# Patient Record
Sex: FEMALE | Race: WHITE | Employment: OTHER | URBAN - NONMETROPOLITAN AREA
[De-identification: names, ages, dates, MRNs, and addresses within clinical notes are randomized per-mention and may not be internally consistent; named-entity substitution may affect disease eponyms.]

---

## 2017-01-11 ENCOUNTER — HOSPITAL ENCOUNTER (EMERGENCY)
Age: 62
Discharge: HOME | End: 2017-01-11
Payer: COMMERCIAL

## 2017-01-11 ENCOUNTER — APPOINTMENT (OUTPATIENT)
Dept: GENERAL RADIOLOGY | Age: 62
End: 2017-01-11
Payer: COMMERCIAL

## 2017-01-11 VITALS
SYSTOLIC BLOOD PRESSURE: 126 MMHG | HEART RATE: 89 BPM | OXYGEN SATURATION: 100 % | RESPIRATION RATE: 18 BRPM | DIASTOLIC BLOOD PRESSURE: 77 MMHG | WEIGHT: 230 LBS | BODY MASS INDEX: 38.32 KG/M2 | HEIGHT: 65 IN | TEMPERATURE: 97.3 F

## 2017-01-11 DIAGNOSIS — S20.211A RIB CONTUSION, RIGHT, INITIAL ENCOUNTER: Primary | ICD-10-CM
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
## 2017-01-11 PROCEDURE — 71100 X-RAY EXAM RIBS UNI 2 VIEWS: CPT

## 2017-01-11 PROCEDURE — 99282 EMERGENCY DEPT VISIT SF MDM: CPT | Performed by: NURSE PRACTITIONER

## 2017-01-11 PROCEDURE — 99283 EMERGENCY DEPT VISIT LOW MDM: CPT

## 2017-01-11 PROCEDURE — 96372 THER/PROPH/DIAG INJ SC/IM: CPT

## 2017-01-11 PROCEDURE — 6360000002 HC RX W HCPCS: Performed by: NURSE PRACTITIONER

## 2017-01-11 PROCEDURE — 71020 XR CHEST STANDARD TWO VW: CPT

## 2017-01-11 RX ORDER — NAPROXEN 500 MG/1
500 TABLET ORAL 2 TIMES DAILY
Qty: 20 TABLET | Refills: 0 | Status: ON HOLD | OUTPATIENT
Start: 2017-01-11 | End: 2017-09-02 | Stop reason: HOSPADM

## 2017-01-11 RX ORDER — ATORVASTATIN CALCIUM 10 MG/1
10 TABLET, FILM COATED ORAL DAILY
COMMUNITY
End: 2021-05-27 | Stop reason: SDUPTHER

## 2017-01-11 RX ORDER — ESOMEPRAZOLE MAGNESIUM 40 MG/1
40 FOR SUSPENSION ORAL DAILY
COMMUNITY
End: 2021-05-27

## 2017-01-11 RX ORDER — ONDANSETRON 2 MG/ML
4 INJECTION INTRAMUSCULAR; INTRAVENOUS ONCE
Status: COMPLETED | OUTPATIENT
Start: 2017-01-11 | End: 2017-01-11

## 2017-01-11 RX ORDER — MORPHINE SULFATE 4 MG/ML
4 INJECTION, SOLUTION INTRAMUSCULAR; INTRAVENOUS ONCE
Status: COMPLETED | OUTPATIENT
Start: 2017-01-11 | End: 2017-01-11

## 2017-01-11 RX ORDER — HYDROCODONE BITARTRATE AND ACETAMINOPHEN 5; 325 MG/1; MG/1
1 TABLET ORAL EVERY 6 HOURS PRN
Qty: 15 TABLET | Refills: 0 | Status: SHIPPED | OUTPATIENT
Start: 2017-01-11 | End: 2017-01-18

## 2017-01-11 RX ORDER — CYCLOBENZAPRINE HCL 10 MG
10 TABLET ORAL 3 TIMES DAILY PRN
Qty: 30 TABLET | Refills: 0 | Status: SHIPPED | OUTPATIENT
Start: 2017-01-11 | End: 2017-01-21

## 2017-01-11 RX ADMIN — MORPHINE SULFATE 4 MG: 4 INJECTION, SOLUTION INTRAMUSCULAR; INTRAVENOUS at 15:18

## 2017-01-11 RX ADMIN — ONDANSETRON 4 MG: 2 INJECTION INTRAMUSCULAR; INTRAVENOUS at 15:18

## 2017-01-11 ASSESSMENT — PAIN SCALES - GENERAL
PAINLEVEL_OUTOF10: 10
PAINLEVEL_OUTOF10: 10

## 2017-07-21 ENCOUNTER — OFFICE VISIT (OUTPATIENT)
Dept: OTOLARYNGOLOGY | Facility: CLINIC | Age: 62
End: 2017-07-21

## 2017-07-21 VITALS
TEMPERATURE: 98.6 F | WEIGHT: 241 LBS | HEIGHT: 65 IN | SYSTOLIC BLOOD PRESSURE: 167 MMHG | DIASTOLIC BLOOD PRESSURE: 98 MMHG | HEART RATE: 105 BPM | BODY MASS INDEX: 40.15 KG/M2

## 2017-07-21 DIAGNOSIS — K11.20 SUBMANDIBULAR SIALOADENITIS: Primary | ICD-10-CM

## 2017-07-21 PROCEDURE — 99203 OFFICE O/P NEW LOW 30 MIN: CPT | Performed by: PHYSICIAN ASSISTANT

## 2017-07-21 RX ORDER — GABAPENTIN 400 MG/1
800 CAPSULE ORAL 3 TIMES DAILY PRN
COMMUNITY
End: 2020-01-29 | Stop reason: DRUGHIGH

## 2017-07-21 RX ORDER — ESOMEPRAZOLE MAGNESIUM 40 MG/1
40 CAPSULE, DELAYED RELEASE ORAL
COMMUNITY
End: 2020-01-29 | Stop reason: SDUPTHER

## 2017-07-21 RX ORDER — ATORVASTATIN CALCIUM 10 MG/1
10 TABLET, FILM COATED ORAL DAILY
COMMUNITY
Start: 2017-06-06 | End: 2020-01-29 | Stop reason: SDUPTHER

## 2017-07-21 RX ORDER — LISINOPRIL 10 MG/1
10 TABLET ORAL DAILY
COMMUNITY
Start: 2017-06-06 | End: 2020-01-29 | Stop reason: SDUPTHER

## 2017-07-21 RX ORDER — CLINDAMYCIN HYDROCHLORIDE 300 MG/1
CAPSULE ORAL
COMMUNITY
Start: 2017-07-20 | End: 2017-07-28

## 2017-07-21 NOTE — PROGRESS NOTES
Patient Care Team:  NATTY Seymour as PCP - General (Family Medicine)  MARIO Park as Physician Assistant (Otolaryngology)    Chief Complaint   Patient presents with   • Nodule left side of neck        Subjective     Lindsay Laughlin is a 61 y.o. female who presents for evaluation.  HPI Comments: Patient presents for evaluation of left neck swelling and pain. This started several days ago and was very large yesterday with deviation of the trachea with difficulty breathing. The patient was treated with IM steroids and antibiotics and set up to follow-up today. The patient reports greatly improved symptoms since yesterday. She denies any other symptoms at this time.      Review of Systems  Review of Systems   Constitutional: Negative for activity change, appetite change, chills, diaphoresis, fatigue, fever and unexpected weight change.   HENT: Negative for congestion, dental problem, drooling, ear discharge, ear pain, facial swelling, hearing loss, mouth sores, nosebleeds, postnasal drip, rhinorrhea, sinus pressure, sneezing, sore throat, tinnitus, trouble swallowing and voice change.         Left neck swelling and pain   Eyes: Negative.    Respiratory: Negative.    Cardiovascular: Negative.    Gastrointestinal: Negative.    Endocrine: Negative.    Skin: Negative.    Allergic/Immunologic: Negative for environmental allergies, food allergies and immunocompromised state.   Neurological: Negative.    Hematological: Negative.    Psychiatric/Behavioral: Negative.        History  Past Medical History:   Diagnosis Date   • Hypertension    • Neuropathy    • Type 2 diabetes mellitus      Past Surgical History:   Procedure Laterality Date   • CARPAL TUNNEL RELEASE     •  SECTION     • KNEE SURGERY     • TONSILLECTOMY       History reviewed. No pertinent family history.  Social History   Substance Use Topics   • Smoking status: Never Smoker   • Smokeless tobacco: None   • Alcohol use No        Current Outpatient Prescriptions:   •  atorvastatin (LIPITOR) 10 MG tablet, , Disp: , Rfl:   •  clindamycin (CLEOCIN) 300 MG capsule, , Disp: , Rfl:   •  diclofenac (VOLTAREN) 50 MG EC tablet, Take 50 mg by mouth 2 (Two) Times a Day As Needed., Disp: , Rfl:   •  esomeprazole (nexIUM) 40 MG capsule, Take 40 mg by mouth Every Morning Before Breakfast., Disp: , Rfl:   •  gabapentin (NEURONTIN) 400 MG capsule, Take 400 mg by mouth 3 (Three) Times a Day., Disp: , Rfl:   •  lisinopril (PRINIVIL,ZESTRIL) 10 MG tablet, , Disp: , Rfl:   •  metFORMIN (GLUCOPHAGE) 1000 MG tablet, Take 1,000 mg by mouth 2 (Two) Times a Day With Meals., Disp: , Rfl:   Allergies:  Review of patient's allergies indicates no known allergies.    Objective     Vital Signs  Temp:  [98.6 °F (37 °C)] 98.6 °F (37 °C)  Heart Rate:  [105] 105  BP: (167)/(98) 167/98    Physical Exam:  Physical Exam   Constitutional: She is oriented to person, place, and time. Vital signs are normal. She appears well-developed and well-nourished. No distress.   HENT:   Head: Normocephalic and atraumatic.   Right Ear: Hearing, tympanic membrane, external ear and ear canal normal.   Left Ear: Hearing, tympanic membrane, external ear and ear canal normal.   Nose: Nose normal. No mucosal edema, rhinorrhea, nasal deformity or septal deviation.   Mouth/Throat: Uvula is midline, oropharynx is clear and moist and mucous membranes are normal. No oral lesions. No trismus in the jaw. Abnormal dentition. No dental abscesses or uvula swelling. No oropharyngeal exudate, posterior oropharyngeal edema, posterior oropharyngeal erythema or tonsillar abscesses.   Eyes: Conjunctivae, EOM and lids are normal. Pupils are equal, round, and reactive to light. No scleral icterus.   Neck: Trachea normal and phonation normal. No tracheal tenderness present. No tracheal deviation present. No thyroid mass and no thyromegaly present.       Cardiovascular: Normal rate.    Pulmonary/Chest: Effort  normal. No stridor. No respiratory distress.   Lymphadenopathy:        Head (right side): No submental, no submandibular, no tonsillar, no preauricular and no posterior auricular adenopathy present.        Head (left side): Submandibular adenopathy present. No submental, no tonsillar, no preauricular and no posterior auricular adenopathy present.     She has no cervical adenopathy.        Right cervical: No superficial cervical, no deep cervical and no posterior cervical adenopathy present.       Left cervical: No superficial cervical, no deep cervical and no posterior cervical adenopathy present.   Neurological: She is alert and oriented to person, place, and time. No cranial nerve deficit. Coordination and gait normal.   Skin: Skin is warm, dry and intact. No rash noted. She is not diaphoretic. No erythema. No pallor.   Psychiatric: She has a normal mood and affect. Her speech is normal and behavior is normal. Judgment and thought content normal.   Vitals reviewed.      Results Review:   I reviewed the patient's new clinical results.    Assessment/Plan     Problems Addressed this Visit        Other    Submandibular sialoadenitis - Primary    Relevant Medications    clindamycin (CLEOCIN) 300 MG capsule          My findings and recommendations were discussed and questions were answered. Continue antibiotic, start massaging the gland, warm compresses, and sore candies. If symptoms start to worsen again advised to call and will admit patient for IV antibiotics and possible I&D.    Return for Follow-up Monday for recheck of left neck swellling.    MARIO Park  07/21/17  1:38 PM

## 2017-07-23 ENCOUNTER — HOSPITAL ENCOUNTER (EMERGENCY)
Facility: HOSPITAL | Age: 62
Discharge: HOME | End: 2017-07-23
Attending: EMERGENCY MEDICINE | Admitting: EMERGENCY MEDICINE

## 2017-07-23 VITALS
WEIGHT: 245 LBS | SYSTOLIC BLOOD PRESSURE: 124 MMHG | TEMPERATURE: 98.6 F | HEART RATE: 79 BPM | HEIGHT: 65 IN | DIASTOLIC BLOOD PRESSURE: 74 MMHG | BODY MASS INDEX: 40.82 KG/M2 | OXYGEN SATURATION: 100 % | RESPIRATION RATE: 18 BRPM

## 2017-07-23 DIAGNOSIS — K11.20 SUBMANDIBULAR SIALOADENITIS: Primary | ICD-10-CM

## 2017-07-23 PROCEDURE — 25010000002 PIPERACILLIN SOD-TAZOBACTAM PER 1 G: Performed by: EMERGENCY MEDICINE

## 2017-07-23 PROCEDURE — 96375 TX/PRO/DX INJ NEW DRUG ADDON: CPT

## 2017-07-23 PROCEDURE — 25010000002 METHYLPREDNISOLONE PER 125 MG: Performed by: EMERGENCY MEDICINE

## 2017-07-23 PROCEDURE — 99283 EMERGENCY DEPT VISIT LOW MDM: CPT

## 2017-07-23 PROCEDURE — 96365 THER/PROPH/DIAG IV INF INIT: CPT

## 2017-07-23 RX ORDER — SODIUM CHLORIDE 0.9 % (FLUSH) 0.9 %
10 SYRINGE (ML) INJECTION AS NEEDED
Status: DISCONTINUED | OUTPATIENT
Start: 2017-07-23 | End: 2017-07-23 | Stop reason: HOSPADM

## 2017-07-23 RX ORDER — METHYLPREDNISOLONE SODIUM SUCCINATE 125 MG/2ML
125 INJECTION, POWDER, LYOPHILIZED, FOR SOLUTION INTRAMUSCULAR; INTRAVENOUS ONCE
Status: COMPLETED | OUTPATIENT
Start: 2017-07-23 | End: 2017-07-23

## 2017-07-23 RX ORDER — OXYCODONE AND ACETAMINOPHEN 7.5; 325 MG/1; MG/1
1 TABLET ORAL ONCE
Status: COMPLETED | OUTPATIENT
Start: 2017-07-23 | End: 2017-07-23

## 2017-07-23 RX ADMIN — TAZOBACTAM SODIUM AND PIPERACILLIN SODIUM 4.5 G: 500; 4 INJECTION, SOLUTION INTRAVENOUS at 16:44

## 2017-07-23 RX ADMIN — METHYLPREDNISOLONE SODIUM SUCCINATE 125 MG: 125 INJECTION, POWDER, FOR SOLUTION INTRAMUSCULAR; INTRAVENOUS at 16:46

## 2017-07-23 RX ADMIN — OXYCODONE HYDROCHLORIDE AND ACETAMINOPHEN 1 TABLET: 7.5; 325 TABLET ORAL at 16:44

## 2017-07-23 NOTE — ED PROVIDER NOTES
Subjective   HPI Comments: Patient has a knot in the left side of her neck just under the child its been there for more than a week.  One point it was very large had greater than 6 cm when she was seen at [facility].  She was referred to ENT and saw Jorge Frank on Friday.  By that time the mass was much smaller.  She says that she is supposed to see him again tomorrow but was told if it worsened over the weekend to come to the emergency room.  She spoke his answering service today because they do not seem to be a little larger and more painful and that her to come here and he would be paged and have it drained.    Patient is a 61 y.o. female presenting with neck pain.   History provided by:  Patient and spouse   used: No    Neck Pain   Pain location:  L side  Quality:  Aching  Pain radiates to:  Does not radiate  Pain severity:  Moderate  Pain is:  Same all the time  Onset quality:  Gradual  Duration:  1 week  Timing:  Constant  Progression:  Waxing and waning  Chronicity:  New  Context: not fall, not jumping from heights, not lifting a heavy object, not MCA, not MVC, not pedestrian accident and not recent injury    Relieved by:  Nothing  Worsened by:  Nothing  Ineffective treatments:  None tried  Associated symptoms: no bladder incontinence, no bowel incontinence, no chest pain, no fever, no headaches, no leg pain, no numbness, no paresis, no photophobia, no syncope, no tingling, no visual change, no weakness and no weight loss    Risk factors: no hx of head and neck radiation, no hx of osteoporosis, no hx of spinal trauma, no recent epidural, no recent head injury and no recurrent falls        Review of Systems   Constitutional: Negative.  Negative for fever and weight loss.   HENT: Negative.    Eyes: Negative for photophobia.   Respiratory: Negative.    Cardiovascular: Negative.  Negative for chest pain and syncope.   Gastrointestinal: Negative.  Negative for bowel incontinence.    Genitourinary: Negative.  Negative for bladder incontinence.   Musculoskeletal: Positive for neck pain.   Neurological: Negative.  Negative for tingling, weakness, numbness and headaches.   Hematological: Negative.    Psychiatric/Behavioral: Negative.    All other systems reviewed and are negative.      Past Medical History:   Diagnosis Date   • Hypertension    • Neuropathy    • Type 2 diabetes mellitus        No Known Allergies    Past Surgical History:   Procedure Laterality Date   • CARPAL TUNNEL RELEASE     •  SECTION     • KNEE SURGERY     • TONSILLECTOMY         History reviewed. No pertinent family history.    Social History     Social History   • Marital status:      Spouse name: N/A   • Number of children: N/A   • Years of education: N/A     Social History Main Topics   • Smoking status: Never Smoker   • Smokeless tobacco: None   • Alcohol use No   • Drug use: Defer   • Sexual activity: Not Asked     Other Topics Concern   • None     Social History Narrative           Objective   Physical Exam   Constitutional: She is oriented to person, place, and time. She appears well-developed and well-nourished.   HENT:   Head: Normocephalic and atraumatic.   Mouth/Throat: Oropharynx is clear and moist.   Eyes: EOM are normal. Pupils are equal, round, and reactive to light.   Neck: Normal range of motion. Neck supple.   There is a 1-2 cm tender palpable nodule under the left mandible area.  There is no heat or erythema or fluctuance noted.     Cardiovascular: Normal rate and regular rhythm.    Pulmonary/Chest: Effort normal and breath sounds normal.   Musculoskeletal: Normal range of motion.   Neurological: She is alert and oriented to person, place, and time.   Skin: Skin is warm and dry.   Psychiatric: She has a normal mood and affect. Her behavior is normal.   Nursing note and vitals reviewed.      Procedures         ED Course  ED Course   Comment By Time   I did speak with abdomen he advised some  IV antibiotics and steroids and keep the appointment tomorrow and I told the patient this. Nishant Hdz Jr., MD 07/23 1715                  MDM  Number of Diagnoses or Management Options  Submandibular sialoadenitis: established and worsening  Risk of Complications, Morbidity, and/or Mortality  Presenting problems: moderate  Diagnostic procedures: moderate  Management options: moderate    Patient Progress  Patient progress: stable      Final diagnoses:   Submandibular sialoadenitis            Nishant Hdz Jr., MD  07/23/17 [id number]

## 2017-07-24 ENCOUNTER — OFFICE VISIT (OUTPATIENT)
Dept: OTOLARYNGOLOGY | Facility: CLINIC | Age: 62
End: 2017-07-24

## 2017-07-24 VITALS
HEART RATE: 95 BPM | DIASTOLIC BLOOD PRESSURE: 81 MMHG | HEIGHT: 65 IN | TEMPERATURE: 98.1 F | SYSTOLIC BLOOD PRESSURE: 160 MMHG | WEIGHT: 240 LBS | BODY MASS INDEX: 39.99 KG/M2

## 2017-07-24 DIAGNOSIS — R22.1 MASS OF LEFT SIDE OF NECK: Primary | ICD-10-CM

## 2017-07-24 DIAGNOSIS — K21.9 GASTROESOPHAGEAL REFLUX DISEASE, ESOPHAGITIS PRESENCE NOT SPECIFIED: ICD-10-CM

## 2017-07-24 DIAGNOSIS — D49.9 NEOPLASM OF UNSPECIFIED BEHAVIOR OF UNSPECIFIED SITE: ICD-10-CM

## 2017-07-24 DIAGNOSIS — Z86.39 HISTORY OF HYPERCHOLESTEROLEMIA: ICD-10-CM

## 2017-07-24 DIAGNOSIS — E04.2 MULTINODULAR GOITER: ICD-10-CM

## 2017-07-24 DIAGNOSIS — R13.14 PHARYNGOESOPHAGEAL DYSPHAGIA: ICD-10-CM

## 2017-07-24 PROCEDURE — 99213 OFFICE O/P EST LOW 20 MIN: CPT | Performed by: PHYSICIAN ASSISTANT

## 2017-07-24 NOTE — PROGRESS NOTES
Patient Care Team:  NATTY Seymour as PCP - General (Family Medicine)  MARIO Park as Physician Assistant (Otolaryngology)    Chief Complaint   Patient presents with   • Follow-up     neck swelling        Subjective     Lindsay Laughlin is a 61 y.o. female who presents for evaluation.  HPI Comments: Patient presents for follow-up evaluation of left neck swelling and pain. The patient was last seen in the office on Friday and had improved greatly from her previous visit. Over the weekend the patient returned to the ER and was treated for increased swelling and pain again. Today the patient reports stable symptoms and the ultrasound shows a continue mass in the submandibular gland area and also noted thyroid nodules. The patient denies thyroid problems or a family history of thyroid problems, thyroid cancer, or radiation exposure. No other problems at this time.       Review of Systems  Review of Systems   Constitutional: Negative for activity change, appetite change, chills, diaphoresis, fatigue, fever and unexpected weight change.   HENT: Negative for congestion, dental problem, drooling, ear discharge, ear pain, facial swelling, hearing loss, mouth sores, nosebleeds, postnasal drip, rhinorrhea, sinus pressure, sneezing, sore throat, tinnitus, trouble swallowing and voice change.         Left neck swelling and pain   Eyes: Negative.    Respiratory: Negative.    Cardiovascular: Negative.    Gastrointestinal: Negative.    Endocrine: Negative.    Skin: Negative.    Allergic/Immunologic: Negative for environmental allergies, food allergies and immunocompromised state.   Neurological: Negative.    Hematological: Negative.    Psychiatric/Behavioral: Negative.        History  Past Medical History:   Diagnosis Date   • Hypertension    • Neuropathy    • Type 2 diabetes mellitus      Past Surgical History:   Procedure Laterality Date   • CARPAL TUNNEL RELEASE     •  SECTION     • KNEE SURGERY    "  • TONSILLECTOMY       History reviewed. No pertinent family history.  Social History   Substance Use Topics   • Smoking status: Never Smoker   • Smokeless tobacco: None   • Alcohol use No       Current Outpatient Prescriptions:   •  atorvastatin (LIPITOR) 10 MG tablet, , Disp: , Rfl:   •  clindamycin (CLEOCIN) 300 MG capsule, , Disp: , Rfl:   •  diclofenac (VOLTAREN) 50 MG EC tablet, Take 50 mg by mouth 2 (Two) Times a Day As Needed., Disp: , Rfl:   •  esomeprazole (nexIUM) 40 MG capsule, Take 40 mg by mouth Every Morning Before Breakfast., Disp: , Rfl:   •  gabapentin (NEURONTIN) 400 MG capsule, Take 400 mg by mouth 3 (Three) Times a Day., Disp: , Rfl:   •  lisinopril (PRINIVIL,ZESTRIL) 10 MG tablet, , Disp: , Rfl:   •  metFORMIN (GLUCOPHAGE) 1000 MG tablet, Take 1,000 mg by mouth 2 (Two) Times a Day With Meals., Disp: , Rfl:   Allergies:  Review of patient's allergies indicates no known allergies.    Objective     Vital Signs      Vitals:    07/24/17 1406   BP: 160/81   Patient Position: Sitting   Pulse: 95   Temp: 98.1 °F (36.7 °C)   Weight: 240 lb (109 kg)   Height: 65\" (165.1 cm)       Physical Exam:  Physical Exam   Constitutional: She is oriented to person, place, and time. Vital signs are normal. She appears well-developed and well-nourished. No distress.   HENT:   Head: Normocephalic and atraumatic.   Right Ear: Hearing, tympanic membrane, external ear and ear canal normal.   Left Ear: Hearing, tympanic membrane, external ear and ear canal normal.   Nose: Nose normal. No mucosal edema, rhinorrhea, nasal deformity or septal deviation.   Mouth/Throat: Uvula is midline, oropharynx is clear and moist and mucous membranes are normal. No oral lesions. No trismus in the jaw. Abnormal dentition. No dental abscesses or uvula swelling. No oropharyngeal exudate, posterior oropharyngeal edema, posterior oropharyngeal erythema or tonsillar abscesses.   Eyes: Conjunctivae, EOM and lids are normal. Pupils are equal, " round, and reactive to light. No scleral icterus.   Neck: Trachea normal and phonation normal. No tracheal tenderness present. No tracheal deviation present. Thyroid mass present. No thyromegaly present.       Cardiovascular: Normal rate.    Pulmonary/Chest: Effort normal. No stridor. No respiratory distress.   Lymphadenopathy:        Head (right side): No submental, no submandibular, no tonsillar, no preauricular and no posterior auricular adenopathy present.        Head (left side): Submandibular adenopathy present. No submental, no tonsillar, no preauricular and no posterior auricular adenopathy present.     She has no cervical adenopathy.        Right cervical: No superficial cervical, no deep cervical and no posterior cervical adenopathy present.       Left cervical: No superficial cervical, no deep cervical and no posterior cervical adenopathy present.   Neurological: She is alert and oriented to person, place, and time. No cranial nerve deficit. Coordination and gait normal.   Skin: Skin is warm, dry and intact. No rash noted. She is not diaphoretic. No erythema. No pallor.   Psychiatric: She has a normal mood and affect. Her speech is normal and behavior is normal. Judgment and thought content normal.   Vitals reviewed.      Results Review:   I reviewed the patient's new clinical results.    Assessment/Plan     Problems Addressed this Visit        Digestive    Pharyngoesophageal dysphagia    Relevant Orders    Ambulatory Referral to Gastroenterology    Gastroesophageal reflux disease    Relevant Orders    Ambulatory Referral to Gastroenterology       Endocrine    Multinodular goiter    Relevant Orders    TSH    Thyroid Peroxidase Antibody    T4, Free    T3, Free    US Thyroid       Other    Mass of left side of neck - Primary    Relevant Orders    Case Request (Completed)    Comprehensive Metabolic Panel    CBC and Differential    Protime-INR    APTT    ECG 12 Lead    XR Chest 2 View    History of  hypercholesterolemia     Relevant Orders    Protime-INR    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 Will set patient up for excisional biopsy of left neck lymph node. The patient also has thyroid nodules that will need monitored, thus will repeat ultrasound in 6 months.     Return in about 6 months (around 1/24/2018) for Recheck thyroid with ultrasound and postoperatively as directed.    MARIO Park  07/26/17  1:46 PM

## 2017-07-28 ENCOUNTER — APPOINTMENT (OUTPATIENT)
Dept: PREADMISSION TESTING | Facility: HOSPITAL | Age: 62
End: 2017-07-28

## 2017-07-28 ENCOUNTER — HOSPITAL ENCOUNTER (OUTPATIENT)
Dept: GENERAL RADIOLOGY | Facility: HOSPITAL | Age: 62
Discharge: HOME | End: 2017-07-28
Admitting: PHYSICIAN ASSISTANT

## 2017-07-28 VITALS
RESPIRATION RATE: 18 BRPM | OXYGEN SATURATION: 98 % | HEART RATE: 91 BPM | DIASTOLIC BLOOD PRESSURE: 57 MMHG | SYSTOLIC BLOOD PRESSURE: 106 MMHG | WEIGHT: 239.2 LBS | BODY MASS INDEX: 40.84 KG/M2 | HEIGHT: 64 IN

## 2017-07-28 DIAGNOSIS — D49.9 NEOPLASM OF UNSPECIFIED BEHAVIOR OF UNSPECIFIED SITE: ICD-10-CM

## 2017-07-28 DIAGNOSIS — R22.1 MASS OF LEFT SIDE OF NECK: ICD-10-CM

## 2017-07-28 DIAGNOSIS — Z86.39 HISTORY OF HYPERCHOLESTEROLEMIA: ICD-10-CM

## 2017-07-28 LAB
ALBUMIN SERPL-MCNC: 3.8 G/DL (ref 3.5–5)
ALBUMIN/GLOB SERPL: 1.3 G/DL (ref 1.1–2.5)
ALP SERPL-CCNC: 106 U/L (ref 24–120)
ALT SERPL W P-5'-P-CCNC: 39 U/L (ref 0–54)
ANION GAP SERPL CALCULATED.3IONS-SCNC: 11 MMOL/L (ref 4–13)
APTT PPP: 29.7 SECONDS (ref 24.1–34.8)
AST SERPL-CCNC: 23 U/L (ref 7–45)
BASOPHILS # BLD AUTO: 0.03 10*3/MM3 (ref 0–0.2)
BASOPHILS NFR BLD AUTO: 0.2 % (ref 0–2)
BILIRUB SERPL-MCNC: 0.6 MG/DL (ref 0.1–1)
BUN BLD-MCNC: 40 MG/DL (ref 5–21)
BUN/CREAT SERPL: 22.3 (ref 7–25)
CALCIUM SPEC-SCNC: 8.6 MG/DL (ref 8.4–10.4)
CHLORIDE SERPL-SCNC: 103 MMOL/L (ref 98–110)
CO2 SERPL-SCNC: 23 MMOL/L (ref 24–31)
CREAT BLD-MCNC: 1.79 MG/DL (ref 0.5–1.4)
DEPRECATED RDW RBC AUTO: 46.7 FL (ref 40–54)
EOSINOPHIL # BLD AUTO: 0.38 10*3/MM3 (ref 0–0.7)
EOSINOPHIL NFR BLD AUTO: 2.8 % (ref 0–4)
ERYTHROCYTE [DISTWIDTH] IN BLOOD BY AUTOMATED COUNT: 14 % (ref 12–15)
GFR SERPL CREATININE-BSD FRML MDRD: 29 ML/MIN/1.73
GLOBULIN UR ELPH-MCNC: 3 GM/DL
GLUCOSE BLD-MCNC: 236 MG/DL (ref 70–100)
HCT VFR BLD AUTO: 33.9 % (ref 37–47)
HGB BLD-MCNC: 10.9 G/DL (ref 12–16)
IMM GRANULOCYTES # BLD: 0.07 10*3/MM3 (ref 0–0.03)
IMM GRANULOCYTES NFR BLD: 0.5 % (ref 0–5)
INR PPP: 0.98 (ref 0.91–1.09)
LYMPHOCYTES # BLD AUTO: 1.39 10*3/MM3 (ref 0.72–4.86)
LYMPHOCYTES NFR BLD AUTO: 10.3 % (ref 15–45)
MCH RBC QN AUTO: 29.2 PG (ref 28–32)
MCHC RBC AUTO-ENTMCNC: 32.2 G/DL (ref 33–36)
MCV RBC AUTO: 90.9 FL (ref 82–98)
MONOCYTES # BLD AUTO: 1.01 10*3/MM3 (ref 0.19–1.3)
MONOCYTES NFR BLD AUTO: 7.4 % (ref 4–12)
NEUTROPHILS # BLD AUTO: 10.68 10*3/MM3 (ref 1.87–8.4)
NEUTROPHILS NFR BLD AUTO: 78.8 % (ref 39–78)
PLATELET # BLD AUTO: 382 10*3/MM3 (ref 130–400)
PMV BLD AUTO: 10.5 FL (ref 6–12)
POTASSIUM BLD-SCNC: 4.7 MMOL/L (ref 3.5–5.3)
PROT SERPL-MCNC: 6.8 G/DL (ref 6.3–8.7)
PROTHROMBIN TIME: 13.3 SECONDS (ref 11.9–14.6)
RBC # BLD AUTO: 3.73 10*6/MM3 (ref 4.2–5.4)
SODIUM BLD-SCNC: 137 MMOL/L (ref 135–145)
T3FREE SERPL-MCNC: 3.76 PG/ML (ref 2.77–5.27)
T4 FREE SERPL-MCNC: 1.55 NG/DL (ref 0.78–2.19)
TSH SERPL DL<=0.05 MIU/L-ACNC: 0.85 MIU/ML (ref 0.47–4.68)
WBC NRBC COR # BLD: 13.56 10*3/MM3 (ref 4.8–10.8)

## 2017-07-28 PROCEDURE — 36415 COLL VENOUS BLD VENIPUNCTURE: CPT | Performed by: PHYSICIAN ASSISTANT

## 2017-07-28 PROCEDURE — 71020 HC CHEST PA AND LATERAL: CPT

## 2017-07-28 PROCEDURE — 85610 PROTHROMBIN TIME: CPT | Performed by: PHYSICIAN ASSISTANT

## 2017-07-28 PROCEDURE — 93010 ELECTROCARDIOGRAM REPORT: CPT | Performed by: INTERNAL MEDICINE

## 2017-07-28 PROCEDURE — 85730 THROMBOPLASTIN TIME PARTIAL: CPT | Performed by: PHYSICIAN ASSISTANT

## 2017-07-28 PROCEDURE — 84443 ASSAY THYROID STIM HORMONE: CPT | Performed by: PHYSICIAN ASSISTANT

## 2017-07-28 PROCEDURE — 84481 FREE ASSAY (FT-3): CPT | Performed by: PHYSICIAN ASSISTANT

## 2017-07-28 PROCEDURE — 86376 MICROSOMAL ANTIBODY EACH: CPT | Performed by: PHYSICIAN ASSISTANT

## 2017-07-28 PROCEDURE — 85025 COMPLETE CBC W/AUTO DIFF WBC: CPT | Performed by: PHYSICIAN ASSISTANT

## 2017-07-28 PROCEDURE — 93005 ELECTROCARDIOGRAM TRACING: CPT

## 2017-07-28 PROCEDURE — 84439 ASSAY OF FREE THYROXINE: CPT | Performed by: PHYSICIAN ASSISTANT

## 2017-07-28 PROCEDURE — 80053 COMPREHEN METABOLIC PANEL: CPT | Performed by: PHYSICIAN ASSISTANT

## 2017-07-28 RX ORDER — ACETAMINOPHEN 500 MG
500 TABLET ORAL AS NEEDED
COMMUNITY
End: 2020-01-29

## 2017-07-29 LAB — THYROPEROXIDASE AB SERPL-ACNC: 12 IU/ML (ref 0–34)

## 2017-08-04 ENCOUNTER — TELEPHONE (OUTPATIENT)
Dept: OTOLARYNGOLOGY | Facility: CLINIC | Age: 62
End: 2017-08-04

## 2017-08-04 ENCOUNTER — ANESTHESIA (OUTPATIENT)
Dept: PERIOP | Facility: HOSPITAL | Age: 62
End: 2017-08-04

## 2017-08-04 ENCOUNTER — HOSPITAL ENCOUNTER (OUTPATIENT)
Facility: HOSPITAL | Age: 62
Setting detail: HOSPITAL OUTPATIENT SURGERY
Discharge: HOME | End: 2017-08-04
Attending: OTOLARYNGOLOGY | Admitting: OTOLARYNGOLOGY

## 2017-08-04 ENCOUNTER — ANESTHESIA EVENT (OUTPATIENT)
Dept: PERIOP | Facility: HOSPITAL | Age: 62
End: 2017-08-04

## 2017-08-04 VITALS
DIASTOLIC BLOOD PRESSURE: 60 MMHG | SYSTOLIC BLOOD PRESSURE: 115 MMHG | RESPIRATION RATE: 16 BRPM | OXYGEN SATURATION: 96 % | HEART RATE: 72 BPM | TEMPERATURE: 97 F

## 2017-08-04 DIAGNOSIS — R22.1 MASS OF LEFT SIDE OF NECK: ICD-10-CM

## 2017-08-04 LAB
GLUCOSE BLDC GLUCOMTR-MCNC: 153 MG/DL (ref 70–130)
GLUCOSE BLDC GLUCOMTR-MCNC: 173 MG/DL (ref 70–130)

## 2017-08-04 PROCEDURE — 25010000002 FENTANYL CITRATE (PF) 100 MCG/2ML SOLUTION: Performed by: ANESTHESIOLOGY

## 2017-08-04 PROCEDURE — 87070 CULTURE OTHR SPECIMN AEROBIC: CPT | Performed by: OTOLARYNGOLOGY

## 2017-08-04 PROCEDURE — 25010000002 PROPOFOL 10 MG/ML EMULSION: Performed by: NURSE ANESTHETIST, CERTIFIED REGISTERED

## 2017-08-04 PROCEDURE — 21555 EXC NECK LES SC < 3 CM: CPT | Performed by: OTOLARYNGOLOGY

## 2017-08-04 PROCEDURE — 82962 GLUCOSE BLOOD TEST: CPT

## 2017-08-04 PROCEDURE — 88304 TISSUE EXAM BY PATHOLOGIST: CPT | Performed by: OTOLARYNGOLOGY

## 2017-08-04 PROCEDURE — 87205 SMEAR GRAM STAIN: CPT | Performed by: OTOLARYNGOLOGY

## 2017-08-04 PROCEDURE — 87075 CULTR BACTERIA EXCEPT BLOOD: CPT | Performed by: OTOLARYNGOLOGY

## 2017-08-04 PROCEDURE — 25010000002 HYDROMORPHONE PER 4 MG: Performed by: ANESTHESIOLOGY

## 2017-08-04 PROCEDURE — 25010000002 MEPERIDINE PER 100 MG: Performed by: ANESTHESIOLOGY

## 2017-08-04 PROCEDURE — 25010000002 ONDANSETRON PER 1 MG: Performed by: NURSE ANESTHETIST, CERTIFIED REGISTERED

## 2017-08-04 PROCEDURE — 25010000002 MIDAZOLAM PER 1 MG: Performed by: ANESTHESIOLOGY

## 2017-08-04 RX ORDER — MIDAZOLAM HYDROCHLORIDE 1 MG/ML
2 INJECTION INTRAMUSCULAR; INTRAVENOUS
Status: DISCONTINUED | OUTPATIENT
Start: 2017-08-04 | End: 2017-08-04 | Stop reason: HOSPADM

## 2017-08-04 RX ORDER — MORPHINE SULFATE 2 MG/ML
2 INJECTION, SOLUTION INTRAMUSCULAR; INTRAVENOUS
Status: DISCONTINUED | OUTPATIENT
Start: 2017-08-04 | End: 2017-08-04 | Stop reason: HOSPADM

## 2017-08-04 RX ORDER — HYDROCODONE BITARTRATE AND ACETAMINOPHEN 5; 325 MG/1; MG/1
1 TABLET ORAL EVERY 4 HOURS PRN
Qty: 8 TABLET | Refills: 0 | Status: SHIPPED | OUTPATIENT
Start: 2017-08-04 | End: 2019-10-16

## 2017-08-04 RX ORDER — HYDROCODONE BITARTRATE AND ACETAMINOPHEN 5; 325 MG/1; MG/1
1 TABLET ORAL ONCE AS NEEDED
Status: DISCONTINUED | OUTPATIENT
Start: 2017-08-04 | End: 2017-08-04 | Stop reason: HOSPADM

## 2017-08-04 RX ORDER — FENTANYL CITRATE 50 UG/ML
25 INJECTION, SOLUTION INTRAMUSCULAR; INTRAVENOUS
Status: COMPLETED | OUTPATIENT
Start: 2017-08-04 | End: 2017-08-04

## 2017-08-04 RX ORDER — MIDAZOLAM HYDROCHLORIDE 1 MG/ML
1 INJECTION INTRAMUSCULAR; INTRAVENOUS
Status: DISCONTINUED | OUTPATIENT
Start: 2017-08-04 | End: 2017-08-04 | Stop reason: HOSPADM

## 2017-08-04 RX ORDER — MAGNESIUM HYDROXIDE 1200 MG/15ML
LIQUID ORAL AS NEEDED
Status: DISCONTINUED | OUTPATIENT
Start: 2017-08-04 | End: 2017-08-04 | Stop reason: HOSPADM

## 2017-08-04 RX ORDER — ONDANSETRON 2 MG/ML
INJECTION INTRAMUSCULAR; INTRAVENOUS AS NEEDED
Status: DISCONTINUED | OUTPATIENT
Start: 2017-08-04 | End: 2017-08-04 | Stop reason: SURG

## 2017-08-04 RX ORDER — LIDOCAINE HYDROCHLORIDE 40 MG/ML
SOLUTION TOPICAL AS NEEDED
Status: DISCONTINUED | OUTPATIENT
Start: 2017-08-04 | End: 2017-08-04 | Stop reason: SURG

## 2017-08-04 RX ORDER — HYDRALAZINE HYDROCHLORIDE 20 MG/ML
5 INJECTION INTRAMUSCULAR; INTRAVENOUS
Status: DISCONTINUED | OUTPATIENT
Start: 2017-08-04 | End: 2017-08-04 | Stop reason: HOSPADM

## 2017-08-04 RX ORDER — ONDANSETRON 2 MG/ML
4 INJECTION INTRAMUSCULAR; INTRAVENOUS ONCE AS NEEDED
Status: DISCONTINUED | OUTPATIENT
Start: 2017-08-04 | End: 2017-08-04 | Stop reason: HOSPADM

## 2017-08-04 RX ORDER — PROPOFOL 10 MG/ML
VIAL (ML) INTRAVENOUS AS NEEDED
Status: DISCONTINUED | OUTPATIENT
Start: 2017-08-04 | End: 2017-08-04 | Stop reason: SURG

## 2017-08-04 RX ORDER — LABETALOL HYDROCHLORIDE 5 MG/ML
5 INJECTION, SOLUTION INTRAVENOUS
Status: DISCONTINUED | OUTPATIENT
Start: 2017-08-04 | End: 2017-08-04 | Stop reason: HOSPADM

## 2017-08-04 RX ORDER — SODIUM CHLORIDE, SODIUM LACTATE, POTASSIUM CHLORIDE, CALCIUM CHLORIDE 600; 310; 30; 20 MG/100ML; MG/100ML; MG/100ML; MG/100ML
9 INJECTION, SOLUTION INTRAVENOUS CONTINUOUS
Status: DISCONTINUED | OUTPATIENT
Start: 2017-08-04 | End: 2017-08-04 | Stop reason: HOSPADM

## 2017-08-04 RX ORDER — DIPHENHYDRAMINE HYDROCHLORIDE 50 MG/ML
12.5 INJECTION INTRAMUSCULAR; INTRAVENOUS
Status: DISCONTINUED | OUTPATIENT
Start: 2017-08-04 | End: 2017-08-04 | Stop reason: HOSPADM

## 2017-08-04 RX ORDER — IPRATROPIUM BROMIDE AND ALBUTEROL SULFATE 2.5; .5 MG/3ML; MG/3ML
3 SOLUTION RESPIRATORY (INHALATION) ONCE AS NEEDED
Status: DISCONTINUED | OUTPATIENT
Start: 2017-08-04 | End: 2017-08-04 | Stop reason: HOSPADM

## 2017-08-04 RX ORDER — LIDOCAINE HYDROCHLORIDE AND EPINEPHRINE 10; 10 MG/ML; UG/ML
INJECTION, SOLUTION INFILTRATION; PERINEURAL AS NEEDED
Status: DISCONTINUED | OUTPATIENT
Start: 2017-08-04 | End: 2017-08-04 | Stop reason: HOSPADM

## 2017-08-04 RX ORDER — ROCURONIUM BROMIDE 10 MG/ML
INJECTION, SOLUTION INTRAVENOUS AS NEEDED
Status: DISCONTINUED | OUTPATIENT
Start: 2017-08-04 | End: 2017-08-04 | Stop reason: SURG

## 2017-08-04 RX ORDER — SODIUM CHLORIDE 0.9 % (FLUSH) 0.9 %
1-10 SYRINGE (ML) INJECTION AS NEEDED
Status: DISCONTINUED | OUTPATIENT
Start: 2017-08-04 | End: 2017-08-04 | Stop reason: HOSPADM

## 2017-08-04 RX ORDER — DEXTROSE MONOHYDRATE 25 G/50ML
12.5 INJECTION, SOLUTION INTRAVENOUS AS NEEDED
Status: DISCONTINUED | OUTPATIENT
Start: 2017-08-04 | End: 2017-08-04 | Stop reason: HOSPADM

## 2017-08-04 RX ORDER — MEPERIDINE HYDROCHLORIDE 25 MG/ML
12.5 INJECTION INTRAMUSCULAR; INTRAVENOUS; SUBCUTANEOUS
Status: DISCONTINUED | OUTPATIENT
Start: 2017-08-04 | End: 2017-08-04 | Stop reason: HOSPADM

## 2017-08-04 RX ORDER — NALOXONE HCL 0.4 MG/ML
0.4 VIAL (ML) INJECTION AS NEEDED
Status: DISCONTINUED | OUTPATIENT
Start: 2017-08-04 | End: 2017-08-04 | Stop reason: HOSPADM

## 2017-08-04 RX ADMIN — MIDAZOLAM HYDROCHLORIDE 2 MG: 1 INJECTION, SOLUTION INTRAMUSCULAR; INTRAVENOUS at 08:58

## 2017-08-04 RX ADMIN — ROCURONIUM BROMIDE 15 MG: 10 INJECTION INTRAVENOUS at 09:25

## 2017-08-04 RX ADMIN — FENTANYL CITRATE 25 MCG: 50 INJECTION, SOLUTION INTRAMUSCULAR; INTRAVENOUS at 08:44

## 2017-08-04 RX ADMIN — HYDROMORPHONE HYDROCHLORIDE 1 MG: 1 INJECTION, SOLUTION INTRAMUSCULAR; INTRAVENOUS; SUBCUTANEOUS at 11:09

## 2017-08-04 RX ADMIN — ONDANSETRON HYDROCHLORIDE 4 MG: 2 SOLUTION INTRAMUSCULAR; INTRAVENOUS at 09:45

## 2017-08-04 RX ADMIN — FENTANYL CITRATE 50 MCG: 50 INJECTION, SOLUTION INTRAMUSCULAR; INTRAVENOUS at 08:57

## 2017-08-04 RX ADMIN — FENTANYL CITRATE 25 MCG: 50 INJECTION, SOLUTION INTRAMUSCULAR; INTRAVENOUS at 08:31

## 2017-08-04 RX ADMIN — HYDROMORPHONE HYDROCHLORIDE 1 MG: 1 INJECTION, SOLUTION INTRAMUSCULAR; INTRAVENOUS; SUBCUTANEOUS at 11:00

## 2017-08-04 RX ADMIN — SODIUM CHLORIDE, POTASSIUM CHLORIDE, SODIUM LACTATE AND CALCIUM CHLORIDE 9 ML/HR: 600; 310; 30; 20 INJECTION, SOLUTION INTRAVENOUS at 08:32

## 2017-08-04 RX ADMIN — FENTANYL CITRATE 100 MCG: 50 INJECTION, SOLUTION INTRAMUSCULAR; INTRAVENOUS at 09:23

## 2017-08-04 RX ADMIN — MEPERIDINE HYDROCHLORIDE 25 MG: 25 INJECTION, SOLUTION INTRAMUSCULAR; INTRAVENOUS; SUBCUTANEOUS at 11:15

## 2017-08-04 RX ADMIN — MIDAZOLAM HYDROCHLORIDE 2 MG: 1 INJECTION, SOLUTION INTRAMUSCULAR; INTRAVENOUS at 08:31

## 2017-08-04 RX ADMIN — LIDOCAINE HYDROCHLORIDE 1 EACH: 40 SOLUTION TOPICAL at 09:28

## 2017-08-04 RX ADMIN — PROPOFOL 150 MG: 10 INJECTION, EMULSION INTRAVENOUS at 09:25

## 2017-08-04 RX ADMIN — SODIUM CHLORIDE, POTASSIUM CHLORIDE, SODIUM LACTATE AND CALCIUM CHLORIDE 9 ML/HR: 600; 310; 30; 20 INJECTION, SOLUTION INTRAVENOUS at 11:18

## 2017-08-04 RX ADMIN — LIDOCAINE HYDROCHLORIDE 0.5 ML: 10 INJECTION, SOLUTION EPIDURAL; INFILTRATION; INTRACAUDAL; PERINEURAL at 08:32

## 2017-08-04 NOTE — TELEPHONE ENCOUNTER
Pt was told to use the Bactroban ointment, but was not given a script for it.  Will send in to pharmacy per Jorge

## 2017-08-04 NOTE — ANESTHESIA POSTPROCEDURE EVALUATION
Patient: Lindsay Laughlin    Procedure Summary     Date Anesthesia Start Anesthesia Stop Room / Location    08/04/17 0922 1050 [facility] OR 03 / [facility]       Procedure Diagnosis Surgeon Provider    LEFT NECK EXPLORATION WITH INCISIONAL BIOPSY/CULTURE (Left Neck) Mass of left side of neck  (Mass of left side of neck [R22.1]) MD Keith Michaels CRNA          Anesthesia Type: MAC  Last vitals  BP   (!) 89/49 (08/04/17 1140)    Temp   97 °F (36.1 °C) (08/04/17 1140)    Pulse   67 (08/04/17 1140)   Resp   20 (08/04/17 1140)    SpO2   99 % (08/04/17 1140)      Post Anesthesia Care and Evaluation      Comments: Patient discharged from PACU per protocol, VSS.   Blood pressure (!) 89/49, pulse 67, temperature 97 °F (36.1 °C), temperature source Temporal Artery , resp. rate 20, SpO2 99 %.

## 2017-08-04 NOTE — OP NOTE
OPERATIVE NOTE  8/4/2017    NAME: Lindsay Laughlin    YOB: 1955  MRN: [id number]    PRE-OPERATIVE DIAGNOSIS:    Mass of left side of neck [R22.1]    POST-OPERATIVE DIAGNOSIS:   Post-Op Diagnosis Codes:     * Mass of left side of neck [R22.1]    PROCEDURE PERFORMED:   Left neck exploration with excisional biopsy and culture    SURGEON:   Blue Sanchez MD    ASSISTANT(S):   None    ANESTHESIA:   General Anesthesia via Laryngeal Airway    INDICATIONS: The patient is a 61 y.o. female with Mass of left side of neck [R22.1]    PROCEDURE:  The patient was brought to the operating room, given General Anesthesia via Laryngeal Airway, and prepped and draped in the usual manner.     Proximally 8 mL 1% Xylocaine with epinephrine was injected subcutaneously in the planned incision site level to the left neck.  Incision was made horizontally utilizing #15 blade for approximately 4 cm.  Is was carried down through the superficial layer of deep cervical fascia minimal bleeding encountered which was controlled throughout the case with a combination of electrocautery and 3-0 silk ties and 2-0 silk stick ties.  Anteriorly beneath the submandibular gland a large amount of purulent material (approximate 5 mL) associated with granulation tissue in the inferior aspect of the submandibular gland was noted.  A small amount of the granulation tissue was excised and submitted for permanent pathologic examination.  Cultures were taken of the purulent material both anaerobic and aerobic.    Wound was irrigated with copious amounts of bacitracin irrigation and the wound reapproximated utilizing interrupted 4-0 Vicryl subcutaneously and interrupted 5-0 nylon to reapproximate the epidermis.  Steri-Strips Mastisol Bactroban ointment and a Mepilex were applied.  The procedure was terminated.   The patient tolerated the procedure well without complications and was transported to the postanesthesia care unit in stable  condition.    SPECIMENS:  * No specimens in log *    COMPLICATIONS: NONE    ESTIMATED BLOOD LOSS:  < 25 cc    Blue Sanchez MD  8/4/2017

## 2017-08-04 NOTE — ANESTHESIA PREPROCEDURE EVALUATION
Anesthesia Evaluation     Patient summary reviewed   no history of anesthetic complications:  NPO Solid Status: > 8 hours       Airway   Mallampati: II  TM distance: >3 FB  Neck ROM: full  Dental    (+) upper dentures    Pulmonary    (-) asthma, sleep apnea, not a smoker  Cardiovascular   Exercise tolerance: good (4-7 METS)    ECG reviewed    (+) hypertension, hyperlipidemia  (-) pacemaker, past MI, angina, cardiac stents      Neuro/Psych  (-) seizures, CVA  GI/Hepatic/Renal/Endo    (+) morbid obesity, GERD, renal disease CRI, diabetes mellitus,   (-) liver disease    Musculoskeletal     Abdominal    Substance History      OB/GYN          Other                                        Anesthesia Plan    ASA 2     MAC     intravenous induction   Anesthetic plan and risks discussed with patient.

## 2017-08-04 NOTE — ANESTHESIA PROCEDURE NOTES
Airway  Urgency: elective    Date/Time: 8/4/2017 9:34 AM  Airway not difficult    General Information and Staff    Patient location during procedure: OR  CRNA: DIANA NANCE    Indications and Patient Condition  Indications for airway management: airway protection    Preoxygenated: yes  MILS maintained throughout  Mask difficulty assessment: 1 - vent by mask    Final Airway Details  Final airway type: endotracheal airway      Successful airway: ETT  Cuffed: yes   Successful intubation technique: direct laryngoscopy  Endotracheal tube insertion site: oral  Blade: Meza  Blade size: #2  ETT size: 7.5 mm  Cormack-Lehane Classification: grade I - full view of glottis  Placement verified by: chest auscultation and capnometry   Cuff volume (mL): 5  Measured from: gums  ETT to gums (cm): 21  Number of attempts at approach: 1    Additional Comments  Atraumatic

## 2017-08-04 NOTE — PLAN OF CARE
Problem: Patient Care Overview (Adult)  Goal: Plan of Care Review  Outcome: Outcome(s) achieved Date Met:  08/04/17 08/04/17 1255   Coping/Psychosocial Response Interventions   Plan Of Care Reviewed With significant other   Patient Care Overview   Progress improving   Outcome Evaluation   Outcome Summary/Follow up Plan ready for dc          Problem: Perioperative Period (Adult)  Goal: Signs and Symptoms of Listed Potential Problems Will be Absent or Manageable (Perioperative Period)  Outcome: Outcome(s) achieved Date Met:  08/04/17 08/04/17 1255   Perioperative Period   Problems Assessed (Perioperative Period) all   Problems Present (Perioperative Period) none

## 2017-08-04 NOTE — PLAN OF CARE
Problem: Patient Care Overview (Adult)  Goal: Plan of Care Review  Outcome: Ongoing (interventions implemented as appropriate)    08/04/17 0831   Coping/Psychosocial Response Interventions   Plan Of Care Reviewed With patient   Patient Care Overview   Progress no change         Problem: Perioperative Period (Adult)  Goal: Signs and Symptoms of Listed Potential Problems Will be Absent or Manageable (Perioperative Period)  Outcome: Ongoing (interventions implemented as appropriate)    08/04/17 0831   Perioperative Period   Problems Assessed (Perioperative Period) pain;hypothermia;physiologic stress response;situational response   Problems Present (Perioperative Period) pain

## 2017-08-04 NOTE — DISCHARGE INSTRUCTIONS

## 2017-08-04 NOTE — PLAN OF CARE
Problem: Patient Care Overview (Adult)  Goal: Plan of Care Review  Outcome: Ongoing (interventions implemented as appropriate)    08/04/17 1135   Coping/Psychosocial Response Interventions   Plan Of Care Reviewed With patient   Patient Care Overview   Progress no change   Outcome Evaluation   Outcome Summary/Follow up Plan vs stable, meets pacu discharge criteria, patient has periods of anxiety, reassured by staff         Problem: Perioperative Period (Adult)  Goal: Signs and Symptoms of Listed Potential Problems Will be Absent or Manageable (Perioperative Period)  Outcome: Ongoing (interventions implemented as appropriate)

## 2017-08-07 LAB
CYTO UR: NORMAL
LAB AP CASE REPORT: NORMAL
Lab: NORMAL
PATH REPORT.FINAL DX SPEC: NORMAL
PATH REPORT.GROSS SPEC: NORMAL

## 2017-08-09 LAB
BACTERIA SPEC AEROBE CULT: NORMAL
BACTERIA SPEC ANAEROBE CULT: NORMAL
GRAM STN SPEC: NORMAL
GRAM STN SPEC: NORMAL

## 2017-08-11 RX ORDER — CLARITHROMYCIN 500 MG/1
500 TABLET, COATED ORAL 2 TIMES DAILY
Qty: 20 TABLET | Refills: 0 | Status: SHIPPED | OUTPATIENT
Start: 2017-08-11 | End: 2017-08-21

## 2017-08-14 ENCOUNTER — OFFICE VISIT (OUTPATIENT)
Dept: OTOLARYNGOLOGY | Facility: CLINIC | Age: 62
End: 2017-08-14

## 2017-08-14 DIAGNOSIS — R22.1 MASS OF LEFT SIDE OF NECK: Primary | ICD-10-CM

## 2017-08-14 PROCEDURE — 99024 POSTOP FOLLOW-UP VISIT: CPT | Performed by: OTOLARYNGOLOGY

## 2017-08-14 RX ORDER — BENZONATATE 100 MG/1
100 CAPSULE ORAL 3 TIMES DAILY PRN
Qty: 30 CAPSULE | Refills: 0 | Status: SHIPPED | OUTPATIENT
Start: 2017-08-14 | End: 2017-08-24

## 2017-08-14 NOTE — PROGRESS NOTES
Procedure   Suture Removal  Date/Time: 8/14/2017 9:48 AM  Performed by: RAYA CARLSON  Authorized by: PAUL RUSSO   Consent: Verbal consent obtained.  Consent given by: patient  Patient identity confirmed: verbally with patient  Body area: head/neck  Location details: neck  Comments: Patient presents for suture removal. Patient's incision is well-approximated with no redness or edema noted. Patient notified of path report. Jorge Merida, PAC, has listened to patient's chest due to coughing. She was given antibiotics at last visit. We will give her cough medicine today.

## 2017-08-30 ENCOUNTER — APPOINTMENT (OUTPATIENT)
Dept: CT IMAGING | Age: 62
DRG: 683 | End: 2017-08-30
Payer: MEDICARE

## 2017-08-30 ENCOUNTER — HOSPITAL ENCOUNTER (INPATIENT)
Age: 62
LOS: 2 days | Discharge: HOME | DRG: 683 | End: 2017-09-02
Attending: EMERGENCY MEDICINE | Admitting: HOSPITALIST
Payer: MEDICARE

## 2017-08-30 DIAGNOSIS — N17.9 ACUTE RENAL FAILURE, UNSPECIFIED ACUTE RENAL FAILURE TYPE (HCC): Primary | ICD-10-CM

## 2017-08-30 DIAGNOSIS — E11.65 TYPE 2 DIABETES MELLITUS WITH HYPERGLYCEMIA, UNSPECIFIED LONG TERM INSULIN USE STATUS: ICD-10-CM

## 2017-08-30 DIAGNOSIS — R11.11 VOMITING WITHOUT NAUSEA, INTRACTABILITY OF VOMITING NOT SPECIFIED, UNSPECIFIED VOMITING TYPE: ICD-10-CM

## 2017-08-30 DIAGNOSIS — R19.7 DIARRHEA, UNSPECIFIED TYPE: ICD-10-CM

## 2017-08-30 LAB
ALBUMIN SERPL-MCNC: 3.5 G/DL (ref 3.5–5.2)
ALP BLD-CCNC: 83 U/L (ref 35–104)
ALT SERPL-CCNC: 18 U/L (ref 5–33)
ANION GAP SERPL CALCULATED.3IONS-SCNC: 19 MMOL/L (ref 7–19)
AST SERPL-CCNC: 11 U/L (ref 5–32)
BACTERIA: NEGATIVE /HPF
BASE EXCESS ARTERIAL: -6.8 MMOL/L (ref -2–2)
BASOPHILS ABSOLUTE: 0 K/UL (ref 0–0.2)
BASOPHILS RELATIVE PERCENT: 0.2 % (ref 0–1)
BILIRUB SERPL-MCNC: 0.4 MG/DL (ref 0.2–1.2)
BILIRUBIN URINE: ABNORMAL
BLOOD, URINE: NEGATIVE
BUN BLDV-MCNC: 55 MG/DL (ref 8–23)
CALCIUM SERPL-MCNC: 8.8 MG/DL (ref 8.8–10.2)
CARBOXYHEMOGLOBIN ARTERIAL: 2.1 % (ref 0–5)
CHLORIDE BLD-SCNC: 98 MMOL/L (ref 98–111)
CLARITY: ABNORMAL
CO2: 17 MMOL/L (ref 22–29)
COLOR: ABNORMAL
CREAT SERPL-MCNC: 2.5 MG/DL (ref 0.5–0.9)
EOSINOPHILS ABSOLUTE: 0 K/UL (ref 0–0.6)
EOSINOPHILS RELATIVE PERCENT: 0.1 % (ref 0–5)
EPITHELIAL CELLS, UA: 7 /HPF (ref 0–5)
GFR NON-AFRICAN AMERICAN: 19
GLUCOSE BLD-MCNC: 242 MG/DL (ref 74–109)
GLUCOSE URINE: NEGATIVE MG/DL
HCO3 ARTERIAL: 17.5 MMOL/L (ref 22–26)
HCT VFR BLD CALC: 37.4 % (ref 37–47)
HEMOGLOBIN, ART, EXTENDED: 13 G/DL (ref 12–16)
HEMOGLOBIN: 12.2 G/DL (ref 12–16)
HYALINE CASTS: 16 /HPF (ref 0–8)
KETONES, URINE: ABNORMAL MG/DL
LEUKOCYTE ESTERASE, URINE: ABNORMAL
LIPASE: 80 U/L (ref 13–60)
LYMPHOCYTES ABSOLUTE: 1.2 K/UL (ref 1.1–4.5)
LYMPHOCYTES RELATIVE PERCENT: 6.9 % (ref 20–40)
MCH RBC QN AUTO: 30.7 PG (ref 27–31)
MCHC RBC AUTO-ENTMCNC: 32.6 G/DL (ref 33–37)
MCV RBC AUTO: 94.2 FL (ref 81–99)
METHEMOGLOBIN ARTERIAL: 1.3 %
MONOCYTES ABSOLUTE: 1 K/UL (ref 0–0.9)
MONOCYTES RELATIVE PERCENT: 5.7 % (ref 0–10)
NEUTROPHILS ABSOLUTE: 15.2 K/UL (ref 1.5–7.5)
NEUTROPHILS RELATIVE PERCENT: 86.6 % (ref 50–65)
NITRITE, URINE: NEGATIVE
O2 CONTENT ARTERIAL: 17.5 ML/DL
O2 SAT, ARTERIAL: 95.2 %
O2 THERAPY: ABNORMAL
PCO2 ARTERIAL: 31 MMHG (ref 35–45)
PDW BLD-RTO: 14.6 % (ref 11.5–14.5)
PH ARTERIAL: 7.36 (ref 7.35–7.45)
PH UA: 5
PLATELET # BLD: 227 K/UL (ref 130–400)
PMV BLD AUTO: 11.2 FL (ref 9.4–12.3)
PO2 ARTERIAL: 97 MMHG (ref 80–100)
POTASSIUM SERPL-SCNC: 4.9 MMOL/L (ref 3.5–5)
POTASSIUM, WHOLE BLOOD: 4.8
PROTEIN UA: 30 MG/DL
RBC # BLD: 3.97 M/UL (ref 4.2–5.4)
RBC UA: 2 /HPF (ref 0–4)
SODIUM BLD-SCNC: 134 MMOL/L (ref 136–145)
SPECIFIC GRAVITY UA: 1.02
TOTAL PROTEIN: 6.6 G/DL (ref 6.6–8.7)
UROBILINOGEN, URINE: 0.2 E.U./DL
WBC # BLD: 17.6 K/UL (ref 4.8–10.8)
WBC UA: 6 /HPF (ref 0–5)

## 2017-08-30 PROCEDURE — 96365 THER/PROPH/DIAG IV INF INIT: CPT

## 2017-08-30 PROCEDURE — 6360000002 HC RX W HCPCS: Performed by: EMERGENCY MEDICINE

## 2017-08-30 PROCEDURE — 81001 URINALYSIS AUTO W/SCOPE: CPT

## 2017-08-30 PROCEDURE — 84132 ASSAY OF SERUM POTASSIUM: CPT

## 2017-08-30 PROCEDURE — 2580000003 HC RX 258: Performed by: EMERGENCY MEDICINE

## 2017-08-30 PROCEDURE — 83690 ASSAY OF LIPASE: CPT

## 2017-08-30 PROCEDURE — 36600 WITHDRAWAL OF ARTERIAL BLOOD: CPT

## 2017-08-30 PROCEDURE — 80053 COMPREHEN METABOLIC PANEL: CPT

## 2017-08-30 PROCEDURE — 36415 COLL VENOUS BLD VENIPUNCTURE: CPT

## 2017-08-30 PROCEDURE — 85025 COMPLETE CBC W/AUTO DIFF WBC: CPT

## 2017-08-30 PROCEDURE — 99284 EMERGENCY DEPT VISIT MOD MDM: CPT | Performed by: EMERGENCY MEDICINE

## 2017-08-30 PROCEDURE — 99285 EMERGENCY DEPT VISIT HI MDM: CPT

## 2017-08-30 PROCEDURE — 74176 CT ABD & PELVIS W/O CONTRAST: CPT

## 2017-08-30 PROCEDURE — 87086 URINE CULTURE/COLONY COUNT: CPT

## 2017-08-30 PROCEDURE — 82803 BLOOD GASES ANY COMBINATION: CPT

## 2017-08-30 RX ORDER — 0.9 % SODIUM CHLORIDE 0.9 %
1000 INTRAVENOUS SOLUTION INTRAVENOUS ONCE
Status: COMPLETED | OUTPATIENT
Start: 2017-08-30 | End: 2017-08-30

## 2017-08-30 RX ORDER — 0.9 % SODIUM CHLORIDE 0.9 %
1000 INTRAVENOUS SOLUTION INTRAVENOUS ONCE
Status: COMPLETED | OUTPATIENT
Start: 2017-08-30 | End: 2017-08-31

## 2017-08-30 RX ADMIN — CEFTRIAXONE 1 G: 1 INJECTION, POWDER, FOR SOLUTION INTRAMUSCULAR; INTRAVENOUS at 22:43

## 2017-08-30 RX ADMIN — SODIUM CHLORIDE 1000 ML: 9 INJECTION, SOLUTION INTRAVENOUS at 22:22

## 2017-08-30 RX ADMIN — SODIUM CHLORIDE 1000 ML: 9 INJECTION, SOLUTION INTRAVENOUS at 22:38

## 2017-08-30 ASSESSMENT — ENCOUNTER SYMPTOMS
SORE THROAT: 0
ABDOMINAL PAIN: 1
DIARRHEA: 1
NAUSEA: 1
BACK PAIN: 0
SHORTNESS OF BREATH: 0
VOMITING: 1

## 2017-08-30 ASSESSMENT — PAIN SCALES - GENERAL: PAINLEVEL_OUTOF10: 10

## 2017-08-30 ASSESSMENT — PAIN DESCRIPTION - LOCATION: LOCATION: ABDOMEN

## 2017-08-31 PROBLEM — R11.2 NAUSEA AND VOMITING IN ADULT PATIENT: Status: ACTIVE | Noted: 2017-08-31

## 2017-08-31 PROBLEM — N17.9 AKI (ACUTE KIDNEY INJURY) (HCC): Status: ACTIVE | Noted: 2017-08-31

## 2017-08-31 LAB
C DIFFICILE TOXIN, EIA: NORMAL
CREATININE URINE: 137.2 MG/DL (ref 4.2–622)
EOSINOPHIL,URINE: NORMAL
GLUCOSE BLD-MCNC: 193 MG/DL (ref 70–99)
GLUCOSE BLD-MCNC: 198 MG/DL (ref 70–99)
GLUCOSE BLD-MCNC: 200 MG/DL (ref 70–99)
GLUCOSE BLD-MCNC: 217 MG/DL (ref 70–99)
GLUCOSE BLD-MCNC: 253 MG/DL (ref 70–99)
PERFORMED ON: ABNORMAL
SODIUM URINE: 55 MMOL/L
UREA NITROGEN, UR: 625 MG/DL

## 2017-08-31 PROCEDURE — 87324 CLOSTRIDIUM AG IA: CPT

## 2017-08-31 PROCEDURE — 1210000000 HC MED SURG R&B

## 2017-08-31 PROCEDURE — 6360000002 HC RX W HCPCS: Performed by: EMERGENCY MEDICINE

## 2017-08-31 PROCEDURE — 96375 TX/PRO/DX INJ NEW DRUG ADDON: CPT

## 2017-08-31 PROCEDURE — 84300 ASSAY OF URINE SODIUM: CPT

## 2017-08-31 PROCEDURE — 6360000002 HC RX W HCPCS: Performed by: PHYSICIAN ASSISTANT

## 2017-08-31 PROCEDURE — 87046 STOOL CULTR AEROBIC BACT EA: CPT

## 2017-08-31 PROCEDURE — 6370000000 HC RX 637 (ALT 250 FOR IP): Performed by: INTERNAL MEDICINE

## 2017-08-31 PROCEDURE — 84540 ASSAY OF URINE/UREA-N: CPT

## 2017-08-31 PROCEDURE — 89050 BODY FLUID CELL COUNT: CPT

## 2017-08-31 PROCEDURE — 99223 1ST HOSP IP/OBS HIGH 75: CPT | Performed by: INTERNAL MEDICINE

## 2017-08-31 PROCEDURE — 6360000002 HC RX W HCPCS: Performed by: INTERNAL MEDICINE

## 2017-08-31 PROCEDURE — 2580000003 HC RX 258: Performed by: INTERNAL MEDICINE

## 2017-08-31 PROCEDURE — 82948 REAGENT STRIP/BLOOD GLUCOSE: CPT

## 2017-08-31 PROCEDURE — 51702 INSERT TEMP BLADDER CATH: CPT

## 2017-08-31 PROCEDURE — 2580000003 HC RX 258: Performed by: PHYSICIAN ASSISTANT

## 2017-08-31 PROCEDURE — 87335 E COLI 0157 AG IA: CPT

## 2017-08-31 PROCEDURE — 87045 FECES CULTURE AEROBIC BACT: CPT

## 2017-08-31 PROCEDURE — 82570 ASSAY OF URINE CREATININE: CPT

## 2017-08-31 RX ORDER — ACETAMINOPHEN 325 MG/1
650 TABLET ORAL EVERY 4 HOURS PRN
Status: DISCONTINUED | OUTPATIENT
Start: 2017-08-31 | End: 2017-09-02 | Stop reason: HOSPADM

## 2017-08-31 RX ORDER — DOCUSATE SODIUM 100 MG/1
100 CAPSULE, LIQUID FILLED ORAL 2 TIMES DAILY
Status: DISCONTINUED | OUTPATIENT
Start: 2017-08-31 | End: 2017-09-02 | Stop reason: HOSPADM

## 2017-08-31 RX ORDER — NICOTINE POLACRILEX 4 MG
15 LOZENGE BUCCAL PRN
Status: DISCONTINUED | OUTPATIENT
Start: 2017-08-31 | End: 2017-09-02 | Stop reason: HOSPADM

## 2017-08-31 RX ORDER — SODIUM CHLORIDE 0.9 % (FLUSH) 0.9 %
10 SYRINGE (ML) INJECTION EVERY 12 HOURS SCHEDULED
Status: DISCONTINUED | OUTPATIENT
Start: 2017-08-31 | End: 2017-09-02 | Stop reason: HOSPADM

## 2017-08-31 RX ORDER — HYDROCODONE BITARTRATE AND ACETAMINOPHEN 10; 325 MG/1; MG/1
1 TABLET ORAL EVERY 6 HOURS PRN
Status: DISCONTINUED | OUTPATIENT
Start: 2017-08-31 | End: 2017-09-02 | Stop reason: HOSPADM

## 2017-08-31 RX ORDER — SODIUM CHLORIDE 9 MG/ML
INJECTION, SOLUTION INTRAVENOUS CONTINUOUS
Status: DISCONTINUED | OUTPATIENT
Start: 2017-08-31 | End: 2017-09-02 | Stop reason: HOSPADM

## 2017-08-31 RX ORDER — SODIUM CHLORIDE 0.9 % (FLUSH) 0.9 %
10 SYRINGE (ML) INJECTION PRN
Status: DISCONTINUED | OUTPATIENT
Start: 2017-08-31 | End: 2017-09-02 | Stop reason: HOSPADM

## 2017-08-31 RX ORDER — DEXTROSE MONOHYDRATE 25 G/50ML
12.5 INJECTION, SOLUTION INTRAVENOUS PRN
Status: DISCONTINUED | OUTPATIENT
Start: 2017-08-31 | End: 2017-09-02 | Stop reason: HOSPADM

## 2017-08-31 RX ORDER — ONDANSETRON 2 MG/ML
4 INJECTION INTRAMUSCULAR; INTRAVENOUS EVERY 6 HOURS PRN
Status: DISCONTINUED | OUTPATIENT
Start: 2017-08-31 | End: 2017-09-02 | Stop reason: HOSPADM

## 2017-08-31 RX ORDER — MORPHINE SULFATE 4 MG/ML
4 INJECTION, SOLUTION INTRAMUSCULAR; INTRAVENOUS ONCE
Status: COMPLETED | OUTPATIENT
Start: 2017-08-31 | End: 2017-08-31

## 2017-08-31 RX ORDER — DEXTROSE MONOHYDRATE 50 MG/ML
100 INJECTION, SOLUTION INTRAVENOUS PRN
Status: DISCONTINUED | OUTPATIENT
Start: 2017-08-31 | End: 2017-09-02 | Stop reason: HOSPADM

## 2017-08-31 RX ORDER — ONDANSETRON 2 MG/ML
4 INJECTION INTRAMUSCULAR; INTRAVENOUS ONCE
Status: COMPLETED | OUTPATIENT
Start: 2017-08-31 | End: 2017-08-31

## 2017-08-31 RX ADMIN — SODIUM CHLORIDE: 9 INJECTION, SOLUTION INTRAVENOUS at 22:29

## 2017-08-31 RX ADMIN — INSULIN LISPRO 2 UNITS: 100 INJECTION, SOLUTION INTRAVENOUS; SUBCUTANEOUS at 17:40

## 2017-08-31 RX ADMIN — ONDANSETRON 4 MG: 2 INJECTION INTRAMUSCULAR; INTRAVENOUS at 00:18

## 2017-08-31 RX ADMIN — HYDROCODONE BITARTRATE AND ACETAMINOPHEN 1 TABLET: 10; 325 TABLET ORAL at 02:15

## 2017-08-31 RX ADMIN — DOCUSATE SODIUM 100 MG: 100 CAPSULE, LIQUID FILLED ORAL at 20:20

## 2017-08-31 RX ADMIN — ENOXAPARIN SODIUM 40 MG: 40 INJECTION SUBCUTANEOUS at 08:47

## 2017-08-31 RX ADMIN — CEFTRIAXONE SODIUM 1 G: 1 INJECTION, POWDER, FOR SOLUTION INTRAMUSCULAR; INTRAVENOUS at 18:44

## 2017-08-31 RX ADMIN — INSULIN LISPRO 4 UNITS: 100 INJECTION, SOLUTION INTRAVENOUS; SUBCUTANEOUS at 12:43

## 2017-08-31 RX ADMIN — HYDROCODONE BITARTRATE AND ACETAMINOPHEN 1 TABLET: 10; 325 TABLET ORAL at 08:47

## 2017-08-31 RX ADMIN — INSULIN LISPRO 5 UNITS: 100 INJECTION, SOLUTION INTRAVENOUS; SUBCUTANEOUS at 20:35

## 2017-08-31 RX ADMIN — Medication 10 ML: at 08:49

## 2017-08-31 RX ADMIN — HYDROCODONE BITARTRATE AND ACETAMINOPHEN 1 TABLET: 10; 325 TABLET ORAL at 23:21

## 2017-08-31 RX ADMIN — ACETAMINOPHEN 650 MG: 325 TABLET, FILM COATED ORAL at 05:47

## 2017-08-31 RX ADMIN — DOCUSATE SODIUM 100 MG: 100 CAPSULE, LIQUID FILLED ORAL at 08:47

## 2017-08-31 RX ADMIN — HYDROCODONE BITARTRATE AND ACETAMINOPHEN 1 TABLET: 10; 325 TABLET ORAL at 17:40

## 2017-08-31 RX ADMIN — SODIUM CHLORIDE: 9 INJECTION, SOLUTION INTRAVENOUS at 02:15

## 2017-08-31 RX ADMIN — MORPHINE SULFATE 4 MG: 4 INJECTION, SOLUTION INTRAMUSCULAR; INTRAVENOUS at 00:18

## 2017-08-31 RX ADMIN — INSULIN LISPRO 4 UNITS: 100 INJECTION, SOLUTION INTRAVENOUS; SUBCUTANEOUS at 08:49

## 2017-08-31 RX ADMIN — SODIUM CHLORIDE: 9 INJECTION, SOLUTION INTRAVENOUS at 08:55

## 2017-08-31 RX ADMIN — DOCUSATE SODIUM 100 MG: 100 CAPSULE, LIQUID FILLED ORAL at 02:15

## 2017-08-31 ASSESSMENT — PAIN SCALES - GENERAL
PAINLEVEL_OUTOF10: 10
PAINLEVEL_OUTOF10: 3
PAINLEVEL_OUTOF10: 9
PAINLEVEL_OUTOF10: 4
PAINLEVEL_OUTOF10: 1
PAINLEVEL_OUTOF10: 7
PAINLEVEL_OUTOF10: 0
PAINLEVEL_OUTOF10: 6
PAINLEVEL_OUTOF10: 0
PAINLEVEL_OUTOF10: 3
PAINLEVEL_OUTOF10: 6

## 2017-09-01 PROBLEM — R33.9 URINARY RETENTION: Status: ACTIVE | Noted: 2017-09-01

## 2017-09-01 PROBLEM — E87.20 METABOLIC ACIDOSIS: Status: ACTIVE | Noted: 2017-09-01

## 2017-09-01 LAB
ANION GAP SERPL CALCULATED.3IONS-SCNC: 11 MMOL/L (ref 7–19)
BASOPHILS ABSOLUTE: 0 K/UL (ref 0–0.2)
BASOPHILS RELATIVE PERCENT: 0.1 % (ref 0–1)
BUN BLDV-MCNC: 39 MG/DL (ref 8–23)
CALCIUM SERPL-MCNC: 7.5 MG/DL (ref 8.8–10.2)
CHLORIDE BLD-SCNC: 106 MMOL/L (ref 98–111)
CO2: 18 MMOL/L (ref 22–29)
CREAT SERPL-MCNC: 1.6 MG/DL (ref 0.5–0.9)
EOSINOPHILS ABSOLUTE: 0.1 K/UL (ref 0–0.6)
EOSINOPHILS RELATIVE PERCENT: 0.8 % (ref 0–5)
GFR NON-AFRICAN AMERICAN: 33
GLUCOSE BLD-MCNC: 141 MG/DL (ref 70–99)
GLUCOSE BLD-MCNC: 167 MG/DL (ref 70–99)
GLUCOSE BLD-MCNC: 170 MG/DL (ref 70–99)
GLUCOSE BLD-MCNC: 181 MG/DL (ref 70–99)
GLUCOSE BLD-MCNC: 232 MG/DL (ref 74–109)
HCT VFR BLD CALC: 28.3 % (ref 37–47)
HEMOGLOBIN: 8.8 G/DL (ref 12–16)
LYMPHOCYTES ABSOLUTE: 1.1 K/UL (ref 1.1–4.5)
LYMPHOCYTES RELATIVE PERCENT: 10.5 % (ref 20–40)
MCH RBC QN AUTO: 30.4 PG (ref 27–31)
MCHC RBC AUTO-ENTMCNC: 31.1 G/DL (ref 33–37)
MCV RBC AUTO: 97.9 FL (ref 81–99)
MONOCYTES ABSOLUTE: 0.7 K/UL (ref 0–0.9)
MONOCYTES RELATIVE PERCENT: 6.9 % (ref 0–10)
NEUTROPHILS ABSOLUTE: 8.4 K/UL (ref 1.5–7.5)
NEUTROPHILS RELATIVE PERCENT: 81.2 % (ref 50–65)
PDW BLD-RTO: 14.8 % (ref 11.5–14.5)
PERFORMED ON: ABNORMAL
PLATELET # BLD: 145 K/UL (ref 130–400)
PMV BLD AUTO: 11.3 FL (ref 9.4–12.3)
POTASSIUM SERPL-SCNC: 5 MMOL/L (ref 3.5–5)
RBC # BLD: 2.89 M/UL (ref 4.2–5.4)
SODIUM BLD-SCNC: 135 MMOL/L (ref 136–145)
URIC ACID, SERUM: 6.6 MG/DL (ref 2.4–5.7)
URINE CULTURE, ROUTINE: NORMAL
WBC # BLD: 10.4 K/UL (ref 4.8–10.8)

## 2017-09-01 PROCEDURE — 2580000003 HC RX 258: Performed by: PHYSICIAN ASSISTANT

## 2017-09-01 PROCEDURE — 6360000002 HC RX W HCPCS: Performed by: INTERNAL MEDICINE

## 2017-09-01 PROCEDURE — 80048 BASIC METABOLIC PNL TOTAL CA: CPT

## 2017-09-01 PROCEDURE — 85025 COMPLETE CBC W/AUTO DIFF WBC: CPT

## 2017-09-01 PROCEDURE — 84550 ASSAY OF BLOOD/URIC ACID: CPT

## 2017-09-01 PROCEDURE — 1210000000 HC MED SURG R&B

## 2017-09-01 PROCEDURE — 82948 REAGENT STRIP/BLOOD GLUCOSE: CPT

## 2017-09-01 PROCEDURE — 36415 COLL VENOUS BLD VENIPUNCTURE: CPT

## 2017-09-01 PROCEDURE — 6370000000 HC RX 637 (ALT 250 FOR IP): Performed by: INTERNAL MEDICINE

## 2017-09-01 PROCEDURE — 2580000003 HC RX 258: Performed by: INTERNAL MEDICINE

## 2017-09-01 PROCEDURE — 99232 SBSQ HOSP IP/OBS MODERATE 35: CPT | Performed by: PHYSICIAN ASSISTANT

## 2017-09-01 PROCEDURE — 6360000002 HC RX W HCPCS: Performed by: PHYSICIAN ASSISTANT

## 2017-09-01 RX ORDER — SODIUM BICARBONATE 650 MG/1
650 TABLET ORAL 2 TIMES DAILY
Status: DISCONTINUED | OUTPATIENT
Start: 2017-09-01 | End: 2017-09-02 | Stop reason: HOSPADM

## 2017-09-01 RX ADMIN — SODIUM BICARBONATE 650 MG: 650 TABLET ORAL at 20:33

## 2017-09-01 RX ADMIN — INSULIN LISPRO 2 UNITS: 100 INJECTION, SOLUTION INTRAVENOUS; SUBCUTANEOUS at 08:30

## 2017-09-01 RX ADMIN — Medication 10 ML: at 08:57

## 2017-09-01 RX ADMIN — HYDROCODONE BITARTRATE AND ACETAMINOPHEN 1 TABLET: 10; 325 TABLET ORAL at 09:00

## 2017-09-01 RX ADMIN — DOCUSATE SODIUM 100 MG: 100 CAPSULE, LIQUID FILLED ORAL at 08:49

## 2017-09-01 RX ADMIN — HYDROCODONE BITARTRATE AND ACETAMINOPHEN 1 TABLET: 10; 325 TABLET ORAL at 20:33

## 2017-09-01 RX ADMIN — ENOXAPARIN SODIUM 30 MG: 30 INJECTION SUBCUTANEOUS at 08:49

## 2017-09-01 RX ADMIN — ACETAMINOPHEN 650 MG: 325 TABLET, FILM COATED ORAL at 02:03

## 2017-09-01 RX ADMIN — SODIUM BICARBONATE 650 MG: 650 TABLET ORAL at 11:35

## 2017-09-01 RX ADMIN — DOCUSATE SODIUM 100 MG: 100 CAPSULE, LIQUID FILLED ORAL at 20:33

## 2017-09-01 RX ADMIN — SODIUM CHLORIDE: 9 INJECTION, SOLUTION INTRAVENOUS at 03:59

## 2017-09-01 RX ADMIN — INSULIN LISPRO 2 UNITS: 100 INJECTION, SOLUTION INTRAVENOUS; SUBCUTANEOUS at 12:30

## 2017-09-01 RX ADMIN — INSULIN LISPRO 2 UNITS: 100 INJECTION, SOLUTION INTRAVENOUS; SUBCUTANEOUS at 17:30

## 2017-09-01 RX ADMIN — CEFTRIAXONE SODIUM 1 G: 1 INJECTION, POWDER, FOR SOLUTION INTRAMUSCULAR; INTRAVENOUS at 20:33

## 2017-09-01 RX ADMIN — Medication 10 ML: at 20:34

## 2017-09-01 ASSESSMENT — PAIN SCALES - GENERAL
PAINLEVEL_OUTOF10: 5
PAINLEVEL_OUTOF10: 1
PAINLEVEL_OUTOF10: 10
PAINLEVEL_OUTOF10: 1
PAINLEVEL_OUTOF10: 10

## 2017-09-01 ASSESSMENT — ENCOUNTER SYMPTOMS
SORE THROAT: 0
BLOOD IN STOOL: 0
STRIDOR: 0
DOUBLE VISION: 0
NAUSEA: 0
BACK PAIN: 0
WHEEZING: 0
ORTHOPNEA: 0
CONSTIPATION: 0
HEARTBURN: 0
ABDOMINAL PAIN: 0
DIARRHEA: 0
BLURRED VISION: 0
COUGH: 0
VOMITING: 0
HEMOPTYSIS: 0
SHORTNESS OF BREATH: 0
PHOTOPHOBIA: 0
SPUTUM PRODUCTION: 0

## 2017-09-02 VITALS
BODY MASS INDEX: 38.34 KG/M2 | SYSTOLIC BLOOD PRESSURE: 127 MMHG | RESPIRATION RATE: 24 BRPM | WEIGHT: 230.13 LBS | DIASTOLIC BLOOD PRESSURE: 65 MMHG | HEIGHT: 65 IN | OXYGEN SATURATION: 97 % | HEART RATE: 93 BPM | TEMPERATURE: 97.6 F

## 2017-09-02 LAB
ANION GAP SERPL CALCULATED.3IONS-SCNC: 12 MMOL/L (ref 7–19)
BUN BLDV-MCNC: 20 MG/DL (ref 8–23)
CALCIUM SERPL-MCNC: 7.8 MG/DL (ref 8.8–10.2)
CHLORIDE BLD-SCNC: 110 MMOL/L (ref 98–111)
CO2: 18 MMOL/L (ref 22–29)
CREAT SERPL-MCNC: 0.9 MG/DL (ref 0.5–0.9)
GFR NON-AFRICAN AMERICAN: >60
GLUCOSE BLD-MCNC: 164 MG/DL (ref 74–109)
GLUCOSE BLD-MCNC: 199 MG/DL (ref 70–99)
HBA1C MFR BLD: 9.9 %
HCT VFR BLD CALC: 28.8 % (ref 37–47)
HEMOGLOBIN: 8.9 G/DL (ref 12–16)
MCH RBC QN AUTO: 30.5 PG (ref 27–31)
MCHC RBC AUTO-ENTMCNC: 30.9 G/DL (ref 33–37)
MCV RBC AUTO: 98.6 FL (ref 81–99)
PDW BLD-RTO: 15 % (ref 11.5–14.5)
PERFORMED ON: ABNORMAL
PLATELET # BLD: 140 K/UL (ref 130–400)
PMV BLD AUTO: 11.7 FL (ref 9.4–12.3)
POTASSIUM SERPL-SCNC: 4.9 MMOL/L (ref 3.5–5)
RBC # BLD: 2.92 M/UL (ref 4.2–5.4)
SODIUM BLD-SCNC: 140 MMOL/L (ref 136–145)
WBC # BLD: 7 K/UL (ref 4.8–10.8)

## 2017-09-02 PROCEDURE — 80048 BASIC METABOLIC PNL TOTAL CA: CPT

## 2017-09-02 PROCEDURE — 6360000002 HC RX W HCPCS: Performed by: INTERNAL MEDICINE

## 2017-09-02 PROCEDURE — 2580000003 HC RX 258: Performed by: INTERNAL MEDICINE

## 2017-09-02 PROCEDURE — 6370000000 HC RX 637 (ALT 250 FOR IP): Performed by: INTERNAL MEDICINE

## 2017-09-02 PROCEDURE — 83036 HEMOGLOBIN GLYCOSYLATED A1C: CPT

## 2017-09-02 PROCEDURE — 85027 COMPLETE CBC AUTOMATED: CPT

## 2017-09-02 PROCEDURE — 36415 COLL VENOUS BLD VENIPUNCTURE: CPT

## 2017-09-02 PROCEDURE — 99238 HOSP IP/OBS DSCHRG MGMT 30/<: CPT | Performed by: HOSPITALIST

## 2017-09-02 PROCEDURE — 82948 REAGENT STRIP/BLOOD GLUCOSE: CPT

## 2017-09-02 RX ORDER — SODIUM BICARBONATE 650 MG/1
650 TABLET ORAL 2 TIMES DAILY
Qty: 60 TABLET | Refills: 1 | Status: SHIPPED | OUTPATIENT
Start: 2017-09-02 | End: 2021-05-27

## 2017-09-02 RX ADMIN — Medication 10 ML: at 08:38

## 2017-09-02 RX ADMIN — DOCUSATE SODIUM 100 MG: 100 CAPSULE, LIQUID FILLED ORAL at 08:30

## 2017-09-02 RX ADMIN — SODIUM BICARBONATE 650 MG: 650 TABLET ORAL at 08:30

## 2017-09-02 RX ADMIN — ENOXAPARIN SODIUM 40 MG: 40 INJECTION SUBCUTANEOUS at 08:30

## 2017-09-02 RX ADMIN — INSULIN LISPRO 2 UNITS: 100 INJECTION, SOLUTION INTRAVENOUS; SUBCUTANEOUS at 08:30

## 2017-09-03 LAB
CULTURE, STOOL: NORMAL
E COLI SHIGA TOXIN ASSAY: NORMAL

## 2017-09-18 ENCOUNTER — OFFICE VISIT (OUTPATIENT)
Dept: GASTROENTEROLOGY | Facility: CLINIC | Age: 62
End: 2017-09-18

## 2017-09-18 VITALS
WEIGHT: 229 LBS | HEART RATE: 71 BPM | OXYGEN SATURATION: 98 % | HEIGHT: 65 IN | SYSTOLIC BLOOD PRESSURE: 112 MMHG | DIASTOLIC BLOOD PRESSURE: 60 MMHG | BODY MASS INDEX: 38.15 KG/M2 | TEMPERATURE: 97.7 F

## 2017-09-18 DIAGNOSIS — K21.9 GASTROESOPHAGEAL REFLUX DISEASE, ESOPHAGITIS PRESENCE NOT SPECIFIED: ICD-10-CM

## 2017-09-18 DIAGNOSIS — R12 HEARTBURN: ICD-10-CM

## 2017-09-18 DIAGNOSIS — R13.14 PHARYNGOESOPHAGEAL DYSPHAGIA: Primary | ICD-10-CM

## 2017-09-18 PROCEDURE — 99204 OFFICE O/P NEW MOD 45 MIN: CPT | Performed by: NURSE PRACTITIONER

## 2017-09-18 RX ORDER — SODIUM BICARBONATE 650 MG/1
TABLET ORAL
COMMUNITY
Start: 2017-09-02 | End: 2019-10-16

## 2017-09-18 NOTE — PATIENT INSTRUCTIONS
Food Choices for Gastroesophageal Reflux Disease, Adult  When you have gastroesophageal reflux disease (GERD), the foods you eat and your eating habits are very important. Choosing the right foods can help ease the discomfort of GERD.  WHAT GENERAL GUIDELINES DO I NEED TO FOLLOW?  · Choose fruits, vegetables, whole grains, low-fat dairy products, and low-fat meat, fish, and poultry.  · Limit fats such as oils, salad dressings, butter, nuts, and avocado.  · Keep a food diary to identify foods that cause symptoms.  · Avoid foods that cause reflux. These may be different for different people.  · Eat frequent small meals instead of three large meals each day.  · Eat your meals slowly, in a relaxed setting.  · Limit fried foods.  · Cook foods using methods other than frying.  · Avoid drinking alcohol.  · Avoid drinking large amounts of liquids with your meals.  · Avoid bending over or lying down until 2-3 hours after eating.  WHAT FOODS ARE NOT RECOMMENDED?  The following are some foods and drinks that may worsen your symptoms:  Vegetables  Tomatoes. Tomato juice. Tomato and spaghetti sauce. Chili peppers. Onion and garlic. Horseradish.  Fruits  Oranges, grapefruit, and lemon (fruit and juice).  Meats  High-fat meats, fish, and poultry. This includes hot dogs, ribs, ham, sausage, salami, and putnam.  Dairy  Whole milk and chocolate milk. Sour cream. Cream. Butter. Ice cream. Cream cheese.   Beverages  Coffee and tea, with or without caffeine. Carbonated beverages or energy drinks.  Condiments  Hot sauce. Barbecue sauce.   Sweets/Desserts  Chocolate and cocoa. Donuts. Peppermint and spearmint.  Fats and Oils  High-fat foods, including French fries and potato chips.  Other  Vinegar. Strong spices, such as black pepper, white pepper, red pepper, cayenne, grayson powder, cloves, ginger, and chili powder.  The items listed above may not be a complete list of foods and beverages to avoid. Contact your dietitian for more  information.     This information is not intended to replace advice given to you by your health care provider. Make sure you discuss any questions you have with your health care provider.     Document Released: 12/18/2006 Document Revised: 01/08/2016 Document Reviewed: 10/22/2014  [employer] Interactive Patient Education ©2017 [employer]

## 2017-09-18 NOTE — PROGRESS NOTES
"Chief Complaint:   Chief Complaint   Patient presents with   • Difficulty Swallowing     Patient is here today as a new patient having difficulty swallowing.         Patient ID: Lindsay Laughlin is a 61 y.o. female     History of Present Illness:    This is a very pleasant 61-year-old female who was referred to our office by MARIO Taveras with ENT for complaints of difficulty swallowing.  The patient tells me that this has been occurring off and on for over 2 months.  She states that she has found she has most difficulty swallowing meat and bread that feel as though they get \"stuck\" in both her throat and mid esophagus.  She states that she also has some heartburn and reflux along with this.  She states that she has had to make herself vomit in the past as she was unable to get food to pass into her stomach.  The patient does tell me that she has a history of GERD and takes Nexium daily.  He denies any chronic NSAID use.    She denies any nausea, odynophagia or hematemesis.  She denies any fever or chills.  She denies any melena or hematochezia.  She denies any unintentional weight loss or loss of appetite.    The patient has been seen by ENT for complaints of Swelling.  According to records she underwent a biopsy of the left neck lymph node.  The patient was also found to have thyroid nodules that are to be monitored with a repeat ultrasound in 6 months.    Past Medical History:   Diagnosis Date   • Acid reflux    • Arthritis    • High cholesterol    • Hypertension    • Neuropathy    • Swollen lymph nodes     LEFT CERVICAL   • Type 2 diabetes mellitus        Past Surgical History:   Procedure Laterality Date   • CARPAL TUNNEL RELEASE Bilateral    • CERVICAL LYMPH NODE BIOPSY/EXCISION Left 2017    Procedure: LEFT NECK EXPLORATION WITH INCISIONAL BIOPSY/CULTURE;  Surgeon: Blue Sanchez MD;  Location: [facility];  Service:    •  SECTION     • EXCISION LESION      BACK    • KNEE SURGERY Left     " "REPLACEMENT   • TONSILLECTOMY           Current Outpatient Prescriptions:   •  acetaminophen (TYLENOL) 500 MG tablet, Take 500 mg by mouth As Needed for Mild Pain (1-3)., Disp: , Rfl:   •  atorvastatin (LIPITOR) 10 MG tablet, Take 10 mg by mouth Daily., Disp: , Rfl:   •  esomeprazole (nexIUM) 40 MG capsule, Take 40 mg by mouth Every Morning Before Breakfast., Disp: , Rfl:   •  gabapentin (NEURONTIN) 400 MG capsule, Take 400 mg by mouth 3 (Three) Times a Day As Needed (PAIN)., Disp: , Rfl:   •  HYDROcodone-acetaminophen (NORCO) 5-325 MG per tablet, Take 1 tablet by mouth Every 4 (Four) Hours As Needed for Moderate Pain (4-6) or Severe Pain  (7-10) (Pain) for up to 8 doses., Disp: 8 tablet, Rfl: 0  •  lisinopril (PRINIVIL,ZESTRIL) 10 MG tablet, Take 10 mg by mouth Daily., Disp: , Rfl:   •  metFORMIN (GLUCOPHAGE) 1000 MG tablet, Take 1,000 mg by mouth 2 (Two) Times a Day With Meals., Disp: , Rfl:   •  sodium bicarbonate 650 MG tablet, Take  by mouth., Disp: , Rfl:     No Known Allergies    Social History     Social History   • Marital status:      Spouse name: N/A   • Number of children: N/A   • Years of education: N/A     Occupational History   • Not on file.     Social History Main Topics   • Smoking status: Never Smoker   • Smokeless tobacco: Never Used   • Alcohol use No   • Drug use: No   • Sexual activity: Defer     Other Topics Concern   • Not on file     Social History Narrative       Family History   Problem Relation Age of Onset   • Colon cancer Father        Vitals:    09/18/17 1351   BP: 112/60   Pulse: 71   Temp: 97.7 °F (36.5 °C)   SpO2: 98%   Weight: 229 lb (104 kg)   Height: 65\" (165.1 cm)       Review of Systems:    General:    Present -feeling well   Skin:    Not Present-Rash   HEENT:     Not Present-Acute visual changes or Acute hearing changes   Neck :    Not Present- swollen glands   Genitourinary:      Not Present- burning, frequency, urgency hematuria, dysuria,   Cardiovascular:   Not " Present-chest pain, palpitations, or pressure   Respiratory:   Not Present- shortness of breath or cough   Gastrointestinal:  Musculoskeletal:  Neurological:  Psychiatric:   Present as mentioned in the HP    Not Present. Recent gait disturbances.    Not Present-Seizures and weakness in extremities.    Not Present- Anxiety or Depression.       Physical Exam:    General Appearance:    Alert, cooperative, in no acute distress   Psych:    Mood appropriate    Eyes:          conjunctivae and sclerae normal, no   icterus, no pallor   ENMT:    Ears appear intact with no abnormalities noted oral mucosa moist   Neck:   No adenopathy, supple, trachea midline, no thyromegaly, no   carotid bruit, no JVD    Cardiovascular:    Regular rhythm and normal rate, normal S1 and S2, no            murmur, no gallop, no rub, no click   Gastrointestinal:     Inspection normal.  Normal bowel sounds, no masses, no organomegaly, soft round non-tender, non-distended, no guarding, no rebound or tenderness. No hepatosplenomegaly.   Skin:   No bleeding, bruising or rash   Neurologic:   nonfocal       Lab Results   Component Value Date    WBC 13.56 (H) 07/28/2017    HGB 10.9 (L) 07/28/2017    HCT 33.9 (L) 07/28/2017     07/28/2017        Lab Results   Component Value Date     07/28/2017    K 4.7 07/28/2017     07/28/2017    CO2 23.0 (L) 07/28/2017    BUN 40 (H) 07/28/2017    CREATININE 1.79 (H) 07/28/2017    BILITOT 0.6 07/28/2017    ALKPHOS 106 07/28/2017    ALT 39 07/28/2017    AST 23 07/28/2017    GLUCOSE 236 (H) 07/28/2017       Lab Results   Component Value Date    INR 0.98 07/28/2017       Assessment and Plan:    Lindsay was seen today for difficulty swallowing.    Diagnoses and all orders for this visit:    Pharyngoesophageal dysphagia  -     Case Request; Standing  -     Case Request upper endoscopy    Gastroesophageal reflux disease, esophagitis presence not specified  -     Case Request; Standing  -     Case  Request    Heartburn  -     Case Request; Standing  -     Case Request    Other orders  -     Implement Anesthesia Orders Day of Procedure; Standing  -     Obtain Informed Consent; Standing        The risks, benefits, and alternatives of endoscopy were reviewed with the patient today.  Risks including perforation, with or without dilation, possibly requiring surgery.  Additional risks include risk of bleeding.  There is also the risk of a drug reaction or problems with anesthesia.  This will be discussed with the further by the anesthesia team on the day of the procedure. The benefits include the diagnosis and management of disease of the upper digestive tract.  Alternatives to endoscopy include upper GI series, laboratory testing, radiographic evaluation, or no intervention.  The patient verbalizes understanding and agrees.    Next follow-up appointment      EMR Dragon/Transcription disclaimer:  Much of this encounter note is an electronic transcription/translation of spoken language to printed text. The electronic translation of spoken language may permit erroneous, or at times, nonsensical words or phrases to be inadvertently transcribed; although I have reviewed the note for such errors, some may still exist.

## 2017-10-19 ENCOUNTER — TELEPHONE (OUTPATIENT)
Dept: GASTROENTEROLOGY | Facility: CLINIC | Age: 62
End: 2017-10-19

## 2018-04-09 ENCOUNTER — APPOINTMENT (OUTPATIENT)
Dept: CT IMAGING | Age: 63
End: 2018-04-09
Payer: MEDICARE

## 2018-04-09 ENCOUNTER — HOSPITAL ENCOUNTER (EMERGENCY)
Age: 63
Discharge: HOME | End: 2018-04-09
Attending: FAMILY MEDICINE
Payer: MEDICARE

## 2018-04-09 VITALS
TEMPERATURE: 98.2 F | DIASTOLIC BLOOD PRESSURE: 88 MMHG | HEART RATE: 60 BPM | BODY MASS INDEX: 36.65 KG/M2 | HEIGHT: 65 IN | RESPIRATION RATE: 18 BRPM | SYSTOLIC BLOOD PRESSURE: 143 MMHG | WEIGHT: 220 LBS | OXYGEN SATURATION: 98 %

## 2018-04-09 DIAGNOSIS — E16.2 HYPOGLYCEMIA: Primary | ICD-10-CM

## 2018-04-09 LAB
ALBUMIN SERPL-MCNC: 3.1 G/DL (ref 3.5–5.2)
ALP BLD-CCNC: 70 U/L (ref 35–104)
ALT SERPL-CCNC: 10 U/L (ref 5–33)
AMPHETAMINE SCREEN, URINE: POSITIVE
ANION GAP SERPL CALCULATED.3IONS-SCNC: 8 MMOL/L (ref 7–19)
AST SERPL-CCNC: 14 U/L (ref 5–32)
BARBITURATE SCREEN URINE: NEGATIVE
BASOPHILS ABSOLUTE: 0 K/UL (ref 0–0.2)
BASOPHILS RELATIVE PERCENT: 0.3 % (ref 0–1)
BENZODIAZEPINE SCREEN, URINE: NEGATIVE
BILIRUB SERPL-MCNC: <0.2 MG/DL (ref 0.2–1.2)
BILIRUBIN URINE: NEGATIVE
BLOOD, URINE: NEGATIVE
BUN BLDV-MCNC: 28 MG/DL (ref 8–23)
C-REACTIVE PROTEIN: 0.28 MG/DL (ref 0–0.5)
CALCIUM SERPL-MCNC: 7.8 MG/DL (ref 8.8–10.2)
CANNABINOID SCREEN URINE: NEGATIVE
CHLORIDE BLD-SCNC: 111 MMOL/L (ref 98–111)
CLARITY: ABNORMAL
CO2: 23 MMOL/L (ref 22–29)
COCAINE METABOLITE SCREEN URINE: NEGATIVE
COLOR: YELLOW
CREAT SERPL-MCNC: 1.1 MG/DL (ref 0.5–0.9)
EOSINOPHILS ABSOLUTE: 0 K/UL (ref 0–0.6)
EOSINOPHILS RELATIVE PERCENT: 0.4 % (ref 0–5)
ETHANOL: <10 MG/DL (ref 0–0.08)
GFR NON-AFRICAN AMERICAN: 50
GLUCOSE BLD-MCNC: 129 MG/DL (ref 74–109)
GLUCOSE BLD-MCNC: 86 MG/DL (ref 70–99)
GLUCOSE URINE: 100 MG/DL
HCT VFR BLD CALC: 30.1 % (ref 37–47)
HEMOGLOBIN: 9 G/DL (ref 12–16)
INR BLD: 1 (ref 0.88–1.18)
KETONES, URINE: NEGATIVE MG/DL
LACTIC ACID: 1.4 MMOL/L (ref 0.5–1.9)
LEUKOCYTE ESTERASE, URINE: NEGATIVE
LYMPHOCYTES ABSOLUTE: 0.6 K/UL (ref 1.1–4.5)
LYMPHOCYTES RELATIVE PERCENT: 8.2 % (ref 20–40)
Lab: ABNORMAL
MAGNESIUM: 1.5 MG/DL (ref 1.6–2.4)
MCH RBC QN AUTO: 28.8 PG (ref 27–31)
MCHC RBC AUTO-ENTMCNC: 29.9 G/DL (ref 33–37)
MCV RBC AUTO: 96.5 FL (ref 81–99)
MONOCYTES ABSOLUTE: 0.3 K/UL (ref 0–0.9)
MONOCYTES RELATIVE PERCENT: 3.9 % (ref 0–10)
NEUTROPHILS ABSOLUTE: 6 K/UL (ref 1.5–7.5)
NEUTROPHILS RELATIVE PERCENT: 86.8 % (ref 50–65)
NITRITE, URINE: NEGATIVE
OPIATE SCREEN URINE: NEGATIVE
PDW BLD-RTO: 15 % (ref 11.5–14.5)
PERFORMED ON: NORMAL
PERFORMED ON: NORMAL
PH UA: 6.5
PLATELET # BLD: 234 K/UL (ref 130–400)
PMV BLD AUTO: 10.4 FL (ref 9.4–12.3)
POC TROPONIN I: 0.02 NG/ML (ref 0–0.08)
POTASSIUM SERPL-SCNC: 4.8 MMOL/L (ref 3.5–5)
PRO-BNP: 221 PG/ML (ref 0–900)
PROTEIN UA: ABNORMAL MG/DL
PROTHROMBIN TIME: 13.1 SEC (ref 12–14.6)
RBC # BLD: 3.12 M/UL (ref 4.2–5.4)
SODIUM BLD-SCNC: 142 MMOL/L (ref 136–145)
SPECIFIC GRAVITY UA: 1.02
TOTAL CK: 163 U/L (ref 26–192)
TOTAL PROTEIN: 5.4 G/DL (ref 6.6–8.7)
URINE REFLEX TO CULTURE: ABNORMAL
UROBILINOGEN, URINE: 0.2 E.U./DL
WBC # BLD: 6.9 K/UL (ref 4.8–10.8)

## 2018-04-09 PROCEDURE — 99284 EMERGENCY DEPT VISIT MOD MDM: CPT | Performed by: FAMILY MEDICINE

## 2018-04-09 PROCEDURE — 86140 C-REACTIVE PROTEIN: CPT

## 2018-04-09 PROCEDURE — 83605 ASSAY OF LACTIC ACID: CPT

## 2018-04-09 PROCEDURE — 83880 ASSAY OF NATRIURETIC PEPTIDE: CPT

## 2018-04-09 PROCEDURE — 80307 DRUG TEST PRSMV CHEM ANLYZR: CPT

## 2018-04-09 PROCEDURE — 83735 ASSAY OF MAGNESIUM: CPT

## 2018-04-09 PROCEDURE — 82550 ASSAY OF CK (CPK): CPT

## 2018-04-09 PROCEDURE — 85610 PROTHROMBIN TIME: CPT

## 2018-04-09 PROCEDURE — 99285 EMERGENCY DEPT VISIT HI MDM: CPT

## 2018-04-09 PROCEDURE — 84484 ASSAY OF TROPONIN QUANT: CPT

## 2018-04-09 PROCEDURE — 80053 COMPREHEN METABOLIC PANEL: CPT

## 2018-04-09 PROCEDURE — G0480 DRUG TEST DEF 1-7 CLASSES: HCPCS

## 2018-04-09 PROCEDURE — 81003 URINALYSIS AUTO W/O SCOPE: CPT

## 2018-04-09 PROCEDURE — 93005 ELECTROCARDIOGRAM TRACING: CPT

## 2018-04-09 PROCEDURE — 82948 REAGENT STRIP/BLOOD GLUCOSE: CPT

## 2018-04-09 PROCEDURE — 85025 COMPLETE CBC W/AUTO DIFF WBC: CPT

## 2018-04-09 PROCEDURE — 36415 COLL VENOUS BLD VENIPUNCTURE: CPT

## 2018-04-09 PROCEDURE — 70450 CT HEAD/BRAIN W/O DYE: CPT

## 2018-04-09 RX ORDER — GLIMEPIRIDE 2 MG/1
2 TABLET ORAL
COMMUNITY
End: 2018-04-09

## 2018-04-09 RX ORDER — SODIUM CHLORIDE 9 MG/ML
INJECTION, SOLUTION INTRAVENOUS CONTINUOUS
Status: DISCONTINUED | OUTPATIENT
Start: 2018-04-09 | End: 2018-04-09 | Stop reason: HOSPADM

## 2018-04-09 RX ORDER — GLIMEPIRIDE 2 MG/1
1 TABLET ORAL
Qty: 8 TABLET | Refills: 0 | Status: SHIPPED | OUTPATIENT
Start: 2018-04-09 | End: 2021-05-27

## 2018-04-09 ASSESSMENT — ENCOUNTER SYMPTOMS
BACK PAIN: 0
CONSTIPATION: 0
SHORTNESS OF BREATH: 0
DIARRHEA: 0
PHOTOPHOBIA: 0
COUGH: 0
CHEST TIGHTNESS: 0
COLOR CHANGE: 0
SINUS PAIN: 0
BLOOD IN STOOL: 0
ABDOMINAL DISTENTION: 0
VOMITING: 0
WHEEZING: 0
RHINORRHEA: 0
ABDOMINAL PAIN: 0
NAUSEA: 0

## 2018-04-11 LAB
EKG P AXIS: -46 DEGREES
EKG P-R INTERVAL: 230 MS
EKG Q-T INTERVAL: 426 MS
EKG QRS DURATION: 80 MS
EKG QTC CALCULATION (BAZETT): 423 MS
EKG T AXIS: 36 DEGREES

## 2018-08-06 ENCOUNTER — APPOINTMENT (OUTPATIENT)
Dept: ULTRASOUND IMAGING | Age: 63
DRG: 329 | End: 2018-08-06
Payer: MEDICARE

## 2018-08-06 ENCOUNTER — APPOINTMENT (OUTPATIENT)
Dept: CT IMAGING | Age: 63
DRG: 329 | End: 2018-08-06
Payer: MEDICARE

## 2018-08-06 ENCOUNTER — APPOINTMENT (OUTPATIENT)
Dept: GENERAL RADIOLOGY | Age: 63
DRG: 329 | End: 2018-08-06
Payer: MEDICARE

## 2018-08-06 ENCOUNTER — HOSPITAL ENCOUNTER (INPATIENT)
Age: 63
LOS: 5 days | Discharge: ANOTHER HOSPITAL | DRG: 329 | End: 2018-08-11
Attending: EMERGENCY MEDICINE | Admitting: FAMILY MEDICINE
Payer: MEDICARE

## 2018-08-06 DIAGNOSIS — E87.5 HYPERKALEMIA: ICD-10-CM

## 2018-08-06 DIAGNOSIS — I46.9 CARDIAC ARREST (HCC): Primary | ICD-10-CM

## 2018-08-06 DIAGNOSIS — N17.9 ACUTE RENAL FAILURE, UNSPECIFIED ACUTE RENAL FAILURE TYPE (HCC): ICD-10-CM

## 2018-08-06 DIAGNOSIS — E87.20 METABOLIC ACIDOSIS: ICD-10-CM

## 2018-08-06 PROBLEM — E87.29 METABOLIC ACIDOSIS, INCREASED ANION GAP: Status: ACTIVE | Noted: 2017-09-01

## 2018-08-06 PROBLEM — R57.1 HYPOVOLEMIC SHOCK (HCC): Status: ACTIVE | Noted: 2018-08-06

## 2018-08-06 PROBLEM — R34 ANURIA: Status: ACTIVE | Noted: 2018-08-06

## 2018-08-06 LAB
ABO/RH: NORMAL
ACANTHOCYTES: ABNORMAL
ALBUMIN SERPL-MCNC: 2.8 G/DL (ref 3.5–5.2)
ALBUMIN SERPL-MCNC: 2.8 G/DL (ref 3.5–5.2)
ALBUMIN SERPL-MCNC: 3.8 G/DL (ref 3.5–5.2)
ALP BLD-CCNC: 119 U/L (ref 35–104)
ALP BLD-CCNC: 128 U/L (ref 35–104)
ALP BLD-CCNC: 133 U/L (ref 35–104)
ALT SERPL-CCNC: 159 U/L (ref 5–33)
ALT SERPL-CCNC: 17 U/L (ref 5–33)
ALT SERPL-CCNC: 599 U/L (ref 5–33)
ANION GAP SERPL CALCULATED.3IONS-SCNC: 28 MMOL/L (ref 7–19)
ANION GAP SERPL CALCULATED.3IONS-SCNC: 35 MMOL/L (ref 7–19)
ANION GAP SERPL CALCULATED.3IONS-SCNC: 39 MMOL/L (ref 7–19)
ANTIBODY SCREEN: NORMAL
APTT: 63.5 SEC (ref 26–36.2)
AST SERPL-CCNC: 152 U/L (ref 5–32)
AST SERPL-CCNC: 25 U/L (ref 5–32)
AST SERPL-CCNC: 966 U/L (ref 5–32)
ATYPICAL LYMPHOCYTE RELATIVE PERCENT: 1 % (ref 0–8)
BACTERIA: ABNORMAL /HPF
BANDED NEUTROPHILS RELATIVE PERCENT: 6 % (ref 0–5)
BANDED NEUTROPHILS RELATIVE PERCENT: 7 % (ref 0–5)
BASE EXCESS ARTERIAL: -5 MMOL/L (ref -2–2)
BASOPHILS ABSOLUTE: 0 K/UL (ref 0–0.2)
BASOPHILS ABSOLUTE: 0 K/UL (ref 0–0.2)
BASOPHILS MANUAL: 0 %
BASOPHILS MANUAL: 0 %
BASOPHILS RELATIVE PERCENT: 0 % (ref 0–1)
BASOPHILS RELATIVE PERCENT: 0 % (ref 0–1)
BILIRUB SERPL-MCNC: 0.8 MG/DL (ref 0.2–1.2)
BILIRUB SERPL-MCNC: <0.2 MG/DL (ref 0.2–1.2)
BILIRUB SERPL-MCNC: <0.2 MG/DL (ref 0.2–1.2)
BILIRUBIN URINE: ABNORMAL
BLOOD BANK DISPENSE STATUS: NORMAL
BLOOD BANK PRODUCT CODE: NORMAL
BLOOD, URINE: ABNORMAL
BPU ID: NORMAL
BUN BLDV-MCNC: 23 MG/DL (ref 8–23)
BUN BLDV-MCNC: 87 MG/DL (ref 8–23)
BUN BLDV-MCNC: 91 MG/DL (ref 8–23)
CALCIUM SERPL-MCNC: 7 MG/DL (ref 8.8–10.2)
CALCIUM SERPL-MCNC: 7.1 MG/DL (ref 8.8–10.2)
CALCIUM SERPL-MCNC: 8.5 MG/DL (ref 8.8–10.2)
CARBOXYHEMOGLOBIN ARTERIAL: 0.3 % (ref 0–5)
CARBOXYHEMOGLOBIN ARTERIAL: 0.6 % (ref 0–5)
CARBOXYHEMOGLOBIN ARTERIAL: 0.6 % (ref 0–5)
CHLORIDE BLD-SCNC: 86 MMOL/L (ref 98–111)
CHLORIDE BLD-SCNC: 91 MMOL/L (ref 98–111)
CHLORIDE BLD-SCNC: 95 MMOL/L (ref 98–111)
CLARITY: ABNORMAL
CO2: 21 MMOL/L (ref 22–29)
CO2: 5 MMOL/L (ref 22–29)
CO2: 7 MMOL/L (ref 22–29)
COLOR: ABNORMAL
CORTISOL TOTAL: 42.2 UG/DL
CREAT SERPL-MCNC: 10.8 MG/DL (ref 0.5–0.9)
CREAT SERPL-MCNC: 11.2 MG/DL (ref 0.5–0.9)
CREAT SERPL-MCNC: 3 MG/DL (ref 0.5–0.9)
CREATININE URINE: 189 MG/DL (ref 4.2–622)
DESCRIPTION BLOOD BANK: NORMAL
EOSINOPHILS ABSOLUTE: 0 K/UL (ref 0–0.6)
EOSINOPHILS ABSOLUTE: 0 K/UL (ref 0–0.6)
EOSINOPHILS RELATIVE PERCENT: 0 % (ref 0–5)
EOSINOPHILS RELATIVE PERCENT: 0 % (ref 0–5)
EPITHELIAL CELLS, UA: ABNORMAL /HPF
GFR NON-AFRICAN AMERICAN: 16
GFR NON-AFRICAN AMERICAN: 3
GFR NON-AFRICAN AMERICAN: 4
GLUCOSE BLD-MCNC: 100 MG/DL (ref 70–99)
GLUCOSE BLD-MCNC: 103 MG/DL (ref 70–99)
GLUCOSE BLD-MCNC: 103 MG/DL (ref 74–109)
GLUCOSE BLD-MCNC: 104 MG/DL (ref 70–99)
GLUCOSE BLD-MCNC: 115 MG/DL (ref 74–109)
GLUCOSE BLD-MCNC: 120 MG/DL (ref 74–109)
GLUCOSE BLD-MCNC: 122 MG/DL (ref 70–99)
GLUCOSE BLD-MCNC: 125 MG/DL (ref 70–99)
GLUCOSE BLD-MCNC: 152 MG/DL (ref 70–99)
GLUCOSE URINE: 100 MG/DL
HAV IGM SER IA-ACNC: NORMAL
HBA1C MFR BLD: 7.7 % (ref 4–6)
HBV SURFACE AB TITR SER: NORMAL {TITER}
HCO3 ARTERIAL: 19.8 MMOL/L (ref 22–26)
HCT VFR BLD CALC: 31.4 % (ref 37–47)
HCT VFR BLD CALC: 38 % (ref 37–47)
HEMOGLOBIN, ART, EXTENDED: 10.5 G/DL (ref 12–16)
HEMOGLOBIN, ART, EXTENDED: 8.8 G/DL (ref 12–16)
HEMOGLOBIN, ART, EXTENDED: 9.7 G/DL (ref 12–16)
HEMOGLOBIN: 11 G/DL (ref 12–16)
HEMOGLOBIN: 8.7 G/DL (ref 12–16)
HEPATITIS B CORE IGM ANTIBODY: NORMAL
HEPATITIS B SURFACE ANTIGEN INTERPRETATION: NORMAL
HEPATITIS C ANTIBODY INTERPRETATION: NORMAL
HYPOCHROMIA: ABNORMAL
INR BLD: 1.73 (ref 0.88–1.18)
KETONES, URINE: ABNORMAL MG/DL
LACTIC ACID: 11.9 MMOL/L (ref 0.5–1.9)
LACTIC ACID: 12.6 MMOL/L (ref 0.5–1.9)
LACTIC ACID: 14.6 MMOL/L (ref 0.5–1.9)
LEUKOCYTE ESTERASE, URINE: ABNORMAL
LIPASE: 288 U/L (ref 13–60)
LYMPHOCYTES ABSOLUTE: 1.6 K/UL (ref 1.1–4.5)
LYMPHOCYTES ABSOLUTE: 1.9 K/UL (ref 1.1–4.5)
LYMPHOCYTES RELATIVE PERCENT: 11 % (ref 20–40)
LYMPHOCYTES RELATIVE PERCENT: 8 % (ref 20–40)
MACROCYTES: ABNORMAL
MAGNESIUM: 2.2 MG/DL (ref 1.6–2.4)
MCH RBC QN AUTO: 28.2 PG (ref 27–31)
MCH RBC QN AUTO: 28.6 PG (ref 27–31)
MCHC RBC AUTO-ENTMCNC: 27.7 G/DL (ref 33–37)
MCHC RBC AUTO-ENTMCNC: 28.9 G/DL (ref 33–37)
MCV RBC AUTO: 101.9 FL (ref 81–99)
MCV RBC AUTO: 98.7 FL (ref 81–99)
METHEMOGLOBIN ARTERIAL: 0.8 %
METHEMOGLOBIN ARTERIAL: 1 %
METHEMOGLOBIN ARTERIAL: 1.2 %
MONOCYTES ABSOLUTE: 0.2 K/UL (ref 0–0.9)
MONOCYTES ABSOLUTE: 1 K/UL (ref 0–0.9)
MONOCYTES RELATIVE PERCENT: 1 % (ref 0–10)
MONOCYTES RELATIVE PERCENT: 5 % (ref 0–10)
NEUTROPHILS ABSOLUTE: 14.1 K/UL (ref 1.5–7.5)
NEUTROPHILS ABSOLUTE: 17.2 K/UL (ref 1.5–7.5)
NEUTROPHILS MANUAL: 80 %
NEUTROPHILS MANUAL: 81 %
NEUTROPHILS RELATIVE PERCENT: 80 % (ref 50–65)
NEUTROPHILS RELATIVE PERCENT: 81 % (ref 50–65)
NITRITE, URINE: POSITIVE
O2 CONTENT ARTERIAL: 11.9 ML/DL
O2 CONTENT ARTERIAL: 13.8 ML/DL
O2 CONTENT ARTERIAL: 14.6 ML/DL
O2 SAT, ARTERIAL: 93.2 %
O2 SAT, ARTERIAL: 94.5 %
O2 SAT, ARTERIAL: 97.7 %
O2 THERAPY: ABNORMAL
PCO2 ARTERIAL: 22 MMHG (ref 35–45)
PCO2 ARTERIAL: 31 MMHG (ref 35–45)
PCO2 ARTERIAL: 35 MMHG (ref 35–45)
PDW BLD-RTO: 13.6 % (ref 11.5–14.5)
PDW BLD-RTO: 13.9 % (ref 11.5–14.5)
PERFORMED ON: ABNORMAL
PH ARTERIAL: 6.8 (ref 7.35–7.45)
PH ARTERIAL: 6.8 (ref 7.35–7.45)
PH ARTERIAL: 7.36 (ref 7.35–7.45)
PH UA: 5
PHOSPHORUS: 13.6 MG/DL (ref 2.5–4.5)
PLATELET # BLD: 203 K/UL (ref 130–400)
PLATELET # BLD: 327 K/UL (ref 130–400)
PLATELET SLIDE REVIEW: ADEQUATE
PLATELET SLIDE REVIEW: ADEQUATE
PMV BLD AUTO: 11.6 FL (ref 9.4–12.3)
PMV BLD AUTO: 11.7 FL (ref 9.4–12.3)
PO2 ARTERIAL: 106 MMHG (ref 80–100)
PO2 ARTERIAL: 468 MMHG (ref 80–100)
PO2 ARTERIAL: 73 MMHG (ref 80–100)
POIKILOCYTES: ABNORMAL
POTASSIUM SERPL-SCNC: 3.6 MMOL/L (ref 3.5–5)
POTASSIUM SERPL-SCNC: 6.3 MMOL/L (ref 3.5–5)
POTASSIUM SERPL-SCNC: 6.8 MMOL/L (ref 3.5–5)
POTASSIUM, WHOLE BLOOD: 3.2
POTASSIUM, WHOLE BLOOD: 5.7
POTASSIUM, WHOLE BLOOD: 6.7
PROTEIN UA: 300 MG/DL
PROTHROMBIN TIME: 20.2 SEC (ref 12–14.6)
RBC # BLD: 3.08 M/UL (ref 4.2–5.4)
RBC # BLD: 3.85 M/UL (ref 4.2–5.4)
RBC # BLD: NORMAL 10*6/UL
RBC UA: ABNORMAL /HPF (ref 0–2)
SODIUM BLD-SCNC: 130 MMOL/L (ref 136–145)
SODIUM BLD-SCNC: 137 MMOL/L (ref 136–145)
SODIUM BLD-SCNC: 140 MMOL/L (ref 136–145)
SODIUM URINE: <20 MMOL/L
SPECIFIC GRAVITY UA: 1.03
TOTAL PROTEIN: 4.8 G/DL (ref 6.6–8.7)
TOTAL PROTEIN: 4.9 G/DL (ref 6.6–8.7)
TOTAL PROTEIN: 6.9 G/DL (ref 6.6–8.7)
UROBILINOGEN, URINE: 1 E.U./DL
WBC # BLD: 16.2 K/UL (ref 4.8–10.8)
WBC # BLD: 19.8 K/UL (ref 4.8–10.8)
WBC UA: ABNORMAL /HPF (ref 0–5)
YEAST: ABNORMAL /HPF

## 2018-08-06 PROCEDURE — 83735 ASSAY OF MAGNESIUM: CPT

## 2018-08-06 PROCEDURE — P9047 ALBUMIN (HUMAN), 25%, 50ML: HCPCS | Performed by: INTERNAL MEDICINE

## 2018-08-06 PROCEDURE — 6360000002 HC RX W HCPCS: Performed by: FAMILY MEDICINE

## 2018-08-06 PROCEDURE — 6360000002 HC RX W HCPCS: Performed by: EMERGENCY MEDICINE

## 2018-08-06 PROCEDURE — 36556 INSERT NON-TUNNEL CV CATH: CPT | Performed by: FAMILY MEDICINE

## 2018-08-06 PROCEDURE — 74176 CT ABD & PELVIS W/O CONTRAST: CPT

## 2018-08-06 PROCEDURE — 83605 ASSAY OF LACTIC ACID: CPT

## 2018-08-06 PROCEDURE — 84100 ASSAY OF PHOSPHORUS: CPT

## 2018-08-06 PROCEDURE — 36592 COLLECT BLOOD FROM PICC: CPT

## 2018-08-06 PROCEDURE — 0BH18EZ INSERTION OF ENDOTRACHEAL AIRWAY INTO TRACHEA, VIA NATURAL OR ARTIFICIAL OPENING ENDOSCOPIC: ICD-10-PCS | Performed by: INTERNAL MEDICINE

## 2018-08-06 PROCEDURE — 82570 ASSAY OF URINE CREATININE: CPT

## 2018-08-06 PROCEDURE — P9016 RBC LEUKOCYTES REDUCED: HCPCS

## 2018-08-06 PROCEDURE — 02HV33Z INSERTION OF INFUSION DEVICE INTO SUPERIOR VENA CAVA, PERCUTANEOUS APPROACH: ICD-10-PCS | Performed by: FAMILY MEDICINE

## 2018-08-06 PROCEDURE — 6370000000 HC RX 637 (ALT 250 FOR IP): Performed by: FAMILY MEDICINE

## 2018-08-06 PROCEDURE — 6360000002 HC RX W HCPCS: Performed by: INTERNAL MEDICINE

## 2018-08-06 PROCEDURE — 2500000003 HC RX 250 WO HCPCS: Performed by: EMERGENCY MEDICINE

## 2018-08-06 PROCEDURE — 99291 CRITICAL CARE FIRST HOUR: CPT

## 2018-08-06 PROCEDURE — 31500 INSERT EMERGENCY AIRWAY: CPT

## 2018-08-06 PROCEDURE — 6360000002 HC RX W HCPCS

## 2018-08-06 PROCEDURE — 76770 US EXAM ABDO BACK WALL COMP: CPT

## 2018-08-06 PROCEDURE — 86706 HEP B SURFACE ANTIBODY: CPT

## 2018-08-06 PROCEDURE — 36556 INSERT NON-TUNNEL CV CATH: CPT | Performed by: SURGERY

## 2018-08-06 PROCEDURE — 87086 URINE CULTURE/COLONY COUNT: CPT

## 2018-08-06 PROCEDURE — 96374 THER/PROPH/DIAG INJ IV PUSH: CPT

## 2018-08-06 PROCEDURE — S0028 INJECTION, FAMOTIDINE, 20 MG: HCPCS | Performed by: FAMILY MEDICINE

## 2018-08-06 PROCEDURE — 2500000003 HC RX 250 WO HCPCS

## 2018-08-06 PROCEDURE — 2700000000 HC OXYGEN THERAPY PER DAY

## 2018-08-06 PROCEDURE — 8090000000 HC CONTINUOUS VENOVENOUS HEMOFIL

## 2018-08-06 PROCEDURE — 83690 ASSAY OF LIPASE: CPT

## 2018-08-06 PROCEDURE — P9059 PLASMA, FRZ BETWEEN 8-24HOUR: HCPCS

## 2018-08-06 PROCEDURE — C1751 CATH, INF, PER/CENT/MIDLINE: HCPCS

## 2018-08-06 PROCEDURE — 71045 X-RAY EXAM CHEST 1 VIEW: CPT

## 2018-08-06 PROCEDURE — 86850 RBC ANTIBODY SCREEN: CPT

## 2018-08-06 PROCEDURE — 86901 BLOOD TYPING SEROLOGIC RH(D): CPT

## 2018-08-06 PROCEDURE — 87040 BLOOD CULTURE FOR BACTERIA: CPT

## 2018-08-06 PROCEDURE — 87070 CULTURE OTHR SPECIMN AEROBIC: CPT

## 2018-08-06 PROCEDURE — 82533 TOTAL CORTISOL: CPT

## 2018-08-06 PROCEDURE — 36600 WITHDRAWAL OF ARTERIAL BLOOD: CPT

## 2018-08-06 PROCEDURE — 2580000003 HC RX 258: Performed by: FAMILY MEDICINE

## 2018-08-06 PROCEDURE — 93005 ELECTROCARDIOGRAM TRACING: CPT

## 2018-08-06 PROCEDURE — 96375 TX/PRO/DX INJ NEW DRUG ADDON: CPT

## 2018-08-06 PROCEDURE — 2580000003 HC RX 258: Performed by: EMERGENCY MEDICINE

## 2018-08-06 PROCEDURE — 2000000000 HC ICU R&B

## 2018-08-06 PROCEDURE — 86900 BLOOD TYPING SEROLOGIC ABO: CPT

## 2018-08-06 PROCEDURE — 02HV33Z INSERTION OF INFUSION DEVICE INTO SUPERIOR VENA CAVA, PERCUTANEOUS APPROACH: ICD-10-PCS | Performed by: SURGERY

## 2018-08-06 PROCEDURE — 36415 COLL VENOUS BLD VENIPUNCTURE: CPT

## 2018-08-06 PROCEDURE — 99291 CRITICAL CARE FIRST HOUR: CPT | Performed by: EMERGENCY MEDICINE

## 2018-08-06 PROCEDURE — 6370000000 HC RX 637 (ALT 250 FOR IP): Performed by: EMERGENCY MEDICINE

## 2018-08-06 PROCEDURE — 2580000003 HC RX 258: Performed by: INTERNAL MEDICINE

## 2018-08-06 PROCEDURE — 85610 PROTHROMBIN TIME: CPT

## 2018-08-06 PROCEDURE — 31500 INSERT EMERGENCY AIRWAY: CPT | Performed by: INTERNAL MEDICINE

## 2018-08-06 PROCEDURE — 85025 COMPLETE CBC W/AUTO DIFF WBC: CPT

## 2018-08-06 PROCEDURE — 82803 BLOOD GASES ANY COMBINATION: CPT

## 2018-08-06 PROCEDURE — 82948 REAGENT STRIP/BLOOD GLUCOSE: CPT

## 2018-08-06 PROCEDURE — 84132 ASSAY OF SERUM POTASSIUM: CPT

## 2018-08-06 PROCEDURE — 80074 ACUTE HEPATITIS PANEL: CPT

## 2018-08-06 PROCEDURE — 36556 INSERT NON-TUNNEL CV CATH: CPT

## 2018-08-06 PROCEDURE — 94002 VENT MGMT INPAT INIT DAY: CPT

## 2018-08-06 PROCEDURE — 85730 THROMBOPLASTIN TIME PARTIAL: CPT

## 2018-08-06 PROCEDURE — 2500000003 HC RX 250 WO HCPCS: Performed by: INTERNAL MEDICINE

## 2018-08-06 PROCEDURE — 99291 CRITICAL CARE FIRST HOUR: CPT | Performed by: FAMILY MEDICINE

## 2018-08-06 PROCEDURE — 83036 HEMOGLOBIN GLYCOSYLATED A1C: CPT

## 2018-08-06 PROCEDURE — 84300 ASSAY OF URINE SODIUM: CPT

## 2018-08-06 PROCEDURE — 2500000003 HC RX 250 WO HCPCS: Performed by: FAMILY MEDICINE

## 2018-08-06 PROCEDURE — 5A1955Z RESPIRATORY VENTILATION, GREATER THAN 96 CONSECUTIVE HOURS: ICD-10-PCS | Performed by: INTERNAL MEDICINE

## 2018-08-06 PROCEDURE — 81001 URINALYSIS AUTO W/SCOPE: CPT

## 2018-08-06 PROCEDURE — 5A1D80Z PERFORMANCE OF URINARY FILTRATION, PROLONGED INTERMITTENT, 6-18 HOURS PER DAY: ICD-10-PCS | Performed by: FAMILY MEDICINE

## 2018-08-06 PROCEDURE — 80053 COMPREHEN METABOLIC PANEL: CPT

## 2018-08-06 RX ORDER — PANTOPRAZOLE SODIUM 40 MG/10ML
40 INJECTION, POWDER, LYOPHILIZED, FOR SOLUTION INTRAVENOUS DAILY
Status: DISCONTINUED | OUTPATIENT
Start: 2018-08-07 | End: 2018-08-11 | Stop reason: HOSPADM

## 2018-08-06 RX ORDER — DEXTROSE MONOHYDRATE 25 G/50ML
12.5 INJECTION, SOLUTION INTRAVENOUS PRN
Status: DISCONTINUED | OUTPATIENT
Start: 2018-08-06 | End: 2018-08-07 | Stop reason: SDUPTHER

## 2018-08-06 RX ORDER — 0.9 % SODIUM CHLORIDE 0.9 %
1000 INTRAVENOUS SOLUTION INTRAVENOUS ONCE
Status: COMPLETED | OUTPATIENT
Start: 2018-08-06 | End: 2018-08-06

## 2018-08-06 RX ORDER — SODIUM CHLORIDE 0.9 % (FLUSH) 0.9 %
10 SYRINGE (ML) INJECTION EVERY 12 HOURS SCHEDULED
Status: DISCONTINUED | OUTPATIENT
Start: 2018-08-06 | End: 2018-08-11 | Stop reason: HOSPADM

## 2018-08-06 RX ORDER — NOREPINEPHRINE BITARTRATE 1 MG/ML
INJECTION, SOLUTION INTRAVENOUS
Status: DISPENSED
Start: 2018-08-06 | End: 2018-08-07

## 2018-08-06 RX ORDER — DEXTROSE MONOHYDRATE 50 MG/ML
100 INJECTION, SOLUTION INTRAVENOUS PRN
Status: DISCONTINUED | OUTPATIENT
Start: 2018-08-06 | End: 2018-08-10 | Stop reason: SDUPTHER

## 2018-08-06 RX ORDER — MIDAZOLAM HYDROCHLORIDE 1 MG/ML
2 INJECTION INTRAMUSCULAR; INTRAVENOUS EVERY 30 MIN PRN
Status: DISCONTINUED | OUTPATIENT
Start: 2018-08-06 | End: 2018-08-07

## 2018-08-06 RX ORDER — ALBUMIN (HUMAN) 12.5 G/50ML
50 SOLUTION INTRAVENOUS ONCE
Status: COMPLETED | OUTPATIENT
Start: 2018-08-06 | End: 2018-08-06

## 2018-08-06 RX ORDER — NICOTINE POLACRILEX 4 MG
15 LOZENGE BUCCAL PRN
Status: DISCONTINUED | OUTPATIENT
Start: 2018-08-06 | End: 2018-08-10 | Stop reason: SDUPTHER

## 2018-08-06 RX ORDER — CHLORHEXIDINE GLUCONATE 0.12 MG/ML
15 RINSE ORAL 2 TIMES DAILY
Status: DISCONTINUED | OUTPATIENT
Start: 2018-08-06 | End: 2018-08-07

## 2018-08-06 RX ORDER — PROPOFOL 10 MG/ML
INJECTION, EMULSION INTRAVENOUS
Status: COMPLETED
Start: 2018-08-06 | End: 2018-08-06

## 2018-08-06 RX ORDER — 0.9 % SODIUM CHLORIDE 0.9 %
500 INTRAVENOUS SOLUTION INTRAVENOUS ONCE
Status: COMPLETED | OUTPATIENT
Start: 2018-08-06 | End: 2018-08-06

## 2018-08-06 RX ORDER — PROPOFOL 10 MG/ML
10 INJECTION, EMULSION INTRAVENOUS CONTINUOUS
Status: DISCONTINUED | OUTPATIENT
Start: 2018-08-06 | End: 2018-08-07

## 2018-08-06 RX ORDER — SODIUM CHLORIDE 9 MG/ML
INJECTION, SOLUTION INTRAVENOUS ONCE
Status: COMPLETED | OUTPATIENT
Start: 2018-08-06 | End: 2018-08-06

## 2018-08-06 RX ORDER — DEXTROSE MONOHYDRATE 25 G/50ML
25 INJECTION, SOLUTION INTRAVENOUS PRN
Status: DISCONTINUED | OUTPATIENT
Start: 2018-08-06 | End: 2018-08-10 | Stop reason: SDUPTHER

## 2018-08-06 RX ORDER — PHENTOLAMINE MESYLATE 5 MG/1
5 INJECTION INTRAMUSCULAR; INTRAVENOUS ONCE
Status: COMPLETED | OUTPATIENT
Start: 2018-08-06 | End: 2018-08-06

## 2018-08-06 RX ORDER — SODIUM POLYSTYRENE SULFONATE 15 G/60ML
15 SUSPENSION ORAL; RECTAL ONCE
Status: DISCONTINUED | OUTPATIENT
Start: 2018-08-06 | End: 2018-08-11 | Stop reason: HOSPADM

## 2018-08-06 RX ORDER — MORPHINE SULFATE 1 MG/ML
4 INJECTION, SOLUTION EPIDURAL; INTRATHECAL; INTRAVENOUS ONCE
Status: COMPLETED | OUTPATIENT
Start: 2018-08-06 | End: 2018-08-06

## 2018-08-06 RX ORDER — HYDROMORPHONE HCL IN 0.9% NACL 0.5 MG/ML
2 SYRINGE (ML) INTRAVENOUS ONCE
Status: COMPLETED | OUTPATIENT
Start: 2018-08-06 | End: 2018-08-06

## 2018-08-06 RX ORDER — SODIUM CHLORIDE 0.9 % (FLUSH) 0.9 %
10 SYRINGE (ML) INJECTION PRN
Status: DISCONTINUED | OUTPATIENT
Start: 2018-08-06 | End: 2018-08-11 | Stop reason: HOSPADM

## 2018-08-06 RX ORDER — FENTANYL CITRATE 50 UG/ML
25 INJECTION, SOLUTION INTRAMUSCULAR; INTRAVENOUS ONCE
Status: COMPLETED | OUTPATIENT
Start: 2018-08-07 | End: 2018-08-07

## 2018-08-06 RX ORDER — ONDANSETRON 2 MG/ML
4 INJECTION INTRAMUSCULAR; INTRAVENOUS EVERY 6 HOURS PRN
Status: DISCONTINUED | OUTPATIENT
Start: 2018-08-06 | End: 2018-08-11 | Stop reason: HOSPADM

## 2018-08-06 RX ORDER — MORPHINE SULFATE 1 MG/ML
2 INJECTION, SOLUTION EPIDURAL; INTRATHECAL; INTRAVENOUS
Status: DISCONTINUED | OUTPATIENT
Start: 2018-08-06 | End: 2018-08-11 | Stop reason: HOSPADM

## 2018-08-06 RX ADMIN — Medication 4 MG: at 10:58

## 2018-08-06 RX ADMIN — SODIUM CHLORIDE 1000 ML: 9 INJECTION, SOLUTION INTRAVENOUS at 12:27

## 2018-08-06 RX ADMIN — INSULIN HUMAN 10 UNITS: 100 INJECTION, SOLUTION PARENTERAL at 12:00

## 2018-08-06 RX ADMIN — SODIUM CHLORIDE 1.86 UNITS/HR: 9 INJECTION, SOLUTION INTRAVENOUS at 16:03

## 2018-08-06 RX ADMIN — ALBUMIN (HUMAN) 50 G: 0.25 INJECTION, SOLUTION INTRAVENOUS at 23:37

## 2018-08-06 RX ADMIN — PROPOFOL 1000 MG: 10 INJECTION, EMULSION INTRAVENOUS at 11:54

## 2018-08-06 RX ADMIN — VASOPRESSIN 0.04 UNITS/MIN: 20 INJECTION INTRAVENOUS at 20:48

## 2018-08-06 RX ADMIN — SODIUM BICARBONATE: 84 INJECTION, SOLUTION INTRAVENOUS at 15:04

## 2018-08-06 RX ADMIN — Medication 10 ML: at 21:04

## 2018-08-06 RX ADMIN — DEXTROSE MONOHYDRATE 25 G: 25 INJECTION, SOLUTION INTRAVENOUS at 12:02

## 2018-08-06 RX ADMIN — PROPOFOL 5 MCG/KG/MIN: 10 INJECTION, EMULSION INTRAVENOUS at 16:37

## 2018-08-06 RX ADMIN — CHLORHEXIDINE GLUCONATE 15 ML: 1.2 RINSE ORAL at 21:04

## 2018-08-06 RX ADMIN — MEROPENEM 500 MG: 500 INJECTION, POWDER, FOR SOLUTION INTRAVENOUS at 17:06

## 2018-08-06 RX ADMIN — VASOPRESSIN 0.04 UNITS/MIN: 20 INJECTION INTRAVENOUS at 14:35

## 2018-08-06 RX ADMIN — SODIUM BICARBONATE 100 MEQ: 84 INJECTION INTRAVENOUS at 14:23

## 2018-08-06 RX ADMIN — Medication 2 MG: at 11:19

## 2018-08-06 RX ADMIN — EPINEPHRINE 1 MG: 0.1 INJECTION INTRACARDIAC; INTRAVENOUS at 13:13

## 2018-08-06 RX ADMIN — NOREPINEPHRINE BITARTRATE 10 MCG/MIN: 1 INJECTION INTRAVENOUS at 13:26

## 2018-08-06 RX ADMIN — Medication 100 MEQ: at 14:23

## 2018-08-06 RX ADMIN — SODIUM CHLORIDE 1000 ML: 9 INJECTION, SOLUTION INTRAVENOUS at 10:58

## 2018-08-06 RX ADMIN — MIDAZOLAM HYDROCHLORIDE 4 MG/HR: 5 INJECTION, SOLUTION INTRAMUSCULAR; INTRAVENOUS at 16:24

## 2018-08-06 RX ADMIN — FAMOTIDINE 20 MG: 10 INJECTION INTRAVENOUS at 17:40

## 2018-08-06 RX ADMIN — SODIUM CHLORIDE: 9 INJECTION, SOLUTION INTRAVENOUS at 13:18

## 2018-08-06 RX ADMIN — PHENTOLAMINE MESYLATE 5 MG: 5 INJECTION, POWDER, FOR SOLUTION INTRAMUSCULAR; INTRAVENOUS at 18:47

## 2018-08-06 ASSESSMENT — PULMONARY FUNCTION TESTS
PIF_VALUE: 7.9
PIF_VALUE: 6.3

## 2018-08-06 ASSESSMENT — ENCOUNTER SYMPTOMS
ABDOMINAL PAIN: 1
VOMITING: 0
BLOOD IN STOOL: 0
ABDOMINAL DISTENTION: 0
SHORTNESS OF BREATH: 0
NAUSEA: 1
CHEST TIGHTNESS: 0
DIARRHEA: 0

## 2018-08-06 ASSESSMENT — PAIN SCALES - GENERAL
PAINLEVEL_OUTOF10: 6
PAINLEVEL_OUTOF10: 10

## 2018-08-06 NOTE — CONSULTS
injection 10 mL  10 mL Intravenous PRN Radha Hampton MD        ondansetron [facility]) injection 4 mg  4 mg Intravenous Q6H PRN Radha Hampton MD        insulin regular (HUMULIN R;NOVOLIN R) 100 Units in sodium chloride 0.9 % 100 mL infusion  0.5 Units/hr Intravenous Continuous Radha Hampton MD        glucose (GLUTOSE) 40 % oral gel 15 g  15 g Oral PRN Radha Hampton MD        dextrose 50 % solution 12.5 g  12.5 g Intravenous PRN Radha Hampton MD        glucagon (rDNA) injection 1 mg  1 mg Intramuscular PRN Radha Hampton MD        dextrose 5 % solution  100 mL/hr Intravenous PRN Radha Hampton MD        chlorhexidine (PERIDEX) 0.12 % solution 15 mL  15 mL Mouth/Throat BID Radha Hampton MD        famotidine (PEPCID) injection 20 mg  20 mg Intravenous Daily Radha Hampton MD        midazolam (VERSED) 100 mg in dextrose 5 % 100 mL infusion  1 mg/hr Intravenous Continuous Radha Hampton MD        midazolam (VERSED) injection 2 mg  2 mg Intravenous Q30 Min PRN Radha Hampton MD        propofol 1000 MG/100ML injection  10 mcg/kg/min Intravenous Continuous Radha Hampton MD        morphine (PF) injection 2 mg  2 mg Intravenous Q1H PRN Radha Hampton MD        meropenem (MERREM) 500 mg in sodium chloride 0.9 % 100 mL IVPB  500 mg Intravenous Q24H Radha Hamtpon MD           Past Medical History:  Past Medical History:   Diagnosis Date    Chronic back pain     GERD (gastroesophageal reflux disease)     Hypertension     Neuropathy     Osteoarthritis     Type II or unspecified type diabetes mellitus without mention of complication, not stated as uncontrolled        Past Surgical History:  Past Surgical History:   Procedure Laterality Date     SECTION      JOINT REPLACEMENT Left     ltk    [place] EXCISION TUMOR SOFT TISSUE BACK/FLANK SUBQ <3CM N/A 2016    EXCISION LIPOMA OF THE UPPER BACK  performed by Laquita Thomas MD at [address] [place] [address] Family History  Family History   Problem Relation Age of Onset    Arthritis Mother     Diabetes Mother     High Blood Pressure Mother     High Cholesterol Mother     Cancer Father        Social History  Social History     Social History    Marital status:      Spouse name: N/A    Number of children: N/A    Years of education: N/A     Occupational History    Not on file. Social History Main Topics    Smoking status: Never Smoker    Smokeless tobacco: Never Used    Alcohol use No    Drug use: No    Sexual activity: Not on file     Other Topics Concern    Not on file     Social History Narrative    No narrative on file         Review of Systems:  History obtained from unobtainable from patient due to mental status      Objective:  Blood pressure (!) 124/47, pulse 87, temperature 96.3 °F (35.7 °C), temperature source Temporal, resp. rate 25, height 5' 5\" (1.651 m), weight 220 lb (99.8 kg), SpO2 (!) 81 %. No intake or output data in the 24 hours ending 08/06/18 1546  General: sedated/vent  Chest:  clear to auscultation bilaterally without respiratory distress  CVS: regular rate and rhythm  Abdominal: soft, nontender, normal bowel sounds  Extremities: no cyanosis or edema  Skin: warm and dry without rash      Labs:  BMP:   Recent Labs      08/06/18   0950  08/06/18   1216  08/06/18   1336   NA  130*   --    --    K  6.8*  6.7  5.7   CL  86*   --    --    CO2  5*   --    --    BUN  91*   --    --    CREATININE  11.2*   --    --    CALCIUM  8.5*   --    --      CBC:   Recent Labs      08/06/18   0950   WBC  19.8*   HGB  11.0*   HCT  38.0   MCV  98.7   PLT  327     LIVER PROFILE:   Recent Labs      08/06/18   0950   AST  25   ALT  17   LIPASE  288*   BILITOT  <0.2   ALKPHOS  128*     PT/INR:   Recent Labs      08/06/18   1439   PROTIME  20.2*   INR  1.73*     APTT: No results for input(s): APTT in the last 72 hours. BNP:  No results for input(s): BNP in the last 72 hours.   Ionized Calcium:No results for input(s): IONCA in the last 72 hours. Magnesium:No results for input(s): MG in the last 72 hours. Phosphorus:No results for input(s): PHOS in the last 72 hours. HgbA1C: No results for input(s): LABA1C in the last 72 hours. Hepatic:   Recent Labs      08/06/18   0950   ALKPHOS  128*   ALT  17   AST  25   PROT  6.9   BILITOT  <0.2   LABALBU  3.8     Lactic Acid:   Recent Labs      08/06/18   0950   LACTA  12.6*     Troponin: No results for input(s): CKTOTAL, CKMB, TROPONINT in the last 72 hours. ABGs: No results for input(s): PH, PCO2, PO2, HCO3, O2SAT in the last 72 hours. CRP:  No results for input(s): CRP in the last 72 hours. Sed Rate:  No results for input(s): SEDRATE in the last 72 hours. Cultures:   No results for input(s): CULTURE in the last 72 hours. No results for input(s): BC, Yessi Baptise in the last 72 hours. No results for input(s): CXSURG in the last 72 hours. Radiology reports as per the Radiologist  Radiology: Ct Abdomen Pelvis Wo Contrast Additional Contrast? None    Result Date: 8/6/2018  Examination. CT ABDOMEN PELVIS WO CONTRAST History: Abdominal pain. DLP: 1595 mGycm. The CT scan of the abdomen and pelvis is performed without oral or intravenous contrast enhancement. The images are acquired in axial plane with subsequent reconstruction in coronal and sagittal planes. The comparison is made with the previous study dated 8/30/2017. The lung bases included in the study show interstitial infiltrate in the lower lobes bilaterally, extending into the infrahilar region bilaterally. There are atelectatic changes in the right middle lobe and lingular segment of the left upper lobe. Unenhanced liver appears normal. Normal spleen is seen with a large splenule along the lateral inferior margin of the spleen. Moderately distended gallbladder seen with a high density contents which may represent sludge. No radiopaque calculus. No evidence of common bile duct dilatation. Hypoventilation with vascular crowding. Patient is rotated. 2. Right subclavian central venous catheter in good position with no evidence of pneumothorax. 3. Bronchial wall thickening, stable. Signed by Dr Edilma Santana on 8/6/2018 12:24 PM       Assessment   ARABELLA / ATN  CKD 3  hyperkalemia  Lactic acidosis  Hyponatremia  Cardiopulmonary arrest  DM2 with nephropathy  HTN    Plan:  D/w RN/IM  Appreciate quick vascular assistance  Pressors via central line as tolerated  W/u ordered and ongoing  SLED once access obtained      Thank you for the consult, we appreciate the opportunity to provide care to your patients. Feel free to contact me if I can be of any further assistance.       Sheri Bautista MD  08/06/18  3:46 PM

## 2018-08-06 NOTE — ED PROVIDER NOTES
[facility]  eMERGENCY dEPARTMENT eNCOUnter      Pt Name: Antonio Cortez  MRN: [id number]  [place] 1955  Date of evaluation: 8/6/2018  Provider: Kary Pink MD    CHIEF COMPLAINT       Chief Complaint   Patient presents with    Abdominal Pain     presents to er per ems with c/o abd pain         HISTORY OF PRESENT ILLNESS   (Location/Symptom, Timing/Onset, Context/Setting, Quality, Duration, Modifying Factors, Severity)  Note limiting factors. Antonio Cortez is a 60 y.o. female who presents to the emergency department For evaluation of abdominal pain. Patient reports that the symptoms been ongoing for the past 2-3 weeks however have become significantly worse over the past 2-3 days. She describes the pain as sharp in nature, located over her lower abdomen diffusely. Denies any radiation of the pain. States the pain has been fairly constant and without relieving factors. It is described as moderate in severity. States been associated with some nausea, no vomiting. States that she has not had any hematuria, dysuria, diarrhea or blood noted in her stool. No prior history of similar symptoms. He reports that she was seen last week by her PMD and was felt to have a \"stomach infection\", reports that she was started on by mouth antibiotics, unsure of the name. HPI    Nursing Notes were reviewed. REVIEW OF SYSTEMS    (2-9 systems for level 4, 10 or more for level 5)     Review of Systems   Constitutional: Negative for chills and fever. Respiratory: Negative for chest tightness and shortness of breath. Cardiovascular: Negative for chest pain. Gastrointestinal: Positive for abdominal pain and nausea. Negative for abdominal distention, blood in stool, diarrhea and vomiting. Genitourinary: Negative for dysuria. Neurological: Negative for syncope. All other systems reviewed and are negative.            PAST MEDICAL HISTORY     Past Medical History:   Diagnosis Date    Chronic back pain     GERD who was present at the bedside assisting with her resuscitation. At this point patient is been started on a Diprivan drip. She is received IV fluids and is currently sedated. We'll plan to obtain a CT scan for further evaluation at this point. CT scan unremarkable for intra-abdominal pathology. Patient remains sedated on the ventilator. Case was reviewed with Dr. Kenny Neil regarding inpatient admission to the hospitalist service. Patient remains hypotensive despite fluid resuscitation, currently 2.5 L at this time. We will plan to start her on Levophed in addition to continuing fluid resuscitation. Repeat ABG demonstrates improvement of serum potassium. She remains acidotic. PROCEDURES:  Unless otherwise noted below, none     Procedures    FINAL IMPRESSION      1. Cardiac arrest ([address]    2. Acute renal failure, unspecified acute renal failure type ([address]    3.  Hyperkalemia          DISPOSITION/PLAN   DISPOSITION  Admitted    Please note that portions of this note were completed with a voice recognition program.  Efforts were made to edit the dictations but occasionally words are mis-transcribed.)    Steph Teresa MD (electronically signed)  Attending Emergency Physician         Steph Teresa MD  08/06/18 9534

## 2018-08-06 NOTE — ED NOTES
Unable to obtain manual b/p or palpate brachial pulse, strong carotid pulse, dr Suarez Shall notified order received for levophed etco2  17, b/p 52/39 on monitor     Dreama Claude, RN  08/06/18 0202

## 2018-08-06 NOTE — ED NOTES
p107 sat 100% ON VENT, manual b/p 172/86 r 19 to ct per stretcher with rt, multiple staff, pt on code cart monitor rt bagging 100% FIO2     Brien Choi RN  08/06/18 8907

## 2018-08-06 NOTE — PROGRESS NOTES
8/6/2018  1:38 PM - [facility] Incoming Lab Results From [facility]     Component Results     Component Value Ref Range & Units Status Collected Lab   pH, Arterial 6.800   7.350 - 7.450 Final 08/06/2018  1:36 PM [address] [place] [address] Lab   <^Outside Reportable Range   <^Outside Reportable Range    pCO2, Arterial 31.0   35.0 - 45.0 mmHg Final 08/06/2018  1:36 PM [address] [place] [address] Lab   pO2, Arterial 106.0   80.0 - 100.0 mmHg Final 08/06/2018  1:36 PM [address] [place] [address] Lab   Hemoglobin, Art, Extended 8.8   12.0 - 16.0 g/dL Final 08/06/2018  1:36 PM [address] [place] [address] Lab   O2 Sat, Arterial 94.5  >92 % Final 08/06/2018  1:36 PM  - [facility] Lab   Carboxyhgb, Arterial 0.6  0.0 - 5.0 % Final 08/06/2018  1:36 PM [facility] Lab        0.0-1.5   (Smokers 1.5-5.0)    Methemoglobin, Arterial 0.8  <1.5 % Final 08/06/2018  1:36 PM [address] [place] [address] Lab   O2 Content, Arterial 11.9  Not Established mL/dL Final 08/06/2018  1:36 PM [address] [place] [address] Lab   O2 Therapy Unknown   Final 08/06/2018  1:36 PM [address] [place] [address] Performed By     [address] Name Director Address Valid Date Range   [id number] - [address] [place] [address] [address] [place] [address] 08/30/17 1233-Present   Narrative     CALL  [facility] tel. ,  dr Lucie Manning, 08/06/2018 13:37, by [facility]     Pt on vent settings SIMV 12, , 5 peep and 50% Fio2, LR, AT+

## 2018-08-06 NOTE — ED NOTES
Pt to CT scan with RN on monitor, pt noted to lose pulse in CT scan CPR initiated per Christian Huff MD to Ct scan, RT to scan, pt being bagged.    MD Pedraza to CT scan to run code;  1135- Epi given IVP per YANETH Zuniga verbal order per MD Pedraza  1138- 2 gm Calcium given IVP per YANETH Zuniga per verbal order Keila  1140- pulse check, pt noted PEA, compression restarted  1140- 1 amp bicabinate given IVP per YANETH Zuniga per verbal order Beatrice Woods  [id number]- Epi given per YANETH Zuniga per verbal order MD Pedraza   Pt transported to ER room #2 while CPR continued  1147- IV placed in right wrist 18 gauge  1148- Vecuronium 10 mg and Etomidate 20 mg given IVP per Nadia RN per verbal order MD Beatrice Woods  1149-pt intubated per Coreen Dominique MD; color change noted to colormetric and ET tube placement confirmed; 7.5 tube, 24 at the lip  1154- Diprovan gtt started per Jestine Goodell, RN  08/06/18 1150

## 2018-08-06 NOTE — ED NOTES
16 fr rayo catheter inserted, scant amount dark terry urine     Kamari Constantino, YANETH  08/06/18 0565

## 2018-08-06 NOTE — H&P
Negative 04/09/2018    WBCUA 6 08/30/2017    BACTERIA NEGATIVE 08/30/2017    RBCUA 2 08/30/2017    BLOODU Negative 04/09/2018    SPECGRAV 1.019 04/09/2018    GLUCOSEU 100 04/09/2018       Active Hospital Problems    Diagnosis Date Noted    Acute renal failure (ARF) ([facility] [address] [N17.9] 08/06/2018     Priority: High    Hypovolemic shock (HCC) [R57.1] 08/06/2018     Priority: High    Anuria [R34] 08/06/2018     Priority: High    Metabolic acidosis, increased anion gap [E87.2] 09/01/2017     Priority: Medium    Nausea and vomiting in adult patient [R11.2] 08/31/2017     Priority: Medium    Obesity, Class I, BMI 30.0-34.9 (see actual BMI) [E66.9] 05/02/2016     Priority: Low    Neuropathy [G62.9]      Priority: Low    Type 2 DM with CKD stage 3 and hypertension (HCC) [E11.22, I12.9, N18.3]      Priority: Low    Cardiac arrest ([facility] [address] [I46.9]        Assessment and Plan:  Principal Problem:    Acute renal failure (ARF) ([facility] [address]  Active Problems:    Hypovolemic shock (HCC)    Anuria    Nausea and vomiting in adult patient    Metabolic acidosis, increased anion gap    Type 2 DM with CKD stage 3 and hypertension (HCC)    Neuropathy    Obesity, Class I, BMI 30.0-34.9 (see actual BMI)    Cardiac arrest ([facility] [address]  Resolved Problems:    * No resolved hospital problems. *      1. Admit to ICU floor inpatient. Serial vitals, telemetry, diet npo, activity bedrest.  2. IVF D5W sodium bicarbonate. Bolus to MAP above 50. CVP monitoring. 3. Levophed drip, add Vasopressin drip. 4. Follow labs CBC/BMP. 5. Order tests: US kidneys. 6. Consults: Nephrology for HD  7. Start medications Merrem empirically  8. Home medications reconciled. 9. DVT prophylaxis with SCD  10. Code status full  11. Oxygen AC mode invasive ventilation with FIO 50% currently  12. Disposition ICU care  13. Prognosis very poor at the moment. Critical care time 62 minutes, excluding procedures.  Time used for patient evaluation in ER and ICU floor and management decisions, discussion with consultants.     Tabitha Carpenter MD  Hospitalist service  8/6/2018  2:31 PM

## 2018-08-06 NOTE — ED NOTES
Bed: 02-A  Expected date:   Expected time:   Means of arrival:   Comments:  Hold for Usman Oliver RN  08/06/18 [id number]

## 2018-08-06 NOTE — PROGRESS NOTES
Pharmacy Renal Adjustment    Caitlyn Ferro is a 60 y.o. female. Pharmacy has renally adjusted medications per protocol. Recent Labs      08/06/18   0950   BUN  91*       Recent Labs      08/06/18   0950   CREATININE  11.2*       Estimated Creatinine Clearance: 6 mL/min (A) (based on SCr of 11.2 mg/dL (H)). Height:   Ht Readings from Last 1 Encounters:   08/06/18 5' 5\" (1.651 m)     Weight:  Wt Readings from Last 1 Encounters:   08/06/18 220 lb (99.8 kg)       Plan: Adjust the following medications based on renal function:  Change Merrem to 500mg IV q 24 hours.     KVNG FRAZIER, PHARM D, 8/6/2018, 3:14 PM

## 2018-08-06 NOTE — PROCEDURES
Silvia Justin is a 60 y.o. female patient. 1. Cardiac arrest ([facility] [address]    2. Acute renal failure, unspecified acute renal failure type ([facility] [address]    3. Hyperkalemia      Past Medical History:   Diagnosis Date    Chronic back pain     GERD (gastroesophageal reflux disease)     Hypertension     Neuropathy     Osteoarthritis     Type II or unspecified type diabetes mellitus without mention of complication, not stated as uncontrolled      Blood pressure (!) 70/40, pulse 84, temperature 97.7 °F (36.5 °C), resp. rate (!) 32, height 5' 5\" (1.651 m), weight 220 lb (99.8 kg), SpO2 (!) 81 %. Central Line  Date/Time: 8/6/2018 12:00 PM  Performed by: Elo Joya by: Fredy Salcido   Consent: The procedure was performed in an emergent situation. Required items: required blood products, implants, devices, and special equipment available  Patient identity confirmed: arm band and hospital-assigned identification number  Time out: Immediately prior to procedure a \"time out\" was called to verify the correct patient, procedure, equipment, support staff and site/side marked as required.   Indications: vascular access  Anesthesia: local infiltration    Anesthesia:  Local Anesthetic: lidocaine 1% without epinephrine  Anesthetic total: 5 mL    Sedation:  Patient sedated: yes  Sedatives: etomidate    Preparation: skin prepped with 2% chlorhexidine  Skin prep agent dried: skin prep agent completely dried prior to procedure  Sterile barriers: all five maximum sterile barriers used - cap, mask, sterile gown, sterile gloves, and large sterile sheet  Hand hygiene: hand hygiene performed prior to central venous catheter insertion  Location details: right subclavian  Site selection rationale: position and need for further access at different sites  Patient position: flat  Catheter type: triple lumen  Catheter size: 7.5 Fr  Pre-procedure: landmarks identified  Ultrasound guidance: no  Number of attempts: 1  Successful placement: yes  Post-procedure: line sutured and dressing applied  Assessment: blood return through all ports,  free fluid flow,  placement verified by x-ray and no pneumothorax on x-ray  Patient tolerance: Patient tolerated the procedure well with no immediate complications          Nakul Sofia MD  8/6/2018

## 2018-08-07 ENCOUNTER — ANESTHESIA (OUTPATIENT)
Dept: OPERATING ROOM | Age: 63
DRG: 329 | End: 2018-08-07
Payer: MEDICARE

## 2018-08-07 ENCOUNTER — ANESTHESIA EVENT (OUTPATIENT)
Dept: OPERATING ROOM | Age: 63
DRG: 329 | End: 2018-08-07
Payer: MEDICARE

## 2018-08-07 ENCOUNTER — APPOINTMENT (OUTPATIENT)
Dept: GENERAL RADIOLOGY | Age: 63
DRG: 329 | End: 2018-08-07
Payer: MEDICARE

## 2018-08-07 VITALS
DIASTOLIC BLOOD PRESSURE: 29 MMHG | SYSTOLIC BLOOD PRESSURE: 82 MMHG | RESPIRATION RATE: 20 BRPM | OXYGEN SATURATION: 94 %

## 2018-08-07 PROBLEM — K55.069 ACUTE BOWEL INFARCTION (HCC): Status: ACTIVE | Noted: 2018-08-07

## 2018-08-07 PROBLEM — K72.00 SHOCK LIVER: Status: ACTIVE | Noted: 2018-08-07

## 2018-08-07 LAB
ALBUMIN SERPL-MCNC: 3 G/DL (ref 3.5–5.2)
ALP BLD-CCNC: 95 U/L (ref 35–104)
ALT SERPL-CCNC: 462 U/L (ref 5–33)
ANION GAP SERPL CALCULATED.3IONS-SCNC: 40 MMOL/L (ref 7–19)
APTT: 38 SEC (ref 26–36.2)
APTT: 38.6 SEC (ref 26–36.2)
AST SERPL-CCNC: 883 U/L (ref 5–32)
ATYPICAL LYMPHOCYTE RELATIVE PERCENT: 3 % (ref 0–8)
BANDED NEUTROPHILS RELATIVE PERCENT: 3 % (ref 0–5)
BANDED NEUTROPHILS RELATIVE PERCENT: 5 % (ref 0–5)
BASE EXCESS ARTERIAL: -15.3 MMOL/L (ref -2–2)
BASE EXCESS ARTERIAL: 0 (ref -3–3)
BASOPHILS ABSOLUTE: 0 K/UL (ref 0–0.2)
BASOPHILS ABSOLUTE: 0 K/UL (ref 0–0.2)
BASOPHILS MANUAL: 0 %
BASOPHILS MANUAL: 0 %
BASOPHILS RELATIVE PERCENT: 0 % (ref 0–1)
BASOPHILS RELATIVE PERCENT: 0 % (ref 0–1)
BILIRUB SERPL-MCNC: 1.4 MG/DL (ref 0.2–1.2)
BLOOD BANK DISPENSE STATUS: NORMAL
BLOOD BANK PRODUCT CODE: NORMAL
BPU ID: NORMAL
BUN BLDV-MCNC: 26 MG/DL (ref 8–23)
CALCIUM IONIZED: 0.89 MMOL/L (ref 1.1–1.3)
CALCIUM SERPL-MCNC: 6.7 MG/DL (ref 8.8–10.2)
CARBOXYHEMOGLOBIN ARTERIAL: 0.5 % (ref 0–5)
CHLORIDE BLD-SCNC: 84 MMOL/L (ref 98–111)
CO2: 11 MMOL/L (ref 22–29)
CO2: 24 MEQ/L (ref 21–32)
CREAT SERPL-MCNC: 3.7 MG/DL (ref 0.5–0.9)
DESCRIPTION BLOOD BANK: NORMAL
EOSINOPHILS ABSOLUTE: 0 K/UL (ref 0–0.6)
EOSINOPHILS ABSOLUTE: 0 K/UL (ref 0–0.6)
EOSINOPHILS RELATIVE PERCENT: 0 % (ref 0–5)
EOSINOPHILS RELATIVE PERCENT: 0 % (ref 0–5)
FIBRINOGEN: 247 MG/DL (ref 238–505)
FIBRINOGEN: 252 MG/DL (ref 238–505)
GFR NON-AFRICAN AMERICAN: 12
GFR NON-AFRICAN AMERICAN: 32
GLUCOSE BLD-MCNC: 105 MG/DL (ref 70–99)
GLUCOSE BLD-MCNC: 144 MG/DL (ref 70–99)
GLUCOSE BLD-MCNC: 226 MG/DL (ref 74–109)
GLUCOSE BLD-MCNC: 66 MG/DL (ref 70–99)
HCO3 ARTERIAL: 11.2 MMOL/L (ref 22–26)
HCT VFR BLD CALC: 17.8 % (ref 37–47)
HCT VFR BLD CALC: 29.1 % (ref 37–47)
HCT VFR BLD CALC: 32.4 % (ref 37–47)
HEMOGLOBIN, ART, EXTENDED: 9.8 G/DL (ref 12–16)
HEMOGLOBIN: 10.3 G/DL (ref 12–16)
HEMOGLOBIN: 5.8 G/DL (ref 12–16)
HEMOGLOBIN: 8 GM/DL (ref 12–18)
HEMOGLOBIN: 9.1 G/DL (ref 12–16)
HYPOCHROMIA: ABNORMAL
HYPOCHROMIA: ABNORMAL
INR BLD: 1.61 (ref 0.88–1.18)
INR BLD: 1.76 (ref 0.88–1.18)
LACTIC ACID: 16.1 MMOL/L (ref 0.5–1.9)
LYMPHOCYTES ABSOLUTE: 0.7 K/UL (ref 1.1–4.5)
LYMPHOCYTES ABSOLUTE: 1.7 K/UL (ref 1.1–4.5)
LYMPHOCYTES RELATIVE PERCENT: 14 % (ref 20–40)
LYMPHOCYTES RELATIVE PERCENT: 28 % (ref 20–40)
MAGNESIUM: 1.2 MG/DL (ref 1.6–2.4)
MCH RBC QN AUTO: 28.4 PG (ref 27–31)
MCH RBC QN AUTO: 28.4 PG (ref 27–31)
MCH RBC QN AUTO: 28.5 PG (ref 27–31)
MCHC RBC AUTO-ENTMCNC: 31.3 G/DL (ref 33–37)
MCHC RBC AUTO-ENTMCNC: 31.8 G/DL (ref 33–37)
MCHC RBC AUTO-ENTMCNC: 32.6 G/DL (ref 33–37)
MCV RBC AUTO: 87.3 FL (ref 81–99)
MCV RBC AUTO: 89.3 FL (ref 81–99)
MCV RBC AUTO: 91.2 FL (ref 81–99)
METHEMOGLOBIN ARTERIAL: 1.2 %
MONOCYTES ABSOLUTE: 0.2 K/UL (ref 0–0.9)
MONOCYTES ABSOLUTE: 0.2 K/UL (ref 0–0.9)
MONOCYTES RELATIVE PERCENT: 3 % (ref 0–10)
MONOCYTES RELATIVE PERCENT: 4 % (ref 0–10)
MYELOCYTE PERCENT: 1 %
MYELOCYTE PERCENT: 2 %
NEUTROPHILS ABSOLUTE: 3.6 K/UL (ref 1.5–7.5)
NEUTROPHILS ABSOLUTE: 3.9 K/UL (ref 1.5–7.5)
NEUTROPHILS MANUAL: 59 %
NEUTROPHILS MANUAL: 78 %
NEUTROPHILS RELATIVE PERCENT: 59 % (ref 50–65)
NEUTROPHILS RELATIVE PERCENT: 78 % (ref 50–65)
O2 CONTENT ARTERIAL: 13.1 ML/DL
O2 SAT, ARTERIAL: 94 %
O2 SAT, ARTERIAL: 95 % (ref 93–100)
O2 THERAPY: ABNORMAL
OVALOCYTES: ABNORMAL
PARATHYROID HORMONE INTACT: 129.1 PG/ML (ref 15–65)
PCO2 ARTERIAL: 28 MMHG (ref 35–45)
PCO2 ARTERIAL: 35 MM HG (ref 35–48)
PDW BLD-RTO: 13.2 % (ref 11.5–14.5)
PDW BLD-RTO: 13.8 % (ref 11.5–14.5)
PDW BLD-RTO: 13.9 % (ref 11.5–14.5)
PERFORMED ON: ABNORMAL
PH ARTERIAL: 7.21 (ref 7.35–7.45)
PH ARTERIAL: 7.44 (ref 7.3–7.5)
PLATELET # BLD: 103 K/UL (ref 130–400)
PLATELET # BLD: 115 K/UL (ref 130–400)
PLATELET # BLD: 63 K/UL (ref 130–400)
PLATELET SLIDE REVIEW: ABNORMAL
PLATELET SLIDE REVIEW: ABNORMAL
PLATELET SLIDE REVIEW: ADEQUATE
PMV BLD AUTO: 10.9 FL (ref 9.4–12.3)
PMV BLD AUTO: 11.2 FL (ref 9.4–12.3)
PMV BLD AUTO: 11.9 FL (ref 9.4–12.3)
PO2 ARTERIAL: 72 MM HG (ref 83–108)
PO2 ARTERIAL: 83 MMHG (ref 80–100)
POC ANION GAP: 17
POC CHLORIDE: 102 MEQ/L (ref 99–110)
POC CREATININE: 1.6 MG/DL (ref 0.3–1.3)
POC HEMATOCRIT: 23 % (ref 37–52)
POC PATIENT TEMP: 37
POC POTASSIUM: 3.3 MEQ/L (ref 3.5–5.1)
POC SAMPLE TYPE: ABNORMAL
POC SODIUM: 143 MEQ/L (ref 136–145)
POLYCHROMASIA: ABNORMAL
POLYCHROMASIA: ABNORMAL
POTASSIUM REFLEX MAGNESIUM: 3.5 MMOL/L (ref 3.5–5)
POTASSIUM, WHOLE BLOOD: 3.4
PROTHROMBIN TIME: 19.1 SEC (ref 12–14.6)
PROTHROMBIN TIME: 20.5 SEC (ref 12–14.6)
RBC # BLD: 2.04 M/UL (ref 4.2–5.4)
RBC # BLD: 3.19 M/UL (ref 4.2–5.4)
RBC # BLD: 3.63 M/UL (ref 4.2–5.4)
SODIUM BLD-SCNC: 135 MMOL/L (ref 136–145)
TCO2 ARTERIAL: 25 MMOL/L
TOTAL CK: 615 U/L (ref 26–192)
TOTAL CK: 616 U/L (ref 26–192)
TOTAL PROTEIN: 4.7 G/DL (ref 6.6–8.7)
VITAMIN D 25-HYDROXY: 5.6 NG/ML
WBC # BLD: 4.8 K/UL (ref 4.8–10.8)
WBC # BLD: 5.5 K/UL (ref 4.8–10.8)
WBC # BLD: 6.7 K/UL (ref 4.8–10.8)

## 2018-08-07 PROCEDURE — 2000000000 HC ICU R&B

## 2018-08-07 PROCEDURE — 2500000003 HC RX 250 WO HCPCS: Performed by: INTERNAL MEDICINE

## 2018-08-07 PROCEDURE — 82800 BLOOD PH: CPT

## 2018-08-07 PROCEDURE — 3700000001 HC ADD 15 MINUTES (ANESTHESIA): Performed by: SURGERY

## 2018-08-07 PROCEDURE — 99231 SBSQ HOSP IP/OBS SF/LOW 25: CPT | Performed by: SURGERY

## 2018-08-07 PROCEDURE — 82306 VITAMIN D 25 HYDROXY: CPT

## 2018-08-07 PROCEDURE — 82565 ASSAY OF CREATININE: CPT

## 2018-08-07 PROCEDURE — 2580000003 HC RX 258: Performed by: FAMILY MEDICINE

## 2018-08-07 PROCEDURE — 82803 BLOOD GASES ANY COMBINATION: CPT

## 2018-08-07 PROCEDURE — 36600 WITHDRAWAL OF ARTERIAL BLOOD: CPT

## 2018-08-07 PROCEDURE — 2780000010 HC IMPLANT OTHER: Performed by: SURGERY

## 2018-08-07 PROCEDURE — 84132 ASSAY OF SERUM POTASSIUM: CPT

## 2018-08-07 PROCEDURE — 85027 COMPLETE CBC AUTOMATED: CPT

## 2018-08-07 PROCEDURE — 6360000002 HC RX W HCPCS: Performed by: NURSE ANESTHETIST, CERTIFIED REGISTERED

## 2018-08-07 PROCEDURE — 85610 PROTHROMBIN TIME: CPT

## 2018-08-07 PROCEDURE — 88307 TISSUE EXAM BY PATHOLOGIST: CPT

## 2018-08-07 PROCEDURE — 2500000003 HC RX 250 WO HCPCS: Performed by: SURGERY

## 2018-08-07 PROCEDURE — C9113 INJ PANTOPRAZOLE SODIUM, VIA: HCPCS | Performed by: INTERNAL MEDICINE

## 2018-08-07 PROCEDURE — 3600000014 HC SURGERY LEVEL 4 ADDTL 15MIN: Performed by: SURGERY

## 2018-08-07 PROCEDURE — 82435 ASSAY OF BLOOD CHLORIDE: CPT

## 2018-08-07 PROCEDURE — 3600000004 HC SURGERY LEVEL 4 BASE: Performed by: SURGERY

## 2018-08-07 PROCEDURE — 2580000003 HC RX 258: Performed by: EMERGENCY MEDICINE

## 2018-08-07 PROCEDURE — 6360000002 HC RX W HCPCS: Performed by: FAMILY MEDICINE

## 2018-08-07 PROCEDURE — 44120 REMOVAL OF SMALL INTESTINE: CPT | Performed by: PHYSICIAN ASSISTANT

## 2018-08-07 PROCEDURE — 2580000003 HC RX 258: Performed by: INTERNAL MEDICINE

## 2018-08-07 PROCEDURE — 2580000003 HC RX 258: Performed by: SURGERY

## 2018-08-07 PROCEDURE — 82374 ASSAY BLOOD CARBON DIOXIDE: CPT

## 2018-08-07 PROCEDURE — 83605 ASSAY OF LACTIC ACID: CPT

## 2018-08-07 PROCEDURE — 85025 COMPLETE CBC W/AUTO DIFF WBC: CPT

## 2018-08-07 PROCEDURE — 82330 ASSAY OF CALCIUM: CPT

## 2018-08-07 PROCEDURE — 0DTH0ZZ RESECTION OF CECUM, OPEN APPROACH: ICD-10-PCS | Performed by: SURGERY

## 2018-08-07 PROCEDURE — P9047 ALBUMIN (HUMAN), 25%, 50ML: HCPCS | Performed by: INTERNAL MEDICINE

## 2018-08-07 PROCEDURE — 44120 REMOVAL OF SMALL INTESTINE: CPT | Performed by: SURGERY

## 2018-08-07 PROCEDURE — 99291 CRITICAL CARE FIRST HOUR: CPT | Performed by: INTERNAL MEDICINE

## 2018-08-07 PROCEDURE — 82810 BLOOD GASES O2 SAT ONLY: CPT

## 2018-08-07 PROCEDURE — 36620 INSERTION CATHETER ARTERY: CPT | Performed by: NURSE ANESTHETIST, CERTIFIED REGISTERED

## 2018-08-07 PROCEDURE — 2709999900 HC NON-CHARGEABLE SUPPLY: Performed by: SURGERY

## 2018-08-07 PROCEDURE — P9059 PLASMA, FRZ BETWEEN 8-24HOUR: HCPCS

## 2018-08-07 PROCEDURE — 99221 1ST HOSP IP/OBS SF/LOW 40: CPT | Performed by: SURGERY

## 2018-08-07 PROCEDURE — 82550 ASSAY OF CK (CPK): CPT

## 2018-08-07 PROCEDURE — 84295 ASSAY OF SERUM SODIUM: CPT

## 2018-08-07 PROCEDURE — 85730 THROMBOPLASTIN TIME PARTIAL: CPT

## 2018-08-07 PROCEDURE — P9016 RBC LEUKOCYTES REDUCED: HCPCS

## 2018-08-07 PROCEDURE — 2700000000 HC OXYGEN THERAPY PER DAY

## 2018-08-07 PROCEDURE — 2500000003 HC RX 250 WO HCPCS: Performed by: NURSE ANESTHETIST, CERTIFIED REGISTERED

## 2018-08-07 PROCEDURE — 99291 CRITICAL CARE FIRST HOUR: CPT | Performed by: FAMILY MEDICINE

## 2018-08-07 PROCEDURE — 8090000000 HC CONTINUOUS VENOVENOUS HEMOFIL

## 2018-08-07 PROCEDURE — 6360000002 HC RX W HCPCS: Performed by: INTERNAL MEDICINE

## 2018-08-07 PROCEDURE — 3700000000 HC ANESTHESIA ATTENDED CARE: Performed by: SURGERY

## 2018-08-07 PROCEDURE — 82948 REAGENT STRIP/BLOOD GLUCOSE: CPT

## 2018-08-07 PROCEDURE — 80053 COMPREHEN METABOLIC PANEL: CPT

## 2018-08-07 PROCEDURE — 85014 HEMATOCRIT: CPT

## 2018-08-07 PROCEDURE — 2500000003 HC RX 250 WO HCPCS: Performed by: EMERGENCY MEDICINE

## 2018-08-07 PROCEDURE — 83735 ASSAY OF MAGNESIUM: CPT

## 2018-08-07 PROCEDURE — 2500000003 HC RX 250 WO HCPCS: Performed by: FAMILY MEDICINE

## 2018-08-07 PROCEDURE — 85384 FIBRINOGEN ACTIVITY: CPT

## 2018-08-07 PROCEDURE — 6360000002 HC RX W HCPCS

## 2018-08-07 PROCEDURE — 0DB80ZZ EXCISION OF SMALL INTESTINE, OPEN APPROACH: ICD-10-PCS | Performed by: SURGERY

## 2018-08-07 PROCEDURE — 71045 X-RAY EXAM CHEST 1 VIEW: CPT

## 2018-08-07 PROCEDURE — 83970 ASSAY OF PARATHORMONE: CPT

## 2018-08-07 PROCEDURE — 94003 VENT MGMT INPAT SUBQ DAY: CPT

## 2018-08-07 DEVICE — RELOAD STPL L55MM OPN H3.8MM CLS H1.5MM WIRE DIA0.2MM REG: Type: IMPLANTABLE DEVICE | Site: ABDOMEN | Status: FUNCTIONAL

## 2018-08-07 DEVICE — RELOAD STPL H1.5X3.6XL60MM REG TISS BLU B FRM AUTO RET PIN: Type: IMPLANTABLE DEVICE | Site: ABDOMEN | Status: FUNCTIONAL

## 2018-08-07 RX ORDER — 0.9 % SODIUM CHLORIDE 0.9 %
500 INTRAVENOUS SOLUTION INTRAVENOUS ONCE
Status: COMPLETED | OUTPATIENT
Start: 2018-08-07 | End: 2018-08-07

## 2018-08-07 RX ORDER — CALCIUM CHLORIDE 100 MG/ML
INJECTION INTRAVENOUS; INTRAVENTRICULAR PRN
Status: DISCONTINUED | OUTPATIENT
Start: 2018-08-07 | End: 2018-08-07 | Stop reason: SDUPTHER

## 2018-08-07 RX ORDER — 0.9 % SODIUM CHLORIDE 0.9 %
250 INTRAVENOUS SOLUTION INTRAVENOUS ONCE
Status: COMPLETED | OUTPATIENT
Start: 2018-08-07 | End: 2018-08-08

## 2018-08-07 RX ORDER — SODIUM CHLORIDE 9 MG/ML
INJECTION, SOLUTION INTRAVENOUS CONTINUOUS
Status: DISCONTINUED | OUTPATIENT
Start: 2018-08-07 | End: 2018-08-11 | Stop reason: HOSPADM

## 2018-08-07 RX ORDER — HEPARIN SODIUM 1000 [USP'U]/ML
INJECTION, SOLUTION INTRAVENOUS; SUBCUTANEOUS
Status: COMPLETED
Start: 2018-08-07 | End: 2018-08-07

## 2018-08-07 RX ORDER — DEXTROSE MONOHYDRATE 25 G/50ML
25 INJECTION, SOLUTION INTRAVENOUS ONCE
Status: COMPLETED | OUTPATIENT
Start: 2018-08-07 | End: 2018-08-07

## 2018-08-07 RX ORDER — HEPARIN SODIUM 1000 [USP'U]/ML
1000 INJECTION, SOLUTION INTRAVENOUS; SUBCUTANEOUS ONCE
Status: COMPLETED | OUTPATIENT
Start: 2018-08-07 | End: 2018-08-07

## 2018-08-07 RX ORDER — SODIUM BICARBONATE 42 MG/ML
INJECTION, SOLUTION INTRAVENOUS PRN
Status: DISCONTINUED | OUTPATIENT
Start: 2018-08-07 | End: 2018-08-07 | Stop reason: SDUPTHER

## 2018-08-07 RX ORDER — NOREPINEPHRINE BITARTRATE 1 MG/ML
25 INJECTION, SOLUTION INTRAVENOUS
Status: DISCONTINUED | OUTPATIENT
Start: 2018-08-07 | End: 2018-08-07 | Stop reason: SDUPTHER

## 2018-08-07 RX ORDER — FENTANYL CITRATE 50 UG/ML
INJECTION, SOLUTION INTRAMUSCULAR; INTRAVENOUS PRN
Status: DISCONTINUED | OUTPATIENT
Start: 2018-08-07 | End: 2018-08-07 | Stop reason: SDUPTHER

## 2018-08-07 RX ORDER — ALBUMIN (HUMAN) 12.5 G/50ML
50 SOLUTION INTRAVENOUS ONCE
Status: COMPLETED | OUTPATIENT
Start: 2018-08-07 | End: 2018-08-07

## 2018-08-07 RX ADMIN — SODIUM BICARBONATE 50 MEQ: 42 INJECTION, SOLUTION INTRAVENOUS at 13:24

## 2018-08-07 RX ADMIN — SODIUM CHLORIDE 250 ML: 9 INJECTION, SOLUTION INTRAVENOUS at 15:54

## 2018-08-07 RX ADMIN — FENTANYL CITRATE 50 MCG: 50 INJECTION INTRAMUSCULAR; INTRAVENOUS at 12:58

## 2018-08-07 RX ADMIN — SODIUM CHLORIDE 500 ML: 9 INJECTION, SOLUTION INTRAVENOUS at 06:43

## 2018-08-07 RX ADMIN — MEROPENEM 1 G: 1 INJECTION, POWDER, FOR SOLUTION INTRAVENOUS at 18:04

## 2018-08-07 RX ADMIN — Medication 1 G: at 14:41

## 2018-08-07 RX ADMIN — Medication 10 ML: at 09:13

## 2018-08-07 RX ADMIN — SODIUM BICARBONATE: 84 INJECTION, SOLUTION INTRAVENOUS at 00:03

## 2018-08-07 RX ADMIN — HEPARIN SODIUM 1000 UNITS: 1000 INJECTION, SOLUTION INTRAVENOUS; SUBCUTANEOUS at 21:46

## 2018-08-07 RX ADMIN — FENTANYL CITRATE 25 MCG/HR: 50 INJECTION, SOLUTION INTRAMUSCULAR; INTRAVENOUS at 18:04

## 2018-08-07 RX ADMIN — SILVER SULFADIAZINE: 10 CREAM TOPICAL at 15:34

## 2018-08-07 RX ADMIN — FENTANYL CITRATE 50 MCG: 50 INJECTION INTRAMUSCULAR; INTRAVENOUS at 12:54

## 2018-08-07 RX ADMIN — NOREPINEPHRINE BITARTRATE 30 MCG/MIN: 1 INJECTION INTRAVENOUS at 08:32

## 2018-08-07 RX ADMIN — VASOPRESSIN 0.04 UNITS/MIN: 20 INJECTION INTRAVENOUS at 07:20

## 2018-08-07 RX ADMIN — Medication 10 ML: at 21:47

## 2018-08-07 RX ADMIN — CALCIUM CHLORIDE 1 G: 100 INJECTION, SOLUTION INTRAVENOUS; INTRAVENTRICULAR at 13:41

## 2018-08-07 RX ADMIN — FENTANYL CITRATE 50 MCG: 50 INJECTION INTRAMUSCULAR; INTRAVENOUS at 13:10

## 2018-08-07 RX ADMIN — VASOPRESSIN 0.04 UNITS/MIN: 20 INJECTION INTRAVENOUS at 14:29

## 2018-08-07 RX ADMIN — MIDAZOLAM 2 MG: 1 INJECTION INTRAMUSCULAR; INTRAVENOUS at 12:55

## 2018-08-07 RX ADMIN — ALBUMIN (HUMAN) 50 G: 0.25 INJECTION, SOLUTION INTRAVENOUS at 06:45

## 2018-08-07 RX ADMIN — SODIUM CHLORIDE 500 ML: 9 INJECTION, SOLUTION INTRAVENOUS at 01:15

## 2018-08-07 RX ADMIN — SODIUM CHLORIDE: 9 INJECTION, SOLUTION INTRAVENOUS at 11:15

## 2018-08-07 RX ADMIN — CHLORHEXIDINE GLUCONATE 15 ML: 1.2 RINSE ORAL at 09:13

## 2018-08-07 RX ADMIN — SODIUM CHLORIDE: 9 INJECTION, SOLUTION INTRAVENOUS at 16:53

## 2018-08-07 RX ADMIN — SODIUM BICARBONATE 50 MEQ: 42 INJECTION, SOLUTION INTRAVENOUS at 13:23

## 2018-08-07 RX ADMIN — NOREPINEPHRINE BITARTRATE 35 MCG/MIN: 1 INJECTION INTRAVENOUS at 18:10

## 2018-08-07 RX ADMIN — NOREPINEPHRINE BITARTRATE 35 MCG/MIN: 1 INJECTION INTRAVENOUS at 00:03

## 2018-08-07 RX ADMIN — HYDROCORTISONE SODIUM SUCCINATE 100 MG: 100 INJECTION, POWDER, FOR SOLUTION INTRAMUSCULAR; INTRAVENOUS at 14:48

## 2018-08-07 RX ADMIN — SODIUM CHLORIDE 250 ML: 9 INJECTION, SOLUTION INTRAVENOUS at 21:45

## 2018-08-07 RX ADMIN — DEXTROSE MONOHYDRATE 25 G: 25 INJECTION, SOLUTION INTRAVENOUS at 00:45

## 2018-08-07 RX ADMIN — SODIUM BICARBONATE 100 MEQ: 84 INJECTION INTRAVENOUS at 06:14

## 2018-08-07 RX ADMIN — FENTANYL CITRATE 25 MCG: 50 INJECTION INTRAMUSCULAR; INTRAVENOUS at 00:02

## 2018-08-07 RX ADMIN — MIDAZOLAM HYDROCHLORIDE 4 MG/HR: 5 INJECTION, SOLUTION INTRAMUSCULAR; INTRAVENOUS at 15:22

## 2018-08-07 RX ADMIN — VANCOMYCIN HYDROCHLORIDE 1500 MG: 10 INJECTION, POWDER, LYOPHILIZED, FOR SOLUTION INTRAVENOUS at 00:05

## 2018-08-07 RX ADMIN — PANTOPRAZOLE SODIUM 40 MG: 40 INJECTION, POWDER, FOR SOLUTION INTRAVENOUS at 09:13

## 2018-08-07 RX ADMIN — HYDROCORTISONE SODIUM SUCCINATE 100 MG: 100 INJECTION, POWDER, FOR SOLUTION INTRAMUSCULAR; INTRAVENOUS at 00:06

## 2018-08-07 RX ADMIN — HYDROCORTISONE SODIUM SUCCINATE 100 MG: 100 INJECTION, POWDER, FOR SOLUTION INTRAMUSCULAR; INTRAVENOUS at 08:45

## 2018-08-07 RX ADMIN — VASOPRESSIN 0.04 UNITS/MIN: 20 INJECTION INTRAVENOUS at 00:07

## 2018-08-07 RX ADMIN — MEROPENEM 1 G: 1 INJECTION, POWDER, FOR SOLUTION INTRAVENOUS at 06:15

## 2018-08-07 RX ADMIN — SILVER SULFADIAZINE: 10 CREAM TOPICAL at 21:45

## 2018-08-07 ASSESSMENT — PULMONARY FUNCTION TESTS
PIF_VALUE: 20.8
PIF_VALUE: 29.6
PIF_VALUE: 29.4
PIF_VALUE: 22.7
PIF_VALUE: 25.7
PIF_VALUE: 25.4
PIF_VALUE: 35.1
PIF_VALUE: 27.8

## 2018-08-07 ASSESSMENT — PAIN SCALES - GENERAL: PAINLEVEL_OUTOF10: 10

## 2018-08-07 NOTE — ADDENDUM NOTE
Addendum  created 08/07/18 4822 by Zeus Yee MD    Anesthesia Intra Blocks edited, Child order released for a procedure order, LDA created via procedure documentation, Sign clinical note

## 2018-08-07 NOTE — ED NOTES
20G IV RAC D/C'D,  Noted swelling surrounding iv site, iv infiltrated, d'cd with catheter intact     Nydia Azar RN  08/06/18 [id number]

## 2018-08-07 NOTE — PROGRESS NOTES
Taiwo Denise failed, in view of severe hypotension vasoconstriction, however bp parameters improved, albumin bolus given vancomycin also , will continue to monitor.     cct 50 minutes

## 2018-08-07 NOTE — PROGRESS NOTES
Follow up visit:  Report from nurse. Pt improved from yesterday. Follows commands. Opens eyes. Pt continues on Vasopressin and Levophed gtts. SLEDD at present time. No family here at time of this nurse visit. PC will follow for supportive care/POC.     Electronically signed by Laurence Gentile RN on 8/7/2018 at 11:45 AM

## 2018-08-07 NOTE — PROGRESS NOTES
8/7/2018  4:56 AM - Naveen Gaxiola Incoming Lab Results From [employer]     Component Results     Component Value Ref Range & Units Status Collected Lab   pH, Arterial 7.210   7.350 - 7.450 Final 08/07/2018  4:53 AM [facility] Lab   pCO2, Arterial 28.0   35.0 - 45.0 mmHg Final 08/07/2018  4:53 AM [facility] Lab   pO2, Arterial 83.0  80.0 - 100.0 mmHg Final 08/07/2018  4:53 AM [facility] Lab   HCO3, Arterial 11.2   22.0 - 26.0 mmol/L Final 08/07/2018  4:53 AM [facility] Excess, Arterial -15.3   -2.0 - 2.0 mmol/L Final 08/07/2018  4:53 AM [facility] Lab   Hemoglobin, Art, Extended 9.8   12.0 - 16.0 g/dL Final 08/07/2018  4:53 AM [facility] Lab   O2 Sat, Arterial 94.0  >92 % Final 08/07/2018  4:53 AM [facility] Lab   Carboxyhgb, Arterial 0.5  0.0 - 5.0 % Final 08/07/2018  4:53 AM [facility]        0.0-1.5   (Smokers 1.5-5.0)    Methemoglobin, Arterial 1.2  <1.5 % Final 08/07/2018  4:53 AM [facility] Lab   O2 Content, Arterial 13.1  Not Established mL/dL Final 08/07/2018  4:53 AM [facility] Lab   O2 Therapy Unknown   Final 08/07/2018  4:53 AM [facility]   Testing Performed By     Leela Calderón Name Director Address Valid Date Range   [address] - [address] LAB MD Harinder [address] [address] 08/30/17 1233-Present   Narrative     CALL  Levi  [facility] tel. , S.  William Rose RN ICU  Phillip Connell RN ICU, 08/07/2018 04:55, by ANUPAM MARTÍNEZ [facility]   Lab and Collection     BLOOD GAS, ARTERIAL on 8/7/2018   Result History     PRVC 18, , 80% FIO2, 5 PEEP  LB +AT

## 2018-08-07 NOTE — PROGRESS NOTES
reflux disease)     Hypertension     Neuropathy     Osteoarthritis     Type II or unspecified type diabetes mellitus without mention of complication, not stated as uncontrolled        Past Surgical History:  Past Surgical History:   Procedure Laterality Date     SECTION      JOINT REPLACEMENT Left     ltk    LA EXCISION TUMOR SOFT TISSUE BACK/FLANK SUBQ <3CM N/A 2016    EXCISION LIPOMA OF THE UPPER BACK  performed by Kip Guardado MD at [facility] 20         Family History  Family History   Problem Relation Age of Onset    Arthritis Mother     Diabetes Mother     High Blood Pressure Mother     High Cholesterol Mother     Cancer Father        Social History  Social History     Social History    Marital status:      Spouse name: N/A    Number of children: N/A    Years of education: N/A     Occupational History    Not on file. Social History Main Topics    Smoking status: Never Smoker    Smokeless tobacco: Never Used    Alcohol use No    Drug use: No    Sexual activity: Not on file     Other Topics Concern    Not on file     Social History Narrative    No narrative on file         Review of Systems:  History obtained from unobtainable from patient due to mental status          Objective:  Blood pressure 114/75, pulse 113, temperature 97.3 °F (36.3 °C), temperature source Temporal, resp. rate 25, height 5' 5\" (1.651 m), weight 244 lb 11.2 oz (111 kg), SpO2 93 %.     Intake/Output Summary (Last 24 hours) at 18 1044  Last data filed at 18 1001   Gross per 24 hour   Intake          2645.97 ml   Output             5030 ml   Net         -2384.03 ml     General: sedated/vent  Chest:  Coarse bs bilat  CVS: regular rate and rhythm  Abdominal: soft, nontender, normal bowel sounds  Extremities: no cyanosis or edema  Skin: warm and dry without rash    Labs:  BMP: Recent Labs      18   1439  18   2324  18   2346  18   2153 08/07/18   0537   NA  137  140   --    --   135*   K  6.3*  3.6  3.2  3.4  3.5   CL  95*  91*   --    --   84*   CO2  7*  21*   --    --   11*   PHOS  13.6*   --    --    --    --    BUN  87*  23   --    --   26*   CREATININE  10.8*  3.0*   --    --   3.7*   CALCIUM  7.1*  7.0*   --    --   6.7*     CBC: Recent Labs      08/06/18   1439 08/06/18 2324 08/07/18   0537   WBC  16.2*  5.5  4.8   HGB  8.7*  10.3*  9.1*   HCT  31.4*  32.4*  29.1*   MCV  101.9*  89.3  91.2   PLT  203  115*  103*     LIVER PROFILE:   Recent Labs      08/06/18   0950  08/06/18 1439 08/06/18 2324 08/07/18   0537   AST  25  152*  966*  883*   ALT  17  159*  599*  462*   LIPASE  288*   --    --    --    BILITOT  <0.2  <0.2  0.8  1.4*   ALKPHOS  128*  119*  133*  95     PT/INR:   Recent Labs      08/06/18 1439 08/07/18   0537   PROTIME  20.2*  20.5*   INR  1.73*  1.76*     APTT:   Recent Labs      08/06/18   1439 08/07/18   0537   APTT  63.5*  38.0*     BNP:  No results for input(s): BNP in the last 72 hours. Ionized Calcium:No results for input(s): IONCA in the last 72 hours. Magnesium:  Recent Labs      08/06/18 1439 08/07/18   0537   MG  2.2  1.2*     Phosphorus:  Recent Labs      08/06/18 1439   PHOS  13.6*     HgbA1C:   Recent Labs      08/06/18   1439   LABA1C  7.7*     Hepatic: Recent Labs      08/06/18   1439 08/06/18 2324 08/07/18   0537   ALKPHOS  119*  133*  95   ALT  159*  599*  462*   AST  152*  966*  883*   PROT  4.8*  4.9*  4.7*   BILITOT  <0.2  0.8  1.4*   LABALBU  2.8*  2.8*  3.0*     Lactic Acid:   Recent Labs      08/07/18 0537   LACTA  16.1*     Troponin: Recent Labs      08/07/18 0537   CKTOTAL  616*     ABGs: Lab Results   Component Value Date    PHART 7.210 08/07/2018    PO2ART 83.0 08/07/2018    QZR1GQG 28.0 08/07/2018     CRP:  No results for input(s): CRP in the last 72 hours. Sed Rate:  No results for input(s): SEDRATE in the last 72 hours.     Culture Results:   Blood Culture Recent: No results for input(s): BC in the last 720 hours. Urine Culture Recent : No results for input(s): LABURIN in the last 720 hours. Radiology reports as per the Radiologist  Radiology: Ct Abdomen Pelvis Wo Contrast Additional Contrast? None    Result Date: 8/6/2018  Examination. CT ABDOMEN PELVIS WO CONTRAST History: Abdominal pain. DLP: 1595 mGycm. The CT scan of the abdomen and pelvis is performed without oral or intravenous contrast enhancement. The images are acquired in axial plane with subsequent reconstruction in coronal and sagittal planes. The comparison is made with the previous study dated 8/30/2017. The lung bases included in the study show interstitial infiltrate in the lower lobes bilaterally, extending into the infrahilar region bilaterally. There are atelectatic changes in the right middle lobe and lingular segment of the left upper lobe. Unenhanced liver appears normal. Normal spleen is seen with a large splenule along the lateral inferior margin of the spleen. Moderately distended gallbladder seen with a high density contents which may represent sludge. No radiopaque calculus. No evidence of common bile duct dilatation. The pancreas appear normal. There is moderate fullness of the left adrenal gland and a small fat density nodule measuring 1 cm in diameter. The right adrenal gland appear normal. Moderate lobulation of the renal outline bilaterally seen. No hydronephrosis. The ureters appear normal. The urinary bladder is decompressed and may not be optimally evaluated. A normal size uterus is seen which is moderately anteverted and displaced to the left. No adnexal masses. The stomach and duodenum are normal. Normal small bowel is seen. There are no findings to suggest appendicitis. Moderate gas and stool are seen in the colon. The distal colon is decompressed. There is diverticulosis of the distal colon. No evidence for diverticulitis.  Atheromatous changes of the abdominal aorta and iliac arteries are seen. No aneurysmal dilatation. There is no evidence of abdominal or pelvic lymphadenopathy. The scattered mesenteric lymph nodes are visualized without significant enlargement. Chronic degenerative changes of the lumbar spine are seen. No focal bony abnormality or bony lesion. No acute abnormality of the abdomen or pelvis. The diverticulosis of the distal colon. No evidence for diverticulitis. A 1 cm fat density nodule in the left adrenal gland. Bilateral lower lobar interstitial infiltrate. The above finding are recorded on a digital voice clip in PACS. Signed by Dr Darby Cherry on 8/6/2018 12:59 PM    Us Renal Complete    Result Date: 8/6/2018  US RENAL COMPLETE 8/6/2018 2:15 PM HISTORY: Acute kidney injury COMPARISON: None  TECHNIQUE: Multiple longitudinal and transverse realtime sonographic images of the kidneys and urinary bladder are obtained. FINDINGS: The right kidney measures 9.5 x 4.5 x 5 cm. The right kidney is normal in size, shape, contour and position. The cortical thickness is normal, with preservation of the corticomedullary differentiation. The central echo complex is compact with no evidence for hydronephrosis. No nephrolithiasis or abnormal perinephric fluid collections are seen. No hydroureter. The left kidney measures 9.4 x 5 x 4.7 cm. The left kidney is normal in size, shape, contour and position. The cortical thickness is normal, with preservation of the corticomedullary differentiation. The central echo complex is compact with no evidence for hydronephrosis. No nephrolithiasis or abnormal perinephric fluid collections are seen. No hydroureter. The bladder is decompressed. There is no pelvic ascites. 1. Unremarkable renal ultrasound. Signed by Dr Raji Willis on 8/6/2018 6:06 PM    Xr Chest Portable    Result Date: 8/7/2018  EXAMINATION:  XR CHEST PORTABLE  8/7/2018 7:00 AM HISTORY: On ventilator. COMPARISON: 8/6/2018.  FINDINGS:  There is a new NG tube with tip and

## 2018-08-07 NOTE — ANESTHESIA PRE PROCEDURE
BMI:   Wt Readings from Last 3 Encounters:   08/07/18 244 lb 11.2 oz (111 kg)   04/09/18 220 lb (99.8 kg)   09/02/17 230 lb 2 oz (104.4 kg)     Body mass index is 40.72 kg/m². CBC:   Lab Results   Component Value Date    WBC 4.8 08/07/2018    RBC 3.19 08/07/2018    HGB 9.1 08/07/2018    HCT 29.1 08/07/2018    MCV 91.2 08/07/2018    RDW 13.8 08/07/2018     08/07/2018       CMP:   Lab Results   Component Value Date     08/07/2018    K 3.5 08/07/2018    CL 84 08/07/2018    CO2 11 08/07/2018    BUN 26 08/07/2018    CREATININE 3.7 08/07/2018    LABGLOM 12 08/07/2018    GLUCOSE 226 08/07/2018    PROT 4.7 08/07/2018    PROT 7.5 01/13/2013    CALCIUM 6.7 08/07/2018    BILITOT 1.4 08/07/2018    ALKPHOS 95 08/07/2018     08/07/2018     08/07/2018       POC Tests:   Recent Labs      08/07/18   0053   POCGLU  105*       Coags:   Lab Results   Component Value Date    PROTIME 20.5 08/07/2018    INR 1.76 08/07/2018    APTT 38.0 08/07/2018       HCG (If Applicable): No results found for: PREGTESTUR, PREGSERUM, HCG, HCGQUANT     ABGs:   Lab Results   Component Value Date    PHART 7.210 08/07/2018    PO2ART 83.0 08/07/2018    KFW4BLX 28.0 08/07/2018    AUI8XGG 11.2 08/07/2018    BEART -15.3 08/07/2018    [id number] 94.0 08/07/2018        Type & Screen (If Applicable):  No results found for: [facility]    Anesthesia Evaluation  Patient summary reviewed and Nursing notes reviewed no history of anesthetic complications:   Airway:        Comment: Intubated 7.5 ett    Dental:          Pulmonary:Negative Pulmonary ROS                             ROS comment: Intubated for resp failure, metabolic acicoisk.  Code blue in [address] yesterday   Cardiovascular:    (+) hypertension:, weak pulses, peripheral edema,           Rate: abnormal                    Neuro/Psych:               GI/Hepatic/Renal:   (+) GERD:, liver disease: coagulopathy from hepatic

## 2018-08-07 NOTE — ANESTHESIA PROCEDURE NOTES
Arterial Line:    An arterial line was placed using ultrasound guidance, in the ICU for the following indication(s): continuous blood pressure monitoring, blood sampling needed and unable to use non-invasive cuff. A 20 gauge (size), 12 cm (length), Arrow (type) catheter was placed, Seldinger technique used, into the left brachial artery, secured by suture and 2 3/0 silk sutures were applied . Anesthesia type: Local  Local infiltration: Injection    Events:  patient tolerated procedure well with no complications. Additional notes:  After sterile prep and draping the brachial artery was localized with ultrasound  Via  a out of plane technique.  The artery and surronding structures appeared small and vasoconstricted, a 22 ga arrow art line was placed in artery, when I turned around to exchange the arrow cath for a guidewire the pt moved and the catheter was no longer intraarterial. pressue was held for 10 min and after I attempted again to cannulate BA but vessel now too small and hematoma had collected around it, given poor environment in icu, limited space, dialysis machine and inability to get equipemetnt in a ergonomical position will reattempt in OR.   8/7/2018 12:40 PM8/7/2018 12:40 PM  Anesthesiologist: Dinora Live  Performed: Anesthesiologist   Preanesthetic Checklist  Completed: patient identified, site marked, surgical consent, pre-op evaluation, timeout performed, IV checked, risks and benefits discussed, monitors and equipment checked, anesthesia consent given, oxygen available and patient being monitored

## 2018-08-07 NOTE — PROGRESS NOTES
ICU Hospitalist Progress Note                    Patient Name: Loy Taylor  [id number]  Admit Date: 8/6/2018  ICU Day 2  LOS 1     PCP: Murl Aschoff, APRN - CNP     Brief Overview: 60 y.o. female presenting with nausea, vomiting and abdominal pain for an unknown length of time. She coded in ER and is admitted to ICU. Chief Complaint: Abdominal pain    Subjective / History of present illness:  Intubated in ER during code blue, regained circulation and a central line was placed over right subclavian. A left femoral dialysis catheter was placed by Dr Lalo Julio yesterday and SLED started. She has had less than 100 cc urine output. This morning she opens eyes and seems to understand some questions. REVIEW OF SYSTEMS:  Unable to obtain, patient intubated.     OBJECTIVE:  Hemodynamic Monitoring  CVP (Mean): 9 mmHg  Ventilator Settings:   Vent Information  Vent Mode: PRVC  Vt Ordered: 500 mL  Rate Set: 18 bmp  Pressure Support: 0 cmH20  FiO2 : 75 %  Sensitivity: 5  PEEP/CPAP: 5  I Time/ I Time %: 1 s  Additional Respiratory  Assessments  Pulse: 113  Resp: 25  SpO2: 93 %  End Tidal CO2: 39 (%)  Oral Care: Mouth swabbed, Mouth moisturizer, Mouth suctioned    IV Access: Right subclavian (Day 2)   Diet:. npo  Antibiotics:  Merrem  Prophylaxis:  DVT - SCD, coagulopathic  GI - Protonix    Current Medications:  Current Facility-Administered Medications   Medication Dose Route Frequency Provider Last Rate Last Dose    meropenem (MERREM) 1 g in sodium chloride 0.9 % 100 mL IVPB (mini-bag)  1 g Intravenous Q12H Shiva Bauman MD   Stopped at 08/07/18 0645    sodium bicarbonate 8.4 % injection             silver sulfADIAZINE (SILVADENE) 1 % cream   Topical BID Giorgi Guajardo MD        dextrose 50 % solution 25 g  25 g Intravenous PRN John Medel MD   25 g at 08/06/18 1202    sodium polystyrene (KAYEXALATE) 15 GM/60ML suspension 15 g  15 g Oral Once John Medel MD        norepinephrine (LEVOPHED) 16 mg in dextrose 5 % 250 mL infusion  5 mcg/min Intravenous Continuous Jennifer Damon MD 37.5 mL/hr at 08/07/18 0842 40 mcg/min at 08/07/18 0842    sodium bicarbonate 150 mEq in dextrose 5 % 1,000 mL infusion   Intravenous Continuous Min Galan  mL/hr at 08/07/18 0003      vasopressin 20 Units in dextrose 5 % 100 mL infusion  0.04 Units/min Intravenous Continuous Lily Watson MD 18 mL/hr at 08/07/18 0842 0.06 Units/min at 08/07/18 0842    sodium chloride flush 0.9 % injection 10 mL  10 mL Intravenous 2 times per day Lily Watson MD   10 mL at 08/07/18 0913    sodium chloride flush 0.9 % injection 10 mL  10 mL Intravenous PRN Lily Watson MD        ondansetron [facility]) injection 4 mg  4 mg Intravenous Q6H PRN Lily Watson MD        glucose (GLUTOSE) 40 % oral gel 15 g  15 g Oral PRN Lily Watson MD        dextrose 50 % solution 12.5 g  12.5 g Intravenous PRN Lily Watson MD        glucagon (rDNA) injection 1 mg  1 mg Intramuscular PRN Lily Watson MD        dextrose 5 % solution  100 mL/hr Intravenous PRN Lily Watson MD        chlorhexidine (PERIDEX) 0.12 % solution 15 mL  15 mL Mouth/Throat BID Lily Watson MD   15 mL at 08/07/18 0913    midazolam (VERSED) 100 mg in dextrose 5 % 100 mL infusion  1 mg/hr Intravenous Continuous Lily Watson MD 2 mL/hr at 08/07/18 0807 2 mg/hr at 08/07/18 0807    midazolam (VERSED) injection 2 mg  2 mg Intravenous Q30 Min PRN Lily Watson MD        propofol 1000 MG/100ML injection  10 mcg/kg/min Intravenous Continuous Lily Watson MD   Stopped at 08/06/18 1700    morphine (PF) injection 2 mg  2 mg Intravenous Q1H PRN Lily Watson MD        hydrocortisone sodium succinate PF (SOLU-CORTEF) injection 100 mg  100 mg Intravenous Q8H Brannon Ring MD   100 mg at 08/07/18 0845    pantoprazole (PROTONIX) injection 40 mg  40 mg Intravenous Daily Terrance Taylor MD   40 mg at 08/07/18 0913     Physical Exam:  BP 114/75   Pulse 113   Temp 97.3 °F (36.3 °C) (Temporal)   Resp 25   Ht 5' 5\" (1.651 m)   Wt 244 lb 11.2 oz (111 kg)   SpO2 93%   BMI 40.72 kg/m²   24hr I & O:    Intake/Output Summary (Last 24 hours) at 08/07/18 1042  Last data filed at 08/07/18 1001   Gross per 24 hour   Intake          2645.97 ml   Output             5030 ml   Net         -2384.03 ml     General appearance: alert, appears stated age, acutely ill  Head: Normocephalic, without obvious abnormality, atraumatic  Eyes: conjunctivae/corneas clear. PERRL, EOM's intact. Ears: normal external ears  Neck: no adenopathy, no carotid bruit, supple, symmetrical, trachea midline and thyroid not enlarged, symmetric, no tenderness/mass/nodules  Lungs: wet rales bilateral chest, no airway secretions obtained with suctioning   Heart: regular rate and rhythm, S1, S2 normal, no murmur,  No thrill palpable   Abdomen: firm, absent bowel sounds, no organomegaly  Extremities:Pedal edema 1+  Homans sign is negative, no sign of DVT  Skin: Skin color pale, texture, turgor normal. Hematoma and skin sloughing right forearm. Lymphatic: No palpable lymph node enlargment  Neurologic: Intubated, opens eyes.   Psychiatric:  Unable to obtain, intubated    Labs:   Recent Labs      08/06/18   1439  08/06/18   2324  08/07/18   0537   WBC  16.2*  5.5  4.8   RBC  3.08*  3.63*  3.19*   HGB  8.7*  10.3*  9.1*   HCT  31.4*  32.4*  29.1*   PLT  203  115*  103*     Recent Labs      08/06/18   1439  08/06/18   2324  08/06/18   2346  08/07/18   0453  08/07/18   0537   NA  137  140   --    --   135*   K  6.3*  3.6  3.2  3.4  3.5   ANIONGAP  35*  28*   --    --   40*   CL  95*  91*   --    --   84*   CO2  7*  21*   --    --   11*   BUN  87*  23   --    --   26*   CREATININE  10.8*  3.0*   --    --   3.7*   GLUCOSE  120*  115*   --    --   226*   CALCIUM  7.1*  7.0*   --    --   6.7*     Recent Labs      08/06/18   1439  08/07/18   0537   MG  2.2  1.2*   PHOS  13.6*   --      Recent Labs 08/06/18   1439  08/06/18   2324  08/07/18   0537   AST  152*  966*  883*   ALT  159*  599*  462*   BILITOT  <0.2  0.8  1.4*   ALKPHOS  119*  133*  95     HgBA1c:  No components found for: HGBA1C  Troponin T: No results for input(s): TROPONINI in the last 72 hours. Pro-BNP: No results for input(s): BNP in the last 72 hours. INR:   Recent Labs      08/06/18   1439  08/07/18   0537   INR  1.73*  1.76*     ABGs: Lab Results   Component Value Date    PHART 7.210 08/07/2018    PO2ART 83.0 08/07/2018    PBP5DHQ 28.0 08/07/2018     UA:  Recent Labs      08/06/18   1449   COLORU  RED*   PHUR  5.0   WBCUA  3-5*   RBCUA  3-5*   YEAST  Rare*   BACTERIA  NONE   CLARITYU  CLOUDY*   SPECGRAV  1.027   LEUKOCYTESUR  SMALL*   UROBILINOGEN  1.0   BILIRUBINUR  SMALL*   BLOODU  MODERATE*   GLUCOSEU  100*     RAD:   US Kidney 8/6/18  1. Unremarkable renal ultrasound. Echo: pending    Micro: Urine pending    IMPRESSION / PLAN:  Principal Problem:    Acute renal failure (ARF) (HCC)  Active Problems:    Hypovolemic shock (HCC)    Anuria    Shock liver    Nausea and vomiting in adult patient    Metabolic acidosis, increased anion gap    Type 2 DM with CKD stage 3 and hypertension (HCC)    Neuropathy    Obesity, Class I, BMI 30.0-34.9 (see actual BMI)    ARABELLA (acute kidney injury) ([facility] [address]    Cardiac arrest ([facility] [address]  Resolved Problems:    * No resolved hospital problems. *    1. ARABELLA > SLED by nephrology  2. Shock hypovolemic ? Superimposed sepsis > Empiric Merrem, follow cultures. IVF and pressors Vasopressin/norepinephrine. 3. DM > Insulin drip. 4. Coagulopathy due to shock liver ? DIC  5. Abdominal pain and lactic acidosis > Surgery consult in place, case discussed with Dr Katlin Haas. The patient is unstable for any exploration and likely to worsen if attempted. CT abd/pelvis was unremarkable. 6. Pulmonary edema > Check echocardiogram.   7. Hypotension > pressors.  Arterial line attempted overnight by covering nocturnist without success may

## 2018-08-08 ENCOUNTER — APPOINTMENT (OUTPATIENT)
Dept: GENERAL RADIOLOGY | Age: 63
DRG: 329 | End: 2018-08-08
Payer: MEDICARE

## 2018-08-08 LAB
ALBUMIN SERPL-MCNC: 2.4 G/DL (ref 3.5–5.2)
ALP BLD-CCNC: 105 U/L (ref 35–104)
ALT SERPL-CCNC: 237 U/L (ref 5–33)
ANION GAP SERPL CALCULATED.3IONS-SCNC: 21 MMOL/L (ref 7–19)
AST SERPL-CCNC: 286 U/L (ref 5–32)
ATYPICAL LYMPHOCYTE RELATIVE PERCENT: 2 % (ref 0–8)
BANDED NEUTROPHILS RELATIVE PERCENT: 4 % (ref 0–5)
BASE EXCESS ARTERIAL: -3.8 MMOL/L (ref -2–2)
BASOPHILS ABSOLUTE: 0 K/UL (ref 0–0.2)
BASOPHILS MANUAL: 0 %
BASOPHILS RELATIVE PERCENT: 0 % (ref 0–1)
BILIRUB SERPL-MCNC: 1.5 MG/DL (ref 0.2–1.2)
BUN BLDV-MCNC: 9 MG/DL (ref 8–23)
CALCIUM SERPL-MCNC: 7.1 MG/DL (ref 8.8–10.2)
CARBOXYHEMOGLOBIN ARTERIAL: 1 % (ref 0–5)
CHLORIDE BLD-SCNC: 100 MMOL/L (ref 98–111)
CO2: 19 MMOL/L (ref 22–29)
CREAT SERPL-MCNC: 1.2 MG/DL (ref 0.5–0.9)
EOSINOPHILS ABSOLUTE: 0.13 K/UL (ref 0–0.6)
EOSINOPHILS RELATIVE PERCENT: 1 % (ref 0–5)
GFR NON-AFRICAN AMERICAN: 45
GLUCOSE BLD-MCNC: 101 MG/DL (ref 70–99)
GLUCOSE BLD-MCNC: 111 MG/DL (ref 74–109)
GLUCOSE BLD-MCNC: 113 MG/DL (ref 70–99)
GLUCOSE BLD-MCNC: 94 MG/DL (ref 70–99)
HCO3 ARTERIAL: 19.9 MMOL/L (ref 22–26)
HCT VFR BLD CALC: 22.3 % (ref 37–47)
HCT VFR BLD CALC: 23.3 % (ref 37–47)
HEMOGLOBIN, ART, EXTENDED: 8.1 G/DL (ref 12–16)
HEMOGLOBIN: 7.3 G/DL (ref 12–16)
HEMOGLOBIN: 7.7 G/DL (ref 12–16)
LYMPHOCYTES ABSOLUTE: 2.2 K/UL (ref 1.1–4.5)
LYMPHOCYTES RELATIVE PERCENT: 15 % (ref 20–40)
MCH RBC QN AUTO: 28.5 PG (ref 27–31)
MCHC RBC AUTO-ENTMCNC: 33 G/DL (ref 33–37)
MCV RBC AUTO: 86.3 FL (ref 81–99)
METHEMOGLOBIN ARTERIAL: 0.8 %
MONOCYTES ABSOLUTE: 1.2 K/UL (ref 0–0.9)
MONOCYTES RELATIVE PERCENT: 9 % (ref 0–10)
MRSA CULTURE ONLY: NORMAL
MYELOCYTE PERCENT: 1 %
NEUTROPHILS ABSOLUTE: 9.4 K/UL (ref 1.5–7.5)
NEUTROPHILS MANUAL: 68 %
NEUTROPHILS RELATIVE PERCENT: 68 % (ref 50–65)
O2 CONTENT ARTERIAL: 11.2 ML/DL
O2 SAT, ARTERIAL: 96.7 %
O2 THERAPY: ABNORMAL
OVALOCYTES: ABNORMAL
PCO2 ARTERIAL: 30 MMHG (ref 35–45)
PDW BLD-RTO: 13.9 % (ref 11.5–14.5)
PERFORMED ON: ABNORMAL
PERFORMED ON: ABNORMAL
PERFORMED ON: NORMAL
PH ARTERIAL: 7.43 (ref 7.35–7.45)
PLATELET # BLD: 66 K/UL (ref 130–400)
PLATELET SLIDE REVIEW: ABNORMAL
PMV BLD AUTO: 13.2 FL (ref 9.4–12.3)
PO2 ARTERIAL: 108 MMHG (ref 80–100)
POTASSIUM REFLEX MAGNESIUM: 4.4 MMOL/L (ref 3.5–5)
POTASSIUM, WHOLE BLOOD: 4.2
RBC # BLD: 2.7 M/UL (ref 4.2–5.4)
SODIUM BLD-SCNC: 140 MMOL/L (ref 136–145)
TOTAL PROTEIN: 4 G/DL (ref 6.6–8.7)
URINE CULTURE, ROUTINE: NORMAL
WBC # BLD: 12.9 K/UL (ref 4.8–10.8)

## 2018-08-08 PROCEDURE — 2500000003 HC RX 250 WO HCPCS: Performed by: EMERGENCY MEDICINE

## 2018-08-08 PROCEDURE — P9016 RBC LEUKOCYTES REDUCED: HCPCS

## 2018-08-08 PROCEDURE — 6360000002 HC RX W HCPCS: Performed by: FAMILY MEDICINE

## 2018-08-08 PROCEDURE — 6360000002 HC RX W HCPCS: Performed by: INTERNAL MEDICINE

## 2018-08-08 PROCEDURE — 2580000003 HC RX 258: Performed by: INTERNAL MEDICINE

## 2018-08-08 PROCEDURE — 2500000003 HC RX 250 WO HCPCS: Performed by: FAMILY MEDICINE

## 2018-08-08 PROCEDURE — 71045 X-RAY EXAM CHEST 1 VIEW: CPT

## 2018-08-08 PROCEDURE — 94003 VENT MGMT INPAT SUBQ DAY: CPT

## 2018-08-08 PROCEDURE — P9047 ALBUMIN (HUMAN), 25%, 50ML: HCPCS | Performed by: FAMILY MEDICINE

## 2018-08-08 PROCEDURE — 99291 CRITICAL CARE FIRST HOUR: CPT | Performed by: FAMILY MEDICINE

## 2018-08-08 PROCEDURE — 85025 COMPLETE CBC W/AUTO DIFF WBC: CPT

## 2018-08-08 PROCEDURE — 85018 HEMOGLOBIN: CPT

## 2018-08-08 PROCEDURE — 80053 COMPREHEN METABOLIC PANEL: CPT

## 2018-08-08 PROCEDURE — 36430 TRANSFUSION BLD/BLD COMPNT: CPT

## 2018-08-08 PROCEDURE — 2700000000 HC OXYGEN THERAPY PER DAY

## 2018-08-08 PROCEDURE — 2580000003 HC RX 258: Performed by: EMERGENCY MEDICINE

## 2018-08-08 PROCEDURE — 82803 BLOOD GASES ANY COMBINATION: CPT

## 2018-08-08 PROCEDURE — 84132 ASSAY OF SERUM POTASSIUM: CPT

## 2018-08-08 PROCEDURE — 2580000003 HC RX 258: Performed by: FAMILY MEDICINE

## 2018-08-08 PROCEDURE — C9113 INJ PANTOPRAZOLE SODIUM, VIA: HCPCS | Performed by: INTERNAL MEDICINE

## 2018-08-08 PROCEDURE — 82948 REAGENT STRIP/BLOOD GLUCOSE: CPT

## 2018-08-08 PROCEDURE — 36600 WITHDRAWAL OF ARTERIAL BLOOD: CPT

## 2018-08-08 PROCEDURE — 2000000000 HC ICU R&B

## 2018-08-08 PROCEDURE — 85014 HEMATOCRIT: CPT

## 2018-08-08 PROCEDURE — 99024 POSTOP FOLLOW-UP VISIT: CPT | Performed by: SURGERY

## 2018-08-08 RX ORDER — 0.9 % SODIUM CHLORIDE 0.9 %
1500 INTRAVENOUS SOLUTION INTRAVENOUS ONCE
Status: COMPLETED | OUTPATIENT
Start: 2018-08-08 | End: 2018-08-08

## 2018-08-08 RX ORDER — ALBUMIN (HUMAN) 12.5 G/50ML
25 SOLUTION INTRAVENOUS EVERY 8 HOURS
Status: COMPLETED | OUTPATIENT
Start: 2018-08-08 | End: 2018-08-10

## 2018-08-08 RX ADMIN — Medication 10 ML: at 20:43

## 2018-08-08 RX ADMIN — SILVER SULFADIAZINE: 10 CREAM TOPICAL at 08:48

## 2018-08-08 RX ADMIN — NOREPINEPHRINE BITARTRATE 35 MCG/MIN: 1 INJECTION INTRAVENOUS at 07:41

## 2018-08-08 RX ADMIN — SODIUM CHLORIDE: 9 INJECTION, SOLUTION INTRAVENOUS at 20:42

## 2018-08-08 RX ADMIN — MEROPENEM 1 G: 1 INJECTION, POWDER, FOR SOLUTION INTRAVENOUS at 17:35

## 2018-08-08 RX ADMIN — HYDROCORTISONE SODIUM SUCCINATE 100 MG: 100 INJECTION, POWDER, FOR SOLUTION INTRAMUSCULAR; INTRAVENOUS at 00:39

## 2018-08-08 RX ADMIN — NOREPINEPHRINE BITARTRATE 32 MCG/MIN: 1 INJECTION INTRAVENOUS at 03:06

## 2018-08-08 RX ADMIN — HYDROCORTISONE SODIUM SUCCINATE 100 MG: 100 INJECTION, POWDER, FOR SOLUTION INTRAMUSCULAR; INTRAVENOUS at 23:31

## 2018-08-08 RX ADMIN — HYDROCORTISONE SODIUM SUCCINATE 100 MG: 100 INJECTION, POWDER, FOR SOLUTION INTRAMUSCULAR; INTRAVENOUS at 07:18

## 2018-08-08 RX ADMIN — NOREPINEPHRINE BITARTRATE 32 MCG/MIN: 1 INJECTION INTRAVENOUS at 14:32

## 2018-08-08 RX ADMIN — PANTOPRAZOLE SODIUM 40 MG: 40 INJECTION, POWDER, FOR SOLUTION INTRAVENOUS at 08:50

## 2018-08-08 RX ADMIN — ALBUMIN (HUMAN) 25 G: 0.25 INJECTION, SOLUTION INTRAVENOUS at 10:52

## 2018-08-08 RX ADMIN — SODIUM CHLORIDE 1500 ML: 9 INJECTION, SOLUTION INTRAVENOUS at 10:43

## 2018-08-08 RX ADMIN — HYDROCORTISONE SODIUM SUCCINATE 100 MG: 100 INJECTION, POWDER, FOR SOLUTION INTRAMUSCULAR; INTRAVENOUS at 16:25

## 2018-08-08 RX ADMIN — NOREPINEPHRINE BITARTRATE 30 MCG/MIN: 1 INJECTION INTRAVENOUS at 17:32

## 2018-08-08 RX ADMIN — ALBUMIN (HUMAN) 25 G: 0.25 INJECTION, SOLUTION INTRAVENOUS at 17:34

## 2018-08-08 RX ADMIN — NOREPINEPHRINE BITARTRATE 35 MCG/MIN: 1 INJECTION INTRAVENOUS at 10:46

## 2018-08-08 RX ADMIN — MEROPENEM 1 G: 1 INJECTION, POWDER, FOR SOLUTION INTRAVENOUS at 07:18

## 2018-08-08 RX ADMIN — FENTANYL CITRATE 75 MCG/HR: 50 INJECTION, SOLUTION INTRAMUSCULAR; INTRAVENOUS at 13:49

## 2018-08-08 RX ADMIN — NOREPINEPHRINE BITARTRATE 32 MCG/MIN: 1 INJECTION INTRAVENOUS at 17:57

## 2018-08-08 RX ADMIN — Medication 10 ML: at 08:48

## 2018-08-08 RX ADMIN — NOREPINEPHRINE BITARTRATE 32 MCG/MIN: 1 INJECTION INTRAVENOUS at 18:52

## 2018-08-08 RX ADMIN — SILVER SULFADIAZINE: 10 CREAM TOPICAL at 20:43

## 2018-08-08 RX ADMIN — ASCORBIC ACID, VITAMIN A PALMITATE, CHOLECALCIFEROL, THIAMINE HYDROCHLORIDE, RIBOFLAVIN-5 PHOSPHATE SODIUM, PYRIDOXINE HYDROCHLORIDE, NIACINAMIDE, DEXPANTHENOL, ALPHA-TOCOPHEROL ACETATE, VITAMIN K1, FOLIC ACID, BIOTIN, CYANOCOBALAMIN: 200; 3300; 200; 6; 3.6; 6; 40; 15; 10; 150; 600; 60; 5 INJECTION, SOLUTION INTRAVENOUS at 20:43

## 2018-08-08 ASSESSMENT — PULMONARY FUNCTION TESTS
PIF_VALUE: 22.5
PIF_VALUE: 24.5

## 2018-08-08 NOTE — PROGRESS NOTES
Report from nurse. Pt continues on vent, SLED. Follows commands. Sig other here earlier today. Dtr calls daily. This nurse spoke with dtr via phone briefly. Dtr states she is on her way to see pt. Palliative following for supportive care and POC.   Electronically signed by Munir Cadet RN on 8/8/2018 at 2:52 PM

## 2018-08-08 NOTE — PROGRESS NOTES
Nutrition Assessment    Type and Reason for Visit: Initial, Consult    Nutrition Recommendations: start PN 5/20 with a rate of 50ml and give lipids 20% 250ml 3x/week    Malnutrition Assessment:  · Malnutrition Status: At risk for malnutrition  · Context: Acute illness or injury    Nutrition Diagnosis:   · Problem: Increased nutrient needs  · Etiology: related to Increased demand for energy/nutrients due to     Signs and symptoms:  as evidenced by Patient report of    Nutrition Assessment:  · Subjective Assessment: Consult for PN. Aware pt also receiving dialysis. Suggest PN formula 5/20 with a rate of 50ml/hr and lipids 20% at 250 3x/week. · Nutrition-Focused Physical Findings: BiPap  · Wound Type: Surgical Wound  · Current Nutrition Therapies:  · Oral Diet Orders: NPO   · Oral Diet intake: NPO  · Oral Nutrition Supplement (ONS) Orders: None  · ONS intake: NPO  · Parenteral Nutrition Orders:  · Type and Formula:  (PN-5/20)   · Lipids: 250ml, Three times weekly  · Rate/Volume: 50ml/hr  · Duration: Continuous 24 hrs  · Current PN Order Provides: 0  · Goal PN Orders Provides: 50ml= 1056 total kcals with 60 g protein on non-lipid days and     1556 kcals with 60gm protein with lipids.   GUR + 1.53 mgCHO/ KG/Min    · Anthropometric Measures:  · Ht: 5' 5\" (165.1 cm)   · Current Body Wt: 239 lb 1 oz (108.4 kg)  · Admission Body Wt: 220 lb (99.8 kg) (stated)  · Usual Body Wt: 230 lb 2 oz (104.4 kg) (8 2017)  · Ideal Body Wt: 125 lb (56.7 kg), % Ideal Body 191.2%  · BMI Classification: BMI 35.0 - 39.9 Obese Class II     · Comparative Standards (Estimated Nutrition Needs):  · Estimated Daily Total Kcal: 9317-8027 kcals  · Estimated Daily Protein (g): 108-129g  · Estimated Daily Total Fluid (ml/day): 0218-3109 ml    Estimated Intake vs Estimated Needs: Intake Less Than Needs    Nutrition Risk Level: High    Nutrition Interventions:   Continue NPO, Start Parenteral Nutrition  Continued Inpatient Monitoring    Nutrition

## 2018-08-08 NOTE — PROGRESS NOTES
chloride 0.9 % 250 mL  25 mcg/hr Intravenous Continuous Velma Keith MD 15 mL/hr at 18 0800 75 mcg/hr at 18 0800    dextrose 50 % solution 25 g  25 g Intravenous PRN Jackelin Coffey MD   25 g at 18 1202    sodium polystyrene (KAYEXALATE) 15 GM/60ML suspension 15 g  15 g Oral Once Jackelin Coffey MD        norepinephrine (LEVOPHED) 16 mg in dextrose 5 % 250 mL infusion  5 mcg/min Intravenous Continuous Jackelin Coffey MD 32.8 mL/hr at 18 1046 35 mcg/min at 18 1046    sodium chloride flush 0.9 % injection 10 mL  10 mL Intravenous 2 times per day Velma Keith MD   10 mL at 18 0848    sodium chloride flush 0.9 % injection 10 mL  10 mL Intravenous PRN Velma Keith MD        ondansetron [facility]) injection 4 mg  4 mg Intravenous Q6H PRN Velma Keith MD        glucose (GLUTOSE) 40 % oral gel 15 g  15 g Oral PRN Velma Keith MD        glucagon (rDNA) injection 1 mg  1 mg Intramuscular PRN Velma Keith MD        dextrose 5 % solution  100 mL/hr Intravenous PRN Velma Keith MD        morphine (PF) injection 2 mg  2 mg Intravenous Q1H PRN Velma Keith MD        hydrocortisone sodium succinate PF (SOLU-CORTEF) injection 100 mg  100 mg Intravenous Q8H Brannon Ring MD   100 mg at 18 5103    pantoprazole (PROTONIX) injection 40 mg  40 mg Intravenous Daily Michael Newton MD   40 mg at 18 0850       Past Medical History:  Past Medical History:   Diagnosis Date    Chronic back pain     GERD (gastroesophageal reflux disease)     Hypertension     Neuropathy     Osteoarthritis     Palliative care encounter 2018    Type II or unspecified type diabetes mellitus without mention of complication, not stated as uncontrolled        Past Surgical History:  Past Surgical History:   Procedure Laterality Date     SECTION      JOINT REPLACEMENT Left     ltk    IN EXCISION TUMOR SOFT TISSUE BACK/FLANK SUBQ <3CM N/A 2016 9   --    CREATININE  10.8*  3.0*   --   3.7*  1.6*  1.2*   --    CALCIUM  7.1*  7.0*   --   6.7*   --   7.1*   --     < > = values in this interval not displayed. CBC:   Recent Labs      08/07/18   0537  08/07/18   1335  08/07/18 1925 08/08/18   0335   WBC  4.8   --   6.7  12.9*   HGB  9.1*  8.0*  5.8*  7.7*   HCT  29.1*   --   17.8*  23.3*   MCV  91.2   --   87.3  86.3   PLT  103*   --   63*  66*     LIVER PROFILE:   Recent Labs      08/06/18   0950   08/06/18 2324 08/07/18 0537 08/08/18   0335   AST  25   < >  966*  883*  286*   ALT  17   < >  599*  462*  237*   LIPASE  288*   --    --    --    --    BILITOT  <0.2   < >  0.8  1.4*  1.5*   ALKPHOS  128*   < >  133*  95  105*    < > = values in this interval not displayed. PT/INR:   Recent Labs      08/06/18   1439  08/07/18 0537 08/07/18 1925   PROTIME  20.2*  20.5*  19.1*   INR  1.73*  1.76*  1.61*     APTT:   Recent Labs      08/06/18   1439  08/07/18   0537  08/07/18 1925   APTT  63.5*  38.0*  38.6*     BNP:  No results for input(s): BNP in the last 72 hours. Ionized Calcium:No results for input(s): IONCA in the last 72 hours. Magnesium:  Recent Labs      08/06/18   1439 08/07/18   0537   MG  2.2  1.2*     Phosphorus:  Recent Labs      08/06/18   1439   PHOS  13.6*     HgbA1C:   Recent Labs      08/06/18   1439   LABA1C  7.7*     Hepatic:   Recent Labs      08/06/18   2324  08/07/18   0537  08/08/18   0335   ALKPHOS  133*  95  105*   ALT  599*  462*  237*   AST  966*  883*  286*   PROT  4.9*  4.7*  4.0*   BILITOT  0.8  1.4*  1.5*   LABALBU  2.8*  3.0*  2.4*     Lactic Acid:   Recent Labs      08/07/18   0537   LACTA  16.1*     Troponin:   Recent Labs      08/07/18 0537   CKTOTAL  615*  616*     ABGs:   Lab Results   Component Value Date    PHART 7.430 08/08/2018    PO2ART 108.0 08/08/2018    RNL9GWC 30.0 08/08/2018     CRP:  No results for input(s): CRP in the last 72 hours.   Sed Rate:  No results for input(s): SEDRATE in the last 72 hours. Culture Results:   Blood Culture Recent:   Recent Labs      08/06/18   1439   BC  No Growth to date. Any change in status will be called. Urine Culture Recent :   Recent Labs      08/06/18   1449   LABURIN  No growth at 36-48 hours       Radiology reports as per the Radiologist  Radiology: Ct Abdomen Pelvis Wo Contrast Additional Contrast? None    Result Date: 8/6/2018  Examination. CT ABDOMEN PELVIS WO CONTRAST History: Abdominal pain. DLP: 1595 mGycm. The CT scan of the abdomen and pelvis is performed without oral or intravenous contrast enhancement. The images are acquired in axial plane with subsequent reconstruction in coronal and sagittal planes. The comparison is made with the previous study dated 8/30/2017. The lung bases included in the study show interstitial infiltrate in the lower lobes bilaterally, extending into the infrahilar region bilaterally. There are atelectatic changes in the right middle lobe and lingular segment of the left upper lobe. Unenhanced liver appears normal. Normal spleen is seen with a large splenule along the lateral inferior margin of the spleen. Moderately distended gallbladder seen with a high density contents which may represent sludge. No radiopaque calculus. No evidence of common bile duct dilatation. The pancreas appear normal. There is moderate fullness of the left adrenal gland and a small fat density nodule measuring 1 cm in diameter. The right adrenal gland appear normal. Moderate lobulation of the renal outline bilaterally seen. No hydronephrosis. The ureters appear normal. The urinary bladder is decompressed and may not be optimally evaluated. A normal size uterus is seen which is moderately anteverted and displaced to the left. No adnexal masses. The stomach and duodenum are normal. Normal small bowel is seen. There are no findings to suggest appendicitis. Moderate gas and stool are seen in the colon. The distal colon is decompressed.  There is

## 2018-08-08 NOTE — PROGRESS NOTES
0223 (!) 114/50 98.9 °F (37.2 °C) - 109 20 - -   08/08/18 0100 - - - 110 21 98 % -   08/08/18 0000 - 98.4 °F (36.9 °C) Temporal 110 23 99 % -   08/07/18 2331 (!) 132/56 98.9 °F (37.2 °C) - 111 26 - -   08/07/18 2300 - - - 111 26 100 % -   08/07/18 2200 114/87 - - 113 23 - -   08/07/18 2116 (!) 112/47 97.5 °F (36.4 °C) - 111 28 - -   08/07/18 2100 (!) 87/34 - - 111 25 - -   08/07/18 2000 (!) 79/55 97.3 °F (36.3 °C) Temporal 110 20 - -   08/07/18 1900 96/61 - - 110 21 94 % -   08/07/18 1800 (!) 85/48 - - 108 15 - -   08/07/18 1700 (!) 121/94 - - 107 21 - -   08/07/18 1634 (!) 112/104 97.5 °F (36.4 °C) - 107 22 - -   08/07/18 1600 (!) 100/58 - - 107 16 - -   08/07/18 1550 - 97.6 °F (36.4 °C) Temporal 105 12 - -   08/07/18 1528 121/61 97.8 °F (36.6 °C) - 107 22 - -   08/07/18 1500 - - - 108 19 - -   08/07/18 1421 - - - - 18 - -         Intake/Output Summary (Last 24 hours) at 08/08/18 1209  Last data filed at 08/08/18 1200   Gross per 24 hour   Intake          5764.24 ml   Output             3990 ml   Net          1774.24 ml       In: 4776 [I.V.:3777; Blood:599]  Out: 3351 [Urine:28; Drains:1425]    I/O last 3 completed shifts: In: 5227.9 [I.V.:3588.9; Blood:1239; IV Piggyback:400]  Out: 3821 [Urine:25; Emesis/NG output:50; Drains:1575]       Date 08/08/18 0000 - 08/08/18 2359   Shift [id number] 3954-4817 1518-0774 24 Hour Total   I  N  T  A  K  E   I.V.  (mL/kg) 986.1  (9.1) 2125.4  (19.6)  3111.5  (28.7)    IV Piggyback  (mL/kg) 300  (2.8) 100  (0.9)  400  (3.7)    Shift Total  (mL/kg) 1286.1  (11.9) 2225.4  (20.5)  3511.5  (32.4)   O  U  T  P  U  T   Urine  (mL/kg/hr)  27  27    Emesis/NG output  (mL/kg)  0  (0)  0  (0)    Drains  (mL/kg) 200  (1.8) 300  (2.8)  500  (4.6)    CRRT  265  265    Shift Total  (mL/kg) 200  (1.8) 592  (5.5)  792  (7.3)   Weight (kg) 108. 5 108.5 108.5 108.5       Wt Readings from Last 3 Encounters:   08/08/18 239 lb 1.6 oz (108.5 kg)   04/09/18 220 lb (99.8 kg)   09/02/17 230 lb 2 oz (104.4 kg)        Body mass index is 39.79 kg/m². Diet: Diet NPO Effective Now    LABS:     CBC: Recent Labs      08/07/18   0537  08/07/18   1335  08/07/18   1925 08/08/18   0335   WBC  4.8   --   6.7  12.9*   RBC  3.19*   --   2.04*  2.70*   HGB  9.1*  8.0*  5.8*  7.7*   HCT  29.1*   --   17.8*  23.3*   MCV  91.2   --   87.3  86.3   MCH  28.5   --   28.4  28.5   MCHC  31.3*   --   32.6*  33.0   RDW  13.8   --   13.9  13.9   PLT  103*   --   63*  66*   MPV  11.2   --   11.9  13.2*      Last 3 CMP:   Recent Labs      08/06/18   2324   08/07/18 0537  08/07/18 1335 08/08/18   0335  08/08/18   0434   NA  140   --   135*   --   140   --    K  3.6   < >  3.5   --   4.4  4.2   CL  91*   --   84*   --   100   --    CO2  21*   --   11*  24  19*   --    BUN  23   --   26*   --   9   --    CREATININE  3.0*   --   3.7*  1.6*  1.2*   --    GLUCOSE  115*   --   226*   --   111*   --    CALCIUM  7.0*   --   6.7*   --   7.1*   --    PROT  4.9*   --   4.7*   --   4.0*   --    LABALBU  2.8*   --   3.0*   --   2.4*   --    BILITOT  0.8   --   1.4*   --   1.5*   --    ALKPHOS  133*   --   95   --   105*   --    AST  966*   --   883*   --   286*   --    ALT  599*   --   462*   --   237*   --     < > = values in this interval not displayed.           PHYSICAL EXAM:     CONSTITUTIONAL: Alert,  SKIN: warm, dry with no rashes or lesions  EYES: Non icteric  CHEST/LUNGS: CTA bilaterally  CARDIOVASCULAR: RRR    ABDOMEN: soft, ND, vAC IN PLACE  NEUROLOGIC: CN II-XI grossly intact, no motor or sensory deficits   EXTREMITIES: warm, well perfused, no edema, mottled toes    ASSESSMENT:       1. HD # 2  Patient Active Problem List   Diagnosis    GERD (gastroesophageal reflux disease)    Type 2 DM with CKD stage 3 and hypertension (HCC)    Osteoarthritis    Neuropathy    Chronic back pain    Hypertension    Obesity, Class I, BMI 30.0-34.9 (see actual BMI)    Lipoma of torso    ARABELLA (acute kidney injury) (HCC)    Nausea and vomiting in adult patient    Urinary retention    Metabolic acidosis, increased anion gap    Acute renal failure (ARF) (HCC)    Hypovolemic shock (HCC)    Anuria    Cardiac arrest ([facility] [address]    Shock liver    Acute bowel infarction ([facility] [address]   2. PLAN:     1. cONTINUE SUPPORT  2. SECOND LOOK IN A DAY OR SO  3. Mikala Virgen M.D.  FACS  Electronically signed 8/8/2018 at 12:09 PM

## 2018-08-08 NOTE — PROGRESS NOTES
sodium chloride infusion   Intravenous Continuous Jeanna Nageotte,  mL/hr at 08/08/18 0834      fentaNYL (SUBLIMAZE) 1250 mcg in sodium chloride 0.9 % 250 mL  25 mcg/hr Intravenous Continuous Bennett Campos MD 15 mL/hr at 08/08/18 0800 75 mcg/hr at 08/08/18 0800    0.9 % sodium chloride bolus  250 mL Intravenous Once Marjorie Singh MD 20 mL/hr at 08/07/18 2145 250 mL at 08/07/18 2145    dextrose 50 % solution 25 g  25 g Intravenous PRN Rosaline Gaytan MD   25 g at 08/06/18 1202    sodium polystyrene (KAYEXALATE) 15 GM/60ML suspension 15 g  15 g Oral Once Rosaline Gaytan MD        norepinephrine (LEVOPHED) 16 mg in dextrose 5 % 250 mL infusion  5 mcg/min Intravenous Continuous Rosaline Gaytan MD 32.8 mL/hr at 08/08/18 0800 35 mcg/min at 08/08/18 0800    vasopressin 20 Units in dextrose 5 % 100 mL infusion  0.04 Units/min Intravenous Continuous Bennett Campos MD   Stopped at 08/07/18 1734    sodium chloride flush 0.9 % injection 10 mL  10 mL Intravenous 2 times per day Bennett Campos MD   10 mL at 08/08/18 0848    sodium chloride flush 0.9 % injection 10 mL  10 mL Intravenous PRN Bennett Campos MD        ondansetron [facility]) injection 4 mg  4 mg Intravenous Q6H PRN Bennett Campos MD        glucose (GLUTOSE) 40 % oral gel 15 g  15 g Oral PRN Bennett Campos MD        glucagon (rDNA) injection 1 mg  1 mg Intramuscular PRN Bennett Campos MD        dextrose 5 % solution  100 mL/hr Intravenous PRN Bennett Campos MD        morphine (PF) injection 2 mg  2 mg Intravenous Q1H PRN Bennett Campos MD        hydrocortisone sodium succinate PF (SOLU-CORTEF) injection 100 mg  100 mg Intravenous Q8H Brannon Ring MD   100 mg at 08/08/18 0718    pantoprazole (PROTONIX) injection 40 mg  40 mg Intravenous Daily Marjorie Singh MD   40 mg at 08/08/18 0850     Physical Exam:  BP (!) 114/51   Pulse 108   Temp 98.6 °F (37 °C) (Temporal)   Resp 18   Ht 5' 5\" (1.651 m)   Wt 239 lb 1.6

## 2018-08-08 NOTE — PROGRESS NOTES
8/8/2018  4:34 AM - Naveen Gaxiola Incoming Lab Results From [facility]     Component Results     Component Value Ref Range & Units Status Collected Lab   pH, Arterial 7.430  7.350 - 7.450 Final 08/08/2018  4:34 AM [facility]   pCO2, Arterial 30.0   35.0 - 45.0 mmHg Final 08/08/2018  4:34 AM [facility]   pO2, Arterial 108.0   80.0 - 100.0 mmHg Final 08/08/2018  4:34 AM [facility]   HCO3, Arterial 19.9   22.0 - 26.0 mmol/L Final 08/08/2018  4:34 AM [facility] Excess, Arterial -3.8   -2.0 - 2.0 mmol/L Final 08/08/2018  4:34 AM [facility]   Hemoglobin, Art, Extended 8.1   12.0 - 16.0 g/dL Final 08/08/2018  4:34 AM [facility]   O2 Sat, Arterial 96.7  >92 % Final 08/08/2018  4:34 AM [facility]   Carboxyhgb, Arterial 1.0  0.0 - 5.0 % Final 08/08/2018  4:34 AM [facility]        0.0-1.5   (Smokers 1.5-5.0)    Methemoglobin, Arterial 0.8  <1.5 % Final 08/08/2018  4:34 AM [facility]   O2 Content, Arterial 11.2  Not Established mL/dL Final 08/08/2018  4:34 AM [facility]   O2 Therapy Unknown   Final 08/08/2018  4:34 AM [facility]   Testing Performed By     Leela Calderón Name Director Address Valid Date Range   [facility] - [address] LAB Edith Finch MD [address]  [address] [facility] [address] 08/30/17 1233-Present   Lab and Collection     BLOOD GAS, ARTERIAL on 8/8/2018   Result History     BLOOD GAS, ARTERIAL on 8/8/2018     PRVC 18, , 40% FIO2, 5 PEEP  ABG obtained via ART line

## 2018-08-09 ENCOUNTER — APPOINTMENT (OUTPATIENT)
Dept: GENERAL RADIOLOGY | Age: 63
DRG: 329 | End: 2018-08-09
Payer: MEDICARE

## 2018-08-09 LAB
ALBUMIN SERPL-MCNC: 2.8 G/DL (ref 3.5–5.2)
ALP BLD-CCNC: 130 U/L (ref 35–104)
ALT SERPL-CCNC: 149 U/L (ref 5–33)
ANION GAP SERPL CALCULATED.3IONS-SCNC: 19 MMOL/L (ref 7–19)
APTT: 35.7 SEC (ref 26–36.2)
AST SERPL-CCNC: 117 U/L (ref 5–32)
ATYPICAL LYMPHOCYTE RELATIVE PERCENT: 4 % (ref 0–8)
BANDED NEUTROPHILS RELATIVE PERCENT: 3 % (ref 0–5)
BASE EXCESS ARTERIAL: -5.2 MMOL/L (ref -2–2)
BASOPHILS ABSOLUTE: 0 K/UL (ref 0–0.2)
BASOPHILS MANUAL: 0 %
BASOPHILS RELATIVE PERCENT: 0 % (ref 0–1)
BILIRUB SERPL-MCNC: 1.5 MG/DL (ref 0.2–1.2)
BLOOD BANK DISPENSE STATUS: NORMAL
BLOOD BANK PRODUCT CODE: NORMAL
BPU ID: NORMAL
BUN BLDV-MCNC: 14 MG/DL (ref 8–23)
CALCIUM SERPL-MCNC: 7.3 MG/DL (ref 8.8–10.2)
CARBOXYHEMOGLOBIN ARTERIAL: 1.1 % (ref 0–5)
CHLORIDE BLD-SCNC: 97 MMOL/L (ref 98–111)
CO2: 19 MMOL/L (ref 22–29)
CREAT SERPL-MCNC: 1.1 MG/DL (ref 0.5–0.9)
DESCRIPTION BLOOD BANK: NORMAL
EOSINOPHILS ABSOLUTE: 0 K/UL (ref 0–0.6)
EOSINOPHILS RELATIVE PERCENT: 0 % (ref 0–5)
GFR NON-AFRICAN AMERICAN: 50
GLUCOSE BLD-MCNC: 280 MG/DL (ref 74–109)
GLUCOSE BLD-MCNC: 316 MG/DL (ref 70–99)
GLUCOSE BLD-MCNC: 343 MG/DL (ref 70–99)
GLUCOSE BLD-MCNC: 355 MG/DL (ref 70–99)
GLUCOSE BLD-MCNC: 382 MG/DL (ref 70–99)
HCO3 ARTERIAL: 19.8 MMOL/L (ref 22–26)
HCT VFR BLD CALC: 16.5 % (ref 37–47)
HCT VFR BLD CALC: 19.8 % (ref 37–47)
HCT VFR BLD CALC: 20 % (ref 37–47)
HCT VFR BLD CALC: 21.8 % (ref 37–47)
HCT VFR BLD CALC: 23.2 % (ref 37–47)
HEMOGLOBIN, ART, EXTENDED: 6.7 G/DL (ref 12–16)
HEMOGLOBIN: 5.4 G/DL (ref 12–16)
HEMOGLOBIN: 6.7 G/DL (ref 12–16)
HEMOGLOBIN: 6.9 G/DL (ref 12–16)
HEMOGLOBIN: 7.3 G/DL (ref 12–16)
HEMOGLOBIN: 8 G/DL (ref 12–16)
HYPOCHROMIA: ABNORMAL
INR BLD: 1.67 (ref 0.88–1.18)
INR BLD: 2.48 (ref 0.88–1.18)
LACTIC ACID: 1.9 MMOL/L (ref 0.5–1.9)
LYMPHOCYTES ABSOLUTE: 2.8 K/UL (ref 1.1–4.5)
LYMPHOCYTES RELATIVE PERCENT: 8 % (ref 20–40)
MAGNESIUM: 1.8 MG/DL (ref 1.6–2.4)
MCH RBC QN AUTO: 28.4 PG (ref 27–31)
MCHC RBC AUTO-ENTMCNC: 32.7 G/DL (ref 33–37)
MCV RBC AUTO: 86.8 FL (ref 81–99)
METHEMOGLOBIN ARTERIAL: 1.3 %
MONOCYTES ABSOLUTE: 0 K/UL (ref 0–0.9)
MONOCYTES RELATIVE PERCENT: 0 % (ref 0–10)
MYELOCYTE PERCENT: 2 %
NEUTROPHILS ABSOLUTE: 20.5 K/UL (ref 1.5–7.5)
NEUTROPHILS MANUAL: 83 %
NEUTROPHILS RELATIVE PERCENT: 83 % (ref 50–65)
O2 CONTENT ARTERIAL: 9.4 ML/DL
O2 SAT, ARTERIAL: 96.2 %
O2 THERAPY: ABNORMAL
PCO2 ARTERIAL: 35 MMHG (ref 35–45)
PDW BLD-RTO: 16 % (ref 11.5–14.5)
PERFORMED ON: ABNORMAL
PH ARTERIAL: 7.36 (ref 7.35–7.45)
PLATELET # BLD: 51 K/UL (ref 130–400)
PLATELET SLIDE REVIEW: ABNORMAL
PMV BLD AUTO: 10.9 FL (ref 9.4–12.3)
PO2 ARTERIAL: 134 MMHG (ref 80–100)
POLYCHROMASIA: ABNORMAL
POTASSIUM REFLEX MAGNESIUM: 3.7 MMOL/L (ref 3.5–5)
POTASSIUM, WHOLE BLOOD: 3.6
PROTHROMBIN TIME: 19.7 SEC (ref 12–14.6)
PROTHROMBIN TIME: 26.9 SEC (ref 12–14.6)
RBC # BLD: 1.9 M/UL (ref 4.2–5.4)
SODIUM BLD-SCNC: 135 MMOL/L (ref 136–145)
TOTAL PROTEIN: 4 G/DL (ref 6.6–8.7)
WBC # BLD: 23.3 K/UL (ref 4.8–10.8)

## 2018-08-09 PROCEDURE — 2580000003 HC RX 258: Performed by: INTERNAL MEDICINE

## 2018-08-09 PROCEDURE — 36600 WITHDRAWAL OF ARTERIAL BLOOD: CPT

## 2018-08-09 PROCEDURE — 2700000000 HC OXYGEN THERAPY PER DAY

## 2018-08-09 PROCEDURE — P9059 PLASMA, FRZ BETWEEN 8-24HOUR: HCPCS

## 2018-08-09 PROCEDURE — 85730 THROMBOPLASTIN TIME PARTIAL: CPT

## 2018-08-09 PROCEDURE — 83735 ASSAY OF MAGNESIUM: CPT

## 2018-08-09 PROCEDURE — 6360000002 HC RX W HCPCS: Performed by: INTERNAL MEDICINE

## 2018-08-09 PROCEDURE — 2500000003 HC RX 250 WO HCPCS: Performed by: FAMILY MEDICINE

## 2018-08-09 PROCEDURE — 93005 ELECTROCARDIOGRAM TRACING: CPT

## 2018-08-09 PROCEDURE — 82948 REAGENT STRIP/BLOOD GLUCOSE: CPT

## 2018-08-09 PROCEDURE — 71045 X-RAY EXAM CHEST 1 VIEW: CPT

## 2018-08-09 PROCEDURE — 99291 CRITICAL CARE FIRST HOUR: CPT | Performed by: FAMILY MEDICINE

## 2018-08-09 PROCEDURE — P9035 PLATELET PHERES LEUKOREDUCED: HCPCS

## 2018-08-09 PROCEDURE — 80053 COMPREHEN METABOLIC PANEL: CPT

## 2018-08-09 PROCEDURE — 85014 HEMATOCRIT: CPT

## 2018-08-09 PROCEDURE — 82803 BLOOD GASES ANY COMBINATION: CPT

## 2018-08-09 PROCEDURE — 2500000003 HC RX 250 WO HCPCS: Performed by: INTERNAL MEDICINE

## 2018-08-09 PROCEDURE — 2500000003 HC RX 250 WO HCPCS: Performed by: EMERGENCY MEDICINE

## 2018-08-09 PROCEDURE — P9016 RBC LEUKOCYTES REDUCED: HCPCS

## 2018-08-09 PROCEDURE — 83605 ASSAY OF LACTIC ACID: CPT

## 2018-08-09 PROCEDURE — C9113 INJ PANTOPRAZOLE SODIUM, VIA: HCPCS | Performed by: INTERNAL MEDICINE

## 2018-08-09 PROCEDURE — 2000000000 HC ICU R&B

## 2018-08-09 PROCEDURE — 85025 COMPLETE CBC W/AUTO DIFF WBC: CPT

## 2018-08-09 PROCEDURE — 84132 ASSAY OF SERUM POTASSIUM: CPT

## 2018-08-09 PROCEDURE — 6370000000 HC RX 637 (ALT 250 FOR IP): Performed by: FAMILY MEDICINE

## 2018-08-09 PROCEDURE — 6360000002 HC RX W HCPCS: Performed by: FAMILY MEDICINE

## 2018-08-09 PROCEDURE — 94003 VENT MGMT INPAT SUBQ DAY: CPT

## 2018-08-09 PROCEDURE — 85610 PROTHROMBIN TIME: CPT

## 2018-08-09 PROCEDURE — P9047 ALBUMIN (HUMAN), 25%, 50ML: HCPCS | Performed by: FAMILY MEDICINE

## 2018-08-09 PROCEDURE — 2580000003 HC RX 258: Performed by: EMERGENCY MEDICINE

## 2018-08-09 PROCEDURE — 36430 TRANSFUSION BLD/BLD COMPNT: CPT

## 2018-08-09 PROCEDURE — 85018 HEMOGLOBIN: CPT

## 2018-08-09 PROCEDURE — 2580000003 HC RX 258: Performed by: FAMILY MEDICINE

## 2018-08-09 RX ORDER — 0.9 % SODIUM CHLORIDE 0.9 %
250 INTRAVENOUS SOLUTION INTRAVENOUS ONCE
Status: COMPLETED | OUTPATIENT
Start: 2018-08-09 | End: 2018-08-09

## 2018-08-09 RX ORDER — 0.9 % SODIUM CHLORIDE 0.9 %
1000 INTRAVENOUS SOLUTION INTRAVENOUS ONCE
Status: COMPLETED | OUTPATIENT
Start: 2018-08-09 | End: 2018-08-09

## 2018-08-09 RX ORDER — PHYTONADIONE 10 MG/ML
10 INJECTION, EMULSION INTRAMUSCULAR; INTRAVENOUS; SUBCUTANEOUS ONCE
Status: COMPLETED | OUTPATIENT
Start: 2018-08-09 | End: 2018-08-09

## 2018-08-09 RX ORDER — SODIUM CHLORIDE 0.9 % (FLUSH) 0.9 %
10 SYRINGE (ML) INJECTION PRN
Status: DISCONTINUED | OUTPATIENT
Start: 2018-08-09 | End: 2018-08-09 | Stop reason: SDUPTHER

## 2018-08-09 RX ORDER — 0.9 % SODIUM CHLORIDE 0.9 %
250 INTRAVENOUS SOLUTION INTRAVENOUS ONCE
Status: DISCONTINUED | OUTPATIENT
Start: 2018-08-09 | End: 2018-08-11 | Stop reason: HOSPADM

## 2018-08-09 RX ADMIN — ALBUMIN (HUMAN) 25 G: 0.25 INJECTION, SOLUTION INTRAVENOUS at 11:02

## 2018-08-09 RX ADMIN — ALBUMIN (HUMAN) 25 G: 0.25 INJECTION, SOLUTION INTRAVENOUS at 18:00

## 2018-08-09 RX ADMIN — PHYTONADIONE 10 MG: 10 INJECTION, EMULSION INTRAMUSCULAR; INTRAVENOUS; SUBCUTANEOUS at 04:43

## 2018-08-09 RX ADMIN — Medication 2 G: at 04:41

## 2018-08-09 RX ADMIN — SODIUM CHLORIDE 250 ML: 9 INJECTION, SOLUTION INTRAVENOUS at 05:21

## 2018-08-09 RX ADMIN — NOREPINEPHRINE BITARTRATE 20 MCG/MIN: 1 INJECTION INTRAVENOUS at 05:30

## 2018-08-09 RX ADMIN — SODIUM CHLORIDE: 9 INJECTION, SOLUTION INTRAVENOUS at 17:13

## 2018-08-09 RX ADMIN — DILTIAZEM HYDROCHLORIDE 5 MG/HR: 5 INJECTION INTRAVENOUS at 22:24

## 2018-08-09 RX ADMIN — HYDROCORTISONE SODIUM SUCCINATE 100 MG: 100 INJECTION, POWDER, FOR SOLUTION INTRAMUSCULAR; INTRAVENOUS at 23:54

## 2018-08-09 RX ADMIN — Medication 10 ML: at 20:29

## 2018-08-09 RX ADMIN — HYDROCORTISONE SODIUM SUCCINATE 100 MG: 100 INJECTION, POWDER, FOR SOLUTION INTRAMUSCULAR; INTRAVENOUS at 15:17

## 2018-08-09 RX ADMIN — PANTOPRAZOLE SODIUM 40 MG: 40 INJECTION, POWDER, FOR SOLUTION INTRAVENOUS at 07:55

## 2018-08-09 RX ADMIN — INSULIN LISPRO 10 UNITS: 100 INJECTION, SOLUTION INTRAVENOUS; SUBCUTANEOUS at 12:19

## 2018-08-09 RX ADMIN — MEROPENEM 1 G: 1 INJECTION, POWDER, FOR SOLUTION INTRAVENOUS at 17:13

## 2018-08-09 RX ADMIN — ASCORBIC ACID, VITAMIN A PALMITATE, CHOLECALCIFEROL, THIAMINE HYDROCHLORIDE, RIBOFLAVIN-5 PHOSPHATE SODIUM, PYRIDOXINE HYDROCHLORIDE, NIACINAMIDE, DEXPANTHENOL, ALPHA-TOCOPHEROL ACETATE, VITAMIN K1, FOLIC ACID, BIOTIN, CYANOCOBALAMIN: 200; 3300; 200; 6; 3.6; 6; 40; 15; 10; 150; 600; 60; 5 INJECTION, SOLUTION INTRAVENOUS at 21:06

## 2018-08-09 RX ADMIN — INSULIN LISPRO 10 UNITS: 100 INJECTION, SOLUTION INTRAVENOUS; SUBCUTANEOUS at 16:14

## 2018-08-09 RX ADMIN — MEROPENEM 1 G: 1 INJECTION, POWDER, FOR SOLUTION INTRAVENOUS at 06:04

## 2018-08-09 RX ADMIN — I.V. FAT EMULSION 250 ML: 20 EMULSION INTRAVENOUS at 18:00

## 2018-08-09 RX ADMIN — INSULIN LISPRO 4 UNITS: 100 INJECTION, SOLUTION INTRAVENOUS; SUBCUTANEOUS at 20:52

## 2018-08-09 RX ADMIN — SODIUM CHLORIDE 250 ML: 9 INJECTION, SOLUTION INTRAVENOUS at 04:38

## 2018-08-09 RX ADMIN — SILVER SULFADIAZINE: 10 CREAM TOPICAL at 07:56

## 2018-08-09 RX ADMIN — NOREPINEPHRINE BITARTRATE 5 MCG/MIN: 1 INJECTION INTRAVENOUS at 18:08

## 2018-08-09 RX ADMIN — Medication 10 ML: at 07:56

## 2018-08-09 RX ADMIN — SILVER SULFADIAZINE: 10 CREAM TOPICAL at 20:31

## 2018-08-09 RX ADMIN — NOREPINEPHRINE BITARTRATE 5 MCG/MIN: 1 INJECTION INTRAVENOUS at 12:00

## 2018-08-09 RX ADMIN — FENTANYL CITRATE 100 MCG/HR: 50 INJECTION, SOLUTION INTRAMUSCULAR; INTRAVENOUS at 05:18

## 2018-08-09 RX ADMIN — SODIUM CHLORIDE 1000 ML: 9 INJECTION, SOLUTION INTRAVENOUS at 09:58

## 2018-08-09 RX ADMIN — HYDROCORTISONE SODIUM SUCCINATE 100 MG: 100 INJECTION, POWDER, FOR SOLUTION INTRAMUSCULAR; INTRAVENOUS at 07:55

## 2018-08-09 RX ADMIN — ALBUMIN (HUMAN) 25 G: 0.25 INJECTION, SOLUTION INTRAVENOUS at 02:44

## 2018-08-09 RX ADMIN — FENTANYL CITRATE 100 MCG/HR: 50 INJECTION, SOLUTION INTRAMUSCULAR; INTRAVENOUS at 18:07

## 2018-08-09 ASSESSMENT — PAIN SCALES - GENERAL: PAINLEVEL_OUTOF10: 0

## 2018-08-09 ASSESSMENT — PULMONARY FUNCTION TESTS
PIF_VALUE: 24.1
PIF_VALUE: 33

## 2018-08-09 NOTE — PROGRESS NOTES
Ref Range & Units 08/09/18 0411    pH, Arterial 7.350 - 7.450 7.360    pCO2, Arterial 35.0 - 45.0 mmHg 35.0    pO2, Arterial 80.0 - 100.0 mmHg 134.0     HCO3, Arterial 22.0 - 26.0 mmol/L 19.8     Base Excess, Arterial -2.0 - 2.0 mmol/L -5.2     Hemoglobin, Art, Extended 12.0 - 16.0 g/dL 6.7     O2 Sat, Arterial >92 % 96.2    Carboxyhgb, Arterial 0.0 - 5.0 % 1.1    Comment:      0.0-1.5   (Smokers 1.5-5.0)    Methemoglobin, Arterial <1.5 % 1.3    O2 Content, Arterial Not Established mL/dL 9.4      PRVC 18, Vt 500, 40% FIO2, +5 PEEP  A-LINE GAS

## 2018-08-09 NOTE — PROGRESS NOTES
Wound Care  Reviewed order for wound photo right arm ac. Reviewed signed consent in patient folder for wound photo. Wound photo located in epic. Dressing reapplied to right arm, cleansed with SNS and pat dry, silvadene cream applied, non stick dressing, abd pad and kerlix. Discussed with Piyush Ly RN patient nurse today.

## 2018-08-09 NOTE — PROGRESS NOTES
Suzanne Garcia M.D., FACS  Daily Progress Note    Pt Name: Zo Lora  Medical Record Number: [id number]  Date of Birth 1955   Today's Date: 8/9/2018    Chief Complaint:  Chief Complaint   Patient presents with    Abdominal Pain     presents to er per ems with c/o abd pain         SUBJECTIVE:     Char Martínez is off pressors    Still no urine.   Continues to ooze requiring transfusions    Slow improvement    MEDS:     Scheduled Meds:   sodium chloride  250 mL Intravenous Once    sodium chloride  250 mL Intravenous Once    sodium chloride  250 mL Intravenous Once    sodium chloride  1,000 mL Intravenous Once    insulin lispro  0-12 Units Subcutaneous TID WC    insulin lispro  0-6 Units Subcutaneous Nightly    albumin human  25 g Intravenous Q8H    meropenem  1 g Intravenous Q12H    silver sulfADIAZINE   Topical BID    sodium polystyrene  15 g Oral Once    sodium chloride flush  10 mL Intravenous 2 times per day    hydrocortisone sodium succinate PF  100 mg Intravenous Q8H    pantoprazole  40 mg Intravenous Daily     Continuous Infusions:   PN-Adult Premix 5/20 - Standard Electrolytes - Central Line 41.7 mL/hr at 08/09/18 0700    fat emulsion      sodium chloride 125 mL/hr at 08/09/18 0700    fentaNYL (SUBLIMAZE) 1250 mcg in sodium chloride 0.9 % 250 mL 100 mcg/hr (08/09/18 0700)    norepinephrine Stopped (08/09/18 0910)    dextrose       PRN Meds:  dextrose 25 g PRN   sodium chloride flush 10 mL PRN   ondansetron 4 mg Q6H PRN   glucose 15 g PRN   glucagon (rDNA) 1 mg PRN   dextrose 100 mL/hr PRN   morphine 2 mg Q1H PRN       OBJECTIVE:     Patient Vitals for the past 24 hrs:   BP Temp Temp src Pulse Resp SpO2 Weight   08/09/18 1024 111/65 97.8 °F (36.6 °C) Temporal - - - -   08/09/18 0900 - - - 137 16 - -   08/09/18 0845 - 96.6 °F (35.9 °C) - 142 18 - -   08/09/18 0830 117/65 96.8 °F (36 °C) - 126 18 - -   08/09/18 0800 119/67 97 °F (36.1 °C) Temporal 131 18 100 % -   08/09/18 0756 - - - 124 18 100 % -   08/09/18 0730 131/71 97 °F (36.1 °C) Temporal 131 18 - -   08/09/18 0700 127/68 97.1 °F (36.2 °C) Temporal 137 18 100 % -   08/09/18 0634 - - - 140 (!) 36 (!) 76 % -   08/09/18 0608 115/63 97.8 °F (36.6 °C) Temporal 145 18 100 % -   08/09/18 0535 126/71 97.4 °F (36.3 °C) Temporal 146 18 100 % -   08/09/18 0512 129/69 97.4 °F (36.3 °C) - 146 18 100 % -   08/09/18 0500 - - - 148 18 100 % -   08/09/18 0448 - - - - - - 253 lb 12.8 oz (115.1 kg)   08/09/18 0430 (!) 101/49 97.7 °F (36.5 °C) Temporal 152 18 100 % -   08/09/18 0413 (!) 114/59 98.4 °F (36.9 °C) - 148 18 - -   08/09/18 0400 - 98.4 °F (36.9 °C) Temporal 150 18 100 % -   08/09/18 0300 - - - 104 14 100 % -   08/09/18 0200 - - - 107 18 100 % -   08/09/18 0100 - - - 106 18 100 % -   08/09/18 0000 - 98.4 °F (36.9 °C) Temporal 111 18 100 % -   08/08/18 2300 - - - 112 18 100 % -   08/08/18 2200 - - - 111 18 100 % -   08/08/18 2100 - - - 110 18 100 % -   08/08/18 2000 - 99.3 °F (37.4 °C) Temporal 113 18 97 % -   08/08/18 1900 - - - 114 18 99 % -   08/08/18 1800 - - - 112 18 99 % -   08/08/18 1700 - - - 115 22 97 % -   08/08/18 1600 (!) 104/47 97.8 °F (36.6 °C) Temporal 112 19 98 % -   08/08/18 1500 - - - 109 19 100 % -   08/08/18 1400 - 97.9 °F (36.6 °C) Temporal 112 20 100 % -   08/08/18 1339 - - - 109 20 - -   08/08/18 1300 - 97.5 °F (36.4 °C) Temporal 110 19 99 % -   08/08/18 1245 (!) 115/48 97.3 °F (36.3 °C) Temporal 109 22 - -   08/08/18 1200 (!) 107/46 98 °F (36.7 °C) Temporal 109 19 97 % -   08/08/18 1130 - - - 106 23 - -   08/08/18 1112 - - - 110 24 - -   08/08/18 1100 - - - 108 24 96 % -         Intake/Output Summary (Last 24 hours) at 08/09/18 1044  Last data filed at 08/09/18 0900   Gross per 24 hour   Intake          5862.76 ml   Output             3786 ml   Net          2076.76 ml       In: 4610.3 [I.V.:2677.7; Blood:1235]  Out: 7002 [Urine:38; Drains:1250]    I/O last 3 completed shifts: In: 7054.3 [I.V.:5005.1;  Blood:1235;

## 2018-08-09 NOTE — PROGRESS NOTES
250 mL Intravenous Once Ashley Mccray MD 20 mL/hr at 08/09/18 0438 250 mL at 08/09/18 0438    0.9 % sodium chloride bolus  250 mL Intravenous Once Ashley Mccray MD 20 mL/hr at 08/09/18 0521 250 mL at 08/09/18 0521    0.9 % sodium chloride bolus  250 mL Intravenous Once Ashley Mccray MD        0.9 % sodium chloride bolus  1,000 mL Intravenous Once Khai Raymundo MD        albumin human 25 % solution 25 g  25 g Intravenous Q8H Khai Raymundo MD   25 g at 08/09/18 0244    PN-Adult Premix 5/20 - Standard Electrolytes - Central Line   Intravenous Continuous TPN Khai Raymundo MD   Stopped at 08/09/18 0415    fat emulsion 20 % infusion 250 mL  250 mL Intravenous Once per day on Mon Thu Khai Raymundo MD        meropenem [facility]) 1 g in sodium chloride 0.9 % 100 mL IVPB (mini-bag)  1 g Intravenous Q12H Khai Raymundo MD   Stopped at 08/09/18 5208    silver sulfADIAZINE (SILVADENE) 1 % cream   Topical BID Stan Hines MD        0.9 % sodium chloride infusion   Intravenous Continuous Raymond Holman  mL/hr at 08/09/18 0700      fentaNYL (SUBLIMAZE) 1250 mcg in sodium chloride 0.9 % 250 mL  25 mcg/hr Intravenous Continuous Khai Raymundo MD 20 mL/hr at 08/09/18 0700 100 mcg/hr at 08/09/18 0700    dextrose 50 % solution 25 g  25 g Intravenous PRN Marilyn Melara MD   25 g at 08/06/18 1202    sodium polystyrene (KAYEXALATE) 15 GM/60ML suspension 15 g  15 g Oral Once Marilyn Melara MD        norepinephrine (LEVOPHED) 16 mg in dextrose 5 % 250 mL infusion  5 mcg/min Intravenous Continuous Marilyn Melara MD 4.7 mL/hr at 08/09/18 0829 5 mcg/min at 08/09/18 0829    sodium chloride flush 0.9 % injection 10 mL  10 mL Intravenous 2 times per day Khai Raymundo MD   10 mL at 08/09/18 0756    sodium chloride flush 0.9 % injection 10 mL  10 mL Intravenous PRN Khai Raymundo MD        ondansetron [facility]) injection 4 mg  4 mg Intravenous Q6H PRN Khai Raymundo MD       [facility] glucose (GLUTOSE) 40 % oral gel 15 g  15 g Oral PRN Bennett Campos MD        glucagon (rDNA) injection 1 mg  1 mg Intramuscular PRN Bennett Campos MD        dextrose 5 % solution  100 mL/hr Intravenous PRN Bennett Campos MD        morphine (PF) injection 2 mg  2 mg Intravenous Q1H PRN Bennett Campos MD        hydrocortisone sodium succinate PF (SOLU-CORTEF) injection 100 mg  100 mg Intravenous Q8H Brannon Ring MD   100 mg at 08/09/18 0755    pantoprazole (PROTONIX) injection 40 mg  40 mg Intravenous Daily Marjorie Singh MD   40 mg at 08/09/18 0755     Physical Exam:  /65   Pulse 126   Temp 96.8 °F (36 °C)   Resp 18   Ht 5' 5\" (1.651 m)   Wt 253 lb 12.8 oz (115.1 kg)   SpO2 100%   BMI 42.23 kg/m²   24hr I & O:      Intake/Output Summary (Last 24 hours) at 08/09/18 0912  Last data filed at 08/09/18 0800   Gross per 24 hour   Intake           5784.1 ml   Output             3893 ml   Net           1891.1 ml     General appearance: alert, appears stated age, acutely ill  Head: Normocephalic, without obvious abnormality, atraumatic  Eyes: conjunctivae/corneas clear. PERRL, EOM's intact. Ears: normal external ears  Neck: no adenopathy, no carotid bruit, supple, symmetrical, trachea midline and thyroid not enlarged, symmetric, no tenderness/mass/nodules  Lungs: wet rales bilateral chest, no airway secretions obtained with suctioning   Heart: regular rate and rhythm, S1, S2 normal, no murmur,  No thrill palpable   Abdomen: open wound with wound vac in mid abdomen. No bowel sounds. Drain 200 cc bloody serum. Extremities:Pedal edema 1+  Homans sign is negative, no sign of DVT  Skin: Skin color pale, texture, turgor normal. Hematoma and skin sloughing right forearm. Lymphatic: No palpable lymph node enlargment  Neurologic: Intubated, opens eyes.   Psychiatric:  Unable to obtain, intubated    Labs:   Recent Labs      08/07/18   1925  08/08/18   0335  08/08/18   1645  08/09/18   1236 08/09/18   0820   WBC  6.7  12.9*   --   23.3*   --    RBC  2.04*  2.70*   --   1.90*   --    HGB  5.8*  7.7*  7.3*  5.4*  7.3*   HCT  17.8*  23.3*  22.3*  16.5*  21.8*   PLT  63*  66*   --   51*   --      Recent Labs      08/07/18   0537  08/07/18   1335  08/08/18   0335  08/08/18   0434  08/09/18   0338  08/09/18   0411   NA  135*   --   140   --   135*   --    K  3.5   --   4.4  4.2  3.7  3.6   ANIONGAP  40*   --   21*   --   19   --    CL  84*   --   100   --   97*   --    CO2  11*  24  19*   --   19*   --    BUN  26*   --   9   --   14   --    CREATININE  3.7*  1.6*  1.2*   --   1.1*   --    GLUCOSE  226*   --   111*   --   280*   --    CALCIUM  6.7*   --   7.1*   --   7.3*   --      Recent Labs      08/06/18   1439  08/07/18   0537   MG  2.2  1.2*   PHOS  13.6*   --      Recent Labs      08/07/18   0537  08/08/18   0335  08/09/18   0338   AST  883*  286*  117*   ALT  462*  237*  149*   BILITOT  1.4*  1.5*  1.5*   ALKPHOS  95  105*  130*     HgBA1c:  No components found for: HGBA1C  Troponin T: No results for input(s): TROPONINI in the last 72 hours. Pro-BNP: No results for input(s): BNP in the last 72 hours. INR:   Recent Labs      08/07/18   0537  08/07/18   1925  08/09/18   0338   INR  1.76*  1.61*  2.48*     ABGs:   Lab Results   Component Value Date    PHART 7.360 08/09/2018    PO2ART 134.0 08/09/2018    NVN7UAE 35.0 08/09/2018     UA:  Recent Labs      08/06/18   1449   COLORU  RED*   PHUR  5.0   WBCUA  3-5*   RBCUA  3-5*   YEAST  Rare*   BACTERIA  NONE   CLARITYU  CLOUDY*   SPECGRAV  1.027   LEUKOCYTESUR  SMALL*   UROBILINOGEN  1.0   BILIRUBINUR  SMALL*   BLOODU  MODERATE*   GLUCOSEU  100*     RAD:   US Kidney 8/6/18  1. Unremarkable renal ultrasound.     Echo: pending    Micro: Urine pending    IMPRESSION / PLAN:  Principal Problem:    Acute bowel infarction ([address]  Active Problems:    Acute renal failure (ARF) (HCC)    Hypovolemic shock (HCC)    Anuria    Shock liver    Nausea and vomiting in adult

## 2018-08-09 NOTE — PROGRESS NOTES
Nephrology ([facility]) Progress Note    Patient:  Abel Rhodes  YOB: 1955  Date of Service: 8/9/2018  MRN: [id number]   Acct: [de-identified]   Primary Care Physician: ALLYN Dey CNP  Advance Directive: Full Code  Admit Date: 8/6/2018       Hospital Day: 3  Referring Provider: Anne Castleman, MD    Patient independently seen and examined, Chart, Consults, Notes, Operative notes, Labs, Cardiology, and Radiology studies reviewed as able. Subjective:  Abel Rhodes is a 60 y.o. female  whom we were consulted for ARABELLA/hyperkalemia. Presented to ED with abd pain. Underwent code blue in the ED with PEA which required intubation, CPR. Required initiation of pressors and fluid boluses. No prior known renal issues per RN, ARABELLA noted in 2017 and was seen by Dr. Haja Pñia. Minimal UOP. Recently started unknown abx for GI issues as outpt per notes. Today, continued bleed from surgical site. Required transfusions of multiple blood products. Off pressors  No other events per RN. Allergies:  Patient has no known allergies.     Medicines:  Current Facility-Administered Medications   Medication Dose Route Frequency Provider Last Rate Last Dose    0.9 % sodium chloride bolus  250 mL Intravenous Once Teresa Mejias MD 20 mL/hr at 08/09/18 0438 250 mL at 08/09/18 0438    0.9 % sodium chloride bolus  250 mL Intravenous Once Teresa Mejias MD 20 mL/hr at 08/09/18 0521 250 mL at 08/09/18 0521    0.9 % sodium chloride bolus  250 mL Intravenous Once Teresa Mejias MD        0.9 % sodium chloride bolus  1,000 mL Intravenous Once Anne Castleman,  mL/hr at 08/09/18 0958 1,000 mL at 08/09/18 0958    insulin lispro (HUMALOG) injection vial 0-12 Units  0-12 Units Subcutaneous TID  Anne Castleman, MD        insulin lispro (HUMALOG) injection vial 0-6 Units  0-6 Units Subcutaneous Nightly Anne Castleman, MD        albumin human 25 % solution 25 g  25 g 7585    pantoprazole (PROTONIX) injection 40 mg  40 mg Intravenous Daily Armando Mcburney, MD   40 mg at 18 8375       Past Medical History:  Past Medical History:   Diagnosis Date    Chronic back pain     GERD (gastroesophageal reflux disease)     Hypertension     Neuropathy     Osteoarthritis     Palliative care encounter 2018    Type II or unspecified type diabetes mellitus without mention of complication, not stated as uncontrolled        Past Surgical History:  Past Surgical History:   Procedure Laterality Date     SECTION      JOINT REPLACEMENT Left     ltk    NJ EXCISION TUMOR SOFT TISSUE BACK/FLANK SUBQ <3CM N/A 2016    EXCISION LIPOMA OF THE UPPER BACK  performed by Alla Harper MD at [address] N/A 2018    LAPAROTOMY EXPLORATORY performed by Wili Garza MD at [place] [id number]         Family History  Family History   Problem Relation Age of Onset    Arthritis Mother     Diabetes Mother     High Blood Pressure Mother     High Cholesterol Mother     Cancer Father        Social History  Social History     Social History    Marital status:      Spouse name: N/A    Number of children: N/A    Years of education: N/A     Occupational History    Not on file. Social History Main Topics    Smoking status: Never Smoker    Smokeless tobacco: Never Used    Alcohol use No    Drug use: No    Sexual activity: Not on file     Other Topics Concern    Not on file     Social History Narrative    No narrative on file         Review of Systems:  History obtained from unobtainable from patient due to mental status          Objective:  Blood pressure 117/65, pulse 137, temperature 96.6 °F (35.9 °C), resp. rate 16, height 5' 5\" (1.651 m), weight 253 lb 12.8 oz (115.1 kg), SpO2 100 %.     Intake/Output Summary (Last 24 hours) at 18 1013  Last data filed at 18 0900   Gross per 24 hour   Intake          5862.76 ml   Output             3786 ml   Net          2076.76 ml     General: sedated/vent  Chest:  Coarse bs bilat  CVS: regular rate and rhythm  Abdominal: soft, nontender, normal bowel sounds  Extremities: no cyanosis or edema  Skin: warm and dry without rash    Labs:  BMP:   Recent Labs      08/06/18   1439   08/07/18   0537  08/07/18   1335  08/08/18   0335  08/08/18   0434  08/09/18   0338  08/09/18   0411   NA  137   < >  135*   --   140   --   135*   --    K  6.3*   < >  3.5   --   4.4  4.2  3.7  3.6   CL  95*   < >  84*   --   100   --   97*   --    CO2  7*   < >  11*  24  19*   --   19*   --    PHOS  13.6*   --    --    --    --    --    --    --    BUN  87*   < >  26*   --   9   --   14   --    CREATININE  10.8*   < >  3.7*  1.6*  1.2*   --   1.1*   --    CALCIUM  7.1*   < >  6.7*   --   7.1*   --   7.3*   --     < > = values in this interval not displayed. CBC:   Recent Labs      08/07/18 1925 08/08/18 0335 08/08/18   1645  08/09/18   0338  08/09/18   0820   WBC  6.7  12.9*   --   23.3*   --    HGB  5.8*  7.7*  7.3*  5.4*  7.3*   HCT  17.8*  23.3*  22.3*  16.5*  21.8*   MCV  87.3  86.3   --   86.8   --    PLT  63*  66*   --   51*   --      LIVER PROFILE:   Recent Labs      08/07/18   0537  08/08/18   0335  08/09/18   0338   AST  883*  286*  117*   ALT  462*  237*  149*   BILITOT  1.4*  1.5*  1.5*   ALKPHOS  95  105*  130*     PT/INR:   Recent Labs      08/07/18 1925 08/09/18   0338  08/09/18   0930   PROTIME  19.1*  26.9*  19.7*   INR  1.61*  2.48*  1.67*     APTT:   Recent Labs      08/07/18   0537  08/07/18   1925  08/09/18   0930   APTT  38.0*  38.6*  35.7     BNP:  No results for input(s): BNP in the last 72 hours. Ionized Calcium:No results for input(s): IONCA in the last 72 hours.   Magnesium:  Recent Labs      08/06/18   1439  08/07/18   0537   MG  2.2  1.2*     Phosphorus:  Recent Labs      08/06/18   1439   PHOS  13.6*     HgbA1C:   Recent Labs      08/06/18   1439   LABA1C  7.7* Hepatic:   Recent Labs      08/07/18   0537  08/08/18   0335  08/09/18   0338   ALKPHOS  95  105*  130*   ALT  462*  237*  149*   AST  883*  286*  117*   PROT  4.7*  4.0*  4.0*   BILITOT  1.4*  1.5*  1.5*   LABALBU  3.0*  2.4*  2.8*     Lactic Acid:   Recent Labs      08/09/18   0338   LACTA  1.9     Troponin:   Recent Labs      08/07/18   0537   CKTOTAL  615*  616*     ABGs:   Lab Results   Component Value Date    PHART 7.360 08/09/2018    PO2ART 134.0 08/09/2018    AHV4HDP 35.0 08/09/2018     CRP:  No results for input(s): CRP in the last 72 hours. Sed Rate:  No results for input(s): SEDRATE in the last 72 hours. Culture Results:   Blood Culture Recent:   Recent Labs      08/06/18   1439   BC  No Growth to date. Any change in status will be called. Urine Culture Recent :   Recent Labs      08/06/18   1449   LABURIN  No growth at 36-48 hours       Radiology reports as per the Radiologist  Radiology: Ct Abdomen Pelvis Wo Contrast Additional Contrast? None    Result Date: 8/6/2018  Examination. CT ABDOMEN PELVIS WO CONTRAST History: Abdominal pain. DLP: 1595 mGycm. The CT scan of the abdomen and pelvis is performed without oral or intravenous contrast enhancement. The images are acquired in axial plane with subsequent reconstruction in coronal and sagittal planes. The comparison is made with the previous study dated 8/30/2017. The lung bases included in the study show interstitial infiltrate in the lower lobes bilaterally, extending into the infrahilar region bilaterally. There are atelectatic changes in the right middle lobe and lingular segment of the left upper lobe. Unenhanced liver appears normal. Normal spleen is seen with a large splenule along the lateral inferior margin of the spleen. Moderately distended gallbladder seen with a high density contents which may represent sludge. No radiopaque calculus. No evidence of common bile duct dilatation.  The pancreas appear normal. There is moderate fullness of the left adrenal gland and a small fat density nodule measuring 1 cm in diameter. The right adrenal gland appear normal. Moderate lobulation of the renal outline bilaterally seen. No hydronephrosis. The ureters appear normal. The urinary bladder is decompressed and may not be optimally evaluated. A normal size uterus is seen which is moderately anteverted and displaced to the left. No adnexal masses. The stomach and duodenum are normal. Normal small bowel is seen. There are no findings to suggest appendicitis. Moderate gas and stool are seen in the colon. The distal colon is decompressed. There is diverticulosis of the distal colon. No evidence for diverticulitis. Atheromatous changes of the abdominal aorta and iliac arteries are seen. No aneurysmal dilatation. There is no evidence of abdominal or pelvic lymphadenopathy. The scattered mesenteric lymph nodes are visualized without significant enlargement. Chronic degenerative changes of the lumbar spine are seen. No focal bony abnormality or bony lesion. No acute abnormality of the abdomen or pelvis. The diverticulosis of the distal colon. No evidence for diverticulitis. A 1 cm fat density nodule in the left adrenal gland. Bilateral lower lobar interstitial infiltrate. The above finding are recorded on a digital voice clip in PACS. Signed by Dr Yusra Walker on 8/6/2018 12:59 PM    Us Renal Complete    Result Date: 8/6/2018  US RENAL COMPLETE 8/6/2018 2:15 PM HISTORY: Acute kidney injury COMPARISON: None  TECHNIQUE: Multiple longitudinal and transverse realtime sonographic images of the kidneys and urinary bladder are obtained. FINDINGS: The right kidney measures 9.5 x 4.5 x 5 cm. The right kidney is normal in size, shape, contour and position. The cortical thickness is normal, with preservation of the corticomedullary differentiation. The central echo complex is compact with no evidence for hydronephrosis.  No nephrolithiasis or abnormal perinephric fluid collections are seen. No hydroureter. The left kidney measures 9.4 x 5 x 4.7 cm. The left kidney is normal in size, shape, contour and position. The cortical thickness is normal, with preservation of the corticomedullary differentiation. The central echo complex is compact with no evidence for hydronephrosis. No nephrolithiasis or abnormal perinephric fluid collections are seen. No hydroureter. The bladder is decompressed. There is no pelvic ascites. 1. Unremarkable renal ultrasound. Signed by Dr Katia Valdes on 8/6/2018 6:06 PM    Xr Chest Portable    Result Date: 8/7/2018  EXAMINATION:  XR CHEST PORTABLE  8/7/2018 7:00 AM HISTORY: On ventilator. COMPARISON: 8/6/2018. FINDINGS:  There is a new NG tube with tip and side-port in the stomach. There is bilateral perihilar infiltrate. This is worse on the right side when compared to the prior study. There is cardiomegaly. There have been no other tube or line changes. 1. New NG tube with tip and side-port in the stomach. 2. Worsening perihilar infiltrate, especially on the right side. Signed by Dr Emelia Loo on 8/7/2018 7:01 AM    Xr Chest Portable    Result Date: 8/6/2018  EXAMINATION:  XR CHEST PORTABLE  8/6/2018 12:23 PM HISTORY: Central line placement. Intubated. COMPARISON: 1/11/2017. FINDINGS:  The patient is intubated with endotracheal tube tip at the T3 level. There is a right subclavian central venous catheter with catheter tip in the superior vena cava. There is no evidence of pneumothorax. There is poor inspiration. There is vascular crowding. The patient is rotated towards the left side. Heart size is within normal limits. 1. Hypoventilation with vascular crowding. Patient is rotated. 2. Right subclavian central venous catheter in good position with no evidence of pneumothorax. 3. Bronchial wall thickening, stable.  Signed by Dr Emelia Loo on 8/6/2018 12:24 PM       Assessment   ARABELLA / ATN  CKD 3  hyperkalemia  Lactic acidosis  Hyponatremia  Cardiopulmonary arrest  DM2 with nephropathy  HTN  Low vitamin D  Secondary Hyperparathyroidism  Anemia - acute blood loss       Plan:  D/w RN  SLED on hold for today, reassess in AM for possible HD        Archana Roche MD  08/09/18  10:13 AM

## 2018-08-09 NOTE — PROGRESS NOTES
Wound Care  Follow up with patient today re: RAC dressing change. Are measures 12cm x 22cm x <0.1cm. Lateral elbow with partial filled blister, center of AC is light brown/tan, surrounding periwound is purple maroon discoloration. Noted this has remained inside the line marked for demarcation. Cleaned with SNS and pat dry. Silvadene cream 1% applied to area and covered with gauze sponges and abd pad, secured with kerlix. Noted abtherea dressing dry and intact at this time. Noted kci vac ulta at 125 mmHg continuous negative pressure, approximately 120 mls dark serous sanguinous drainage noted in canister. Discussed with Mara Toscano RN patient nurse today.

## 2018-08-09 NOTE — PLAN OF CARE
Problem: Nutrition  Goal: Optimal nutrition therapy  Nutrition Problem: Inadequate oral intake  Intervention: Food and/or Nutrient Delivery: Continue NPO, Continue Parenteral Nutrition  Nutritional Goals: Meet nutritional needs through PN       Outcome: Ongoing

## 2018-08-09 NOTE — PLAN OF CARE
Problem: Falls - Risk of:  Goal: Will remain free from falls  Will remain free from falls   Outcome: Ongoing    Goal: Absence of physical injury  Absence of physical injury   Outcome: Ongoing      Problem: Risk for Impaired Skin Integrity  Goal: Tissue integrity - skin and mucous membranes  Structural intactness and normal physiological function of skin and  mucous membranes.    Outcome: Ongoing      Problem: Restraint Use - Nonviolent/Non-Self-Destructive Behavior:  Goal: Absence of restraint indications  Absence of restraint indications   Outcome: Ongoing    Goal: Absence of restraint-related injury  Absence of restraint-related injury   Outcome: Ongoing      Problem: Cardiac Output - Decreased:  Goal: Hemodynamic stability will improve  Hemodynamic stability will improve   Outcome: Ongoing      Problem: Fluid Volume - Imbalance:  Goal: Absence of imbalanced fluid volume signs and symptoms  Absence of imbalanced fluid volume signs and symptoms   Outcome: Ongoing      Problem: Pain:  Goal: Pain level will decrease  Pain level will decrease   Outcome: Ongoing    Goal: Recognizes and communicates pain  Recognizes and communicates pain   Outcome: Ongoing    Goal: Control of acute pain  Control of acute pain   Outcome: Ongoing    Goal: Control of chronic pain  Control of chronic pain   Outcome: Ongoing      Problem: Nutrition  Goal: Optimal nutrition therapy  Nutrition Problem: Increased nutrient needs  Intervention: Food and/or Nutrient Delivery: Continue NPO, Start Parenteral Nutrition  Nutritional Goals: meet nutritional needs through PN     Outcome: Ongoing

## 2018-08-10 ENCOUNTER — APPOINTMENT (OUTPATIENT)
Dept: GENERAL RADIOLOGY | Age: 63
DRG: 329 | End: 2018-08-10
Payer: MEDICARE

## 2018-08-10 ENCOUNTER — ANESTHESIA EVENT (OUTPATIENT)
Dept: OPERATING ROOM | Age: 63
DRG: 329 | End: 2018-08-10
Payer: MEDICARE

## 2018-08-10 ENCOUNTER — ANESTHESIA (OUTPATIENT)
Dept: OPERATING ROOM | Age: 63
DRG: 329 | End: 2018-08-10
Payer: MEDICARE

## 2018-08-10 VITALS — OXYGEN SATURATION: 89 % | RESPIRATION RATE: 1 BRPM | TEMPERATURE: 98 F

## 2018-08-10 LAB
ABO/RH: NORMAL
ABO/RH: NORMAL
ALBUMIN SERPL-MCNC: 3 G/DL (ref 3.5–5.2)
ALP BLD-CCNC: 123 U/L (ref 35–104)
ALT SERPL-CCNC: 113 U/L (ref 5–33)
ANION GAP SERPL CALCULATED.3IONS-SCNC: 12 MMOL/L (ref 7–19)
ANISOCYTOSIS: ABNORMAL
ANTIBODY SCREEN: NORMAL
AST SERPL-CCNC: 64 U/L (ref 5–32)
ATYPICAL LYMPHOCYTE RELATIVE PERCENT: 1 % (ref 0–8)
BANDED NEUTROPHILS RELATIVE PERCENT: 2 % (ref 0–5)
BASE EXCESS ARTERIAL: -2.3 MMOL/L (ref -2–2)
BASOPHILS ABSOLUTE: 0 K/UL (ref 0–0.2)
BASOPHILS MANUAL: 0 %
BASOPHILS RELATIVE PERCENT: 0 % (ref 0–1)
BILIRUB SERPL-MCNC: 2.3 MG/DL (ref 0.2–1.2)
BLOOD BANK DISPENSE STATUS: NORMAL
BLOOD BANK DISPENSE STATUS: NORMAL
BLOOD BANK PRODUCT CODE: NORMAL
BLOOD BANK PRODUCT CODE: NORMAL
BPU ID: NORMAL
BPU ID: NORMAL
BUN BLDV-MCNC: 36 MG/DL (ref 8–23)
CALCIUM SERPL-MCNC: 7.8 MG/DL (ref 8.8–10.2)
CARBOXYHEMOGLOBIN ARTERIAL: 1.5 % (ref 0–5)
CHLORIDE BLD-SCNC: 103 MMOL/L (ref 98–111)
CO2: 22 MMOL/L (ref 22–29)
CREAT SERPL-MCNC: 2.1 MG/DL (ref 0.5–0.9)
DESCRIPTION BLOOD BANK: NORMAL
DESCRIPTION BLOOD BANK: NORMAL
EOSINOPHILS ABSOLUTE: 0 K/UL (ref 0–0.6)
EOSINOPHILS RELATIVE PERCENT: 0 % (ref 0–5)
GFR NON-AFRICAN AMERICAN: 24
GLUCOSE BLD-MCNC: 103 MG/DL (ref 70–99)
GLUCOSE BLD-MCNC: 107 MG/DL (ref 70–99)
GLUCOSE BLD-MCNC: 108 MG/DL (ref 70–99)
GLUCOSE BLD-MCNC: 110 MG/DL (ref 70–99)
GLUCOSE BLD-MCNC: 111 MG/DL (ref 70–99)
GLUCOSE BLD-MCNC: 117 MG/DL (ref 70–99)
GLUCOSE BLD-MCNC: 126 MG/DL (ref 70–99)
GLUCOSE BLD-MCNC: 139 MG/DL (ref 70–99)
GLUCOSE BLD-MCNC: 145 MG/DL (ref 70–99)
GLUCOSE BLD-MCNC: 155 MG/DL (ref 70–99)
GLUCOSE BLD-MCNC: 157 MG/DL (ref 70–99)
GLUCOSE BLD-MCNC: 174 MG/DL (ref 70–99)
GLUCOSE BLD-MCNC: 181 MG/DL (ref 70–99)
GLUCOSE BLD-MCNC: 186 MG/DL (ref 70–99)
GLUCOSE BLD-MCNC: 195 MG/DL (ref 70–99)
GLUCOSE BLD-MCNC: 244 MG/DL (ref 70–99)
GLUCOSE BLD-MCNC: 269 MG/DL (ref 70–99)
GLUCOSE BLD-MCNC: 284 MG/DL (ref 70–99)
GLUCOSE BLD-MCNC: 336 MG/DL (ref 70–99)
GLUCOSE BLD-MCNC: 343 MG/DL (ref 70–99)
GLUCOSE BLD-MCNC: 388 MG/DL (ref 74–109)
GLUCOSE BLD-MCNC: 394 MG/DL (ref 70–99)
GLUCOSE BLD-MCNC: 87 MG/DL (ref 70–99)
HCO3 ARTERIAL: 22.4 MMOL/L (ref 22–26)
HCT VFR BLD CALC: 24.3 % (ref 37–47)
HEMOGLOBIN, ART, EXTENDED: 8.4 G/DL (ref 12–16)
HEMOGLOBIN: 8.7 G/DL (ref 12–16)
INR BLD: 1.27 (ref 0.88–1.18)
LACTIC ACID: 2.3 MMOL/L (ref 0.5–1.9)
LYMPHOCYTES ABSOLUTE: 1.5 K/UL (ref 1.1–4.5)
LYMPHOCYTES RELATIVE PERCENT: 7 % (ref 20–40)
MAGNESIUM: 1.9 MG/DL (ref 1.6–2.4)
MCH RBC QN AUTO: 30.2 PG (ref 27–31)
MCHC RBC AUTO-ENTMCNC: 35.8 G/DL (ref 33–37)
MCV RBC AUTO: 84.4 FL (ref 81–99)
METHEMOGLOBIN ARTERIAL: 1.2 %
MONOCYTES ABSOLUTE: 0.6 K/UL (ref 0–0.9)
MONOCYTES RELATIVE PERCENT: 3 % (ref 0–10)
MYELOCYTE PERCENT: 1 %
NEUTROPHILS ABSOLUTE: 17.1 K/UL (ref 1.5–7.5)
NEUTROPHILS MANUAL: 86 %
NEUTROPHILS RELATIVE PERCENT: 86 % (ref 50–65)
O2 CONTENT ARTERIAL: 11.7 ML/DL
O2 SAT, ARTERIAL: 96.5 %
O2 THERAPY: ABNORMAL
OVALOCYTES: ABNORMAL
PCO2 ARTERIAL: 37 MMHG (ref 35–45)
PDW BLD-RTO: 15.3 % (ref 11.5–14.5)
PERFORMED ON: ABNORMAL
PERFORMED ON: NORMAL
PH ARTERIAL: 7.39 (ref 7.35–7.45)
PLATELET # BLD: 24 K/UL (ref 130–400)
PLATELET SLIDE REVIEW: ABNORMAL
PO2 ARTERIAL: 134 MMHG (ref 80–100)
POTASSIUM REFLEX MAGNESIUM: 3.3 MMOL/L (ref 3.5–5)
POTASSIUM, WHOLE BLOOD: 2.7
PROTHROMBIN TIME: 15.8 SEC (ref 12–14.6)
RBC # BLD: 2.88 M/UL (ref 4.2–5.4)
SODIUM BLD-SCNC: 137 MMOL/L (ref 136–145)
TEAR DROP CELLS: ABNORMAL
TOTAL PROTEIN: 4.6 G/DL (ref 6.6–8.7)
WBC # BLD: 19.2 K/UL (ref 4.8–10.8)

## 2018-08-10 PROCEDURE — 2500000003 HC RX 250 WO HCPCS: Performed by: INTERNAL MEDICINE

## 2018-08-10 PROCEDURE — 8090000000 HC CONTINUOUS VENOVENOUS HEMOFIL

## 2018-08-10 PROCEDURE — 6360000002 HC RX W HCPCS: Performed by: INTERNAL MEDICINE

## 2018-08-10 PROCEDURE — 2500000003 HC RX 250 WO HCPCS: Performed by: SURGERY

## 2018-08-10 PROCEDURE — 82948 REAGENT STRIP/BLOOD GLUCOSE: CPT

## 2018-08-10 PROCEDURE — P9035 PLATELET PHERES LEUKOREDUCED: HCPCS

## 2018-08-10 PROCEDURE — 84132 ASSAY OF SERUM POTASSIUM: CPT

## 2018-08-10 PROCEDURE — 2580000003 HC RX 258: Performed by: INTERNAL MEDICINE

## 2018-08-10 PROCEDURE — 2500000003 HC RX 250 WO HCPCS: Performed by: NURSE ANESTHETIST, CERTIFIED REGISTERED

## 2018-08-10 PROCEDURE — 86900 BLOOD TYPING SEROLOGIC ABO: CPT

## 2018-08-10 PROCEDURE — C9113 INJ PANTOPRAZOLE SODIUM, VIA: HCPCS | Performed by: INTERNAL MEDICINE

## 2018-08-10 PROCEDURE — 2500000003 HC RX 250 WO HCPCS: Performed by: FAMILY MEDICINE

## 2018-08-10 PROCEDURE — 86850 RBC ANTIBODY SCREEN: CPT

## 2018-08-10 PROCEDURE — P9047 ALBUMIN (HUMAN), 25%, 50ML: HCPCS | Performed by: FAMILY MEDICINE

## 2018-08-10 PROCEDURE — A4649 SURGICAL SUPPLIES: HCPCS | Performed by: SURGERY

## 2018-08-10 PROCEDURE — 80053 COMPREHEN METABOLIC PANEL: CPT

## 2018-08-10 PROCEDURE — 82803 BLOOD GASES ANY COMBINATION: CPT

## 2018-08-10 PROCEDURE — 44625 REPAIR BOWEL OPENING: CPT | Performed by: PHYSICIAN ASSISTANT

## 2018-08-10 PROCEDURE — 36600 WITHDRAWAL OF ARTERIAL BLOOD: CPT

## 2018-08-10 PROCEDURE — 0D1A0ZK BYPASS JEJUNUM TO ASCENDING COLON, OPEN APPROACH: ICD-10-PCS | Performed by: SURGERY

## 2018-08-10 PROCEDURE — 71045 X-RAY EXAM CHEST 1 VIEW: CPT

## 2018-08-10 PROCEDURE — 3600000014 HC SURGERY LEVEL 4 ADDTL 15MIN: Performed by: SURGERY

## 2018-08-10 PROCEDURE — 2000000000 HC ICU R&B

## 2018-08-10 PROCEDURE — 3700000001 HC ADD 15 MINUTES (ANESTHESIA): Performed by: SURGERY

## 2018-08-10 PROCEDURE — 2700000000 HC OXYGEN THERAPY PER DAY

## 2018-08-10 PROCEDURE — 83605 ASSAY OF LACTIC ACID: CPT

## 2018-08-10 PROCEDURE — 86901 BLOOD TYPING SEROLOGIC RH(D): CPT

## 2018-08-10 PROCEDURE — 6360000002 HC RX W HCPCS: Performed by: NURSE ANESTHETIST, CERTIFIED REGISTERED

## 2018-08-10 PROCEDURE — 2720000010 HC SURG SUPPLY STERILE: Performed by: SURGERY

## 2018-08-10 PROCEDURE — 2580000003 HC RX 258: Performed by: FAMILY MEDICINE

## 2018-08-10 PROCEDURE — 6370000000 HC RX 637 (ALT 250 FOR IP): Performed by: INTERNAL MEDICINE

## 2018-08-10 PROCEDURE — 85025 COMPLETE CBC W/AUTO DIFF WBC: CPT

## 2018-08-10 PROCEDURE — 94003 VENT MGMT INPAT SUBQ DAY: CPT

## 2018-08-10 PROCEDURE — 99223 1ST HOSP IP/OBS HIGH 75: CPT | Performed by: INTERNAL MEDICINE

## 2018-08-10 PROCEDURE — 6360000002 HC RX W HCPCS: Performed by: FAMILY MEDICINE

## 2018-08-10 PROCEDURE — 44625 REPAIR BOWEL OPENING: CPT | Performed by: SURGERY

## 2018-08-10 PROCEDURE — 83735 ASSAY OF MAGNESIUM: CPT

## 2018-08-10 PROCEDURE — 2709999900 HC NON-CHARGEABLE SUPPLY: Performed by: SURGERY

## 2018-08-10 PROCEDURE — 3600000004 HC SURGERY LEVEL 4 BASE: Performed by: SURGERY

## 2018-08-10 PROCEDURE — 3700000000 HC ANESTHESIA ATTENDED CARE: Performed by: SURGERY

## 2018-08-10 PROCEDURE — 99239 HOSP IP/OBS DSCHRG MGMT >30: CPT | Performed by: FAMILY MEDICINE

## 2018-08-10 PROCEDURE — 85610 PROTHROMBIN TIME: CPT

## 2018-08-10 PROCEDURE — C1729 CATH, DRAINAGE: HCPCS | Performed by: SURGERY

## 2018-08-10 PROCEDURE — 2580000003 HC RX 258: Performed by: NURSE ANESTHETIST, CERTIFIED REGISTERED

## 2018-08-10 RX ORDER — DEXTROSE MONOHYDRATE 25 G/50ML
12.5 INJECTION, SOLUTION INTRAVENOUS PRN
Status: DISCONTINUED | OUTPATIENT
Start: 2018-08-10 | End: 2018-08-11 | Stop reason: HOSPADM

## 2018-08-10 RX ORDER — SODIUM CHLORIDE 9 MG/ML
INJECTION, SOLUTION INTRAVENOUS CONTINUOUS PRN
Status: DISCONTINUED | OUTPATIENT
Start: 2018-08-10 | End: 2018-08-10 | Stop reason: SDUPTHER

## 2018-08-10 RX ORDER — FENTANYL CITRATE 50 UG/ML
INJECTION, SOLUTION INTRAMUSCULAR; INTRAVENOUS PRN
Status: DISCONTINUED | OUTPATIENT
Start: 2018-08-10 | End: 2018-08-10 | Stop reason: SDUPTHER

## 2018-08-10 RX ORDER — HEPARIN SODIUM 1000 [USP'U]/ML
INJECTION, SOLUTION INTRAVENOUS; SUBCUTANEOUS
Status: DISPENSED
Start: 2018-08-10 | End: 2018-08-11

## 2018-08-10 RX ORDER — HEPARIN SODIUM 1000 [USP'U]/ML
1200 INJECTION, SOLUTION INTRAVENOUS; SUBCUTANEOUS ONCE
Status: COMPLETED | OUTPATIENT
Start: 2018-08-10 | End: 2018-08-10

## 2018-08-10 RX ORDER — DEXTROSE MONOHYDRATE 50 MG/ML
100 INJECTION, SOLUTION INTRAVENOUS PRN
Status: DISCONTINUED | OUTPATIENT
Start: 2018-08-10 | End: 2018-08-11 | Stop reason: HOSPADM

## 2018-08-10 RX ORDER — 0.9 % SODIUM CHLORIDE 0.9 %
250 INTRAVENOUS SOLUTION INTRAVENOUS ONCE
Status: DISCONTINUED | OUTPATIENT
Start: 2018-08-10 | End: 2018-08-11 | Stop reason: HOSPADM

## 2018-08-10 RX ORDER — ROCURONIUM BROMIDE 10 MG/ML
INJECTION, SOLUTION INTRAVENOUS PRN
Status: DISCONTINUED | OUTPATIENT
Start: 2018-08-10 | End: 2018-08-10 | Stop reason: SDUPTHER

## 2018-08-10 RX ORDER — NICOTINE POLACRILEX 4 MG
15 LOZENGE BUCCAL PRN
Status: DISCONTINUED | OUTPATIENT
Start: 2018-08-10 | End: 2018-08-11 | Stop reason: HOSPADM

## 2018-08-10 RX ORDER — HEPARIN SODIUM 1000 [USP'U]/ML
1600 INJECTION, SOLUTION INTRAVENOUS; SUBCUTANEOUS ONCE
Status: COMPLETED | OUTPATIENT
Start: 2018-08-10 | End: 2018-08-10

## 2018-08-10 RX ADMIN — DILTIAZEM HYDROCHLORIDE 5 MG/HR: 5 INJECTION INTRAVENOUS at 15:27

## 2018-08-10 RX ADMIN — SODIUM CHLORIDE 7.8 UNITS/HR: 9 INJECTION, SOLUTION INTRAVENOUS at 08:11

## 2018-08-10 RX ADMIN — ALBUMIN (HUMAN) 25 G: 0.25 INJECTION, SOLUTION INTRAVENOUS at 02:28

## 2018-08-10 RX ADMIN — FENTANYL CITRATE 50 MCG: 50 INJECTION INTRAMUSCULAR; INTRAVENOUS at 09:04

## 2018-08-10 RX ADMIN — Medication 10 ML: at 07:44

## 2018-08-10 RX ADMIN — FENTANYL CITRATE 50 MCG: 50 INJECTION INTRAMUSCULAR; INTRAVENOUS at 09:10

## 2018-08-10 RX ADMIN — SILVER SULFADIAZINE: 10 CREAM TOPICAL at 21:01

## 2018-08-10 RX ADMIN — FENTANYL CITRATE 50 MCG: 50 INJECTION INTRAMUSCULAR; INTRAVENOUS at 09:06

## 2018-08-10 RX ADMIN — FENTANYL CITRATE 100 MCG/HR: 50 INJECTION, SOLUTION INTRAMUSCULAR; INTRAVENOUS at 06:17

## 2018-08-10 RX ADMIN — SODIUM CHLORIDE 8.5 UNITS/HR: 9 INJECTION, SOLUTION INTRAVENOUS at 01:45

## 2018-08-10 RX ADMIN — SODIUM CHLORIDE: 9 INJECTION, SOLUTION INTRAVENOUS at 09:41

## 2018-08-10 RX ADMIN — HYDROCORTISONE SODIUM SUCCINATE 100 MG: 100 INJECTION, POWDER, FOR SOLUTION INTRAMUSCULAR; INTRAVENOUS at 23:32

## 2018-08-10 RX ADMIN — HYDROCORTISONE SODIUM SUCCINATE 100 MG: 100 INJECTION, POWDER, FOR SOLUTION INTRAMUSCULAR; INTRAVENOUS at 07:44

## 2018-08-10 RX ADMIN — Medication 10 ML: at 21:00

## 2018-08-10 RX ADMIN — SODIUM CHLORIDE: 9 INJECTION, SOLUTION INTRAVENOUS at 16:57

## 2018-08-10 RX ADMIN — ASCORBIC ACID, VITAMIN A PALMITATE, CHOLECALCIFEROL, THIAMINE HYDROCHLORIDE, RIBOFLAVIN-5 PHOSPHATE SODIUM, PYRIDOXINE HYDROCHLORIDE, NIACINAMIDE, DEXPANTHENOL, ALPHA-TOCOPHEROL ACETATE, VITAMIN K1, FOLIC ACID, BIOTIN, CYANOCOBALAMIN: 200; 3300; 200; 6; 3.6; 6; 40; 15; 10; 150; 600; 60; 5 INJECTION, SOLUTION INTRAVENOUS at 18:49

## 2018-08-10 RX ADMIN — SILVER SULFADIAZINE: 10 CREAM TOPICAL at 07:45

## 2018-08-10 RX ADMIN — PANTOPRAZOLE SODIUM 40 MG: 40 INJECTION, POWDER, FOR SOLUTION INTRAVENOUS at 07:44

## 2018-08-10 RX ADMIN — HEPARIN SODIUM 1200 UNITS: 1000 INJECTION, SOLUTION INTRAVENOUS; SUBCUTANEOUS at 20:44

## 2018-08-10 RX ADMIN — FENTANYL CITRATE 50 MCG: 50 INJECTION INTRAMUSCULAR; INTRAVENOUS at 09:26

## 2018-08-10 RX ADMIN — HYDROCORTISONE SODIUM SUCCINATE 100 MG: 100 INJECTION, POWDER, FOR SOLUTION INTRAMUSCULAR; INTRAVENOUS at 15:45

## 2018-08-10 RX ADMIN — HEPARIN SODIUM 1600 UNITS: 1000 INJECTION, SOLUTION INTRAVENOUS; SUBCUTANEOUS at 20:44

## 2018-08-10 RX ADMIN — ROCURONIUM BROMIDE 50 MG: 10 INJECTION INTRAVENOUS at 08:34

## 2018-08-10 RX ADMIN — MEROPENEM 1 G: 1 INJECTION, POWDER, FOR SOLUTION INTRAVENOUS at 18:22

## 2018-08-10 RX ADMIN — MEROPENEM 1 G: 1 INJECTION, POWDER, FOR SOLUTION INTRAVENOUS at 05:30

## 2018-08-10 ASSESSMENT — PAIN SCALES - GENERAL
PAINLEVEL_OUTOF10: 0
PAINLEVEL_OUTOF10: 0

## 2018-08-10 ASSESSMENT — PULMONARY FUNCTION TESTS
PIF_VALUE: 31.6
PIF_VALUE: 27.5
PIF_VALUE: 28
PIF_VALUE: 29.1

## 2018-08-10 NOTE — PROGRESS NOTES
Central Line   Intravenous Continuous TPN Nakul Sofia MD 41.7 mL/hr at 08/09/18 2106      diltiazem 125 mg in dextrose 5 % 125 mL infusion  5 mg/hr Intravenous Continuous Cyrus George MD 5 mL/hr at 08/10/18 0727 5 mg/hr at 08/10/18 0727    fat emulsion 20 % infusion 250 mL  250 mL Intravenous Once per day on Mon Thu Nakul Sofia MD   Stopped at 08/10/18 0532    meropenem (MERREM) 1 g in sodium chloride 0.9 % 100 mL IVPB (mini-bag)  1 g Intravenous Q12H Nakul Sofia MD   Stopped at 08/10/18 0556    silver sulfADIAZINE (SILVADENE) 1 % cream   Topical BID Zak Doan MD        0.9 % sodium chloride infusion   Intravenous Continuous Yvette Ortiz  mL/hr at 08/10/18 0727      fentaNYL (SUBLIMAZE) 1250 mcg in sodium chloride 0.9 % 250 mL  25 mcg/hr Intravenous Continuous Nakul Sofia MD 20 mL/hr at 08/10/18 0728 100 mcg/hr at 08/10/18 0728    sodium polystyrene (KAYEXALATE) 15 GM/60ML suspension 15 g  15 g Oral Once Romana Goodman MD        norepinephrine (LEVOPHED) 16 mg in dextrose 5 % 250 mL infusion  5 mcg/min Intravenous Continuous Romana Goodman MD   Stopped at 08/10/18 1138    sodium chloride flush 0.9 % injection 10 mL  10 mL Intravenous 2 times per day Nakul Sofia MD   10 mL at 08/10/18 0744    sodium chloride flush 0.9 % injection 10 mL  10 mL Intravenous PRN Nakul Sofia MD        ondansetron [facility]) injection 4 mg  4 mg Intravenous Q6H PRN Nakul Sofia MD        morphine (PF) injection 2 mg  2 mg Intravenous Q1H PRN Nakul Sofia MD        hydrocortisone sodium succinate PF (SOLU-CORTEF) injection 100 mg  100 mg Intravenous Q8H Brannon Ring MD   100 mg at 08/10/18 0744    pantoprazole (PROTONIX) injection 40 mg  40 mg Intravenous Daily Dejuan Moise MD   40 mg at 08/10/18 0744       Past Medical History:  Past Medical History:   Diagnosis Date    Chronic back pain     GERD (gastroesophageal reflux disease)     Hypertension     Coarse bs bilat  CVS: regular rate and rhythm  Abdominal: soft, nontender, normal bowel sounds  Extremities: no cyanosis or edema  Skin: warm and dry without rash    Labs:  BMP:   Recent Labs      08/08/18   0335 08/09/18   0338 08/09/18   0411  08/10/18   0200  08/10/18   0424   NA  140   --   135*   --   137   --    K  4.4   < >  3.7  3.6  3.3*  2.7   CL  100   --   97*   --   103   --    CO2  19*   --   19*   --   22   --    BUN  9   --   14   --   36*   --    CREATININE  1.2*   --   1.1*   --   2.1*   --    CALCIUM  7.1*   --   7.3*   --   7.8*   --     < > = values in this interval not displayed. CBC:   Recent Labs      08/08/18   0335 08/09/18 0338 08/09/18   1615  08/09/18   2100  08/10/18   0200   WBC  12.9*   --   23.3*   --    --    --   19.2*   HGB  7.7*   < >  5.4*   < >  6.9*  8.0*  8.7*   HCT  23.3*   < >  16.5*   < >  19.8*  23.2*  24.3*   MCV  86.3   --   86.8   --    --    --   84.4   PLT  66*   --   51*   --    --    --   24*    < > = values in this interval not displayed. LIVER PROFILE:   Recent Labs      08/08/18   0335  08/09/18   0338  08/10/18   0200   AST  286*  117*  64*   ALT  237*  149*  113*   BILITOT  1.5*  1.5*  2.3*   ALKPHOS  105*  130*  123*     PT/INR:   Recent Labs      08/09/18 0338 08/09/18   0930  08/10/18   0200   PROTIME  26.9*  19.7*  15.8*   INR  2.48*  1.67*  1.27*     APTT:   Recent Labs      08/07/18   1925 08/09/18 0930   APTT  38.6*  35.7     BNP:  No results for input(s): BNP in the last 72 hours. Ionized Calcium:No results for input(s): IONCA in the last 72 hours. Magnesium:  Recent Labs      08/09/18   0338  08/10/18   0200   MG  1.8  1.9     Phosphorus:  No results for input(s): PHOS in the last 72 hours. HgbA1C:   No results for input(s): LABA1C in the last 72 hours.   Hepatic:   Recent Labs      08/08/18   0335  08/09/18   0338  08/10/18   0200   ALKPHOS  105*  130*  123*   ALT  237*  149*  113*   AST  286*  117*  64*   PROT  4.0*  4.0* renal outline bilaterally seen. No hydronephrosis. The ureters appear normal. The urinary bladder is decompressed and may not be optimally evaluated. A normal size uterus is seen which is moderately anteverted and displaced to the left. No adnexal masses. The stomach and duodenum are normal. Normal small bowel is seen. There are no findings to suggest appendicitis. Moderate gas and stool are seen in the colon. The distal colon is decompressed. There is diverticulosis of the distal colon. No evidence for diverticulitis. Atheromatous changes of the abdominal aorta and iliac arteries are seen. No aneurysmal dilatation. There is no evidence of abdominal or pelvic lymphadenopathy. The scattered mesenteric lymph nodes are visualized without significant enlargement. Chronic degenerative changes of the lumbar spine are seen. No focal bony abnormality or bony lesion. No acute abnormality of the abdomen or pelvis. The diverticulosis of the distal colon. No evidence for diverticulitis. A 1 cm fat density nodule in the left adrenal gland. Bilateral lower lobar interstitial infiltrate. The above finding are recorded on a digital voice clip in PACS. Signed by Dr Nancy Nieves on 8/6/2018 12:59 PM    Us Renal Complete    Result Date: 8/6/2018  US RENAL COMPLETE 8/6/2018 2:15 PM HISTORY: Acute kidney injury COMPARISON: None  TECHNIQUE: Multiple longitudinal and transverse realtime sonographic images of the kidneys and urinary bladder are obtained. FINDINGS: The right kidney measures 9.5 x 4.5 x 5 cm. The right kidney is normal in size, shape, contour and position. The cortical thickness is normal, with preservation of the corticomedullary differentiation. The central echo complex is compact with no evidence for hydronephrosis. No nephrolithiasis or abnormal perinephric fluid collections are seen. No hydroureter. The left kidney measures 9.4 x 5 x 4.7 cm. The left kidney is normal in size, shape, contour and position.  The RN/surgery/cardiology  SLED today  Ok for transfer when bed available.         Heidi Barrera MD  08/10/18  12:07 PM

## 2018-08-10 NOTE — PROGRESS NOTES
Wound Care  Noted patient will be transferring to [facility] in [address]. Hospital wound vac changed to [address] [facility] [id number] for transfer. Notified Karin Queen RN, [employer] that rental vac will be transferred with patient. Notified [facility] RN that [facility] wound vac rental unit can be transferred with patient and that I was notified. Discussed with Donta Stahl RN and Rosi WOLFE.

## 2018-08-10 NOTE — CONSULTS
[facility] OR    TONSILLECTOMY         Family History   Problem Relation Age of Onset    Arthritis Mother     Diabetes Mother     High Blood Pressure Mother     High Cholesterol Mother     Cancer Father        Social History     Social History    Marital status:      Spouse name: N/A    Number of children: N/A    Years of education: N/A     Occupational History    Not on file.      Social History Main Topics    Smoking status: Never Smoker    Smokeless tobacco: Never Used    Alcohol use No    Drug use: No    Sexual activity: Not on file     Other Topics Concern    Not on file     Social History Narrative    No narrative on file       No Known Allergies    Current Meds   sodium chloride  250 mL Intravenous Once    [MAR Hold] sodium chloride  250 mL Intravenous Once    [MAR Hold] meropenem  1 g Intravenous Q12H    [MAR Hold] silver sulfADIAZINE   Topical BID    [MAR Hold] sodium polystyrene  15 g Oral Once    [facility] Hold] sodium chloride flush  10 mL Intravenous 2 times per day    [MAR Hold] hydrocortisone sodium succinate PF  100 mg Intravenous Q8H    [MAR Hold] pantoprazole  40 mg Intravenous Daily       Current Infused Meds   [MAR Hold] insulin (HUMAN R) non-weight based infusion 7.8 Units/hr (08/10/18 0811)    [MAR Hold] dextrose      [MAR Hold] PN-Adult Premix 5/20 - Standard Electrolytes - Central Line 41.7 mL/hr at 08/09/18 2106    [MAR Hold] diltiazem 5 mg/hr (08/10/18 0727)    [MAR Hold] fat emulsion Stopped (08/10/18 0532)    [MAR Hold] sodium chloride 125 mL/hr at 08/10/18 0727    [MAR Hold] fentaNYL (SUBLIMAZE) 1250 mcg in sodium chloride 0.9 % 250 mL 100 mcg/hr (08/10/18 0728)    [MAR Hold] norepinephrine 3 mcg/min (08/10/18 0755)       PE:  Vitals:    08/10/18 0800   BP: (!) 103/58   Pulse: 92   Resp: 18   Temp: 98.7 °F (37.1 °C)   SpO2: 100%       Intake/Output Summary (Last 24 hours) at 08/10/18 0956  Last data filed at 08/10/18 0955   Gross per 24 hour   Intake 7248.66 ml   Output             1610 ml   Net          5638.66 ml     Estimated body mass index is 43.65 kg/m² as calculated from the following:    Height as of this encounter: 5' 5\" (1.651 m). Weight as of this encounter: 262 lb 4.8 oz (119 kg). General - No acute distress  Eyes - PERRL, anicteric sclerae; no lid-lag  ENMT - Atraumatic; Mucous membranes moist, oropharynx clear  Neck - trachea midline, thyroid non-tender  Cardio - No jugular venous distension                Irregularly irregular, no gallop, rub, murmur                 No edema, normal pulses  Resp - Normal effort, Clear to auscultation bilaterally  GI - abdomen soft, non-tender, no hepatosplenomegaly  Skin - warm and dry; no rashes  Psych - A+O x 3, normal affect    Recent Labs      08/08/18 0335 08/09/18 0338 08/09/18   1615  08/09/18   2100  08/10/18   0200   WBC  12.9*   --   23.3*   --    --    --   19.2*   HGB  7.7*   < >  5.4*   < >  6.9*  8.0*  8.7*   PLT  66*   --   51*   --    --    --   24*    < > = values in this interval not displayed. Recent Labs      08/08/18 0335 08/09/18   0338 08/09/18   0411  08/10/18   0200  08/10/18   0424   NA  140   --   135*   --   137   --    K  4.4   < >  3.7  3.6  3.3*  2.7   CL  100   --   97*   --   103   --    CO2  19*   --   19*   --   22   --    BUN  9   --   14   --   36*   --    CREATININE  1.2*   --   1.1*   --   2.1*   --    LABGLOM  45*   --   50*   --   24*   --    MG   --    --   1.8   --   1.9   --    CALCIUM  7.1*   --   7.3*   --   7.8*   --     < > = values in this interval not displayed. No results for input(s): Santos Starring in the last 72 hours.     TTE  Pending    Assessment, Recommendations, & Plan:  60 y.o. female with Status post cardiac arrest, new onset atrial fibrillation, sepsis, ischemic bowel, possible DIC versus shock liver, diabetes, ARABELLA on dialysis    PAF - agree with the current strategy of diltiazem IV, she's not requiring much in the order of pressors. Replete potassium to 4 and mag to 2. Will follow-up on transthoracic echo. The patient gone back to the OR, I explained to the nurse anesthetist that if something is needed to control heart rate, a one-time dose of IV digoxin can be given. I will need to know this because she has ARABELLA and on dialysis. Hypotension - wean Levophed as tolerated    Cardiac arrest - likely related to hyperkalemia and acidotic state, I'm not certain the patient will need a heart cath, we will consider an ischemia workup for her atrial fibrillation anyway once the patient is medically stable and likely as an outpatient. As always, thank you for allowing me to participate in the care of your patient. Please do not hesitate to contact me with any questions or concerns. Cristofer Khan MD, MSc  Director of the [facility] [place]

## 2018-08-10 NOTE — PROGRESS NOTES
8/10/2018  4:25 AM - Naveen Gaxiola Incoming Lab Results From [facility]     Component Results     Component Value Ref Range & Units Status Collected Lab   pH, Arterial 7.390  7.350 - 7.450 Final 08/10/2018  4:24 AM [facility] Lab   pCO2, Arterial 37.0  35.0 - 45.0 mmHg Final 08/10/2018  4:24 AM [facility]   pO2, Arterial 134.0   80.0 - 100.0 mmHg Final 08/10/2018  4:24 AM [facility]   HCO3, Arterial 22.4  22.0 - 26.0 mmol/L Final 08/10/2018  4:24 AM [facility] Excess, Arterial -2.3   -2.0 - 2.0 mmol/L Final 08/10/2018  4:24 AM [facility]   Hemoglobin, Art, Extended 8.4   12.0 - 16.0 g/dL Final 08/10/2018  4:24 AM [facility]   O2 Sat, Arterial 96.5  >92 % Final 08/10/2018  4:24 AM [facility]   Carboxyhgb, Arterial 1.5  0.0 - 5.0 % Final 08/10/2018  4:24 AM [facility]        0.0-1.5   (Smokers 1.5-5.0)    Methemoglobin, Arterial 1.2  <1.5 % Final 08/10/2018  4:24 AM [facility]   O2 Content, Arterial 11.7  Not Established mL/dL Final 08/10/2018  4:24 AM [address] [facility]   O2 Therapy Unknown   Final 08/10/2018  4:24 AM [facility] [address] [facility], 100%  PRVC 18, 500, +5, 40%  Decrease FIO2 to 30%

## 2018-08-10 NOTE — ANESTHESIA PRE PROCEDURE
resp failure, metabolic acidosis   Cardiovascular:    (+) hypertension:, weak pulses, peripheral edema,           Rate: abnormal                    Neuro/Psych:   Negative Neuro/Psych ROS              GI/Hepatic/Renal:   (+) GERD:, liver disease: coagulopathy from hepatic dysfunction, renal disease: ESRD and dialysis, morbid obesity          Endo/Other:    (+) DiabetesType II DM, , blood dyscrasia: anemia:., electrolyte abnormalities, . Abdominal:           Vascular: negative vascular ROS. Anesthesia Plan      general     ASA 4     (Iv zofran within 30 min of closing . Attempted gibran left brachial, no success, see note . Will attempt femoral art line in or)  Induction: intravenous. arterial line  MIPS: Postoperative opioids intended and Prophylactic antiemetics administered. Anesthetic plan and risks discussed with patient. Use of blood products discussed with patient whom. Plan discussed with CRNA.     Attending anesthesiologist reviewed and agrees with Pre Eval content              ALLYN Wilkerson - CRNA   8/10/2018

## 2018-08-10 NOTE — DISCHARGE SUMMARY
MD Jhonathan        glucagon (rDNA) injection 1 mg  1 mg Intramuscular PRN Brannon Ring MD        dextrose 5 % solution  100 mL/hr Intravenous PRN Brannon Ring MD        0.9 % sodium chloride bolus  250 mL Intravenous Once Mikala Virgen MD        PN-Adult Premix 5/20 - Standard Electrolytes - Central Line   Intravenous Continuous TPN Lily Watson MD        0.9 % sodium chloride bolus  250 mL Intravenous Once Terrance Taylor MD        PN-Adult Premix 5/20 - Standard Electrolytes - Central Line   Intravenous Continuous TPN Lily Watson MD 41.7 mL/hr at 08/09/18 2106      diltiazem 125 mg in dextrose 5 % 125 mL infusion  5 mg/hr Intravenous Continuous Betty Jones MD 5 mL/hr at 08/10/18 0727 5 mg/hr at 08/10/18 0727    fat emulsion 20 % infusion 250 mL  250 mL Intravenous Once per day on Mon Thu Lily Watson MD   Stopped at 08/10/18 0532    meropenem (MERREM) 1 g in sodium chloride 0.9 % 100 mL IVPB (mini-bag)  1 g Intravenous Q12H Lily Watson MD   Stopped at 08/10/18 0556    silver sulfADIAZINE (SILVADENE) 1 % cream   Topical BID Castro Rodriguez MD        0.9 % sodium chloride infusion   Intravenous Continuous Wallace Whatley  mL/hr at 08/10/18 0727      fentaNYL (SUBLIMAZE) 1250 mcg in sodium chloride 0.9 % 250 mL  25 mcg/hr Intravenous Continuous Lily Watson MD 20 mL/hr at 08/10/18 0728 100 mcg/hr at 08/10/18 0728    sodium polystyrene (KAYEXALATE) 15 GM/60ML suspension 15 g  15 g Oral Once Jennifer Damon MD        norepinephrine (LEVOPHED) 16 mg in dextrose 5 % 250 mL infusion  5 mcg/min Intravenous Continuous Jennifer Damon MD   Stopped at 08/10/18 1138    sodium chloride flush 0.9 % injection 10 mL  10 mL Intravenous 2 times per day Lily Watson MD   10 mL at 08/10/18 0744    sodium chloride flush 0.9 % injection 10 mL  10 mL Intravenous PRN Lily Watson MD        ondansetron [facility]) injection 4 mg  4 mg Intravenous Q6H PRN Wendy Wilkinson

## 2018-08-10 NOTE — BRIEF OP NOTE
Brief Postoperative Note      DATE OF PROCEDURE: 8/10/2018     SURGEON: Alin Kramer MD    PREOPERATIVE DIAGNOSIS: * DEAD BOWEL    POSTOPERATIVE DIAGNOSIS: Same     OPERATION: Procedure(s):  LAPAROTOMY EXPLORATORY, ABDOMINAL CLOSURE, WOUND VAC PLACEMENT  Ileo-colic anastomosis  ANESTHESIA: General    ESTIMATED BLOOD LOSS: Minimal    COMPLICATIONS: None. SPECIMENS: * No specimens in log *    DRAINS: 2 JPs    The patient tolerated the procedure well.     Electronically signed by Alin Kramer MD  on 8/10/2018 at 10:15 AM

## 2018-08-10 NOTE — PLAN OF CARE
Problem: Falls - Risk of:  Goal: Will remain free from falls  Will remain free from falls   Outcome: Ongoing    Goal: Absence of physical injury  Absence of physical injury   Outcome: Ongoing      Problem: Risk for Impaired Skin Integrity  Goal: Tissue integrity - skin and mucous membranes  Structural intactness and normal physiological function of skin and  mucous membranes.    Outcome: Not Met This Shift      Problem: Restraint Use - Nonviolent/Non-Self-Destructive Behavior:  Goal: Absence of restraint indications  Absence of restraint indications   Outcome: Ongoing    Goal: Absence of restraint-related injury  Absence of restraint-related injury   Outcome: Ongoing      Problem: Cardiac Output - Decreased:  Goal: Hemodynamic stability will improve  Hemodynamic stability will improve   Outcome: Ongoing      Problem: Fluid Volume - Imbalance:  Goal: Absence of imbalanced fluid volume signs and symptoms  Absence of imbalanced fluid volume signs and symptoms   Outcome: Ongoing      Problem: Pain:  Goal: Pain level will decrease  Pain level will decrease   Outcome: Ongoing    Goal: Recognizes and communicates pain  Recognizes and communicates pain   Outcome: Ongoing    Goal: Control of acute pain  Control of acute pain   Outcome: Ongoing    Goal: Control of chronic pain  Control of chronic pain   Outcome: Ongoing      Problem: Nutrition  Goal: Optimal nutrition therapy  Nutrition Problem: Inadequate oral intake  Intervention: Food and/or Nutrient Delivery: Continue NPO, Continue Parenteral Nutrition  Nutritional Goals: Meet nutritional needs through PN        Outcome: Ongoing

## 2018-08-10 NOTE — OP NOTE
Dictated
really no areas of significant  concern. We mobilized some more of the right colon and then constructed a  side-to-side anastomosis with the KARIME. We placed a 2-0 Vicryl at the apex  of the anastomosis to prevent unzipping and then looked at the inside of  the bowel. There was some patchy necrosis of the mucosa, but it actually  looked like it was all going to do just fine. We irrigated copiously then  closed the defect with the TA 55, oversewed this with 3-0 Vicryl pop-offs. We then irrigated the abdomen copiously, made sure we had good hemostasis. Her platelet count was 24 heading into this case, so we gave her two  6-packs of platelets. We then placed two larger Joe-Koo drains, one  going down the right gutter into the pelvis, the other coming down the left  gutter into the pelvis and then closed the abdomen under really minimal  tension with 0 Vicryl figure-of-eight. We placed a VAC on the subcu  portion of the wound and returned her to the ICU. Estimated blood loss,  minimal.  Complications, none. She tolerated all this quite well.         Belinda Ashford MD    D: 08/10/2018 11:44:10      T: 08/10/2018 14:04:06     [id number]  Job#: [id number]     Doc#: [id number]    CC:

## 2018-08-11 ENCOUNTER — APPOINTMENT (OUTPATIENT)
Dept: GENERAL RADIOLOGY | Age: 63
DRG: 329 | End: 2018-08-11
Payer: MEDICARE

## 2018-08-11 VITALS
OXYGEN SATURATION: 99 % | BODY MASS INDEX: 45.43 KG/M2 | DIASTOLIC BLOOD PRESSURE: 56 MMHG | RESPIRATION RATE: 17 BRPM | TEMPERATURE: 97.4 F | WEIGHT: 272.7 LBS | HEIGHT: 65 IN | HEART RATE: 96 BPM | SYSTOLIC BLOOD PRESSURE: 98 MMHG

## 2018-08-11 LAB
ACANTHOCYTES: ABNORMAL
ALBUMIN SERPL-MCNC: 2.6 G/DL (ref 3.5–5.2)
ALP BLD-CCNC: 232 U/L (ref 35–104)
ALT SERPL-CCNC: 85 U/L (ref 5–33)
ANION GAP SERPL CALCULATED.3IONS-SCNC: 10 MMOL/L (ref 7–19)
AST SERPL-CCNC: 56 U/L (ref 5–32)
BASE EXCESS ARTERIAL: 2.6 MMOL/L (ref -2–2)
BASOPHILS ABSOLUTE: 0 K/UL (ref 0–0.2)
BASOPHILS MANUAL: 0 %
BASOPHILS RELATIVE PERCENT: 0 % (ref 0–1)
BILIRUB SERPL-MCNC: 2.1 MG/DL (ref 0.2–1.2)
BLOOD CULTURE, ROUTINE: NORMAL
BUN BLDV-MCNC: 27 MG/DL (ref 8–23)
CALCIUM SERPL-MCNC: 7.4 MG/DL (ref 8.8–10.2)
CARBOXYHEMOGLOBIN ARTERIAL: 1.4 % (ref 0–5)
CHLORIDE BLD-SCNC: 107 MMOL/L (ref 98–111)
CO2: 24 MMOL/L (ref 22–29)
CREAT SERPL-MCNC: 1.4 MG/DL (ref 0.5–0.9)
CULTURE, BLOOD 2: NORMAL
EOSINOPHILS ABSOLUTE: 0 K/UL (ref 0–0.6)
EOSINOPHILS RELATIVE PERCENT: 0 % (ref 0–5)
GFR NON-AFRICAN AMERICAN: 38
GLUCOSE BLD-MCNC: 136 MG/DL (ref 70–99)
GLUCOSE BLD-MCNC: 139 MG/DL (ref 70–99)
GLUCOSE BLD-MCNC: 140 MG/DL (ref 70–99)
GLUCOSE BLD-MCNC: 142 MG/DL (ref 70–99)
GLUCOSE BLD-MCNC: 144 MG/DL (ref 70–99)
GLUCOSE BLD-MCNC: 150 MG/DL (ref 70–99)
GLUCOSE BLD-MCNC: 152 MG/DL (ref 70–99)
GLUCOSE BLD-MCNC: 155 MG/DL (ref 74–109)
GLUCOSE BLD-MCNC: 179 MG/DL (ref 70–99)
GLUCOSE BLD-MCNC: 186 MG/DL (ref 70–99)
HCO3 ARTERIAL: 25.9 MMOL/L (ref 22–26)
HCT VFR BLD CALC: 24.2 % (ref 37–47)
HEMOGLOBIN, ART, EXTENDED: 9 G/DL (ref 12–16)
HEMOGLOBIN: 8.3 G/DL (ref 12–16)
INR BLD: 1.25 (ref 0.88–1.18)
LACTIC ACID: 1.6 MMOL/L (ref 0.5–1.9)
LYMPHOCYTES ABSOLUTE: 2.9 K/UL (ref 1.1–4.5)
LYMPHOCYTES RELATIVE PERCENT: 14 % (ref 20–40)
MAGNESIUM: 1.7 MG/DL (ref 1.6–2.4)
MCH RBC QN AUTO: 28.8 PG (ref 27–31)
MCHC RBC AUTO-ENTMCNC: 34.3 G/DL (ref 33–37)
MCV RBC AUTO: 84 FL (ref 81–99)
METHEMOGLOBIN ARTERIAL: 1.3 %
MONOCYTES ABSOLUTE: 0.4 K/UL (ref 0–0.9)
MONOCYTES RELATIVE PERCENT: 2 % (ref 0–10)
NEUTROPHILS ABSOLUTE: 17.2 K/UL (ref 1.5–7.5)
NEUTROPHILS MANUAL: 84 %
NEUTROPHILS RELATIVE PERCENT: 84 % (ref 50–65)
O2 CONTENT ARTERIAL: 12.3 ML/DL
O2 SAT, ARTERIAL: 95.7 %
O2 THERAPY: ABNORMAL
OVALOCYTES: ABNORMAL
PCO2 ARTERIAL: 34 MMHG (ref 35–45)
PDW BLD-RTO: 15.4 % (ref 11.5–14.5)
PERFORMED ON: ABNORMAL
PH ARTERIAL: 7.49 (ref 7.35–7.45)
PLATELET # BLD: 52 K/UL (ref 130–400)
PLATELET SLIDE REVIEW: ABNORMAL
PMV BLD AUTO: 10.4 FL (ref 9.4–12.3)
PO2 ARTERIAL: 115 MMHG (ref 80–100)
POIKILOCYTES: ABNORMAL
POTASSIUM REFLEX MAGNESIUM: 3.3 MMOL/L (ref 3.5–5)
POTASSIUM, WHOLE BLOOD: 3
PROTHROMBIN TIME: 15.6 SEC (ref 12–14.6)
RBC # BLD: 2.88 M/UL (ref 4.2–5.4)
SLIDE REVIEW: ABNORMAL
SODIUM BLD-SCNC: 141 MMOL/L (ref 136–145)
TOTAL PROTEIN: 4.2 G/DL (ref 6.6–8.7)
WBC # BLD: 20.5 K/UL (ref 4.8–10.8)

## 2018-08-11 PROCEDURE — 83735 ASSAY OF MAGNESIUM: CPT

## 2018-08-11 PROCEDURE — 84132 ASSAY OF SERUM POTASSIUM: CPT

## 2018-08-11 PROCEDURE — 80053 COMPREHEN METABOLIC PANEL: CPT

## 2018-08-11 PROCEDURE — 36592 COLLECT BLOOD FROM PICC: CPT

## 2018-08-11 PROCEDURE — 82803 BLOOD GASES ANY COMBINATION: CPT

## 2018-08-11 PROCEDURE — 82948 REAGENT STRIP/BLOOD GLUCOSE: CPT

## 2018-08-11 PROCEDURE — 2700000000 HC OXYGEN THERAPY PER DAY

## 2018-08-11 PROCEDURE — 94003 VENT MGMT INPAT SUBQ DAY: CPT

## 2018-08-11 PROCEDURE — 71045 X-RAY EXAM CHEST 1 VIEW: CPT

## 2018-08-11 PROCEDURE — 85025 COMPLETE CBC W/AUTO DIFF WBC: CPT

## 2018-08-11 PROCEDURE — 6360000002 HC RX W HCPCS: Performed by: INTERNAL MEDICINE

## 2018-08-11 PROCEDURE — 6360000002 HC RX W HCPCS: Performed by: FAMILY MEDICINE

## 2018-08-11 PROCEDURE — 2580000003 HC RX 258: Performed by: FAMILY MEDICINE

## 2018-08-11 PROCEDURE — 85610 PROTHROMBIN TIME: CPT

## 2018-08-11 PROCEDURE — 83605 ASSAY OF LACTIC ACID: CPT

## 2018-08-11 PROCEDURE — C9113 INJ PANTOPRAZOLE SODIUM, VIA: HCPCS | Performed by: INTERNAL MEDICINE

## 2018-08-11 PROCEDURE — 36620 INSERTION CATHETER ARTERY: CPT

## 2018-08-11 PROCEDURE — 36600 WITHDRAWAL OF ARTERIAL BLOOD: CPT

## 2018-08-11 RX ORDER — PROPOFOL 10 MG/ML
INJECTION, EMULSION INTRAVENOUS
Status: DISCONTINUED
Start: 2018-08-11 | End: 2018-08-11 | Stop reason: HOSPADM

## 2018-08-11 RX ADMIN — MEROPENEM 1 G: 1 INJECTION, POWDER, FOR SOLUTION INTRAVENOUS at 05:40

## 2018-08-11 RX ADMIN — HYDROCORTISONE SODIUM SUCCINATE 100 MG: 100 INJECTION, POWDER, FOR SOLUTION INTRAMUSCULAR; INTRAVENOUS at 08:10

## 2018-08-11 RX ADMIN — PANTOPRAZOLE SODIUM 40 MG: 40 INJECTION, POWDER, FOR SOLUTION INTRAVENOUS at 08:10

## 2018-08-11 RX ADMIN — FENTANYL CITRATE 100 MCG/HR: 50 INJECTION, SOLUTION INTRAMUSCULAR; INTRAVENOUS at 08:06

## 2018-08-11 RX ADMIN — Medication 10 ML: at 08:10

## 2018-08-11 ASSESSMENT — PAIN SCALES - GENERAL
PAINLEVEL_OUTOF10: 0

## 2018-08-11 ASSESSMENT — PULMONARY FUNCTION TESTS
PIF_VALUE: 65.6
PIF_VALUE: 30.6
PIF_VALUE: 27.3

## 2018-08-11 NOTE — PROGRESS NOTES
8/11/2018  3:28 AM - Naveen Gaxiola Incoming Lab Results From [facility]     Component Results     Component Value Ref Range & Units Status Collected Lab   pH, Arterial 7.490   7.350 - 7.450 Final 08/11/2018  3:26 AM [facility] Lab   pCO2, Arterial 34.0   35.0 - 45.0 mmHg Final 08/11/2018  3:26 AM [facility] Lab   pO2, Arterial 115.0   80.0 - 100.0 mmHg Final 08/11/2018  3:26 AM [facility] Lab   HCO3, Arterial 25.9  22.0 - 26.0 mmol/L Final 08/11/2018  3:26 AM [facility] Excess, Arterial 2.6   -2.0 - 2.0 mmol/L Final 08/11/2018  3:26 AM [facility] Lab   Hemoglobin, Art, Extended 9.0   12.0 - 16.0 g/dL Final 08/11/2018  3:26 AM [facility] Lab   O2 Sat, Arterial 95.7  >92 % Final 08/11/2018  3:26 AM [facility]   Carboxyhgb, Arterial 1.4  0.0 - 5.0 % Final 08/11/2018  3:26 AM [facility] Lab        0.0-1.5   (Smokers 1.5-5.0)    Methemoglobin, Arterial 1.3  <1.5 % Final 08/11/2018  3:26 AM [facility] Lab   O2 Content, Arterial 12.3  Not Established mL/dL Final 08/11/2018  3:26 AM [facility]   O2 Therapy Unknown   Final 08/11/2018  3:26 AM [facility]   Testing Performed By     Leela Calderón Name Director Address Valid Date Range   [facility] - [address] LAB Travis Naylor  [place],[address]  [address] [facility] [address] 08/30/17 1233-Present   Lab and Collection     BLOOD GAS, ARTERIAL on 8/10/2018   Result History   PRVC 18, Vt 500, FIO2 30%, +5 PEEP, arterial line

## 2018-08-11 NOTE — PLAN OF CARE
Problem: Nutrition  Goal: Optimal nutrition therapy  Nutrition Problem: Inadequate oral intake  Intervention: Food and/or Nutrient Delivery: Continue NPO  Nutritional Goals: Nutritional needs will be met        Outcome: Ongoing

## 2018-11-14 LAB
EKG P AXIS: 43 DEGREES
EKG P-R INTERVAL: 198 MS
EKG Q-T INTERVAL: 342 MS
EKG QRS DURATION: 104 MS
EKG QTC CALCULATION (BAZETT): 417 MS
EKG T AXIS: 13 DEGREES

## 2019-03-22 ENCOUNTER — TRANSCRIBE ORDERS (OUTPATIENT)
Dept: ADMINISTRATIVE | Facility: HOSPITAL | Age: 64
End: 2019-03-22

## 2019-03-22 DIAGNOSIS — R10.11 ABDOMINAL PAIN, RIGHT UPPER QUADRANT: ICD-10-CM

## 2019-03-22 DIAGNOSIS — R10.32 ABDOMINAL PAIN, LEFT LOWER QUADRANT: ICD-10-CM

## 2019-03-22 DIAGNOSIS — R10.31 ABDOMINAL PAIN, RIGHT LOWER QUADRANT: Primary | ICD-10-CM

## 2019-03-25 ENCOUNTER — HOSPITAL ENCOUNTER (OUTPATIENT)
Dept: ULTRASOUND IMAGING | Facility: HOSPITAL | Age: 64
Discharge: HOME | End: 2019-03-25
Admitting: NURSE PRACTITIONER

## 2019-03-25 DIAGNOSIS — R10.11 ABDOMINAL PAIN, RIGHT UPPER QUADRANT: ICD-10-CM

## 2019-03-25 PROCEDURE — 76705 ECHO EXAM OF ABDOMEN: CPT

## 2019-03-28 ENCOUNTER — HOSPITAL ENCOUNTER (OUTPATIENT)
Dept: CT IMAGING | Facility: HOSPITAL | Age: 64
Discharge: HOME | End: 2019-03-28
Admitting: NURSE PRACTITIONER

## 2019-03-28 DIAGNOSIS — R10.31 ABDOMINAL PAIN, RIGHT LOWER QUADRANT: ICD-10-CM

## 2019-03-28 DIAGNOSIS — R10.32 ABDOMINAL PAIN, LEFT LOWER QUADRANT: ICD-10-CM

## 2019-03-28 LAB — CREAT BLDA-MCNC: 1.2 MG/DL (ref 0.6–1.3)

## 2019-03-28 PROCEDURE — 82565 ASSAY OF CREATININE: CPT

## 2019-03-28 PROCEDURE — 74177 CT ABD & PELVIS W/CONTRAST: CPT

## 2019-03-28 PROCEDURE — 0 IOHEXOL 300 MG/ML SOLUTION: Performed by: NURSE PRACTITIONER

## 2019-03-28 PROCEDURE — 25010000002 IOPAMIDOL 61 % SOLUTION: Performed by: NURSE PRACTITIONER

## 2019-03-28 RX ADMIN — IOPAMIDOL 100 ML: 612 INJECTION, SOLUTION INTRAVENOUS at 08:39

## 2019-03-28 RX ADMIN — IOHEXOL 50 ML: 300 INJECTION, SOLUTION INTRAVENOUS at 08:39

## 2019-10-16 ENCOUNTER — APPOINTMENT (OUTPATIENT)
Dept: CT IMAGING | Facility: HOSPITAL | Age: 64
End: 2019-10-16

## 2019-10-16 ENCOUNTER — HOSPITAL ENCOUNTER (EMERGENCY)
Facility: HOSPITAL | Age: 64
Discharge: HOME | End: 2019-10-16
Attending: EMERGENCY MEDICINE | Admitting: EMERGENCY MEDICINE

## 2019-10-16 VITALS
DIASTOLIC BLOOD PRESSURE: 85 MMHG | SYSTOLIC BLOOD PRESSURE: 143 MMHG | RESPIRATION RATE: 17 BRPM | OXYGEN SATURATION: 99 % | WEIGHT: 188 LBS | TEMPERATURE: 97.6 F | HEIGHT: 65 IN | BODY MASS INDEX: 31.32 KG/M2 | HEART RATE: 78 BPM

## 2019-10-16 DIAGNOSIS — R19.7 DIARRHEA, UNSPECIFIED TYPE: ICD-10-CM

## 2019-10-16 DIAGNOSIS — K57.32 SIGMOID DIVERTICULITIS: ICD-10-CM

## 2019-10-16 DIAGNOSIS — R10.9 ABDOMINAL PAIN, UNSPECIFIED ABDOMINAL LOCATION: Primary | ICD-10-CM

## 2019-10-16 LAB
ALBUMIN SERPL-MCNC: 3.9 G/DL (ref 3.5–5.2)
ALBUMIN/GLOB SERPL: 1.5 G/DL
ALP SERPL-CCNC: 107 U/L (ref 39–117)
ALT SERPL W P-5'-P-CCNC: 15 U/L (ref 1–33)
ANION GAP SERPL CALCULATED.3IONS-SCNC: 16 MMOL/L (ref 5–15)
AST SERPL-CCNC: 15 U/L (ref 1–32)
BASOPHILS # BLD AUTO: 0.07 10*3/MM3 (ref 0–0.2)
BASOPHILS NFR BLD AUTO: 0.6 % (ref 0–1.5)
BILIRUB SERPL-MCNC: 0.2 MG/DL (ref 0.2–1.2)
BUN BLD-MCNC: 20 MG/DL (ref 8–23)
BUN/CREAT SERPL: 12.6 (ref 7–25)
CALCIUM SPEC-SCNC: 7.6 MG/DL (ref 8.6–10.5)
CHLORIDE SERPL-SCNC: 110 MMOL/L (ref 98–107)
CO2 SERPL-SCNC: 17 MMOL/L (ref 22–29)
CREAT BLD-MCNC: 1.59 MG/DL (ref 0.57–1)
DEPRECATED RDW RBC AUTO: 47.9 FL (ref 37–54)
EOSINOPHIL # BLD AUTO: 0.33 10*3/MM3 (ref 0–0.4)
EOSINOPHIL NFR BLD AUTO: 2.8 % (ref 0.3–6.2)
ERYTHROCYTE [DISTWIDTH] IN BLOOD BY AUTOMATED COUNT: 14 % (ref 12.3–15.4)
GFR SERPL CREATININE-BSD FRML MDRD: 33 ML/MIN/1.73
GLOBULIN UR ELPH-MCNC: 2.6 GM/DL
GLUCOSE BLD-MCNC: 161 MG/DL (ref 65–99)
HCT VFR BLD AUTO: 35.4 % (ref 34–46.6)
HGB BLD-MCNC: 11.8 G/DL (ref 12–15.9)
IMM GRANULOCYTES # BLD AUTO: 0.11 10*3/MM3 (ref 0–0.05)
IMM GRANULOCYTES NFR BLD AUTO: 0.9 % (ref 0–0.5)
LIPASE SERPL-CCNC: 32 U/L (ref 13–60)
LYMPHOCYTES # BLD AUTO: 1.54 10*3/MM3 (ref 0.7–3.1)
LYMPHOCYTES NFR BLD AUTO: 12.9 % (ref 19.6–45.3)
MCH RBC QN AUTO: 31.1 PG (ref 26.6–33)
MCHC RBC AUTO-ENTMCNC: 33.3 G/DL (ref 31.5–35.7)
MCV RBC AUTO: 93.4 FL (ref 79–97)
MONOCYTES # BLD AUTO: 0.58 10*3/MM3 (ref 0.1–0.9)
MONOCYTES NFR BLD AUTO: 4.9 % (ref 5–12)
NEUTROPHILS # BLD AUTO: 9.3 10*3/MM3 (ref 1.7–7)
NEUTROPHILS NFR BLD AUTO: 77.9 % (ref 42.7–76)
NRBC BLD AUTO-RTO: 0 /100 WBC (ref 0–0.2)
PLATELET # BLD AUTO: 287 10*3/MM3 (ref 140–450)
PMV BLD AUTO: 10 FL (ref 6–12)
POTASSIUM BLD-SCNC: 3.3 MMOL/L (ref 3.5–5.2)
PROT SERPL-MCNC: 6.5 G/DL (ref 6–8.5)
RBC # BLD AUTO: 3.79 10*6/MM3 (ref 3.77–5.28)
SODIUM BLD-SCNC: 143 MMOL/L (ref 136–145)
WBC NRBC COR # BLD: 11.93 10*3/MM3 (ref 3.4–10.8)

## 2019-10-16 PROCEDURE — 96374 THER/PROPH/DIAG INJ IV PUSH: CPT

## 2019-10-16 PROCEDURE — 99284 EMERGENCY DEPT VISIT MOD MDM: CPT

## 2019-10-16 PROCEDURE — 25010000002 MORPHINE PER 10 MG: Performed by: EMERGENCY MEDICINE

## 2019-10-16 PROCEDURE — 80053 COMPREHEN METABOLIC PANEL: CPT | Performed by: EMERGENCY MEDICINE

## 2019-10-16 PROCEDURE — 96375 TX/PRO/DX INJ NEW DRUG ADDON: CPT

## 2019-10-16 PROCEDURE — 83690 ASSAY OF LIPASE: CPT | Performed by: EMERGENCY MEDICINE

## 2019-10-16 PROCEDURE — 85025 COMPLETE CBC W/AUTO DIFF WBC: CPT | Performed by: EMERGENCY MEDICINE

## 2019-10-16 PROCEDURE — 74177 CT ABD & PELVIS W/CONTRAST: CPT

## 2019-10-16 PROCEDURE — 25010000002 ONDANSETRON PER 1 MG: Performed by: EMERGENCY MEDICINE

## 2019-10-16 PROCEDURE — 0 IOPAMIDOL PER 1 ML: Performed by: EMERGENCY MEDICINE

## 2019-10-16 RX ORDER — SODIUM CHLORIDE 0.9 % (FLUSH) 0.9 %
10 SYRINGE (ML) INJECTION AS NEEDED
Status: DISCONTINUED | OUTPATIENT
Start: 2019-10-16 | End: 2019-10-16 | Stop reason: HOSPADM

## 2019-10-16 RX ORDER — MORPHINE SULFATE 2 MG/ML
2 INJECTION, SOLUTION INTRAMUSCULAR; INTRAVENOUS ONCE
Status: COMPLETED | OUTPATIENT
Start: 2019-10-16 | End: 2019-10-16

## 2019-10-16 RX ORDER — METRONIDAZOLE 500 MG/1
500 TABLET ORAL 3 TIMES DAILY
Qty: 21 TABLET | Refills: 0 | Status: SHIPPED | OUTPATIENT
Start: 2019-10-16 | End: 2020-01-29

## 2019-10-16 RX ORDER — LEVOFLOXACIN 500 MG/1
500 TABLET, FILM COATED ORAL DAILY
Qty: 7 TABLET | Refills: 0 | Status: SHIPPED | OUTPATIENT
Start: 2019-10-16 | End: 2019-10-23

## 2019-10-16 RX ORDER — ONDANSETRON 2 MG/ML
4 INJECTION INTRAMUSCULAR; INTRAVENOUS ONCE
Status: COMPLETED | OUTPATIENT
Start: 2019-10-16 | End: 2019-10-16

## 2019-10-16 RX ADMIN — ONDANSETRON HYDROCHLORIDE 4 MG: 2 SOLUTION INTRAMUSCULAR; INTRAVENOUS at 16:53

## 2019-10-16 RX ADMIN — MORPHINE SULFATE 2 MG: 2 INJECTION, SOLUTION INTRAMUSCULAR; INTRAVENOUS at 16:54

## 2019-10-16 RX ADMIN — SODIUM CHLORIDE 500 ML: 9 INJECTION, SOLUTION INTRAVENOUS at 16:51

## 2019-10-16 RX ADMIN — IOPAMIDOL 100 ML: 755 INJECTION, SOLUTION INTRAVENOUS at 18:28

## 2019-10-16 NOTE — ED NOTES
Patient is a 63 year old female that presents to ER with complaints of N/v/d and abdominal pain that radiates to under right rib. Patient was seen at [facility] today but due her medial history of a bowel resection patient was sent to ER. Patient is very anxious and tearful at this time. Patient states that she is just worried.      Oanh Meza, RN  10/16/19 8586

## 2019-10-16 NOTE — ED PROVIDER NOTES
Subjective   Patient is a 63-year-old female who presents to the ER with abdominal pain and diarrhea.  Patient states she has had abdominal pain and diarrhea for the last week.  Patient states her abdominal pain is intermittent and located in different parts of her abdomen and is sharp and crampy in nature.  She had severe pain earlier but no pain at present.  Patient has had associated nausea and nonbloody diarrhea.  Her  has had similar symptoms.  She denies any fever, chest pain, shortness of air, vomiting, urinary changes, neurologic changes.  Patient was seen at another facility today and was sent here for further work-up.            Review of Systems   Constitutional: Negative.    HENT: Negative.    Eyes: Negative.    Respiratory: Negative.    Cardiovascular: Negative.    Gastrointestinal: Positive for abdominal pain, diarrhea and nausea.   Endocrine: Negative.    Genitourinary: Negative.    Musculoskeletal: Negative.    Skin: Negative.    Allergic/Immunologic: Negative.    Neurological: Negative.    Hematological: Negative.    Psychiatric/Behavioral: Negative.    All other systems reviewed and are negative.      Past Medical History:   Diagnosis Date   • Acid reflux    • Arthritis    • High cholesterol    • Hypertension    • Neuropathy    • Swollen lymph nodes     LEFT CERVICAL   • Type 2 diabetes mellitus (CMS/HCC)        No Known Allergies    Past Surgical History:   Procedure Laterality Date   • CARPAL TUNNEL RELEASE Bilateral    • CERVICAL LYMPH NODE BIOPSY/EXCISION Left 2017    Procedure: LEFT NECK EXPLORATION WITH INCISIONAL BIOPSY/CULTURE;  Surgeon: Blue Sanchez MD;  Location: [facility] OR;  Service:    •  SECTION     • COLON RESECTION     • EXCISION LESION      BACK    • KNEE SURGERY Left     REPLACEMENT   • TONSILLECTOMY         Family History   Problem Relation Age of Onset   • Colon cancer Father        Social History     Socioeconomic History   • Marital status:       Spouse name: Not on file   • Number of children: Not on file   • Years of education: Not on file   • Highest education level: Not on file   Tobacco Use   • Smoking status: Never Smoker   • Smokeless tobacco: Never Used   Substance and Sexual Activity   • Alcohol use: No   • Drug use: No   • Sexual activity: Defer           Objective   Physical Exam   Constitutional: She is oriented to person, place, and time. She appears well-developed and well-nourished.   HENT:   Head: Normocephalic and atraumatic.   Eyes: Conjunctivae are normal. Pupils are equal, round, and reactive to light.   Neck: Normal range of motion.   Cardiovascular: Normal rate, regular rhythm and normal heart sounds.   Pulmonary/Chest: Effort normal and breath sounds normal.   Abdominal: Soft. There is no tenderness. There is no rigidity, no rebound, no guarding and no CVA tenderness.   Musculoskeletal: Normal range of motion. She exhibits no edema or deformity.   Neurological: She is alert and oriented to person, place, and time. She has normal strength.   Skin: Skin is warm.   Psychiatric: She has a normal mood and affect. Her behavior is normal.   Nursing note and vitals reviewed.      Procedures           ED Course  ED Course as of Oct 17 1156   Wed Oct 16, 2019   1927 Case was discussed with the patient at length she recently had a pretty extensive surgery and ended up in [facility] which appears to be second to shock was there for 4 months and then discharged home today she came abdominal pain appears to have sigmoid diverticulitis I wanted to admit her to the hospital for further evaluation but she does not want to do that she wants to go home the possibly increased morbidity and mortality has been explained she understands that well is going to go home and follow-up with her primary MD  [TS]   1928  The patient's symptoms are now better.  Patient is not having pain.  No chest pain, difficulty breathing, nausea, vomiting or palpitations.   No anxiety or dizziness.  Vital signs have been reviewed and appear to be correct.  Physical exam findings are improved.  Alert.  Oriented X3 .  No acute distress.  Breath sounds normal.  No respiratory distress, decreased breath sounds or wheezes.  Normal heart rate and rhythm.  Heart sounds normal.  .  Abdomen soft and nontender.  No abdominal tenderness or guarding or rebound tenderness.  Skin warm and dry.  No cyanosis or diaphoresis.  Oriented X 3.  Not anxious.  No alteration in mental status or weakness.          [TS]   1928 Risks and benefits of treatments given and alternative treatment options discussed with patient/family. I answered all the questions in simple, plain language, and there was voiced understanding and agreement with plan of care. There were no further questions. Differential diagnosis discussed. Patient/family was advised that the practice of medicine is not always an exact science, and sometimes tests, physical exam, or history may not show the underlying conditions with certainty. Additionally, the condition may change or show itself later after initial presentation. There was also expressed understanding and agreement with this limitation of emergency medicine practice. Patient/family was asked to return to ED if any problem or issues or if condition worsens or does not improved. Patient/family agreed to follow up with PCP/specialist as advised, or return to ED if unable to see a provider in a timely fashion for continued symptoms.   [TS]      ED Course User Index  [TS] Tone Mckeon MD      Patient was given IVFs, morphine and zofran.    Labs and imaging were ordered and results pending at shift change.  Care transferred to Dr. Mckeon.            [facility]    Final diagnoses:   Abdominal pain, unspecified abdominal location   Diarrhea, unspecified type              Nelly Huerta MD  10/16/19 1710       Nelly Huerta MD  10/17/19 4830

## 2019-10-17 NOTE — ED PROVIDER NOTES
Subjective   History of Present Illness    Review of Systems    Past Medical History:   Diagnosis Date   • Acid reflux    • Arthritis    • High cholesterol    • Hypertension    • Neuropathy    • Swollen lymph nodes     LEFT CERVICAL   • Type 2 diabetes mellitus (CMS/HCC)        No Known Allergies    Past Surgical History:   Procedure Laterality Date   • CARPAL TUNNEL RELEASE Bilateral    • CERVICAL LYMPH NODE BIOPSY/EXCISION Left 2017    Procedure: LEFT NECK EXPLORATION WITH INCISIONAL BIOPSY/CULTURE;  Surgeon: Blue Sanchez MD;  Location: [facility] OR;  Service:    •  SECTION     • COLON RESECTION     • EXCISION LESION      BACK    • KNEE SURGERY Left     REPLACEMENT   • TONSILLECTOMY         Family History   Problem Relation Age of Onset   • Colon cancer Father        Social History     Socioeconomic History   • Marital status:      Spouse name: Not on file   • Number of children: Not on file   • Years of education: Not on file   • Highest education level: Not on file   Tobacco Use   • Smoking status: Never Smoker   • Smokeless tobacco: Never Used   Substance and Sexual Activity   • Alcohol use: No   • Drug use: No   • Sexual activity: Defer           Objective   Physical Exam    Procedures           ED Course  ED Course as of Oct 16 1930   Wed Oct 16, 2019   1927 Case was discussed with the patient at length she recently had a pretty extensive surgery and ended up in [facility] which appears to be second to shock was there for 4 months and then discharged home today she came abdominal pain appears to have sigmoid diverticulitis I wanted to admit her to the hospital for further evaluation but she does not want to do that she wants to go home the possibly increased morbidity and mortality has been explained she understands that well is going to go home and follow-up with her primary MD  [TS]     The patient's symptoms are now better.  Patient is not having pain.  No chest pain,  difficulty breathing, nausea, vomiting or palpitations.  No anxiety or dizziness.  Vital signs have been reviewed and appear to be correct.  Physical exam findings are improved.  Alert.  Oriented X3 .  No acute distress.  Breath sounds normal.  No respiratory distress, decreased breath sounds or wheezes.  Normal heart rate and rhythm.  Heart sounds normal.  .  Abdomen soft and nontender.  No abdominal tenderness or guarding or rebound tenderness.  Skin warm and dry.  No cyanosis or diaphoresis.  Oriented X 3.  Not anxious.  No alteration in mental status or weakness.          [TS]   1928 Risks and benefits of treatments given and alternative treatment options discussed with patient/family. I answered all the questions in simple, plain language, and there was voiced understanding and agreement with plan of care. There were no further questions. Differential diagnosis discussed. Patient/family was advised that the practice of medicine is not always an exact science, and sometimes tests, physical exam, or history may not show the underlying conditions with certainty. Additionally, the condition may change or show itself later after initial presentation. There was also expressed understanding and agreement with this limitation of emergency medicine practice. Patient/family was asked to return to ED if any problem or issues or if condition worsens or does not improved. Patient/family agreed to follow up with PCP/specialist as advised, or return to ED if unable to see a provider in a timely fashion for continued symptoms.   [TS]      ED Course User Index  [TS] Tone Mckeon MD                  [facility]    Final diagnoses:   Abdominal pain, unspecified abdominal location   Diarrhea, unspecified type   Sigmoid diverticulitis              Tone Mckeon MD  10/16/19 1930

## 2019-10-17 NOTE — ED NOTES
Waiting for discharge prescriptions to be signed. Dr. Mckeon currently  in another room.      Oanh Meza, RN  10/16/19 1958

## 2019-10-17 NOTE — DISCHARGE INSTRUCTIONS

## 2020-01-29 ENCOUNTER — OFFICE VISIT (OUTPATIENT)
Dept: FAMILY MEDICINE CLINIC | Facility: CLINIC | Age: 65
End: 2020-01-29

## 2020-01-29 VITALS
TEMPERATURE: 97.9 F | SYSTOLIC BLOOD PRESSURE: 155 MMHG | HEART RATE: 81 BPM | HEIGHT: 65 IN | BODY MASS INDEX: 32.89 KG/M2 | RESPIRATION RATE: 24 BRPM | OXYGEN SATURATION: 99 % | WEIGHT: 197.4 LBS | DIASTOLIC BLOOD PRESSURE: 83 MMHG

## 2020-01-29 DIAGNOSIS — Z51.81 THERAPEUTIC DRUG MONITORING: ICD-10-CM

## 2020-01-29 DIAGNOSIS — E11.65 TYPE 2 DIABETES MELLITUS WITH HYPERGLYCEMIA, WITHOUT LONG-TERM CURRENT USE OF INSULIN (HCC): Primary | ICD-10-CM

## 2020-01-29 DIAGNOSIS — Z12.39 SCREENING FOR MALIGNANT NEOPLASM OF BREAST: ICD-10-CM

## 2020-01-29 DIAGNOSIS — E78.2 MIXED HYPERLIPIDEMIA: ICD-10-CM

## 2020-01-29 DIAGNOSIS — M25.50 ARTHRALGIA, UNSPECIFIED JOINT: ICD-10-CM

## 2020-01-29 DIAGNOSIS — G62.9 NEUROPATHY: ICD-10-CM

## 2020-01-29 DIAGNOSIS — E66.01 OBESITY, MORBID (HCC): ICD-10-CM

## 2020-01-29 DIAGNOSIS — B37.2 YEAST DERMATITIS: ICD-10-CM

## 2020-01-29 DIAGNOSIS — I10 ESSENTIAL HYPERTENSION: ICD-10-CM

## 2020-01-29 DIAGNOSIS — K21.9 GASTROESOPHAGEAL REFLUX DISEASE WITHOUT ESOPHAGITIS: ICD-10-CM

## 2020-01-29 LAB
AVERAGE GLUCOSE: NORMAL
HBA1C MFR BLD: 6.6 %
HBA1C MFR BLD: 6.6 %

## 2020-01-29 PROCEDURE — 99214 OFFICE O/P EST MOD 30 MIN: CPT | Performed by: NURSE PRACTITIONER

## 2020-01-29 PROCEDURE — 83036 HEMOGLOBIN GLYCOSYLATED A1C: CPT | Performed by: NURSE PRACTITIONER

## 2020-01-29 RX ORDER — FLUCONAZOLE 150 MG/1
150 TABLET ORAL ONCE
Qty: 1 TABLET | Refills: 0 | Status: SHIPPED | OUTPATIENT
Start: 2020-01-29 | End: 2020-01-29

## 2020-01-29 RX ORDER — LISINOPRIL 10 MG/1
10 TABLET ORAL DAILY
Qty: 90 TABLET | Refills: 0 | Status: SHIPPED | OUTPATIENT
Start: 2020-01-29

## 2020-01-29 RX ORDER — ESOMEPRAZOLE MAGNESIUM 40 MG/1
40 CAPSULE, DELAYED RELEASE ORAL
Qty: 90 CAPSULE | Refills: 0 | Status: SHIPPED | OUTPATIENT
Start: 2020-01-29

## 2020-01-29 RX ORDER — DICLOFENAC SODIUM AND MISOPROSTOL 75; 200 MG/1; UG/1
1 TABLET, DELAYED RELEASE ORAL 2 TIMES DAILY
COMMUNITY
End: 2020-01-29

## 2020-01-29 RX ORDER — DICLOFENAC SODIUM 75 MG/1
75 TABLET, DELAYED RELEASE ORAL 2 TIMES DAILY
Qty: 180 TABLET | Refills: 0 | Status: SHIPPED | OUTPATIENT
Start: 2020-01-29

## 2020-01-29 RX ORDER — GABAPENTIN 800 MG/1
800 TABLET ORAL 3 TIMES DAILY
Qty: 90 TABLET | Refills: 2 | Status: SHIPPED | OUTPATIENT
Start: 2020-01-29 | End: 2020-02-25

## 2020-01-29 RX ORDER — CLOTRIMAZOLE AND BETAMETHASONE DIPROPIONATE 10; .64 MG/G; MG/G
CREAM TOPICAL 2 TIMES DAILY
Qty: 45 G | Refills: 0 | Status: SHIPPED | OUTPATIENT
Start: 2020-01-29 | End: 2020-04-17 | Stop reason: SDUPTHER

## 2020-01-29 RX ORDER — ATORVASTATIN CALCIUM 10 MG/1
10 TABLET, FILM COATED ORAL DAILY
Qty: 90 TABLET | Refills: 0 | Status: SHIPPED | OUTPATIENT
Start: 2020-01-29

## 2020-01-30 LAB
ALBUMIN SERPL-MCNC: 4.2 G/DL (ref 3.5–5.2)
ALBUMIN/GLOB SERPL: 2.1 G/DL
ALP SERPL-CCNC: 107 U/L (ref 39–117)
ALT SERPL-CCNC: 17 U/L (ref 1–33)
AST SERPL-CCNC: 20 U/L (ref 1–32)
BASOPHILS # BLD AUTO: 0.06 10*3/MM3 (ref 0–0.2)
BASOPHILS NFR BLD AUTO: 0.9 % (ref 0–1.5)
BILIRUB SERPL-MCNC: 0.5 MG/DL (ref 0.2–1.2)
BUN SERPL-MCNC: 22 MG/DL (ref 8–23)
BUN/CREAT SERPL: 17.3 (ref 7–25)
CALCIUM SERPL-MCNC: 9 MG/DL (ref 8.6–10.5)
CHLORIDE SERPL-SCNC: 101 MMOL/L (ref 98–107)
CHOLEST SERPL-MCNC: 154 MG/DL (ref 0–200)
CO2 SERPL-SCNC: 27.3 MMOL/L (ref 22–29)
CREAT SERPL-MCNC: 1.27 MG/DL (ref 0.57–1)
EOSINOPHIL # BLD AUTO: 0.17 10*3/MM3 (ref 0–0.4)
EOSINOPHIL NFR BLD AUTO: 2.4 % (ref 0.3–6.2)
ERYTHROCYTE [DISTWIDTH] IN BLOOD BY AUTOMATED COUNT: 12.4 % (ref 12.3–15.4)
GLOBULIN SER CALC-MCNC: 2 GM/DL
GLUCOSE SERPL-MCNC: 132 MG/DL (ref 65–99)
HCT VFR BLD AUTO: 36.4 % (ref 34–46.6)
HDLC SERPL-MCNC: 80 MG/DL (ref 40–60)
HGB BLD-MCNC: 11.8 G/DL (ref 12–15.9)
IMM GRANULOCYTES # BLD AUTO: 0.02 10*3/MM3 (ref 0–0.05)
IMM GRANULOCYTES NFR BLD AUTO: 0.3 % (ref 0–0.5)
LDLC SERPL CALC-MCNC: 50 MG/DL (ref 0–100)
LYMPHOCYTES # BLD AUTO: 1.11 10*3/MM3 (ref 0.7–3.1)
LYMPHOCYTES NFR BLD AUTO: 15.9 % (ref 19.6–45.3)
MCH RBC QN AUTO: 31 PG (ref 26.6–33)
MCHC RBC AUTO-ENTMCNC: 32.4 G/DL (ref 31.5–35.7)
MCV RBC AUTO: 95.5 FL (ref 79–97)
MONOCYTES # BLD AUTO: 0.47 10*3/MM3 (ref 0.1–0.9)
MONOCYTES NFR BLD AUTO: 6.7 % (ref 5–12)
NEUTROPHILS # BLD AUTO: 5.15 10*3/MM3 (ref 1.7–7)
NEUTROPHILS NFR BLD AUTO: 73.8 % (ref 42.7–76)
NRBC BLD AUTO-RTO: 0 /100 WBC (ref 0–0.2)
PLATELET # BLD AUTO: 272 10*3/MM3 (ref 140–450)
POTASSIUM SERPL-SCNC: 4.4 MMOL/L (ref 3.5–5.2)
PROT SERPL-MCNC: 6.2 G/DL (ref 6–8.5)
RBC # BLD AUTO: 3.81 10*6/MM3 (ref 3.77–5.28)
SODIUM SERPL-SCNC: 139 MMOL/L (ref 136–145)
TRIGL SERPL-MCNC: 122 MG/DL (ref 0–150)
VLDLC SERPL CALC-MCNC: 24.4 MG/DL
WBC # BLD AUTO: 6.98 10*3/MM3 (ref 3.4–10.8)

## 2020-02-02 LAB
6MAM UR QL CFM: NEGATIVE
6MAM/CREAT UR: NOT DETECTED NG/MG CREAT
7AMINOCLONAZEPAM/CREAT UR: NOT DETECTED NG/MG CREAT
A-OH ALPRAZ/CREAT UR: NOT DETECTED NG/MG CREAT
A-OH-TRIAZOLAM/CREAT UR CFM: NOT DETECTED NG/MG CREAT
ALFENTANIL/CREAT UR CFM: NOT DETECTED NG/MG CREAT
ALPHA-HYDROXYMIDAZOLAM, URINE: NOT DETECTED NG/MG CREAT
ALPRAZ/CREAT UR CFM: NOT DETECTED NG/MG CREAT
AMOBARBITAL UR QL CFM: NOT DETECTED
AMPHET/CREAT UR: 100 NG/MG CREAT
AMPHETAMINES UR QL CFM: NORMAL
BARBITAL UR QL CFM: NOT DETECTED
BARBITURATES UR QL CFM: NEGATIVE
BENZODIAZ UR QL CFM: NORMAL
BUPRENORPHINE UR QL CFM: NEGATIVE
BUPRENORPHINE/CREAT UR: NOT DETECTED NG/MG CREAT
BUTABARBITAL UR QL CFM: NOT DETECTED
BUTALBITAL UR QL CFM: NOT DETECTED
BZE/CREAT UR: NOT DETECTED NG/MG CREAT
CANNABINOIDS UR QL CFM: NEGATIVE
CARBOXYTHC/CREAT UR: NOT DETECTED NG/MG CREAT
CLONAZEPAM/CREAT UR CFM: NOT DETECTED NG/MG CREAT
COCAETHYLENE/CREAT UR CFM: NOT DETECTED NG/MG CREAT
COCAINE UR QL CFM: NEGATIVE
COCAINE/CREAT UR CFM: NOT DETECTED NG/MG CREAT
CODEINE/CREAT UR: NOT DETECTED NG/MG CREAT
CREAT UR-MCNC: 298 MG/DL
DESALKYLFLURAZ/CREAT UR: NOT DETECTED NG/MG CREAT
DESMETHYLFLUNITRAZEPAM: NOT DETECTED NG/MG CREAT
DHC/CREAT UR: NOT DETECTED NG/MG CREAT
DIAZEPAM/CREAT UR: NOT DETECTED NG/MG CREAT
DRUGS UR: NORMAL
EDDP/CREAT UR: NOT DETECTED NG/MG CREAT
ETHANOL UR CFM-MCNC: NOT DETECTED G/DL
ETHANOL UR QL CFM: NEGATIVE
FENTANYL UR QL CFM: NEGATIVE
FENTANYL/CREAT UR: NOT DETECTED NG/MG CREAT
FLUNITRAZEPAM UR QL CFM: NOT DETECTED NG/MG CREAT
GABAPENTIN UR-MCNC: NEGATIVE UG/ML
HYDROCODONE/CREAT UR: NOT DETECTED NG/MG CREAT
HYDROMORPHONE/CREAT UR: NOT DETECTED NG/MG CREAT
LEVEL OF DETECTION:: NORMAL
LORAZEPAM/CREAT UR: NOT DETECTED NG/MG CREAT
MDA/CREAT UR: NOT DETECTED NG/MG CREAT
MDMA/CREAT UR: NOT DETECTED NG/MG CREAT
MEPHOBARBITAL UR QL CFM: NOT DETECTED
METHADONE UR QL CFM: NEGATIVE
METHADONE/CREAT UR: NOT DETECTED NG/MG CREAT
METHAMPHET/CREAT UR: 298 NG/MG CREAT
MIDAZOLAM/CREAT UR CFM: NOT DETECTED NG/MG CREAT
MORPHINE/CREAT UR: NOT DETECTED NG/MG CREAT
N-NORTRAMADOL/CREAT UR CFM: NOT DETECTED NG/MG CREAT
NARCOTICS UR: NEGATIVE
NORBUPRENORPHINE/CREAT UR: NOT DETECTED NG/MG CREAT
NORCODEINE/CREAT UR CFM: NOT DETECTED NG/MG CREAT
NORDIAZEPAM/CREAT UR: NOT DETECTED NG/MG CREAT
NORFENTANYL/CREAT UR: NOT DETECTED NG/MG CREAT
NORHYDROCODONE/CREAT UR: NOT DETECTED NG/MG CREAT
NORMORPHINE UR-MCNC: NOT DETECTED NG/MG CREAT
NOROXYCODONE/CREAT UR: 1014 NG/MG CREAT
NOROXYMORPHONE/CREAT UR CFM: 128 NG/MG CREAT
O-NORTRAMADOL UR CFM-MCNC: NOT DETECTED NG/MG CREAT
OPIATES UR QL CFM: NEGATIVE
OXAZEPAM/CREAT UR: 26 NG/MG CREAT
OXYCODONE UR QL CFM: NORMAL
OXYCODONE/CREAT UR: 98 NG/MG CREAT
OXYMORPHONE/CREAT UR: 335 NG/MG CREAT
PENTOBARB UR QL CFM: NOT DETECTED
PHENOBARB UR QL CFM: NOT DETECTED
SECOBARBITAL UR QL CFM: NOT DETECTED
SUFENTANIL/CREAT UR CFM: NOT DETECTED NG/MG CREAT
TAPENTADOL UR QL CFM: NEGATIVE
TAPENTADOL/CREAT UR: NOT DETECTED NG/MG CREAT
TEMAZEPAM/CREAT UR: NOT DETECTED NG/MG CREAT
THIOPENTAL UR QL CFM: NOT DETECTED
TRAMADOL UR QL CFM: NOT DETECTED NG/MG CREAT

## 2020-02-04 PROBLEM — N17.9 AKI (ACUTE KIDNEY INJURY) (HCC): Status: ACTIVE | Noted: 2017-08-31

## 2020-02-04 PROBLEM — K21.9 GERD (GASTROESOPHAGEAL REFLUX DISEASE): Status: ACTIVE | Noted: 2020-02-04

## 2020-02-04 PROBLEM — I10 HYPERTENSION: Status: ACTIVE | Noted: 2020-02-04

## 2020-02-04 PROBLEM — F41.9 ANXIETY: Status: ACTIVE | Noted: 2020-02-04

## 2020-02-04 PROBLEM — G62.9 NEUROPATHY: Status: ACTIVE | Noted: 2020-02-04

## 2020-02-04 PROBLEM — E11.9 TYPE 2 DIABETES MELLITUS (HCC): Status: ACTIVE | Noted: 2020-02-04

## 2020-02-04 PROBLEM — E11.22 TYPE 2 DM WITH CKD STAGE 3 AND HYPERTENSION: Status: ACTIVE | Noted: 2020-02-04

## 2020-02-04 PROBLEM — I12.9 TYPE 2 DM WITH CKD STAGE 3 AND HYPERTENSION (HCC): Status: ACTIVE | Noted: 2020-02-04

## 2020-02-04 PROBLEM — M19.90 OSTEOARTHRITIS: Status: ACTIVE | Noted: 2020-02-04

## 2020-02-04 PROBLEM — N17.9 ACUTE RENAL FAILURE (ARF) (HCC): Status: ACTIVE | Noted: 2018-08-06

## 2020-02-04 PROBLEM — G89.29 CHRONIC BACK PAIN: Status: ACTIVE | Noted: 2020-02-04

## 2020-02-04 PROBLEM — R34 ANURIA: Status: ACTIVE | Noted: 2018-08-06

## 2020-02-04 PROBLEM — M54.9 CHRONIC BACK PAIN: Status: ACTIVE | Noted: 2020-02-04

## 2020-02-04 PROBLEM — N18.30 TYPE 2 DM WITH CKD STAGE 3 AND HYPERTENSION: Status: ACTIVE | Noted: 2020-02-04

## 2020-02-04 PROBLEM — K55.069 ACUTE BOWEL INFARCTION (HCC): Status: ACTIVE | Noted: 2018-08-07

## 2020-02-05 ENCOUNTER — HOSPITAL ENCOUNTER (OUTPATIENT)
Dept: MAMMOGRAPHY | Facility: HOSPITAL | Age: 65
Discharge: HOME | End: 2020-02-05
Admitting: NURSE PRACTITIONER

## 2020-02-05 PROCEDURE — 77067 SCR MAMMO BI INCL CAD: CPT

## 2020-02-05 PROCEDURE — 77063 BREAST TOMOSYNTHESIS BI: CPT

## 2020-02-07 ENCOUNTER — TELEPHONE (OUTPATIENT)
Dept: FAMILY MEDICINE CLINIC | Facility: CLINIC | Age: 65
End: 2020-02-07

## 2020-02-25 ENCOUNTER — HOSPITAL ENCOUNTER (OUTPATIENT)
Dept: GENERAL RADIOLOGY | Facility: HOSPITAL | Age: 65
Discharge: HOME | End: 2020-02-25
Admitting: NURSE PRACTITIONER

## 2020-02-25 ENCOUNTER — OFFICE VISIT (OUTPATIENT)
Dept: FAMILY MEDICINE CLINIC | Facility: CLINIC | Age: 65
End: 2020-02-25

## 2020-02-25 VITALS — TEMPERATURE: 98 F | HEART RATE: 93 BPM | SYSTOLIC BLOOD PRESSURE: 158 MMHG | DIASTOLIC BLOOD PRESSURE: 83 MMHG

## 2020-02-25 DIAGNOSIS — J01.00 ACUTE NON-RECURRENT MAXILLARY SINUSITIS: ICD-10-CM

## 2020-02-25 DIAGNOSIS — J40 BRONCHITIS: ICD-10-CM

## 2020-02-25 DIAGNOSIS — R06.02 SHORTNESS OF BREATH: Primary | ICD-10-CM

## 2020-02-25 PROCEDURE — 71046 X-RAY EXAM CHEST 2 VIEWS: CPT

## 2020-02-25 PROCEDURE — 99214 OFFICE O/P EST MOD 30 MIN: CPT | Performed by: NURSE PRACTITIONER

## 2020-02-25 RX ORDER — DEXTROMETHORPHAN HYDROBROMIDE AND PROMETHAZINE HYDROCHLORIDE 15; 6.25 MG/5ML; MG/5ML
5 SYRUP ORAL 4 TIMES DAILY PRN
Qty: 120 ML | Refills: 0 | Status: SHIPPED | OUTPATIENT
Start: 2020-02-25

## 2020-02-25 RX ORDER — PREDNISONE 20 MG/1
20 TABLET ORAL DAILY
Qty: 5 TABLET | Refills: 0 | Status: SHIPPED | OUTPATIENT
Start: 2020-02-25 | End: 2020-03-01

## 2020-02-25 RX ORDER — AMOXICILLIN 500 MG/1
500 CAPSULE ORAL 2 TIMES DAILY
Qty: 20 CAPSULE | Refills: 0 | Status: SHIPPED | OUTPATIENT
Start: 2020-02-25 | End: 2020-03-06

## 2020-02-25 NOTE — PATIENT INSTRUCTIONS
Shortness of Breath, Adult  Shortness of breath means you have trouble breathing. Shortness of breath could be a sign of a medical problem.  Follow these instructions at home:    · Watch for any changes in your symptoms.  · Do not use any products that contain nicotine or tobacco, such as cigarettes, e-cigarettes, and chewing tobacco.  · Do not smoke. Smoking can cause shortness of breath. If you need help to quit smoking, ask your doctor.  · Avoid things that can make it harder to breathe, such as:  ? Mold.  ? Dust.  ? Air pollution.  ? Chemical smells.  ? Things that can cause allergy symptoms (allergens), if you have allergies.  · Keep your living space clean. Use products that help remove mold and dust.  · Rest as needed. Slowly return to your normal activities.  · Take over-the-counter and prescription medicines only as told by your doctor. This includes oxygen therapy and inhaled medicines.  · Keep all follow-up visits as told by your doctor. This is important.  Contact a doctor if:  · Your condition does not get better as soon as expected.  · You have a hard time doing your normal activities, even after you rest.  · You have new symptoms.  Get help right away if:  · Your shortness of breath gets worse.  · You have trouble breathing when you are resting.  · You feel light-headed or you pass out (faint).  · You have a cough that is not helped by medicines.  · You cough up blood.  · You have pain with breathing.  · You have pain in your chest, arms, shoulders, or belly (abdomen).  · You have a fever.  · You cannot walk up stairs.  · You cannot exercise the way you normally do.  These symptoms may represent a serious problem that is an emergency. Do not wait to see if the symptoms will go away. Get medical help right away. Call your local emergency services (911 in the [place] Do not drive yourself to the hospital.  Summary  · Shortness of breath is when you have trouble breathing enough air. It can be a sign of a  medical problem.  · Avoid things that make it hard for you to breathe, such as smoking, pollution, mold, and dust.  · Watch for any changes in your symptoms. Contact your doctor if you do not get better or you get worse.  This information is not intended to replace advice given to you by your health care provider. Make sure you discuss any questions you have with your health care provider.  Document Released: 06/05/2009 Document Revised: 05/20/2019 Document Reviewed: 05/20/2019  Elsevier Interactive Patient Education © 2020 [employer]

## 2020-02-25 NOTE — PROGRESS NOTES
Chief Complaint   Patient presents with   • URI     Complaints of sinus pressure and eye pain.   Reports she is SOB. Pt was treated for same symptoms last week.  Reports she has not gotten any better.    Reports she has had no fever.        No Known Allergies    HPI:  Lindsay Laughlin is a 64 y.o. female presents today with  Complaints of sinus pain and pressure, shortness of breath and coughing for the last week, but says she has been sick for more than a week and yesterday her shortness of breath got much worse and today she feels horrible.  She says she was left a message to call about her labs and she would like to discuss them now.    I discussed all of the following test and I also discussed her abnormal drug screen which showed methamphetamines, benzos, and oxycodone which she is not prescribed.  She states she took some of her daughters pain medication because she was hurting so bad but she says she doesn't know why she had meth, amphetamines or benzos.  I explained to her that I would no longer prescribe narcotics or gabapentin for her and I called the pharmacy and cancelled refills for gabapentin     Lab Results   Component Value Date    WBC 6.98 01/29/2020    HGB 11.8 (L) 01/29/2020    HCT 36.4 01/29/2020    MCV 95.5 01/29/2020     01/29/2020     Lab Results   Component Value Date    GLUCOSE 161 (H) 10/16/2019    BUN 22 01/29/2020    CREATININE 1.27 (H) 01/29/2020    EGFRIFNONA 42 (L) 01/29/2020    EGFRIFAFRI 51 (L) 01/29/2020    BCR 17.3 01/29/2020    K 4.4 01/29/2020    CO2 27.3 01/29/2020    CALCIUM 9.0 01/29/2020    PROTENTOTREF 6.2 01/29/2020    ALBUMIN 4.20 01/29/2020    LABIL2 2.1 01/29/2020    AST 20 01/29/2020    ALT 17 01/29/2020     Lab Results   Component Value Date    CHLPL 154 01/29/2020    TRIG 122 01/29/2020    HDL 80 (H) 01/29/2020    LDL 50 01/29/2020     Lab Results   Component Value Date    HGBA1C 6.6 01/29/2020     ToxASSURE Select 13 Discrete -   Order: [id number]   Status:   Final result   Visible to patient:  No (Not Released) Next appt:  04/29/2020 at 09:00 AM in Family Medicine (Yuki Reyes, WU, APRN) Dx:  Therapeutic drug monitoring   Specimen Information: Urine        Component  Ref Range & Units 3wk ago 1yr ago   Report Summary FINAL     Comment: ====================================================================   TOXASSURE SELECT 13 SONIA-DIS   ====================================================================   Test                             Result       Flag       Units   Drug Present     Methamphetamine                298                     ng/mg creat     Amphetamine                    100                     ng/mg creat      Sources of methamphetamine include illicit sources, as a      scheduled prescription medication, as a metabolite of some      prescription drugs, or use of an l-methamphetamine inhaler.      Amphetamine is an expected metabolite of methamphetamine.      Amphetamine is also available as a schedule II prescription drug.     Oxazepam                       26                      ng/mg creat      Oxazepam may be administered as a scheduled prescription      medication; it is also an expected metabolite of other      benzodiazepine drugs, including diazepam, chlordiazepoxide,      prazepam, clorazepate, halazepam, and temazepam.     Oxycodone                      98                      ng/mg creat     Oxymorphone                    335                     ng/mg creat     Noroxycodone                   1014                    ng/mg creat     Noroxymorphone                 128                     ng/mg creat      Sources of oxycodone are scheduled prescription medications.      Oxymorphone, noroxycodone, and noroxymorphone are expected      metabolites of oxycodone. Oxymorphone is also available as a      scheduled prescription medication.   ====================================================================   Test                      Result     Flag   Units      Ref Range     Creatinine              298              mg/dL      >=20   ====================================================================   Declared Medications:    Medication list was not provided.   ====================================================================   For clinical consultation, please call [phone].   ====================================================================    Ethanol Screen Urine (Ref) Negative     Ethanol, Urine  g/dL Not Detected     Amphetamine, Urine Qual +POSITIVE+  PositiveAbnormal     Methamphetamine, Urine  ng/mg creat 298     Amphetamine, Urine  ng/mg creat 100     MDMA URINE  ng/mg creat Not Detected     MDA  ng/mg creat Not Detected     Benzodiazepine Screen, Urine +POSITIVE+     DIAZEPAM URINE QUANT (REF)  ng/mg creat Not Detected     Desmethyldiazepam  ng/mg creat Not Detected     Oxazepam, urine  ng/mg creat 26     Temazepam, urine  ng/mg creat Not Detected     Comment: Expected metabolism of benzodiazepine class drugs:    Parent Drug       Detected Metabolites    -----------       --------------------    Diazepam:         Desmethyldiazepam, Temazepam, Oxazepam    Chlordiazepoxide: Desmethyldiazepam, Oxazepam    Clorazepate:      Desmethyldiazepam, Oxazepam    Halazepam:        Desmethyldiazepam, Oxazepam    Temazepam:        Oxazepam    Oxazepam:         None    ALPRAZOLAM  ng/mg creat Not Detected     ALPHA-HYDROXYALPRAZOLAM UR, QT (REF)  ng/mg creat Not Detected     DESALKLFLURAZEPAM UR QUANT (REF)  ng/mg creat Not Detected     LORAZEPAM URINE QUANT (REF)  ng/mg creat Not Detected     Alpha-hydroxytriazolam, Urine  ng/mg creat Not Detected     Clonazepam Urine  ng/mg creat Not Detected     7-Aminoclonazepam Urine  ng/mg creat Not Detected     MIDAZOLAM UR, QUANT (REF)  ng/mg creat Not Detected     Alpha-hydroxymidazolam, Urine  ng/mg creat Not Detected     Flunitrazepam  ng/mg creat Not Detected     DESMETHYLFLUNITRAZEPAM  ng/mg creat Not  Detected     Cocaine & Metabolite Negative     Cocaine Screen, Urine  ng/mg creat Not Detected  Negative R   Benzoylecgonine, Urine  ng/mg creat Not Detected     Cocaethylene Ur  ng/mg creat Not Detected     THC, Urine Negative     Carboxy THC, Urine  ng/mg creat Not Detected     6-ACETYLMORPHINE Negative     6-acetylmorphine  ng/mg creat Not Detected     Opiate Class Negative     Codeine, Urine  ng/mg creat Not Detected     Morphine, Urine  ng/mg creat Not Detected     normorphine  ng/mg creat Not Detected     Norcodeine Ur  ng/mg creat Not Detected     Hydrocodone UR  ng/mg creat Not Detected     Hydromorphone Urine  ng/mg creat Not Detected     Dihydrocodeine, Urine  ng/mg creat Not Detected     Opiates, Norhydrocodone, Urine  ng/mg creat Not Detected     Comment: Expected metabolism of opiate class drugs:   Parent Drug       Detected Metabolites    -----------       --------------------    Codeine:          Major:  Morphine, Norcodeine                      Minor:  Hydrocodone, Hydromorphone,                              Dihydrocodeine, Norhydrocodone,                              Normorphine    Morphine:         Major:  Normorphine                      Minor:  Hydromorphone    Hydrocodone:      Hydromorphone, Dihydrocodeine,                      Norhydrocodone    Hydromorphone:    None    Dihydrocodeine:   None    Heroin:           6-Acetylmorphine (if included),                      Morphine, Normorphine                      Codeine, in small amounts in comparison                       to morphine, is often detected when                       heroin is the source drug.    Oxycodone Class Ur +POSITIVE+     Oxycodone, Confirmation, Urine  ng/mg creat 98     Oxymorphone UR  ng/mg creat 335     Opiates, Noroxycodone, Urine  ng/mg creat 1,014     Noroxymorphone  ng/mg creat 128     Comment: Expected metabolism of oxycodone class drugs:    Parent Drug       Detected Metabolites    -----------        --------------------    Oxycodone:        Oxymorphone, Noroxycodone, Noroxymorphone    Oxymorphone:      Noroxymorphone    Methadone Negative     Methadone, Quantitative  ng/mg creat Not Detected     EDDP  ng/mg creat Not Detected     Fentanyl and Analogues, Ur Negative     Fentanyl, Urine  ng/mg creat Not Detected     Norfentanyl Urine  ng/mg creat Not Detected     Sufentanil, Ur  ng/mg creat Not Detected     Alfentanil, Ur  ng/mg creat Not Detected     BUPRENORPHINE Negative     Buprenorphine, Urine  ng/mg creat Not Detected     Norbuprenorphine  ng/mg creat Not Detected     Tapentadol Negative     Tapentadol Ur  ng/mg creat Not Detected     Opoidss other, UR Negative     Tramadol, Urine  ng/mg creat Not Detected     O-desmethyltramadol, Ur  ng/mg creat Not Detected     N-Desmethyltramadol, U  ng/mg creat Not Detected     BARBITURATES, URINE Negative     Amobarbital, Urine Not Detected     Barbital Not Detected     Butabarbital, Ur Not Detected     Butalbital Screen, Urine Not Detected     Mephobarbital Urine Not Detected     Pentobarbital UR Not Detected     Phenobarbital Not Detected     Secobarbital, urine Not Detected     Thiopental, Ur Not Detected     Creatinine, Urine  mg/dL 298     Comment: REFERENCE RANGE: Ref Range>=20   Level of Detection: Comment     Comment: TESTING THRESHOLDS ARE AS FOLLOWS:   AMPHETAMINES: 50 ng/mL   BENZODIAZEPINES: 20 ng/mL   COCAINE / METABOLITE: 50 ng/mL   ALCOHOL, ETHYL: 0.020 gm/dL   CANNABINOIDS: total-20 ng/mL, carboxy-THC-2 ng/mL   6-ACETYLMORPHINE: 10 ng/mL   OPIATE CLASS: 50 ng/mL/mL   OXYCODONE CLASS: 50 ng/mL   METHADONE: 50 ng/mL/mL   BUPRENORPHINE: buprenorphine-1.0 ng/mL,                  norbuprenorphine-5 ng/mL   FENTANYL / ANALOGUES: fentanyl-1.0 ng/mL, others-5.0 ng/mL   TAPENTADOL: 50 ng/mL   TRAMADOL: 50 ng/mL   BARBITURATES: 200 ng/mL   This test was developed and its performance characteristics   determined by [facility].  It has not been cleared or approved    by the Food and Drug Administration.    Resulting Agency [facility]      Narrative   Performed by: [facility]   Performed at:  [facility]  [address] [place] [address]  [id number]  : Juliet Montelongo, Phone:  [phone]      Specimen Collected: 20 10:00 Last Resulted: 20 10:07                Past Medical History:   Diagnosis Date   • Acid reflux    • Arthritis    • High cholesterol    • Hypertension    • Neuropathy    • Swollen lymph nodes     LEFT CERVICAL   • Type 2 diabetes mellitus (CMS/HCC)      Past Surgical History:   Procedure Laterality Date   • CARPAL TUNNEL RELEASE Bilateral    • CERVICAL LYMPH NODE BIOPSY/EXCISION Left 2017    Procedure: LEFT NECK EXPLORATION WITH INCISIONAL BIOPSY/CULTURE;  Surgeon: Blue Sanchez MD;  Location: [facility] OR;  Service:    •  SECTION     • COLON RESECTION     • EXCISION LESION      BACK    • KNEE SURGERY Left     REPLACEMENT   • TONSILLECTOMY       Social History     Socioeconomic History   • Marital status:      Spouse name: Not on file   • Number of children: Not on file   • Years of education: Not on file   • Highest education level: Not on file   Tobacco Use   • Smoking status: Never Smoker   • Smokeless tobacco: Never Used   Substance and Sexual Activity   • Alcohol use: No   • Drug use: No   • Sexual activity: Defer     Family History   Problem Relation Age of Onset   • Colon cancer Father    • Breast cancer Neg Hx        Current Outpatient Medications on File Prior to Visit   Medication Sig Dispense Refill   • atorvastatin (LIPITOR) 10 MG tablet Take 1 tablet by mouth Daily. 90 tablet 0   • clotrimazole-betamethasone (LOTRISONE) 1-0.05 % cream Apply  topically to the appropriate area as directed 2 (Two) Times a Day. 45 g 0   • diclofenac (VOLTAREN) 75 MG EC tablet Take 1 tablet by mouth 2 (Two) Times a Day. 180 tablet 0   • esomeprazole (nexIUM) 40 MG capsule Take 1  "capsule by mouth Every Morning Before Breakfast. 90 capsule 0   • gabapentin (NEURONTIN) 800 MG tablet Take 1 tablet by mouth 3 (Three) Times a Day. 90 tablet 2   • lisinopril (PRINIVIL,ZESTRIL) 10 MG tablet Take 1 tablet by mouth Daily. 90 tablet 0   • metFORMIN (GLUCOPHAGE) 1000 MG tablet Take 0.5 tablets by mouth 2 (Two) Times a Day With Meals. 180 tablet 0     No current facility-administered medications on file prior to visit.         ROS:  REVIEW OF SYMPTOMS: (Positives bolded)  General:  weight loss, fever, chills, night sweats, fatigue, appetite loss  HEENT:  blurry vision, eye pain, eye discharge, dry eyes, decreased vision, sore throat tinnitus, bloody nose, hearing loss, sinus pain/pressure, ear pain/pressure.   Respiratory: shortness of breath, cough, hemoptysis, wheezing, pleurisy,   Cardiovascular:  chest pain, PND, palpitation, edema, orthopnea, syncope, swelling of extremities  Gastro: Nausea, vomiting, diarrhea, hematemesis, abdominal pain, constipation  Neuro:  Migraine, numbness, ataxia, tremor, vertigo, weakness, memory loss, Irritability, dizziness  Allergic/immune: allergic rhinitis, hay fever, asthma, hives  \"All other systems reviewed and negative, except as listed above.”      OBJECTIVE:  Constitutional:  Alert, oriented x 3, well developed, well nourished. Consistent with stated age. Not in acute distress.  Has normal posture. Gait and station normal. .  Behavior appropriate. Patient is pleasant and cooperative with the interview and exam.    Skin: No rashes, no visible scars or suspicious moles noted.  Skin is warm to touch. Normal appropriate skin turgor.  Capillary refill is normal bilateral Upper and lower extremity.     Head/Neck: Head is normocephalic and atraumatic.  Neck without visible/palpable lumps.  No thyromegaly.    Eye: Bilaterally EOMI.  PERRLA.  Sclera/conjunctiva is normal, no discharge. Cornea is normal and clear. Lens is normal.  Eyeball normal. Upper eyelid normal.  " Lower eyelid normal.     OROPHARYNX: without redness or post nasal drip, no exudate, no irregularities, Dentition average for age. No obvious dental carries. No lesions. Tongue normal.    EARS: Bilateral auditory canal normal, without redness or cerumen impaction. Bilateral Tympanic membrane Normal, pearly gray with good cone of light and landmarks.  Hearing grossly intact to normal conversation.     NOSE: Purulent drainage, mucosa is erythematous, edematous and congested, nares patent    SINUS:  Frontal and maxillary sinus tenderness on palpation.     CHEST/LUNG: No use of accessory muscles, chest non-tender on palpation.  Has wheezes upper and lower lobes, with a dry cough in office. No respiratory distress.    CARDIOVASCULAR:  Inspection: Carotid artery bilateral normal, no bruits, pulse regular.  Palpation/Percussion Bilateral normal pulsations.  Auscultation: Regular rate and rhythm. No murmur noted in sitting, supine, standing or squatting positions.    LYMPH: Cervical Nodes-normal, size; non-tender to palpation. Axillary Nodes- normal size; non-tender to palpation.     Assessment:  Lindsay was seen today for uri.    Diagnoses and all orders for this visit:    Shortness of breath  -     XR Chest PA & Lateral    Bronchitis  -     promethazine-dextromethorphan (PROMETHAZINE-DM) 6.25-15 MG/5ML syrup; Take 5 mL by mouth 4 (Four) Times a Day As Needed for Cough.    Acute non-recurrent maxillary sinusitis  -     amoxicillin (AMOXIL) 500 MG capsule; Take 1 capsule by mouth 2 (Two) Times a Day for 10 days.  -     predniSONE (DELTASONE) 20 MG tablet; Take 1 tablet by mouth Daily for 5 days.     Pt did not want to wait for xray, so I had her leave and told her we would call her with results.  CXR: Impression:    No acute cardiopulmonary disease. This report was finalized on 02/25/2020 12:49 by Dr. Amelia To MD.    Marley called pt and told her xray of chest was negative and that I sent amoxicillin, prednisone and  promethazine to pharmacy      I spoke with the patient about the benefits and risks associated with antibiotic therapy; the benefits include treating a bacterial infection, preventing the spread of disease, and minimizing serious complications associated with bacterial diseases; the risks include antibiotic resistance, allergic reactions, oral and/ or vaginal candidia, and GI related side effects such as an upset stomach and diarrhea, as well as placing the patient at an increase risk for C-diff; verbal acknowledgement of these risk were obtained; based on the patients complaint and clinical finding, no antibiotics are required at this time.             Return in about 1 week (around 3/3/2020) for Recheck breathing.    Electronically signed by Yuki Reyes, WU, APRN, 02/25/20, 11:49 AM.

## 2020-04-17 DIAGNOSIS — B37.2 YEAST DERMATITIS: ICD-10-CM

## 2020-04-17 RX ORDER — CLOTRIMAZOLE AND BETAMETHASONE DIPROPIONATE 10; .64 MG/G; MG/G
CREAM TOPICAL 2 TIMES DAILY
Qty: 45 G | Refills: 0 | Status: SHIPPED | OUTPATIENT
Start: 2020-04-17

## 2020-04-17 NOTE — TELEPHONE ENCOUNTER
PT CALLED REQUESTING A REFILL ON clotrimazole-betamethasone (LOTRISONE) 1-0.05 % cream    PHARMACY: [facility] - [place], [place] - [address] [facility] [address] - [phone] [facility] [phone] FX  [phone]    PT'S CALLBACK NUMBER:[phone]

## 2020-05-04 DIAGNOSIS — I10 ESSENTIAL HYPERTENSION: ICD-10-CM

## 2020-05-04 DIAGNOSIS — E11.65 TYPE 2 DIABETES MELLITUS WITH HYPERGLYCEMIA, WITHOUT LONG-TERM CURRENT USE OF INSULIN (HCC): ICD-10-CM

## 2020-05-04 DIAGNOSIS — E78.2 MIXED HYPERLIPIDEMIA: ICD-10-CM

## 2020-05-04 RX ORDER — LISINOPRIL 10 MG/1
TABLET ORAL
Qty: 90 TABLET | Refills: 0 | OUTPATIENT
Start: 2020-05-04

## 2020-05-04 RX ORDER — ATORVASTATIN CALCIUM 10 MG/1
TABLET, FILM COATED ORAL
Qty: 90 TABLET | Refills: 0 | OUTPATIENT
Start: 2020-05-04

## 2020-06-10 DIAGNOSIS — I10 ESSENTIAL HYPERTENSION: ICD-10-CM

## 2020-06-10 RX ORDER — LISINOPRIL 10 MG/1
TABLET ORAL
Qty: 90 TABLET | Refills: 0 | OUTPATIENT
Start: 2020-06-10

## 2020-07-29 NOTE — TELEPHONE ENCOUNTER
She cancelled her endoscopy that was scheduled for 11/2 and she does not want to have it done so she is not rescheduling.   L arm/yes

## 2020-11-03 PROBLEM — N17.9 AKI (ACUTE KIDNEY INJURY) (HCC): Status: RESOLVED | Noted: 2017-08-31 | Resolved: 2020-11-03

## 2020-11-09 ENCOUNTER — NURSE ONLY (OUTPATIENT)
Dept: PRIMARY CARE CLINIC | Age: 65
End: 2020-11-09
Payer: MEDICARE

## 2020-11-09 PROCEDURE — G0008 ADMIN INFLUENZA VIRUS VAC: HCPCS | Performed by: NURSE PRACTITIONER

## 2020-11-09 PROCEDURE — 90694 VACC AIIV4 NO PRSRV 0.5ML IM: CPT | Performed by: NURSE PRACTITIONER

## 2020-11-09 NOTE — PROGRESS NOTES
After obtaining consent, and per orders of ALLYN Miranda, injection of flu vaccine given in Left arm by Jc Blanchard. Patient instructed to remain in clinic for 20 minutes afterwards, and to report any adverse reaction to me immediately.

## 2021-05-27 ENCOUNTER — OFFICE VISIT (OUTPATIENT)
Dept: PRIMARY CARE CLINIC | Age: 66
End: 2021-05-27
Payer: MEDICARE

## 2021-05-27 ENCOUNTER — TELEPHONE (OUTPATIENT)
Dept: PRIMARY CARE CLINIC | Age: 66
End: 2021-05-27

## 2021-05-27 ENCOUNTER — TRANSCRIBE ORDERS (OUTPATIENT)
Dept: ADMINISTRATIVE | Facility: HOSPITAL | Age: 66
End: 2021-05-27

## 2021-05-27 VITALS
WEIGHT: 215 LBS | BODY MASS INDEX: 35.82 KG/M2 | DIASTOLIC BLOOD PRESSURE: 88 MMHG | OXYGEN SATURATION: 100 % | HEART RATE: 81 BPM | RESPIRATION RATE: 16 BRPM | SYSTOLIC BLOOD PRESSURE: 128 MMHG | HEIGHT: 65 IN | TEMPERATURE: 96.8 F

## 2021-05-27 DIAGNOSIS — I10 ESSENTIAL HYPERTENSION: ICD-10-CM

## 2021-05-27 DIAGNOSIS — K21.9 GASTROESOPHAGEAL REFLUX DISEASE, UNSPECIFIED WHETHER ESOPHAGITIS PRESENT: ICD-10-CM

## 2021-05-27 DIAGNOSIS — E11.42 TYPE 2 DIABETES MELLITUS WITH DIABETIC POLYNEUROPATHY, WITHOUT LONG-TERM CURRENT USE OF INSULIN (HCC): ICD-10-CM

## 2021-05-27 DIAGNOSIS — Z12.31 OTHER SCREENING MAMMOGRAM: Primary | ICD-10-CM

## 2021-05-27 DIAGNOSIS — G62.9 NEUROPATHY: ICD-10-CM

## 2021-05-27 DIAGNOSIS — Z12.31 VISIT FOR SCREENING MAMMOGRAM: ICD-10-CM

## 2021-05-27 DIAGNOSIS — I12.9 TYPE 2 DM WITH CKD STAGE 3 AND HYPERTENSION (HCC): Primary | ICD-10-CM

## 2021-05-27 DIAGNOSIS — E11.22 TYPE 2 DM WITH CKD STAGE 3 AND HYPERTENSION (HCC): Primary | ICD-10-CM

## 2021-05-27 DIAGNOSIS — N18.30 TYPE 2 DM WITH CKD STAGE 3 AND HYPERTENSION (HCC): Primary | ICD-10-CM

## 2021-05-27 LAB
ALBUMIN SERPL-MCNC: 4.1 G/DL (ref 3.5–5.2)
ALP BLD-CCNC: 140 U/L (ref 35–104)
ALT SERPL-CCNC: 14 U/L (ref 5–33)
ANION GAP SERPL CALCULATED.3IONS-SCNC: 15 MMOL/L (ref 7–19)
AST SERPL-CCNC: 18 U/L (ref 5–32)
BILIRUB SERPL-MCNC: 0.4 MG/DL (ref 0.2–1.2)
BUN BLDV-MCNC: 28 MG/DL (ref 8–23)
CALCIUM SERPL-MCNC: 8.3 MG/DL (ref 8.8–10.2)
CHLORIDE BLD-SCNC: 107 MMOL/L (ref 98–111)
CHOLESTEROL, TOTAL: 161 MG/DL (ref 160–199)
CO2: 16 MMOL/L (ref 22–29)
CREAT SERPL-MCNC: 1.8 MG/DL (ref 0.5–0.9)
GFR AFRICAN AMERICAN: 34
GFR NON-AFRICAN AMERICAN: 28
GLUCOSE BLD-MCNC: 116 MG/DL (ref 74–109)
HBA1C MFR BLD: 7.7 % (ref 4–6)
HDLC SERPL-MCNC: 75 MG/DL (ref 65–121)
LDL CHOLESTEROL CALCULATED: 62 MG/DL
POTASSIUM SERPL-SCNC: 3.9 MMOL/L (ref 3.5–5)
REASON FOR REJECTION: NORMAL
REJECTED TEST: NORMAL
SODIUM BLD-SCNC: 138 MMOL/L (ref 136–145)
TOTAL PROTEIN: 7 G/DL (ref 6.6–8.7)
TRIGL SERPL-MCNC: 121 MG/DL (ref 0–149)

## 2021-05-27 PROCEDURE — 2022F DILAT RTA XM EVC RTNOPTHY: CPT | Performed by: FAMILY MEDICINE

## 2021-05-27 PROCEDURE — G8417 CALC BMI ABV UP PARAM F/U: HCPCS | Performed by: FAMILY MEDICINE

## 2021-05-27 PROCEDURE — 1036F TOBACCO NON-USER: CPT | Performed by: FAMILY MEDICINE

## 2021-05-27 PROCEDURE — 1123F ACP DISCUSS/DSCN MKR DOCD: CPT | Performed by: FAMILY MEDICINE

## 2021-05-27 PROCEDURE — G8400 PT W/DXA NO RESULTS DOC: HCPCS | Performed by: FAMILY MEDICINE

## 2021-05-27 PROCEDURE — 3017F COLORECTAL CA SCREEN DOC REV: CPT | Performed by: FAMILY MEDICINE

## 2021-05-27 PROCEDURE — 99214 OFFICE O/P EST MOD 30 MIN: CPT | Performed by: FAMILY MEDICINE

## 2021-05-27 PROCEDURE — 3046F HEMOGLOBIN A1C LEVEL >9.0%: CPT | Performed by: FAMILY MEDICINE

## 2021-05-27 PROCEDURE — 36415 COLL VENOUS BLD VENIPUNCTURE: CPT | Performed by: FAMILY MEDICINE

## 2021-05-27 PROCEDURE — 4040F PNEUMOC VAC/ADMIN/RCVD: CPT | Performed by: FAMILY MEDICINE

## 2021-05-27 PROCEDURE — G8427 DOCREV CUR MEDS BY ELIG CLIN: HCPCS | Performed by: FAMILY MEDICINE

## 2021-05-27 PROCEDURE — 1090F PRES/ABSN URINE INCON ASSESS: CPT | Performed by: FAMILY MEDICINE

## 2021-05-27 RX ORDER — DICLOFENAC SODIUM 75 MG/1
75 TABLET, DELAYED RELEASE ORAL 2 TIMES DAILY
COMMUNITY
End: 2021-05-27

## 2021-05-27 RX ORDER — LISINOPRIL 20 MG/1
20 TABLET ORAL DAILY
Qty: 90 TABLET | Refills: 2 | Status: SHIPPED | OUTPATIENT
Start: 2021-05-27 | End: 2021-10-05

## 2021-05-27 RX ORDER — ATORVASTATIN CALCIUM 10 MG/1
10 TABLET, FILM COATED ORAL DAILY
Qty: 30 TABLET | Refills: 5 | Status: SHIPPED | OUTPATIENT
Start: 2021-05-27 | End: 2021-05-27 | Stop reason: SDUPTHER

## 2021-05-27 RX ORDER — CELECOXIB 200 MG/1
200 CAPSULE ORAL 2 TIMES DAILY
Qty: 180 CAPSULE | Refills: 2 | Status: SHIPPED | OUTPATIENT
Start: 2021-05-27

## 2021-05-27 RX ORDER — CLOTRIMAZOLE 1 %
CREAM (GRAM) TOPICAL 2 TIMES DAILY
COMMUNITY

## 2021-05-27 RX ORDER — PANTOPRAZOLE SODIUM 20 MG/1
20 TABLET, DELAYED RELEASE ORAL DAILY
COMMUNITY
End: 2021-05-27 | Stop reason: SDUPTHER

## 2021-05-27 RX ORDER — PANTOPRAZOLE SODIUM 40 MG/1
40 TABLET, DELAYED RELEASE ORAL DAILY
Qty: 90 TABLET | Refills: 3 | Status: SHIPPED | OUTPATIENT
Start: 2021-05-27 | End: 2021-10-05

## 2021-05-27 RX ORDER — LISINOPRIL 20 MG/1
20 TABLET ORAL DAILY
Qty: 30 TABLET | Refills: 5 | Status: SHIPPED | OUTPATIENT
Start: 2021-05-27 | End: 2021-05-27 | Stop reason: SDUPTHER

## 2021-05-27 RX ORDER — CELECOXIB 200 MG/1
200 CAPSULE ORAL 2 TIMES DAILY
Qty: 60 CAPSULE | Refills: 3 | Status: SHIPPED | OUTPATIENT
Start: 2021-05-27 | End: 2021-05-27 | Stop reason: SDUPTHER

## 2021-05-27 RX ORDER — ATORVASTATIN CALCIUM 10 MG/1
10 TABLET, FILM COATED ORAL DAILY
Qty: 90 TABLET | Refills: 3 | Status: SHIPPED | OUTPATIENT
Start: 2021-05-27 | End: 2021-10-05

## 2021-05-27 RX ORDER — PANTOPRAZOLE SODIUM 40 MG/1
40 TABLET, DELAYED RELEASE ORAL DAILY
Qty: 30 TABLET | Refills: 5 | Status: SHIPPED | OUTPATIENT
Start: 2021-05-27 | End: 2021-05-27 | Stop reason: SDUPTHER

## 2021-05-27 ASSESSMENT — ENCOUNTER SYMPTOMS
COLOR CHANGE: 0
COUGH: 0
DIARRHEA: 0
VOMITING: 0
NAUSEA: 0
ABDOMINAL PAIN: 0
EYE DISCHARGE: 0
BACK PAIN: 0
WHEEZING: 0

## 2021-05-27 ASSESSMENT — PATIENT HEALTH QUESTIONNAIRE - PHQ9
SUM OF ALL RESPONSES TO PHQ9 QUESTIONS 1 & 2: 2
1. LITTLE INTEREST OR PLEASURE IN DOING THINGS: 1
SUM OF ALL RESPONSES TO PHQ QUESTIONS 1-9: 2

## 2021-05-27 NOTE — TELEPHONE ENCOUNTER
This patient cried the whole stick. I got the other bloodwork, and I had 2 of the lavenders, but they are saying they are insufficient.

## 2021-05-27 NOTE — PROGRESS NOTES
SUBJECTIVE:    Patient ID: Cristal Covarrubias is a 76 y.o. female. HPI:     Diabetic - last a1c was 6 months ago and it was good. Occasionally will have a low blood sugars. No n/v. No sores or ulcerations. Due for eye exam.     GERD - on protonix wanting to go up on the dose to help with her symptoms. She does have neuropathy associated with her diabetes. She states that she has been on gabapentin previously but she knows that this is controlled and knows that she cannot get it prescribed here. She states that she would like to change her anti-inflammatory because she does not feel like the diclofenac is helping as much as it was previously. She states that she would like to try changing that. She states that she does have a lot of discomfort in her legs and feet. She states that her toes do curl. She states that she does have some toenail deformity and would like a referral to podiatry to help with foot care. She does have a history of hypertension. She is currently on lisinopril 20 mg and is tolerating this well. She denies any chest pains or palpitations. She does not monitor her blood pressure at home. She does need a new meter and test strips. She states that this needs to be sent to a home medical pharmacy. She states that there is one in [place] across from [place] that she thinks will cover under her insurance to get it filled there. Past Medical History:   Diagnosis Date    Chronic back pain     GERD (gastroesophageal reflux disease)     Hypertension     Neuropathy     Osteoarthritis     Palliative care encounter 08/06/2018    Type II or unspecified type diabetes mellitus without mention of complication, not stated as uncontrolled      Current Outpatient Medications on File Prior to Visit   Medication Sig Dispense Refill    clotrimazole (LOTRIMIN) 1 % cream Apply topically 2 times daily Apply topically 2 times daily.        No current facility-administered medications on file prior to visit. No Known Allergies  Past Surgical History:   Procedure Laterality Date     SECTION      JOINT REPLACEMENT Left     ltk    [place] EXCISION TUMOR SOFT TISSUE BACK/FLANK SUBQ <3CM N/A 2016    EXCISION LIPOMA OF THE UPPER BACK  performed by Charlotte Gabriel MD at [facility] EXPLORATORY OF ABDOMEN N/A 2018    LAPAROTOMY EXPLORATORY performed by Yonathan Butler MD at [facility] OF ABDOMEN N/A 8/10/2018    LAPAROTOMY EXPLORATORY, ABDOMINAL CLOSURE, WOUND VAC PLACEMENT performed by Yonathan Butler MD at [place] [address].       Family History   Problem Relation Age of Onset    Arthritis Mother     Diabetes Mother     High Blood Pressure Mother     High Cholesterol Mother     Cancer Father      Social History     Socioeconomic History    Marital status:      Spouse name: Not on file    Number of children: Not on file    Years of education: Not on file    Highest education level: Not on file   Occupational History    Not on file   Tobacco Use    Smoking status: Never Smoker    Smokeless tobacco: Never Used   Substance and Sexual Activity    Alcohol use: No    Drug use: No    Sexual activity: Not on file   Other Topics Concern    Not on file   Social History Narrative    Not on file     Social Determinants of Health     Financial Resource Strain:     Difficulty of Paying Living Expenses:    Food Insecurity:     Worried About Running Out of Food in the Last Year:     [address] in the Last Year:    Transportation Needs:     Lack of Transportation (Medical):      Lack of Transportation (Non-Medical):    Physical Activity:     Days of Exercise per Week:     Minutes of Exercise per Session:    Stress:     Feeling of Stress :    Social Connections:     Frequency of Communication with Friends and Family:     Frequency of Social Gatherings with Friends and Family:     Attends Hoah[employer]ism Services:     Active Member of Clubs or Organizations:     Attends Club or Organization Meetings:     Marital Status:    Intimate Partner Violence:     Fear of Current or Ex-Partner:     Emotionally Abused:     Physically Abused:     Sexually Abused:        Review of Systems   Constitutional: Negative for activity change, appetite change and fever. HENT: Negative for congestion and nosebleeds. Eyes: Negative for discharge. Respiratory: Negative for cough and wheezing. Cardiovascular: Negative for chest pain and leg swelling. Gastrointestinal: Negative for abdominal pain, diarrhea, nausea and vomiting. Genitourinary: Negative for difficulty urinating, frequency and urgency. Musculoskeletal: Negative for back pain and gait problem. Skin: Negative for color change and rash. Neurological: Negative for dizziness and headaches. Hematological: Does not bruise/bleed easily. Psychiatric/Behavioral: Negative for sleep disturbance and suicidal ideas. OBJECTIVE:    Physical Exam  Constitutional:       Appearance: She is well-developed. HENT:      Head: Normocephalic and atraumatic. Neck:      Thyroid: No thyroid mass or thyromegaly. Vascular: No carotid bruit. Cardiovascular:      Rate and Rhythm: Normal rate and regular rhythm. Pulses: Normal pulses. Heart sounds: Normal heart sounds. No murmur heard. No friction rub. No gallop. Pulmonary:      Effort: Pulmonary effort is normal. No respiratory distress. Breath sounds: Normal breath sounds. Abdominal:      General: Bowel sounds are normal.      Palpations: Abdomen is soft. Tenderness: There is no abdominal tenderness. Musculoskeletal:      Cervical back: Normal range of motion and neck supple. Lymphadenopathy:      Cervical: No cervical adenopathy. Skin:     General: Skin is warm and dry. Findings: No rash. Neurological:      General: No focal deficit present.       Mental Status: She is alert and oriented to person, place, and time. Mental status is at baseline. Psychiatric:         Mood and Affect: Mood normal.         Behavior: Behavior normal.         Thought Content: Thought content normal.         Judgment: Judgment normal.        /88 (Site: Left Upper Arm, Position: Sitting, Cuff Size: Large Adult)   Pulse 81   Temp 96.8 °F (36 °C) (Temporal)   Resp 16   Ht 5' 5\" (1.651 m)   Wt 215 lb (97.5 kg)   SpO2 100%   BMI 35.78 kg/m²      ASSESSMENT:    Aniya Rodriguez was seen today for new patient, discuss medications, knee pain and abdominal pain. Diagnoses and all orders for this visit:    Type 2 DM with CKD stage 3 and hypertension ([facility] [address] [address]  -     Hemoglobin A1C  -     CBC Auto Differential  -     Comprehensive Metabolic Panel  -     Lipid Panel  -     External Referral To Podiatry    Visit for screening mammogram  -     [facility] DIGITAL SCREEN W OR WO CAD BILATERAL; Future  -     Hemoglobin A1C  -     CBC Auto Differential  -     Comprehensive Metabolic Panel  -     Lipid Panel    Neuropathy  -     Hemoglobin A1C  -     CBC Auto Differential  -     Comprehensive Metabolic Panel  -     Lipid Panel  -     External Referral To Podiatry    Type 2 diabetes mellitus with diabetic polyneuropathy, without long-term current use of insulin (HCC)  -     External Referral To Podiatry    Essential hypertension    Gastroesophageal reflux disease, unspecified whether esophagitis present    Other orders  -     Discontinue: pantoprazole (PROTONIX) 40 MG tablet; Take 1 tablet by mouth daily  -     Discontinue: atorvastatin (LIPITOR) 10 MG tablet; Take 1 tablet by mouth daily  -     Discontinue: lisinopril (PRINIVIL;ZESTRIL) 20 MG tablet; Take 1 tablet by mouth daily  -     Discontinue: celecoxib (CELEBREX) 200 MG capsule; Take 1 capsule by mouth 2 times daily  -     Discontinue: metFORMIN (GLUCOPHAGE) 500 MG tablet; Take 1 tablet by mouth 2 times daily (with meals)  -     pantoprazole (PROTONIX) 40 MG tablet;  Take 1 tablet by mouth daily  - atorvastatin (LIPITOR) 10 MG tablet; Take 1 tablet by mouth daily  -     lisinopril (PRINIVIL;ZESTRIL) 20 MG tablet; Take 1 tablet by mouth daily  -     celecoxib (CELEBREX) 200 MG capsule; Take 1 capsule by mouth 2 times daily  -     metFORMIN (GLUCOPHAGE) 500 MG tablet; Take 1 tablet by mouth 2 times daily (with meals)        PLAN:    I have refilled her medications today. We have sent them over for 90-day supply. We are going to write her a prescription for diabetes meter and test strips. We have put a referral in for podiatry for evaluation of her toenails and for foot management. Follow-up with us in 8 weeks for Medicare annual wellness and checkup as well as a diabetic foot exam.  She is wanting to get a diabetic shoe prescription but she wants to hold off on doing it at this time. She is refusing to do a colonoscopy or Cologuard. She is okay with us getting a mammogram scheduled. We have drawn fasting labs on her today and will notify her of the results. EMR Dragon/transcription disclaimer:  Much of this encounter note is electronictranscription/translation of spoken language to printed texts. The electronic translation of spoken language may be erroneous, or at times, nonsensical words or phrases may be inadvertently transcribed.   Although I havereviewed the note for such errors, some may still exist.

## 2021-05-27 NOTE — TELEPHONE ENCOUNTER
Mikie Armstrong from [facility] laboratory states that blood drawn on pt for CBC and HA1C is an insufficient amount for the tests to be run.

## 2021-06-04 ENCOUNTER — APPOINTMENT (OUTPATIENT)
Dept: MAMMOGRAPHY | Facility: HOSPITAL | Age: 66
End: 2021-06-04

## 2021-06-09 ENCOUNTER — APPOINTMENT (OUTPATIENT)
Dept: MAMMOGRAPHY | Facility: HOSPITAL | Age: 66
End: 2021-06-09

## 2021-06-18 ENCOUNTER — TELEPHONE (OUTPATIENT)
Dept: PRIMARY CARE CLINIC | Age: 66
End: 2021-06-18

## 2021-06-18 NOTE — TELEPHONE ENCOUNTER
Pt states that the Guadlupe Renato she is taking is causing her urine to feel hot. She states it doesn't burn when she urinates but it does feel hot as it comes out.

## 2021-06-21 NOTE — TELEPHONE ENCOUNTER
The medication will make her pee out her excess sugar so it may make her urine feel different. Make sure she is drinking plenty of water to keep her system flushed out to help with this.

## 2021-08-26 ENCOUNTER — OFFICE VISIT (OUTPATIENT)
Dept: PRIMARY CARE CLINIC | Age: 66
End: 2021-08-26
Payer: MEDICARE

## 2021-08-26 VITALS
TEMPERATURE: 97.9 F | WEIGHT: 232 LBS | HEIGHT: 65 IN | BODY MASS INDEX: 38.65 KG/M2 | HEART RATE: 94 BPM | DIASTOLIC BLOOD PRESSURE: 84 MMHG | SYSTOLIC BLOOD PRESSURE: 136 MMHG | OXYGEN SATURATION: 97 %

## 2021-08-26 DIAGNOSIS — E11.22 TYPE 2 DM WITH CKD STAGE 3 AND HYPERTENSION (HCC): ICD-10-CM

## 2021-08-26 DIAGNOSIS — I12.9 TYPE 2 DM WITH CKD STAGE 3 AND HYPERTENSION (HCC): ICD-10-CM

## 2021-08-26 DIAGNOSIS — E11.42 TYPE 2 DIABETES MELLITUS WITH DIABETIC POLYNEUROPATHY, WITHOUT LONG-TERM CURRENT USE OF INSULIN (HCC): ICD-10-CM

## 2021-08-26 DIAGNOSIS — Z00.00 ROUTINE GENERAL MEDICAL EXAMINATION AT A HEALTH CARE FACILITY: ICD-10-CM

## 2021-08-26 DIAGNOSIS — I10 ESSENTIAL HYPERTENSION: ICD-10-CM

## 2021-08-26 DIAGNOSIS — G62.9 NEUROPATHY: ICD-10-CM

## 2021-08-26 DIAGNOSIS — L84 FOOT CALLUS: Primary | ICD-10-CM

## 2021-08-26 DIAGNOSIS — N18.30 TYPE 2 DM WITH CKD STAGE 3 AND HYPERTENSION (HCC): ICD-10-CM

## 2021-08-26 PROCEDURE — 99214 OFFICE O/P EST MOD 30 MIN: CPT | Performed by: FAMILY MEDICINE

## 2021-08-26 PROCEDURE — 2022F DILAT RTA XM EVC RTNOPTHY: CPT | Performed by: FAMILY MEDICINE

## 2021-08-26 PROCEDURE — G8400 PT W/DXA NO RESULTS DOC: HCPCS | Performed by: FAMILY MEDICINE

## 2021-08-26 PROCEDURE — 1123F ACP DISCUSS/DSCN MKR DOCD: CPT | Performed by: FAMILY MEDICINE

## 2021-08-26 PROCEDURE — 1036F TOBACCO NON-USER: CPT | Performed by: FAMILY MEDICINE

## 2021-08-26 PROCEDURE — 4040F PNEUMOC VAC/ADMIN/RCVD: CPT | Performed by: FAMILY MEDICINE

## 2021-08-26 PROCEDURE — 3017F COLORECTAL CA SCREEN DOC REV: CPT | Performed by: FAMILY MEDICINE

## 2021-08-26 PROCEDURE — 3051F HG A1C>EQUAL 7.0%<8.0%: CPT | Performed by: FAMILY MEDICINE

## 2021-08-26 PROCEDURE — G8427 DOCREV CUR MEDS BY ELIG CLIN: HCPCS | Performed by: FAMILY MEDICINE

## 2021-08-26 PROCEDURE — G8417 CALC BMI ABV UP PARAM F/U: HCPCS | Performed by: FAMILY MEDICINE

## 2021-08-26 PROCEDURE — G0439 PPPS, SUBSEQ VISIT: HCPCS | Performed by: FAMILY MEDICINE

## 2021-08-26 PROCEDURE — 1090F PRES/ABSN URINE INCON ASSESS: CPT | Performed by: FAMILY MEDICINE

## 2021-08-26 ASSESSMENT — PATIENT HEALTH QUESTIONNAIRE - PHQ9
SUM OF ALL RESPONSES TO PHQ QUESTIONS 1-9: 0
SUM OF ALL RESPONSES TO PHQ QUESTIONS 1-9: 0
SUM OF ALL RESPONSES TO PHQ9 QUESTIONS 1 & 2: 0
2. FEELING DOWN, DEPRESSED OR HOPELESS: 0
1. LITTLE INTEREST OR PLEASURE IN DOING THINGS: 0
SUM OF ALL RESPONSES TO PHQ QUESTIONS 1-9: 0

## 2021-08-26 ASSESSMENT — LIFESTYLE VARIABLES: HOW OFTEN DO YOU HAVE A DRINK CONTAINING ALCOHOL: 0

## 2021-08-27 ENCOUNTER — TELEPHONE (OUTPATIENT)
Dept: PRIMARY CARE CLINIC | Age: 66
End: 2021-08-27

## 2021-09-02 ASSESSMENT — ENCOUNTER SYMPTOMS
DIARRHEA: 0
NAUSEA: 0
WHEEZING: 0
VOMITING: 0
EYE DISCHARGE: 0
ABDOMINAL PAIN: 0
COLOR CHANGE: 0
BACK PAIN: 0
COUGH: 0

## 2021-09-02 NOTE — PROGRESS NOTES
SUBJECTIVE:    Patient ID: Inderjit Davis is a 76 y.o. female. HPI:   Patient is seen today for Medicare annual wellness but also has several chronic problems that we follow. She is a type II diabetic. She states that she does start struggle with neuropathy in her feet. She is currently on Celebrex but is not on anything for the neuropathy specifically. She states that she is also taking her [place]. She states that she is tolerating her medication well denies any side effects the medication. She states that she has not had any low blood sugars. She states she does check her sugar occasionally. She does have a history of hyperlipidemia. She is currently on Lipitor. She is tolerating this well denies any change in the dosage of this recently. She also has a history of hypertension. She is on lisinopril 20 mg. She is tolerating this well denies any change in the dosage of this. She states that she is not having any chest pain or palpitations. Past Medical History:   Diagnosis Date    Chronic back pain     GERD (gastroesophageal reflux disease)     Hypertension     Neuropathy     Osteoarthritis     Palliative care encounter 08/06/2018    Type II or unspecified type diabetes mellitus without mention of complication, not stated as uncontrolled       Current Outpatient Medications on File Prior to Visit   Medication Sig Dispense Refill    clotrimazole (LOTRIMIN) 1 % cream Apply topically 2 times daily Apply topically 2 times daily.       pantoprazole (PROTONIX) 40 MG tablet Take 1 tablet by mouth daily 90 tablet 3    atorvastatin (LIPITOR) 10 MG tablet Take 1 tablet by mouth daily 90 tablet 3    lisinopril (PRINIVIL;ZESTRIL) 20 MG tablet Take 1 tablet by mouth daily 90 tablet 2    celecoxib (CELEBREX) 200 MG capsule Take 1 capsule by mouth 2 times daily 180 capsule 2    dapagliflozin (FARXIGA) 10 MG tablet Take 1 tablet by mouth every morning 30 tablet 5     No current facility-administered medications on file prior to visit. No Known Allergies    Review of Systems   Constitutional: Negative for activity change, appetite change and fever. HENT: Negative for congestion and nosebleeds. Eyes: Negative for discharge. Respiratory: Negative for cough and wheezing. Cardiovascular: Negative for chest pain and leg swelling. Gastrointestinal: Negative for abdominal pain, diarrhea, nausea and vomiting. Genitourinary: Negative for difficulty urinating, frequency and urgency. Musculoskeletal: Negative for back pain and gait problem. Skin: Negative for color change and rash. Neurological: Negative for dizziness and headaches. Hematological: Does not bruise/bleed easily. Psychiatric/Behavioral: Negative for sleep disturbance and suicidal ideas. OBJECTIVE:    Physical Exam  Vitals reviewed. Constitutional:       General: She is not in acute distress. Appearance: Normal appearance. She is well-developed. She is not diaphoretic. HENT:      Head: Normocephalic and atraumatic. Right Ear: External ear normal.      Left Ear: External ear normal.   Cardiovascular:      Rate and Rhythm: Normal rate and regular rhythm. Pulses: Normal pulses. Heart sounds: Normal heart sounds. No murmur heard. Pulmonary:      Effort: Pulmonary effort is normal. No respiratory distress. Breath sounds: Normal breath sounds. Abdominal:      General: Abdomen is flat. Bowel sounds are normal.      Palpations: Abdomen is soft. Tenderness: There is no abdominal tenderness. Musculoskeletal:      Cervical back: Normal range of motion and neck supple. Skin:     General: Skin is warm and dry. Neurological:      General: No focal deficit present. Mental Status: She is alert and oriented to person, place, and time. Mental status is at baseline.    Psychiatric:         Mood and Affect: Mood normal.         Behavior: Behavior normal.         Thought 15     Glucose 05/27/2021 116*    BUN 05/27/2021 28*    CREATININE 05/27/2021 1.8*    GFR Non- 05/27/2021 28*    GFR  05/27/2021 34*    Calcium 05/27/2021 8.3*    Total Protein 05/27/2021 7.0     Albumin 05/27/2021 4.1     Total Bilirubin 05/27/2021 0.4     Alkaline Phosphatase 05/27/2021 140*    ALT 05/27/2021 14     AST 05/27/2021 18     Cholesterol, Total 05/27/2021 161     Triglycerides 05/27/2021 121     HDL 05/27/2021 75     LDL Calculated 05/27/2021 62     Rejected Test 05/27/2021 [id number]     Reason for Rejection 05/27/2021 see below          EMR Dragon/transcription disclaimer:  Much of this encounter note is electronic transcription/translation of spoken language toprinted texts. The electronic translation of spoken language may be erroneous, or at times, nonsensical words or phrases may be inadvertently transcribed.   Although I have reviewed the note for such errors, some may stillexist.

## 2021-09-02 NOTE — PATIENT INSTRUCTIONS
Personalized Preventive Plan for Vicki Simpson - 8/26/2021  Medicare offers a range of preventive health benefits. Some of the tests and screenings are paid in full while other may be subject to a deductible, co-insurance, and/or copay. Some of these benefits include a comprehensive review of your medical history including lifestyle, illnesses that may run in your family, and various assessments and screenings as appropriate. After reviewing your medical record and screening and assessments performed today your provider may have ordered immunizations, labs, imaging, and/or referrals for you. A list of these orders (if applicable) as well as your Preventive Care list are included within your After Visit Summary for your review. Other Preventive Recommendations:    · A preventive eye exam performed by an eye specialist is recommended every 1-2 years to screen for glaucoma; cataracts, macular degeneration, and other eye disorders. · A preventive dental visit is recommended every 6 months. · Try to get at least 150 minutes of exercise per week or 10,000 steps per day on a pedometer . · Order or download the FREE \"Exercise & Physical Activity: Your Everyday Guide\" from [employer]. Call 1-723.902.9468 or search [employer] online. · You need 5193-2437 mg of calcium and 2921-6093 IU of vitamin D per day. It is possible to meet your calcium requirement with diet alone, but a vitamin D supplement is usually necessary to meet this goal.  · When exposed to the sun, use a sunscreen that protects against both UVA and UVB radiation with an SPF of 30 or greater. Reapply every 2 to 3 hours or after sweating, drying off with a towel, or swimming. · Always wear a seat belt when traveling in a car. Always wear a helmet when riding a bicycle or motorcycle.

## 2021-09-02 NOTE — PROGRESS NOTES
Medicare Annual Wellness Visit  Name: Kita Lockwood Date: 2021   MRN: [id number] Sex: Female   Age: 76 y.o. Ethnicity: Non- / Non    : 1955 Race: White (non-)      Bruce Whalen is here for Medicare AWV (She is here for her medicare annual wellness exam. Also she wants another referral for podiatry)    Screenings for behavioral, psychosocial and functional/safety risks, and cognitive dysfunction are all negative except as indicated below. These results, as well as other patient data from the [address] form, are documented in Flowsheets linked to this Encounter. No Known Allergies      Prior to Visit Medications    Medication Sig Taking? Authorizing Provider   clotrimazole (LOTRIMIN) 1 % cream Apply topically 2 times daily Apply topically 2 times daily.  Yes Historical Provider, MD   pantoprazole (PROTONIX) 40 MG tablet Take 1 tablet by mouth daily Yes Guero Jose MD   atorvastatin (LIPITOR) 10 MG tablet Take 1 tablet by mouth daily Yes Guero Jose MD   lisinopril (PRINIVIL;ZESTRIL) 20 MG tablet Take 1 tablet by mouth daily Yes Guero Jose MD   celecoxib (CELEBREX) 200 MG capsule Take 1 capsule by mouth 2 times daily Yes Guero Jose MD   dapagliflozin (FARXIGA) 10 MG tablet Take 1 tablet by mouth every morning Yes Guero Jose MD         Past Medical History:   Diagnosis Date    Chronic back pain     GERD (gastroesophageal reflux disease)     Hypertension     Neuropathy     Osteoarthritis     Palliative care encounter 2018    Type II or unspecified type diabetes mellitus without mention of complication, not stated as uncontrolled        Past Surgical History:   Procedure Laterality Date     SECTION      JOINT REPLACEMENT Left     ltk    [place] EXCISION TUMOR SOFT TISSUE BACK/FLANK SUBQ <3CM N/A 2016    EXCISION LIPOMA OF THE UPPER BACK  performed by Arianne Ontiveros MD at [facility] EXPLORATORY OF ABDOMEN N/A 8/7/2018    LAPAROTOMY EXPLORATORY performed by Molina Wagner MD at [facility] 178 OF ABDOMEN N/A 8/10/2018    LAPAROTOMY EXPLORATORY, ABDOMINAL CLOSURE, WOUND VAC PLACEMENT performed by Molina Wagner MD at [facility] .           Family History   Problem Relation Age of Onset    Arthritis Mother     Diabetes Mother     High Blood Pressure Mother     High Cholesterol Mother     Cancer Father        CareTeam (Including outside providers/suppliers regularly involved in providing care):   Patient Care Team:  Iqra Morse MD as PCP - General (Family Medicine)  Iqra Morse MD as PCP - [facility] Empaneled Provider  SAGE Cerrato (Physician Assistant Medical)    Wt Readings from Last 3 Encounters:   08/26/21 232 lb (105.2 kg)   05/27/21 215 lb (97.5 kg)   10/26/20 219 lb 9.6 oz (99.6 kg)     Vitals:    08/26/21 1537   BP: 136/84   Site: Left Upper Arm   Position: Sitting   Cuff Size: Large Adult   Pulse: 94   Temp: 97.9 °F (36.6 °C)   SpO2: 97%   Weight: 232 lb (105.2 kg)   Height: 5' 5\" (1.651 m)     Body mass index is 38.61 kg/m². Based upon direct observation of the patient, evaluation of cognition reveals recent and remote memory intact. Patient's complete Health Risk Assessment and screening values have been reviewed and are found in Flowsheets. The following problems were reviewed today and where indicated follow up appointments were made and/or referrals ordered. Positive Risk Factor Screenings with Interventions:            General Health and ACP:  General  In general, how would you say your health is?: Good  In the past 7 days, have you experienced any of the following?  New or Increased Pain, New or Increased Fatigue, Loneliness, Social Isolation, Stress or Anger?: (!) Stress  Do you get the social and emotional support that you need?: (!) No  Do you have a Living Will?: Yes  Advance Directives     Power of  Living Will ACP-Advance Directive ACP-Power of     Not on File Not on File Not on File Not on File      General Health Risk Interventions:  · Social isolation: patient declines any further intervention for this issue  · Stress: patient declines any further evaluation/treatment for this issue    Health Habits/Nutrition:  Health Habits/Nutrition  Do you exercise for at least 20 minutes 2-3 times per week?: (!) No  Have you lost any weight without trying in the past 3 months?: No  Do you eat only one meal per day?: No  Have you seen the dentist within the past year?: N/A - wear dentures  Body mass index: (!) 38.6  Health Habits/Nutrition Interventions:  · Inadequate physical activity:  educational materials provided to promote increased physical activity  · Nutritional issues:  educational materials for healthy, well-balanced diet provided    Hearing/Vision:  No exam data present  Hearing/Vision  Do you or your family notice any trouble with your hearing that hasn't been managed with hearing aids?: No  Do you have difficulty driving, watching TV, or doing any of your daily activities because of your eyesight?: (!) Yes  Have you had an eye exam within the past year?: (!) No  Hearing/Vision Interventions:  · Vision concerns:  patient encouraged to make appointment with his/her eye specialist    Safety:  Safety  Do you have working smoke detectors?: (!) No  Have all throw rugs been removed or fastened?: Yes  Do you have non-slip mats or surfaces in all bathtubs/showers?: (!) No  Do all of your stairways have a railing or banister?: Yes  Are your doorways, halls and stairs free of clutter?: Yes  Do you always fasten your seatbelt when you are in a car?: Yes  Safety Interventions:  · Home safety tips provided     Personalized Preventive Plan   Current Health Maintenance Status  Immunization History   Administered Date(s) Administered    COVID-19, Moderna, PF, 100mcg/0.5mL 03/25/2021, 04/22/2021    Influenza, Quadv, adjuvanted, 65 yrs +, IM, PF (Fluad) 11/09/2020        Health Maintenance   Topic Date Due    Diabetic foot exam  Never done    Diabetic retinal exam  Never done    Colon cancer screen colonoscopy  Never done    Annual Wellness Visit (AWV)  Never done    Flu vaccine (1) 09/01/2021    DEXA (modify frequency per FRAX score)  05/27/2022 (Originally 11/7/2009)    DTaP/Tdap/Td vaccine (1 - Tdap) 05/27/2022 (Originally 11/7/1973)    Shingles Vaccine (1 of 2) 05/27/2022 (Originally 11/7/2004)    Pneumococcal 65+ years Vaccine (1 of 1 - PPSV23) 08/23/2022 (Originally 11/7/2019)    Breast cancer screen  02/05/2022    A1C test (Diabetic or Prediabetic)  05/27/2022    Lipid screen  05/27/2022    Potassium monitoring  05/27/2022    Creatinine monitoring  05/27/2022    COVID-19 Vaccine  Completed    Hepatitis C screen  Completed    Hepatitis A vaccine  Aged Out    Hib vaccine  Aged Out    Meningococcal (ACWY) vaccine  Aged Out     Recommendations for Archy Due: see orders and patient instructions/AVS.  . Recommended screening schedule for the next 5-10 years is provided to the patient in written form: see Patient Instructions/AVS.    Lunadionicio Hammans was seen today for medicare awv.     Diagnoses and all orders for this visit:    Foot callus    Neuropathy    Type 2 DM with CKD stage 3 and hypertension ([address]    Type 2 diabetes mellitus with diabetic polyneuropathy, without long-term current use of insulin ([address]    Routine general medical examination at a health care facility

## 2021-09-15 ENCOUNTER — HOSPITAL ENCOUNTER (OUTPATIENT)
Dept: MAMMOGRAPHY | Facility: HOSPITAL | Age: 66
Discharge: HOME | End: 2021-09-15
Admitting: FAMILY MEDICINE

## 2021-09-15 DIAGNOSIS — Z12.31 VISIT FOR SCREENING MAMMOGRAM: ICD-10-CM

## 2021-09-15 DIAGNOSIS — Z12.31 OTHER SCREENING MAMMOGRAM: ICD-10-CM

## 2021-09-15 PROCEDURE — 77067 SCR MAMMO BI INCL CAD: CPT

## 2021-09-15 PROCEDURE — 77063 BREAST TOMOSYNTHESIS BI: CPT

## 2021-09-22 ENCOUNTER — APPOINTMENT (OUTPATIENT)
Dept: MAMMOGRAPHY | Facility: HOSPITAL | Age: 66
End: 2021-09-22

## 2021-10-05 RX ORDER — DAPAGLIFLOZIN 10 MG/1
TABLET, FILM COATED ORAL
Qty: 60 TABLET | Refills: 5 | Status: SHIPPED | OUTPATIENT
Start: 2021-10-05

## 2021-10-05 RX ORDER — ATORVASTATIN CALCIUM 10 MG/1
TABLET, FILM COATED ORAL
Qty: 60 TABLET | Refills: 5 | Status: SHIPPED | OUTPATIENT
Start: 2021-10-05

## 2021-10-05 RX ORDER — LISINOPRIL 20 MG/1
TABLET ORAL
Qty: 60 TABLET | Refills: 5 | Status: SHIPPED | OUTPATIENT
Start: 2021-10-05

## 2021-10-05 RX ORDER — PANTOPRAZOLE SODIUM 40 MG/1
TABLET, DELAYED RELEASE ORAL
Qty: 60 TABLET | Refills: 5 | Status: SHIPPED | OUTPATIENT
Start: 2021-10-05

## 2022-06-23 ENCOUNTER — NURSE ONLY (OUTPATIENT)
Dept: PRIMARY CARE CLINIC | Age: 67
End: 2022-06-23

## 2022-06-23 DIAGNOSIS — I12.9 TYPE 2 DM WITH CKD STAGE 3 AND HYPERTENSION (HCC): Primary | ICD-10-CM

## 2022-06-23 DIAGNOSIS — N18.30 TYPE 2 DM WITH CKD STAGE 3 AND HYPERTENSION (HCC): Primary | ICD-10-CM

## 2022-06-23 DIAGNOSIS — E11.42 TYPE 2 DIABETES MELLITUS WITH DIABETIC POLYNEUROPATHY, WITHOUT LONG-TERM CURRENT USE OF INSULIN (HCC): ICD-10-CM

## 2022-06-23 DIAGNOSIS — E11.22 TYPE 2 DM WITH CKD STAGE 3 AND HYPERTENSION (HCC): Primary | ICD-10-CM

## 2022-06-24 LAB
ALBUMIN SERPL-MCNC: 3.9 G/DL (ref 3.5–5.2)
ALP BLD-CCNC: 144 U/L (ref 35–104)
ALT SERPL-CCNC: 15 U/L (ref 5–33)
ANION GAP SERPL CALCULATED.3IONS-SCNC: 12 MMOL/L (ref 7–19)
AST SERPL-CCNC: 15 U/L (ref 5–32)
BASOPHILS ABSOLUTE: 0.1 K/UL (ref 0–0.2)
BASOPHILS RELATIVE PERCENT: 0.6 % (ref 0–1)
BILIRUB SERPL-MCNC: 0.4 MG/DL (ref 0.2–1.2)
BUN BLDV-MCNC: 18 MG/DL (ref 8–23)
CALCIUM SERPL-MCNC: 8.5 MG/DL (ref 8.8–10.2)
CHLORIDE BLD-SCNC: 105 MMOL/L (ref 98–111)
CHOLESTEROL, TOTAL: 133 MG/DL (ref 160–199)
CO2: 19 MMOL/L (ref 22–29)
CREAT SERPL-MCNC: 1.4 MG/DL (ref 0.5–0.9)
EOSINOPHILS ABSOLUTE: 0.1 K/UL (ref 0–0.6)
EOSINOPHILS RELATIVE PERCENT: 1.8 % (ref 0–5)
GFR AFRICAN AMERICAN: 45
GFR NON-AFRICAN AMERICAN: 37
GLUCOSE BLD-MCNC: 299 MG/DL (ref 74–109)
HBA1C MFR BLD: 10.1 % (ref 4–6)
HCT VFR BLD CALC: 42.9 % (ref 37–47)
HDLC SERPL-MCNC: 70 MG/DL (ref 65–121)
HEMOGLOBIN: 13.1 G/DL (ref 12–16)
IMMATURE GRANULOCYTES #: 0.1 K/UL
LDL CHOLESTEROL CALCULATED: 41 MG/DL
LYMPHOCYTES ABSOLUTE: 1.2 K/UL (ref 1.1–4.5)
LYMPHOCYTES RELATIVE PERCENT: 14.7 % (ref 20–40)
MCH RBC QN AUTO: 29.6 PG (ref 27–31)
MCHC RBC AUTO-ENTMCNC: 30.5 G/DL (ref 33–37)
MCV RBC AUTO: 96.8 FL (ref 81–99)
MONOCYTES ABSOLUTE: 0.4 K/UL (ref 0–0.9)
MONOCYTES RELATIVE PERCENT: 5.1 % (ref 0–10)
NEUTROPHILS ABSOLUTE: 6 K/UL (ref 1.5–7.5)
NEUTROPHILS RELATIVE PERCENT: 76.8 % (ref 50–65)
PDW BLD-RTO: 14.2 % (ref 11.5–14.5)
PLATELET # BLD: 239 K/UL (ref 130–400)
PMV BLD AUTO: 11.7 FL (ref 9.4–12.3)
POTASSIUM SERPL-SCNC: 4.5 MMOL/L (ref 3.5–5)
RBC # BLD: 4.43 M/UL (ref 4.2–5.4)
SODIUM BLD-SCNC: 136 MMOL/L (ref 136–145)
TOTAL PROTEIN: 6.6 G/DL (ref 6.6–8.7)
TRIGL SERPL-MCNC: 112 MG/DL (ref 0–149)
WBC # BLD: 7.8 K/UL (ref 4.8–10.8)

## 2022-11-08 RX ORDER — ATORVASTATIN CALCIUM 10 MG/1
TABLET, FILM COATED ORAL
Qty: 60 TABLET | Refills: 5 | OUTPATIENT
Start: 2022-11-08

## 2022-11-23 RX ORDER — LISINOPRIL 20 MG/1
TABLET ORAL
Qty: 60 TABLET | Refills: 5 | OUTPATIENT
Start: 2022-11-23

## 2022-11-23 RX ORDER — PANTOPRAZOLE SODIUM 40 MG/1
TABLET, DELAYED RELEASE ORAL
Qty: 60 TABLET | Refills: 5 | OUTPATIENT
Start: 2022-11-23

## 2022-11-23 RX ORDER — DAPAGLIFLOZIN 10 MG/1
TABLET, FILM COATED ORAL
Qty: 60 TABLET | Refills: 5 | OUTPATIENT
Start: 2022-11-23

## 2022-11-23 RX ORDER — ATORVASTATIN CALCIUM 10 MG/1
TABLET, FILM COATED ORAL
Qty: 60 TABLET | Refills: 5 | OUTPATIENT
Start: 2022-11-23

## 2022-11-28 RX ORDER — CELECOXIB 200 MG/1
CAPSULE ORAL
Qty: 180 CAPSULE | Refills: 2 | OUTPATIENT
Start: 2022-11-28

## 2022-12-29 ENCOUNTER — TELEPHONE (OUTPATIENT)
Dept: PRIMARY CARE CLINIC | Age: 67
End: 2022-12-29

## 2022-12-29 NOTE — TELEPHONE ENCOUNTER
Received a call from the patient's insurance company pharmacy. They were checking to see if the patient was currently taking Lisinopril. I let her know that the patient's refill requests had been denied because she needs an appointment. Attempted to call the patient, left a voicemail for her to call back and get a follow up appointment.

## 2023-02-22 ENCOUNTER — HOSPITAL ENCOUNTER (EMERGENCY)
Facility: HOSPITAL | Age: 68
Discharge: HOME | End: 2023-02-22
Admitting: STUDENT IN AN ORGANIZED HEALTH CARE EDUCATION/TRAINING PROGRAM
Payer: MEDICARE

## 2023-02-22 ENCOUNTER — APPOINTMENT (OUTPATIENT)
Dept: CT IMAGING | Facility: HOSPITAL | Age: 68
End: 2023-02-22
Payer: MEDICARE

## 2023-02-22 VITALS
TEMPERATURE: 97.2 F | SYSTOLIC BLOOD PRESSURE: 172 MMHG | HEIGHT: 65 IN | OXYGEN SATURATION: 100 % | BODY MASS INDEX: 37.65 KG/M2 | RESPIRATION RATE: 18 BRPM | DIASTOLIC BLOOD PRESSURE: 88 MMHG | HEART RATE: 84 BPM | WEIGHT: 226 LBS

## 2023-02-22 DIAGNOSIS — R11.0 NAUSEA: ICD-10-CM

## 2023-02-22 DIAGNOSIS — R10.84 GENERALIZED ABDOMINAL PAIN: Primary | ICD-10-CM

## 2023-02-22 DIAGNOSIS — R19.7 DIARRHEA, UNSPECIFIED TYPE: ICD-10-CM

## 2023-02-22 LAB
ALBUMIN SERPL-MCNC: 3.9 G/DL (ref 3.5–5.2)
ALBUMIN/GLOB SERPL: 1.6 G/DL
ALP SERPL-CCNC: 117 U/L (ref 39–117)
ALT SERPL W P-5'-P-CCNC: 11 U/L (ref 1–33)
ANION GAP SERPL CALCULATED.3IONS-SCNC: 10 MMOL/L (ref 5–15)
APTT PPP: 28.6 SECONDS (ref 24.1–35)
AST SERPL-CCNC: 14 U/L (ref 1–32)
BACTERIA UR QL AUTO: ABNORMAL /HPF
BASOPHILS # BLD AUTO: 0.05 10*3/MM3 (ref 0–0.2)
BASOPHILS NFR BLD AUTO: 0.6 % (ref 0–1.5)
BILIRUB SERPL-MCNC: 0.4 MG/DL (ref 0–1.2)
BILIRUB UR QL STRIP: NEGATIVE
BUN SERPL-MCNC: 12 MG/DL (ref 8–23)
BUN/CREAT SERPL: 8.6 (ref 7–25)
CALCIUM SPEC-SCNC: 8.3 MG/DL (ref 8.6–10.5)
CHLORIDE SERPL-SCNC: 108 MMOL/L (ref 98–107)
CLARITY UR: CLEAR
CO2 SERPL-SCNC: 23 MMOL/L (ref 22–29)
COLOR UR: YELLOW
CREAT SERPL-MCNC: 1.39 MG/DL (ref 0.57–1)
D-LACTATE SERPL-SCNC: 1 MMOL/L (ref 0.5–2)
DEPRECATED RDW RBC AUTO: 48.4 FL (ref 37–54)
DEVELOPER EXPIRATION DATE: NORMAL
DEVELOPER LOT NUMBER: 225
EGFRCR SERPLBLD CKD-EPI 2021: 41.4 ML/MIN/1.73
EOSINOPHIL # BLD AUTO: 0.17 10*3/MM3 (ref 0–0.4)
EOSINOPHIL NFR BLD AUTO: 2.1 % (ref 0.3–6.2)
ERYTHROCYTE [DISTWIDTH] IN BLOOD BY AUTOMATED COUNT: 13.6 % (ref 12.3–15.4)
EXPIRATION DATE: NORMAL
FECAL OCCULT BLOOD SCREEN, POC: NEGATIVE
GLOBULIN UR ELPH-MCNC: 2.4 GM/DL
GLUCOSE SERPL-MCNC: 245 MG/DL (ref 65–99)
GLUCOSE UR STRIP-MCNC: ABNORMAL MG/DL
HCT VFR BLD AUTO: 38.2 % (ref 34–46.6)
HGB BLD-MCNC: 11.7 G/DL (ref 12–15.9)
HGB UR QL STRIP.AUTO: NEGATIVE
HOLD SPECIMEN: NORMAL
HYALINE CASTS UR QL AUTO: ABNORMAL /LPF
IMM GRANULOCYTES # BLD AUTO: 0.02 10*3/MM3 (ref 0–0.05)
IMM GRANULOCYTES NFR BLD AUTO: 0.2 % (ref 0–0.5)
INR PPP: 1.04 (ref 0.91–1.09)
KETONES UR QL STRIP: NEGATIVE
LEUKOCYTE ESTERASE UR QL STRIP.AUTO: NEGATIVE
LIPASE SERPL-CCNC: 22 U/L (ref 13–60)
LYMPHOCYTES # BLD AUTO: 1.05 10*3/MM3 (ref 0.7–3.1)
LYMPHOCYTES NFR BLD AUTO: 12.9 % (ref 19.6–45.3)
Lab: 225
MCH RBC QN AUTO: 29.5 PG (ref 26.6–33)
MCHC RBC AUTO-ENTMCNC: 30.6 G/DL (ref 31.5–35.7)
MCV RBC AUTO: 96.2 FL (ref 79–97)
MONOCYTES # BLD AUTO: 0.55 10*3/MM3 (ref 0.1–0.9)
MONOCYTES NFR BLD AUTO: 6.7 % (ref 5–12)
NEGATIVE CONTROL: NEGATIVE
NEUTROPHILS NFR BLD AUTO: 6.33 10*3/MM3 (ref 1.7–7)
NEUTROPHILS NFR BLD AUTO: 77.5 % (ref 42.7–76)
NITRITE UR QL STRIP: NEGATIVE
NRBC BLD AUTO-RTO: 0 /100 WBC (ref 0–0.2)
PH UR STRIP.AUTO: 6 [PH] (ref 5–8)
PLATELET # BLD AUTO: 253 10*3/MM3 (ref 140–450)
PMV BLD AUTO: 10.2 FL (ref 6–12)
POSITIVE CONTROL: POSITIVE
POTASSIUM SERPL-SCNC: 3.5 MMOL/L (ref 3.5–5.2)
PROT SERPL-MCNC: 6.3 G/DL (ref 6–8.5)
PROT UR QL STRIP: ABNORMAL
PROTHROMBIN TIME: 13.7 SECONDS (ref 11.8–14.8)
RBC # BLD AUTO: 3.97 10*6/MM3 (ref 3.77–5.28)
RBC # UR STRIP: ABNORMAL /HPF
REF LAB TEST METHOD: ABNORMAL
SODIUM SERPL-SCNC: 141 MMOL/L (ref 136–145)
SP GR UR STRIP: >1.03 (ref 1–1.03)
SQUAMOUS #/AREA URNS HPF: ABNORMAL /HPF
UROBILINOGEN UR QL STRIP: ABNORMAL
WBC # UR STRIP: ABNORMAL /HPF
WBC NRBC COR # BLD: 8.17 10*3/MM3 (ref 3.4–10.8)
WHOLE BLOOD HOLD COAG: NORMAL
WHOLE BLOOD HOLD SPECIMEN: NORMAL

## 2023-02-22 PROCEDURE — 25010000002 ONDANSETRON PER 1 MG: Performed by: NURSE PRACTITIONER

## 2023-02-22 PROCEDURE — 82270 OCCULT BLOOD FECES: CPT | Performed by: NURSE PRACTITIONER

## 2023-02-22 PROCEDURE — 25010000002 IOPAMIDOL 61 % SOLUTION: Performed by: NURSE PRACTITIONER

## 2023-02-22 PROCEDURE — 87150 DNA/RNA AMPLIFIED PROBE: CPT | Performed by: NURSE PRACTITIONER

## 2023-02-22 PROCEDURE — 81001 URINALYSIS AUTO W/SCOPE: CPT | Performed by: NURSE PRACTITIONER

## 2023-02-22 PROCEDURE — 99283 EMERGENCY DEPT VISIT LOW MDM: CPT

## 2023-02-22 PROCEDURE — 83690 ASSAY OF LIPASE: CPT | Performed by: NURSE PRACTITIONER

## 2023-02-22 PROCEDURE — 87040 BLOOD CULTURE FOR BACTERIA: CPT | Performed by: NURSE PRACTITIONER

## 2023-02-22 PROCEDURE — 96374 THER/PROPH/DIAG INJ IV PUSH: CPT

## 2023-02-22 PROCEDURE — 83605 ASSAY OF LACTIC ACID: CPT | Performed by: NURSE PRACTITIONER

## 2023-02-22 PROCEDURE — 87147 CULTURE TYPE IMMUNOLOGIC: CPT | Performed by: NURSE PRACTITIONER

## 2023-02-22 PROCEDURE — 80053 COMPREHEN METABOLIC PANEL: CPT | Performed by: NURSE PRACTITIONER

## 2023-02-22 PROCEDURE — 85025 COMPLETE CBC W/AUTO DIFF WBC: CPT | Performed by: NURSE PRACTITIONER

## 2023-02-22 PROCEDURE — 96375 TX/PRO/DX INJ NEW DRUG ADDON: CPT

## 2023-02-22 PROCEDURE — 74177 CT ABD & PELVIS W/CONTRAST: CPT

## 2023-02-22 PROCEDURE — 36415 COLL VENOUS BLD VENIPUNCTURE: CPT

## 2023-02-22 PROCEDURE — 25010000002 MORPHINE PER 10 MG: Performed by: NURSE PRACTITIONER

## 2023-02-22 PROCEDURE — 87076 CULTURE ANAEROBE IDENT EACH: CPT | Performed by: NURSE PRACTITIONER

## 2023-02-22 PROCEDURE — 85610 PROTHROMBIN TIME: CPT | Performed by: NURSE PRACTITIONER

## 2023-02-22 PROCEDURE — 85730 THROMBOPLASTIN TIME PARTIAL: CPT | Performed by: NURSE PRACTITIONER

## 2023-02-22 RX ORDER — ONDANSETRON 4 MG/1
4 TABLET, FILM COATED ORAL EVERY 8 HOURS PRN
Qty: 9 TABLET | Refills: 0 | Status: SHIPPED | OUTPATIENT
Start: 2023-02-22 | End: 2023-02-25

## 2023-02-22 RX ORDER — ONDANSETRON 2 MG/ML
4 INJECTION INTRAMUSCULAR; INTRAVENOUS ONCE
Status: COMPLETED | OUTPATIENT
Start: 2023-02-22 | End: 2023-02-22

## 2023-02-22 RX ORDER — SODIUM CHLORIDE 0.9 % (FLUSH) 0.9 %
10 SYRINGE (ML) INJECTION AS NEEDED
Status: DISCONTINUED | OUTPATIENT
Start: 2023-02-22 | End: 2023-02-22 | Stop reason: HOSPADM

## 2023-02-22 RX ADMIN — ONDANSETRON 4 MG: 2 INJECTION INTRAMUSCULAR; INTRAVENOUS at 17:39

## 2023-02-22 RX ADMIN — MORPHINE SULFATE 4 MG: 4 INJECTION, SOLUTION INTRAMUSCULAR; INTRAVENOUS at 17:40

## 2023-02-22 RX ADMIN — SODIUM CHLORIDE 1000 ML: 9 INJECTION, SOLUTION INTRAVENOUS at 17:39

## 2023-02-22 RX ADMIN — IOPAMIDOL 100 ML: 612 INJECTION, SOLUTION INTRAVENOUS at 18:20

## 2023-02-23 LAB — BACTERIA BLD CULT: ABNORMAL

## 2023-02-23 NOTE — ED PROVIDER NOTES
Subjective   History of Present Illness  Lindsay Laughlin is a 67-year-old female with past medical history type 2 diabetes, hypertension, GERD who presents to ED today for abdominal pain.  Patient states she has had increasingly worse abdominal pain for the past 3 days with more diarrhea that is green and black in color.  Patient notes that she had a partial colectomy in the past and has been told she needs her gallbladder out but has not done so.  Patient also notes some nausea and states she has had a decreased appetite for the past 2 days.  States she last urinated 2 hours ago.  Patient denies fevers, chest pain, headache, vision changes, weakness, and paresthesias.    History provided by:  Patient and spouse   used: No        Review of Systems   Constitutional: Positive for appetite change. Negative for activity change, chills and fever.   Eyes: Negative for visual disturbance.   Respiratory: Negative for cough, chest tightness and shortness of breath.    Cardiovascular: Negative for chest pain and palpitations.   Gastrointestinal: Positive for abdominal pain, blood in stool (dark stools), diarrhea and nausea. Negative for abdominal distention and vomiting.   Genitourinary: Negative for difficulty urinating and dysuria.   Musculoskeletal: Negative for arthralgias, back pain and neck pain.   Skin: Negative for color change, rash and wound.   Neurological: Negative for dizziness, weakness, numbness and headaches.   Psychiatric/Behavioral: Negative for confusion.       Past Medical History:   Diagnosis Date   • Acid reflux    • Arthritis    • High cholesterol    • Hypertension    • Neuropathy    • Swollen lymph nodes     LEFT CERVICAL   • Type 2 diabetes mellitus (HCC)        No Known Allergies    Past Surgical History:   Procedure Laterality Date   • CARPAL TUNNEL RELEASE Bilateral    • CERVICAL LYMPH NODE BIOPSY/EXCISION Left 8/4/2017    Procedure: LEFT NECK EXPLORATION WITH INCISIONAL  BIOPSY/CULTURE;  Surgeon: Blue Sanchez MD;  Location: [facility] OR;  Service:    •  SECTION     • COLON RESECTION     • EXCISION LESION      BACK    • KNEE SURGERY Left     REPLACEMENT   • TONSILLECTOMY         Family History   Problem Relation Age of Onset   • Colon cancer Father    • Breast cancer Neg Hx        Social History     Socioeconomic History   • Marital status:    Tobacco Use   • Smoking status: Never   • Smokeless tobacco: Never   Substance and Sexual Activity   • Alcohol use: No   • Drug use: No   • Sexual activity: Defer           Objective   Physical Exam  Vitals and nursing note reviewed.   Constitutional:       General: She is not in acute distress.     Appearance: Normal appearance. She is well-developed. She is not ill-appearing or toxic-appearing.   HENT:      Head: Normocephalic and atraumatic.      Mouth/Throat:      Mouth: Mucous membranes are moist.   Eyes:      Conjunctiva/sclera: Conjunctivae normal.   Cardiovascular:      Rate and Rhythm: Normal rate and regular rhythm.      Pulses: Normal pulses.      Heart sounds: Normal heart sounds.   Pulmonary:      Effort: Pulmonary effort is normal. No respiratory distress.      Breath sounds: Normal breath sounds.   Abdominal:      General: Bowel sounds are normal. There is no distension.      Palpations: Abdomen is soft. There is no mass.      Tenderness: There is abdominal tenderness (generalized TTP).   Musculoskeletal:         General: Normal range of motion.      Cervical back: Normal range of motion and neck supple.   Skin:     General: Skin is warm and dry.      Capillary Refill: Capillary refill takes less than 2 seconds.   Neurological:      General: No focal deficit present.      Mental Status: She is alert and oriented to person, place, and time.   Psychiatric:         Mood and Affect: Mood normal.         Behavior: Behavior normal.         Procedures           ED Course                                            Medical Decision Making  In summary, patient presents to ED today for a 3-day history of abdominal pain, diarrhea, and changes in stool color.  On arrival, patient is ambulatory, afebrile, and normotensive.  Differential diagnoses include, but are not limited to, small bowel obstruction, diverticulitis, GI bleed, and colitis.  Evaluation included UA, point-of-care occult blood stool, PT/PTT, blood culture x2, CMP, lactic, lipase, CBC, 4 mg Zofran IV, 4 mg morphine IV.  Physical exam revealed abdomen had generalized tenderness to palpation with bowel sounds present and abdomen soft.  Labs reviewed and significant for mild proteinuria mildly elevated creatinine that is consistent with patient's baseline.  CT showed no acute diverticulitis.  Discussed these results with patient, who stated that she felt much better after medications and was ready to go home.  Patient prescribed Zofran as needed and advised follow-up with primary care provider.  Patient is agreeable with this plan.    Diarrhea, unspecified type: acute illness or injury  Generalized abdominal pain: acute illness or injury  Nausea: acute illness or injury  Amount and/or Complexity of Data Reviewed  Labs: ordered. Decision-making details documented in ED Course.  Radiology: ordered. Decision-making details documented in ED Course.      Risk  Prescription drug management.          Final diagnoses:   Generalized abdominal pain   Nausea   Diarrhea, unspecified type       ED Disposition  ED Disposition     ED Disposition   Discharge    Condition   Stable    Comment   --             Ruth Noble MD  [address] [place] [address] [address] [place] [address] [phone]    Schedule an appointment as soon as possible for a visit in 1 week           Medication List      New Prescriptions    ondansetron 4 MG tablet  Commonly known as: ZOFRAN  Take 1 tablet by mouth Every 8 (Eight) Hours As Needed for Nausea or Vomiting for up to 3 days.           Where to Get Your  Medications      These medications were sent to [facility] - [place], [place] - [address] - [phone]  - [phone]   [address] [place] [address], [facility] [address]    Phone: [phone]   · ondansetron 4 MG tablet          Brianna Callaway, APRN  02/23/23 7548

## 2023-02-23 NOTE — DISCHARGE INSTRUCTIONS
Take zofran as needed for nausea. Start with clear liquid diet and slowly advance as tolerated. Follow up with primary doctor next week for recheck.  Return to ED with any further concerns, symptoms, or as needed.

## 2023-02-26 LAB
BACTERIA SPEC AEROBE CULT: ABNORMAL
BACTERIA SPEC AEROBE CULT: ABNORMAL
GRAM STN SPEC: ABNORMAL
ISOLATED FROM: ABNORMAL
ISOLATED FROM: ABNORMAL

## 2023-03-09 ENCOUNTER — OFFICE VISIT (OUTPATIENT)
Dept: PRIMARY CARE CLINIC | Age: 68
End: 2023-03-09
Payer: MEDICAID

## 2023-03-09 VITALS
WEIGHT: 235 LBS | HEART RATE: 95 BPM | TEMPERATURE: 96.2 F | HEIGHT: 65 IN | RESPIRATION RATE: 18 BRPM | OXYGEN SATURATION: 99 % | DIASTOLIC BLOOD PRESSURE: 80 MMHG | SYSTOLIC BLOOD PRESSURE: 130 MMHG | BODY MASS INDEX: 39.15 KG/M2

## 2023-03-09 DIAGNOSIS — I12.9 TYPE 2 DM WITH CKD STAGE 3 AND HYPERTENSION (HCC): ICD-10-CM

## 2023-03-09 DIAGNOSIS — E11.22 TYPE 2 DM WITH CKD STAGE 3 AND HYPERTENSION (HCC): ICD-10-CM

## 2023-03-09 DIAGNOSIS — N18.30 TYPE 2 DM WITH CKD STAGE 3 AND HYPERTENSION (HCC): ICD-10-CM

## 2023-03-09 DIAGNOSIS — Z13.1 SCREENING FOR DIABETES MELLITUS: ICD-10-CM

## 2023-03-09 DIAGNOSIS — Z13.220 ENCOUNTER FOR SCREENING FOR LIPID DISORDER: ICD-10-CM

## 2023-03-09 DIAGNOSIS — J06.9 VIRAL URI WITH COUGH: ICD-10-CM

## 2023-03-09 DIAGNOSIS — I10 ESSENTIAL HYPERTENSION: ICD-10-CM

## 2023-03-09 DIAGNOSIS — R10.11 RIGHT UPPER QUADRANT PAIN: Primary | ICD-10-CM

## 2023-03-09 LAB
ALBUMIN SERPL-MCNC: 3.6 G/DL (ref 3.5–5.2)
ALP BLD-CCNC: 103 U/L (ref 35–104)
ALT SERPL-CCNC: 11 U/L (ref 5–33)
ANION GAP SERPL CALCULATED.3IONS-SCNC: 15 MMOL/L (ref 7–19)
AST SERPL-CCNC: 12 U/L (ref 5–32)
BASOPHILS ABSOLUTE: 0 K/UL (ref 0–0.2)
BASOPHILS RELATIVE PERCENT: 0.7 % (ref 0–1)
BILIRUB SERPL-MCNC: 0.3 MG/DL (ref 0.2–1.2)
BUN BLDV-MCNC: 36 MG/DL (ref 8–23)
CALCIUM SERPL-MCNC: 8.4 MG/DL (ref 8.8–10.2)
CHLORIDE BLD-SCNC: 106 MMOL/L (ref 98–111)
CHOLESTEROL, TOTAL: 124 MG/DL (ref 160–199)
CO2: 17 MMOL/L (ref 22–29)
CREAT SERPL-MCNC: 2.6 MG/DL (ref 0.5–0.9)
EOSINOPHILS ABSOLUTE: 0.2 K/UL (ref 0–0.6)
EOSINOPHILS RELATIVE PERCENT: 3 % (ref 0–5)
GFR SERPL CREATININE-BSD FRML MDRD: 19 ML/MIN/{1.73_M2}
GLUCOSE BLD-MCNC: 205 MG/DL (ref 74–109)
HBA1C MFR BLD: 11 % (ref 4–6)
HCT VFR BLD CALC: 37 % (ref 37–47)
HDLC SERPL-MCNC: 72 MG/DL (ref 65–121)
HEMOGLOBIN: 11.5 G/DL (ref 12–16)
IMMATURE GRANULOCYTES #: 0 K/UL
LDL CHOLESTEROL CALCULATED: 30 MG/DL
LIPASE: 174 U/L (ref 13–60)
LYMPHOCYTES ABSOLUTE: 0.7 K/UL (ref 1.1–4.5)
LYMPHOCYTES RELATIVE PERCENT: 12.9 % (ref 20–40)
MCH RBC QN AUTO: 30.1 PG (ref 27–31)
MCHC RBC AUTO-ENTMCNC: 31.1 G/DL (ref 33–37)
MCV RBC AUTO: 96.9 FL (ref 81–99)
MONOCYTES ABSOLUTE: 0.5 K/UL (ref 0–0.9)
MONOCYTES RELATIVE PERCENT: 8.6 % (ref 0–10)
NEUTROPHILS ABSOLUTE: 4.2 K/UL (ref 1.5–7.5)
NEUTROPHILS RELATIVE PERCENT: 74.3 % (ref 50–65)
PDW BLD-RTO: 13.6 % (ref 11.5–14.5)
PLATELET # BLD: 248 K/UL (ref 130–400)
PMV BLD AUTO: 10.7 FL (ref 9.4–12.3)
POTASSIUM SERPL-SCNC: 4.7 MMOL/L (ref 3.5–5)
RBC # BLD: 3.82 M/UL (ref 4.2–5.4)
SODIUM BLD-SCNC: 138 MMOL/L (ref 136–145)
TOTAL PROTEIN: 6.1 G/DL (ref 6.6–8.7)
TRIGL SERPL-MCNC: 108 MG/DL (ref 0–149)
WBC # BLD: 5.7 K/UL (ref 4.8–10.8)

## 2023-03-09 PROCEDURE — 1123F ACP DISCUSS/DSCN MKR DOCD: CPT | Performed by: NURSE PRACTITIONER

## 2023-03-09 PROCEDURE — 3075F SYST BP GE 130 - 139MM HG: CPT | Performed by: NURSE PRACTITIONER

## 2023-03-09 PROCEDURE — 99214 OFFICE O/P EST MOD 30 MIN: CPT | Performed by: NURSE PRACTITIONER

## 2023-03-09 PROCEDURE — 3079F DIAST BP 80-89 MM HG: CPT | Performed by: NURSE PRACTITIONER

## 2023-03-09 RX ORDER — ATORVASTATIN CALCIUM 10 MG/1
TABLET, FILM COATED ORAL
Qty: 60 TABLET | Refills: 5 | Status: SHIPPED | OUTPATIENT
Start: 2023-03-09

## 2023-03-09 RX ORDER — CLOTRIMAZOLE 1 %
CREAM (GRAM) TOPICAL 2 TIMES DAILY
Qty: 60 G | Refills: 1 | Status: SHIPPED | OUTPATIENT
Start: 2023-03-09

## 2023-03-09 RX ORDER — CELECOXIB 200 MG/1
CAPSULE ORAL
Qty: 180 CAPSULE | Refills: 2 | Status: SHIPPED | OUTPATIENT
Start: 2023-03-09

## 2023-03-09 RX ORDER — ONDANSETRON 4 MG/1
TABLET, FILM COATED ORAL
Qty: 9 TABLET | Refills: 0 | Status: SHIPPED | OUTPATIENT
Start: 2023-03-09

## 2023-03-09 RX ORDER — PANTOPRAZOLE SODIUM 40 MG/1
TABLET, DELAYED RELEASE ORAL
Qty: 60 TABLET | Refills: 5 | Status: SHIPPED | OUTPATIENT
Start: 2023-03-09

## 2023-03-09 RX ORDER — FUROSEMIDE 20 MG/1
20 TABLET ORAL DAILY
Qty: 30 TABLET | Refills: 0 | Status: SHIPPED | OUTPATIENT
Start: 2023-03-09

## 2023-03-09 RX ORDER — LISINOPRIL 20 MG/1
TABLET ORAL
Qty: 60 TABLET | Refills: 5 | Status: SHIPPED | OUTPATIENT
Start: 2023-03-09

## 2023-03-09 RX ORDER — DAPAGLIFLOZIN 10 MG/1
TABLET, FILM COATED ORAL
Qty: 60 TABLET | Refills: 5 | Status: SHIPPED | OUTPATIENT
Start: 2023-03-09

## 2023-03-09 ASSESSMENT — PATIENT HEALTH QUESTIONNAIRE - PHQ9
SUM OF ALL RESPONSES TO PHQ QUESTIONS 1-9: 0
SUM OF ALL RESPONSES TO PHQ QUESTIONS 1-9: 0
2. FEELING DOWN, DEPRESSED OR HOPELESS: 0
SUM OF ALL RESPONSES TO PHQ QUESTIONS 1-9: 0
SUM OF ALL RESPONSES TO PHQ9 QUESTIONS 1 & 2: 0
1. LITTLE INTEREST OR PLEASURE IN DOING THINGS: 0
SUM OF ALL RESPONSES TO PHQ QUESTIONS 1-9: 0

## 2023-03-09 ASSESSMENT — ENCOUNTER SYMPTOMS
ABDOMINAL PAIN: 1
EYES NEGATIVE: 1
SORE THROAT: 1
NAUSEA: 1
RESPIRATORY NEGATIVE: 1

## 2023-03-09 NOTE — PROGRESS NOTES
[facility]  [facility]  [address]  [address] [address]  Dept: [phone]  Dept Fax: [phone]  Loc: [phone]    Becca Altamirano is a 75 y.o. female who presents today for her medical conditions/complaints as noted below. Becca Altamirano is c/o of Pharyngitis (Patient presents today with c/o sore throat, and lower extremity swelling. She states that the swelling comes and goes. Patient went to ER on 2/22/23./)        HPI:     HPI   Chief Complaint   Patient presents with    Pharyngitis     Patient presents today with c/o sore throat, and lower extremity swelling. She states that the swelling comes and goes. Patient went to ER on 2/22/23. She is diabetic and went to the emergency room on February 22 at [facility] her blood sugar was 245. Her white blood count was normal but there was some thickening near the rectum. 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 Blood sugar has been 115 the past few mornings. She says she is on all of her medications although she has not been here in a year and a half and her refills in 2021 were only for 6 months. She said that she is having right upper quadrant pain with nausea she has been told before that she has a bad gallbladder. She also has a sore throat today and feels like she is got a virus coming on. She has not had a fever she has not done a COVID test  She says she has been having pitting edema in both ankles. She denies any shortness of breath.   She used to take fluid pills but does not have any  Past Medical History:   Diagnosis Date    Chronic back pain     GERD (gastroesophageal reflux disease)     Hypertension     Neuropathy     Osteoarthritis     Palliative care encounter 08/06/2018    Type II or unspecified type diabetes mellitus without mention of complication, not stated as uncontrolled       Past Surgical History:   Procedure Laterality Date  SECTION      JOINT REPLACEMENT Left     ltk    VT EXCISION TUMOR SOFT TISSUE BACK/FLANK SUBQ <3CM N/A 2016    EXCISION LIPOMA OF THE UPPER BACK  performed by Ángela Escamilla MD at [address] [employer] CELIOTOMY W/WO BIOPSY SPX N/A 2018    LAPAROTOMY EXPLORATORY performed by Germaine Trinh MD at [address] [employer] CELIOTOMY W/WO BIOPSY SPX N/A 8/10/2018    LAPAROTOMY EXPLORATORY, ABDOMINAL CLOSURE, WOUND VAC PLACEMENT performed by Germaine Trinh MD [facility] 3/9/2023 2021 2021 10/26/2020 10/26/2020    SYSTOLIC [phone] 270 -   DIASTOLIC 80 84 [phone] -   Site - Left Upper Arm Left Upper Arm - - -   Position - Sitting Sitting - - -   Cuff Size - Large Adult Large Adult - - -   Pulse 95 94 81 - 86 -   Temp 96.2 97.9 96.8 - 98 -   Resp 18 - 16 - - 1   SpO2 99 97 100 - 98 -   Weight 235 lb 232 lb 215 lb - 219 lb 9.6 oz -   Height 5' 5\" 5' 5\" 5' 5\" - 5' 5\" -   Body mass index 39.1 kg/m2 38.6 kg/m2 35.77 kg/m2 - 36.54 kg/m2 -   Some recent data might be hidden       Family History   Problem Relation Age of Onset    Arthritis Mother     Diabetes Mother     High Blood Pressure Mother     High Cholesterol Mother     Cancer Father        Social History     Tobacco Use    Smoking status: Never    Smokeless tobacco: Never   Substance Use Topics    Alcohol use: No      Current Outpatient Medications on File Prior to Visit   Medication Sig Dispense Refill    atorvastatin (LIPITOR) 10 MG tablet TAKE ONE TABLET BY MOUTH DAILY 60 tablet 5    pantoprazole (PROTONIX) 40 MG tablet TAKE ONE TABLET BY MOUTH DAILY 60 tablet 5    lisinopril (PRINIVIL;ZESTRIL) 20 MG tablet TAKE ONE TABLET BY MOUTH DAILY 60 tablet 5    FARXIGA 10 MG tablet TAKE ONE TABLET BY MOUTH EVERY MORNING 60 tablet 5    celecoxib (CELEBREX) 200 MG capsule Take 1 capsule by mouth 2 times daily 180 capsule 2     No current facility-administered medications on file prior to visit. No Known Allergies    Health Maintenance   Topic Date Due    Pneumococcal 65+ years Vaccine (1 - PCV) Never done    Diabetic foot exam  Never done    Diabetic Alb to Cr ratio (uACR) test  Never done    Diabetic retinal exam  Never done    DTaP/Tdap/Td vaccine (1 - Tdap) Never done    Shingles vaccine (1 of 2) Never done    DEXA (modify frequency per FRAX score)  Never done    COVID-19 Vaccine (3 - Booster for Moderna series) 06/17/2021    Flu vaccine (1) 08/01/2022    Depression Screen  08/26/2022    Annual Wellness Visit (AWV)  08/27/2022    A1C test (Diabetic or Prediabetic)  09/23/2022    Lipids  06/23/2023    GFR test (Diabetes, CKD 3-4, OR last GFR 15-59)  06/23/2023    Breast cancer screen  09/15/2023    Colorectal Cancer Screen  02/22/2024    Hepatitis C screen  Completed    Hepatitis A vaccine  Aged Out    Hib vaccine  Aged Out    Meningococcal (ACWY) vaccine  Aged Out       Subjective:      Review of Systems   Constitutional:  Positive for activity change. HENT:  Positive for congestion and sore throat. Eyes: Negative. Respiratory: Negative. Cardiovascular: Negative. Gastrointestinal:  Positive for abdominal pain and nausea. Endocrine:        Diabetes   Genitourinary: Negative. Musculoskeletal: Negative. Skin: Negative. Neurological: Negative. Psychiatric/Behavioral: Negative. Objective:     Physical Exam  Vitals and nursing note reviewed. Constitutional:       Appearance: Normal appearance. She is well-developed. She is obese. HENT:      Head: Normocephalic. Right Ear: Tympanic membrane and external ear normal.      Left Ear: Tympanic membrane and external ear normal.      Nose: Nose normal.   Eyes:      Pupils: Pupils are equal, round, and reactive to light. Cardiovascular:      Rate and Rhythm: Normal rate and regular rhythm. Heart sounds: Normal heart sounds.    Pulmonary:      Effort: Pulmonary effort is normal. Breath sounds: Normal breath sounds. Abdominal:      General: Bowel sounds are normal.      Palpations: Abdomen is soft. Tenderness: There is abdominal tenderness in the right upper quadrant. There is no right CVA tenderness or guarding. Negative signs include Ruiz's sign, McBurney's sign and obturator sign. Musculoskeletal:      Cervical back: Normal range of motion. Skin:     General: Skin is warm and dry. Capillary Refill: Capillary refill takes less than 2 seconds. Neurological:      General: No focal deficit present. Mental Status: She is alert and oriented to person, place, and time. Mental status is at baseline. Psychiatric:         Mood and Affect: Mood normal.         Behavior: Behavior normal.         Thought Content: Thought content normal.         Judgment: Judgment normal.     /80   Pulse 95   Temp (!) 96.2 °F (35.7 °C) (Temporal)   Resp 18   Ht 5' 5\" (1.651 m)   Wt 235 lb (106.6 kg)   SpO2 99%   BMI 39.11 kg/m²     Assessment:       Diagnosis Orders   1. Right upper quadrant pain  US GALLBLADDER RUQ    CBC with Auto Differential    Lipase      2. Type 2 DM with CKD stage 3 and hypertension (HCC)  Hemoglobin A1C      3. Essential hypertension        4. Encounter for screening for lipid disorder  Lipid Panel      5. Screening for diabetes mellitus  Comprehensive Metabolic Panel    Hemoglobin A1C      6. Viral URI with cough              Plan:   More than 50% of the time was spent counseling and coordinating care for a total time of 35min face to face. I did review the records from [facility] and the previous records from here. She said she was fasting so we went ahead and got her blood work. She has not had a Medicare annual wellness in 2 years and I told her that she would have to come back for that. I will not renew her medicines until we get all of her labs back.   I highly doubt she is on her diabetes medicine  PDMP Monitoring:    Last PDMP Basim Kirby as Reviewed:  Review User Review Instant Review Result          Last Controlled Substance Monitoring Documentation      [address] ED from 1/11/2017 in [facility] EMERGENCY DEPT   Attestation The Prescription Monitoring Report for this patient was reviewed today. filed at 01/11/2017 1521          Urine Drug Screenings (1 yr)       Urine Drug Screen  Collected: 4/9/2018  1:35 PM (Final result)                  Medication Contract and Consent for Opioid Use Documents Filed       Patient Documents       Type of Document Status Date Received Received By Description    Medication Contract Signed 12/11/2013 10:59 AM Jeannie Mckeon                      Patient given educational materials -see patient instructions. Discussed use, benefit, and side effects of prescribed medications. All patient questions answered. Pt voiced understanding. Reviewed health maintenance. Instructed to continue currentmedications, diet and exercise. Patient agreed with treatment plan. Follow up as directed. MEDICATIONS:  Orders Placed This Encounter   Medications    clotrimazole (LOTRIMIN) 1 % cream     Sig: Apply topically 2 times daily Apply topically 2 times daily. Dispense:  60 g     Refill:  1    furosemide (LASIX) 20 MG tablet     Sig: Take 1 tablet by mouth daily     Dispense:  30 tablet     Refill:  0         ORDERS:  Orders Placed This Encounter   Procedures    US GALLBLADDER RUQ    CBC with Auto Differential    Comprehensive Metabolic Panel    Hemoglobin A1C    Lipid Panel    Lipase       Follow-up:  Return in about 2 weeks (around 3/23/2023) for for maw. PATIENT INSTRUCTIONS:  Patient Instructions   Gallbladder US  Watch sugar fasting should be under 140  Lasix 20mg daily for 3 days and then only with swelling  Labs today  If you miss any more appointments we will dismiss you from the office. You have to be compliant and show up for your appointments. We have not seen you since August 2021.   Highly recommend that you get a colonoscopy given your history and your CT scan results  Electronically signed by ALLYN Vasquez CNP on 3/9/2023 at 3:36 PM    EMR Dragon/transcription disclaimer:  Much of thisencounter note is electronic transcription/translation of spoken language to printed texts. The electronic translation of spoken language may be erroneous, or at times, nonsensical words or phrases may be inadvertentlytranscribed.   Although I have reviewed the note for such errors, some may still exist.

## 2023-03-09 NOTE — PATIENT INSTRUCTIONS
Gallbladder US  Watch sugar fasting should be under 140  Lasix 20mg daily for 3 days and then only with swelling  Labs today  If you miss any more appointments we will dismiss you from the office. You have to be compliant and show up for your appointments. We have not seen you since August 2021.   Highly recommend that you get a colonoscopy given your history and your CT scan results

## 2023-03-16 RX ORDER — LISINOPRIL 20 MG/1
TABLET ORAL
Qty: 200 TABLET | Refills: 1 | Status: SHIPPED | OUTPATIENT
Start: 2023-03-16

## 2023-03-16 RX ORDER — ATORVASTATIN CALCIUM 10 MG/1
TABLET, FILM COATED ORAL
Qty: 200 TABLET | Refills: 1 | Status: SHIPPED | OUTPATIENT
Start: 2023-03-16

## 2023-03-23 ENCOUNTER — OFFICE VISIT (OUTPATIENT)
Dept: PRIMARY CARE CLINIC | Age: 68
End: 2023-03-23

## 2023-03-23 VITALS
SYSTOLIC BLOOD PRESSURE: 136 MMHG | BODY MASS INDEX: 37.55 KG/M2 | TEMPERATURE: 97.3 F | HEIGHT: 65 IN | OXYGEN SATURATION: 99 % | HEART RATE: 98 BPM | RESPIRATION RATE: 16 BRPM | WEIGHT: 225.4 LBS | DIASTOLIC BLOOD PRESSURE: 86 MMHG

## 2023-03-23 DIAGNOSIS — I12.9 TYPE 2 DM WITH CKD STAGE 3 AND HYPERTENSION (HCC): ICD-10-CM

## 2023-03-23 DIAGNOSIS — R93.3 ABNORMAL CT SCAN, COLON: Primary | ICD-10-CM

## 2023-03-23 DIAGNOSIS — R05.3 CHRONIC COUGH: ICD-10-CM

## 2023-03-23 DIAGNOSIS — E11.22 TYPE 2 DM WITH CKD STAGE 3 AND HYPERTENSION (HCC): ICD-10-CM

## 2023-03-23 DIAGNOSIS — I10 ESSENTIAL HYPERTENSION: ICD-10-CM

## 2023-03-23 DIAGNOSIS — N18.30 TYPE 2 DM WITH CKD STAGE 3 AND HYPERTENSION (HCC): ICD-10-CM

## 2023-03-23 DIAGNOSIS — Z00.00 MEDICARE ANNUAL WELLNESS VISIT, SUBSEQUENT: ICD-10-CM

## 2023-03-23 LAB
ANION GAP SERPL CALCULATED.3IONS-SCNC: 13 MMOL/L (ref 7–19)
BUN SERPL-MCNC: 29 MG/DL (ref 8–23)
CALCIUM SERPL-MCNC: 8.3 MG/DL (ref 8.8–10.2)
CHLORIDE SERPL-SCNC: 107 MMOL/L (ref 98–111)
CO2 SERPL-SCNC: 19 MMOL/L (ref 22–29)
CREAT SERPL-MCNC: 2.3 MG/DL (ref 0.5–0.9)
GLUCOSE SERPL-MCNC: 334 MG/DL (ref 74–109)
POTASSIUM SERPL-SCNC: 4.2 MMOL/L (ref 3.5–5)
SODIUM SERPL-SCNC: 139 MMOL/L (ref 136–145)

## 2023-03-23 RX ORDER — FLUTICASONE PROPIONATE 50 MCG
2 SPRAY, SUSPENSION (ML) NASAL DAILY
Qty: 16 G | Refills: 0 | Status: SHIPPED | OUTPATIENT
Start: 2023-03-23

## 2023-03-23 RX ORDER — GUAIFENESIN 600 MG/1
600 TABLET, EXTENDED RELEASE ORAL 2 TIMES DAILY
Qty: 30 TABLET | Refills: 0 | Status: SHIPPED | OUTPATIENT
Start: 2023-03-23 | End: 2023-04-07

## 2023-03-23 ASSESSMENT — PATIENT HEALTH QUESTIONNAIRE - PHQ9
1. LITTLE INTEREST OR PLEASURE IN DOING THINGS: 2
2. FEELING DOWN, DEPRESSED OR HOPELESS: 0
SUM OF ALL RESPONSES TO PHQ9 QUESTIONS 1 & 2: 2
SUM OF ALL RESPONSES TO PHQ QUESTIONS 1-9: 2

## 2023-03-23 ASSESSMENT — LIFESTYLE VARIABLES
HOW OFTEN DO YOU HAVE A DRINK CONTAINING ALCOHOL: MONTHLY OR LESS
HOW MANY STANDARD DRINKS CONTAINING ALCOHOL DO YOU HAVE ON A TYPICAL DAY: PATIENT DOES NOT DRINK

## 2023-03-23 NOTE — PROGRESS NOTES
TABLET BY MOUTH DAILY Yes Jade Fernández MD   dapagliflozin (FARXIGA) 10 MG tablet TAKE ONE TABLET BY MOUTH EVERY MORNING Yes Jade Fernández MD   lisinopril (PRINIVIL;ZESTRIL) 20 MG tablet TAKE ONE TABLET BY MOUTH DAILY Yes Camille Giles MD   clotrimazole (LOTRIMIN) 1 % cream Apply topically 2 times daily Apply topically 2 times daily. Yes ALLYN Mccauley CNP   furosemide (LASIX) 20 MG tablet Take 1 tablet by mouth daily Yes ALLYN Mccauley CNP   pantoprazole (PROTONIX) 40 MG tablet TAKE ONE TABLET BY MOUTH DAILY Yes Camille Giles MD   celecoxib (CELEBREX) 200 MG capsule TAKE ONE CAPSULE BY MOUTH TWICE A DAY Yes Camille Giles MD   ondansetron (ZOFRAN) 4 MG tablet Take 1 tablet by mouth Every 8 (Eight) Hours As Needed for Nausea or Vomiting for up to 3 days.  Yes Jade Fernández MD       [facility] (Including outside providers/suppliers regularly involved in providing care):   Patient Care Team:  Jade Fernández MD as PCP - General (Family Medicine)  Jade Fernández MD as PCP - Empaneled Provider  SAGE Kirby (Physician Assistant Medical)     Reviewed and updated this visit:  Tobacco  Allergies  Meds  Med Hx  Surg Hx  Soc Hx  Fam Hx             Jade Fernández MD

## 2023-03-23 NOTE — PATIENT INSTRUCTIONS
the help you need, you can stay as independent as possible. Your doctor will want to know if you are able to do tasks such as: Take a bath or shower without help. Go to the bathroom by yourself. Dress and undress without help. Shave, comb your hair, and brush teeth on your own. Get in and out of bed or a chair without help. Feed yourself without help. If you are having trouble doing basic self-care tasks, talk with your doctor. You may want to bring a caregiver or family member who can help the doctor understand your needs and abilities. How will a doctor assess your ADLs? Asking about ADLs is part of a routine health checkup your doctor will likely do as you age. Your health check might be done in a doctor's office, in your home, or at a hospital. The goal is to find out if you are having any problems that could make your health problems worse or that make it unsafe for you to be on your own. To measure your ADLs, your doctor will ask how hard it is for you to do routine tasks. He or she may also want to know if you have changed the way you do a task because of a health problem. He or she may watch how you:  Walk back and forth. Keep your balance while you stand or walk. Move from sitting to standing or from a bed to a chair. Button or unbutton a shirt or sweater. Remove and put on your shoes. It's normal to feel a little worried or anxious if you find you can't do all the things you used to be able to do. Talking with your doctor about ADLs isn't a test that you either pass or fail. It's just a way to get more information about your health and safety. Follow-up care is a key part of your treatment and safety. Be sure to make and go to all appointments, and call your doctor if you are having problems. It's also a good idea to know your test results and keep a list of the medicines you take. Current as of: June 6, 2022               Content Version: 13.6  © 4830-1456 [employer], [employer].

## 2023-04-24 ENCOUNTER — OFFICE VISIT (OUTPATIENT)
Dept: GASTROENTEROLOGY | Facility: CLINIC | Age: 68
End: 2023-04-24
Payer: MEDICARE

## 2023-04-24 VITALS
HEIGHT: 65 IN | BODY MASS INDEX: 38.15 KG/M2 | SYSTOLIC BLOOD PRESSURE: 142 MMHG | WEIGHT: 229 LBS | HEART RATE: 92 BPM | DIASTOLIC BLOOD PRESSURE: 78 MMHG | OXYGEN SATURATION: 98 % | TEMPERATURE: 97.6 F

## 2023-04-24 DIAGNOSIS — R11.2 NAUSEA AND VOMITING, UNSPECIFIED VOMITING TYPE: ICD-10-CM

## 2023-04-24 DIAGNOSIS — R19.7 DIARRHEA, UNSPECIFIED TYPE: ICD-10-CM

## 2023-04-24 DIAGNOSIS — R10.84 GENERALIZED ABDOMINAL PAIN: ICD-10-CM

## 2023-04-24 DIAGNOSIS — R93.3 ABNORMAL FINDING ON GI TRACT IMAGING: Primary | ICD-10-CM

## 2023-04-24 DIAGNOSIS — R63.4 WEIGHT LOSS: ICD-10-CM

## 2023-04-24 PROBLEM — Z85.038 HISTORY OF COLON CANCER: Status: ACTIVE | Noted: 2023-04-24

## 2023-04-24 PROCEDURE — 3078F DIAST BP <80 MM HG: CPT | Performed by: NURSE PRACTITIONER

## 2023-04-24 PROCEDURE — 99204 OFFICE O/P NEW MOD 45 MIN: CPT | Performed by: NURSE PRACTITIONER

## 2023-04-24 PROCEDURE — 1159F MED LIST DOCD IN RCRD: CPT | Performed by: NURSE PRACTITIONER

## 2023-04-24 PROCEDURE — 3077F SYST BP >= 140 MM HG: CPT | Performed by: NURSE PRACTITIONER

## 2023-04-24 PROCEDURE — 1160F RVW MEDS BY RX/DR IN RCRD: CPT | Performed by: NURSE PRACTITIONER

## 2023-04-24 RX ORDER — METRONIDAZOLE 500 MG/1
500 TABLET ORAL 3 TIMES DAILY
Qty: 30 TABLET | Refills: 0 | Status: SHIPPED | OUTPATIENT
Start: 2023-04-24 | End: 2023-05-04

## 2023-04-24 RX ORDER — CELECOXIB 200 MG/1
200 CAPSULE ORAL 2 TIMES DAILY
COMMUNITY

## 2023-04-24 RX ORDER — PANTOPRAZOLE SODIUM 40 MG/1
40 TABLET, DELAYED RELEASE ORAL DAILY
COMMUNITY

## 2023-04-24 RX ORDER — FUROSEMIDE 20 MG/1
20 TABLET ORAL 2 TIMES DAILY
COMMUNITY

## 2023-04-24 RX ORDER — DAPAGLIFLOZIN 10 MG/1
10 TABLET, FILM COATED ORAL DAILY
COMMUNITY

## 2023-04-24 NOTE — H&P (VIEW-ONLY)
Primary Physician: Ruth Noble MD    Chief Complaint   Patient presents with   • GI Problem     Abnormal ct       Subjective     Lindsay Laughlin is a 67 y.o. female.    HPI   Abnormal CT scan  CT scan of the abdomen and pelvis with contrast 2023 revealing mucosal thickening involving the rectum and distal colon.  Mild proctitis/colitis not excluded.  There were scattered diverticula within the descending colon without evidence of acute diverticulitis.    Pt reports she had an abdominal surgery at [facility]- laparotomy Exploratory 2018. She had complications and was transferred to [facility], [facility].  She was also told during that hospitalization she needed to have her gallbladder removed which she did not have.  It has been since that surgery she has been having complications of abdominal pain and loose stools.    Abdominal pain/diarrhea  Over the past 12 months she has been having issues with generalized abd pain. Cannot eat anything without feeling of abdominal fullness and states tight clothing hurts to even touch her abdomen.  She may have 10-15 BM small amounts daily.  It does not matter what food she eats.  Has nausea and pain after eating during day time hours.  But in the evening hours she does not have any pain or nausea.   + weight loss of 30 pounds in 3 weeks    Family history of colon cancer  Father had colon cancer.  No previous endoscopy and colonoscopy per Dr Smith    Past Medical History:   Diagnosis Date   • Acid reflux    • Arthritis    • High cholesterol    • Hypertension    • Neuropathy    • Swollen lymph nodes     LEFT CERVICAL   • Type 2 diabetes mellitus        Past Surgical History:   Procedure Laterality Date   • CARPAL TUNNEL RELEASE Bilateral    • CERVICAL LYMPH NODE BIOPSY/EXCISION Left 2017    Procedure: LEFT NECK EXPLORATION WITH INCISIONAL BIOPSY/CULTURE;  Surgeon: Blue Sanchez MD;  Location: [facility] OR;  Service:    •  SECTION     • COLON RESECTION      • EXCISION LESION      From back   • REPLACEMENT TOTAL KNEE Left    • TONSILLECTOMY          Current Outpatient Medications:   •  atorvastatin (LIPITOR) 10 MG tablet, Take 1 tablet by mouth Daily., Disp: 90 tablet, Rfl: 0  •  celecoxib (CeleBREX) 200 MG capsule, Take 1 capsule by mouth 2 (Two) Times a Day., Disp: , Rfl:   •  clotrimazole-betamethasone (Lotrisone) 1-0.05 % cream, Apply  topically to the appropriate area as directed 2 (Two) Times a Day., Disp: 45 g, Rfl: 0  •  dapagliflozin Propanediol (Farxiga) 10 MG tablet, Take 10 mg by mouth Daily., Disp: , Rfl:   •  furosemide (LASIX) 20 MG tablet, Take 1 tablet by mouth 2 (Two) Times a Day., Disp: , Rfl:   •  lisinopril (PRINIVIL,ZESTRIL) 10 MG tablet, Take 1 tablet by mouth Daily. (Patient taking differently: Take 2 tablets by mouth Daily.), Disp: 90 tablet, Rfl: 0  •  pantoprazole (PROTONIX) 40 MG EC tablet, Take 1 tablet by mouth Daily., Disp: , Rfl:   •  diclofenac (VOLTAREN) 75 MG EC tablet, Take 1 tablet by mouth 2 (Two) Times a Day., Disp: 180 tablet, Rfl: 0  •  esomeprazole (nexIUM) 40 MG capsule, Take 1 capsule by mouth Every Morning Before Breakfast., Disp: 90 capsule, Rfl: 0  •  metroNIDAZOLE (Flagyl) 500 MG tablet, Take 1 tablet by mouth 3 (Three) Times a Day for 10 days., Disp: 30 tablet, Rfl: 0  •  promethazine-dextromethorphan (PROMETHAZINE-DM) 6.25-15 MG/5ML syrup, Take 5 mL by mouth 4 (Four) Times a Day As Needed for Cough., Disp: 120 mL, Rfl: 0    No Known Allergies    Social History     Socioeconomic History   • Marital status:    Tobacco Use   • Smoking status: Never   • Smokeless tobacco: Never   Substance and Sexual Activity   • Alcohol use: No   • Drug use: No   • Sexual activity: Defer       Family History   Problem Relation Age of Onset   • Colon cancer Father    • Breast cancer Neg Hx        Review of Systems   Constitutional: Positive for unexpected weight change. Negative for fever.   HENT: Negative for trouble swallowing.   "  Respiratory: Negative for shortness of breath.    Cardiovascular: Negative for chest pain.   Gastrointestinal: Positive for abdominal pain, diarrhea and nausea.   Musculoskeletal: Positive for gait problem.        Right knee pain and mobility issues       Objective     /78   Pulse 92   Temp 97.6 °F (36.4 °C)   Ht 165.1 cm (65\")   Wt 104 kg (229 lb)   SpO2 98%   BMI 38.11 kg/m²     Physical Exam  Vitals reviewed.   Constitutional:       Appearance: Normal appearance.   Cardiovascular:      Rate and Rhythm: Regular rhythm.      Heart sounds: Normal heart sounds.   Pulmonary:      Effort: Pulmonary effort is normal.      Breath sounds: Normal breath sounds.   Abdominal:      General: Abdomen is flat. Bowel sounds are normal.      Palpations: Abdomen is soft.      Tenderness: There is no abdominal tenderness.   Neurological:      Mental Status: She is alert.         Lab Results - Last 18 Months   Lab Units 02/22/23  1712 06/23/22  1141   GLUCOSE mg/dL 245* 299*   BUN mg/dL 12 18   CREATININE mg/dL 1.39* 1.4*   SODIUM mmol/L 141 136   POTASSIUM mmol/L 3.5 4.5   CHLORIDE mmol/L 108* 105   TOTAL CO2 mmol/L  --  19*   CO2 mmol/L 23.0  --    TOTAL PROTEIN g/dL 6.3 6.6   ALBUMIN g/dL 3.9 3.9   ALT (SGPT) U/L 11 15   AST (SGOT) U/L 14 15   ALK PHOS U/L 117 144*   BILIRUBIN mg/dL 0.4 0.4   GLOBULIN gm/dL 2.4  --        Lab Results - Last 18 Months   Lab Units 03/09/23  1400 02/22/23  1842 02/22/23  1712 06/23/22  1141   HEMOGLOBIN g/dL 11.5*  --  11.7* 13.1   HEMATOCRIT % 37.0  --  38.2 42.9   MCV fL 96.9  --  96.2 96.8   WBC K/uL 5.7  --  8.17 7.8   RDW % 13.6  --  13.6 14.2   MPV fL 10.7  --  10.2 11.7   PLATELETS K/uL 248  --  253 239   INR   --  1.04  --   --        EXAMINATION: CT ABDOMEN PELVIS W CONTRAST- 2/22/2023 6:38 PM CST     HISTORY: Left-sided abdominal pain with diarrhea.     DOSE: 847 mGycm (Automatic exposure control technique was implemented in  an effort to keep the radiation dose as low as " possible without  compromising image quality)     REPORT: Spiral CT of the abdomen and pelvis was performed after  administration of intravenous contrast from the lung bases through the  pubic symphysis. Reconstructed coronal and sagittal images are also  reviewed.     COMPARISON: CT abdomen and pelvis 10/16/2019.     Review of lung windows demonstrates mild respiratory motion artifact,  mild atelectasis in the anterior lingula and right middle lobe. No  pleural effusion is identified. The liver and spleen are homogeneous,  without evidence of a mass. There is accessory spleen anterior to the  spleen, measuring 4.9 cm. This is stable. The gallbladder is not  visualized. No biliary dilatation is identified. The stomach is  decompressed. There is a small sliding-type hiatal hernia. The pancreas  and adrenal glands are within normal limits. There is dense material  seen near the raine hepatis with streak artifact. This is stable. The  kidneys appear increased in size, particularly on the right and there is  diffuse cortical thickening and evidence of chronic cortical scarring.  No renal mass or hydronephrosis is identified. The ureters are  decompressed. There is a small subcentimeter cyst at the upper pole the  right kidney which appears stable. There is bladder wall thickening with  incomplete bladder distention. This may be artifactual or could be  related to cystitis. Clinical correlation is recommended. Bowel loops  are normal caliber, there are scattered diverticula within the  descending colon without evidence of acute diverticulitis. There is mild  mucosal thickening of the rectum and distal colon, mild proctitis,  distal colitis cannot be excluded. The fat planes surrounding the rectum  and distal colon appear normal. There is a surgical suture line at the  proximal ascending colon. No evidence of bowel obstruction is  identified. The appendix is not visualized. There is no free fluid or  free air. Review of  bone windows shows no acute abnormality.     IMPRESSION:  1. Subtle mucosal thickening involving the rectum and distal colon,  which is not well distended. Mild proctitis/colitis is not excluded,  though this could be artifactual as well due to underdistention.  Clinical correlation is recommended. There are scattered diverticula  within the descending colon without evidence of acute diverticulitis.  2. Postop changes of previous partial right colectomy, with some  retained fluid noted in the right colon.  3. Chronic atrophy and cortical scarring involving both kidneys without  there are tract obstruction.        This report was finalized on 02/22/2023 18:48 by Dr. Venkata Muniz MD  IMPRESSION/PLAN:    Assessment & Plan      Problem List Items Addressed This Visit        Endocrine and Metabolic    Weight loss    Overview     30 pounds in 3 weeks         Relevant Orders    Case Request (Completed)       Gastrointestinal Abdominal     Abnormal finding on GI tract imaging - Primary    Overview     CT scan of the abdomen and pelvis with contrast 2/22/2023 revealing mucosal thickening involving the rectum and distal colon.  Mild proctitis/colitis not excluded.  There were scattered diverticula within the descending colon without evidence of acute diverticulitis         Relevant Orders    Case Request (Completed)    Diarrhea    Overview     Chronic diarrhea for 5 years since abd surgery in 2018         Generalized abdominal pain    Overview     Worse with eating, chronic in nature since 2018 however recently worse         Relevant Orders    Case Request (Completed)    Nausea & vomiting    Overview     Since 2018, worse during daytime hours, types of food does not make any difference, associated with generalized abdominal pain          Endoscopy and colonoscopy per Dr Smith  miralax prep    Begin Flagyl 500mg TI for 10 days given findings on CT scan   continue Protonix daily      ..The risks, benefits, and alternatives of  colonoscopy were reviewed with the patient today.  Risks including perforation of the colon possibly requiring surgery or colostomy.  Additional risks include risk of bleeding from biopsies or removal of colon tissue.  There is also the risk of a drug reaction or problems with anesthesia.  This will be discussed with the further by the anesthesia team on the day of the procedure.  Lastly there is a possibility of missing a colon polyp or cancer.  The benefits include the diagnosis and management of disease of the colon and rectum.  Alternatives to colonoscopy include barium enema, laboratory testing, radiographic evaluation, or no intervention.  The patient verbalizes understanding and agrees.    In accordance with requirements under the Affordable Care Act, [facility] has provided pricing for all hospital services and items on each of its websites. However, a patient's actual cost may differ based on the services the patient receives to meet individual healthcare needs and based on the benefits provided under the patient’s insurance coverage.                  Nicole Beasley, APRN  04/24/23  11:56 CDT    Part of this note may be an electronic transcription/translation of spoken language to printed text.

## 2023-04-24 NOTE — PROGRESS NOTES
Primary Physician: Ruth Noble MD    Chief Complaint   Patient presents with   • GI Problem     Abnormal ct       Subjective     Lindsay Laughlin is a 67 y.o. female.    HPI   Abnormal CT scan  CT scan of the abdomen and pelvis with contrast 2023 revealing mucosal thickening involving the rectum and distal colon.  Mild proctitis/colitis not excluded.  There were scattered diverticula within the descending colon without evidence of acute diverticulitis.    Pt reports she had an abdominal surgery at [facility]- laparotomy Exploratory 2018. She had complications and was transferred to [facility].  She was also told during that hospitalization she needed to have her gallbladder removed which she did not have.  It has been since that surgery she has been having complications of abdominal pain and loose stools.    Abdominal pain/diarrhea  Over the past 12 months she has been having issues with generalized abd pain. Cannot eat anything without feeling of abdominal fullness and states tight clothing hurts to even touch her abdomen.  She may have 10-15 BM small amounts daily.  It does not matter what food she eats.  Has nausea and pain after eating during day time hours.  But in the evening hours she does not have any pain or nausea.   + weight loss of 30 pounds in 3 weeks    Family history of colon cancer  Father had colon cancer.  No previous endoscopy and colonoscopy per Dr Smith    Past Medical History:   Diagnosis Date   • Acid reflux    • Arthritis    • High cholesterol    • Hypertension    • Neuropathy    • Swollen lymph nodes     LEFT CERVICAL   • Type 2 diabetes mellitus        Past Surgical History:   Procedure Laterality Date   • CARPAL TUNNEL RELEASE Bilateral    • CERVICAL LYMPH NODE BIOPSY/EXCISION Left 2017    Procedure: LEFT NECK EXPLORATION WITH INCISIONAL BIOPSY/CULTURE;  Surgeon: Blue Sanchez MD;  Location: [facility] OR;  Service:    •  SECTION     • COLON RESECTION      • EXCISION LESION      From back   • REPLACEMENT TOTAL KNEE Left    • TONSILLECTOMY          Current Outpatient Medications:   •  atorvastatin (LIPITOR) 10 MG tablet, Take 1 tablet by mouth Daily., Disp: 90 tablet, Rfl: 0  •  celecoxib (CeleBREX) 200 MG capsule, Take 1 capsule by mouth 2 (Two) Times a Day., Disp: , Rfl:   •  clotrimazole-betamethasone (Lotrisone) 1-0.05 % cream, Apply  topically to the appropriate area as directed 2 (Two) Times a Day., Disp: 45 g, Rfl: 0  •  dapagliflozin Propanediol (Farxiga) 10 MG tablet, Take 10 mg by mouth Daily., Disp: , Rfl:   •  furosemide (LASIX) 20 MG tablet, Take 1 tablet by mouth 2 (Two) Times a Day., Disp: , Rfl:   •  lisinopril (PRINIVIL,ZESTRIL) 10 MG tablet, Take 1 tablet by mouth Daily. (Patient taking differently: Take 2 tablets by mouth Daily.), Disp: 90 tablet, Rfl: 0  •  pantoprazole (PROTONIX) 40 MG EC tablet, Take 1 tablet by mouth Daily., Disp: , Rfl:   •  diclofenac (VOLTAREN) 75 MG EC tablet, Take 1 tablet by mouth 2 (Two) Times a Day., Disp: 180 tablet, Rfl: 0  •  esomeprazole (nexIUM) 40 MG capsule, Take 1 capsule by mouth Every Morning Before Breakfast., Disp: 90 capsule, Rfl: 0  •  metroNIDAZOLE (Flagyl) 500 MG tablet, Take 1 tablet by mouth 3 (Three) Times a Day for 10 days., Disp: 30 tablet, Rfl: 0  •  promethazine-dextromethorphan (PROMETHAZINE-DM) 6.25-15 MG/5ML syrup, Take 5 mL by mouth 4 (Four) Times a Day As Needed for Cough., Disp: 120 mL, Rfl: 0    No Known Allergies    Social History     Socioeconomic History   • Marital status:    Tobacco Use   • Smoking status: Never   • Smokeless tobacco: Never   Substance and Sexual Activity   • Alcohol use: No   • Drug use: No   • Sexual activity: Defer       Family History   Problem Relation Age of Onset   • Colon cancer Father    • Breast cancer Neg Hx        Review of Systems   Constitutional: Positive for unexpected weight change. Negative for fever.   HENT: Negative for trouble swallowing.   "  Respiratory: Negative for shortness of breath.    Cardiovascular: Negative for chest pain.   Gastrointestinal: Positive for abdominal pain, diarrhea and nausea.   Musculoskeletal: Positive for gait problem.        Right knee pain and mobility issues       Objective     /78   Pulse 92   Temp 97.6 °F (36.4 °C)   Ht 165.1 cm (65\")   Wt 104 kg (229 lb)   SpO2 98%   BMI 38.11 kg/m²     Physical Exam  Vitals reviewed.   Constitutional:       Appearance: Normal appearance.   Cardiovascular:      Rate and Rhythm: Regular rhythm.      Heart sounds: Normal heart sounds.   Pulmonary:      Effort: Pulmonary effort is normal.      Breath sounds: Normal breath sounds.   Abdominal:      General: Abdomen is flat. Bowel sounds are normal.      Palpations: Abdomen is soft.      Tenderness: There is no abdominal tenderness.   Neurological:      Mental Status: She is alert.         Lab Results - Last 18 Months   Lab Units 02/22/23  1712 06/23/22  1141   GLUCOSE mg/dL 245* 299*   BUN mg/dL 12 18   CREATININE mg/dL 1.39* 1.4*   SODIUM mmol/L 141 136   POTASSIUM mmol/L 3.5 4.5   CHLORIDE mmol/L 108* 105   TOTAL CO2 mmol/L  --  19*   CO2 mmol/L 23.0  --    TOTAL PROTEIN g/dL 6.3 6.6   ALBUMIN g/dL 3.9 3.9   ALT (SGPT) U/L 11 15   AST (SGOT) U/L 14 15   ALK PHOS U/L 117 144*   BILIRUBIN mg/dL 0.4 0.4   GLOBULIN gm/dL 2.4  --        Lab Results - Last 18 Months   Lab Units 03/09/23  1400 02/22/23  1842 02/22/23  1712 06/23/22  1141   HEMOGLOBIN g/dL 11.5*  --  11.7* 13.1   HEMATOCRIT % 37.0  --  38.2 42.9   MCV fL 96.9  --  96.2 96.8   WBC K/uL 5.7  --  8.17 7.8   RDW % 13.6  --  13.6 14.2   MPV fL 10.7  --  10.2 11.7   PLATELETS K/uL 248  --  253 239   INR   --  1.04  --   --        EXAMINATION: CT ABDOMEN PELVIS W CONTRAST- 2/22/2023 6:38 PM CST     HISTORY: Left-sided abdominal pain with diarrhea.     DOSE: 847 mGycm (Automatic exposure control technique was implemented in  an effort to keep the radiation dose as low as " possible without  compromising image quality)     REPORT: Spiral CT of the abdomen and pelvis was performed after  administration of intravenous contrast from the lung bases through the  pubic symphysis. Reconstructed coronal and sagittal images are also  reviewed.     COMPARISON: CT abdomen and pelvis 10/16/2019.     Review of lung windows demonstrates mild respiratory motion artifact,  mild atelectasis in the anterior lingula and right middle lobe. No  pleural effusion is identified. The liver and spleen are homogeneous,  without evidence of a mass. There is accessory spleen anterior to the  spleen, measuring 4.9 cm. This is stable. The gallbladder is not  visualized. No biliary dilatation is identified. The stomach is  decompressed. There is a small sliding-type hiatal hernia. The pancreas  and adrenal glands are within normal limits. There is dense material  seen near the raine hepatis with streak artifact. This is stable. The  kidneys appear increased in size, particularly on the right and there is  diffuse cortical thickening and evidence of chronic cortical scarring.  No renal mass or hydronephrosis is identified. The ureters are  decompressed. There is a small subcentimeter cyst at the upper pole the  right kidney which appears stable. There is bladder wall thickening with  incomplete bladder distention. This may be artifactual or could be  related to cystitis. Clinical correlation is recommended. Bowel loops  are normal caliber, there are scattered diverticula within the  descending colon without evidence of acute diverticulitis. There is mild  mucosal thickening of the rectum and distal colon, mild proctitis,  distal colitis cannot be excluded. The fat planes surrounding the rectum  and distal colon appear normal. There is a surgical suture line at the  proximal ascending colon. No evidence of bowel obstruction is  identified. The appendix is not visualized. There is no free fluid or  free air. Review of  bone windows shows no acute abnormality.     IMPRESSION:  1. Subtle mucosal thickening involving the rectum and distal colon,  which is not well distended. Mild proctitis/colitis is not excluded,  though this could be artifactual as well due to underdistention.  Clinical correlation is recommended. There are scattered diverticula  within the descending colon without evidence of acute diverticulitis.  2. Postop changes of previous partial right colectomy, with some  retained fluid noted in the right colon.  3. Chronic atrophy and cortical scarring involving both kidneys without  there are tract obstruction.        This report was finalized on 02/22/2023 18:48 by Dr. Venkata Muniz MD  IMPRESSION/PLAN:    Assessment & Plan      Problem List Items Addressed This Visit        Endocrine and Metabolic    Weight loss    Overview     30 pounds in 3 weeks         Relevant Orders    Case Request (Completed)       Gastrointestinal Abdominal     Abnormal finding on GI tract imaging - Primary    Overview     CT scan of the abdomen and pelvis with contrast 2/22/2023 revealing mucosal thickening involving the rectum and distal colon.  Mild proctitis/colitis not excluded.  There were scattered diverticula within the descending colon without evidence of acute diverticulitis         Relevant Orders    Case Request (Completed)    Diarrhea    Overview     Chronic diarrhea for 5 years since abd surgery in 2018         Generalized abdominal pain    Overview     Worse with eating, chronic in nature since 2018 however recently worse         Relevant Orders    Case Request (Completed)    Nausea & vomiting    Overview     Since 2018, worse during daytime hours, types of food does not make any difference, associated with generalized abdominal pain          Endoscopy and colonoscopy per Dr Smith  miralax prep    Begin Flagyl 500mg TI for 10 days given findings on CT scan   continue Protonix daily      ..The risks, benefits, and alternatives of  colonoscopy were reviewed with the patient today.  Risks including perforation of the colon possibly requiring surgery or colostomy.  Additional risks include risk of bleeding from biopsies or removal of colon tissue.  There is also the risk of a drug reaction or problems with anesthesia.  This will be discussed with the further by the anesthesia team on the day of the procedure.  Lastly there is a possibility of missing a colon polyp or cancer.  The benefits include the diagnosis and management of disease of the colon and rectum.  Alternatives to colonoscopy include barium enema, laboratory testing, radiographic evaluation, or no intervention.  The patient verbalizes understanding and agrees.    In accordance with requirements under the Affordable Care Act, [facility] has provided pricing for all hospital services and items on each of its websites. However, a patient's actual cost may differ based on the services the patient receives to meet individual healthcare needs and based on the benefits provided under the patient’s insurance coverage.                  Nicole Beasley, APRN  04/24/23  11:56 CDT    Part of this note may be an electronic transcription/translation of spoken language to printed text.

## 2023-05-08 RX ORDER — FUROSEMIDE 20 MG/1
20 TABLET ORAL DAILY
Qty: 30 TABLET | Refills: 0 | Status: SHIPPED | OUTPATIENT
Start: 2023-05-08

## 2023-05-08 RX ORDER — FLUTICASONE PROPIONATE 50 MCG
SPRAY, SUSPENSION (ML) NASAL
Qty: 16 G | Refills: 0 | Status: SHIPPED | OUTPATIENT
Start: 2023-05-08

## 2023-05-17 ENCOUNTER — ANESTHESIA (OUTPATIENT)
Dept: GASTROENTEROLOGY | Facility: HOSPITAL | Age: 68
End: 2023-05-17
Payer: MEDICARE

## 2023-05-17 ENCOUNTER — PREP FOR SURGERY (OUTPATIENT)
Dept: GASTROENTEROLOGY | Facility: CLINIC | Age: 68
End: 2023-05-17
Payer: MEDICARE

## 2023-05-17 ENCOUNTER — HOSPITAL ENCOUNTER (OUTPATIENT)
Facility: HOSPITAL | Age: 68
Setting detail: HOSPITAL OUTPATIENT SURGERY
Discharge: HOME | End: 2023-05-17
Attending: INTERNAL MEDICINE | Admitting: INTERNAL MEDICINE
Payer: MEDICARE

## 2023-05-17 ENCOUNTER — TELEPHONE (OUTPATIENT)
Dept: GASTROENTEROLOGY | Facility: CLINIC | Age: 68
End: 2023-05-17

## 2023-05-17 ENCOUNTER — ANESTHESIA EVENT (OUTPATIENT)
Dept: GASTROENTEROLOGY | Facility: HOSPITAL | Age: 68
End: 2023-05-17
Payer: MEDICARE

## 2023-05-17 VITALS
OXYGEN SATURATION: 99 % | HEIGHT: 65 IN | BODY MASS INDEX: 37.15 KG/M2 | RESPIRATION RATE: 25 BRPM | WEIGHT: 223 LBS | DIASTOLIC BLOOD PRESSURE: 61 MMHG | TEMPERATURE: 96.8 F | SYSTOLIC BLOOD PRESSURE: 115 MMHG | HEART RATE: 87 BPM

## 2023-05-17 DIAGNOSIS — R93.3 ABNORMAL FINDING ON GI TRACT IMAGING: ICD-10-CM

## 2023-05-17 DIAGNOSIS — R63.4 WEIGHT LOSS: ICD-10-CM

## 2023-05-17 DIAGNOSIS — K21.01 GASTROESOPHAGEAL REFLUX DISEASE WITH ESOPHAGITIS AND HEMORRHAGE: Primary | ICD-10-CM

## 2023-05-17 DIAGNOSIS — R19.7 DIARRHEA, UNSPECIFIED TYPE: ICD-10-CM

## 2023-05-17 DIAGNOSIS — R10.84 GENERALIZED ABDOMINAL PAIN: ICD-10-CM

## 2023-05-17 PROBLEM — Z80.0 FAMILY HISTORY OF COLON CANCER: Status: ACTIVE | Noted: 2023-04-24

## 2023-05-17 LAB — GLUCOSE BLDC GLUCOMTR-MCNC: 223 MG/DL (ref 70–130)

## 2023-05-17 PROCEDURE — 25010000002 PROPOFOL 10 MG/ML EMULSION: Performed by: NURSE ANESTHETIST, CERTIFIED REGISTERED

## 2023-05-17 PROCEDURE — 45378 DIAGNOSTIC COLONOSCOPY: CPT | Performed by: INTERNAL MEDICINE

## 2023-05-17 PROCEDURE — 82948 REAGENT STRIP/BLOOD GLUCOSE: CPT

## 2023-05-17 PROCEDURE — 88305 TISSUE EXAM BY PATHOLOGIST: CPT | Performed by: INTERNAL MEDICINE

## 2023-05-17 PROCEDURE — 43239 EGD BIOPSY SINGLE/MULTIPLE: CPT | Performed by: INTERNAL MEDICINE

## 2023-05-17 RX ORDER — SUCRALFATE 1 G/1
1 TABLET ORAL 4 TIMES DAILY
Qty: 120 TABLET | Refills: 3 | Status: SHIPPED | OUTPATIENT
Start: 2023-05-17

## 2023-05-17 RX ORDER — LIDOCAINE HYDROCHLORIDE 10 MG/ML
0.5 INJECTION, SOLUTION EPIDURAL; INFILTRATION; INTRACAUDAL; PERINEURAL ONCE AS NEEDED
Status: DISCONTINUED | OUTPATIENT
Start: 2023-05-17 | End: 2023-05-17 | Stop reason: HOSPADM

## 2023-05-17 RX ORDER — PROPOFOL 10 MG/ML
VIAL (ML) INTRAVENOUS AS NEEDED
Status: DISCONTINUED | OUTPATIENT
Start: 2023-05-17 | End: 2023-05-17 | Stop reason: SURG

## 2023-05-17 RX ORDER — LIDOCAINE HYDROCHLORIDE 20 MG/ML
INJECTION, SOLUTION EPIDURAL; INFILTRATION; INTRACAUDAL; PERINEURAL AS NEEDED
Status: DISCONTINUED | OUTPATIENT
Start: 2023-05-17 | End: 2023-05-17 | Stop reason: SURG

## 2023-05-17 RX ORDER — SODIUM CHLORIDE 0.9 % (FLUSH) 0.9 %
10 SYRINGE (ML) INJECTION AS NEEDED
Status: DISCONTINUED | OUTPATIENT
Start: 2023-05-17 | End: 2023-05-17 | Stop reason: HOSPADM

## 2023-05-17 RX ORDER — PANTOPRAZOLE SODIUM 40 MG/1
40 TABLET, DELAYED RELEASE ORAL
Qty: 60 TABLET | Refills: 11 | Status: SHIPPED | OUTPATIENT
Start: 2023-05-17

## 2023-05-17 RX ORDER — SODIUM CHLORIDE 9 MG/ML
500 INJECTION, SOLUTION INTRAVENOUS CONTINUOUS PRN
Status: DISCONTINUED | OUTPATIENT
Start: 2023-05-17 | End: 2023-05-17 | Stop reason: HOSPADM

## 2023-05-17 RX ADMIN — LIDOCAINE HYDROCHLORIDE 100 MG: 20 INJECTION, SOLUTION EPIDURAL; INFILTRATION; INTRACAUDAL; PERINEURAL at 11:04

## 2023-05-17 RX ADMIN — SODIUM CHLORIDE 500 ML: 9 INJECTION, SOLUTION INTRAVENOUS at 10:58

## 2023-05-17 RX ADMIN — PROPOFOL INJECTABLE EMULSION 200 MG: 10 INJECTION, EMULSION INTRAVENOUS at 11:04

## 2023-05-17 NOTE — DISCHARGE INSTRUCTIONS
Increase acid blocker pantoprazole to twice a day    Repeat endo and colonoscopy  in 3 months, the office of dr pendleton will call you to schedule both procedure the same day

## 2023-05-17 NOTE — ANESTHESIA PREPROCEDURE EVALUATION
Anesthesia Evaluation     Patient summary reviewed   no history of anesthetic complications:   NPO Solid Status: > 8 hours             Airway   Mallampati: II  Dental    (+) upper dentures    Pulmonary - negative pulmonary ROS   Cardiovascular - negative cardio ROS  Exercise tolerance: good (4-7 METS)    (+) hypertensionhyperlipidemia      Neuro/Psych- negative ROS  GI/Hepatic/Renal/Endo - negative ROS   (+) obesity, GERD, renal disease CRI, diabetes mellitus    Musculoskeletal     Abdominal    Substance History      OB/GYN          Other                      Anesthesia Plan    ASA 2     MAC       Anesthetic plan, risks, benefits, and alternatives have been provided, discussed and informed consent has been obtained with: patient.    CODE STATUS:

## 2023-05-18 NOTE — TELEPHONE ENCOUNTER
Tried to call pt, but phone went to  stating one wasn't set up. Will mail pt letter to call me to schedule. No other numbers listed.

## 2023-05-18 NOTE — ANESTHESIA POSTPROCEDURE EVALUATION
Patient: Lindsay Laughlin    Procedure Summary       Date: 05/17/23 Room / Location: [facility] ENDOSCOPY 5 / [facility]    Anesthesia Start: 1100 Anesthesia Stop: 1118    Procedures:       ESOPHAGOGASTRODUODENOSCOPY WITH ANESTHESIA      COLONOSCOPY WITH ANESTHESIA Diagnosis:       Abnormal finding on GI tract imaging      Weight loss      Generalized abdominal pain      (Abnormal finding on GI tract imaging [R93.3])      (Weight loss [R63.4])      (Generalized abdominal pain [R10.84])    Surgeons: Tamy Smith MD Provider: Pato Purcell CRNA    Anesthesia Type: MAC ASA Status: 2            Anesthesia Type: MAC    Vitals  Vitals Value Taken Time   /61 05/17/23 1136   Temp     Pulse 87 05/17/23 1137   Resp 25 05/17/23 1135   SpO2 100 % 05/17/23 1137   Vitals shown include unvalidated device data.        Post Anesthesia Care and Evaluation    Patient location during evaluation: PHASE II  Patient participation: complete - patient participated  Level of consciousness: awake and alert  Pain score: 0  Pain management: adequate    Airway patency: patent  Anesthetic complications: No anesthetic complications    Cardiovascular status: acceptable  Respiratory status: acceptable  Hydration status: acceptable

## 2023-07-05 RX ORDER — FUROSEMIDE 20 MG/1
20 TABLET ORAL DAILY
Qty: 30 TABLET | Refills: 5 | Status: SHIPPED | OUTPATIENT
Start: 2023-07-05

## 2023-07-05 RX ORDER — FLUTICASONE PROPIONATE 50 MCG
SPRAY, SUSPENSION (ML) NASAL
Qty: 16 G | Refills: 5 | Status: SHIPPED | OUTPATIENT
Start: 2023-07-05

## 2023-08-24 ENCOUNTER — TELEPHONE (OUTPATIENT)
Dept: PHARMACY | Facility: CLINIC | Age: 68
End: 2023-08-24

## 2023-08-24 NOTE — TELEPHONE ENCOUNTER
TRIG 108 03/09/2023    HDL 72 03/09/2023    LDLCALC 30 03/09/2023    LDLDIRECT 101 05/19/2014     ALT   Date Value Ref Range Status   03/09/2023 11 5 - 33 U/L Final     AST   Date Value Ref Range Status   03/09/2023 12 5 - 32 U/L Final     The ASCVD Risk score (Alexandra DK, et al., 2019) failed to calculate for the following reasons: The valid total cholesterol range is 130 to 320 mg/dL     PLAN  The following are interventions that have been identified:   Patient overdue refilling Atorvastatin, Farxiga and Lisinopril and active on home medication list.   Outreach:  Pharmacy:  [facility]  [facility] [address] will fill 90 day supply of Atorvastatin, Farxiga and Lisinopril today 8/25/23 . Patient:  Walleriushart message sent to patient. Attempting to reach patient to review. Left message asking for return call.       Mary Orosco, PharmD, [facility], toll free: [phone] Option 2    For Pharmacy Admin Tracking Only    Program: Lucina in place:  No  Recommendation Provided To: Pharmacy: 3  Intervention Detail: Adherence Monitoring: 3  Intervention Accepted By: Pharmacy: 3  Gap Closed?: No   Time Spent (min): 20

## 2023-09-13 ENCOUNTER — TELEPHONE (OUTPATIENT)
Dept: PHARMACY | Facility: CLINIC | Age: 68
End: 2023-09-13

## 2023-09-13 NOTE — TELEPHONE ENCOUNTER
[facility] CLINICAL PHARMACY: ADHERENCE REVIEW  Identified care gap per [place]: fills at [facility]  [facility] [address] : ACE/ARB and Statin adherence    Patient also appears to be prescribed: Diabetes    ASSESSMENT    ACE/ARB ADHERENCE    Insurance Records claims through  23  (Prior Year  [address] = not reported; YTD [address] [place] [address] Street = 70%; Potential Fail Date: 23):   LISINOPRIL TAB 20 MG last filled on 23 for 100 day supply. Next refill due: 23 -PAST DUE 54 DAYS     Prescribed si tablet/capsule daily    Per Insurer Portal: last filled on 23 for 100 day supply. Per [address] [place] [address]: last picked up on 23 for 100 day supply. will get 100 day supply ready to  since past due. Billed through [place] ([facility]). 0 refills remaining. for a $0 co pay. BP Readings from Last 3 Encounters:   23 136/86   23 130/80   21 136/84     Estimated Creatinine Clearance: 28 mL/min (A) (based on SCr of 2.3 mg/dL (H)). Lab Results   Component Value Date    CREATININE 2.3 (H) 2023     Lab Results   Component Value Date    K 4.2 2023     DIABETES ADHERENCE    Insurance Records claims through  23  (Prior [address] = not reported; YTD PDC = 64% - FAILED):   FARXIGA TAB 10 MG last filled on 23 for 90 day supply. Next refill due: 23 -PAST DUE 64 DAYS     Prescribed si tablet/capsule daily    Per Insurer Portal: last filled on 23 for 90 day supply. Lab Results   Component Value Date    LABA1C 11.0 (H) 2023    LABA1C 10.1 (H) 2022    LABA1C 7.7 (H) 2021       STATIN ADHERENCE    Insurance Records claims through  23  (Prior Year  [address] = not reported;  [address] = 70%; Potential Fail Date: 23):   ATORVASTATIN TAB 10 MG last filled on 23 for 100 day supply. Next refill due: 23 -PAST DUE 54 DAYS     Prescribed si tablet/capsule daily    Per Insurer Portal: last filled on 23 for 100 day supply.      Per [place]

## 2024-03-21 PROBLEM — R57.1 HYPOVOLEMIC SHOCK (HCC): Status: RESOLVED | Noted: 2018-08-06 | Resolved: 2024-03-21

## 2024-03-21 PROBLEM — K55.069: Status: RESOLVED | Noted: 2018-08-07 | Resolved: 2024-03-21

## 2024-03-21 PROBLEM — N17.9 ACUTE RENAL FAILURE (ARF): Status: RESOLVED | Noted: 2018-08-06 | Resolved: 2024-03-21

## 2025-02-24 ENCOUNTER — HOSPITAL ENCOUNTER (EMERGENCY)
Facility: HOSPITAL | Age: 70
Discharge: HOME | End: 2025-02-24
Attending: EMERGENCY MEDICINE | Admitting: EMERGENCY MEDICINE
Payer: MEDICARE

## 2025-02-24 ENCOUNTER — APPOINTMENT (OUTPATIENT)
Dept: GENERAL RADIOLOGY | Facility: HOSPITAL | Age: 70
End: 2025-02-24
Payer: MEDICARE

## 2025-02-24 VITALS
RESPIRATION RATE: 20 BRPM | WEIGHT: 204 LBS | DIASTOLIC BLOOD PRESSURE: 83 MMHG | OXYGEN SATURATION: 97 % | SYSTOLIC BLOOD PRESSURE: 128 MMHG | TEMPERATURE: 98.1 F | HEIGHT: 65 IN | BODY MASS INDEX: 33.99 KG/M2 | HEART RATE: 101 BPM

## 2025-02-24 DIAGNOSIS — E87.6 HYPOKALEMIA: ICD-10-CM

## 2025-02-24 DIAGNOSIS — E83.42 HYPOMAGNESEMIA: ICD-10-CM

## 2025-02-24 DIAGNOSIS — K52.9 GASTROENTERITIS: Primary | ICD-10-CM

## 2025-02-24 LAB
ALBUMIN SERPL-MCNC: 3.7 G/DL (ref 3.5–5.2)
ALBUMIN/GLOB SERPL: 1.2 G/DL
ALP SERPL-CCNC: 127 U/L (ref 39–117)
ALT SERPL W P-5'-P-CCNC: 12 U/L (ref 1–33)
ANION GAP SERPL CALCULATED.3IONS-SCNC: 15 MMOL/L (ref 5–15)
AST SERPL-CCNC: 14 U/L (ref 1–32)
BASOPHILS # BLD AUTO: 0.02 10*3/MM3 (ref 0–0.2)
BASOPHILS NFR BLD AUTO: 0.2 % (ref 0–1.5)
BILIRUB SERPL-MCNC: 0.6 MG/DL (ref 0–1.2)
BUN SERPL-MCNC: 19 MG/DL (ref 8–23)
BUN/CREAT SERPL: 9.4 (ref 7–25)
CALCIUM SPEC-SCNC: 7.5 MG/DL (ref 8.6–10.5)
CHLORIDE SERPL-SCNC: 102 MMOL/L (ref 98–107)
CO2 SERPL-SCNC: 19 MMOL/L (ref 22–29)
CREAT SERPL-MCNC: 2.02 MG/DL (ref 0.57–1)
DEPRECATED RDW RBC AUTO: 50.8 FL (ref 37–54)
EGFRCR SERPLBLD CKD-EPI 2021: 26.1 ML/MIN/1.73
EOSINOPHIL # BLD AUTO: 0.01 10*3/MM3 (ref 0–0.4)
EOSINOPHIL NFR BLD AUTO: 0.1 % (ref 0.3–6.2)
ERYTHROCYTE [DISTWIDTH] IN BLOOD BY AUTOMATED COUNT: 14.6 % (ref 12.3–15.4)
FLUAV RNA RESP QL NAA+PROBE: NOT DETECTED
FLUBV RNA RESP QL NAA+PROBE: NOT DETECTED
GLOBULIN UR ELPH-MCNC: 3 GM/DL
GLUCOSE SERPL-MCNC: 369 MG/DL (ref 65–99)
HCT VFR BLD AUTO: 41.9 % (ref 34–46.6)
HGB BLD-MCNC: 13.3 G/DL (ref 12–15.9)
IMM GRANULOCYTES # BLD AUTO: 0.05 10*3/MM3 (ref 0–0.05)
IMM GRANULOCYTES NFR BLD AUTO: 0.4 % (ref 0–0.5)
LIPASE SERPL-CCNC: 19 U/L (ref 13–60)
LYMPHOCYTES # BLD AUTO: 0.28 10*3/MM3 (ref 0.7–3.1)
LYMPHOCYTES NFR BLD AUTO: 2.5 % (ref 19.6–45.3)
MAGNESIUM SERPL-MCNC: 0.9 MG/DL (ref 1.6–2.4)
MCH RBC QN AUTO: 30.1 PG (ref 26.6–33)
MCHC RBC AUTO-ENTMCNC: 31.7 G/DL (ref 31.5–35.7)
MCV RBC AUTO: 94.8 FL (ref 79–97)
MONOCYTES # BLD AUTO: 0.4 10*3/MM3 (ref 0.1–0.9)
MONOCYTES NFR BLD AUTO: 3.6 % (ref 5–12)
NEUTROPHILS NFR BLD AUTO: 10.4 10*3/MM3 (ref 1.7–7)
NEUTROPHILS NFR BLD AUTO: 93.2 % (ref 42.7–76)
NRBC BLD AUTO-RTO: 0 /100 WBC (ref 0–0.2)
PLATELET # BLD AUTO: 274 10*3/MM3 (ref 140–450)
PMV BLD AUTO: 10.4 FL (ref 6–12)
POTASSIUM SERPL-SCNC: 2.7 MMOL/L (ref 3.5–5.2)
PROT SERPL-MCNC: 6.7 G/DL (ref 6–8.5)
RBC # BLD AUTO: 4.42 10*6/MM3 (ref 3.77–5.28)
RSV RNA RESP QL NAA+PROBE: NOT DETECTED
SARS-COV-2 RNA RESP QL NAA+PROBE: NOT DETECTED
SODIUM SERPL-SCNC: 136 MMOL/L (ref 136–145)
WBC NRBC COR # BLD AUTO: 11.16 10*3/MM3 (ref 3.4–10.8)

## 2025-02-24 PROCEDURE — 25010000002 PROCHLORPERAZINE 10 MG/2ML SOLUTION: Performed by: FAMILY MEDICINE

## 2025-02-24 PROCEDURE — 25810000003 SODIUM CHLORIDE 0.9 % SOLUTION: Performed by: EMERGENCY MEDICINE

## 2025-02-24 PROCEDURE — 85025 COMPLETE CBC W/AUTO DIFF WBC: CPT | Performed by: EMERGENCY MEDICINE

## 2025-02-24 PROCEDURE — 71045 X-RAY EXAM CHEST 1 VIEW: CPT

## 2025-02-24 PROCEDURE — 96365 THER/PROPH/DIAG IV INF INIT: CPT

## 2025-02-24 PROCEDURE — 99283 EMERGENCY DEPT VISIT LOW MDM: CPT

## 2025-02-24 PROCEDURE — 80053 COMPREHEN METABOLIC PANEL: CPT | Performed by: EMERGENCY MEDICINE

## 2025-02-24 PROCEDURE — 96367 TX/PROPH/DG ADDL SEQ IV INF: CPT

## 2025-02-24 PROCEDURE — 25010000002 ONDANSETRON PER 1 MG: Performed by: EMERGENCY MEDICINE

## 2025-02-24 PROCEDURE — 25010000002 MAGNESIUM SULFATE 2 GM/50ML SOLUTION: Performed by: EMERGENCY MEDICINE

## 2025-02-24 PROCEDURE — 87637 SARSCOV2&INF A&B&RSV AMP PRB: CPT | Performed by: EMERGENCY MEDICINE

## 2025-02-24 PROCEDURE — 96375 TX/PRO/DX INJ NEW DRUG ADDON: CPT

## 2025-02-24 PROCEDURE — 25010000002 POTASSIUM CHLORIDE 10 MEQ/100ML SOLUTION: Performed by: EMERGENCY MEDICINE

## 2025-02-24 PROCEDURE — 83690 ASSAY OF LIPASE: CPT | Performed by: EMERGENCY MEDICINE

## 2025-02-24 PROCEDURE — 83735 ASSAY OF MAGNESIUM: CPT | Performed by: EMERGENCY MEDICINE

## 2025-02-24 RX ORDER — PROCHLORPERAZINE EDISYLATE 5 MG/ML
10 INJECTION INTRAMUSCULAR; INTRAVENOUS ONCE
Status: COMPLETED | OUTPATIENT
Start: 2025-02-24 | End: 2025-02-24

## 2025-02-24 RX ORDER — ONDANSETRON 2 MG/ML
4 INJECTION INTRAMUSCULAR; INTRAVENOUS ONCE
Status: COMPLETED | OUTPATIENT
Start: 2025-02-24 | End: 2025-02-24

## 2025-02-24 RX ORDER — IBUPROFEN 200 MG
200 TABLET ORAL EVERY 6 HOURS PRN
COMMUNITY

## 2025-02-24 RX ORDER — ONDANSETRON 4 MG/1
4 TABLET, ORALLY DISINTEGRATING ORAL EVERY 4 HOURS PRN
Qty: 10 TABLET | Refills: 0 | Status: SHIPPED | OUTPATIENT
Start: 2025-02-24

## 2025-02-24 RX ORDER — POTASSIUM CHLORIDE 7.45 MG/ML
10 INJECTION INTRAVENOUS ONCE
Status: COMPLETED | OUTPATIENT
Start: 2025-02-24 | End: 2025-02-24

## 2025-02-24 RX ORDER — SODIUM CHLORIDE 0.9 % (FLUSH) 0.9 %
10 SYRINGE (ML) INJECTION AS NEEDED
Status: DISCONTINUED | OUTPATIENT
Start: 2025-02-24 | End: 2025-02-24 | Stop reason: HOSPADM

## 2025-02-24 RX ORDER — MAGNESIUM SULFATE HEPTAHYDRATE 40 MG/ML
2 INJECTION, SOLUTION INTRAVENOUS ONCE
Status: COMPLETED | OUTPATIENT
Start: 2025-02-24 | End: 2025-02-24

## 2025-02-24 RX ADMIN — SODIUM CHLORIDE 500 ML: 9 INJECTION, SOLUTION INTRAVENOUS at 17:46

## 2025-02-24 RX ADMIN — MAGNESIUM SULFATE HEPTAHYDRATE 2 G: 2 INJECTION, SOLUTION INTRAVENOUS at 16:57

## 2025-02-24 RX ADMIN — SODIUM CHLORIDE 500 ML: 9 INJECTION, SOLUTION INTRAVENOUS at 15:59

## 2025-02-24 RX ADMIN — PROCHLORPERAZINE EDISYLATE 10 MG: 5 INJECTION INTRAMUSCULAR; INTRAVENOUS at 17:50

## 2025-02-24 RX ADMIN — ONDANSETRON 4 MG: 2 INJECTION INTRAMUSCULAR; INTRAVENOUS at 16:03

## 2025-02-24 RX ADMIN — POTASSIUM CHLORIDE 10 MEQ: 7.46 INJECTION, SOLUTION INTRAVENOUS at 17:45

## 2025-02-24 NOTE — ED PROVIDER NOTES
Subjective   History of Present Illness  Patient complains of recurrent vomiting and diarrhea that started yesterday.  She cannot seem to keep anything down according to her.  She says she has bad diarrhea anytime she is coughing she has had some cough that is nonproductive also.  She has had some chills but no definite fever.  Her granddaughters boyfriend had a similar illness but no one else that she knows of.  She has a bad headache and just feels bad all over.    History provided by:  Patient   used: No    Vomiting  The primary symptoms include abdominal pain, nausea, vomiting and diarrhea. The illness began yesterday. The problem has not changed since onset.  The illness is also significant for chills. The illness does not include anorexia, dysphagia, odynophagia, bloating, constipation, tenesmus, back pain or itching. Associated medical issues do not include inflammatory bowel disease, GERD, gallstones, liver disease, alcohol abuse, PUD, gastric bypass, bowel resection, irritable bowel syndrome, hemorrhoids or diverticulitis.       Review of Systems   Constitutional:  Positive for chills.   HENT: Negative.     Respiratory:  Positive for cough.    Cardiovascular: Negative.    Gastrointestinal:  Positive for abdominal pain, diarrhea, nausea and vomiting. Negative for anorexia, bloating, constipation and dysphagia.   Genitourinary: Negative.    Musculoskeletal: Negative.  Negative for back pain.   Skin: Negative.  Negative for itching.   Neurological: Negative.    Psychiatric/Behavioral: Negative.     All other systems reviewed and are negative.      Past Medical History:   Diagnosis Date    Acid reflux     Arthritis     High cholesterol     Hypertension     Neuropathy     Swollen lymph nodes     LEFT CERVICAL    Type 2 diabetes mellitus        No Known Allergies    Past Surgical History:   Procedure Laterality Date    CARPAL TUNNEL RELEASE Bilateral     CERVICAL LYMPH NODE BIOPSY/EXCISION  Left 2017    Procedure: LEFT NECK EXPLORATION WITH INCISIONAL BIOPSY/CULTURE;  Surgeon: Blue Sanchez MD;  Location: [facility] OR;  Service:      SECTION      COLON RESECTION      COLONOSCOPY N/A 2023    Procedure: COLONOSCOPY WITH ANESTHESIA;  Surgeon: Tamy Smith MD;  Location: [facility] ENDOSCOPY;  Service: Gastroenterology;  Laterality: N/A;  preop; abnormal GI scan   postop ; poor prep   PCP Ruth Noble MD    ENDOSCOPY N/A 2023    Procedure: ESOPHAGOGASTRODUODENOSCOPY WITH ANESTHESIA;  Surgeon: Tamy Smith MD;  Location: [facility] ENDOSCOPY;  Service: Gastroenterology;  Laterality: N/A;  preop; abnormal GI scan   postop esophagitis ; hiatal hernia  PCP Ruth Noble MD    EXCISION LESION      From back    REPLACEMENT TOTAL KNEE Left     TONSILLECTOMY         Family History   Problem Relation Age of Onset    No Known Problems Mother     Colon cancer Father         < 60 years old    Breast cancer Neg Hx        Social History     Socioeconomic History    Marital status:    Tobacco Use    Smoking status: Never    Smokeless tobacco: Never   Vaping Use    Vaping status: Never Used   Substance and Sexual Activity    Alcohol use: No    Drug use: No    Sexual activity: Defer       Prior to Admission medications    Medication Sig Start Date End Date Taking? Authorizing Provider   atorvastatin (LIPITOR) 10 MG tablet Take 1 tablet by mouth Daily. 20   Yuki Reyes DNP, APRN   celecoxib (CeleBREX) 200 MG capsule Take 1 capsule by mouth 2 (Two) Times a Day.    ProviderPriyanka MD   clotrimazole-betamethasone (Lotrisone) 1-0.05 % cream Apply  topically to the appropriate area as directed 2 (Two) Times a Day. 20   Yuki Reyes DNP, APRN   dapagliflozin Propanediol (Farxiga) 10 MG tablet Take 10 mg by mouth Daily.    ProviderPriyanka MD   diclofenac (VOLTAREN) 75 MG EC tablet Take 1 tablet by mouth 2 (Two) Times a Day.  1/29/20   Yuki Reyes DNP, APRN   furosemide (LASIX) 20 MG tablet Take 1 tablet by mouth 2 (Two) Times a Day.    Provider, MD Priyanka   lisinopril (PRINIVIL,ZESTRIL) 10 MG tablet Take 1 tablet by mouth Daily.  Patient taking differently: Take 2 tablets by mouth Daily. 1/29/20   Yuki Reyes DNP, APRN   pantoprazole (PROTONIX) 40 MG EC tablet Take 1 tablet by mouth 2 (Two) Times a Day Before Meals. 5/17/23   Tamy Smith MD   promethazine-dextromethorphan (PROMETHAZINE-DM) 6.25-15 MG/5ML syrup Take 5 mL by mouth 4 (Four) Times a Day As Needed for Cough. 2/25/20   Yuki Reyes DNP, APRN   sucralfate (Carafate) 1 g tablet Take 1 tablet by mouth 4 (Four) Times a Day. 5/17/23   Tamy Smith MD       Medications   sodium chloride 0.9 % flush 10 mL (has no administration in time range)   potassium chloride 10 mEq in 100 mL IVPB (has no administration in time range)   sodium chloride 0.9 % bolus 500 mL (0 mL Intravenous Stopped 2/24/25 1657)   ondansetron (ZOFRAN) injection 4 mg (4 mg Intravenous Given 2/24/25 1603)   magnesium sulfate 2g/50 mL (PREMIX) infusion (2 g Intravenous New Bag 2/24/25 1657)       Vitals:    02/24/25 1632   BP: 143/76   Pulse: 98   Resp:    Temp:    SpO2: 97%         Objective   Physical Exam  Vitals and nursing note reviewed.   Constitutional:       Appearance: Normal appearance.   HENT:      Head: Normocephalic and atraumatic.      Mouth/Throat:      Mouth: Mucous membranes are dry.      Pharynx: Oropharynx is clear.   Eyes:      Extraocular Movements: Extraocular movements intact.      Pupils: Pupils are equal, round, and reactive to light.   Cardiovascular:      Rate and Rhythm: Normal rate and regular rhythm.   Pulmonary:      Effort: Pulmonary effort is normal.      Breath sounds: Normal breath sounds.   Abdominal:      General: Bowel sounds are normal.      Palpations: Abdomen is soft.      Tenderness: There is abdominal tenderness.      Comments: There is  mild generalized tenderness but no localization.  There is no rebound or percussion tenderness.   Musculoskeletal:         General: Normal range of motion.   Skin:     General: Skin is warm and dry.      Coloration: Skin is pale.   Neurological:      General: No focal deficit present.      Mental Status: She is alert and oriented to person, place, and time.   Psychiatric:         Mood and Affect: Mood normal.         Behavior: Behavior normal.         Procedures         Lab Results (last 24 hours)       Procedure Component Value Units Date/Time    CBC & Differential [[id number]]  (Abnormal) Collected: 02/24/25 1557    Specimen: Blood Updated: 02/24/25 1609    Narrative:      The following orders were created for panel order CBC & Differential.  Procedure                               Abnormality         Status                     ---------                               -----------         ------                     CBC Auto Differential[[id number]]        Abnormal            Final result                 Please view results for these tests on the individual orders.    Comprehensive Metabolic Panel [[id number]]  (Abnormal) Collected: 02/24/25 1557    Specimen: Blood Updated: 02/24/25 1635     Glucose 369 mg/dL      BUN 19 mg/dL      Creatinine 2.02 mg/dL      Sodium 136 mmol/L      Potassium 2.7 mmol/L      Chloride 102 mmol/L      CO2 19.0 mmol/L      Calcium 7.5 mg/dL      Total Protein 6.7 g/dL      Albumin 3.7 g/dL      ALT (SGPT) 12 U/L      AST (SGOT) 14 U/L      Alkaline Phosphatase 127 U/L      Total Bilirubin 0.6 mg/dL      Globulin 3.0 gm/dL      A/G Ratio 1.2 g/dL      BUN/Creatinine Ratio 9.4     Anion Gap 15.0 mmol/L      eGFR 26.1 mL/min/1.73     Narrative:      GFR Categories in Chronic Kidney Disease (CKD)      GFR Category          GFR (mL/min/1.73)    Interpretation  G1                     90 or greater         Normal or high (1)  G2                      60-89                Mild decrease (1)  G3a                    45-59                Mild to moderate decrease  G3b                   30-44                Moderate to severe decrease  G4                    15-29                Severe decrease  G5                    14 or less           Kidney failure          (1)In the absence of evidence of kidney disease, neither GFR category G1 or G2 fulfill the criteria for CKD.    eGFR calculation 2021 CKD-EPI creatinine equation, which does not include race as a factor    Lipase [[id number]]  (Normal) Collected: 02/24/25 1557    Specimen: Blood Updated: 02/24/25 1630     Lipase 19 U/L     COVID PRE-OP / PRE-PROCEDURE SCREENING ORDER (NO ISOLATION) - Swab, Nasopharynx [[id number]]  (Normal) Collected: 02/24/25 1557    Specimen: Swab from Nasopharynx Updated: 02/24/25 1703    Narrative:      The following orders were created for panel order COVID PRE-OP / PRE-PROCEDURE SCREENING ORDER (NO ISOLATION) - Swab, Nasopharynx.  Procedure                               Abnormality         Status                     ---------                               -----------         ------                     COVID-19, FLU A/B, RSV P...[[id number]]  Normal              Final result                 Please view results for these tests on the individual orders.    Magnesium [[id number]]  (Abnormal) Collected: 02/24/25 1557    Specimen: Blood Updated: 02/24/25 1646     Magnesium 0.9 mg/dL     CBC Auto Differential [[id number]]  (Abnormal) Collected: 02/24/25 1557    Specimen: Blood Updated: 02/24/25 1609     WBC 11.16 10*3/mm3      RBC 4.42 10*6/mm3      Hemoglobin 13.3 g/dL      Hematocrit 41.9 %      MCV 94.8 fL      MCH 30.1 pg      MCHC 31.7 g/dL      RDW 14.6 %      RDW-SD 50.8 fl      MPV 10.4 fL      Platelets 274 10*3/mm3      Neutrophil % 93.2 %      Lymphocyte % 2.5 %      Monocyte % 3.6 %      Eosinophil % 0.1 %      Basophil % 0.2 %      Immature Grans % 0.4 %      Neutrophils, Absolute 10.40 10*3/mm3      Lymphocytes, Absolute 0.28 10*3/mm3       Monocytes, Absolute 0.40 10*3/mm3      Eosinophils, Absolute 0.01 10*3/mm3      Basophils, Absolute 0.02 10*3/mm3      Immature Grans, Absolute 0.05 10*3/mm3      nRBC 0.0 /100 WBC     COVID-19, FLU A/B, RSV PCR 1 HR TAT - Swab, Nasopharynx [[id number]]  (Normal) Collected: 02/24/25 1557    Specimen: Swab from Nasopharynx Updated: 02/24/25 1703     COVID19 Not Detected     Influenza A PCR Not Detected     Influenza B PCR Not Detected     RSV, PCR Not Detected    Narrative:      Fact sheet for providers: https://www.fda.gov/media/350663/download    Fact sheet for patients: https://www.[place].gov/media/821301/download    Test performed by PCR.            XR Chest 1 View   Final Result   1. No acute disease.                       This report was signed and finalized on 2/24/2025 4:52 PM by Dr. Leonard Rogers MD.              ED Course          MDM  Number of Diagnoses or Management Options  Gastroenteritis: new and requires workup  Hypokalemia: new and requires workup  Hypomagnesemia: new and requires workup  Diagnosis management comments: I told the patient her magnesium and her potassium are low but we are replacing those.  She may have a little dehydration but little worsening of her creatinine in the past but not terribly bad.  I did offer to put her in the hospital because of all of these things but together think she will needs more IV fluids but she does not want to be in the hospital.  She wants to be treated at home.  I will give her that option.  Once we finished these infusions she will be allowed to go home with a prescription for something to stop the nausea and vomiting.  She is discharged in stable condition.       Amount and/or Complexity of Data Reviewed  Clinical lab tests: ordered and reviewed  Decide to obtain previous medical records or to obtain history from someone other than the patient: yes    Risk of Complications, Morbidity, and/or Mortality  Presenting problems: moderate  Diagnostic  procedures: moderate  Management options: moderate    Patient Progress  Patient progress: stable        Final diagnoses:   Gastroenteritis   Hypomagnesemia   Hypokalemia          Nishant Hdz Jr., MD  02/24/25 1679

## 2025-07-03 ENCOUNTER — APPOINTMENT (OUTPATIENT)
Dept: GENERAL RADIOLOGY | Facility: HOSPITAL | Age: 70
DRG: 674 | End: 2025-07-03
Payer: MEDICARE

## 2025-07-03 ENCOUNTER — HOSPITAL ENCOUNTER (INPATIENT)
Facility: HOSPITAL | Age: 70
LOS: 11 days | Discharge: NURSING, REHAB OR LONG-TERM CARE | DRG: 674 | End: 2025-07-14
Attending: INTERNAL MEDICINE | Admitting: INTERNAL MEDICINE
Payer: MEDICARE

## 2025-07-03 ENCOUNTER — APPOINTMENT (OUTPATIENT)
Dept: CT IMAGING | Facility: HOSPITAL | Age: 70
DRG: 674 | End: 2025-07-03
Payer: MEDICARE

## 2025-07-03 DIAGNOSIS — G62.9 NEUROPATHY: ICD-10-CM

## 2025-07-03 DIAGNOSIS — E87.6 HYPOKALEMIA: ICD-10-CM

## 2025-07-03 DIAGNOSIS — G89.29 CHRONIC LOW BACK PAIN, UNSPECIFIED BACK PAIN LATERALITY, UNSPECIFIED WHETHER SCIATICA PRESENT: ICD-10-CM

## 2025-07-03 DIAGNOSIS — Z74.09 IMPAIRED MOBILITY: ICD-10-CM

## 2025-07-03 DIAGNOSIS — G89.29 OTHER CHRONIC PAIN: ICD-10-CM

## 2025-07-03 DIAGNOSIS — N17.9 ACUTE KIDNEY INJURY: ICD-10-CM

## 2025-07-03 DIAGNOSIS — E83.42 HYPOMAGNESEMIA: ICD-10-CM

## 2025-07-03 DIAGNOSIS — M54.50 CHRONIC LOW BACK PAIN, UNSPECIFIED BACK PAIN LATERALITY, UNSPECIFIED WHETHER SCIATICA PRESENT: ICD-10-CM

## 2025-07-03 DIAGNOSIS — E83.51 HYPOCALCEMIA: ICD-10-CM

## 2025-07-03 DIAGNOSIS — K52.9 ENTERITIS: Primary | ICD-10-CM

## 2025-07-03 DIAGNOSIS — N17.9 AKI (ACUTE KIDNEY INJURY): ICD-10-CM

## 2025-07-03 DIAGNOSIS — I48.0 PAROXYSMAL ATRIAL FIBRILLATION: ICD-10-CM

## 2025-07-03 LAB
ALBUMIN SERPL-MCNC: 2.9 G/DL (ref 3.5–5.2)
ALBUMIN/GLOB SERPL: 1.2 G/DL
ALP SERPL-CCNC: 82 U/L (ref 39–117)
ALT SERPL W P-5'-P-CCNC: 15 U/L (ref 1–33)
AMORPH URATE CRY URNS QL MICRO: ABNORMAL /HPF
AMYLASE SERPL-CCNC: 19 U/L (ref 28–100)
ANION GAP SERPL CALCULATED.3IONS-SCNC: 23 MMOL/L (ref 5–15)
AST SERPL-CCNC: 41 U/L (ref 1–32)
BACTERIA UR QL AUTO: ABNORMAL /HPF
BASOPHILS # BLD AUTO: 0.04 10*3/MM3 (ref 0–0.2)
BASOPHILS NFR BLD AUTO: 0.2 % (ref 0–1.5)
BILIRUB SERPL-MCNC: 0.3 MG/DL (ref 0–1.2)
BILIRUB UR QL STRIP: NEGATIVE
BUN SERPL-MCNC: 44.5 MG/DL (ref 8–23)
BUN/CREAT SERPL: 8.8 (ref 7–25)
CALCIUM SPEC-SCNC: 5.6 MG/DL (ref 8.6–10.5)
CHLORIDE SERPL-SCNC: 100 MMOL/L (ref 98–107)
CLARITY UR: CLEAR
CO2 SERPL-SCNC: 12 MMOL/L (ref 22–29)
COLOR UR: YELLOW
CREAT SERPL-MCNC: 5.04 MG/DL (ref 0.57–1)
CRP SERPL-MCNC: 11.05 MG/DL (ref 0–0.5)
D-LACTATE SERPL-SCNC: 1.7 MMOL/L (ref 0.5–2)
DEPRECATED RDW RBC AUTO: 64.1 FL (ref 37–54)
EGFRCR SERPLBLD CKD-EPI 2021: 8.7 ML/MIN/1.73
EOSINOPHIL # BLD AUTO: 0.02 10*3/MM3 (ref 0–0.4)
EOSINOPHIL NFR BLD AUTO: 0.1 % (ref 0.3–6.2)
ERYTHROCYTE [DISTWIDTH] IN BLOOD BY AUTOMATED COUNT: 18.5 % (ref 12.3–15.4)
GLOBULIN UR ELPH-MCNC: 2.5 GM/DL
GLUCOSE SERPL-MCNC: 161 MG/DL (ref 65–99)
GLUCOSE UR STRIP-MCNC: NEGATIVE MG/DL
HCT VFR BLD AUTO: 36.2 % (ref 34–46.6)
HGB BLD-MCNC: 12.6 G/DL (ref 12–15.9)
HGB UR QL STRIP.AUTO: ABNORMAL
HYALINE CASTS UR QL AUTO: ABNORMAL /LPF
IMM GRANULOCYTES # BLD AUTO: 0.15 10*3/MM3 (ref 0–0.05)
IMM GRANULOCYTES NFR BLD AUTO: 0.6 % (ref 0–0.5)
KETONES UR QL STRIP: ABNORMAL
LEUKOCYTE ESTERASE UR QL STRIP.AUTO: NEGATIVE
LIPASE SERPL-CCNC: 21 U/L (ref 13–60)
LYMPHOCYTES # BLD AUTO: 1.24 10*3/MM3 (ref 0.7–3.1)
LYMPHOCYTES NFR BLD AUTO: 4.9 % (ref 19.6–45.3)
MAGNESIUM SERPL-MCNC: 0.8 MG/DL (ref 1.6–2.4)
MCH RBC QN AUTO: 32.9 PG (ref 26.6–33)
MCHC RBC AUTO-ENTMCNC: 34.8 G/DL (ref 31.5–35.7)
MCV RBC AUTO: 94.5 FL (ref 79–97)
MONOCYTES # BLD AUTO: 1.31 10*3/MM3 (ref 0.1–0.9)
MONOCYTES NFR BLD AUTO: 5.2 % (ref 5–12)
NEUTROPHILS NFR BLD AUTO: 22.36 10*3/MM3 (ref 1.7–7)
NEUTROPHILS NFR BLD AUTO: 89 % (ref 42.7–76)
NITRITE UR QL STRIP: NEGATIVE
NRBC BLD AUTO-RTO: 0 /100 WBC (ref 0–0.2)
PH UR STRIP.AUTO: 5.5 [PH] (ref 5–8)
PLATELET # BLD AUTO: 233 10*3/MM3 (ref 140–450)
PMV BLD AUTO: 10.6 FL (ref 6–12)
POTASSIUM SERPL-SCNC: 1.7 MMOL/L (ref 3.5–5.2)
PROT SERPL-MCNC: 5.4 G/DL (ref 6–8.5)
PROT UR QL STRIP: ABNORMAL
RBC # BLD AUTO: 3.83 10*6/MM3 (ref 3.77–5.28)
RBC # UR STRIP: ABNORMAL /HPF
REF LAB TEST METHOD: ABNORMAL
SODIUM SERPL-SCNC: 135 MMOL/L (ref 136–145)
SP GR UR STRIP: 1.01 (ref 1–1.03)
SQUAMOUS #/AREA URNS HPF: ABNORMAL /HPF
UROBILINOGEN UR QL STRIP: ABNORMAL
WBC # UR STRIP: ABNORMAL /HPF
WBC NRBC COR # BLD AUTO: 25.12 10*3/MM3 (ref 3.4–10.8)

## 2025-07-03 PROCEDURE — 93005 ELECTROCARDIOGRAM TRACING: CPT | Performed by: STUDENT IN AN ORGANIZED HEALTH CARE EDUCATION/TRAINING PROGRAM

## 2025-07-03 PROCEDURE — 25010000002 CEFTRIAXONE PER 250 MG: Performed by: NURSE PRACTITIONER

## 2025-07-03 PROCEDURE — 83690 ASSAY OF LIPASE: CPT | Performed by: STUDENT IN AN ORGANIZED HEALTH CARE EDUCATION/TRAINING PROGRAM

## 2025-07-03 PROCEDURE — 25010000002 MORPHINE PER 10 MG: Performed by: INTERNAL MEDICINE

## 2025-07-03 PROCEDURE — 25010000002 METRONIDAZOLE 500 MG/100ML SOLUTION: Performed by: NURSE PRACTITIONER

## 2025-07-03 PROCEDURE — 87040 BLOOD CULTURE FOR BACTERIA: CPT | Performed by: NURSE PRACTITIONER

## 2025-07-03 PROCEDURE — P9612 CATHETERIZE FOR URINE SPEC: HCPCS

## 2025-07-03 PROCEDURE — 25810000003 LACTATED RINGERS SOLUTION: Performed by: NURSE PRACTITIONER

## 2025-07-03 PROCEDURE — 85025 COMPLETE CBC W/AUTO DIFF WBC: CPT | Performed by: STUDENT IN AN ORGANIZED HEALTH CARE EDUCATION/TRAINING PROGRAM

## 2025-07-03 PROCEDURE — 80053 COMPREHEN METABOLIC PANEL: CPT | Performed by: STUDENT IN AN ORGANIZED HEALTH CARE EDUCATION/TRAINING PROGRAM

## 2025-07-03 PROCEDURE — 25010000002 MAGNESIUM SULFATE 2 GM/50ML SOLUTION: Performed by: STUDENT IN AN ORGANIZED HEALTH CARE EDUCATION/TRAINING PROGRAM

## 2025-07-03 PROCEDURE — 83735 ASSAY OF MAGNESIUM: CPT | Performed by: STUDENT IN AN ORGANIZED HEALTH CARE EDUCATION/TRAINING PROGRAM

## 2025-07-03 PROCEDURE — 25010000002 ONDANSETRON PER 1 MG: Performed by: STUDENT IN AN ORGANIZED HEALTH CARE EDUCATION/TRAINING PROGRAM

## 2025-07-03 PROCEDURE — 94761 N-INVAS EAR/PLS OXIMETRY MLT: CPT

## 2025-07-03 PROCEDURE — 99285 EMERGENCY DEPT VISIT HI MDM: CPT

## 2025-07-03 PROCEDURE — 81001 URINALYSIS AUTO W/SCOPE: CPT | Performed by: STUDENT IN AN ORGANIZED HEALTH CARE EDUCATION/TRAINING PROGRAM

## 2025-07-03 PROCEDURE — 93010 ELECTROCARDIOGRAM REPORT: CPT | Performed by: HOSPITALIST

## 2025-07-03 PROCEDURE — 25010000002 CALCIUM GLUCONATE-NACL 1-0.675 GM/50ML-% SOLUTION: Performed by: STUDENT IN AN ORGANIZED HEALTH CARE EDUCATION/TRAINING PROGRAM

## 2025-07-03 PROCEDURE — 25010000002 HEPARIN (PORCINE) PER 1000 UNITS: Performed by: NURSE PRACTITIONER

## 2025-07-03 PROCEDURE — 94799 UNLISTED PULMONARY SVC/PX: CPT

## 2025-07-03 PROCEDURE — 73030 X-RAY EXAM OF SHOULDER: CPT

## 2025-07-03 PROCEDURE — 86140 C-REACTIVE PROTEIN: CPT | Performed by: STUDENT IN AN ORGANIZED HEALTH CARE EDUCATION/TRAINING PROGRAM

## 2025-07-03 PROCEDURE — 71045 X-RAY EXAM CHEST 1 VIEW: CPT

## 2025-07-03 PROCEDURE — 82150 ASSAY OF AMYLASE: CPT | Performed by: STUDENT IN AN ORGANIZED HEALTH CARE EDUCATION/TRAINING PROGRAM

## 2025-07-03 PROCEDURE — 83605 ASSAY OF LACTIC ACID: CPT | Performed by: STUDENT IN AN ORGANIZED HEALTH CARE EDUCATION/TRAINING PROGRAM

## 2025-07-03 PROCEDURE — 25810000003 SODIUM CHLORIDE 0.9 % SOLUTION: Performed by: STUDENT IN AN ORGANIZED HEALTH CARE EDUCATION/TRAINING PROGRAM

## 2025-07-03 PROCEDURE — 25010000002 POTASSIUM CHLORIDE 10 MEQ/100ML SOLUTION: Performed by: STUDENT IN AN ORGANIZED HEALTH CARE EDUCATION/TRAINING PROGRAM

## 2025-07-03 PROCEDURE — 74176 CT ABD & PELVIS W/O CONTRAST: CPT

## 2025-07-03 RX ORDER — MAGNESIUM SULFATE HEPTAHYDRATE 40 MG/ML
2 INJECTION, SOLUTION INTRAVENOUS ONCE
Status: COMPLETED | OUTPATIENT
Start: 2025-07-03 | End: 2025-07-03

## 2025-07-03 RX ORDER — ONDANSETRON 2 MG/ML
4 INJECTION INTRAMUSCULAR; INTRAVENOUS ONCE
Status: COMPLETED | OUTPATIENT
Start: 2025-07-03 | End: 2025-07-03

## 2025-07-03 RX ORDER — ONDANSETRON 2 MG/ML
4 INJECTION INTRAMUSCULAR; INTRAVENOUS EVERY 6 HOURS PRN
Status: DISCONTINUED | OUTPATIENT
Start: 2025-07-03 | End: 2025-07-09 | Stop reason: SDUPTHER

## 2025-07-03 RX ORDER — AMOXICILLIN 250 MG
2 CAPSULE ORAL 2 TIMES DAILY
Status: DISCONTINUED | OUTPATIENT
Start: 2025-07-03 | End: 2025-07-14 | Stop reason: HOSPADM

## 2025-07-03 RX ORDER — BISACODYL 10 MG
10 SUPPOSITORY, RECTAL RECTAL DAILY PRN
Status: DISCONTINUED | OUTPATIENT
Start: 2025-07-03 | End: 2025-07-14 | Stop reason: HOSPADM

## 2025-07-03 RX ORDER — ACETAMINOPHEN 650 MG/1
650 SUPPOSITORY RECTAL EVERY 4 HOURS PRN
Status: DISCONTINUED | OUTPATIENT
Start: 2025-07-03 | End: 2025-07-14 | Stop reason: HOSPADM

## 2025-07-03 RX ORDER — POLYETHYLENE GLYCOL 3350 17 G/17G
17 POWDER, FOR SOLUTION ORAL DAILY PRN
Status: DISCONTINUED | OUTPATIENT
Start: 2025-07-03 | End: 2025-07-14 | Stop reason: HOSPADM

## 2025-07-03 RX ORDER — SODIUM CHLORIDE 9 MG/ML
40 INJECTION, SOLUTION INTRAVENOUS AS NEEDED
Status: DISCONTINUED | OUTPATIENT
Start: 2025-07-03 | End: 2025-07-14 | Stop reason: HOSPADM

## 2025-07-03 RX ORDER — METRONIDAZOLE 500 MG/100ML
500 INJECTION, SOLUTION INTRAVENOUS EVERY 8 HOURS
Status: DISCONTINUED | OUTPATIENT
Start: 2025-07-03 | End: 2025-07-06

## 2025-07-03 RX ORDER — SODIUM CHLORIDE 0.9 % (FLUSH) 0.9 %
10 SYRINGE (ML) INJECTION AS NEEDED
Status: DISCONTINUED | OUTPATIENT
Start: 2025-07-03 | End: 2025-07-12

## 2025-07-03 RX ORDER — SODIUM CHLORIDE 0.9 % (FLUSH) 0.9 %
10 SYRINGE (ML) INJECTION EVERY 12 HOURS SCHEDULED
Status: DISCONTINUED | OUTPATIENT
Start: 2025-07-03 | End: 2025-07-14 | Stop reason: HOSPADM

## 2025-07-03 RX ORDER — HEPARIN SODIUM 5000 [USP'U]/ML
5000 INJECTION, SOLUTION INTRAVENOUS; SUBCUTANEOUS EVERY 12 HOURS SCHEDULED
Status: DISCONTINUED | OUTPATIENT
Start: 2025-07-03 | End: 2025-07-08

## 2025-07-03 RX ORDER — CALCIUM GLUCONATE 20 MG/ML
1000 INJECTION, SOLUTION INTRAVENOUS ONCE
Status: COMPLETED | OUTPATIENT
Start: 2025-07-03 | End: 2025-07-03

## 2025-07-03 RX ORDER — CHLORHEXIDINE GLUCONATE 500 MG/1
1 CLOTH TOPICAL ONCE
Status: COMPLETED | OUTPATIENT
Start: 2025-07-03 | End: 2025-07-03

## 2025-07-03 RX ORDER — POTASSIUM CHLORIDE 7.45 MG/ML
10 INJECTION INTRAVENOUS ONCE
Status: COMPLETED | OUTPATIENT
Start: 2025-07-03 | End: 2025-07-03

## 2025-07-03 RX ORDER — ACETAMINOPHEN 325 MG/1
650 TABLET ORAL EVERY 4 HOURS PRN
Status: DISCONTINUED | OUTPATIENT
Start: 2025-07-03 | End: 2025-07-14 | Stop reason: HOSPADM

## 2025-07-03 RX ORDER — POTASSIUM CHLORIDE 29.8 MG/ML
20 INJECTION INTRAVENOUS ONCE
Status: DISCONTINUED | OUTPATIENT
Start: 2025-07-03 | End: 2025-07-03

## 2025-07-03 RX ORDER — BISACODYL 5 MG/1
5 TABLET, DELAYED RELEASE ORAL DAILY PRN
Status: DISCONTINUED | OUTPATIENT
Start: 2025-07-03 | End: 2025-07-14 | Stop reason: HOSPADM

## 2025-07-03 RX ORDER — SODIUM CHLORIDE 0.9 % (FLUSH) 0.9 %
10 SYRINGE (ML) INJECTION AS NEEDED
Status: DISCONTINUED | OUTPATIENT
Start: 2025-07-03 | End: 2025-07-14 | Stop reason: HOSPADM

## 2025-07-03 RX ORDER — CALCIUM GLUCONATE 98 MG/ML
1 INJECTION, SOLUTION INTRAVENOUS ONCE
Status: DISCONTINUED | OUTPATIENT
Start: 2025-07-03 | End: 2025-07-03 | Stop reason: SDUPTHER

## 2025-07-03 RX ORDER — MORPHINE SULFATE 2 MG/ML
2 INJECTION, SOLUTION INTRAMUSCULAR; INTRAVENOUS ONCE
Status: COMPLETED | OUTPATIENT
Start: 2025-07-03 | End: 2025-07-03

## 2025-07-03 RX ORDER — NITROGLYCERIN 0.4 MG/1
0.4 TABLET SUBLINGUAL
Status: DISCONTINUED | OUTPATIENT
Start: 2025-07-03 | End: 2025-07-14 | Stop reason: HOSPADM

## 2025-07-03 RX ORDER — POTASSIUM CHLORIDE 1500 MG/1
40 TABLET, EXTENDED RELEASE ORAL ONCE
Status: COMPLETED | OUTPATIENT
Start: 2025-07-03 | End: 2025-07-03

## 2025-07-03 RX ORDER — NITROGLYCERIN 0.4 MG/1
0.4 TABLET SUBLINGUAL
Status: DISCONTINUED | OUTPATIENT
Start: 2025-07-03 | End: 2025-07-03

## 2025-07-03 RX ORDER — CHLORHEXIDINE GLUCONATE 500 MG/1
1 CLOTH TOPICAL EVERY 24 HOURS
Status: DISCONTINUED | OUTPATIENT
Start: 2025-07-04 | End: 2025-07-08

## 2025-07-03 RX ADMIN — CHLORHEXIDINE GLUCONATE 1 APPLICATION: 500 CLOTH TOPICAL at 21:00

## 2025-07-03 RX ADMIN — POTASSIUM CHLORIDE 10 MEQ: 7.46 INJECTION, SOLUTION INTRAVENOUS at 19:46

## 2025-07-03 RX ADMIN — Medication 1 APPLICATION: at 21:15

## 2025-07-03 RX ADMIN — MORPHINE SULFATE 2 MG: 2 INJECTION, SOLUTION INTRAMUSCULAR; INTRAVENOUS at 18:38

## 2025-07-03 RX ADMIN — ONDANSETRON 4 MG: 2 INJECTION, SOLUTION INTRAMUSCULAR; INTRAVENOUS at 16:29

## 2025-07-03 RX ADMIN — SODIUM CHLORIDE, POTASSIUM CHLORIDE, SODIUM LACTATE AND CALCIUM CHLORIDE 1000 ML: 600; 310; 30; 20 INJECTION, SOLUTION INTRAVENOUS at 21:15

## 2025-07-03 RX ADMIN — ACETAMINOPHEN 650 MG: 325 TABLET ORAL at 22:42

## 2025-07-03 RX ADMIN — METRONIDAZOLE 500 MG: 500 INJECTION, SOLUTION INTRAVENOUS at 21:16

## 2025-07-03 RX ADMIN — HEPARIN SODIUM 5000 UNITS: 5000 INJECTION INTRAVENOUS; SUBCUTANEOUS at 21:15

## 2025-07-03 RX ADMIN — SODIUM CHLORIDE 1000 ML: 9 INJECTION, SOLUTION INTRAVENOUS at 16:30

## 2025-07-03 RX ADMIN — CALCIUM GLUCONATE 1000 MG: 20 INJECTION, SOLUTION INTRAVENOUS at 18:17

## 2025-07-03 RX ADMIN — POTASSIUM CHLORIDE 40 MEQ: 1500 TABLET, EXTENDED RELEASE ORAL at 21:15

## 2025-07-03 RX ADMIN — CEFTRIAXONE 2000 MG: 2 INJECTION, POWDER, FOR SOLUTION INTRAMUSCULAR; INTRAVENOUS at 21:14

## 2025-07-03 RX ADMIN — MAGNESIUM SULFATE HEPTAHYDRATE 2 G: 40 INJECTION, SOLUTION INTRAVENOUS at 17:48

## 2025-07-03 RX ADMIN — Medication 10 ML: at 21:16

## 2025-07-03 NOTE — ED PROVIDER NOTES
Subjective   History of Present Illness  States for the last week she has had constant diffuse abdominal pain, nausea, vomiting, and diarrhea.  She states the symptoms have not improved despite conservative measures.  She denies any melena, hematochezia, fever, chills, chest pain, shortness of breath, cough, dysuria, hematuria.  She states abdominal pain is constant and does not radiate.  She does not give a description of the pain at this time.  She has a past medical history of GERD, type 2 diabetes, colon resection, .        Review of Systems   Gastrointestinal:  Positive for abdominal pain, diarrhea, nausea and vomiting.   All other systems reviewed and are negative.      Past Medical History:   Diagnosis Date    Acid reflux     Arthritis     High cholesterol     Hypertension     Neuropathy     Swollen lymph nodes     LEFT CERVICAL    Type 2 diabetes mellitus        No Known Allergies    Past Surgical History:   Procedure Laterality Date    CARPAL TUNNEL RELEASE Bilateral     CERVICAL LYMPH NODE BIOPSY/EXCISION Left 2017    Procedure: LEFT NECK EXPLORATION WITH INCISIONAL BIOPSY/CULTURE;  Surgeon: Blue Sanchez MD;  Location: [facility] OR;  Service:      SECTION      COLON RESECTION      COLONOSCOPY N/A 2023    Procedure: COLONOSCOPY WITH ANESTHESIA;  Surgeon: Tamy Smith MD;  Location: [facility] ENDOSCOPY;  Service: Gastroenterology;  Laterality: N/A;  preop; abnormal GI scan   postop ; poor prep   PCP Ruth Noble MD    ENDOSCOPY N/A 2023    Procedure: ESOPHAGOGASTRODUODENOSCOPY WITH ANESTHESIA;  Surgeon: Tamy Smith MD;  Location: [facility] ENDOSCOPY;  Service: Gastroenterology;  Laterality: N/A;  preop; abnormal GI scan   postop esophagitis ; hiatal hernia  PCP Ruth Noble MD    EXCISION LESION      From back    REPLACEMENT TOTAL KNEE Left     TONSILLECTOMY         Family History   Problem Relation Age of Onset    No Known Problems  Mother     Colon cancer Father         < 60 years old    Breast cancer Neg Hx        Social History     Socioeconomic History    Marital status:    Tobacco Use    Smoking status: Never    Smokeless tobacco: Never   Vaping Use    Vaping status: Never Used   Substance and Sexual Activity    Alcohol use: No    Drug use: No    Sexual activity: Defer           Objective   Physical Exam  Vitals and nursing note reviewed.   Constitutional:       General: She is not in acute distress.     Appearance: She is well-developed. She is obese. She is ill-appearing. She is not toxic-appearing or diaphoretic.   HENT:      Head: Normocephalic and atraumatic.   Cardiovascular:      Rate and Rhythm: Normal rate.   Pulmonary:      Effort: Pulmonary effort is normal.   Abdominal:      General: There is distension.      Palpations: Abdomen is soft.      Tenderness: There is generalized abdominal tenderness.   Skin:     General: Skin is warm and dry.   Neurological:      Mental Status: She is alert.   Psychiatric:         Mood and Affect: Mood normal.         Behavior: Behavior normal.         Procedures           ED Course  ED Course as of 25 1851   Thu 2025   1828 Corrected calcium is 6.5 [KT]      ED Course User Index  [KT] London Leiva PA-C                                                       Medical Decision Making  States for the last week she has had constant diffuse abdominal pain, nausea, vomiting, and diarrhea.  She states the symptoms have not improved despite conservative measures.  She denies any melena, hematochezia, fever, chills, chest pain, shortness of breath, cough, dysuria, hematuria.  She states abdominal pain is constant and does not radiate.  She does not give a description of the pain at this time.  She has a past medical history of GERD, type 2 diabetes, colon resection, .    DDX: Viral gastroenteritis, colitis, diverticulitis, gastritis, pancreatitis, electrolyte abnormality,  UTI.     ECG 12 Lead Electrolyte Imbalance   Preliminary Result    Test Reason : Electrolyte Imbalance    Blood Pressure :   */*   mmHG    Vent. Rate :  89 BPM     Atrial Rate :   * BPM       P-R Int :   * ms          QRS Dur :  86 ms        QT Int : 582 ms       P-R-T Axes :   *  24  84 degrees      QTcB Int : 708 ms        ** Critical Test Result: Long QTc    Accelerated Junctional rhythm    ST & T wave abnormality, consider lateral ischemia    Abnormal ECG    When compared with ECG of 28-Jul-2017 08:47,    Junctional rhythm has replaced Sinus rhythm    ST now depressed in Anterior leads    Nonspecific T wave abnormality now evident in Inferior leads    T wave inversion now evident in Lateral leads    QT has lengthened        Referred By:            Confirmed By:     Labs Reviewed  COMPREHENSIVE METABOLIC PANEL - Abnormal; Notable for the following components:     Glucose                       161 (*)                BUN                           44.5 (*)               Creatinine                    5.04 (*)               Sodium                        135 (*)                Potassium                     1.7 (*)                CO2                           12.0 (*)               Calcium                       5.6 (*)                Total Protein                 5.4 (*)                Albumin                       2.9 (*)                AST (SGOT)                    41 (*)                 Anion Gap                     23.0 (*)               eGFR                          8.7 (*)             All other components within normal limits         Narrative: GFR Categories in Chronic Kidney Disease (CKD)                                              GFR Category          GFR (mL/min/1.73)    Interpretation                  G1                    90 or greater        Normal or high (1)                  G2                    60-89                Mild decrease (1)                  G3a                   45-59                Mild to  moderate decrease                  G3b                   30-44                Moderate to severe decrease                  G4                    15-29                Severe decrease                  G5                    14 or less           Kidney failure                                    (1)In the absence of evidence of kidney disease, neither GFR category G1 or G2 fulfill the criteria for CKD.                                    eGFR calculation 2021 CKD-EPI creatinine equation, which does not include race as a factor  AMYLASE - Abnormal; Notable for the following components:     Amylase                       19 (*)              All other components within normal limits  URINALYSIS W/ CULTURE IF INDICATED - Abnormal; Notable for the following components:     Ketones, UA                   Trace (*)               Blood, UA                     Large (3+) (*)               Protein, UA                     (*)               All other components within normal limits         Narrative: In absence of clinical symptoms, the presence of pyuria, bacteria, and/or nitrites on the urinalysis result does not correlate with infection.  C-REACTIVE PROTEIN - Abnormal; Notable for the following components:     C-Reactive Protein            11.05 (*)            All other components within normal limits  CBC WITH AUTO DIFFERENTIAL - Abnormal; Notable for the following components:     WBC                           25.12 (*)               RDW                           18.5 (*)               RDW-SD                        64.1 (*)               Neutrophil %                  89.0 (*)               Lymphocyte %                  4.9 (*)                Eosinophil %                  0.1 (*)                Immature Grans %              0.6 (*)                Neutrophils, Absolute         22.36 (*)               Monocytes, Absolute           1.31 (*)               Immature Grans, Absolute      0.15 (*)            All other components within  normal limits  MAGNESIUM - Abnormal; Notable for the following components:     Magnesium                     0.8 (*)             All other components within normal limits  URINALYSIS, MICROSCOPIC ONLY - Abnormal; Notable for the following components:     Squamous Epithelial Cells, UA   3-6 (*)             All other components within normal limits  LIPASE - Normal  LACTIC ACID, PLASMA - Normal  GASTROINTESTINAL PANEL, PCR (PREFERRED) DOES NOT INCLUDE CDIFF  CBC AND DIFFERENTIAL  CT Abdomen Pelvis Without Contrast   Final Result    1. Fluid retention within the small intestine and portions of the colon    compatible with the history of diarrhea, no evidence of bowel    obstruction, no bowel wall thickening. Correlate clinically for mild    enteritis or diarrheal type illness.    2. New small stone in the left renal pelvis, measuring 7 mm. No urinary    tract obstruction is identified.    3. Postoperative changes, including previous partial right colectomy,    appendectomy and there is stable dense material in the region of the    raine hepatis which may be related to previous surgical intervention.    The gallbladder is not visualized.    4. Stable accessory spleen measuring up to 4 cm.              This report was signed and finalized on 7/3/2025 6:19 PM by Dr. Venkata Muniz MD.        Spoke with NATTY Hu for ICU-she reviewed patient's labs and imaging.  Accepts admission for Dr. Alberts.    I discussed results with patient in the room, as well as need for admission.  Patient is agreeable to this at this time, and she has no complaints currently.  She is pleased with plan overall.  I discussed the plan with Dr. Raimrez, who is in agreement with the plan.      Amount and/or Complexity of Data Reviewed  Labs: ordered.  Radiology: ordered.  ECG/medicine tests: ordered.    Risk  OTC drugs.  Prescription drug management.        Final diagnoses:   None       ED Disposition  ED Disposition       None            No  follow-up provider specified.       Medication List      No changes were made to your prescriptions during this visit.            London Leiva PA-C  07/03/25 1754

## 2025-07-04 LAB
ANION GAP SERPL CALCULATED.3IONS-SCNC: 21 MMOL/L (ref 5–15)
ANION GAP SERPL CALCULATED.3IONS-SCNC: 23 MMOL/L (ref 5–15)
BASOPHILS # BLD AUTO: 0.03 10*3/MM3 (ref 0–0.2)
BASOPHILS NFR BLD AUTO: 0.2 % (ref 0–1.5)
BUN SERPL-MCNC: 41.4 MG/DL (ref 8–23)
BUN SERPL-MCNC: 42.7 MG/DL (ref 8–23)
BUN/CREAT SERPL: 8.8 (ref 7–25)
BUN/CREAT SERPL: 9.1 (ref 7–25)
CALCIUM SPEC-SCNC: 5.9 MG/DL (ref 8.6–10.5)
CALCIUM SPEC-SCNC: 6 MG/DL (ref 8.6–10.5)
CHLORIDE SERPL-SCNC: 101 MMOL/L (ref 98–107)
CHLORIDE SERPL-SCNC: 102 MMOL/L (ref 98–107)
CO2 SERPL-SCNC: 12 MMOL/L (ref 22–29)
CO2 SERPL-SCNC: 9 MMOL/L (ref 22–29)
CREAT SERPL-MCNC: 4.69 MG/DL (ref 0.57–1)
CREAT SERPL-MCNC: 4.71 MG/DL (ref 0.57–1)
DEPRECATED RDW RBC AUTO: 65.4 FL (ref 37–54)
EGFRCR SERPLBLD CKD-EPI 2021: 9.5 ML/MIN/1.73
EGFRCR SERPLBLD CKD-EPI 2021: 9.5 ML/MIN/1.73
EOSINOPHIL # BLD AUTO: 0.11 10*3/MM3 (ref 0–0.4)
EOSINOPHIL NFR BLD AUTO: 0.7 % (ref 0.3–6.2)
ERYTHROCYTE [DISTWIDTH] IN BLOOD BY AUTOMATED COUNT: 18.7 % (ref 12.3–15.4)
GLUCOSE SERPL-MCNC: 126 MG/DL (ref 65–99)
GLUCOSE SERPL-MCNC: 189 MG/DL (ref 65–99)
HBA1C MFR BLD: 9 % (ref 4.8–5.6)
HCT VFR BLD AUTO: 32.2 % (ref 34–46.6)
HGB BLD-MCNC: 10.9 G/DL (ref 12–15.9)
IMM GRANULOCYTES # BLD AUTO: 0.11 10*3/MM3 (ref 0–0.05)
IMM GRANULOCYTES NFR BLD AUTO: 0.7 % (ref 0–0.5)
LYMPHOCYTES # BLD AUTO: 0.4 10*3/MM3 (ref 0.7–3.1)
LYMPHOCYTES NFR BLD AUTO: 2.4 % (ref 19.6–45.3)
MAGNESIUM SERPL-MCNC: 1.4 MG/DL (ref 1.6–2.4)
MAGNESIUM SERPL-MCNC: 2.3 MG/DL (ref 1.6–2.4)
MCH RBC QN AUTO: 32.5 PG (ref 26.6–33)
MCHC RBC AUTO-ENTMCNC: 33.9 G/DL (ref 31.5–35.7)
MCV RBC AUTO: 96.1 FL (ref 79–97)
MONOCYTES # BLD AUTO: 0.67 10*3/MM3 (ref 0.1–0.9)
MONOCYTES NFR BLD AUTO: 4 % (ref 5–12)
NEUTROPHILS NFR BLD AUTO: 15.24 10*3/MM3 (ref 1.7–7)
NEUTROPHILS NFR BLD AUTO: 92 % (ref 42.7–76)
NRBC BLD AUTO-RTO: 0 /100 WBC (ref 0–0.2)
PHOSPHATE SERPL-MCNC: 4.7 MG/DL (ref 2.5–4.5)
PLATELET # BLD AUTO: 204 10*3/MM3 (ref 140–450)
PMV BLD AUTO: 10.9 FL (ref 6–12)
POTASSIUM SERPL-SCNC: 2.1 MMOL/L (ref 3.5–5.2)
POTASSIUM SERPL-SCNC: 2.2 MMOL/L (ref 3.5–5.2)
RBC # BLD AUTO: 3.35 10*6/MM3 (ref 3.77–5.28)
SODIUM SERPL-SCNC: 133 MMOL/L (ref 136–145)
SODIUM SERPL-SCNC: 135 MMOL/L (ref 136–145)
WBC NRBC COR # BLD AUTO: 16.56 10*3/MM3 (ref 3.4–10.8)

## 2025-07-04 PROCEDURE — 83735 ASSAY OF MAGNESIUM: CPT | Performed by: NURSE PRACTITIONER

## 2025-07-04 PROCEDURE — 25010000002 POTASSIUM CHLORIDE 10 MEQ/100ML SOLUTION: Performed by: INTERNAL MEDICINE

## 2025-07-04 PROCEDURE — 85025 COMPLETE CBC W/AUTO DIFF WBC: CPT | Performed by: NURSE PRACTITIONER

## 2025-07-04 PROCEDURE — 84100 ASSAY OF PHOSPHORUS: CPT | Performed by: NURSE PRACTITIONER

## 2025-07-04 PROCEDURE — 25810000003 SODIUM CHLORIDE 0.9 % SOLUTION: Performed by: NURSE PRACTITIONER

## 2025-07-04 PROCEDURE — 25010000002 HEPARIN (PORCINE) PER 1000 UNITS: Performed by: NURSE PRACTITIONER

## 2025-07-04 PROCEDURE — 25010000002 CEFTRIAXONE PER 250 MG: Performed by: NURSE PRACTITIONER

## 2025-07-04 PROCEDURE — 25010000002 MAGNESIUM SULFATE 2 GM/50ML SOLUTION: Performed by: INTERNAL MEDICINE

## 2025-07-04 PROCEDURE — 83036 HEMOGLOBIN GLYCOSYLATED A1C: CPT | Performed by: NURSE PRACTITIONER

## 2025-07-04 PROCEDURE — 25010000002 POTASSIUM CHLORIDE 10 MEQ/100ML SOLUTION: Performed by: NURSE PRACTITIONER

## 2025-07-04 PROCEDURE — 25010000002 CALCIUM GLUCONATE 2-0.675 GM/100ML-% SOLUTION: Performed by: INTERNAL MEDICINE

## 2025-07-04 PROCEDURE — 80048 BASIC METABOLIC PNL TOTAL CA: CPT | Performed by: NURSE PRACTITIONER

## 2025-07-04 PROCEDURE — 25010000002 MAGNESIUM SULFATE IN D5W 1G/100ML (PREMIX) 1-5 GM/100ML-% SOLUTION: Performed by: INTERNAL MEDICINE

## 2025-07-04 PROCEDURE — 25010000002 METRONIDAZOLE 500 MG/100ML SOLUTION: Performed by: NURSE PRACTITIONER

## 2025-07-04 PROCEDURE — 25010000002 CALCIUM GLUCONATE-NACL 1-0.675 GM/50ML-% SOLUTION: Performed by: INTERNAL MEDICINE

## 2025-07-04 PROCEDURE — 25010000002 CALCIUM GLUCONATE-NACL 1-0.675 GM/50ML-% SOLUTION: Performed by: NURSE PRACTITIONER

## 2025-07-04 PROCEDURE — 25010000002 POTASSIUM CHLORIDE 10 MEQ/100ML SOLUTION

## 2025-07-04 RX ORDER — MAGNESIUM SULFATE HEPTAHYDRATE 40 MG/ML
2 INJECTION, SOLUTION INTRAVENOUS ONCE
Status: COMPLETED | OUTPATIENT
Start: 2025-07-04 | End: 2025-07-04

## 2025-07-04 RX ORDER — CALCIUM GLUCONATE 20 MG/ML
1000 INJECTION, SOLUTION INTRAVENOUS
Status: DISCONTINUED | OUTPATIENT
Start: 2025-07-04 | End: 2025-07-04

## 2025-07-04 RX ORDER — POTASSIUM CHLORIDE 7.45 MG/ML
10 INJECTION INTRAVENOUS
Status: COMPLETED | OUTPATIENT
Start: 2025-07-04 | End: 2025-07-04

## 2025-07-04 RX ORDER — POTASSIUM CHLORIDE 7.45 MG/ML
10 INJECTION INTRAVENOUS ONCE
Status: DISCONTINUED | OUTPATIENT
Start: 2025-07-04 | End: 2025-07-04

## 2025-07-04 RX ORDER — POTASSIUM CHLORIDE 29.8 MG/ML
20 INJECTION INTRAVENOUS ONCE
Status: DISCONTINUED | OUTPATIENT
Start: 2025-07-04 | End: 2025-07-04

## 2025-07-04 RX ORDER — CALCIUM GLUCONATE 20 MG/ML
1000 INJECTION, SOLUTION INTRAVENOUS ONCE
Status: COMPLETED | OUTPATIENT
Start: 2025-07-04 | End: 2025-07-04

## 2025-07-04 RX ORDER — ALBUTEROL SULFATE 90 UG/1
2 INHALANT RESPIRATORY (INHALATION) EVERY 4 HOURS PRN
COMMUNITY

## 2025-07-04 RX ORDER — HYDROCODONE BITARTRATE AND ACETAMINOPHEN 5; 325 MG/1; MG/1
1 TABLET ORAL EVERY 6 HOURS PRN
Refills: 0 | Status: DISCONTINUED | OUTPATIENT
Start: 2025-07-04 | End: 2025-07-09

## 2025-07-04 RX ORDER — POTASSIUM CHLORIDE 7.45 MG/ML
10 INJECTION INTRAVENOUS
Status: DISPENSED | OUTPATIENT
Start: 2025-07-04 | End: 2025-07-04

## 2025-07-04 RX ORDER — POTASSIUM CHLORIDE 1500 MG/1
40 TABLET, EXTENDED RELEASE ORAL ONCE
Status: COMPLETED | OUTPATIENT
Start: 2025-07-04 | End: 2025-07-04

## 2025-07-04 RX ORDER — CALCIUM CARBONATE 500 MG/1
2 TABLET, CHEWABLE ORAL 3 TIMES DAILY PRN
Status: DISCONTINUED | OUTPATIENT
Start: 2025-07-04 | End: 2025-07-14 | Stop reason: HOSPADM

## 2025-07-04 RX ORDER — SODIUM CHLORIDE 9 MG/ML
50 INJECTION, SOLUTION INTRAVENOUS CONTINUOUS
Status: DISCONTINUED | OUTPATIENT
Start: 2025-07-04 | End: 2025-07-05

## 2025-07-04 RX ORDER — POTASSIUM CHLORIDE 1.5 G/1.58G
40 POWDER, FOR SOLUTION ORAL ONCE
Status: COMPLETED | OUTPATIENT
Start: 2025-07-04 | End: 2025-07-04

## 2025-07-04 RX ORDER — CALCIUM GLUCONATE 20 MG/ML
2000 INJECTION, SOLUTION INTRAVENOUS
Status: COMPLETED | OUTPATIENT
Start: 2025-07-04 | End: 2025-07-05

## 2025-07-04 RX ORDER — CALCIUM GLUCONATE 20 MG/ML
2000 INJECTION, SOLUTION INTRAVENOUS
Status: DISCONTINUED | OUTPATIENT
Start: 2025-07-04 | End: 2025-07-04

## 2025-07-04 RX ORDER — MAGNESIUM SULFATE 1 G/100ML
1 INJECTION INTRAVENOUS
Status: COMPLETED | OUTPATIENT
Start: 2025-07-04 | End: 2025-07-04

## 2025-07-04 RX ORDER — CALCIUM GLUCONATE 20 MG/ML
1000 INJECTION, SOLUTION INTRAVENOUS
Status: COMPLETED | OUTPATIENT
Start: 2025-07-04 | End: 2025-07-04

## 2025-07-04 RX ADMIN — CALCIUM GLUCONATE 1000 MG: 20 INJECTION, SOLUTION INTRAVENOUS at 02:35

## 2025-07-04 RX ADMIN — METRONIDAZOLE 500 MG: 500 INJECTION, SOLUTION INTRAVENOUS at 21:15

## 2025-07-04 RX ADMIN — SODIUM CHLORIDE 50 ML/HR: 9 INJECTION, SOLUTION INTRAVENOUS at 10:11

## 2025-07-04 RX ADMIN — HEPARIN SODIUM 5000 UNITS: 5000 INJECTION INTRAVENOUS; SUBCUTANEOUS at 08:39

## 2025-07-04 RX ADMIN — MAGNESIUM SULFATE HEPTAHYDRATE 2 G: 2 INJECTION, SOLUTION INTRAVENOUS at 10:11

## 2025-07-04 RX ADMIN — POTASSIUM CHLORIDE 10 MEQ: 7.46 INJECTION, SOLUTION INTRAVENOUS at 22:35

## 2025-07-04 RX ADMIN — POTASSIUM CHLORIDE 10 MEQ: 7.46 INJECTION, SOLUTION INTRAVENOUS at 04:19

## 2025-07-04 RX ADMIN — CALCIUM GLUCONATE 1000 MG: 20 INJECTION, SOLUTION INTRAVENOUS at 16:10

## 2025-07-04 RX ADMIN — CALCIUM GLUCONATE 2000 MG: 20 INJECTION, SOLUTION INTRAVENOUS at 22:08

## 2025-07-04 RX ADMIN — POTASSIUM CHLORIDE 10 MEQ: 7.46 INJECTION, SOLUTION INTRAVENOUS at 05:32

## 2025-07-04 RX ADMIN — POTASSIUM CHLORIDE 40 MEQ: 1.5 POWDER, FOR SOLUTION ORAL at 02:11

## 2025-07-04 RX ADMIN — CALCIUM GLUCONATE 2000 MG: 20 INJECTION, SOLUTION INTRAVENOUS at 17:33

## 2025-07-04 RX ADMIN — MAGNESIUM SULFATE IN DEXTROSE 1 G: 10 INJECTION, SOLUTION INTRAVENOUS at 05:12

## 2025-07-04 RX ADMIN — HYDROCODONE BITARTRATE AND ACETAMINOPHEN 1 TABLET: 5; 325 TABLET ORAL at 09:17

## 2025-07-04 RX ADMIN — POTASSIUM CHLORIDE 10 MEQ: 7.46 INJECTION, SOLUTION INTRAVENOUS at 08:38

## 2025-07-04 RX ADMIN — MAGNESIUM SULFATE IN DEXTROSE 1 G: 10 INJECTION, SOLUTION INTRAVENOUS at 04:19

## 2025-07-04 RX ADMIN — POTASSIUM CHLORIDE 10 MEQ: 7.46 INJECTION, SOLUTION INTRAVENOUS at 21:15

## 2025-07-04 RX ADMIN — Medication 1 APPLICATION: at 21:14

## 2025-07-04 RX ADMIN — METRONIDAZOLE 500 MG: 500 INJECTION, SOLUTION INTRAVENOUS at 13:20

## 2025-07-04 RX ADMIN — Medication 10 ML: at 08:39

## 2025-07-04 RX ADMIN — CALCIUM GLUCONATE 2000 MG: 20 INJECTION, SOLUTION INTRAVENOUS at 19:45

## 2025-07-04 RX ADMIN — Medication 10 ML: at 21:15

## 2025-07-04 RX ADMIN — CHLORHEXIDINE GLUCONATE 1 APPLICATION: 500 CLOTH TOPICAL at 04:00

## 2025-07-04 RX ADMIN — ANTACID TABLETS 2 TABLET: 500 TABLET, CHEWABLE ORAL at 22:08

## 2025-07-04 RX ADMIN — METRONIDAZOLE 500 MG: 500 INJECTION, SOLUTION INTRAVENOUS at 05:32

## 2025-07-04 RX ADMIN — POTASSIUM CHLORIDE 10 MEQ: 7.46 INJECTION, SOLUTION INTRAVENOUS at 03:07

## 2025-07-04 RX ADMIN — HEPARIN SODIUM 5000 UNITS: 5000 INJECTION INTRAVENOUS; SUBCUTANEOUS at 21:14

## 2025-07-04 RX ADMIN — MAGNESIUM SULFATE IN DEXTROSE 1 G: 10 INJECTION, SOLUTION INTRAVENOUS at 02:35

## 2025-07-04 RX ADMIN — POTASSIUM CHLORIDE 10 MEQ: 7.46 INJECTION, SOLUTION INTRAVENOUS at 01:58

## 2025-07-04 RX ADMIN — HYDROCODONE BITARTRATE AND ACETAMINOPHEN 1 TABLET: 5; 325 TABLET ORAL at 02:07

## 2025-07-04 RX ADMIN — Medication 1 APPLICATION: at 08:39

## 2025-07-04 RX ADMIN — CEFTRIAXONE 2000 MG: 2 INJECTION, POWDER, FOR SOLUTION INTRAMUSCULAR; INTRAVENOUS at 21:15

## 2025-07-04 RX ADMIN — MAGNESIUM SULFATE HEPTAHYDRATE 2 G: 2 INJECTION, SOLUTION INTRAVENOUS at 19:45

## 2025-07-04 RX ADMIN — POTASSIUM CHLORIDE 40 MEQ: 1500 TABLET, EXTENDED RELEASE ORAL at 16:11

## 2025-07-04 RX ADMIN — ANTACID TABLETS 2 TABLET: 500 TABLET, CHEWABLE ORAL at 01:58

## 2025-07-04 NOTE — PROGRESS NOTES
[facility] Intensivist Services    Date of Admission: 7/3/2025  Date of Note: 07/04/25  Primary Care Physician: Provider, No Known   LOS: 1 day     History   Next of Kin:  Primary Emergency Contact: Rodger Marrufo Home Phone: [phone]   Code Status: Code Status and Medical Interventions: CPR (Attempt to Resuscitate); Full Support; Discussed CODE STATUS with Mr. Laughlin.  Patient is ,  is in a nursing home.  She does not have any children.    She is a 69 y.o. female admitted 7/3/2025 with Enteritis who also  has a past medical history of Acid reflux, Arthritis, High cholesterol, Hypertension, Neuropathy, Swollen lymph nodes, and Type 2 diabetes mellitus.    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     She takes ibuprofen and ondansetron ODT at home based on most current med reconciliation.   Complete list is below.     The ICU team was asked to evaluate the patient for severe dehydration with electrolyte abnormalities and acute kidney injury..    7/4 today patient is feeling a little bit better.  Her cramps has improved.  She can move all of her upper and lower extremities fine.   Denies having any major abdominal pain.  Have not had anything to eat yet.  No nausea or vomiting noted    Past Medical History     Active and Resolved Problems  Active Hospital Problems    Diagnosis  POA    **Enteritis [K52.9]  Yes      Resolved Hospital Problems   No resolved problems to display.       Allergies     Allergies:   She has no known allergies.    Labs   Basic Labs:  CBC:      Lab 07/03/25  1628   WBC 25.12*   HEMOGLOBIN 12.6   HEMATOCRIT 36.2   PLATELETS 233   NEUTROS ABS 22.36*   IMMATURE GRANS (ABS) 0.15*   LYMPHS ABS 1.24   MONOS ABS 1.31*   EOS ABS 0.02   MCV 94.5       LIVER FUNCTION AND COAG TESTS:      Lab 07/03/25  1706 07/03/25  1628   TOTAL PROTEIN 5.4*  --    ALBUMIN 2.9*  --    GLOBULIN 2.5  --    ALT (SGPT) 15  --    AST (SGOT) 41*  --    BILIRUBIN 0.3  --    ALK PHOS 82  --    AMYLASE  --  19*   LIPASE  --  21       ABG:      Lab 07/04/25  0036 07/03/25  1706   ANION GAP 23.0* 23.0*       CMP:      Lab 07/04/25  0036 07/03/25  1706   SODIUM 133* 135*   POTASSIUM 2.1* 1.7*   CHLORIDE 101 100   BUN 42.7* 44.5*   CREATININE 4.69* 5.04*   CALCIUM 5.9* 5.6*   MAGNESIUM 1.4* 0.8*   GLUCOSE 126* 161*   EGFR 9.5* 8.7*       Inpatient Medications   Scheduled Meds:cefTRIAXone, 2,000 mg, Intravenous, Q24H  Chlorhexidine Gluconate Cloth, 1 Application, Topical, Q24H  heparin (porcine), 5,000 Units, Subcutaneous, Q12H  metroNIDAZOLE, 500 mg, Intravenous, Q8H  mupirocin, 1 Application, Each Nare, BID  senna-docusate sodium, 2 tablet, Oral, BID  sodium chloride, 10 mL, Intravenous, Q12H      Continuous Infusions:   PRN Meds:.  acetaminophen **OR** acetaminophen    senna-docusate sodium **AND** polyethylene glycol **AND** bisacodyl **AND** bisacodyl    calcium carbonate    Calcium Replacement - Follow Nurse / BPA Driven Protocol    HYDROcodone-acetaminophen    Magnesium Standard Dose Replacement - Follow Nurse / BPA Driven Protocol    nitroglycerin    ondansetron    Potassium Replacement - Follow Nurse  "/ BPA Driven Protocol    [COMPLETED] Insert Peripheral IV **AND** sodium chloride    sodium chloride    sodium chloride    I have reviewed the patient's current medications.   Outpatient Medications     Current Outpatient Medications   Medication Instructions    ibuprofen (ADVIL,MOTRIN) 200 mg, Every 6 Hours PRN    ondansetron ODT (ZOFRAN-ODT) 4 mg, Translingual, Every 4 Hours PRN       Current Antibiotics   cefTRIAXone (ROCEPHIN) 2000 mg IVPB in 100 mL NS (MBP)  metroNIDAZOLE - 500-0.79 MG/100ML-%    Exam   Vent settings for last 24 hours:       Vital signs for last 24 hours:  Temp:  [97.7 °F (36.5 °C)-97.9 °F (36.6 °C)] 97.9 °F (36.6 °C)  Heart Rate:  [82-92] 83  Resp:  [14-21] 21  BP: ()/(46-98) 140/48    Vitals: Her  height is 165.1 cm (65\") and weight is 88.8 kg (195 lb 12.3 oz). Her oral temperature is 97.9 °F (36.6 °C). Her blood pressure is 140/48 and her pulse is 83. Her respiration is 21 and oxygen saturation is 94%.     Constitutional:       General: She is not in acute distress.  HENT:      Head: Normocephalic and atraumatic.      Nose: Nose normal.      Mouth/Throat:      Mouth: Mucous membranes are dry.      Comments: Extremely dry mucosa.    Eyes:      Extraocular Movements: Extraocular movements intact.      Pupils: Pupils are equal, round, and reactive to light.   Cardiovascular:      Rate and Rhythm: Normal rate and regular rhythm.   Pulmonary:      Effort: Pulmonary effort is normal.      Breath sounds: Normal breath sounds.   Abdominal:      General: There is no distension.      Palpations: Abdomen is soft.      Tenderness: There is no guarding.      Comments: Well-healed vertical incisional site.  No rebound tenderness or concerns for peritonitis on exam.  Hyperactive bowel sounds   Musculoskeletal:         General: No deformity.      Cervical back: Normal range of motion.      Right lower leg: No edema.      Left lower leg: No edema.      Comments: Cramping noted to right hand and feet " bilateral improved ROM now.   Skin:     General: Skin is warm and dry.      Capillary Refill: Capillary refill takes 2 to 3 seconds.      Findings: No bruising.   Neurological:      General: No focal deficit present.      Mental Status: She is alert and oriented to person, place, and time.        Intake/Output   Intake/Output this shift:  No intake/output data recorded.    Intake/Output last 3 shifts:  I/O last 3 completed shifts:  In: 2554 [P.O.:354; I.V.:1600; IV Piggyback:600]  Out: 250 [Urine:250]    Results and Cultures Review     Result Review:  I have personally reviewed the results from the time of this admission to 7/4/2025 07:58 CDT and agree with these findings:  [x]  Laboratory list / accordion  [x]  Microbiology  [x]  Radiology  []  EKG/Telemetry   []  Cardiology/Vascular   []  Pathology  []  Old records  []  Other:  Most notable findings include: mg 1.3  K 2.3        Culture Data:     Assessment/Plan       Enteritis  Came with abdominal pain with severe dehydration.  Continue current therapy with Flagyl.  Left seems to be feeling a little bit better.  No more diarrhea noted.  Try oral liquids to see if she tolerates.  2 acute kidney injury associated with creatinine of 5.0  Prerenal associated with hypovolemia.  Creatinine seems to be improving down to 4.39.  Will continue with IV hydration.  Check urine Fena check urine sodium continue with close hydration for now.  3 electrolyte abnormalities with   Severe hypokalemia-replaced recheck 2.1   Hypomagnesemia magnesium level of 1.3 replace and recheck.  4 non obstructive renal stone  Cont hydration, no s/s for now.fu conservatively  5 Pem low protein / albumin level   Optimize oral when able to take  .    6 VTE Prophylaxis:    Pharmacologic & mechanical VTE prophylaxis orders are present.      7 Active VTE Prophylaxis  Pharmacologic:        Start     Dose Route Frequency Stop    07/03/25 2100  heparin (porcine) 5000 UNIT/ML injection 5,000 Units          5,000 Units SC Every 12 Hours Scheduled --                  Mechanical:        Start        07/03/25 1956  Maintain Sequential Compression Device  Continuous                            Code Status and Medical Interventions: CPR (Attempt to Resuscitate); Full Support; Discussed CODE STATUS with Mr. Laughlin.  Patient is ,  is in a nursing home.  She does not have any children.   Ordered at: 07/03/25 2000     Code Status (Patient has no pulse and is not breathing):    CPR (Attempt to Resuscitate)     Medical Interventions (Patient has pulse or is breathing):    Full Support     Comments:    Discussed CODE STATUS with Mr. Laughlin.  Patient is ,  is in a nursing home.  She does not have any children.     Level Of Support Discussed With:    Patient       Due to a high probability of clinically significant, life threatening deterioration, the patient required my highest level of preparedness to intervene emergently and I personally spent this critical care time directly and personally managing the patient.     This critical care time included obtaining a history; examining the patient; pulse oximetry; ordering and review of studies; arranging urgent treatment with development of a management plan; evaluation of patient's response to treatment; frequent reassessment; and, discussions with other providers.    This critical care time was performed to assess and manage the high probability of imminent, life-threatening deterioration that could result in multi-organ failure. It was exclusive of separately billable procedures and treating other patients and teaching time.    Please see MDM section and the rest of the note for further information on patient assessment and treatment.    Part of this note may be an electronic transcription/translation of spoken language to printed text using the Dragon Dictation System    Electronically signed by Dennis Ansari MD on 7/4/2025 at 07:58  CDT

## 2025-07-04 NOTE — H&P
[facility] Intensivist Services  HISTORY AND PHYSICAL    Date of Admission: 7/3/2025  Primary Care Physician: Provider, No Known    Subjective   Primary Historian: Patient, ER physician    Chief Complaint: Severe electrolyte abnormality, hypokalemia, hypomagnesia, hypocalcemia, nausea, vomiting and diarrhea, dehydration    History of Present Illness  Very pleasant 69-year-old female patient past medical history type 2 diabetes, hypertension, hyperlipidemia, arthritis, GERD, chronic diarrhea.  Recent endoscopy/colonoscopy in May 2023 which were unremarkable, exploratory lap in August 2028 with complications patient endorses colectomy at that time secondary to complications that she had performed at [facility] presents to the emergency department today with generally not feeling well.    Patient endorses ongoing nausea, vomiting and diarrhea for the last 5 days along with abdominal pain with diarrhea.  She describes the diarrhea as none hematochezia or melena.  She denies any systemic symptoms such as fever chills or rigors.  She does endorse a mechanical fall this morning while in the bathroom that she feels was a direct result of her generalized weakness.  She does endorse left shoulder pain.  She denies hitting her head.  She called EMS for abdominal pain, nausea vomiting and diarrhea for the last week and generalized pain.    ER workup revealed critically low electrolytes.  Potassium 1.7, magnesium 0.8, calcium 5.6 leukocytosis 25.  With a shift no bandemia.  Stable lactate at 1.7.  CRP elevated at 11.05 BUN 44, creatinine 5 gap 23 CT of the abdomen and pelvis shows mild enteritis or diarrheal type illness there is no bowel wall thickening or obstruction.  Postoperative changes including previous partial right colectomy, new small stones in the left renal pelvis no urinary tract obstruction identified.    Patient was given 2 g of magnesium, gram of calcium and 20 mill  equivalents of potassium was ordered however not infused in the ED, along with IV fluid r resuscitation with 1 L of NS.  Patient is alert oriented cognitively intact.  She is hemodynamically stable surprisingly.    ICU was consulted for ongoing management of electrolytes in the setting of acute vomiting and diarrhea.    I have seen and evaluated the patient immediately upon arriving to ICU room 5.  Patient is alert oriented cognitively intact.  Vital signs stable.  She does have difficulty with raising her left shoulder above her head.  She is able to flex and extend from the elbow.  She has pain with passive range of motion and full extension of the left shoulder.    She does endorse that she sustained a ground-level fall and she feels that she hit her shoulder.  Once we increase her potassium and stabilize her we will obtain x-rays of the left shoulder.  She denies hitting her head she denies any neck pain.  She has some cramping noted to her feet and right hand I suspect this could be carpopedal spasms secondary to hypocalcemia with hypomagnesia and hypokalemia.    No systemic symptoms however in the setting of leukocytosis elevated CRP and shift with gastritis we will implement ceftriaxone and Flagyl after obtaining blood cultures.  Still pending a GI panel.  She denies being on any recent antibiotics as I considered C. difficile.    Will continue with electrolyte replacement and ongoing hydration.        Review of Systems   Otherwise complete ROS reviewed and negative except as mentioned in the HPI.    Past Medical History:   Past Medical History:   Diagnosis Date    Acid reflux     Arthritis     High cholesterol     Hypertension     Neuropathy     Swollen lymph nodes     LEFT CERVICAL    Type 2 diabetes mellitus      Past Surgical History:  Past Surgical History:   Procedure Laterality Date    CARPAL TUNNEL RELEASE Bilateral     CERVICAL LYMPH NODE BIOPSY/EXCISION Left 08/04/2017    Procedure: LEFT NECK  "EXPLORATION WITH INCISIONAL BIOPSY/CULTURE;  Surgeon: Blue Sanchez MD;  Location: [facility] OR;  Service:      SECTION      COLON RESECTION      COLONOSCOPY N/A 2023    Procedure: COLONOSCOPY WITH ANESTHESIA;  Surgeon: Tamy Smith MD;  Location: [facility] ENDOSCOPY;  Service: Gastroenterology;  Laterality: N/A;  preop; abnormal GI scan   postop ; poor prep   PCP Ruth Noble MD    ENDOSCOPY N/A 2023    Procedure: ESOPHAGOGASTRODUODENOSCOPY WITH ANESTHESIA;  Surgeon: Tamy Smith MD;  Location: [facility] ENDOSCOPY;  Service: Gastroenterology;  Laterality: N/A;  preop; abnormal GI scan   postop esophagitis ; hiatal hernia  PCP Ruth Noble MD    EXCISION LESION      From back    REPLACEMENT TOTAL KNEE Left     TONSILLECTOMY       Social History:  reports that she has never smoked. She has never used smokeless tobacco. She reports that she does not drink alcohol and does not use drugs.    Family History: family history includes Colon cancer in her father; No Known Problems in her mother.       Allergies:  No Known Allergies    Medications:  Prior to Admission medications    Medication Sig Start Date End Date Taking? Authorizing Provider   ibuprofen (ADVIL,MOTRIN) 200 MG tablet Take 1 tablet by mouth Every 6 (Six) Hours As Needed for Mild Pain. Pt reports taking \"5 at a time only when needed\"    Provider, MD Priyanka   ondansetron ODT (ZOFRAN-ODT) 4 MG disintegrating tablet Place 1 tablet on the tongue Every 4 (Four) Hours As Needed for Nausea or Vomiting. 25   Nishant Hdz Jr., MD     I have utilized all available immediate resources to obtain, update, or review the patient's current medications (including all prescriptions, over-the-counter products, herbals, cannabis/cannabidiol products, and vitamin/mineral/dietary (nutritional) supplements).    Objective     Vital Signs: /56   Pulse 91   Temp 97.8 °F (36.6 °C) (Oral)   Resp 17   Ht " "165.1 cm (65\")   Wt 97.1 kg (214 lb 1.6 oz)   SpO2 94%   BMI 35.63 kg/m²   Physical Exam  Vitals and nursing note reviewed. Exam conducted with a chaperone present.   Constitutional:       General: She is not in acute distress.  HENT:      Head: Normocephalic and atraumatic.      Nose: Nose normal.      Mouth/Throat:      Mouth: Mucous membranes are dry.      Comments: Extremely dry mucosa.  Patient endorses she has not had anything to eat or drink in at least 24 to 48 hours now  Eyes:      Extraocular Movements: Extraocular movements intact.      Pupils: Pupils are equal, round, and reactive to light.   Cardiovascular:      Rate and Rhythm: Normal rate and regular rhythm.   Pulmonary:      Effort: Pulmonary effort is normal.      Breath sounds: Normal breath sounds.   Abdominal:      General: There is no distension.      Palpations: Abdomen is soft.      Tenderness: There is no guarding.      Comments: Well-healed vertical incisional site.  No rebound tenderness or concerns for peritonitis on exam.  Hyperactive bowel sounds   Musculoskeletal:         General: No deformity.      Cervical back: Normal range of motion.      Right lower leg: No edema.      Left lower leg: No edema.      Comments: Cramping noted to right hand and feet bilaterally.  Decreased range of motion noted to the left upper extremity.  Patient is able to flex and extend from the elbow however she is not able to independently lift the left arm above her head.  Radial pulse palpable and strong.  She has neurovascularly intact.   Skin:     General: Skin is warm and dry.      Capillary Refill: Capillary refill takes 2 to 3 seconds.      Findings: No bruising.   Neurological:      General: No focal deficit present.      Mental Status: She is alert and oriented to person, place, and time.            Results Reviewed:  Lab Results (last 24 hours)       Procedure Component Value Units Date/Time    Comprehensive Metabolic Panel [[id number]]  (Abnormal) " Collected: 07/03/25 1706    Specimen: Blood Updated: 07/03/25 1739     Glucose 161 mg/dL      BUN 44.5 mg/dL      Creatinine 5.04 mg/dL      Sodium 135 mmol/L      Potassium 1.7 mmol/L      Chloride 100 mmol/L      CO2 12.0 mmol/L      Calcium 5.6 mg/dL      Total Protein 5.4 g/dL      Albumin 2.9 g/dL      ALT (SGPT) 15 U/L      AST (SGOT) 41 U/L      Alkaline Phosphatase 82 U/L      Total Bilirubin 0.3 mg/dL      Globulin 2.5 gm/dL      A/G Ratio 1.2 g/dL      BUN/Creatinine Ratio 8.8     Anion Gap 23.0 mmol/L      eGFR 8.7 mL/min/1.73     Narrative:      GFR Categories in Chronic Kidney Disease (CKD)              GFR Category          GFR (mL/min/1.73)    Interpretation  G1                    90 or greater        Normal or high (1)  G2                    60-89                Mild decrease (1)  G3a                   45-59                Mild to moderate decrease  G3b                   30-44                Moderate to severe decrease  G4                    15-29                Severe decrease  G5                    14 or less           Kidney failure    (1)In the absence of evidence of kidney disease, neither GFR category G1 or G2 fulfill the criteria for CKD.    eGFR calculation 2021 CKD-EPI creatinine equation, which does not include race as a factor    Magnesium [[id number]]  (Abnormal) Collected: 07/03/25 1706    Specimen: Blood Updated: 07/03/25 1738     Magnesium 0.8 mg/dL     C-reactive Protein [[id number]]  (Abnormal) Collected: 07/03/25 1706    Specimen: Blood Updated: 07/03/25 1730     C-Reactive Protein 11.05 mg/dL     Urinalysis With Culture If Indicated - Urine, Catheter [[id number]]  (Abnormal) Collected: 07/03/25 1647    Specimen: Urine, Catheter Updated: 07/03/25 1708     Color, UA Yellow     Appearance, UA Clear     pH, UA 5.5     Specific Gravity, UA 1.011     Glucose, UA Negative     Ketones, UA Trace     Bilirubin, UA Negative     Blood, UA Large (3+)     Protein,  mg/dL (2+)     Leuk  Esterase, UA Negative     Nitrite, UA Negative     Urobilinogen, UA 0.2 E.U./dL    Narrative:      In absence of clinical symptoms, the presence of pyuria, bacteria, and/or nitrites on the urinalysis result does not correlate with infection.    Urinalysis, Microscopic Only - Urine, Catheter [[id number]]  (Abnormal) Collected: 07/03/25 1647    Specimen: Urine, Catheter Updated: 07/03/25 1708     RBC, UA 0-2 /HPF      WBC, UA 0-2 /HPF      Comment: Urine culture not indicated.        Bacteria, UA None Seen /HPF      Squamous Epithelial Cells, UA 3-6 /HPF      Hyaline Casts, UA 0-2 /LPF      Amorphous Crystals, UA Small/1+ /HPF      Methodology Manual Light Microscopy    Amylase [[id number]]  (Abnormal) Collected: 07/03/25 1628    Specimen: Blood Updated: 07/03/25 1658     Amylase 19 U/L     Lactic Acid, Plasma [[id number]]  (Normal) Collected: 07/03/25 1628    Specimen: Blood Updated: 07/03/25 1657     Lactate 1.7 mmol/L     Lipase [[id number]]  (Normal) Collected: 07/03/25 1628    Specimen: Blood Updated: 07/03/25 1656     Lipase 21 U/L     CBC & Differential [[id number]]  (Abnormal) Collected: 07/03/25 1628    Specimen: Blood Updated: 07/03/25 1635    Narrative:      The following orders were created for panel order CBC & Differential.  Procedure                               Abnormality         Status                     ---------                               -----------         ------                     CBC Auto Differential[[id number]]        Abnormal            Final result                 Please view results for these tests on the individual orders.    CBC Auto Differential [[id number]]  (Abnormal) Collected: 07/03/25 1628    Specimen: Blood Updated: 07/03/25 1635     WBC 25.12 10*3/mm3      RBC 3.83 10*6/mm3      Hemoglobin 12.6 g/dL      Hematocrit 36.2 %      MCV 94.5 fL      MCH 32.9 pg      MCHC 34.8 g/dL      RDW 18.5 %      RDW-SD 64.1 fl      MPV 10.6 fL      Platelets 233 10*3/mm3      Neutrophil % 89.0 %       Lymphocyte % 4.9 %      Monocyte % 5.2 %      Eosinophil % 0.1 %      Basophil % 0.2 %      Immature Grans % 0.6 %      Neutrophils, Absolute 22.36 10*3/mm3      Lymphocytes, Absolute 1.24 10*3/mm3      Monocytes, Absolute 1.31 10*3/mm3      Eosinophils, Absolute 0.02 10*3/mm3      Basophils, Absolute 0.04 10*3/mm3      Immature Grans, Absolute 0.15 10*3/mm3      nRBC 0.0 /100 WBC              CT Abdomen Pelvis Without Contrast  Result Date: 7/3/2025  1. Fluid retention within the small intestine and portions of the colon compatible with the history of diarrhea, no evidence of bowel obstruction, no bowel wall thickening. Correlate clinically for mild enteritis or diarrheal type illness. 2. New small stone in the left renal pelvis, measuring 7 mm. No urinary tract obstruction is identified. 3. Postoperative changes, including previous partial right colectomy, appendectomy and there is stable dense material in the region of the raine hepatis which may be related to previous surgical intervention. The gallbladder is not visualized. 4. Stable accessory spleen measuring up to 4 cm.   This report was signed and finalized on 7/3/2025 6:19 PM by Dr. Venkata Muniz MD.            Result Review:  I have personally reviewed the results from the time of this admission to 7/3/2025 20:17 CDT and agree with these findings:  [x]  Laboratory list / accordion  []  Microbiology  [x]  Radiology  [x]  EKG/Telemetry   []  Cardiology/Vascular   [x]  Pathology  [x]  Old records-  []  Other:  Most notable findings include: Potassium 1.7, magnesium 0.8, calcium 5.6, leukocytosis 25 with a left shift, elevated CRP at 11      I have personally reviewed and interpreted the radiology studies and ECG obtained at time of admission.     Assessment / Plan   Assessment:   Active Hospital Problems    Diagnosis     **Enteritis        Treatment Plan  The patient will be admitted to my service here at [facility].  The patient to ICU/CCU  under intensivist services this patient is currently requiring ICU services for electrolyte management    69-year-old female patient past medical history acute on chronic diarrhea presented to the emergency department via EMS for a 5-day history of nausea, vomiting diarrhea and generalized bodyaches along with abdominal pain.  Critically low potassium 1.8, magnesium 0.8 and calcium 5.6.  EG changes prolonged QTc greater than 700.  CT concerning for enteritis otherwise no acute findings.    Hypokalemia  Hypomagnesia  Hypocalcemia  Enteritis  Leukocytosis  DAISY  Dehydration  8.   Past medical history type 2 diabetes  9.   Prolonged Qtc    Plan:  Continue with electrolyte replacement.  Will initiate oral potassium as well as IV.  Reassess electrolytes after replacement.  Continue with IV fluid hydration in the setting of acute dehydration and DAISY.  Reviewed previous labs from 2/24 2025 kidney function, creatinine 2, her decreased oral intake dehydration's probably worsening kidney function today.  Renally dose all medications.  Avoid any nephrotoxin agents.  Avoid any IV contrast  Reassess BMP in the AM.  Sitter nephrology consult if warranted  Leukocytosis, no concerns for sepsis on admission.  Leukocytosis felt to be secondary to enteritis.  Obtain GI panel.  Initiate Rocephin 2 g and Flagyl 500 every 8 after blood cultures.  De-escalate antibiotics based on cultures and GI panel.  Obtain a chest x-ray to rule out any acute process.  Urinalysis unremarkable for any acute infectious process.  Sliding scale insulin during this hospitalization  Avoid any QTc prolongation medications in the setting of prolonged QTc.    VTE: Heparin SQ  GI: Protonix , hx of Gerd  NTN: Diet  ABX: Flagyl 500 mg every 8 Rocephin 2 g daily  Lines/Tubes: Peripheral  CODE Status: Full code          60 minutes of critical care provided. This time excludes other billable procedures. Time does include preparation of documents, medical  consultations, review of old records, and direct bedside care. Patient is at high risk for life-threatening deterioration due to patient is a high risk for cardiac arrhythmia secondary to critically low electrolytes.  .       Medical Decision Making  Number and Complexity of problems: 7  Differential Diagnosis: Electrolyte abnormalities, acute on chronic.  Patient does have a history of chronic diarrhea.  Her previous labs from April of this year revealed critically low potassium of 2.7.  I question if she has been taking potassium and magnesium supplements in the setting of chronic diarrhea.  Acute on chronic kidney insufficiency.  I feel her acute dehydration is contributing to her worsening kidney function.  I considered sepsis in the setting of leukocytosis however no acute process other than enteritis.  Will search for additional sources.    Conditions and Status        Condition is unchanged.     [facility] Data  External documents reviewed: N/A I did review previous endoscopy, colonoscopy by Dr. Smith in April 2023 biopsies unremarkable.    Cardiac tracing (EKG, telemetry) interpretation: Reviewed admission EKG  Radiology interpretation: Reviewed admission CT abdomen pelvis imaging  Labs reviewed: Reviewed admission labs from ER  Any tests that were considered but not ordered: Chest x-ray, left shoulder x-ray     Decision rules/scores evaluated (example [id number], Wells, etc):      Discussed with: Patient.  Patient is decision-maker.  She endorses she is  however her  is in the nursing home.  She does have a close friend that lives with her and helps take care of her however she does not have any children.     Care Planning  Shared decision making: Patient  Code status and discussions: Full code    Disposition  Social Determinants of Health that impact treatment or disposition: NA  Estimated length of stay is 2 to 5 days.     I confirmed that the patient's advanced care plan is present, code status  is documented, and a surrogate decision maker is listed in the patient's medical record.     The patient's surrogate decision maker is patient.     The patient was seen and examined by me on 7/3/20/2025 at 1930 immediately upon arriving to ICU room 3.    I admitted the patient overnight to the intensivist services.  Case will be discussed with Dr. Alberts, In the a.m.  Final treatment plan and recommendations will be at his discretion.     Electronically signed by NATTY Major on 7/3/2025 at 20:17 CDT

## 2025-07-05 ENCOUNTER — APPOINTMENT (OUTPATIENT)
Dept: CARDIOLOGY | Facility: HOSPITAL | Age: 70
DRG: 674 | End: 2025-07-05
Payer: MEDICARE

## 2025-07-05 ENCOUNTER — APPOINTMENT (OUTPATIENT)
Dept: GENERAL RADIOLOGY | Facility: HOSPITAL | Age: 70
DRG: 674 | End: 2025-07-05
Payer: MEDICARE

## 2025-07-05 LAB
ADV 40+41 DNA STL QL NAA+NON-PROBE: NOT DETECTED
ALBUMIN UR-MCNC: 9.3 MG/DL
ANION GAP SERPL CALCULATED.3IONS-SCNC: 21 MMOL/L (ref 5–15)
AORTIC DIMENSIONLESS INDEX: 0.62 (DI)
ASTRO TYP 1-8 RNA STL QL NAA+NON-PROBE: NOT DETECTED
AV MEAN PRESS GRAD SYS DOP V1V2: 8 MMHG
AV VMAX SYS DOP: 194 CM/SEC
BASOPHILS # BLD AUTO: 0.03 10*3/MM3 (ref 0–0.2)
BASOPHILS NFR BLD AUTO: 0.2 % (ref 0–1.5)
BH CV ECHO MEAS - AI P1/2T: 426 MSEC
BH CV ECHO MEAS - AO MAX PG: 15.1 MMHG
BH CV ECHO MEAS - AO V2 VTI: 40 CM
BH CV ECHO MEAS - AVA(I,D): 1.93 CM2
BH CV ECHO MEAS - EDV(CUBED): 65.9 ML
BH CV ECHO MEAS - EDV(MOD-SP2): 70.7 ML
BH CV ECHO MEAS - EDV(MOD-SP4): 78.7 ML
BH CV ECHO MEAS - EF(MOD-SP2): 78.4 %
BH CV ECHO MEAS - EF(MOD-SP4): 77.9 %
BH CV ECHO MEAS - ESV(CUBED): 24.9 ML
BH CV ECHO MEAS - ESV(MOD-SP2): 15.3 ML
BH CV ECHO MEAS - ESV(MOD-SP4): 17.4 ML
BH CV ECHO MEAS - FS: 27.7 %
BH CV ECHO MEAS - IVS/LVPW: 1.3 CM
BH CV ECHO MEAS - IVSD: 1.1 CM
BH CV ECHO MEAS - LA DIMENSION: 3.7 CM
BH CV ECHO MEAS - LAT PEAK E' VEL: 8.1 CM/SEC
BH CV ECHO MEAS - LV DIASTOLIC VOL/BSA (35-75): 39.3 CM2
BH CV ECHO MEAS - LV MASS(C)D: 124.2 GRAMS
BH CV ECHO MEAS - LV MAX PG: 4.5 MMHG
BH CV ECHO MEAS - LV MEAN PG: 3 MMHG
BH CV ECHO MEAS - LV SYSTOLIC VOL/BSA (12-30): 8.7 CM2
BH CV ECHO MEAS - LV V1 MAX: 106 CM/SEC
BH CV ECHO MEAS - LV V1 VTI: 24.6 CM
BH CV ECHO MEAS - LVIDD: 4 CM
BH CV ECHO MEAS - LVIDS: 2.9 CM
BH CV ECHO MEAS - LVOT AREA: 3.1 CM2
BH CV ECHO MEAS - LVOT DIAM: 2 CM
BH CV ECHO MEAS - LVPWD: 0.85 CM
BH CV ECHO MEAS - MED PEAK E' VEL: 8.2 CM/SEC
BH CV ECHO MEAS - MV A MAX VEL: 114 CM/SEC
BH CV ECHO MEAS - MV DEC SLOPE: 474 CM/SEC2
BH CV ECHO MEAS - MV E MAX VEL: 91.7 CM/SEC
BH CV ECHO MEAS - MV E/A: 0.8
BH CV ECHO MEAS - MV P1/2T: 65.5 MSEC
BH CV ECHO MEAS - MVA(P1/2T): 3.4 CM2
BH CV ECHO MEAS - PA V2 MAX: 106 CM/SEC
BH CV ECHO MEAS - RAP SYSTOLE: 3 MMHG
BH CV ECHO MEAS - RVSP: 41.7 MMHG
BH CV ECHO MEAS - SV(LVOT): 77.3 ML
BH CV ECHO MEAS - SV(MOD-SP2): 55.4 ML
BH CV ECHO MEAS - SV(MOD-SP4): 61.3 ML
BH CV ECHO MEAS - SVI(LVOT): 38.6 ML/M2
BH CV ECHO MEAS - SVI(MOD-SP2): 27.7 ML/M2
BH CV ECHO MEAS - SVI(MOD-SP4): 30.6 ML/M2
BH CV ECHO MEAS - TAPSE (>1.6): 1.99 CM
BH CV ECHO MEAS - TR MAX PG: 38.7 MMHG
BH CV ECHO MEAS - TR MAX VEL: 311 CM/SEC
BH CV ECHO MEASUREMENTS AVERAGE E/E' RATIO: 11.25
BH CV XLRA - RV BASE: 2.45 CM
BUN SERPL-MCNC: 39.5 MG/DL (ref 8–23)
BUN/CREAT SERPL: 8.4 (ref 7–25)
C CAYETANENSIS DNA STL QL NAA+NON-PROBE: NOT DETECTED
C COLI+JEJ+UPSA DNA STL QL NAA+NON-PROBE: NOT DETECTED
CALCIUM SPEC-SCNC: 7.6 MG/DL (ref 8.6–10.5)
CHLORIDE SERPL-SCNC: 100 MMOL/L (ref 98–107)
CO2 SERPL-SCNC: 11 MMOL/L (ref 22–29)
CREAT SERPL-MCNC: 4.69 MG/DL (ref 0.57–1)
CREAT UR-MCNC: 84.5 MG/DL
CRYPTOSP DNA STL QL NAA+NON-PROBE: NOT DETECTED
D-LACTATE SERPL-SCNC: 1.5 MMOL/L (ref 0.5–2)
D-LACTATE SERPL-SCNC: 3.1 MMOL/L (ref 0.5–2)
DEPRECATED RDW RBC AUTO: 63.9 FL (ref 37–54)
E HISTOLYT DNA STL QL NAA+NON-PROBE: NOT DETECTED
EAEC PAA PLAS AGGR+AATA ST NAA+NON-PRB: NOT DETECTED
EC STX1+STX2 GENES STL QL NAA+NON-PROBE: NOT DETECTED
EGFRCR SERPLBLD CKD-EPI 2021: 9.5 ML/MIN/1.73
EOSINOPHIL # BLD AUTO: 0.08 10*3/MM3 (ref 0–0.4)
EOSINOPHIL NFR BLD AUTO: 0.5 % (ref 0.3–6.2)
EPEC EAE GENE STL QL NAA+NON-PROBE: NOT DETECTED
ERYTHROCYTE [DISTWIDTH] IN BLOOD BY AUTOMATED COUNT: 18.5 % (ref 12.3–15.4)
ETEC LTA+ST1A+ST1B TOX ST NAA+NON-PROBE: NOT DETECTED
G LAMBLIA DNA STL QL NAA+NON-PROBE: NOT DETECTED
GLUCOSE SERPL-MCNC: 215 MG/DL (ref 65–99)
HCT VFR BLD AUTO: 32.4 % (ref 34–46.6)
HGB BLD-MCNC: 11 G/DL (ref 12–15.9)
IMM GRANULOCYTES # BLD AUTO: 0.12 10*3/MM3 (ref 0–0.05)
IMM GRANULOCYTES NFR BLD AUTO: 0.7 % (ref 0–0.5)
IRON 24H UR-MRATE: 22 MCG/DL (ref 37–145)
IRON SATN MFR SERPL: 13 % (ref 20–50)
LEFT ATRIUM VOLUME INDEX: 24.9 ML/M2
LEFT ATRIUM VOLUME: 49.7 ML
LIPASE SERPL-CCNC: 61 U/L (ref 13–60)
LV EF BIPLANE MOD: 78.3 %
LYMPHOCYTES # BLD AUTO: 0.37 10*3/MM3 (ref 0.7–3.1)
LYMPHOCYTES NFR BLD AUTO: 2.1 % (ref 19.6–45.3)
MAGNESIUM SERPL-MCNC: 2.8 MG/DL (ref 1.6–2.4)
MCH RBC QN AUTO: 32 PG (ref 26.6–33)
MCHC RBC AUTO-ENTMCNC: 34 G/DL (ref 31.5–35.7)
MCV RBC AUTO: 94.2 FL (ref 79–97)
MICROALBUMIN/CREAT UR: 110.1 MG/G (ref 0–29)
MONOCYTES # BLD AUTO: 0.51 10*3/MM3 (ref 0.1–0.9)
MONOCYTES NFR BLD AUTO: 3 % (ref 5–12)
NEUTROPHILS NFR BLD AUTO: 16.17 10*3/MM3 (ref 1.7–7)
NEUTROPHILS NFR BLD AUTO: 93.5 % (ref 42.7–76)
NOROVIRUS GI+II RNA STL QL NAA+NON-PROBE: NOT DETECTED
NRBC BLD AUTO-RTO: 0 /100 WBC (ref 0–0.2)
P SHIGELLOIDES DNA STL QL NAA+NON-PROBE: NOT DETECTED
PHOSPHATE SERPL-MCNC: 5.7 MG/DL (ref 2.5–4.5)
PLATELET # BLD AUTO: 213 10*3/MM3 (ref 140–450)
PMV BLD AUTO: 10.7 FL (ref 6–12)
POTASSIUM SERPL-SCNC: 2.2 MMOL/L (ref 3.5–5.2)
POTASSIUM UR-SCNC: 19.5 MMOL/L
RBC # BLD AUTO: 3.44 10*6/MM3 (ref 3.77–5.28)
RVA RNA STL QL NAA+NON-PROBE: NOT DETECTED
S ENT+BONG DNA STL QL NAA+NON-PROBE: NOT DETECTED
SAPO I+II+IV+V RNA STL QL NAA+NON-PROBE: NOT DETECTED
SHIGELLA SP+EIEC IPAH ST NAA+NON-PROBE: NOT DETECTED
SINUS: 2.8 CM
SODIUM SERPL-SCNC: 132 MMOL/L (ref 136–145)
SODIUM UR-SCNC: 24 MMOL/L
TIBC SERPL-MCNC: 173 MCG/DL (ref 298–536)
TRANSFERRIN SERPL-MCNC: 116 MG/DL (ref 200–360)
URATE SERPL-MCNC: 8.1 MG/DL (ref 2.4–5.7)
V CHOL+PARA+VUL DNA STL QL NAA+NON-PROBE: NOT DETECTED
V CHOLERAE DNA STL QL NAA+NON-PROBE: NOT DETECTED
WBC NRBC COR # BLD AUTO: 17.28 10*3/MM3 (ref 3.4–10.8)
Y ENTEROCOL DNA STL QL NAA+NON-PROBE: NOT DETECTED

## 2025-07-05 PROCEDURE — 84132 ASSAY OF SERUM POTASSIUM: CPT

## 2025-07-05 PROCEDURE — 25810000003 SODIUM CHLORIDE 0.9 % SOLUTION: Performed by: INTERNAL MEDICINE

## 2025-07-05 PROCEDURE — 83540 ASSAY OF IRON: CPT | Performed by: INTERNAL MEDICINE

## 2025-07-05 PROCEDURE — 05HM33Z INSERTION OF INFUSION DEVICE INTO RIGHT INTERNAL JUGULAR VEIN, PERCUTANEOUS APPROACH: ICD-10-PCS | Performed by: INTERNAL MEDICINE

## 2025-07-05 PROCEDURE — 83690 ASSAY OF LIPASE: CPT | Performed by: INTERNAL MEDICINE

## 2025-07-05 PROCEDURE — 82043 UR ALBUMIN QUANTITATIVE: CPT | Performed by: INTERNAL MEDICINE

## 2025-07-05 PROCEDURE — 84100 ASSAY OF PHOSPHORUS: CPT | Performed by: NURSE PRACTITIONER

## 2025-07-05 PROCEDURE — 71045 X-RAY EXAM CHEST 1 VIEW: CPT

## 2025-07-05 PROCEDURE — 25010000002 POTASSIUM CHLORIDE 10 MEQ/100ML SOLUTION: Performed by: INTERNAL MEDICINE

## 2025-07-05 PROCEDURE — 87507 IADNA-DNA/RNA PROBE TQ 12-25: CPT | Performed by: STUDENT IN AN ORGANIZED HEALTH CARE EDUCATION/TRAINING PROGRAM

## 2025-07-05 PROCEDURE — 80048 BASIC METABOLIC PNL TOTAL CA: CPT | Performed by: NURSE PRACTITIONER

## 2025-07-05 PROCEDURE — 83735 ASSAY OF MAGNESIUM: CPT | Performed by: NURSE PRACTITIONER

## 2025-07-05 PROCEDURE — 25010000002 POTASSIUM CHLORIDE PER 2 MEQ OF POTASSIUM: Performed by: INTERNAL MEDICINE

## 2025-07-05 PROCEDURE — 85025 COMPLETE CBC W/AUTO DIFF WBC: CPT | Performed by: INTERNAL MEDICINE

## 2025-07-05 PROCEDURE — 84300 ASSAY OF URINE SODIUM: CPT | Performed by: INTERNAL MEDICINE

## 2025-07-05 PROCEDURE — 84466 ASSAY OF TRANSFERRIN: CPT | Performed by: INTERNAL MEDICINE

## 2025-07-05 PROCEDURE — C1751 CATH, INF, PER/CENT/MIDLINE: HCPCS

## 2025-07-05 PROCEDURE — 25810000003 SODIUM CHLORIDE 0.9 % SOLUTION: Performed by: NURSE PRACTITIONER

## 2025-07-05 PROCEDURE — 82570 ASSAY OF URINE CREATININE: CPT | Performed by: INTERNAL MEDICINE

## 2025-07-05 PROCEDURE — 84550 ASSAY OF BLOOD/URIC ACID: CPT | Performed by: INTERNAL MEDICINE

## 2025-07-05 PROCEDURE — 84133 ASSAY OF URINE POTASSIUM: CPT | Performed by: INTERNAL MEDICINE

## 2025-07-05 PROCEDURE — 25010000002 METRONIDAZOLE 500 MG/100ML SOLUTION: Performed by: NURSE PRACTITIONER

## 2025-07-05 PROCEDURE — 25010000002 CEFTRIAXONE PER 250 MG: Performed by: NURSE PRACTITIONER

## 2025-07-05 PROCEDURE — 25010000002 HEPARIN (PORCINE) PER 1000 UNITS: Performed by: NURSE PRACTITIONER

## 2025-07-05 PROCEDURE — 93306 TTE W/DOPPLER COMPLETE: CPT | Performed by: INTERNAL MEDICINE

## 2025-07-05 PROCEDURE — 83605 ASSAY OF LACTIC ACID: CPT | Performed by: INTERNAL MEDICINE

## 2025-07-05 PROCEDURE — 93306 TTE W/DOPPLER COMPLETE: CPT

## 2025-07-05 RX ORDER — POTASSIUM CHLORIDE 1500 MG/1
20 TABLET, EXTENDED RELEASE ORAL ONCE
Status: COMPLETED | OUTPATIENT
Start: 2025-07-05 | End: 2025-07-05

## 2025-07-05 RX ORDER — POTASSIUM CHLORIDE 1500 MG/1
20 TABLET, EXTENDED RELEASE ORAL DAILY
Status: DISCONTINUED | OUTPATIENT
Start: 2025-07-05 | End: 2025-07-05 | Stop reason: ALTCHOICE

## 2025-07-05 RX ORDER — POTASSIUM CHLORIDE 1500 MG/1
40 TABLET, EXTENDED RELEASE ORAL ONCE
Status: COMPLETED | OUTPATIENT
Start: 2025-07-05 | End: 2025-07-05

## 2025-07-05 RX ORDER — POTASSIUM CHLORIDE 29.8 MG/ML
20 INJECTION INTRAVENOUS ONCE
Status: DISCONTINUED | OUTPATIENT
Start: 2025-07-05 | End: 2025-07-05

## 2025-07-05 RX ORDER — INDOMETHACIN 25 MG/1
100 CAPSULE ORAL ONCE
Status: COMPLETED | OUTPATIENT
Start: 2025-07-05 | End: 2025-07-05

## 2025-07-05 RX ORDER — DEXTROSE MONOHYDRATE, SODIUM CHLORIDE, AND POTASSIUM CHLORIDE 50; 1.49; 9 G/1000ML; G/1000ML; G/1000ML
50 INJECTION, SOLUTION INTRAVENOUS CONTINUOUS
Status: DISCONTINUED | OUTPATIENT
Start: 2025-07-05 | End: 2025-07-05

## 2025-07-05 RX ORDER — POTASSIUM CHLORIDE 7.45 MG/ML
10 INJECTION INTRAVENOUS
Status: DISPENSED | OUTPATIENT
Start: 2025-07-05 | End: 2025-07-05

## 2025-07-05 RX ORDER — ALUMINA, MAGNESIA, AND SIMETHICONE 2400; 2400; 240 MG/30ML; MG/30ML; MG/30ML
15 SUSPENSION ORAL EVERY 6 HOURS PRN
Status: DISCONTINUED | OUTPATIENT
Start: 2025-07-05 | End: 2025-07-14 | Stop reason: HOSPADM

## 2025-07-05 RX ORDER — POTASSIUM CHLORIDE 1500 MG/1
40 TABLET, EXTENDED RELEASE ORAL 3 TIMES DAILY
Status: DISCONTINUED | OUTPATIENT
Start: 2025-07-05 | End: 2025-07-10

## 2025-07-05 RX ORDER — PANTOPRAZOLE SODIUM 40 MG/1
40 TABLET, DELAYED RELEASE ORAL
Status: DISCONTINUED | OUTPATIENT
Start: 2025-07-05 | End: 2025-07-14 | Stop reason: HOSPADM

## 2025-07-05 RX ADMIN — SODIUM BICARBONATE 100 MEQ: 84 INJECTION INTRAVENOUS at 10:24

## 2025-07-05 RX ADMIN — SODIUM BICARBONATE: 84 INJECTION, SOLUTION INTRAVENOUS at 14:47

## 2025-07-05 RX ADMIN — PANTOPRAZOLE SODIUM 40 MG: 40 TABLET, DELAYED RELEASE ORAL at 10:24

## 2025-07-05 RX ADMIN — POTASSIUM CHLORIDE 20 MEQ: 1500 TABLET, EXTENDED RELEASE ORAL at 10:24

## 2025-07-05 RX ADMIN — SODIUM CHLORIDE 50 ML/HR: 9 INJECTION, SOLUTION INTRAVENOUS at 06:04

## 2025-07-05 RX ADMIN — POTASSIUM CHLORIDE 20 MEQ: 1500 TABLET, EXTENDED RELEASE ORAL at 11:14

## 2025-07-05 RX ADMIN — HEPARIN SODIUM 5000 UNITS: 5000 INJECTION INTRAVENOUS; SUBCUTANEOUS at 08:01

## 2025-07-05 RX ADMIN — HEPARIN SODIUM 5000 UNITS: 5000 INJECTION INTRAVENOUS; SUBCUTANEOUS at 20:32

## 2025-07-05 RX ADMIN — CEFTRIAXONE 2000 MG: 2 INJECTION, POWDER, FOR SOLUTION INTRAMUSCULAR; INTRAVENOUS at 20:31

## 2025-07-05 RX ADMIN — METRONIDAZOLE 500 MG: 500 INJECTION, SOLUTION INTRAVENOUS at 04:31

## 2025-07-05 RX ADMIN — POTASSIUM CHLORIDE 40 MEQ: 1500 TABLET, EXTENDED RELEASE ORAL at 16:39

## 2025-07-05 RX ADMIN — DEXTROSE MONOHYDRATE, SODIUM CHLORIDE, AND POTASSIUM CHLORIDE 50 ML/HR: 50; 1.49; 9 INJECTION, SOLUTION INTRAVENOUS at 11:14

## 2025-07-05 RX ADMIN — Medication 10 ML: at 20:33

## 2025-07-05 RX ADMIN — HYDROCODONE BITARTRATE AND ACETAMINOPHEN 1 TABLET: 5; 325 TABLET ORAL at 02:32

## 2025-07-05 RX ADMIN — SODIUM CHLORIDE 500 ML: 9 INJECTION, SOLUTION INTRAVENOUS at 13:44

## 2025-07-05 RX ADMIN — Medication 1 APPLICATION: at 20:32

## 2025-07-05 RX ADMIN — POTASSIUM CHLORIDE 40 MEQ: 1500 TABLET, EXTENDED RELEASE ORAL at 20:32

## 2025-07-05 RX ADMIN — Medication 10 ML: at 08:02

## 2025-07-05 RX ADMIN — ALUMINUM HYDROXIDE, MAGNESIUM HYDROXIDE, AND DIMETHICONE 15 ML: 400; 400; 40 SUSPENSION ORAL at 13:31

## 2025-07-05 RX ADMIN — PANTOPRAZOLE SODIUM 40 MG: 40 TABLET, DELAYED RELEASE ORAL at 16:40

## 2025-07-05 RX ADMIN — CHLORHEXIDINE GLUCONATE 1 APPLICATION: 500 CLOTH TOPICAL at 04:00

## 2025-07-05 RX ADMIN — POTASSIUM CHLORIDE 10 MEQ: 7.46 INJECTION, SOLUTION INTRAVENOUS at 10:24

## 2025-07-05 RX ADMIN — Medication 1 APPLICATION: at 08:02

## 2025-07-05 RX ADMIN — POTASSIUM CHLORIDE 40 MEQ: 1500 TABLET, EXTENDED RELEASE ORAL at 04:31

## 2025-07-05 RX ADMIN — METRONIDAZOLE 500 MG: 500 INJECTION, SOLUTION INTRAVENOUS at 13:21

## 2025-07-05 RX ADMIN — METRONIDAZOLE 500 MG: 500 INJECTION, SOLUTION INTRAVENOUS at 20:32

## 2025-07-05 NOTE — CONSULTS
Nephrology ([facility], [facility]) Consult Note      Patient:  Lindsay Laughlin  YOB: 1955  Date of Service: 7/5/2025  MRN: [id number]   Acct: [id number]   Primary Care Physician: Provider, No Known  Advance Directive:   Code Status and Medical Interventions: CPR (Attempt to Resuscitate); Full Support; Discussed CODE STATUS with Mr. Laughlin.  Patient is ,  is in a nursing home.  She does not have any children.   Ordered at: 07/03/25 2000     Code Status (Patient has no pulse and is not breathing):    CPR (Attempt to Resuscitate)     Medical Interventions (Patient has pulse or is breathing):    Full Support     Comments:    Discussed CODE STATUS with Mr. Laughlin.  Patient is ,  is in a nursing home.  She does not have any children.     Level Of Support Discussed With:    Patient     Admit Date: 7/3/2025       Hospital Day: 2  Referring Provider: Shade Alberts MD      Patient Seen, Chart, Consults, Notes, Labs, Radiology studies reviewed.    Chief complaint: Abnormal labs.    Subjective:  Lindsay Laughlin is a 69 y.o. female  whom we were consulted for acute kidney injury/chronic kidney disease.  Patient has history of stage III chronic kidney disease but no follow-up with nephrology in the past.  She has a history of type 2 diabetes, hypertension, gastroesophageal reflux disease, hyperlipidemia.  Patient has chronic diarrhea.  She has history of colectomy at [facility], reason for colectomy is unknown.  Presented to the emergency room complaining of nausea vomiting and diarrhea for the last 5 days.  She has chronic mild diarrhea, this time she has large-volume and more frequent watery bowel movements.  Routine lab data indicated serum creatinine was 5.0, metabolic acidemia, severe hypokalemia and hypomagnesemia.  Hospital course remarkable for IV fluid administration/resuscitation with minimal response.  Patient also endorses frequent use of  ibuprofen for the treatment of generalized arthritis.    This morning patient has noticed large urine output.  She denies any swelling of legs or shortness of breath.  Her hemodynamics has been stabilized with blood pressure within normal range.    Allergies:  Patient has no known allergies.    Home Meds:  Medications Prior to Admission   Medication Sig Dispense Refill Last Dose/Taking    albuterol sulfate  (90 Base) MCG/ACT inhaler Inhale 2 puffs Every 4 (Four) Hours As Needed for Wheezing.       ibuprofen (ADVIL,MOTRIN) 200 MG tablet Take 5 tablets by mouth Every 6 (Six) Hours As Needed for Mild Pain.       ondansetron ODT (ZOFRAN-ODT) 4 MG disintegrating tablet Place 1 tablet on the tongue Every 4 (Four) Hours As Needed for Nausea or Vomiting. 10 tablet 0        Medicines:  Current Facility-Administered Medications   Medication Dose Route Frequency Provider Last Rate Last Admin    acetaminophen (TYLENOL) tablet 650 mg  650 mg Oral Q4H PRN Barbara Renteria APRN   650 mg at 07/03/25 2242    Or    acetaminophen (TYLENOL) suppository 650 mg  650 mg Rectal Q4H PRN Brabara Renteria APRN        aluminum-magnesium hydroxide-simethicone (MAALOX MAX) 400-400-40 MG/5ML suspension 15 mL  15 mL Oral Q6H PRN Dennis Ansari MD        sennosides-docusate (PERICOLACE) 8.6-50 MG per tablet 2 tablet  2 tablet Oral BID Barbara Renteria APRN        And    polyethylene glycol (MIRALAX) packet 17 g  17 g Oral Daily PRN Barbara Renteria APRN        And    bisacodyl (DULCOLAX) EC tablet 5 mg  5 mg Oral Daily PRN Barbara Renteria APRN        And    bisacodyl (DULCOLAX) suppository 10 mg  10 mg Rectal Daily PRN Barbara Renteria APRN        calcium carbonate (TUMS) chewable tablet 500 mg (200 mg elemental)  2 tablet Oral TID PRN Barbara Renteria APRN   2 tablet at 07/04/25 2208    Calcium Replacement - Follow Nurse / BPA Driven Protocol   Not Applicable PRN London Leiva PA-C        cefTRIAXone (ROCEPHIN) 2,000 mg in  sodium chloride 0.9 % 100 mL MBP  2,000 mg Intravenous Q24H Barbara Renteria, APRN 200 mL/hr at 07/04/25 2115 2,000 mg at 07/04/25 2115    Chlorhexidine Gluconate Cloth 2 % pads 1 Application  1 Application Topical Q24H Barbara Renteria APRN   1 Application at 07/05/25 0400    dextrose 5 % and sodium chloride 0.9 % with KCl 20 mEq/L infusion  50 mL/hr Intravenous Continuous Shade Soto APRN 50 mL/hr at 07/05/25 1114 50 mL/hr at 07/05/25 1114    heparin (porcine) 5000 UNIT/ML injection 5,000 Units  5,000 Units Subcutaneous Q12H Brabara Renteria APRN   5,000 Units at 07/05/25 0801    HYDROcodone-acetaminophen (NORCO) 5-325 MG per tablet 1 tablet  1 tablet Oral Q6H PRN Barbara Renteria APRN   1 tablet at 07/05/25 0232    Magnesium Standard Dose Replacement - Follow Nurse / BPA Driven Protocol   Not Applicable PRN London Leiva PA-C        metroNIDAZOLE (FLAGYL) IVPB 500 mg  500 mg Intravenous Q8H Barbara Renteria APRN 200 mL/hr at 07/05/25 0431 500 mg at 07/05/25 0431    mupirocin (BACTROBAN) 2 % nasal ointment 1 Application  1 Application Each Nare BID Barbara Renteria, APRN   1 Application at 07/05/25 0802    nitroglycerin (NITROSTAT) SL tablet 0.4 mg  0.4 mg Sublingual Q5 Min PRN Shade Alberts MD        ondansetron (ZOFRAN) injection 4 mg  4 mg Intravenous Q6H PRN Barbara Renteria, APRN        pantoprazole (PROTONIX) EC tablet 40 mg  40 mg Oral BID AC Dennis Ansari MD   40 mg at 07/05/25 1024    potassium chloride (KLOR-CON M20) CR tablet 20 mEq  20 mEq Oral Daily Shade Soto APRN   20 mEq at 07/05/25 1024    Potassium Replacement - Follow Nurse / BPA Driven Protocol   Not Applicable PRN London Leiva PA-C        sodium chloride 0.9 % flush 10 mL  10 mL Intravenous PRN London Leiva PA-C        sodium chloride 0.9 % flush 10 mL  10 mL Intravenous Q12H Barbara Renteria APRN   10 mL at 07/05/25 0802    sodium chloride 0.9 % flush 10 mL  10 mL Intravenous PRN Barbara Renteria,  APRN        sodium chloride 0.9 % infusion 40 mL  40 mL Intravenous PRN Barbara Renteria, APRURIEL           Past Medical History:  Past Medical History:   Diagnosis Date    Acid reflux     Arthritis     High cholesterol     Hypertension     Neuropathy     Swollen lymph nodes     LEFT CERVICAL    Type 2 diabetes mellitus        Past Surgical History:  Past Surgical History:   Procedure Laterality Date    CARPAL TUNNEL RELEASE Bilateral     CERVICAL LYMPH NODE BIOPSY/EXCISION Left 2017    Procedure: LEFT NECK EXPLORATION WITH INCISIONAL BIOPSY/CULTURE;  Surgeon: Blue Sanchez MD;  Location: [facility] OR;  Service:      SECTION      COLON RESECTION      COLONOSCOPY N/A 2023    Procedure: COLONOSCOPY WITH ANESTHESIA;  Surgeon: Tamy Smith MD;  Location: [facility] ENDOSCOPY;  Service: Gastroenterology;  Laterality: N/A;  preop; abnormal GI scan   postop ; poor prep   PCP Ruth Noble MD    ENDOSCOPY N/A 2023    Procedure: ESOPHAGOGASTRODUODENOSCOPY WITH ANESTHESIA;  Surgeon: Tamy Smith MD;  Location: [facility] ENDOSCOPY;  Service: Gastroenterology;  Laterality: N/A;  preop; abnormal GI scan   postop esophagitis ; hiatal hernia  PCP Ruth Noble MD    EXCISION LESION      From back    REPLACEMENT TOTAL KNEE Left     TONSILLECTOMY         Family History  Family History   Problem Relation Age of Onset    No Known Problems Mother     Colon cancer Father         < 60 years old    Breast cancer Neg Hx        Social History  Social History     Socioeconomic History    Marital status:    Tobacco Use    Smoking status: Never    Smokeless tobacco: Never   Vaping Use    Vaping status: Never Used   Substance and Sexual Activity    Alcohol use: No    Drug use: No    Sexual activity: Defer         Review of Systems:  History obtained from chart review and the patient  General ROS: No fever or chills  Respiratory ROS: No cough, shortness of breath,  "wheezing  Cardiovascular ROS: no chest pain or dyspnea on exertion  Gastrointestinal ROS: Diarrhea.  Genito-Urinary ROS: No dysuria or hematuria  14 point ROS reviewed with the patient and negative except as noted above and in the HPI unless unable to obtain.    Objective:  /64   Pulse 86   Temp 98.4 °F (36.9 °C) (Oral)   Resp 14   Ht 165.1 cm (65\")   Wt 93.5 kg (206 lb 2.1 oz)   SpO2 99%   BMI 34.30 kg/m²     Intake/Output Summary (Last 24 hours) at 7/5/2025 1318  Last data filed at 7/5/2025 1200  Gross per 24 hour   Intake 4099.59 ml   Output 550 ml   Net 3549.59 ml     General: awake/alert   HEENT: Normocephalic/atraumatic head  Neck: Supple with no JVD or carotid bruits.  Chest:  clear to auscultation bilaterally without respiratory distress  CVS: regular rate and rhythm  Abdominal: soft, nontender, normal bowel sounds  Extremities: no cyanosis or edema  Skin: warm and dry without rash      Labs:    Results from last 7 days   Lab Units 07/05/25  0223 07/04/25  1418 07/03/25  1628   WBC 10*3/mm3 17.28* 16.56* 25.12*   HEMOGLOBIN g/dL 11.0* 10.9* 12.6   HEMATOCRIT % 32.4* 32.2* 36.2   PLATELETS 10*3/mm3 213 204 233       Results from last 7 days   Lab Units 07/05/25  0223 07/04/25  1418 07/04/25  0036 07/03/25  1706   SODIUM mmol/L 132* 135* 133* 135*   POTASSIUM mmol/L 2.2* 2.2* 2.1* 1.7*   CHLORIDE mmol/L 100 102 101 100   CO2 mmol/L 11.0* 12.0* 9.0* 12.0*   BUN mg/dL 39.5* 41.4* 42.7* 44.5*   CREATININE mg/dL 4.69* 4.71* 4.69* 5.04*   CALCIUM mg/dL 7.6* 6.0* 5.9* 5.6*   BILIRUBIN mg/dL  --   --   --  0.3   ALK PHOS U/L  --   --   --  82   ALT (SGPT) U/L  --   --   --  15   AST (SGOT) U/L  --   --   --  41*   GLUCOSE mg/dL 215* 189* 126* 161*   EGFR mL/min/1.73 9.5* 9.5* 9.5* 8.7*         Radiology:   Imaging Results (Last 24 Hours)       ** No results found for the last 24 hours. **            Culture:  No components found for: \"WOUNDCUL\", \"3\"  No components found for: \"CSFCUL\", \"3\"  No components " "found for: \"BC\", \"3\"  No components found for: \"URINECUL\", \"3\"      Assessment   1.  Acute kidney injury/some improvement.  2.  Intravascular volume depletion versus ATN.  3.  Hemodynamic effect of nonsteroidal agents leading to ATN.  4.  Chronic kidney disease stage III baseline.  5.  Severe hypokalemia/metabolic acidosis related to diarrhea.  6.  Hypomagnesemia related to loss of magnesium as well.    Plan:  1.  Change IV fluid composition with half-normal saline and potassium chloride.  I will also give bolus of normal saline 1000 cc followed by maintenance IV fluid.  2.  P.o. potassium supplement as well.  3.  Urinary electrolytes/UACR/serum PTH/baseline iron studies  4.  Baseline renal ultrasound needed.  5.  Plan was discussed with the patient.      Thank you for the consult, we appreciate the opportunity to provide care to your patients.  Feel free to contact me if I can be of any further assistance.      Miguel Chow MD  7/5/2025  13:18 CDT  "

## 2025-07-05 NOTE — PROGRESS NOTES
[facility] Intensivist Services    Date of Admission: 7/3/2025  Date of Note: 07/05/25  Primary Care Physician: Provider, No Known   LOS: 2 days     History   Next of Kin:  Primary Emergency Contact: Rodger Marrufo Home Phone: [phone]   Code Status: Code Status and Medical Interventions: CPR (Attempt to Resuscitate); Full Support; Discussed CODE STATUS with Mr. Laughlin.  Patient is ,  is in a nursing home.  She does not have any children.    She is a 69 y.o. female admitted 7/3/2025 with Enteritis who also  has a past medical history of Acid reflux, Arthritis, High cholesterol, Hypertension, Neuropathy, Swollen lymph nodes, and Type 2 diabetes mellitus.    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     She takes albuterol sulfate HFA, ibuprofen, and ondansetron ODT at home based on most current med reconciliation.   Complete list is below.     The ICU team was asked to evaluate the patient for severe dehydration with electrolyte abnormalities and acute kidney injury..    7/4 today patient is feeling a little bit better.  Her cramps has improved.  She can move all of her upper and  lower extremities fine.  Denies having any major abdominal pain.  Have not had anything to eat yet.  No nausea or vomiting noted    7/5 patient complaining of having a lot of throat pain.  No more diarrhea noted.  Seems slightly confused on conversation.  Denies having any chest discomfort.  No episodes of fever overnight.  On room air.  Blood pressures okay.    Past Medical History     Active and Resolved Problems  Active Hospital Problems    Diagnosis  POA    **Enteritis [K52.9]  Yes      Resolved Hospital Problems   No resolved problems to display.       Allergies     Allergies:   She has no known allergies.    Labs   Basic Labs:  CBC:      Lab 07/05/25 0223 07/04/25 1418 07/03/25  1628   WBC 17.28* 16.56* 25.12*   HEMOGLOBIN 11.0* 10.9* 12.6   HEMATOCRIT 32.4* 32.2* 36.2   PLATELETS 213 204 233   NEUTROS ABS 16.17* 15.24* 22.36*   IMMATURE GRANS (ABS) 0.12* 0.11* 0.15*   LYMPHS ABS 0.37* 0.40* 1.24   MONOS ABS 0.51 0.67 1.31*   EOS ABS 0.08 0.11 0.02   MCV 94.2 96.1 94.5       LIVER FUNCTION AND COAG TESTS:      Lab 07/05/25 0223 07/03/25  1706 07/03/25  1628   TOTAL PROTEIN  --  5.4*  --    ALBUMIN  --  2.9*  --    GLOBULIN  --  2.5  --    ALT (SGPT)  --  15  --    AST (SGOT)  --  41*  --    BILIRUBIN  --  0.3  --    ALK PHOS  --  82  --    AMYLASE  --   --  19*   LIPASE 61*  --  21       ABG:      Lab 07/05/25 0223 07/04/25 1418 07/04/25 0036 07/03/25  1706   ANION GAP 21.0* 21.0* 23.0* 23.0*       CMP:      Lab 07/05/25 0223 07/04/25 1418 07/04/25 0036 07/03/25  1706   SODIUM 132* 135* 133* 135*   POTASSIUM 2.2* 2.2* 2.1* 1.7*   CHLORIDE 100 102 101 100   BUN 39.5* 41.4* 42.7* 44.5*   CREATININE 4.69* 4.71* 4.69* 5.04*   CALCIUM 7.6* 6.0* 5.9* 5.6*   MAGNESIUM 2.8* 2.3 1.4* 0.8*   PHOSPHORUS 5.7* 4.7*  --   --    GLUCOSE 215* 189* 126* 161*   EGFR 9.5* 9.5* 9.5* 8.7*       Inpatient Medications   Scheduled Meds:cefTRIAXone, 2,000 mg, Intravenous, Q24H  Chlorhexidine Gluconate Cloth, 1 Application,  "Topical, Q24H  heparin (porcine), 5,000 Units, Subcutaneous, Q12H  metroNIDAZOLE, 500 mg, Intravenous, Q8H  mupirocin, 1 Application, Each Nare, BID  senna-docusate sodium, 2 tablet, Oral, BID  sodium chloride, 10 mL, Intravenous, Q12H      Continuous Infusions:sodium chloride, 50 mL/hr, Last Rate: 50 mL/hr (07/05/25 0604)      PRN Meds:.  acetaminophen **OR** acetaminophen    senna-docusate sodium **AND** polyethylene glycol **AND** bisacodyl **AND** bisacodyl    calcium carbonate    Calcium Replacement - Follow Nurse / BPA Driven Protocol    HYDROcodone-acetaminophen    Magnesium Standard Dose Replacement - Follow Nurse / BPA Driven Protocol    nitroglycerin    ondansetron    Potassium Replacement - Follow Nurse / BPA Driven Protocol    [COMPLETED] Insert Peripheral IV **AND** sodium chloride    sodium chloride    sodium chloride    I have reviewed the patient's current medications.   Outpatient Medications     Current Outpatient Medications   Medication Instructions    albuterol sulfate  (90 Base) MCG/ACT inhaler 2 puffs, Inhalation, Every 4 Hours PRN    ibuprofen (ADVIL,MOTRIN) 1,000 mg, Oral, Every 6 Hours PRN    ondansetron ODT (ZOFRAN-ODT) 4 mg, Translingual, Every 4 Hours PRN       Current Antibiotics   cefTRIAXone (ROCEPHIN) 2000 mg IVPB in 100 mL NS (MBP)  metroNIDAZOLE - 500-0.79 MG/100ML-%    Exam   Vent settings for last 24 hours:       Vital signs for last 24 hours:  Temp:  [98.2 °F (36.8 °C)-98.4 °F (36.9 °C)] 98.4 °F (36.9 °C)  Heart Rate:  [] 88  Resp:  [14-20] 14  BP: ()/() 106/78    Vitals: Her  height is 165.1 cm (65\") and weight is 93.5 kg (206 lb 2.1 oz). Her oral temperature is 98.4 °F (36.9 °C). Her blood pressure is 106/78 and her pulse is 88. Her respiration is 14 and oxygen saturation is 99%.     Constitutional:       General: She is not in acute distress.  Slightly confused  HENT:      Head: Normocephalic and atraumatic.      Nose: Nose normal.      Mouth/Throat: "      Mouth: Mucous membranes are dry.      Comments: Extremely dry mucosa.    Eyes:      Extraocular Movements: Extraocular movements intact.      Pupils: Pupils are equal, round, and reactive to light.   Cardiovascular:      Rate and Rhythm: Normal rate and regular rhythm.   Pulmonary:      Effort: Pulmonary effort is normal.      Breath sounds: Normal breath sounds.   Abdominal:      General: There is no distension.      Palpations: Abdomen is soft.      Tenderness: There is no guarding.      Comments: Well-healed vertical incisional site.  No rebound tenderness or concerns for peritonitis on exam.  Bowel sounds are normal  Musculoskeletal:         General: No deformity.      Cervical back: Normal range of motion.      Right lower leg: No edema.      Left lower leg: No edema.      Comments: Cramping noted to right hand and feet bilateral improved ROM now.   Skin:     General: Skin is warm and dry.      Capillary Refill: Capillary refill takes 2 to 3 seconds.      Findings: No bruising.   Neurological:      General: No focal deficit present.      Mental Status: She is alert and oriented to person, place, and time.        Intake/Output   Intake/Output this shift:  I/O this shift:  In: 264 [I.V.:264]  Out: -     Intake/Output last 3 shifts:  I/O last 3 completed shifts:  In: 6869.6 [P.O.:2696; I.V.:2673.6; IV Piggyback:1500]  Out: 501 [Urine:500; Stool:1]    Results and Cultures Review     Result Review:  I have personally reviewed the results from the time of this admission to 7/5/2025 09:02 CDT and agree with these findings:  [x]  Laboratory list / accordion  [x]  Microbiology  [x]  Radiology  []  EKG/Telemetry   []  Cardiology/Vascular   []  Pathology  []  Old records  []  Other:  Most notable findings include: mg 1.3  K 2.2        Culture Data:     Assessment/Plan       Enteritis  Came with abdominal pain with severe dehydration.    Continue current therapy with Flagyl.   No more diarrhea noted.    Try oral  liquids to see if she tolerates.    2 Acute kidney injury associated with creatinine of 5.0- 4.69  Prerenal associated with hypovolemia.    Will continue with IV hydration.    Check urine Fena check urine sodium   continue with close hydration for now.  Continue replacing electrolytes and recheck check renal ultrasound.  Nephrology consult    3 Electrolyte abnormalities with   Severe hypokalemia-replaced recheck 2.1   Hypomagnesemia magnesium level of 1.3 replace and recheck.  May be associated with a GI disorder with the significant diarrhea but seems pretty refractory at this point I will recheck the electrolytes cannot rule out the possibility of distal  RTA especially with an elevated anion gap.  Will check lactic acid since both diarrheal illness and RTA should be causing a normal anion gap acidosis well patient's anion gap is about 23 now.    4 Non obstructive renal stone  Cont hydration, no s/s for now.fu conservatively    5 Pem low protein / albumin level   Optimize oral when able to take  .      6 VTE Prophylaxis:    Pharmacologic & mechanical VTE prophylaxis orders are present.      7 Active VTE Prophylaxis  Pharmacologic:        Start     Dose Route Frequency Stop    07/03/25 2100  heparin (porcine) 5000 UNIT/ML injection 5,000 Units         5,000 Units SC Every 12 Hours Scheduled --                  Mechanical:        Start        07/03/25 1956  Maintain Sequential Compression Device  Continuous                            Code Status and Medical Interventions: CPR (Attempt to Resuscitate); Full Support; Discussed CODE STATUS with Mr. Laughlin.  Patient is ,  is in a nursing home.  She does not have any children.   Ordered at: 07/03/25 2000     Code Status (Patient has no pulse and is not breathing):    CPR (Attempt to Resuscitate)     Medical Interventions (Patient has pulse or is breathing):    Full Support     Comments:    Discussed CODE STATUS with Mr. Laughlin.  Patient is ,  is  in a nursing home.  She does not have any children.     Level Of Support Discussed With:    Patient       Due to a high probability of clinically significant, life threatening deterioration, the patient required my highest level of preparedness to intervene emergently and I personally spent this critical care time directly and personally managing the patient.     This critical care time included obtaining a history; examining the patient; pulse oximetry; ordering and review of studies; arranging urgent treatment with development of a management plan; evaluation of patient's response to treatment; frequent reassessment; and, discussions with other providers.    This critical care time was performed to assess and manage the high probability of imminent, life-threatening deterioration that could result in multi-organ failure. It was exclusive of separately billable procedures and treating other patients and teaching time.    Please see MDM section and the rest of the note for further information on patient assessment and treatment.    Part of this note may be an electronic transcription/translation of spoken language to printed text using the Dragon Dictation System    Electronically signed by Dennis Ansari MD on 7/5/2025 at 09:02 CDT

## 2025-07-06 ENCOUNTER — APPOINTMENT (OUTPATIENT)
Dept: ULTRASOUND IMAGING | Facility: HOSPITAL | Age: 70
DRG: 674 | End: 2025-07-06
Payer: MEDICARE

## 2025-07-06 LAB
ALBUMIN SERPL-MCNC: 2.6 G/DL (ref 3.5–5.2)
ALBUMIN/GLOB SERPL: 1.1 G/DL
ALP SERPL-CCNC: 87 U/L (ref 39–117)
ALT SERPL W P-5'-P-CCNC: 32 U/L (ref 1–33)
ANION GAP SERPL CALCULATED.3IONS-SCNC: 15 MMOL/L (ref 5–15)
AST SERPL-CCNC: 61 U/L (ref 1–32)
BASOPHILS # BLD AUTO: 0.03 10*3/MM3 (ref 0–0.2)
BASOPHILS NFR BLD AUTO: 0.3 % (ref 0–1.5)
BILIRUB SERPL-MCNC: 0.2 MG/DL (ref 0–1.2)
BUN SERPL-MCNC: 36.5 MG/DL (ref 8–23)
BUN/CREAT SERPL: 7.8 (ref 7–25)
CALCIUM SPEC-SCNC: 6.7 MG/DL (ref 8.6–10.5)
CHLORIDE SERPL-SCNC: 104 MMOL/L (ref 98–107)
CO2 SERPL-SCNC: 15 MMOL/L (ref 22–29)
CREAT SERPL-MCNC: 4.65 MG/DL (ref 0.57–1)
DEPRECATED RDW RBC AUTO: 63 FL (ref 37–54)
EGFRCR SERPLBLD CKD-EPI 2021: 9.6 ML/MIN/1.73
EOSINOPHIL # BLD AUTO: 0.38 10*3/MM3 (ref 0–0.4)
EOSINOPHIL NFR BLD AUTO: 3.2 % (ref 0.3–6.2)
ERYTHROCYTE [DISTWIDTH] IN BLOOD BY AUTOMATED COUNT: 18.5 % (ref 12.3–15.4)
GLOBULIN UR ELPH-MCNC: 2.3 GM/DL
GLUCOSE SERPL-MCNC: 208 MG/DL (ref 65–99)
HCT VFR BLD AUTO: 29.5 % (ref 34–46.6)
HGB BLD-MCNC: 10.1 G/DL (ref 12–15.9)
IMM GRANULOCYTES # BLD AUTO: 0.1 10*3/MM3 (ref 0–0.05)
IMM GRANULOCYTES NFR BLD AUTO: 0.8 % (ref 0–0.5)
LYMPHOCYTES # BLD AUTO: 0.48 10*3/MM3 (ref 0.7–3.1)
LYMPHOCYTES NFR BLD AUTO: 4.1 % (ref 19.6–45.3)
MAGNESIUM SERPL-MCNC: 2.2 MG/DL (ref 1.6–2.4)
MCH RBC QN AUTO: 32 PG (ref 26.6–33)
MCHC RBC AUTO-ENTMCNC: 34.2 G/DL (ref 31.5–35.7)
MCV RBC AUTO: 93.4 FL (ref 79–97)
MONOCYTES # BLD AUTO: 0.65 10*3/MM3 (ref 0.1–0.9)
MONOCYTES NFR BLD AUTO: 5.5 % (ref 5–12)
NEUTROPHILS NFR BLD AUTO: 10.17 10*3/MM3 (ref 1.7–7)
NEUTROPHILS NFR BLD AUTO: 86.1 % (ref 42.7–76)
NRBC BLD AUTO-RTO: 0 /100 WBC (ref 0–0.2)
PHOSPHATE SERPL-MCNC: 3.4 MG/DL (ref 2.5–4.5)
PLATELET # BLD AUTO: 172 10*3/MM3 (ref 140–450)
PMV BLD AUTO: 11.2 FL (ref 6–12)
POTASSIUM SERPL-SCNC: 3 MMOL/L (ref 3.5–5.2)
POTASSIUM SERPL-SCNC: 3.5 MMOL/L (ref 3.5–5.2)
PROT SERPL-MCNC: 4.9 G/DL (ref 6–8.5)
QT INTERVAL: 582 MS
QTC INTERVAL: 708 MS
RBC # BLD AUTO: 3.16 10*6/MM3 (ref 3.77–5.28)
SODIUM SERPL-SCNC: 134 MMOL/L (ref 136–145)
WBC NRBC COR # BLD AUTO: 11.81 10*3/MM3 (ref 3.4–10.8)

## 2025-07-06 PROCEDURE — 83735 ASSAY OF MAGNESIUM: CPT

## 2025-07-06 PROCEDURE — 76775 US EXAM ABDO BACK WALL LIM: CPT

## 2025-07-06 PROCEDURE — 25010000002 METRONIDAZOLE 500 MG/100ML SOLUTION: Performed by: NURSE PRACTITIONER

## 2025-07-06 PROCEDURE — 25010000002 HEPARIN (PORCINE) PER 1000 UNITS: Performed by: NURSE PRACTITIONER

## 2025-07-06 PROCEDURE — 86160 COMPLEMENT ANTIGEN: CPT | Performed by: INTERNAL MEDICINE

## 2025-07-06 PROCEDURE — 86235 NUCLEAR ANTIGEN ANTIBODY: CPT | Performed by: INTERNAL MEDICINE

## 2025-07-06 PROCEDURE — 83516 IMMUNOASSAY NONANTIBODY: CPT | Performed by: INTERNAL MEDICINE

## 2025-07-06 PROCEDURE — 86431 RHEUMATOID FACTOR QUANT: CPT | Performed by: INTERNAL MEDICINE

## 2025-07-06 PROCEDURE — 86037 ANCA TITER EACH ANTIBODY: CPT | Performed by: INTERNAL MEDICINE

## 2025-07-06 PROCEDURE — 25010000002 POTASSIUM CHLORIDE PER 2 MEQ OF POTASSIUM: Performed by: INTERNAL MEDICINE

## 2025-07-06 PROCEDURE — 86147 CARDIOLIPIN ANTIBODY EA IG: CPT | Performed by: INTERNAL MEDICINE

## 2025-07-06 PROCEDURE — 86038 ANTINUCLEAR ANTIBODIES: CPT | Performed by: INTERNAL MEDICINE

## 2025-07-06 PROCEDURE — 86200 CCP ANTIBODY: CPT | Performed by: INTERNAL MEDICINE

## 2025-07-06 PROCEDURE — 85025 COMPLETE CBC W/AUTO DIFF WBC: CPT | Performed by: INTERNAL MEDICINE

## 2025-07-06 PROCEDURE — 86376 MICROSOMAL ANTIBODY EACH: CPT | Performed by: INTERNAL MEDICINE

## 2025-07-06 PROCEDURE — 80053 COMPREHEN METABOLIC PANEL: CPT

## 2025-07-06 PROCEDURE — 84100 ASSAY OF PHOSPHORUS: CPT

## 2025-07-06 PROCEDURE — 25010000002 BUMETANIDE PER 0.5 MG: Performed by: INTERNAL MEDICINE

## 2025-07-06 PROCEDURE — 25010000002 CEFTRIAXONE PER 250 MG: Performed by: NURSE PRACTITIONER

## 2025-07-06 PROCEDURE — 86225 DNA ANTIBODY NATIVE: CPT | Performed by: INTERNAL MEDICINE

## 2025-07-06 RX ORDER — METRONIDAZOLE 500 MG/1
250 TABLET ORAL EVERY 8 HOURS SCHEDULED
Status: DISCONTINUED | OUTPATIENT
Start: 2025-07-06 | End: 2025-07-06

## 2025-07-06 RX ORDER — METRONIDAZOLE 500 MG/1
500 TABLET ORAL EVERY 8 HOURS SCHEDULED
Status: DISPENSED | OUTPATIENT
Start: 2025-07-06 | End: 2025-07-08

## 2025-07-06 RX ORDER — SODIUM BICARBONATE 650 MG/1
1300 TABLET ORAL 3 TIMES DAILY
Status: DISCONTINUED | OUTPATIENT
Start: 2025-07-06 | End: 2025-07-10

## 2025-07-06 RX ORDER — CHOLECALCIFEROL (VITAMIN D3) 25 MCG
5000 TABLET ORAL DAILY
Status: DISCONTINUED | OUTPATIENT
Start: 2025-07-06 | End: 2025-07-14 | Stop reason: HOSPADM

## 2025-07-06 RX ORDER — BUMETANIDE 0.25 MG/ML
1 INJECTION, SOLUTION INTRAMUSCULAR; INTRAVENOUS EVERY 12 HOURS
Status: DISCONTINUED | OUTPATIENT
Start: 2025-07-06 | End: 2025-07-09

## 2025-07-06 RX ADMIN — Medication 10 ML: at 08:32

## 2025-07-06 RX ADMIN — SODIUM BICARBONATE: 84 INJECTION, SOLUTION INTRAVENOUS at 03:12

## 2025-07-06 RX ADMIN — METRONIDAZOLE 500 MG: 500 TABLET ORAL at 14:12

## 2025-07-06 RX ADMIN — Medication 10 ML: at 20:10

## 2025-07-06 RX ADMIN — HEPARIN SODIUM 5000 UNITS: 5000 INJECTION INTRAVENOUS; SUBCUTANEOUS at 08:30

## 2025-07-06 RX ADMIN — Medication 1 APPLICATION: at 08:30

## 2025-07-06 RX ADMIN — PANTOPRAZOLE SODIUM 40 MG: 40 TABLET, DELAYED RELEASE ORAL at 16:55

## 2025-07-06 RX ADMIN — Medication 1 APPLICATION: at 20:09

## 2025-07-06 RX ADMIN — POTASSIUM CHLORIDE 40 MEQ: 1500 TABLET, EXTENDED RELEASE ORAL at 08:31

## 2025-07-06 RX ADMIN — Medication 5000 UNITS: at 16:54

## 2025-07-06 RX ADMIN — POTASSIUM CHLORIDE 40 MEQ: 1500 TABLET, EXTENDED RELEASE ORAL at 20:09

## 2025-07-06 RX ADMIN — PANTOPRAZOLE SODIUM 40 MG: 40 TABLET, DELAYED RELEASE ORAL at 08:31

## 2025-07-06 RX ADMIN — BUMETANIDE 1 MG: 0.25 INJECTION INTRAMUSCULAR; INTRAVENOUS at 14:12

## 2025-07-06 RX ADMIN — CEFTRIAXONE 2000 MG: 2 INJECTION, POWDER, FOR SOLUTION INTRAMUSCULAR; INTRAVENOUS at 20:08

## 2025-07-06 RX ADMIN — SODIUM BICARBONATE 1300 MG: 650 TABLET ORAL at 16:55

## 2025-07-06 RX ADMIN — METRONIDAZOLE 500 MG: 500 INJECTION, SOLUTION INTRAVENOUS at 05:27

## 2025-07-06 RX ADMIN — SODIUM BICARBONATE 1300 MG: 650 TABLET ORAL at 20:09

## 2025-07-06 RX ADMIN — METRONIDAZOLE 500 MG: 500 TABLET ORAL at 20:09

## 2025-07-06 RX ADMIN — SODIUM BICARBONATE: 84 INJECTION, SOLUTION INTRAVENOUS at 14:12

## 2025-07-06 RX ADMIN — CHLORHEXIDINE GLUCONATE 1 APPLICATION: 500 CLOTH TOPICAL at 03:09

## 2025-07-06 RX ADMIN — HYDROCODONE BITARTRATE AND ACETAMINOPHEN 1 TABLET: 5; 325 TABLET ORAL at 20:14

## 2025-07-06 RX ADMIN — POTASSIUM CHLORIDE 40 MEQ: 1500 TABLET, EXTENDED RELEASE ORAL at 14:12

## 2025-07-06 RX ADMIN — HEPARIN SODIUM 5000 UNITS: 5000 INJECTION INTRAVENOUS; SUBCUTANEOUS at 20:09

## 2025-07-06 NOTE — PROGRESS NOTES
[facility]    Date of Admission: 7/3/2025  Date of Note: 07/06/25  Primary Care Physician: Provider, No Known   LOS: 3 days     History   Next of Kin:  Primary Emergency Contact: Rodger Marrufo Home Phone: [phone]   Code Status: Code Status and Medical Interventions: CPR (Attempt to Resuscitate); Full Support; Discussed CODE STATUS with Mr. Laughlin.  Patient is ,  is in a nursing home.  She does not have any children.    She is a 69 y.o. female admitted 7/3/2025 with Enteritis who also  has a past medical history of Acid reflux, Arthritis, High cholesterol, Hypertension, Neuropathy, Swollen lymph nodes, and Type 2 diabetes mellitus.    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     She takes albuterol sulfate HFA, ibuprofen, and ondansetron ODT at home based on most current med reconciliation.   Complete list is below.     The ICU team was asked to evaluate the patient for severe dehydration with electrolyte abnormalities and acute kidney injury..    7/4 today patient is feeling a little bit better.  Her cramps has improved.  She can move all of her upper and  lower extremities fine.  Denies having any major abdominal pain.  Have not had anything to eat yet.  No nausea or vomiting noted    7/5 patient complaining of having a lot of throat pain.  No more diarrhea noted.  Seems slightly confused on conversation.  Denies having any chest discomfort.  No episodes of fever overnight.  On room air.  Blood pressures okay.    7/ 6 doing fair this morning fairly awake denies having any other new discomfort able to move all extremities fine tolerating oral okay with no fever    Past Medical History     Active and Resolved Problems  Active Hospital Problems    Diagnosis  POA    **Enteritis [K52.9]  Yes      Resolved Hospital Problems   No resolved problems to display.       Allergies     Allergies:   She has no known allergies.    Labs   Basic Labs:  CBC:      Lab 07/06/25 0356 07/05/25 0223 07/04/25 1418 07/03/25  1628   WBC 11.81* 17.28* 16.56* 25.12*   HEMOGLOBIN 10.1* 11.0* 10.9* 12.6   HEMATOCRIT 29.5* 32.4* 32.2* 36.2   PLATELETS 172 213 204 233   NEUTROS ABS 10.17* 16.17* 15.24* 22.36*   IMMATURE GRANS (ABS) 0.10* 0.12* 0.11* 0.15*   LYMPHS ABS 0.48* 0.37* 0.40* 1.24   MONOS ABS 0.65 0.51 0.67 1.31*   EOS ABS 0.38 0.08 0.11 0.02   MCV 93.4 94.2 96.1 94.5       LIVER FUNCTION AND COAG TESTS:      Lab 07/06/25 0356 07/05/25 0223 07/03/25  1706 07/03/25  1628   TOTAL PROTEIN 4.9*  --  5.4*  --    ALBUMIN 2.6*  --  2.9*  --    GLOBULIN 2.3  --  2.5  --    ALT (SGPT) 32  --  15  --    AST (SGOT) 61*  --  41*  --    BILIRUBIN 0.2  --  0.3  --    ALK PHOS 87  --  82  --    AMYLASE  --   --   --  19*   LIPASE  --  61*  --  21       ABG:      Lab 07/06/25 0356 07/05/25 0223 07/04/25 1418 07/04/25  0036 07/03/25  1706   ANION GAP 15.0 21.0* 21.0* 23.0* 23.0*       CMP:      Lab 07/06/25  0356 07/05/25  2348 07/05/25  0223 07/04/25  1418 07/04/25  0036 07/03/25  1706   SODIUM 134*  --  132* 135* 133* 135*   POTASSIUM 3.0* 3.5 2.2* 2.2* 2.1* 1.7*   CHLORIDE 104  --  100 102 101  100   BUN 36.5*  --  39.5* 41.4* 42.7* 44.5*   CREATININE 4.65*  --  4.69* 4.71* 4.69* 5.04*   CALCIUM 6.7*  --  7.6* 6.0* 5.9* 5.6*   MAGNESIUM 2.2  --  2.8* 2.3 1.4* 0.8*   PHOSPHORUS 3.4  --  5.7* 4.7*  --   --    GLUCOSE 208*  --  215* 189* 126* 161*   EGFR 9.6*  --  9.5* 9.5* 9.5* 8.7*       Inpatient Medications   Scheduled Meds:cefTRIAXone, 2,000 mg, Intravenous, Q24H  Chlorhexidine Gluconate Cloth, 1 Application, Topical, Q24H  heparin (porcine), 5,000 Units, Subcutaneous, Q12H  metroNIDAZOLE, 500 mg, Intravenous, Q8H  mupirocin, 1 Application, Each Nare, BID  pantoprazole, 40 mg, Oral, BID AC  potassium chloride, 40 mEq, Oral, TID  senna-docusate sodium, 2 tablet, Oral, BID  sodium chloride, 10 mL, Intravenous, Q12H      Continuous Infusions:sodium chloride 0.45 % 905 mL with potassium chloride 40 mEq, sodium bicarbonate 8.4 % 75 mEq infusion, , Last Rate: 150 mL/hr at 07/06/25 0312      PRN Meds:.  acetaminophen **OR** acetaminophen    aluminum-magnesium hydroxide-simethicone    senna-docusate sodium **AND** polyethylene glycol **AND** bisacodyl **AND** bisacodyl    calcium carbonate    Calcium Replacement - Follow Nurse / BPA Driven Protocol    HYDROcodone-acetaminophen    Magnesium Standard Dose Replacement - Follow Nurse / BPA Driven Protocol    nitroglycerin    ondansetron    Potassium Replacement - Follow Nurse / BPA Driven Protocol    [COMPLETED] Insert Peripheral IV **AND** sodium chloride    sodium chloride    sodium chloride    I have reviewed the patient's current medications.   Outpatient Medications     Current Outpatient Medications   Medication Instructions    albuterol sulfate  (90 Base) MCG/ACT inhaler 2 puffs, Inhalation, Every 4 Hours PRN    ibuprofen (ADVIL,MOTRIN) 1,000 mg, Oral, Every 6 Hours PRN    ondansetron ODT (ZOFRAN-ODT) 4 mg, Translingual, Every 4 Hours PRN       Current Antibiotics   cefTRIAXone (ROCEPHIN) 2000 mg IVPB in 100 mL NS (MBP)  metroNIDAZOLE - 500-0.79  "MG/100ML-%    Exam   Vent settings for last 24 hours:       Vital signs for last 24 hours:  Temp:  [98.1 °F (36.7 °C)-98.4 °F (36.9 °C)] 98.4 °F (36.9 °C)  Heart Rate:  [] 98  Resp:  [15-26] 26  BP: ()/(47-86) 99/65    Vitals: Her  height is 165.1 cm (65\") and weight is 100 kg (220 lb 10.9 oz). Her oral temperature is 98.4 °F (36.9 °C). Her blood pressure is 99/65 and her pulse is 98. Her respiration is 26 and oxygen saturation is 96%.     Constitutional:       General: She is not in acute distress.  Slightly confused  HENT:      Head: Normocephalic and atraumatic.      Nose: Nose normal.      Mouth/Throat:      Mouth: Mucous membranes are dry.      Comments: Extremely dry mucosa.    Eyes:      Extraocular Movements: Extraocular movements intact.      Pupils: Pupils are equal, round, and reactive to light.   Cardiovascular:      Rate and Rhythm: Normal rate and regular rhythm.   Pulmonary:      Effort: Pulmonary effort is normal.      Breath sounds: Normal breath sounds.   Abdominal:      General: There is no distension.      Palpations: Abdomen is soft.      Tenderness: There is no guarding.      Comments: Well-healed vertical incisional site.  No rebound tenderness or concerns for peritonitis on exam.  Bowel sounds are normal  Musculoskeletal:         General: No deformity.      Cervical back: Normal range of motion.      Right lower leg: No edema.      Left lower leg: No edema.      Comments: Cramping noted to right hand and feet bilateral improved ROM now.   Skin:     General: Skin is warm and dry.      Capillary Refill: Capillary refill takes 2 to 3 seconds.      Findings: No bruising.   Neurological:      General: No focal deficit present.      Mental Status: She is alert and oriented to person, place, and time.        Intake/Output   Intake/Output this shift:  I/O this shift:  In: 258.7 [I.V.:258.7]  Out: -     Intake/Output last 3 shifts:  I/O last 3 completed shifts:  In: 6205.6 [P.O.:2102; " I.V.:2503.6; IV Piggyback:1600]  Out: 500 [Urine:500]    Results and Cultures Review     Result Review:  I have personally reviewed the results from the time of this admission to 7/6/2025 08:42 CDT and agree with these findings:  [x]  Laboratory list / accordion  [x]  Microbiology  [x]  Radiology  []  EKG/Telemetry   []  Cardiology/Vascular   []  Pathology  []  Old records  []  Other:  Most notable findings include: mg 1.3  K .3.0         Culture Data:     Assessment/Plan       Enteritis  Came with abdominal pain with severe dehydration.    Continue current therapy with Flagyl.  No signs of infection noted.  No more diarrhea noted.    Try oral liquids to see if she tolerates.    2 Acute kidney injury associated with creatinine of 5.0- 4.69- 4.65   Prerenal associated with hypovolemia.  Patient uses NSAIDs routinely may be causing acute tubular necrosis  Will continue with IV hydration.    Appreciate help from nephrology     3 Electrolyte abnormalities with   Severe hypokalemia-replaced recheck 3.0  Magnesium replaced as well   May be associated with a GI disorder with the significant diarrhea but seems pretty refractory at this point I will recheck the electrolytes cannot rule out the possibility of  RTA   We will continue to closely monitor    4 Non obstructive renal stone  Cont hydration, no s/s for now.fu conservatively    5 Pem low protein / albumin level   Optimize oral when able to take  .      6 VTE Prophylaxis:    Pharmacologic & mechanical VTE prophylaxis orders are present.      7 Active VTE Prophylaxis  Pharmacologic:        Start     Dose Route Frequency Stop    07/03/25 2100  heparin (porcine) 5000 UNIT/ML injection 5,000 Units         5,000 Units SC Every 12 Hours Scheduled --                  Mechanical:        Start        07/03/25 1956  Maintain Sequential Compression Device  Continuous                            Code Status and Medical Interventions: CPR (Attempt to Resuscitate); Full Support;  Discussed CODE STATUS with Mr. Laughlin.  Patient is ,  is in a nursing home.  She does not have any children.   Ordered at: 07/03/25 2000     Code Status (Patient has no pulse and is not breathing):    CPR (Attempt to Resuscitate)     Medical Interventions (Patient has pulse or is breathing):    Full Support     Comments:    Discussed CODE STATUS with Mr. Laughlin.  Patient is ,  is in a nursing home.  She does not have any children.     Level Of Support Discussed With:    Patient       Due to a high probability of clinically significant, life threatening deterioration, the patient required my highest level of preparedness to intervene emergently and I personally spent this critical care time directly and personally managing the patient.     This critical care time included obtaining a history; examining the patient; pulse oximetry; ordering and review of studies; arranging urgent treatment with development of a management plan; evaluation of patient's response to treatment; frequent reassessment; and, discussions with other providers.    This critical care time was performed to assess and manage the high probability of imminent, life-threatening deterioration that could result in multi-organ failure. It was exclusive of separately billable procedures and treating other patients and teaching time.    Please see MDM section and the rest of the note for further information on patient assessment and treatment.    Part of this note may be an electronic transcription/translation of spoken language to printed text using the Dragon Dictation System    Electronically signed by Dennis Ansari MD on 7/6/2025 at 08:42 CDT

## 2025-07-06 NOTE — PLAN OF CARE
Goal Outcome Evaluation:        Problem: Adult Inpatient Plan of Care  Goal: Plan of Care Review  7/6/2025 0816 by Duran Trejo RN  Outcome: Progressing  7/6/2025 0611 by Duran Trejo RN  Outcome: Progressing  Goal: Patient-Specific Goal (Individualized)  7/6/2025 0816 by Duran Trejo RN  Outcome: Progressing  7/6/2025 0611 by Duran Trejo RN  Outcome: Progressing  Goal: Absence of Hospital-Acquired Illness or Injury  7/6/2025 0816 by Duran Trejo RN  Outcome: Progressing  7/6/2025 0611 by Duran Trejo RN  Outcome: Progressing  Intervention: Identify and Manage Fall Risk  Recent Flowsheet Documentation  Taken 7/6/2025 0600 by Duran Trejo RN  Safety Promotion/Fall Prevention: safety round/check completed  Taken 7/6/2025 0500 by Duran Trejo RN  Safety Promotion/Fall Prevention: safety round/check completed  Taken 7/6/2025 0415 by Duran Trejo RN  Safety Promotion/Fall Prevention: safety round/check completed  Taken 7/6/2025 0300 by Duran Trejo RN  Safety Promotion/Fall Prevention: safety round/check completed  Taken 7/6/2025 0200 by Duran Trejo RN  Safety Promotion/Fall Prevention: safety round/check completed  Taken 7/6/2025 0100 by Duran Trejo RN  Safety Promotion/Fall Prevention: safety round/check completed  Taken 7/6/2025 0015 by Duran Trejo RN  Safety Promotion/Fall Prevention: safety round/check completed  Taken 7/5/2025 2300 by Duran Trejo RN  Safety Promotion/Fall Prevention: safety round/check completed  Taken 7/5/2025 2200 by Duran Trejo RN  Safety Promotion/Fall Prevention: safety round/check completed  Taken 7/5/2025 2100 by Duran Trejo RN  Safety Promotion/Fall Prevention: safety round/check completed  Taken 7/5/2025 2015 by Duran Trejo RN  Safety Promotion/Fall Prevention: safety round/check completed  Taken 7/5/2025 2000 by Maya, Duran, RN  Safety Promotion/Fall Prevention: safety round/check completed  Taken 7/5/2025  1900 by Maya, Duran, RN  Safety Promotion/Fall Prevention: safety round/check completed  Intervention: Prevent Skin Injury  Recent Flowsheet Documentation  Taken 7/6/2025 0600 by Duran Trejo RN  Body Position: position maintained  Taken 7/6/2025 0500 by Duran Trejo RN  Body Position:   turned   left  Taken 7/6/2025 0415 by Duran Trejo RN  Body Position: position maintained  Skin Protection: pulse oximeter probe site changed  Taken 7/6/2025 0300 by Duran Trejo RN  Body Position:   turned   right  Taken 7/6/2025 0200 by Duran Trejo RN  Body Position: position maintained  Taken 7/6/2025 0100 by Duran Trejo RN  Body Position:   turned   supine  Taken 7/6/2025 0015 by Duran Trejo RN  Body Position: position maintained  Skin Protection:   pulse oximeter probe site changed   silicone foam dressing in place  Taken 7/5/2025 2300 by Duran Trejo RN  Body Position:   turned   left  Taken 7/5/2025 2200 by Duran Trejo RN  Body Position: position maintained  Taken 7/5/2025 2100 by Duran Trejo RN  Body Position:   turned   right  Taken 7/5/2025 2015 by Duran Trejo RN  Body Position: position maintained  Skin Protection:   drying agents applied   incontinence pads utilized  Taken 7/5/2025 2000 by Duran Trejo RN  Body Position: position maintained  Taken 7/5/2025 1900 by Duran Trejo RN  Body Position:   turned   left  Intervention: Prevent and Manage VTE (Venous Thromboembolism) Risk  Recent Flowsheet Documentation  Taken 7/6/2025 0015 by Duran Trejo RN  VTE Prevention/Management: (See MAR) other (see comments)  Taken 7/5/2025 2015 by Duran Trejo RN  VTE Prevention/Management: (See MAR) other (see comments)  Goal: Optimal Comfort and Wellbeing  7/6/2025 0816 by Duran Trejo RN  Outcome: Progressing  7/6/2025 0611 by Duran Trejo RN  Outcome: Progressing  Intervention: Provide Person-Centered Care  Recent Flowsheet Documentation  Taken 7/6/2025 0415  by Duran Trejo RN  Trust Relationship/Rapport:   care explained   choices provided   questions encouraged   questions answered  Taken 7/6/2025 0015 by Duran Trejo RN  Trust Relationship/Rapport:   care explained   choices provided  Taken 7/5/2025 2015 by Duran Trejo RN  Trust Relationship/Rapport:   care explained   choices provided  Goal: Readiness for Transition of Care  7/6/2025 0816 by Duran Trejo RN  Outcome: Progressing  7/6/2025 0611 by Duran Trejo RN  Outcome: Progressing     Problem: Skin Injury Risk Increased  Goal: Skin Health and Integrity  7/6/2025 0816 by Duran Trejo RN  Outcome: Progressing  7/6/2025 0611 by Duran Trejo RN  Outcome: Progressing  Intervention: Optimize Skin Protection  Recent Flowsheet Documentation  Taken 7/6/2025 0600 by Duran Trejo RN  Activity Management: bedrest  Taken 7/6/2025 0500 by Duran Trejo RN  Activity Management: bedrest  Head of Bed (HOB) Positioning: HOB at 30-45 degrees  Taken 7/6/2025 0415 by Duran Trejo RN  Activity Management: bedrest  Pressure Reduction Techniques: weight shift assistance provided  Head of Bed (HOB) Positioning: HOB at 30-45 degrees  Pressure Reduction Devices:   pressure-redistributing mattress utilized   positioning supports utilized   heel offloading device utilized   foam padding utilized  Skin Protection: pulse oximeter probe site changed  Taken 7/6/2025 0300 by Duran Trejo RN  Activity Management: bedrest  Head of Bed (HOB) Positioning: HOB at 30-45 degrees  Taken 7/6/2025 0200 by Duran Trejo RN  Activity Management: bedrest  Taken 7/6/2025 0100 by Duran Trejo RN  Activity Management: bedrest  Head of Bed (HOB) Positioning: HOB at 30-45 degrees  Taken 7/6/2025 0015 by Duran Trejo RN  Activity Management: bedrest  Pressure Reduction Techniques:   weight shift assistance provided   pressure points protected  Head of Bed (HOB) Positioning: HOB at 30 degrees  Pressure Reduction  Devices:   pressure-redistributing mattress utilized   positioning supports utilized   heel offloading device utilized   foam padding utilized  Skin Protection:   pulse oximeter probe site changed   silicone foam dressing in place  Taken 7/5/2025 2300 by Duran Trejo RN  Activity Management: bedrest  Head of Bed (HOB) Positioning: HOB at 30 degrees  Taken 7/5/2025 2200 by Duran Trejo RN  Activity Management: bedrest  Taken 7/5/2025 2100 by Duran Trejo RN  Activity Management: bedrest  Head of Bed (HOB) Positioning: HOB at 30-45 degrees  Taken 7/5/2025 2015 by Duran Trejo RN  Activity Management: up to bedside commode  Pressure Reduction Techniques:   frequent weight shift encouraged   positioned off wounds   weight shift assistance provided  Head of Bed (HOB) Positioning: HOB at 30-45 degrees  Pressure Reduction Devices:   pressure-redistributing mattress utilized   positioning supports utilized   heel offloading device utilized   foam padding utilized  Skin Protection:   drying agents applied   incontinence pads utilized  Taken 7/5/2025 2000 by Duran Trejo RN  Activity Management: bedrest  Head of Bed (HOB) Positioning: HOB at 30-45 degrees  Taken 7/5/2025 1900 by Duran Trejo RN  Activity Management: bedrest  Head of Bed (HOB) Positioning: HOB at 30-45 degrees     Problem: Fall Injury Risk  Goal: Absence of Fall and Fall-Related Injury  7/6/2025 0816 by Duran Trejo RN  Outcome: Progressing  7/6/2025 0611 by Duran Trejo RN  Outcome: Progressing  Intervention: Identify and Manage Contributors  Recent Flowsheet Documentation  Taken 7/6/2025 0415 by Duran Trejo RN  Medication Review/Management: medications reviewed  Taken 7/6/2025 0015 by Duran Trejo RN  Medication Review/Management: medications reviewed  Taken 7/5/2025 2015 by Duran Trejo RN  Medication Review/Management: medications reviewed  Intervention: Promote Injury-Free Environment  Recent Flowsheet  Documentation  Taken 7/6/2025 0600 by Duran Trejo RN  Safety Promotion/Fall Prevention: safety round/check completed  Taken 7/6/2025 0500 by Duran Trejo RN  Safety Promotion/Fall Prevention: safety round/check completed  Taken 7/6/2025 0415 by Duran Trejo RN  Safety Promotion/Fall Prevention: safety round/check completed  Taken 7/6/2025 0300 by Duran Trejo RN  Safety Promotion/Fall Prevention: safety round/check completed  Taken 7/6/2025 0200 by Duran Trejo RN  Safety Promotion/Fall Prevention: safety round/check completed  Taken 7/6/2025 0100 by Duran Trejo RN  Safety Promotion/Fall Prevention: safety round/check completed  Taken 7/6/2025 0015 by Duran Trejo RN  Safety Promotion/Fall Prevention: safety round/check completed  Taken 7/5/2025 2300 by Duran Trejo RN  Safety Promotion/Fall Prevention: safety round/check completed  Taken 7/5/2025 2200 by Duran Trejo RN  Safety Promotion/Fall Prevention: safety round/check completed  Taken 7/5/2025 2100 by Duran Trejo RN  Safety Promotion/Fall Prevention: safety round/check completed  Taken 7/5/2025 2015 by Duran Trejo RN  Safety Promotion/Fall Prevention: safety round/check completed  Taken 7/5/2025 2000 by Duran Trejo RN  Safety Promotion/Fall Prevention: safety round/check completed  Taken 7/5/2025 1900 by Duran Trejo RN  Safety Promotion/Fall Prevention: safety round/check completed

## 2025-07-06 NOTE — PROCEDURES
"Insert Central Line At Bedside    Date/Time: 7/5/2025 10:55 PM    Performed by: Sanjiv Meza APRN  Authorized by: Sanjiv Meza APRN  Consent: Verbal consent obtained  Risks and benefits: risks, benefits and alternatives were discussed  Consent given by: patient  Patient understanding: patient states understanding of the procedure being performed  Patient consent: the patient's understanding of the procedure matches consent given  Procedure consent: procedure consent matches procedure scheduled  Relevant documents: relevant documents present and verified  Test results: test results available and properly labeled  Site marked: the operative site was marked  Imaging studies: imaging studies available  Patient identity confirmed: arm band and hospital-assigned identification number  Time out: Immediately prior to procedure a \"time out\" was called to verify the correct patient, procedure, equipment, support staff and site/side marked as required.  Indications: vascular access    Anesthesia:  Local Anesthetic: lidocaine 2% without epinephrine  Anesthetic total: 3 mL  Preparation: skin prepped with 2% chlorhexidine  Skin prep agent dried: skin prep agent completely dried prior to procedure  Sterile barriers: all five maximum sterile barriers used - cap, mask, sterile gown, sterile gloves, and large sterile sheet  Hand hygiene: hand hygiene performed prior to central venous catheter insertion  Location details: right internal jugular  Patient position: flat  Catheter size: 7.5 Fr  Pre-procedure: landmarks identified  Ultrasound guidance: yes  Sterile ultrasound techniques: sterile gel and sterile probe covers were used  Number of attempts: 1  Post-procedure: line sutured and dressing applied  Assessment: blood return through all ports, placement verified by x-ray and no pneumothorax on x-ray  Patient tolerance: patient tolerated the procedure well with no immediate complications        "

## 2025-07-06 NOTE — NURSING NOTE
Attempted to contact Dr. Chow office regarding lab unable to obtain blood draw. Unable to get in contact with office. Lab draw deferred to AM labs.

## 2025-07-06 NOTE — PROGRESS NOTES
Nephrology ([facility], [facility]) Progress Note      Patient:  Lindsay Laughlin  YOB: 1955  Date of Service: 7/6/2025  MRN: [id number]   Acct: [id number]   Primary Care Physician: Provider, No Known  Advance Directive:   Code Status and Medical Interventions: CPR (Attempt to Resuscitate); Full Support; Discussed CODE STATUS with Mr. Laughlin.  Patient is ,  is in a nursing home.  She does not have any children.   Ordered at: 07/03/25 2000     Code Status (Patient has no pulse and is not breathing):    CPR (Attempt to Resuscitate)     Medical Interventions (Patient has pulse or is breathing):    Full Support     Comments:    Discussed CODE STATUS with Mr. Laughlin.  Patient is ,  is in a nursing home.  She does not have any children.     Level Of Support Discussed With:    Patient     Admit Date: 7/3/2025       Hospital Day: 3  Referring Provider: Shade Alberts MD      Patient personally seen and examined.  Complete chart including Consults, Notes, Operative Reports, Labs, Cardiology, and Radiology studies reviewed as able.    Chief complaint: Abnormal labs.    Subjective:  Lindsay Laughlin is a 69 y.o. female  whom we were consulted for acute kidney injury/chronic kidney disease.  Patient has history of stage III chronic kidney disease but no follow-up with nephrology in the past.  She has a history of type 2 diabetes, hypertension, gastroesophageal reflux disease, hyperlipidemia.  Patient has chronic diarrhea.  She has history of colectomy at [facility], reason for colectomy is unknown.  Presented to the emergency room complaining of nausea vomiting and diarrhea for the last 5 days.  She has chronic mild diarrhea, this time she has large-volume and more frequent watery bowel movements.  She has been using ibuprofen more frequently for the last few days.  Routine lab data indicated serum creatinine was 5.0, metabolic acidemia, severe hypokalemia  and hypomagnesemia.  Hospital course remarkable for IV fluid administration/resuscitation with minimal response.  Patient also endorses frequent use of ibuprofen for the treatment of generalized arthritis.     She was initially admitted to ICU now transferred to regular floor.  This afternoon complaining of shortness of breath and wheezing.  She has good urine output.  She has no improvement of renal function    Allergies:  Patient has no known allergies.    Home Meds:  Medications Prior to Admission   Medication Sig Dispense Refill Last Dose/Taking    albuterol sulfate  (90 Base) MCG/ACT inhaler Inhale 2 puffs Every 4 (Four) Hours As Needed for Wheezing.       ibuprofen (ADVIL,MOTRIN) 200 MG tablet Take 5 tablets by mouth Every 6 (Six) Hours As Needed for Mild Pain.       ondansetron ODT (ZOFRAN-ODT) 4 MG disintegrating tablet Place 1 tablet on the tongue Every 4 (Four) Hours As Needed for Nausea or Vomiting. 10 tablet 0        Medicines:  Current Facility-Administered Medications   Medication Dose Route Frequency Provider Last Rate Last Admin    acetaminophen (TYLENOL) tablet 650 mg  650 mg Oral Q4H PRN Barbara Renteria APRN   650 mg at 07/03/25 2242    Or    acetaminophen (TYLENOL) suppository 650 mg  650 mg Rectal Q4H PRN Barbara Renteria APRN        aluminum-magnesium hydroxide-simethicone (MAALOX MAX) 400-400-40 MG/5ML suspension 15 mL  15 mL Oral Q6H PRN Dennis Ansari MD   15 mL at 07/05/25 1331    sennosides-docusate (PERICOLACE) 8.6-50 MG per tablet 2 tablet  2 tablet Oral BID Barbara Renteria APRN        And    polyethylene glycol (MIRALAX) packet 17 g  17 g Oral Daily PRN Barbara Renteria APRN        And    bisacodyl (DULCOLAX) EC tablet 5 mg  5 mg Oral Daily PRN Barbara Renteria APRN        And    bisacodyl (DULCOLAX) suppository 10 mg  10 mg Rectal Daily PRN Barbara Renteria APRN        bumetanide (BUMEX) injection 1 mg  1 mg Intravenous Q12H Miguel Chow MD   1 mg at 07/06/25 1412     calcium carbonate (TUMS) chewable tablet 500 mg (200 mg elemental)  2 tablet Oral TID PRN Barbara Renteria, APRN   2 tablet at 07/04/25 2208    Calcium Replacement - Follow Nurse / BPA Driven Protocol   Not Applicable PRN London Leiva PA-C        cefTRIAXone (ROCEPHIN) 2,000 mg in sodium chloride 0.9 % 100 mL MBP  2,000 mg Intravenous Q24H Barbara Renteria, APRN 200 mL/hr at 07/05/25 2031 2,000 mg at 07/05/25 2031    Chlorhexidine Gluconate Cloth 2 % pads 1 Application  1 Application Topical Q24H Barbara Renteria, APRN   1 Application at 07/06/25 0309    heparin (porcine) 5000 UNIT/ML injection 5,000 Units  5,000 Units Subcutaneous Q12H Barbara Renteria, APRN   5,000 Units at 07/06/25 0830    HYDROcodone-acetaminophen (NORCO) 5-325 MG per tablet 1 tablet  1 tablet Oral Q6H PRN Barbara Renteria, APRN   1 tablet at 07/05/25 0232    Magnesium Standard Dose Replacement - Follow Nurse / BPA Driven Protocol   Not Applicable PRN London Leiva PA-C        metroNIDAZOLE (FLAGYL) tablet 500 mg  500 mg Oral Q8H Dennis Ansari MD   500 mg at 07/06/25 1412    mupirocin (BACTROBAN) 2 % nasal ointment 1 Application  1 Application Each Nare BID Barbara Renteria, APRN   1 Application at 07/06/25 0830    nitroglycerin (NITROSTAT) SL tablet 0.4 mg  0.4 mg Sublingual Q5 Min PRN Shade Alberts MD        ondansetron (ZOFRAN) injection 4 mg  4 mg Intravenous Q6H PRN Barbara Renteria, APRN        pantoprazole (PROTONIX) EC tablet 40 mg  40 mg Oral BID AC Dennis Ansari MD   40 mg at 07/06/25 0831    potassium chloride (KLOR-CON M20) CR tablet 40 mEq  40 mEq Oral TID Miguel Chow MD   40 mEq at 07/06/25 1412    Potassium Replacement - Follow Nurse / BPA Driven Protocol   Not Applicable PRN London Leiva PA-C        sodium chloride 0.45 % 905 mL with potassium chloride 40 mEq, sodium bicarbonate 8.4 % 75 mEq infusion   Intravenous Continuous Dennis Ansari MD 75 mL/hr at 07/06/25 1412 New Bag at 07/06/25 1412     sodium chloride 0.9 % flush 10 mL  10 mL Intravenous PRN London Leiva PA-C        sodium chloride 0.9 % flush 10 mL  10 mL Intravenous Q12H Barbara Renteria, APRN   10 mL at 25 0832    sodium chloride 0.9 % flush 10 mL  10 mL Intravenous PRN Barbara Renteria, APRURIEL        sodium chloride 0.9 % infusion 40 mL  40 mL Intravenous PRN Barbara Renteria, APRN           Past Medical History:  Past Medical History:   Diagnosis Date    Acid reflux     Arthritis     High cholesterol     Hypertension     Neuropathy     Swollen lymph nodes     LEFT CERVICAL    Type 2 diabetes mellitus        Past Surgical History:  Past Surgical History:   Procedure Laterality Date    CARPAL TUNNEL RELEASE Bilateral     CERVICAL LYMPH NODE BIOPSY/EXCISION Left 2017    Procedure: LEFT NECK EXPLORATION WITH INCISIONAL BIOPSY/CULTURE;  Surgeon: Blue Sanchez MD;  Location: [facility] OR;  Service:      SECTION      COLON RESECTION      COLONOSCOPY N/A 2023    Procedure: COLONOSCOPY WITH ANESTHESIA;  Surgeon: Tamy Smith MD;  Location: [facility] ENDOSCOPY;  Service: Gastroenterology;  Laterality: N/A;  preop; abnormal GI scan   postop ; poor prep   PCP Ruth Noble MD    ENDOSCOPY N/A 2023    Procedure: ESOPHAGOGASTRODUODENOSCOPY WITH ANESTHESIA;  Surgeon: Tamy Smith MD;  Location: [facility] ENDOSCOPY;  Service: Gastroenterology;  Laterality: N/A;  preop; abnormal GI scan   postop esophagitis ; hiatal hernia  PCP Ruth Noble MD    EXCISION LESION      From back    REPLACEMENT TOTAL KNEE Left     TONSILLECTOMY         Family History  Family History   Problem Relation Age of Onset    No Known Problems Mother     Colon cancer Father         < 60 years old    Breast cancer Neg Hx        Social History  Social History     Socioeconomic History    Marital status:    Tobacco Use    Smoking status: Never    Smokeless tobacco: Never   Vaping Use    Vaping status: Never  Used   Substance and Sexual Activity    Alcohol use: No    Drug use: No    Sexual activity: Defer       Review of Systems:  History obtained from chart review and the patient  General ROS: No fever or chills  Respiratory ROS: positive for - shortness of breath  Cardiovascular ROS: No chest pain or palpitations  Gastrointestinal ROS: No abdominal pain or melena  Genito-Urinary ROS: No dysuria or hematuria  Psych ROS: No anxiety and depression  14 point ROS reviewed with the patient and negative except as noted above and in the HPI unless unable to obtain.    Objective:  Patient Vitals for the past 24 hrs:   BP Temp Temp src Pulse Resp SpO2 Weight   07/06/25 1002 107/49 98.1 °F (36.7 °C) Oral 88 20 -- --   07/06/25 0900 -- -- -- 90 -- 93 % --   07/06/25 0800 99/65 98.4 °F (36.9 °C) Oral 98 26 96 % --   07/06/25 0700 103/86 -- -- 90 -- 98 % --   07/06/25 0603 103/65 -- -- 107 16 91 % --   07/06/25 0534 101/64 -- -- 100 -- 98 % --   07/06/25 0527 95/49 -- -- 95 -- 97 % --   07/06/25 0405 113/51 98.2 °F (36.8 °C) Oral 97 18 (!) 89 % 100 kg (220 lb 10.9 oz)   07/06/25 0312 113/81 -- -- 90 -- -- --   07/06/25 0200 125/83 -- -- 94 19 -- --   07/06/25 0117 125/47 -- -- 94 -- -- --   07/06/25 0012 100/64 98.1 °F (36.7 °C) Oral 105 15 -- --   07/05/25 2312 110/71 -- -- 95 -- -- --   07/05/25 2200 132/66 -- -- 101 20 99 % --   07/05/25 2009 122/64 98.4 °F (36.9 °C) Oral 90 20 -- --   07/05/25 1923 131/73 -- -- 87 -- -- --   07/05/25 1900 -- -- -- 92 -- -- --   07/05/25 1700 110/50 -- -- 88 -- -- --   07/05/25 1600 115/69 -- -- 86 -- -- --   07/05/25 1500 114/79 -- -- 82 -- -- --       Intake/Output Summary (Last 24 hours) at 7/6/2025 1448  Last data filed at 7/6/2025 1412  Gross per 24 hour   Intake 2233.7 ml   Output 400 ml   Net 1833.7 ml     General: awake/alert   HEENT: Normocephalic atraumatic head  Neck: Supple with no JVD or carotid bruits.  Chest:  Bilateral expiratory wheezing  CVS: regular rate and rhythm  Abdominal:  soft, nontender, positive bowel sounds  Extremities: no cyanosis or edema  Skin: warm and dry without rash      Labs:  Results from last 7 days   Lab Units 07/06/25  0356 07/05/25  0223 07/04/25  1418   WBC 10*3/mm3 11.81* 17.28* 16.56*   HEMOGLOBIN g/dL 10.1* 11.0* 10.9*   HEMATOCRIT % 29.5* 32.4* 32.2*   PLATELETS 10*3/mm3 172 213 204         Results from last 7 days   Lab Units 07/06/25  0356 07/05/25  2348 07/05/25  0223 07/04/25  1418 07/04/25  0036 07/03/25  1706   SODIUM mmol/L 134*  --  132* 135*   < > 135*   POTASSIUM mmol/L 3.0* 3.5 2.2* 2.2*   < > 1.7*   CHLORIDE mmol/L 104  --  100 102   < > 100   CO2 mmol/L 15.0*  --  11.0* 12.0*   < > 12.0*   BUN mg/dL 36.5*  --  39.5* 41.4*   < > 44.5*   CREATININE mg/dL 4.65*  --  4.69* 4.71*   < > 5.04*   CALCIUM mg/dL 6.7*  --  7.6* 6.0*   < > 5.6*   EGFR mL/min/1.73 9.6*  --  9.5* 9.5*   < > 8.7*   BILIRUBIN mg/dL 0.2  --   --   --   --  0.3   ALK PHOS U/L 87  --   --   --   --  82   ALT (SGPT) U/L 32  --   --   --   --  15   AST (SGOT) U/L 61*  --   --   --   --  41*   GLUCOSE mg/dL 208*  --  215* 189*   < > 161*    < > = values in this interval not displayed.       Radiology:   Imaging Results (Last 72 Hours)       Procedure Component Value Units Date/Time    US Renal Bilateral [[id number]] Collected: 07/06/25 0858     Updated: 07/06/25 0904    Narrative:      EXAMINATION: US RENAL BILATERAL-     HISTORY: DAISY; K52.9-Noninfective gastroenteritis and colitis,  unspecified; E83.42-Hypomagnesemia; E87.6-Hypokalemia;  E83.51-Hypocalcemia; N17.9-Acute kidney failure, unspecified     Images are stored in PACS per institutional protocol.     Grayscale and color-flow renal ultrasound.     Portable exam with limited detail.     Normal size and position of both kidneys.  Normal cortical thickness and normal cortical echogenicity.     No hydronephrosis.     7-8 mm LEFT renal pelvis stone noted on CT exam from 3 days ago though  not well seen on today's exam.     The right  kidney measures 101 x 50 x 46 mm.  RIGHT mid cyst = 29 mm.  RIGHT upper pole cyst = 13 mm.        The left kidney measures 95 x 53 x 46 mm.  Upper pole cyst = 16 mm.  Lower pole cyst = 19 mm.       Impression:      Impression:  1. No evidence of obstruction.           This report was signed and finalized on 7/6/2025 9:01 AM by Dr. Leonard Rogers MD.       XR Chest 1 View [[id number]] Collected: 07/06/25 0850     Updated: 07/06/25 0853    Narrative:      EXAMINATION: XR CHEST 1 VW-     HISTORY: line placement; K52.9-Noninfective gastroenteritis and colitis,  unspecified; E83.42-Hypomagnesemia; E87.6-Hypokalemia;  E83.51-Hypocalcemia; N17.9-Acute kidney failure, unspecified     Images are stored in PACS per institutional protocol.     1 view chest x-ray.     COMPARISON:  7/3/2025.     RIGHT jugular central line has been placed and is in good position.     Mild chronic interstitial lung disease.  No focal infiltrate.     The heart is magnified by the portable projection.       Impression:      1. Well-positioned RIGHT jugular central line.  2. Stable appearance of the lungs.           This report was signed and finalized on 7/6/2025 8:50 AM by Dr. Leonard Rogers MD.       XR Shoulder 2+ View Left [[id number]] Collected: 07/03/25 2143     Updated: 07/03/25 2147    Narrative:      EXAMINATION: XR SHOULDER 2+ VW LEFT- 7/3/2025 9:43 PM     HISTORY: Pain with left shoulder decreased range of motion, status post  fall; K52.9-Noninfective gastroenteritis and colitis, unspecified;  E83.42-Hypomagnesemia; E87.6-Hypokalemia; E83.51-Hypocalcemia;  N17.9-Acute kidney failure, unspecified.     REPORT: 2 views of the left shoulder were obtained.     COMPARISON: There are no correlative imaging studies for comparison.     There is diffuse osteopenia, no fracture is identified. Osseous  alignment is normal. There is mild spurring of the acromion process, AC  joint and greater tuberosity. The glenohumeral joint is preserved. The  soft  tissues appear within normal limits.       Impression:      No fracture, no acute osseous abnormality. Degenerative  changes as described, mild diffuse osteopenia.     This report was signed and finalized on 7/3/2025 9:44 PM by Dr. Venkata Muniz MD.       XR Chest 1 View [[id number]] Collected: 07/03/25 2142     Updated: 07/03/25 2146    Narrative:      EXAMINATION: XR CHEST 1 VW- 7/3/2025 9:42 PM     HISTORY: Leukocytosis; K52.9-Noninfective gastroenteritis and colitis,  unspecified; E83.42-Hypomagnesemia; E87.6-Hypokalemia;  E83.51-Hypocalcemia; N17.9-Acute kidney failure, unspecified.     REPORT: Frontal view of the chest was obtained.     COMPARISON: Chest x-ray 2/24/2025.     The lungs are free of infiltrates, no pneumothorax or pleural effusion  is identified. Heart size is normal. The osseous structures and upper  abdomen are unremarkable.       Impression:      No acute cardiopulmonary abnormality.     This report was signed and finalized on 7/3/2025 9:43 PM by Dr. Venkata Muniz MD.       CT Abdomen Pelvis Without Contrast [[id number]] Collected: 07/03/25 1811     Updated: 07/03/25 1822    Narrative:      EXAMINATION: CT ABDOMEN PELVIS WO CONTRAST- 7/3/2025 6:11 PM     HISTORY: Diffuse abdominal pain; diarrhea/vomiting     TOTAL DOSE: 990.85 mGy.cm (Automatic exposure control technique was  implemented in an effort to keep the radiation dose as low as possible  without compromising image quality)     REPORT: Spiral CT of the abdomen and pelvis was performed without  contrast from the lung bases through the pubic symphysis. Reconstructed  coronal and sagittal images are also reviewed.     Comparison: CT abdomen and pelvis with contrast 2/22/2023.     Lung windows demonstrate mild respiratory motion artifact, the lung  bases are clear. There is a 3 mm subpleural nodule in the right middle  lobe image 3 of series 2, too small to fully characterize. This is  likely benign.     Evaluation of the solid  abdominal organs is limited without intravenous  contrast. Decreased attenuation of the liver likely represents  steatosis. The spleen appears within normal limits. There is a stable 4  cm soft tissue mass lateral to the inferior spleen which has the same  attenuation as the spleen, probably an accessory splenule.     The stomach is decompressed. The gallbladder is not visualized. Atrophy  of the pancreas is noted. There is dense material seen near the raine  hepatis with streak artifact, this is unchanged, presumably related to  the some type procedure. Adrenal glands are within normal limits. No  nephrolithiasis is identified and there is no hydronephrosis. There is a  nonobstructing 7 mm stone in the left renal pelvis which is new. The  ureters are decompressed. The bladder appears within normal limits.     No free fluid or free air is identified. Bowel loops are normal in  caliber, occasional diverticula are seen in the colon without evidence  of acute diverticulitis. There is some fluid retention within segments  of the colon and small intestine without evidence of obstruction and no  significant bowel wall thickening is identified. There is evidence of  previous partial right colectomy, the ileocolic anastomosis appears  within normal limits. The appendix is surgically absent. Review of bone  windows shows no acute osseous abnormality. Advanced degenerative  changes are present in the thoracic and lumbar spine.       Impression:      1. Fluid retention within the small intestine and portions of the colon  compatible with the history of diarrhea, no evidence of bowel  obstruction, no bowel wall thickening. Correlate clinically for mild  enteritis or diarrheal type illness.  2. New small stone in the left renal pelvis, measuring 7 mm. No urinary  tract obstruction is identified.  3. Postoperative changes, including previous partial right colectomy,  appendectomy and there is stable dense material in the region  of the  raine hepatis which may be related to previous surgical intervention.  The gallbladder is not visualized.  4. Stable accessory spleen measuring up to 4 cm.        This report was signed and finalized on 7/3/2025 6:19 PM by Dr. Venkata Muniz MD.               Culture:  Blood Culture   Date Value Ref Range Status   07/03/2025 No growth at 2 days  Preliminary   07/03/2025 No growth at 2 days  Preliminary         Assessment   1.  Acute kidney injury/stable.  2.  Nonoliguric acute tubular necrosis.  3.  Hemodynamic effect of nonsteroidal agent.  4.  Severe metabolic acidosis/anion gap and non-anion gap  5.  Persistent hypokalemia/improved.  6.  Hypomagnesemia now resolved.  7.  Stage III chronic kidney disease baseline  8.  Iron deficiency anemia.  9.  Persistent hypokalemia/improving    Plan:  1.  Patient has nonoliguric acute tubular necrosis caused by hemodynamic effect of nonsteroidal agent in the setting of volume depletion.  Patient has significant volume resuscitation, now has stable hemodynamics.  Today she has evidence of volume overload.  I have recommended to give her Bumex 1 mg IV every 12 hours.  I will go ahead and KVO IV fluid.  She also agreed to proceed with short-term dialysis.  I would request vascular surgery consultation to establish vascular access in the a.m.  I will make her n.p.o. tonight.  2.  Renal ultrasound consistent with normal studies.  3.  P.o. potassium supplement to keep potassium between 3.5 to 4.0 mmol.  4.  Intravenous ferric gluconate for the treatment of iron deficiency anemia.  5.  Plan was discussed with the patient.      Miguel Chow MD  7/6/2025  14:48 CDT

## 2025-07-06 NOTE — PLAN OF CARE
Goal Outcome Evaluation:  Plan of Care Reviewed With: patient        Progress: no change     Pt came from Unit. IJ dressing C/D/I. IVF dc per orders. IV Bumex per orders. Voiding. External cath in place. A/O x 4 with intermittent confusion/ refusal to answer questions. Bed alarm set. Speech garbled since admission per unit. RA. BM x 1. Up x 2. Preventative dressings applied, pt placed on dolphin mattress. Tolerating diet, Will be NPO at midnight. VSS. Safety maintained.

## 2025-07-07 ENCOUNTER — ANESTHESIA EVENT (OUTPATIENT)
Dept: PERIOP | Facility: HOSPITAL | Age: 70
End: 2025-07-07
Payer: MEDICARE

## 2025-07-07 ENCOUNTER — APPOINTMENT (OUTPATIENT)
Dept: INTERVENTIONAL RADIOLOGY/VASCULAR | Facility: HOSPITAL | Age: 70
DRG: 674 | End: 2025-07-07
Payer: MEDICARE

## 2025-07-07 ENCOUNTER — ANESTHESIA (OUTPATIENT)
Dept: PERIOP | Facility: HOSPITAL | Age: 70
End: 2025-07-07
Payer: MEDICARE

## 2025-07-07 VITALS — DIASTOLIC BLOOD PRESSURE: 78 MMHG | HEART RATE: 80 BPM | SYSTOLIC BLOOD PRESSURE: 117 MMHG | OXYGEN SATURATION: 96 %

## 2025-07-07 LAB
ABO GROUP BLD: NORMAL
ALBUMIN SERPL-MCNC: 2.6 G/DL (ref 3.5–5.2)
ALBUMIN/GLOB SERPL: 1.2 G/DL
ALP SERPL-CCNC: 88 U/L (ref 39–117)
ALT SERPL W P-5'-P-CCNC: 28 U/L (ref 1–33)
ANION GAP SERPL CALCULATED.3IONS-SCNC: 13 MMOL/L (ref 5–15)
ANTI-FYA: NORMAL
AST SERPL-CCNC: 28 U/L (ref 1–32)
BASOPHILS # BLD AUTO: 0.02 10*3/MM3 (ref 0–0.2)
BASOPHILS NFR BLD AUTO: 0.2 % (ref 0–1.5)
BILIRUB SERPL-MCNC: 0.2 MG/DL (ref 0–1.2)
BLD GP AB SCN SERPL QL: POSITIVE
BUN SERPL-MCNC: 36.5 MG/DL (ref 8–23)
BUN/CREAT SERPL: 8.7 (ref 7–25)
CALCIUM SPEC-SCNC: 6.8 MG/DL (ref 8.6–10.5)
CHLORIDE SERPL-SCNC: 108 MMOL/L (ref 98–107)
CO2 SERPL-SCNC: 16 MMOL/L (ref 22–29)
CREAT SERPL-MCNC: 4.19 MG/DL (ref 0.57–1)
DEPRECATED RDW RBC AUTO: 68.6 FL (ref 37–54)
DUFFY A ANTIGEN: NEGATIVE
EGFRCR SERPLBLD CKD-EPI 2021: 10.9 ML/MIN/1.73
EOSINOPHIL # BLD AUTO: 0.34 10*3/MM3 (ref 0–0.4)
EOSINOPHIL NFR BLD AUTO: 3.6 % (ref 0.3–6.2)
ERYTHROCYTE [DISTWIDTH] IN BLOOD BY AUTOMATED COUNT: 19.9 % (ref 12.3–15.4)
GLOBULIN UR ELPH-MCNC: 2.2 GM/DL
GLUCOSE BLDC GLUCOMTR-MCNC: 136 MG/DL (ref 70–130)
GLUCOSE SERPL-MCNC: 193 MG/DL (ref 65–99)
HAV IGM SERPL QL IA: NORMAL
HBV CORE IGM SERPL QL IA: NORMAL
HBV SURFACE AB SER RIA-ACNC: NORMAL
HBV SURFACE AG SERPL QL IA: NORMAL
HCT VFR BLD AUTO: 29.1 % (ref 34–46.6)
HCV AB SER QL: NORMAL
HGB BLD-MCNC: 10 G/DL (ref 12–15.9)
IMM GRANULOCYTES # BLD AUTO: 0.11 10*3/MM3 (ref 0–0.05)
IMM GRANULOCYTES NFR BLD AUTO: 1.2 % (ref 0–0.5)
LYMPHOCYTES # BLD AUTO: 0.63 10*3/MM3 (ref 0.7–3.1)
LYMPHOCYTES NFR BLD AUTO: 6.6 % (ref 19.6–45.3)
MAGNESIUM SERPL-MCNC: 1.9 MG/DL (ref 1.6–2.4)
MCH RBC QN AUTO: 32.6 PG (ref 26.6–33)
MCHC RBC AUTO-ENTMCNC: 34.4 G/DL (ref 31.5–35.7)
MCV RBC AUTO: 94.8 FL (ref 79–97)
MONOCYTES # BLD AUTO: 0.73 10*3/MM3 (ref 0.1–0.9)
MONOCYTES NFR BLD AUTO: 7.6 % (ref 5–12)
NEUTROPHILS NFR BLD AUTO: 7.72 10*3/MM3 (ref 1.7–7)
NEUTROPHILS NFR BLD AUTO: 80.8 % (ref 42.7–76)
NRBC BLD AUTO-RTO: 0 /100 WBC (ref 0–0.2)
PHOSPHATE SERPL-MCNC: 3.9 MG/DL (ref 2.5–4.5)
PLATELET # BLD AUTO: 150 10*3/MM3 (ref 140–450)
PMV BLD AUTO: 10.8 FL (ref 6–12)
POTASSIUM SERPL-SCNC: 3.5 MMOL/L (ref 3.5–5.2)
POTASSIUM SERPL-SCNC: 3.7 MMOL/L (ref 3.5–5.2)
PROT SERPL-MCNC: 4.8 G/DL (ref 6–8.5)
RBC # BLD AUTO: 3.07 10*6/MM3 (ref 3.77–5.28)
RH BLD: POSITIVE
SODIUM SERPL-SCNC: 137 MMOL/L (ref 136–145)
T&S EXPIRATION DATE: NORMAL
WBC NRBC COR # BLD AUTO: 9.55 10*3/MM3 (ref 3.4–10.8)

## 2025-07-07 PROCEDURE — 86900 BLOOD TYPING SEROLOGIC ABO: CPT | Performed by: NURSE PRACTITIONER

## 2025-07-07 PROCEDURE — 25010000002 CEFAZOLIN PER 500 MG

## 2025-07-07 PROCEDURE — 86905 BLOOD TYPING RBC ANTIGENS: CPT | Performed by: NURSE PRACTITIONER

## 2025-07-07 PROCEDURE — 05PYX3Z REMOVAL OF INFUSION DEVICE FROM UPPER VEIN, EXTERNAL APPROACH: ICD-10-PCS | Performed by: SURGERY

## 2025-07-07 PROCEDURE — 77001 FLUOROGUIDE FOR VEIN DEVICE: CPT

## 2025-07-07 PROCEDURE — 80053 COMPREHEN METABOLIC PANEL: CPT | Performed by: INTERNAL MEDICINE

## 2025-07-07 PROCEDURE — 84132 ASSAY OF SERUM POTASSIUM: CPT | Performed by: SURGERY

## 2025-07-07 PROCEDURE — 25010000002 LIDOCAINE PF 2% 2 % SOLUTION

## 2025-07-07 PROCEDURE — 0JH60XZ INSERTION OF TUNNELED VASCULAR ACCESS DEVICE INTO CHEST SUBCUTANEOUS TISSUE AND FASCIA, OPEN APPROACH: ICD-10-PCS | Performed by: SURGERY

## 2025-07-07 PROCEDURE — 25010000002 POTASSIUM CHLORIDE 10 MEQ/100ML SOLUTION: Performed by: HOSPITALIST

## 2025-07-07 PROCEDURE — 86922 COMPATIBILITY TEST ANTIGLOB: CPT

## 2025-07-07 PROCEDURE — 86901 BLOOD TYPING SEROLOGIC RH(D): CPT | Performed by: NURSE PRACTITIONER

## 2025-07-07 PROCEDURE — 25010000002 BUMETANIDE PER 0.5 MG: Performed by: INTERNAL MEDICINE

## 2025-07-07 PROCEDURE — 86920 COMPATIBILITY TEST SPIN: CPT

## 2025-07-07 PROCEDURE — 83735 ASSAY OF MAGNESIUM: CPT

## 2025-07-07 PROCEDURE — 86850 RBC ANTIBODY SCREEN: CPT | Performed by: NURSE PRACTITIONER

## 2025-07-07 PROCEDURE — 86706 HEP B SURFACE ANTIBODY: CPT | Performed by: INTERNAL MEDICINE

## 2025-07-07 PROCEDURE — 86870 RBC ANTIBODY IDENTIFICATION: CPT

## 2025-07-07 PROCEDURE — 25010000002 PROPOFOL 10 MG/ML EMULSION

## 2025-07-07 PROCEDURE — 25010000002 HEPARIN (PORCINE) PER 1000 UNITS: Performed by: SURGERY

## 2025-07-07 PROCEDURE — 25810000003 SODIUM CHLORIDE 0.9 % SOLUTION: Performed by: SURGERY

## 2025-07-07 PROCEDURE — 86902 BLOOD TYPE ANTIGEN DONOR EA: CPT

## 2025-07-07 PROCEDURE — 86870 RBC ANTIBODY IDENTIFICATION: CPT | Performed by: NURSE PRACTITIONER

## 2025-07-07 PROCEDURE — 25010000002 FENTANYL CITRATE (PF) 100 MCG/2ML SOLUTION

## 2025-07-07 PROCEDURE — 99222 1ST HOSP IP/OBS MODERATE 55: CPT | Performed by: NURSE PRACTITIONER

## 2025-07-07 PROCEDURE — 82948 REAGENT STRIP/BLOOD GLUCOSE: CPT | Performed by: ANESTHESIOLOGY

## 2025-07-07 PROCEDURE — 76000 FLUOROSCOPY <1 HR PHYS/QHP: CPT

## 2025-07-07 PROCEDURE — C1894 INTRO/SHEATH, NON-LASER: HCPCS | Performed by: SURGERY

## 2025-07-07 PROCEDURE — C1750 CATH, HEMODIALYSIS,LONG-TERM: HCPCS | Performed by: SURGERY

## 2025-07-07 PROCEDURE — 85025 COMPLETE CBC W/AUTO DIFF WBC: CPT | Performed by: INTERNAL MEDICINE

## 2025-07-07 PROCEDURE — 82948 REAGENT STRIP/BLOOD GLUCOSE: CPT

## 2025-07-07 PROCEDURE — 80074 ACUTE HEPATITIS PANEL: CPT | Performed by: INTERNAL MEDICINE

## 2025-07-07 PROCEDURE — 36581 REPLACE TUNNELED CV CATH: CPT | Performed by: SURGERY

## 2025-07-07 PROCEDURE — 77001 FLUOROGUIDE FOR VEIN DEVICE: CPT | Performed by: SURGERY

## 2025-07-07 PROCEDURE — 02HV33Z INSERTION OF INFUSION DEVICE INTO SUPERIOR VENA CAVA, PERCUTANEOUS APPROACH: ICD-10-PCS | Performed by: SURGERY

## 2025-07-07 PROCEDURE — 36558 INSERT TUNNELED CV CATH: CPT

## 2025-07-07 PROCEDURE — 84100 ASSAY OF PHOSPHORUS: CPT

## 2025-07-07 PROCEDURE — 25010000002 CEFTRIAXONE PER 250 MG: Performed by: NURSE PRACTITIONER

## 2025-07-07 RX ORDER — IBUPROFEN 600 MG/1
1 TABLET ORAL
Status: DISCONTINUED | OUTPATIENT
Start: 2025-07-07 | End: 2025-07-14 | Stop reason: HOSPADM

## 2025-07-07 RX ORDER — ONDANSETRON 4 MG/1
4 TABLET, ORALLY DISINTEGRATING ORAL EVERY 6 HOURS PRN
Status: DISCONTINUED | OUTPATIENT
Start: 2025-07-07 | End: 2025-07-14 | Stop reason: HOSPADM

## 2025-07-07 RX ORDER — DEXTROSE MONOHYDRATE 25 G/50ML
25 INJECTION, SOLUTION INTRAVENOUS
Status: DISCONTINUED | OUTPATIENT
Start: 2025-07-07 | End: 2025-07-14 | Stop reason: HOSPADM

## 2025-07-07 RX ORDER — ACETAMINOPHEN 325 MG/1
650 TABLET ORAL EVERY 8 HOURS
Status: DISPENSED | OUTPATIENT
Start: 2025-07-07 | End: 2025-07-09

## 2025-07-07 RX ORDER — LABETALOL HYDROCHLORIDE 5 MG/ML
5 INJECTION, SOLUTION INTRAVENOUS
Status: DISCONTINUED | OUTPATIENT
Start: 2025-07-07 | End: 2025-07-07 | Stop reason: HOSPADM

## 2025-07-07 RX ORDER — HEPARIN SODIUM 1000 [USP'U]/ML
INJECTION, SOLUTION INTRAVENOUS; SUBCUTANEOUS AS NEEDED
Status: DISCONTINUED | OUTPATIENT
Start: 2025-07-07 | End: 2025-07-07 | Stop reason: HOSPADM

## 2025-07-07 RX ORDER — SODIUM CHLORIDE 9 MG/ML
50 INJECTION, SOLUTION INTRAVENOUS CONTINUOUS
Status: DISCONTINUED | OUTPATIENT
Start: 2025-07-07 | End: 2025-07-07

## 2025-07-07 RX ORDER — HYDROMORPHONE HYDROCHLORIDE 1 MG/ML
0.25 INJECTION, SOLUTION INTRAMUSCULAR; INTRAVENOUS; SUBCUTANEOUS
Status: DISCONTINUED | OUTPATIENT
Start: 2025-07-07 | End: 2025-07-07 | Stop reason: HOSPADM

## 2025-07-07 RX ORDER — BUPIVACAINE HYDROCHLORIDE AND EPINEPHRINE 5; 5 MG/ML; UG/ML
INJECTION, SOLUTION EPIDURAL; INTRACAUDAL; PERINEURAL AS NEEDED
Status: DISCONTINUED | OUTPATIENT
Start: 2025-07-07 | End: 2025-07-07 | Stop reason: HOSPADM

## 2025-07-07 RX ORDER — CEFAZOLIN SODIUM 1 G/3ML
INJECTION, POWDER, FOR SOLUTION INTRAMUSCULAR; INTRAVENOUS AS NEEDED
Status: DISCONTINUED | OUTPATIENT
Start: 2025-07-07 | End: 2025-07-07 | Stop reason: SURG

## 2025-07-07 RX ORDER — HYDROMORPHONE HYDROCHLORIDE 1 MG/ML
0.25 INJECTION, SOLUTION INTRAMUSCULAR; INTRAVENOUS; SUBCUTANEOUS
Status: DISCONTINUED | OUTPATIENT
Start: 2025-07-07 | End: 2025-07-10 | Stop reason: ALTCHOICE

## 2025-07-07 RX ORDER — ACETAMINOPHEN 650 MG/1
650 SUPPOSITORY RECTAL EVERY 8 HOURS
Status: ACTIVE | OUTPATIENT
Start: 2025-07-07 | End: 2025-07-09

## 2025-07-07 RX ORDER — ACETAMINOPHEN 160 MG/5ML
650 SOLUTION ORAL EVERY 8 HOURS
Status: DISPENSED | OUTPATIENT
Start: 2025-07-07 | End: 2025-07-09

## 2025-07-07 RX ORDER — PROPOFOL 10 MG/ML
VIAL (ML) INTRAVENOUS AS NEEDED
Status: DISCONTINUED | OUTPATIENT
Start: 2025-07-07 | End: 2025-07-07 | Stop reason: SURG

## 2025-07-07 RX ORDER — HYDROCODONE BITARTRATE AND ACETAMINOPHEN 5; 325 MG/1; MG/1
1 TABLET ORAL EVERY 6 HOURS PRN
Status: DISPENSED | OUTPATIENT
Start: 2025-07-07 | End: 2025-07-12

## 2025-07-07 RX ORDER — FENTANYL CITRATE 50 UG/ML
25 INJECTION, SOLUTION INTRAMUSCULAR; INTRAVENOUS
Status: DISCONTINUED | OUTPATIENT
Start: 2025-07-07 | End: 2025-07-07 | Stop reason: HOSPADM

## 2025-07-07 RX ORDER — NICOTINE POLACRILEX 4 MG
15 LOZENGE BUCCAL
Status: DISCONTINUED | OUTPATIENT
Start: 2025-07-07 | End: 2025-07-14 | Stop reason: HOSPADM

## 2025-07-07 RX ORDER — LIDOCAINE HYDROCHLORIDE 20 MG/ML
INJECTION, SOLUTION EPIDURAL; INFILTRATION; INTRACAUDAL; PERINEURAL AS NEEDED
Status: DISCONTINUED | OUTPATIENT
Start: 2025-07-07 | End: 2025-07-07 | Stop reason: SURG

## 2025-07-07 RX ORDER — NALOXONE HCL 0.4 MG/ML
0.04 VIAL (ML) INJECTION AS NEEDED
Status: DISCONTINUED | OUTPATIENT
Start: 2025-07-07 | End: 2025-07-07 | Stop reason: HOSPADM

## 2025-07-07 RX ORDER — HYDROCODONE BITARTRATE AND ACETAMINOPHEN 5; 325 MG/1; MG/1
1 TABLET ORAL ONCE AS NEEDED
Status: DISCONTINUED | OUTPATIENT
Start: 2025-07-07 | End: 2025-07-07 | Stop reason: HOSPADM

## 2025-07-07 RX ORDER — FLUMAZENIL 0.1 MG/ML
0.2 INJECTION INTRAVENOUS AS NEEDED
Status: DISCONTINUED | OUTPATIENT
Start: 2025-07-07 | End: 2025-07-07 | Stop reason: HOSPADM

## 2025-07-07 RX ORDER — FENTANYL CITRATE 50 UG/ML
INJECTION, SOLUTION INTRAMUSCULAR; INTRAVENOUS AS NEEDED
Status: DISCONTINUED | OUTPATIENT
Start: 2025-07-07 | End: 2025-07-07 | Stop reason: SURG

## 2025-07-07 RX ORDER — POTASSIUM CHLORIDE 7.45 MG/ML
10 INJECTION INTRAVENOUS
Status: COMPLETED | OUTPATIENT
Start: 2025-07-07 | End: 2025-07-07

## 2025-07-07 RX ORDER — SODIUM CHLORIDE 0.9 % (FLUSH) 0.9 %
10 SYRINGE (ML) INJECTION AS NEEDED
Status: DISCONTINUED | OUTPATIENT
Start: 2025-07-07 | End: 2025-07-07 | Stop reason: HOSPADM

## 2025-07-07 RX ORDER — NALOXONE HCL 0.4 MG/ML
0.4 VIAL (ML) INJECTION
Status: DISCONTINUED | OUTPATIENT
Start: 2025-07-07 | End: 2025-07-10 | Stop reason: ALTCHOICE

## 2025-07-07 RX ORDER — ONDANSETRON 2 MG/ML
4 INJECTION INTRAMUSCULAR; INTRAVENOUS EVERY 6 HOURS PRN
Status: DISCONTINUED | OUTPATIENT
Start: 2025-07-07 | End: 2025-07-14 | Stop reason: HOSPADM

## 2025-07-07 RX ORDER — LIDOCAINE HYDROCHLORIDE 10 MG/ML
0.5 INJECTION, SOLUTION EPIDURAL; INFILTRATION; INTRACAUDAL; PERINEURAL ONCE AS NEEDED
Status: DISCONTINUED | OUTPATIENT
Start: 2025-07-07 | End: 2025-07-07 | Stop reason: HOSPADM

## 2025-07-07 RX ADMIN — HYDROCODONE BITARTRATE AND ACETAMINOPHEN 1 TABLET: 5; 325 TABLET ORAL at 03:27

## 2025-07-07 RX ADMIN — BUMETANIDE 1 MG: 0.25 INJECTION INTRAMUSCULAR; INTRAVENOUS at 03:28

## 2025-07-07 RX ADMIN — CHLORHEXIDINE GLUCONATE 1 APPLICATION: 500 CLOTH TOPICAL at 03:45

## 2025-07-07 RX ADMIN — Medication 10 ML: at 20:54

## 2025-07-07 RX ADMIN — BUMETANIDE 1 MG: 0.25 INJECTION INTRAMUSCULAR; INTRAVENOUS at 15:49

## 2025-07-07 RX ADMIN — POTASSIUM CHLORIDE 40 MEQ: 1500 TABLET, EXTENDED RELEASE ORAL at 20:52

## 2025-07-07 RX ADMIN — PROPOFOL 200 MG: 10 INJECTION, EMULSION INTRAVENOUS at 17:49

## 2025-07-07 RX ADMIN — CEFAZOLIN 2 G: 1 INJECTION, POWDER, FOR SOLUTION INTRAMUSCULAR; INTRAVENOUS at 17:51

## 2025-07-07 RX ADMIN — METRONIDAZOLE 500 MG: 500 TABLET ORAL at 20:52

## 2025-07-07 RX ADMIN — POTASSIUM CHLORIDE 10 MEQ: 7.46 INJECTION, SOLUTION INTRAVENOUS at 15:49

## 2025-07-07 RX ADMIN — METRONIDAZOLE 500 MG: 500 TABLET ORAL at 05:04

## 2025-07-07 RX ADMIN — FENTANYL CITRATE 25 MCG: 50 INJECTION, SOLUTION INTRAMUSCULAR; INTRAVENOUS at 17:49

## 2025-07-07 RX ADMIN — SODIUM CHLORIDE 20 ML/HR: 9 INJECTION, SOLUTION INTRAVENOUS at 17:40

## 2025-07-07 RX ADMIN — ACETAMINOPHEN 650 MG: 325 TABLET ORAL at 20:52

## 2025-07-07 RX ADMIN — HEPARIN SODIUM 5000 UNITS: 5000 INJECTION INTRAVENOUS; SUBCUTANEOUS at 20:53

## 2025-07-07 RX ADMIN — SODIUM BICARBONATE 1300 MG: 650 TABLET ORAL at 20:52

## 2025-07-07 RX ADMIN — LIDOCAINE HYDROCHLORIDE 60 MG: 20 INJECTION, SOLUTION EPIDURAL; INFILTRATION; INTRACAUDAL; PERINEURAL at 17:49

## 2025-07-07 RX ADMIN — POTASSIUM CHLORIDE 10 MEQ: 7.46 INJECTION, SOLUTION INTRAVENOUS at 16:19

## 2025-07-07 RX ADMIN — FENTANYL CITRATE 25 MCG: 50 INJECTION, SOLUTION INTRAMUSCULAR; INTRAVENOUS at 17:59

## 2025-07-07 RX ADMIN — CEFTRIAXONE 2000 MG: 2 INJECTION, POWDER, FOR SOLUTION INTRAMUSCULAR; INTRAVENOUS at 20:59

## 2025-07-07 NOTE — PLAN OF CARE
Goal Outcome Evaluation:  Plan of Care Reviewed With: patient        Progress: no change  Outcome Evaluation: Right IJ IID; IV ABX; IV bumex; ext. cath in place; patient tearful; medicated for pain x2; resting between care; safety maintained

## 2025-07-07 NOTE — ANESTHESIA PREPROCEDURE EVALUATION
Anesthesia Evaluation     Patient summary reviewed   no history of anesthetic complications:   NPO Solid Status: > 8 hours             Airway   Mallampati: II  Dental    (+) upper dentures    Pulmonary - negative pulmonary ROS   Cardiovascular   Exercise tolerance: good (4-7 METS)    (+) hypertension, hyperlipidemia      Neuro/Psych- negative ROS  GI/Hepatic/Renal/Endo    (+) obesity, GERD, renal disease- CRI and ESRD, diabetes mellitus    Musculoskeletal     Abdominal    Substance History      OB/GYN          Other                        Anesthesia Plan    ASA 4 - emergent     MAC     (K+ OK )        CODE STATUS:    Code Status (Patient has no pulse and is not breathing): CPR (Attempt to Resuscitate)  Medical Interventions (Patient has pulse or is breathing): Full Support  Comments: Discussed CODE STATUS with Mr. Laughlin.  Patient is ,  is in a nursing home.  She does not have any children.  Level Of Support Discussed With: Patient

## 2025-07-07 NOTE — CONSULTS
Lindsay Laughlin  [id number]  [id number]  391/  Harpal Proctor MD  7/3/2025    Chief Complaint   Patient presents with    Abdominal Pain       HPI: Lindsay Laughlin is a 69 y.o. female who  we were consulted for PermCath placement.  She has a history of stage III chronic kidney disease, diabetes, hypertension, GERD, and hyperlipidemia.  She has had a previous colectomy at [facility] and chronic diarrhea.  Upon routine labs indicated creatinine was 5 with metabolic acidemia, severe hypokalemia and hypomagnesia.  Minimal response with IV fluid administration/resuscitation.  We have been consulted for PermCath placement to begin dialysis.    Past Medical History:   Diagnosis Date    Acid reflux     Arthritis     High cholesterol     Hypertension     Neuropathy     Swollen lymph nodes     LEFT CERVICAL    Type 2 diabetes mellitus        Past Surgical History:   Procedure Laterality Date    CARPAL TUNNEL RELEASE Bilateral     CERVICAL LYMPH NODE BIOPSY/EXCISION Left 2017    Procedure: LEFT NECK EXPLORATION WITH INCISIONAL BIOPSY/CULTURE;  Surgeon: Blue Sanchez MD;  Location: [facility] OR;  Service:      SECTION      COLON RESECTION      COLONOSCOPY N/A 2023    Procedure: COLONOSCOPY WITH ANESTHESIA;  Surgeon: Tamy Smith MD;  Location: [facility] ENDOSCOPY;  Service: Gastroenterology;  Laterality: N/A;  preop; abnormal GI scan   postop ; poor prep   PCP Ruth Noble MD    ENDOSCOPY N/A 2023    Procedure: ESOPHAGOGASTRODUODENOSCOPY WITH ANESTHESIA;  Surgeon: Tamy Smith MD;  Location: [facility] ENDOSCOPY;  Service: Gastroenterology;  Laterality: N/A;  preop; abnormal GI scan   postop esophagitis ; hiatal hernia  PCP Ruth Noble MD    EXCISION LESION      From back    REPLACEMENT TOTAL KNEE Left     TONSILLECTOMY         Family History   Problem Relation Age of Onset    No Known Problems Mother     Colon cancer Father         < 60 years old    Breast  "cancer Neg Hx        Social History     Socioeconomic History    Marital status:    Tobacco Use    Smoking status: Never    Smokeless tobacco: Never   Vaping Use    Vaping status: Never Used   Substance and Sexual Activity    Alcohol use: No    Drug use: No    Sexual activity: Defer       No Known Allergies    Hospital Medications (active)         Dose Frequency Start End    acetaminophen (TYLENOL) suppository 650 mg 650 mg Every 4 Hours PRN 7/3/2025 --    Admin Instructions: If given for fever, use fever parameter: fever greater than 100.4 °F  Based on patient request - if ordered for moderate or severe pain, provider allows for administration of a medication prescribed for a lower pain scale.    Do not exceed 4 grams of acetaminophen in a 24 hr period. Max dose of 2gm for AST/ALT greater than 120 units/L.    If given for pain, use the following pain scale:   Mild Pain = Pain Score of 1-3, CPOT 1-2  Moderate Pain = Pain Score of 4-6, CPOT 3-4  Severe Pain = Pain Score of 7-10, CPOT 5-8    Route: Rectal    Linked Group 1: Placed in \"Or\" Linked Group        acetaminophen (TYLENOL) tablet 650 mg 650 mg Every 4 Hours PRN 7/3/2025 --    Admin Instructions: If given for fever, use fever parameter: fever greater than 100.4 °F  Based on patient request - if ordered for moderate or severe pain, provider allows for administration of a medication prescribed for a lower pain scale.    Do not exceed 4 grams of acetaminophen in a 24 hr period. Max dose of 2gm for AST/ALT greater than 120 units/L.    If given for pain, use the following pain scale:   Mild Pain = Pain Score of 1-3, CPOT 1-2  Moderate Pain = Pain Score of 4-6, CPOT 3-4  Severe Pain = Pain Score of 7-10, CPOT 5-8    Route: Oral    Linked Group 1: Placed in \"Or\" Linked Group        aluminum-magnesium hydroxide-simethicone (MAALOX MAX) 400-400-40 MG/5ML suspension 15 mL 15 mL Every 6 Hours PRN 7/5/2025 --    Admin Instructions: Maximum 60 mL in 24 hours.    " "Route: Oral    bisacodyl (DULCOLAX) EC tablet 5 mg 5 mg Daily PRN 7/3/2025 --    Admin Instructions: Use if no bowel movement after 12 hours.  Swallow whole. Do not crush, split, or chew tablet.    Route: Oral    Linked Group 2: Placed in \"And\" Linked Group        bisacodyl (DULCOLAX) suppository 10 mg 10 mg Daily PRN 7/3/2025 --    Admin Instructions: Use if no bowel movement after 12 hours.  Hold for diarrhea    Route: Rectal    Linked Group 2: Placed in \"And\" Linked Group        bumetanide (BUMEX) injection 1 mg 1 mg Every 12 Hours 7/6/2025 --    Admin Instructions: Hold for SBP less than 100, DBP less than 60.  Give slow IV push over 1-2 minutes.    Route: Intravenous    calcium carbonate (TUMS) chewable tablet 500 mg (200 mg elemental) 2 tablet 3 Times Daily PRN 7/4/2025 --    Admin Instructions: One tablet contains 200 mg elemental calcium.  Take with food.    Route: Oral    Calcium Replacement - Follow Nurse / BPA Driven Protocol  As Needed 7/3/2025 --    Admin Instructions: Open Order & Select \"[facility] Electrolyte Replacement Protocol Algorithm\" to View Details    Route: Not Applicable    Cosign for Ordering: Accepted by Claudine Church DO on 7/3/2025  7:10 PM    cefTRIAXone (ROCEPHIN) 2,000 mg in sodium chloride 0.9 % 100 mL MBP 2,000 mg Every 24 Hours 7/3/2025 7/8/2025    Admin Instructions: LR should be paused and flushing of the line with NS is recommended prior to and after completion of ceftriaxone infusion due to incompatibility. Do not co-adminster with calcium-containing solutions.  Caution: Look alike/sound alike drug alert    Route: Intravenous    Chlorhexidine Gluconate Cloth 2 % pads 1 Application 1 Application Every 24 Hours 7/4/2025 --    Admin Instructions: Use for admission bath and continue for length of critical care stay.    Route: Topical    cholecalciferol (VITAMIN D3) tablet 5,000 Units 5,000 Units Daily 7/6/2025 --    Route: Oral    heparin (porcine) 5000 UNIT/ML injection 5,000 Units " "5,000 Units Every 12 Hours Scheduled 7/3/2025 --    Route: Subcutaneous    HYDROcodone-acetaminophen (NORCO) 5-325 MG per tablet 1 tablet 1 tablet Every 6 Hours PRN 7/4/2025 7/9/2025    Admin Instructions: Based on patient request - if ordered for moderate or severe pain, provider allows for administration of a medication prescribed for a lower pain scale.  [SELVIN]    Do not exceed 4 grams of acetaminophen in a 24 hr period. Max dose of 2gm for AST/ALT greater than 120 units/L        If given for pain, use the following pain scale:   Mild Pain = Pain Score of 1-3, CPOT 1-2  Moderate Pain = Pain Score of 4-6, CPOT 3-4  Severe Pain = Pain Score of 7-10, CPOT 5-8    Route: Oral    Magnesium Standard Dose Replacement - Follow Nurse / BPA Driven Protocol  As Needed 7/3/2025 --    Admin Instructions: Open Order & Select \"BHS Electrolyte Replacement Protocol Algorithm\" to View Details    Route: Not Applicable    Cosign for Ordering: Accepted by Claudine Church DO on 7/3/2025  7:10 PM    metroNIDAZOLE (FLAGYL) tablet 500 mg 500 mg Every 8 Hours Scheduled 7/6/2025 7/8/2025    Admin Instructions: Caution: Look alike/sound alike drug alert    Route: Oral    mupirocin (BACTROBAN) 2 % nasal ointment 1 Application 1 Application 2 Times Daily 7/3/2025 7/8/2025    Admin Instructions: Begin on day 1 of ICU admission and administer for 5 days, even if patient transferred out of critical care.    MUPIROCIN APPLICATION:  1. Place patient's bed at 30 degrees, if tolerated.  2. Wash your hands with warm soapy water or use hand  .  3. Open the tube of mupirocin 2%.  4. Squeeze about 0.5 g (blueberry-size) of mupirocin from the  tube onto a sterile applicator or a cotton swab.  5. Apply the swab directly into nostril. Ensure coating of the  sides of the nostril.  6. Repeat with second sterile applicator for other nostril.  7. Gently press the sides of the nostrils together and massage  gently for 60 seconds.    ([facility])    Route: " "Each Nare    nitroglycerin (NITROSTAT) SL tablet 0.4 mg 0.4 mg Every 5 Minutes PRN 7/3/2025 --    Admin Instructions: If Pain Unrelieved After 3 Doses Notify MD  May administer up to 3 doses per episode. Hold if SBP less than 100.    Route: Sublingual    ondansetron (ZOFRAN) injection 4 mg 4 mg Every 6 Hours PRN 7/3/2025 --    Admin Instructions: If BOTH ondansetron (ZOFRAN) & promethazine (PHENERGAN) Ordered, Use ondansetron First & THEN promethazine IF ondansetron Ineffective.    Route: Intravenous    pantoprazole (PROTONIX) EC tablet 40 mg 40 mg 2 Times Daily Before Meals 7/5/2025 --    Admin Instructions: Do not crush or chew the capsules or tablets. The drug may not work as designed if the capsule or tablet is crushed or chewed. Swallow whole.  Swallow whole; do not crush, split, or chew.    Route: Oral    polyethylene glycol (MIRALAX) packet 17 g 17 g Daily PRN 7/3/2025 --    Admin Instructions: Use if no bowel movement after 12 hours. Mix in 6-8 ounces of water.  Use 4-8 ounces of water, tea, or juice for each 17 gram dose.    Route: Oral    Linked Group 2: Placed in \"And\" Linked Group        potassium chloride (KLOR-CON M20) CR tablet 40 mEq 40 mEq 3 times daily 7/5/2025 --    Admin Instructions: Do not crush or chew the capsules or tablets. The drug may not work as designed if the capsule or tablet is crushed or chewed. Swallow whole.  Take with food.    Route: Oral    Potassium Replacement - Follow Nurse / BPA Driven Protocol  As Needed 7/3/2025 --    Admin Instructions: Open Order & Select \"BHS Electrolyte Replacement Protocol Algorithm\" to View Details    Route: Not Applicable    Cosign for Ordering: Accepted by Claudine Church DO on 7/3/2025  7:10 PM    sennosides-docusate (PERICOLACE) 8.6-50 MG per tablet 2 tablet 2 tablet 2 Times Daily 7/3/2025 --    Admin Instructions: HOLD MEDICATION IF PATIENT HAS HAD BOWEL MOVEMENT. Start bowel management regimen if patient has not had a bowel movement after " "12 hours.    Route: Oral    Linked Group 2: Placed in \"And\" Linked Group        sodium bicarbonate tablet 1,300 mg 1,300 mg 3 Times Daily 7/6/2025 --    Route: Oral    sodium chloride 0.9 % flush 10 mL 10 mL As Needed 7/3/2025 --    Route: Intravenous    Cosign for Ordering: Accepted by Claudine Church DO on 7/3/2025  7:10 PM    Linked Group 3: Placed in \"And\" Linked Group        sodium chloride 0.9 % flush 10 mL 10 mL Every 12 Hours Scheduled 7/3/2025 --    Route: Intravenous    sodium chloride 0.9 % flush 10 mL 10 mL As Needed 7/3/2025 --    Route: Intravenous    sodium chloride 0.9 % infusion 40 mL 40 mL As Needed 7/3/2025 --    Admin Instructions: Following administration of an IV intermittent medication, flush line with 40mL NS at 100mL/hr.    Route: Intravenous            Review of Systems   Constitutional: Negative.    HENT: Negative.     Eyes: Negative.    Respiratory: Negative.     Cardiovascular: Negative.    Gastrointestinal:  Positive for diarrhea.   Endocrine: Negative.    Genitourinary: Negative.    Musculoskeletal: Negative.    Skin: Negative.    Allergic/Immunologic: Negative.    Neurological: Negative.    Hematological: Negative.    Psychiatric/Behavioral: Negative.         /56 (BP Location: Right arm, Patient Position: Lying)   Pulse 90   Temp 98 °F (36.7 °C) (Oral)   Resp 18   Ht 165.1 cm (65\")   Wt 100 kg (220 lb 10.9 oz)   SpO2 96%   BMI 36.72 kg/m²    Physical Exam  Vitals and nursing note reviewed.   Constitutional:       General: She is not in acute distress.     Appearance: Normal appearance. She is well-developed. She is not diaphoretic.   HENT:      Head: Normocephalic and atraumatic.   Eyes:      General: No scleral icterus.     Pupils: Pupils are equal, round, and reactive to light.   Neck:      Thyroid: No thyromegaly.      Vascular: No carotid bruit or JVD.   Cardiovascular:      Rate and Rhythm: Normal rate and regular rhythm.      Pulses: Normal pulses.      Heart " sounds: Normal heart sounds and S2 normal. No murmur heard.     No friction rub. No gallop.   Pulmonary:      Effort: Pulmonary effort is normal.      Breath sounds: Normal breath sounds.   Abdominal:      General: Bowel sounds are normal.      Palpations: Abdomen is soft.   Musculoskeletal:         General: Normal range of motion.      Cervical back: Normal range of motion and neck supple.   Skin:     General: Skin is warm and dry.   Neurological:      Mental Status: She is alert and oriented to person, place, and time.      Cranial Nerves: No cranial nerve deficit.   Psychiatric:         Behavior: Behavior normal.         Thought Content: Thought content normal.         Judgment: Judgment normal.         Laboratory Data:  Results from last 7 days   Lab Units 07/07/25  1053 07/06/25  0356 07/05/25  0223   WBC 10*3/mm3 9.55 11.81* 17.28*   HEMOGLOBIN g/dL 10.0* 10.1* 11.0*   HEMATOCRIT % 29.1* 29.5* 32.4*   PLATELETS 10*3/mm3 150 172 213       Results from last 7 days   Lab Units 07/07/25  1053 07/06/25  0356 07/05/25  2348 07/05/25  0223 07/04/25  0036 07/03/25  1706   SODIUM mmol/L 137 134*  --  132*   < > 135*   POTASSIUM mmol/L 3.5 3.0* 3.5 2.2*   < > 1.7*   CHLORIDE mmol/L 108* 104  --  100   < > 100   CO2 mmol/L 16.0* 15.0*  --  11.0*   < > 12.0*   BUN mg/dL 36.5* 36.5*  --  39.5*   < > 44.5*   CREATININE mg/dL 4.19* 4.65*  --  4.69*   < > 5.04*   CALCIUM mg/dL 6.8* 6.7*  --  7.6*   < > 5.6*   BILIRUBIN mg/dL 0.2 0.2  --   --   --  0.3   ALK PHOS U/L 88 87  --   --   --  82   ALT (SGPT) U/L 28 32  --   --   --  15   AST (SGOT) U/L 28 61*  --   --   --  41*   GLUCOSE mg/dL 193* 208*  --  215*   < > 161*    < > = values in this interval not displayed.             Diagnostic Data:  Imaging Results (Last 24 Hours)       ** No results found for the last 24 hours. **            Impression:    Enteritis  Acute  on chronic kidney disease, stage III baseline    Plan: After thoroughly evaluating Lindsay Laughlin, I believe  the best course of action is to   Proceed with PermCath placement to begin dialysis.  The risks and benefits were explained at great length to the patient which include but are not limited to bleeding, infection, vessel damage, nerve damage and pneumothorax. The patient understands the risks and wishes for me to proceed.    Thank you for allowing me to participate in the care of your patient.  Please do not hesitate to call with any questions or concerns.    NATTY Malcolm   Vascular Surgery  [phone]

## 2025-07-07 NOTE — PROGRESS NOTES
[facility] Intensivist Services    Date of Admission: 7/3/2025  Date of Note: 07/07/25  Primary Care Physician: Provider, No Known   LOS: 4 days     History   Next of Kin:  Primary Emergency Contact: Rodger Marrufo Home Phone: [phone]   Code Status: Code Status and Medical Interventions: CPR (Attempt to Resuscitate); Full Support; Discussed CODE STATUS with Mr. Laughlin.  Patient is ,  is in a nursing home.  She does not have any children.    She is a 69 y.o. female admitted 7/3/2025 with Enteritis who also  has a past medical history of Acid reflux, Arthritis, High cholesterol, Hypertension, Neuropathy, Swollen lymph nodes, and Type 2 diabetes mellitus.    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     She takes albuterol sulfate HFA, ibuprofen, and ondansetron ODT at home based on most current med reconciliation.   Complete list is below.     The ICU team was asked to evaluate the patient for severe dehydration with electrolyte abnormalities and acute kidney injury..    7/4 today patient is feeling a little bit better.  Her cramps has improved.  She can move all of her upper and  lower extremities fine.  Denies having any major abdominal pain.  Have not had anything to eat yet.  No nausea or vomiting noted    7/5 patient complaining of having a lot of throat pain.  No more diarrhea noted.  Seems slightly confused on conversation.  Denies having any chest discomfort.  No episodes of fever overnight.  On room air.  Blood pressures okay.    7/ 6 doing fair this morning fairly awake denies having any other new discomfort able to move all extremities fine tolerating oral okay with no fever  7/7  As before she has been transferred out of the unit presented originally to the ICU with severe dehydration DAISY ultrasound renal was unremarkable felt acute renal injury secondary to ATN acute on chronic renal failure patient was instructed to avoid NSAIDs per nephrology she might need to be started on dialysis repeat her labs today continue supportive care remains on Rocephin and Flagyl for enteritis  Past Medical History     Active and Resolved Problems  Active Hospital Problems    Diagnosis  POA    **Enteritis [K52.9]  Yes      Resolved Hospital Problems   No resolved problems to display.       Allergies     Allergies:   She has no known allergies.    Labs   Basic Labs:  CBC:      Lab 07/06/25 0356 07/05/25 0223 07/04/25  1418 07/03/25  1628   WBC 11.81* 17.28* 16.56* 25.12*   HEMOGLOBIN 10.1* 11.0* 10.9* 12.6   HEMATOCRIT 29.5* 32.4* 32.2* 36.2   PLATELETS 172 213 204 233   NEUTROS ABS 10.17* 16.17* 15.24* 22.36*   IMMATURE GRANS (ABS) 0.10* 0.12* 0.11* 0.15*   LYMPHS ABS 0.48* 0.37* 0.40* 1.24   MONOS ABS 0.65 0.51 0.67 1.31*   EOS ABS 0.38 0.08 0.11 0.02   MCV 93.4 94.2 96.1 94.5       LIVER FUNCTION AND COAG TESTS:      Lab 07/06/25 0356 07/05/25 0223 07/03/25  1706 07/03/25  1628   TOTAL PROTEIN 4.9*  --  5.4*  --    ALBUMIN 2.6*  --  2.9*  --    GLOBULIN 2.3  --  2.5  --    ALT (SGPT) 32  --  15  --    AST (SGOT) 61*  --  41*  --    BILIRUBIN 0.2  --  0.3  --    ALK PHOS 87  --  82  --     AMYLASE  --   --   --  19*   LIPASE  --  61*  --  21       ABG:      Lab 07/06/25  0356 07/05/25  0223 07/04/25  1418 07/04/25  0036 07/03/25  1706   ANION GAP 15.0 21.0* 21.0* 23.0* 23.0*       CMP:      Lab 07/06/25  0356 07/05/25  2348 07/05/25  0223 07/04/25  1418 07/04/25  0036 07/03/25  1706   SODIUM 134*  --  132* 135* 133* 135*   POTASSIUM 3.0* 3.5 2.2* 2.2* 2.1* 1.7*   CHLORIDE 104  --  100 102 101 100   BUN 36.5*  --  39.5* 41.4* 42.7* 44.5*   CREATININE 4.65*  --  4.69* 4.71* 4.69* 5.04*   CALCIUM 6.7*  --  7.6* 6.0* 5.9* 5.6*   MAGNESIUM 2.2  --  2.8* 2.3 1.4* 0.8*   PHOSPHORUS 3.4  --  5.7* 4.7*  --   --    GLUCOSE 208*  --  215* 189* 126* 161*   EGFR 9.6*  --  9.5* 9.5* 9.5* 8.7*       Inpatient Medications   Scheduled Meds:bumetanide, 1 mg, Intravenous, Q12H  cefTRIAXone, 2,000 mg, Intravenous, Q24H  Chlorhexidine Gluconate Cloth, 1 Application, Topical, Q24H  cholecalciferol, 5,000 Units, Oral, Daily  heparin (porcine), 5,000 Units, Subcutaneous, Q12H  metroNIDAZOLE, 500 mg, Oral, Q8H  mupirocin, 1 Application, Each Nare, BID  pantoprazole, 40 mg, Oral, BID AC  potassium chloride, 40 mEq, Oral, TID  senna-docusate sodium, 2 tablet, Oral, BID  sodium bicarbonate, 1,300 mg, Oral, TID  sodium chloride, 10 mL, Intravenous, Q12H      Continuous Infusions:     PRN Meds:.  acetaminophen **OR** acetaminophen    aluminum-magnesium hydroxide-simethicone    senna-docusate sodium **AND** polyethylene glycol **AND** bisacodyl **AND** bisacodyl    calcium carbonate    Calcium Replacement - Follow Nurse / BPA Driven Protocol    HYDROcodone-acetaminophen    Magnesium Standard Dose Replacement - Follow Nurse / BPA Driven Protocol    nitroglycerin    ondansetron    Potassium Replacement - Follow Nurse / BPA Driven Protocol    [COMPLETED] Insert Peripheral IV **AND** sodium chloride    sodium chloride    sodium chloride    I have reviewed the patient's current medications.   Outpatient Medications     Current  "Outpatient Medications   Medication Instructions    albuterol sulfate  (90 Base) MCG/ACT inhaler 2 puffs, Inhalation, Every 4 Hours PRN    ibuprofen (ADVIL,MOTRIN) 1,000 mg, Oral, Every 6 Hours PRN    ondansetron ODT (ZOFRAN-ODT) 4 mg, Translingual, Every 4 Hours PRN       Current Antibiotics   cefTRIAXone (ROCEPHIN) 2000 mg IVPB in 100 mL NS (MBP)  metroNIDAZOLE - 500 MG    Exam   Vent settings for last 24 hours:       Vital signs for last 24 hours:  Temp:  [97.3 °F (36.3 °C)-98 °F (36.7 °C)] 98 °F (36.7 °C)  Heart Rate:  [80-90] 90  Resp:  [16-18] 18  BP: (114-124)/(46-71) 124/56    Vitals: Her  height is 165.1 cm (65\") and weight is 100 kg (220 lb 10.9 oz). Her oral temperature is 98 °F (36.7 °C). Her blood pressure is 124/56 and her pulse is 90. Her respiration is 18 and oxygen saturation is 96%.     Constitutional:       General: She is not in acute distress.  Slightly confused  HENT:      Head: Normocephalic and atraumatic.      Nose: Nose normal.      Mouth/Throat:      Mouth: Mucous membranes are dry.      Comments: Extremely dry mucosa.    Eyes:      Extraocular Movements: Extraocular movements intact.      Pupils: Pupils are equal, round, and reactive to light.   Cardiovascular:      Rate and Rhythm: Normal rate and regular rhythm.   Pulmonary:      Effort: Pulmonary effort is normal.      Breath sounds: Normal breath sounds.   Abdominal:      General: There is no distension.      Palpations: Abdomen is soft.      Tenderness: There is no guarding.      Comments: Well-healed vertical incisional site.  No rebound tenderness or concerns for peritonitis on exam.  Bowel sounds are normal  Musculoskeletal:         General: No deformity.      Cervical back: Normal range of motion.      Right lower leg: No edema.      Left lower leg: No edema.      Comments: Cramping noted to right hand and feet bilateral improved ROM now.   Skin:     General: Skin is warm and dry.      Capillary Refill: Capillary refill " takes 2 to 3 seconds.      Findings: No bruising.   Neurological:      General: No focal deficit present.      Mental Status: She is alert and oriented to person, place, and time.        Intake/Output   Intake/Output this shift:  No intake/output data recorded.    Intake/Output last 3 shifts:  I/O last 3 completed shifts:  In: 2233.7 [P.O.:480; I.V.:1553.7; IV Piggyback:200]  Out: 300 [Urine:300]    Results and Cultures Review     Result Review:  I have personally reviewed the results from the time of this admission to 7/7/2025 10:15 CDT and agree with these findings:  [x]  Laboratory list / accordion  [x]  Microbiology  [x]  Radiology  []  EKG/Telemetry   []  Cardiology/Vascular   []  Pathology  []  Old records  []  Other:  Most notable findings include: mg 1.3  K .3.0         Culture Data:     Assessment/Plan       Enteritis  Came with abdominal pain with severe dehydration.    Continue current therapy with Flagyl.  No signs of infection noted.  No more diarrhea noted.    Try oral liquids to see if she tolerates.    2 Acute kidney injury associated with creatinine of 5.0- 4.69- 4.65   Prerenal associated with hypovolemia.  Patient uses NSAIDs routinely may be causing acute tubular necrosis  Will continue with IV hydration.    Appreciate help from nephrology     3 Electrolyte abnormalities with   Severe hypokalemia-replaced recheck 3.0  Magnesium replaced as well   May be associated with a GI disorder with the significant diarrhea but seems pretty refractory at this point I will recheck the electrolytes cannot rule out the possibility of  RTA   We will continue to closely monitor    4 Non obstructive renal stone  Cont hydration, no s/s for now.fu conservatively    5 Pem low protein / albumin level   Optimize oral when able to take  .      6 VTE Prophylaxis:    Pharmacologic & mechanical VTE prophylaxis orders are present.      7 Active VTE Prophylaxis  Pharmacologic:        Start     Dose Route Frequency Stop     07/03/25 2100  heparin (porcine) 5000 UNIT/ML injection 5,000 Units         5,000 Units SC Every 12 Hours Scheduled --                  Mechanical:        Start        07/03/25 1956  Maintain Sequential Compression Device  Continuous                            Code Status and Medical Interventions: CPR (Attempt to Resuscitate); Full Support; Discussed CODE STATUS with Mr. Laughlin.  Patient is ,  is in a nursing home.  She does not have any children.   Ordered at: 07/03/25 2000     Code Status (Patient has no pulse and is not breathing):    CPR (Attempt to Resuscitate)     Medical Interventions (Patient has pulse or is breathing):    Full Support     Comments:    Discussed CODE STATUS with Mr. Laughlin.  Patient is ,  is in a nursing home.  She does not have any children.     Level Of Support Discussed With:    Patient       Due to a high probability of clinically significant, life threatening deterioration, the patient required my highest level of preparedness to intervene emergently and I personally spent this critical care time directly and personally managing the patient.     This critical care time included obtaining a history; examining the patient; pulse oximetry; ordering and review of studies; arranging urgent treatment with development of a management plan; evaluation of patient's response to treatment; frequent reassessment; and, discussions with other providers.    This critical care time was performed to assess and manage the high probability of imminent, life-threatening deterioration that could result in multi-organ failure. It was exclusive of separately billable procedures and treating other patients and teaching time.    Please see MDM section and the rest of the note for further information on patient assessment and treatment.    Part of this note may be an electronic transcription/translation of spoken language to printed text using the Dragon Dictation  System    Electronically signed by Harpal Proctor MD on 7/7/2025 at 10:15 CDT

## 2025-07-07 NOTE — PAYOR COMM NOTE
"Javier Pastrana (69 y.o. Female) [id number]  Admit In PT 07/03 [facility] [phone]      Date of Birth   1955    Social Security Number       Address   [address] [address]    Home Phone   [phone]    MRN   [id number]       Latter day   Latter-day    Marital Status                               Admission Date   7/3/2025    Admission Type   Emergency    Admitting Provider   Shade Alberts MD    Attending Provider   Harpal Proctor MD    Department, Room/Bed   [facility] 3C, 391/1       Discharge Date       Discharge Disposition       Discharge Destination                                 Attending Provider: Harpal Proctor MD    Allergies: No Known Allergies    Isolation: None   Infection: None   Code Status: CPR    Ht: 165.1 cm (65\")   Wt: 100 kg (220 lb 10.9 oz)    Admission Cmt: None   Principal Problem: Enteritis [K52.9]                   Active Insurance as of 7/3/2025       Primary Coverage       Payor Plan Insurance Group Employer/Plan Group    UNITED HEALTHC[employer] MEDICARE REPLACEMENT UHC MEDICARE ADVANTAGE [facility] HMO NON PAR [id number]       Payor Plan Address Payor Plan Phone Number Payor Plan Fax Number Effective Dates    [address] [address]   7/1/2025 - None Entered    [facility] [id number]         Subscriber Name Subscriber Birth Date Member ID       JAVIER PASTRANA 1955 [id number]               Secondary Coverage       Payor Plan Insurance Group Employer/Plan Group    [id number]        Payor Plan Address Payor Plan Phone Number Payor Plan Fax Number Effective Dates    [address]   5/17/2023 - None Entered    [facility] [id number]         Subscriber Name Subscriber Birth Date Member ID       JAVIER PASTRANA 1955 [id number]                     Emergency Contacts        (Rel.) Home Phone Work Phone Mobile Phone    Rodger Marrufo (Friend) [phone] -- --    Klever Stone (Friend) " -- -- [phone]    Laura Phelan -- -- [phone]                 History & Physical        Barbara Renteria APRN at 07/03/25 2016              [facility] Intensivist Services  HISTORY AND PHYSICAL    Date of Admission: 7/3/2025  Primary Care Physician: Provider, No Known    Subjective   Primary Historian: Patient, ER physician    Chief Complaint: Severe electrolyte abnormality, hypokalemia, hypomagnesia, hypocalcemia, nausea, vomiting and diarrhea, dehydration    History of Present Illness  Very pleasant 69-year-old female patient past medical history type 2 diabetes, hypertension, hyperlipidemia, arthritis, GERD, chronic diarrhea.  Recent endoscopy/colonoscopy in May 2023 which were unremarkable, exploratory lap in August 2028 with complications patient endorses colectomy at that time secondary to complications that she had performed at [place] presents to the emergency department today with generally not feeling well.    Patient endorses ongoing nausea, vomiting and diarrhea for the last 5 days along with abdominal pain with diarrhea.  She describes the diarrhea as none hematochezia or melena.  She denies any systemic symptoms such as fever chills or rigors.  She does endorse a mechanical fall this morning while in the bathroom that she feels was a direct result of her generalized weakness.  She does endorse left shoulder pain.  She denies hitting her head.  She called EMS for abdominal pain, nausea vomiting and diarrhea for the last week and generalized pain.    ER workup revealed critically low electrolytes.  Potassium 1.7, magnesium 0.8, calcium 5.6 leukocytosis 25.  With a shift no bandemia.  Stable lactate at 1.7.  CRP elevated at 11.05 BUN 44, creatinine 5 gap 23 CT of the abdomen and pelvis shows mild enteritis or diarrheal type illness there is no bowel wall thickening or obstruction.  Postoperative changes including previous partial right colectomy, new small  stones in the left renal pelvis no urinary tract obstruction identified.    Patient was given 2 g of magnesium, gram of calcium and 20 mill equivalents of potassium was ordered however not infused in the ED, along with IV fluid r resuscitation with 1 L of NS.  Patient is alert oriented cognitively intact.  She is hemodynamically stable surprisingly.    ICU was consulted for ongoing management of electrolytes in the setting of acute vomiting and diarrhea.    I have seen and evaluated the patient immediately upon arriving to ICU room 5.  Patient is alert oriented cognitively intact.  Vital signs stable.  She does have difficulty with raising her left shoulder above her head.  She is able to flex and extend from the elbow.  She has pain with passive range of motion and full extension of the left shoulder.    She does endorse that she sustained a ground-level fall and she feels that she hit her shoulder.  Once we increase her potassium and stabilize her we will obtain x-rays of the left shoulder.  She denies hitting her head she denies any neck pain.  She has some cramping noted to her feet and right hand I suspect this could be carpopedal spasms secondary to hypocalcemia with hypomagnesia and hypokalemia.    No systemic symptoms however in the setting of leukocytosis elevated CRP and shift with gastritis we will implement ceftriaxone and Flagyl after obtaining blood cultures.  Still pending a GI panel.  She denies being on any recent antibiotics as I considered C. difficile.    Will continue with electrolyte replacement and ongoing hydration.        Review of Systems   Otherwise complete ROS reviewed and negative except as mentioned in the HPI.    Past Medical History:   Past Medical History:   Diagnosis Date    Acid reflux     Arthritis     High cholesterol     Hypertension     Neuropathy     Swollen lymph nodes     LEFT CERVICAL    Type 2 diabetes mellitus      Past Surgical History:  Past Surgical History:  "  Procedure Laterality Date    CARPAL TUNNEL RELEASE Bilateral     CERVICAL LYMPH NODE BIOPSY/EXCISION Left 2017    Procedure: LEFT NECK EXPLORATION WITH INCISIONAL BIOPSY/CULTURE;  Surgeon: Blue Sanchez MD;  Location: [facility] OR;  Service:      SECTION      COLON RESECTION      COLONOSCOPY N/A 2023    Procedure: COLONOSCOPY WITH ANESTHESIA;  Surgeon: Tamy Smith MD;  Location: [facility] ENDOSCOPY;  Service: Gastroenterology;  Laterality: N/A;  preop; abnormal GI scan   postop ; poor prep   PCP Ruth Noble MD    ENDOSCOPY N/A 2023    Procedure: ESOPHAGOGASTRODUODENOSCOPY WITH ANESTHESIA;  Surgeon: Tamy Smith MD;  Location: [facility] ENDOSCOPY;  Service: Gastroenterology;  Laterality: N/A;  preop; abnormal GI scan   postop esophagitis ; hiatal hernia  PCP Ruth Noble MD    EXCISION LESION      From back    REPLACEMENT TOTAL KNEE Left     TONSILLECTOMY       Social History:  reports that she has never smoked. She has never used smokeless tobacco. She reports that she does not drink alcohol and does not use drugs.    Family History: family history includes Colon cancer in her father; No Known Problems in her mother.       Allergies:  No Known Allergies    Medications:  Prior to Admission medications    Medication Sig Start Date End Date Taking? Authorizing Provider   ibuprofen (ADVIL,MOTRIN) 200 MG tablet Take 1 tablet by mouth Every 6 (Six) Hours As Needed for Mild Pain. Pt reports taking \"5 at a time only when needed\"    Provider, MD Priyanka   ondansetron ODT (ZOFRAN-ODT) 4 MG disintegrating tablet Place 1 tablet on the tongue Every 4 (Four) Hours As Needed for Nausea or Vomiting. 25   Nishant Hdz Jr., MD     I have utilized all available immediate resources to obtain, update, or review the patient's current medications (including all prescriptions, over-the-counter products, herbals, cannabis/cannabidiol products, and " "vitamin/mineral/dietary (nutritional) supplements).    Objective     Vital Signs: /56   Pulse 91   Temp 97.8 °F (36.6 °C) (Oral)   Resp 17   Ht 165.1 cm (65\")   Wt 97.1 kg (214 lb 1.6 oz)   SpO2 94%   BMI 35.63 kg/m²   Physical Exam  Vitals and nursing note reviewed. Exam conducted with a chaperone present.   Constitutional:       General: She is not in acute distress.  HENT:      Head: Normocephalic and atraumatic.      Nose: Nose normal.      Mouth/Throat:      Mouth: Mucous membranes are dry.      Comments: Extremely dry mucosa.  Patient endorses she has not had anything to eat or drink in at least 24 to 48 hours now  Eyes:      Extraocular Movements: Extraocular movements intact.      Pupils: Pupils are equal, round, and reactive to light.   Cardiovascular:      Rate and Rhythm: Normal rate and regular rhythm.   Pulmonary:      Effort: Pulmonary effort is normal.      Breath sounds: Normal breath sounds.   Abdominal:      General: There is no distension.      Palpations: Abdomen is soft.      Tenderness: There is no guarding.      Comments: Well-healed vertical incisional site.  No rebound tenderness or concerns for peritonitis on exam.  Hyperactive bowel sounds   Musculoskeletal:         General: No deformity.      Cervical back: Normal range of motion.      Right lower leg: No edema.      Left lower leg: No edema.      Comments: Cramping noted to right hand and feet bilaterally.  Decreased range of motion noted to the left upper extremity.  Patient is able to flex and extend from the elbow however she is not able to independently lift the left arm above her head.  Radial pulse palpable and strong.  She has neurovascularly intact.   Skin:     General: Skin is warm and dry.      Capillary Refill: Capillary refill takes 2 to 3 seconds.      Findings: No bruising.   Neurological:      General: No focal deficit present.      Mental Status: She is alert and oriented to person, place, and time.    "         Results Reviewed:  Lab Results (last 24 hours)       Procedure Component Value Units Date/Time    Comprehensive Metabolic Panel [[id number]]  (Abnormal) Collected: 07/03/25 1706    Specimen: Blood Updated: 07/03/25 1739     Glucose 161 mg/dL      BUN 44.5 mg/dL      Creatinine 5.04 mg/dL      Sodium 135 mmol/L      Potassium 1.7 mmol/L      Chloride 100 mmol/L      CO2 12.0 mmol/L      Calcium 5.6 mg/dL      Total Protein 5.4 g/dL      Albumin 2.9 g/dL      ALT (SGPT) 15 U/L      AST (SGOT) 41 U/L      Alkaline Phosphatase 82 U/L      Total Bilirubin 0.3 mg/dL      Globulin 2.5 gm/dL      A/G Ratio 1.2 g/dL      BUN/Creatinine Ratio 8.8     Anion Gap 23.0 mmol/L      eGFR 8.7 mL/min/1.73     Narrative:      GFR Categories in Chronic Kidney Disease (CKD)              GFR Category          GFR (mL/min/1.73)    Interpretation  G1                    90 or greater        Normal or high (1)  G2                    60-89                Mild decrease (1)  G3a                   45-59                Mild to moderate decrease  G3b                   30-44                Moderate to severe decrease  G4                    15-29                Severe decrease  G5                    14 or less           Kidney failure    (1)In the absence of evidence of kidney disease, neither GFR category G1 or G2 fulfill the criteria for CKD.    eGFR calculation 2021 CKD-EPI creatinine equation, which does not include race as a factor    Magnesium [[id number]]  (Abnormal) Collected: 07/03/25 1706    Specimen: Blood Updated: 07/03/25 1738     Magnesium 0.8 mg/dL     C-reactive Protein [[id number]]  (Abnormal) Collected: 07/03/25 1706    Specimen: Blood Updated: 07/03/25 1730     C-Reactive Protein 11.05 mg/dL     Urinalysis With Culture If Indicated - Urine, Catheter [[id number]]  (Abnormal) Collected: 07/03/25 1647    Specimen: Urine, Catheter Updated: 07/03/25 1708     Color, UA Yellow     Appearance, UA Clear     pH, UA 5.5     Specific  Gravity, UA 1.011     Glucose, UA Negative     Ketones, UA Trace     Bilirubin, UA Negative     Blood, UA Large (3+)     Protein,  mg/dL (2+)     Leuk Esterase, UA Negative     Nitrite, UA Negative     Urobilinogen, UA 0.2 E.U./dL    Narrative:      In absence of clinical symptoms, the presence of pyuria, bacteria, and/or nitrites on the urinalysis result does not correlate with infection.    Urinalysis, Microscopic Only - Urine, Catheter [[id number]]  (Abnormal) Collected: 07/03/25 1647    Specimen: Urine, Catheter Updated: 07/03/25 1708     RBC, UA 0-2 /HPF      WBC, UA 0-2 /HPF      Comment: Urine culture not indicated.        Bacteria, UA None Seen /HPF      Squamous Epithelial Cells, UA 3-6 /HPF      Hyaline Casts, UA 0-2 /LPF      Amorphous Crystals, UA Small/1+ /HPF      Methodology Manual Light Microscopy    Amylase [[id number]]  (Abnormal) Collected: 07/03/25 1628    Specimen: Blood Updated: 07/03/25 1658     Amylase 19 U/L     Lactic Acid, Plasma [[id number]]  (Normal) Collected: 07/03/25 1628    Specimen: Blood Updated: 07/03/25 1657     Lactate 1.7 mmol/L     Lipase [[id number]]  (Normal) Collected: 07/03/25 1628    Specimen: Blood Updated: 07/03/25 1656     Lipase 21 U/L     CBC & Differential [[id number]]  (Abnormal) Collected: 07/03/25 1628    Specimen: Blood Updated: 07/03/25 1635    Narrative:      The following orders were created for panel order CBC & Differential.  Procedure                               Abnormality         Status                     ---------                               -----------         ------                     CBC Auto Differential[[id number]]        Abnormal            Final result                 Please view results for these tests on the individual orders.    CBC Auto Differential [[id number]]  (Abnormal) Collected: 07/03/25 1628    Specimen: Blood Updated: 07/03/25 1635     WBC 25.12 10*3/mm3      RBC 3.83 10*6/mm3      Hemoglobin 12.6 g/dL      Hematocrit 36.2 %       MCV 94.5 fL      MCH 32.9 pg      MCHC 34.8 g/dL      RDW 18.5 %      RDW-SD 64.1 fl      MPV 10.6 fL      Platelets 233 10*3/mm3      Neutrophil % 89.0 %      Lymphocyte % 4.9 %      Monocyte % 5.2 %      Eosinophil % 0.1 %      Basophil % 0.2 %      Immature Grans % 0.6 %      Neutrophils, Absolute 22.36 10*3/mm3      Lymphocytes, Absolute 1.24 10*3/mm3      Monocytes, Absolute 1.31 10*3/mm3      Eosinophils, Absolute 0.02 10*3/mm3      Basophils, Absolute 0.04 10*3/mm3      Immature Grans, Absolute 0.15 10*3/mm3      nRBC 0.0 /100 WBC              CT Abdomen Pelvis Without Contrast  Result Date: 7/3/2025  1. Fluid retention within the small intestine and portions of the colon compatible with the history of diarrhea, no evidence of bowel obstruction, no bowel wall thickening. Correlate clinically for mild enteritis or diarrheal type illness. 2. New small stone in the left renal pelvis, measuring 7 mm. No urinary tract obstruction is identified. 3. Postoperative changes, including previous partial right colectomy, appendectomy and there is stable dense material in the region of the raine hepatis which may be related to previous surgical intervention. The gallbladder is not visualized. 4. Stable accessory spleen measuring up to 4 cm.   This report was signed and finalized on 7/3/2025 6:19 PM by Dr. Venkata Muniz MD.            Result Review:  I have personally reviewed the results from the time of this admission to 7/3/2025 20:17 CDT and agree with these findings:  [x]  Laboratory list / accordion  []  Microbiology  [x]  Radiology  [x]  EKG/Telemetry   []  Cardiology/Vascular   [x]  Pathology  [x]  Old records-  []  Other:  Most notable findings include: Potassium 1.7, magnesium 0.8, calcium 5.6, leukocytosis 25 with a left shift, elevated CRP at 11      I have personally reviewed and interpreted the radiology studies and ECG obtained at time of admission.     Assessment / Plan   Assessment:   Active Hospital  Problems    Diagnosis     **Enteritis        Treatment Plan  The patient will be admitted to my service here at [facility].  The patient to ICU/CCU under intensivist services this patient is currently requiring ICU services for electrolyte management    69-year-old female patient past medical history acute on chronic diarrhea presented to the emergency department via EMS for a 5-day history of nausea, vomiting diarrhea and generalized bodyaches along with abdominal pain.  Critically low potassium 1.8, magnesium 0.8 and calcium 5.6.  EG changes prolonged QTc greater than 700.  CT concerning for enteritis otherwise no acute findings.    Hypokalemia  Hypomagnesia  Hypocalcemia  Enteritis  Leukocytosis  DAISY  Dehydration  8.   Past medical history type 2 diabetes  9.   Prolonged Qtc    Plan:  Continue with electrolyte replacement.  Will initiate oral potassium as well as IV.  Reassess electrolytes after replacement.  Continue with IV fluid hydration in the setting of acute dehydration and DAISY.  Reviewed previous labs from 2/24 2025 kidney function, creatinine 2, her decreased oral intake dehydration's probably worsening kidney function today.  Renally dose all medications.  Avoid any nephrotoxin agents.  Avoid any IV contrast  Reassess BMP in the AM.  Sitter nephrology consult if warranted  Leukocytosis, no concerns for sepsis on admission.  Leukocytosis felt to be secondary to enteritis.  Obtain GI panel.  Initiate Rocephin 2 g and Flagyl 500 every 8 after blood cultures.  De-escalate antibiotics based on cultures and GI panel.  Obtain a chest x-ray to rule out any acute process.  Urinalysis unremarkable for any acute infectious process.  Sliding scale insulin during this hospitalization  Avoid any QTc prolongation medications in the setting of prolonged QTc.    VTE: Heparin SQ  GI: Protonix , hx of Gerd  NTN: Diet  ABX: Flagyl 500 mg every 8 Rocephin 2 g daily  Lines/Tubes: Peripheral  CODE Status: Full  code          60 minutes of critical care provided. This time excludes other billable procedures. Time does include preparation of documents, medical consultations, review of old records, and direct bedside care. Patient is at high risk for life-threatening deterioration due to patient is a high risk for cardiac arrhythmia secondary to critically low electrolytes.  .       Medical Decision Making  Number and Complexity of problems: 7  Differential Diagnosis: Electrolyte abnormalities, acute on chronic.  Patient does have a history of chronic diarrhea.  Her previous labs from April of this year revealed critically low potassium of 2.7.  I question if she has been taking potassium and magnesium supplements in the setting of chronic diarrhea.  Acute on chronic kidney insufficiency.  I feel her acute dehydration is contributing to her worsening kidney function.  I considered sepsis in the setting of leukocytosis however no acute process other than enteritis.  Will search for additional sources.    Conditions and Status        Condition is unchanged.     [facility] Data  External documents reviewed: N/A I did review previous endoscopy, colonoscopy by Dr. Smith in April 2023 biopsies unremarkable.    Cardiac tracing (EKG, telemetry) interpretation: Reviewed admission EKG  Radiology interpretation: Reviewed admission CT abdomen pelvis imaging  Labs reviewed: Reviewed admission labs from ER  Any tests that were considered but not ordered: Chest x-ray, left shoulder x-ray     Decision rules/scores evaluated (example [id number], Wells, etc):      Discussed with: Patient.  Patient is decision-maker.  She endorses she is  however her  is in the nursing home.  She does have a close friend that lives with her and helps take care of her however she does not have any children.     Care Planning  Shared decision making: Patient  Code status and discussions: Full code    Disposition  Social Determinants of Health that impact  treatment or disposition: NA  Estimated length of stay is 2 to 5 days.     I confirmed that the patient's advanced care plan is present, code status is documented, and a surrogate decision maker is listed in the patient's medical record.     The patient's surrogate decision maker is patient.     The patient was seen and examined by me on 7/3/20/2025 at 1930 immediately upon arriving to ICU room 3.    I admitted the patient overnight to the intensivist services.  Case will be discussed with Dr. Alberts, In the a.m.  Final treatment plan and recommendations will be at his discretion.     Electronically signed by NATYT Major on 7/3/2025 at 20:17 CDT               Electronically signed by Barbara Renteria APRN at 25 2116          Emergency Department Notes        London Leiva PA-C at 25 1549       Attestation signed by Claudine Church DO at 25 1011          SUPERVISE: For this patient encounter, I reviewed the APC's documentation, treatment plan, and medical decision making.  Claudine Church DO 2025 10:11 CDT                             Subjective   History of Present Illness  States for the last week she has had constant diffuse abdominal pain, nausea, vomiting, and diarrhea.  She states the symptoms have not improved despite conservative measures.  She denies any melena, hematochezia, fever, chills, chest pain, shortness of breath, cough, dysuria, hematuria.  She states abdominal pain is constant and does not radiate.  She does not give a description of the pain at this time.  She has a past medical history of GERD, type 2 diabetes, colon resection, .        Review of Systems   Gastrointestinal:  Positive for abdominal pain, diarrhea, nausea and vomiting.   All other systems reviewed and are negative.      Past Medical History:   Diagnosis Date    Acid reflux     Arthritis     High cholesterol     Hypertension     Neuropathy     Swollen lymph nodes     LEFT  CERVICAL    Type 2 diabetes mellitus        No Known Allergies    Past Surgical History:   Procedure Laterality Date    CARPAL TUNNEL RELEASE Bilateral     CERVICAL LYMPH NODE BIOPSY/EXCISION Left 2017    Procedure: LEFT NECK EXPLORATION WITH INCISIONAL BIOPSY/CULTURE;  Surgeon: Blue Sanchez MD;  Location: [facility] OR;  Service:      SECTION      COLON RESECTION      COLONOSCOPY N/A 2023    Procedure: COLONOSCOPY WITH ANESTHESIA;  Surgeon: Tamy Smith MD;  Location: [facility] ENDOSCOPY;  Service: Gastroenterology;  Laterality: N/A;  preop; abnormal GI scan   postop ; poor prep   PCP Ruth Noble MD    ENDOSCOPY N/A 2023    Procedure: ESOPHAGOGASTRODUODENOSCOPY WITH ANESTHESIA;  Surgeon: Tamy Smith MD;  Location: [facility] ENDOSCOPY;  Service: Gastroenterology;  Laterality: N/A;  preop; abnormal GI scan   postop esophagitis ; hiatal hernia  PCP Ruth Noble MD    EXCISION LESION      From back    REPLACEMENT TOTAL KNEE Left     TONSILLECTOMY         Family History   Problem Relation Age of Onset    No Known Problems Mother     Colon cancer Father         < 60 years old    Breast cancer Neg Hx        Social History     Socioeconomic History    Marital status:    Tobacco Use    Smoking status: Never    Smokeless tobacco: Never   Vaping Use    Vaping status: Never Used   Substance and Sexual Activity    Alcohol use: No    Drug use: No    Sexual activity: Defer           Objective   Physical Exam  Vitals and nursing note reviewed.   Constitutional:       General: She is not in acute distress.     Appearance: She is well-developed. She is obese. She is ill-appearing. She is not toxic-appearing or diaphoretic.   HENT:      Head: Normocephalic and atraumatic.   Cardiovascular:      Rate and Rhythm: Normal rate.   Pulmonary:      Effort: Pulmonary effort is normal.   Abdominal:      General: There is distension.      Palpations: Abdomen is soft.       Tenderness: There is generalized abdominal tenderness.   Skin:     General: Skin is warm and dry.   Neurological:      Mental Status: She is alert.   Psychiatric:         Mood and Affect: Mood normal.         Behavior: Behavior normal.         Procedures          ED Course  ED Course as of 25 185   Thu 2025   1828 Corrected calcium is 6.5 [KT]      ED Course User Index  [KT] London Leiva PA-C                                                       Medical Decision Making  States for the last week she has had constant diffuse abdominal pain, nausea, vomiting, and diarrhea.  She states the symptoms have not improved despite conservative measures.  She denies any melena, hematochezia, fever, chills, chest pain, shortness of breath, cough, dysuria, hematuria.  She states abdominal pain is constant and does not radiate.  She does not give a description of the pain at this time.  She has a past medical history of GERD, type 2 diabetes, colon resection, .    DDX: Viral gastroenteritis, colitis, diverticulitis, gastritis, pancreatitis, electrolyte abnormality, UTI.     ECG 12 Lead Electrolyte Imbalance   Preliminary Result    Test Reason : Electrolyte Imbalance    Blood Pressure :   */*   mmHG    Vent. Rate :  89 BPM     Atrial Rate :   * BPM       P-R Int :   * ms          QRS Dur :  86 ms        QT Int : 582 ms       P-R-T Axes :   *  24  84 degrees      QTcB Int : 708 ms        ** Critical Test Result: Long QTc    Accelerated Junctional rhythm    ST & T wave abnormality, consider lateral ischemia    Abnormal ECG    When compared with ECG of 2017 08:47,    Junctional rhythm has replaced Sinus rhythm    ST now depressed in Anterior leads    Nonspecific T wave abnormality now evident in Inferior leads    T wave inversion now evident in Lateral leads    QT has lengthened        Referred By:            Confirmed By:     Labs Reviewed  COMPREHENSIVE METABOLIC PANEL - Abnormal; Notable for the  following components:     Glucose                       161 (*)                BUN                           44.5 (*)               Creatinine                    5.04 (*)               Sodium                        135 (*)                Potassium                     1.7 (*)                CO2                           12.0 (*)               Calcium                       5.6 (*)                Total Protein                 5.4 (*)                Albumin                       2.9 (*)                AST (SGOT)                    41 (*)                 Anion Gap                     23.0 (*)               eGFR                          8.7 (*)             All other components within normal limits         Narrative: GFR Categories in Chronic Kidney Disease (CKD)                                              GFR Category          GFR (mL/min/1.73)    Interpretation                  G1                    90 or greater        Normal or high (1)                  G2                    60-89                Mild decrease (1)                  G3a                   45-59                Mild to moderate decrease                  G3b                   30-44                Moderate to severe decrease                  G4                    15-29                Severe decrease                  G5                    14 or less           Kidney failure                                    (1)In the absence of evidence of kidney disease, neither GFR category G1 or G2 fulfill the criteria for CKD.                                    eGFR calculation 2021 CKD-EPI creatinine equation, which does not include race as a factor  AMYLASE - Abnormal; Notable for the following components:     Amylase                       19 (*)              All other components within normal limits  URINALYSIS W/ CULTURE IF INDICATED - Abnormal; Notable for the following components:     Ketones, UA                   Trace (*)               Blood, UA                      Large (3+) (*)               Protein, UA                     (*)               All other components within normal limits         Narrative: In absence of clinical symptoms, the presence of pyuria, bacteria, and/or nitrites on the urinalysis result does not correlate with infection.  C-REACTIVE PROTEIN - Abnormal; Notable for the following components:     C-Reactive Protein            11.05 (*)            All other components within normal limits  CBC WITH AUTO DIFFERENTIAL - Abnormal; Notable for the following components:     WBC                           25.12 (*)               RDW                           18.5 (*)               RDW-SD                        64.1 (*)               Neutrophil %                  89.0 (*)               Lymphocyte %                  4.9 (*)                Eosinophil %                  0.1 (*)                Immature Grans %              0.6 (*)                Neutrophils, Absolute         22.36 (*)               Monocytes, Absolute           1.31 (*)               Immature Grans, Absolute      0.15 (*)            All other components within normal limits  MAGNESIUM - Abnormal; Notable for the following components:     Magnesium                     0.8 (*)             All other components within normal limits  URINALYSIS, MICROSCOPIC ONLY - Abnormal; Notable for the following components:     Squamous Epithelial Cells, UA   3-6 (*)             All other components within normal limits  LIPASE - Normal  LACTIC ACID, PLASMA - Normal  GASTROINTESTINAL PANEL, PCR (PREFERRED) DOES NOT INCLUDE CDIFF  CBC AND DIFFERENTIAL  CT Abdomen Pelvis Without Contrast   Final Result    1. Fluid retention within the small intestine and portions of the colon    compatible with the history of diarrhea, no evidence of bowel    obstruction, no bowel wall thickening. Correlate clinically for mild    enteritis or diarrheal type illness.    2. New small stone in the left renal pelvis, measuring 7 mm. No  urinary    tract obstruction is identified.    3. Postoperative changes, including previous partial right colectomy,    appendectomy and there is stable dense material in the region of the    raine hepatis which may be related to previous surgical intervention.    The gallbladder is not visualized.    4. Stable accessory spleen measuring up to 4 cm.              This report was signed and finalized on 7/3/2025 6:19 PM by Dr. Venkata Muniz MD.        Spoke with NATTY Hu for ICU-she reviewed patient's labs and imaging.  Accepts admission for Dr. Alberts.    I discussed results with patient in the room, as well as need for admission.  Patient is agreeable to this at this time, and she has no complaints currently.  She is pleased with plan overall.  I discussed the plan with Dr. Ramirez, who is in agreement with the plan.      Amount and/or Complexity of Data Reviewed  Labs: ordered.  Radiology: ordered.  ECG/medicine tests: ordered.    Risk  OTC drugs.  Prescription drug management.        Final diagnoses:   None       ED Disposition  ED Disposition       None            No follow-up provider specified.       Medication List      No changes were made to your prescriptions during this visit.            London Leiva PA-C  07/03/25 2152      Electronically signed by Claudine Church DO at 07/04/25 1011       Vital Signs (last day)       Date/Time Temp Temp src Pulse Resp BP Patient Position SpO2    07/07/25 0740 98 (36.7) Oral 90 18 124/56 Lying 96    07/07/25 0500 97.3 (36.3) -- 83 18 114/46 -- 95    07/07/25 0014 97.4 (36.3) -- 85 16 121/49 -- 94    07/06/25 2010 97.6 (36.4) -- 88 18 116/46 -- 96    07/06/25 1600 97.5 (36.4) Oral 80 18 115/71 Lying 100    07/06/25 1002 98.1 (36.7) Oral 88 20 107/49 Lying --    07/06/25 0900 -- -- 90 -- -- -- 93    07/06/25 0800 98.4 (36.9) Oral 98 26 99/65 Lying 96    07/06/25 0700 -- -- 90 -- 103/86 -- 98    07/06/25 0603 -- -- 107 16 103/65 Lying 91    07/06/25 0534 --  -- 100 -- 101/64 -- 98    07/06/25 0527 -- -- 95 -- 95/49 -- 97    07/06/25 0405 98.2 (36.8) Oral 97 18 113/51 Lying 89    07/06/25 0312 -- -- 90 -- 113/81 -- --    07/06/25 0200 -- -- 94 19 125/83 Lying --    07/06/25 0117 -- -- 94 -- 125/47 -- --    07/06/25 0012 98.1 (36.7) Oral 105 15 100/64 Lying --          Intake & Output (last day)         07/06 0701 07/07 0700 07/07 0701 07/08 0700    P.O.      I.V. (mL/kg) 258.7 (2.6)     IV Piggyback      Total Intake(mL/kg) 258.7 (2.6)     Urine (mL/kg/hr) 300 (0.1)     Stool 0     Total Output 300     Net -41.3           Urine Unmeasured Occurrence 4 x     Stool Unmeasured Occurrence 2 x           Current Facility-Administered Medications   Medication Dose Route Frequency Provider Last Rate Last Admin    acetaminophen (TYLENOL) tablet 650 mg  650 mg Oral Q4H PRN Barbara Renteria APRN   650 mg at 07/03/25 2242    Or    acetaminophen (TYLENOL) suppository 650 mg  650 mg Rectal Q4H PRN Barbara Renteria APRN        aluminum-magnesium hydroxide-simethicone (MAALOX MAX) 400-400-40 MG/5ML suspension 15 mL  15 mL Oral Q6H PRN Dennis Ansari MD   15 mL at 07/05/25 1331    sennosides-docusate (PERICOLACE) 8.6-50 MG per tablet 2 tablet  2 tablet Oral BID Barbara Renteria APRN        And    polyethylene glycol (MIRALAX) packet 17 g  17 g Oral Daily PRN Barbara Renteria APRN        And    bisacodyl (DULCOLAX) EC tablet 5 mg  5 mg Oral Daily PRN Barbara Renteria APRN        And    bisacodyl (DULCOLAX) suppository 10 mg  10 mg Rectal Daily PRN Barbara Renteria APRN        bumetanide (BUMEX) injection 1 mg  1 mg Intravenous Q12H Miguel Chow MD   1 mg at 07/07/25 0328    calcium carbonate (TUMS) chewable tablet 500 mg (200 mg elemental)  2 tablet Oral TID PRN Barbara Renteria APRN   2 tablet at 07/04/25 2208    Calcium Replacement - Follow Nurse / BPA Driven Protocol   Not Applicable PRN London Leiva PA-C        cefTRIAXone (ROCEPHIN) 2,000 mg in sodium chloride 0.9 %  100 mL MBP  2,000 mg Intravenous Q24H Barbara Renteria, APRN 200 mL/hr at 07/06/25 2008 2,000 mg at 07/06/25 2008    Chlorhexidine Gluconate Cloth 2 % pads 1 Application  1 Application Topical Q24H Barbara Renteria, APRN   1 Application at 07/07/25 0345    cholecalciferol (VITAMIN D3) tablet 5,000 Units  5,000 Units Oral Daily Miguel Chow MD   5,000 Units at 07/06/25 1654    heparin (porcine) 5000 UNIT/ML injection 5,000 Units  5,000 Units Subcutaneous Q12H Barbara Renteria, APRN   5,000 Units at 07/06/25 2009    HYDROcodone-acetaminophen (NORCO) 5-325 MG per tablet 1 tablet  1 tablet Oral Q6H PRN Barbara Renteria APRN   1 tablet at 07/07/25 0327    Magnesium Standard Dose Replacement - Follow Nurse / BPA Driven Protocol   Not Applicable PRN London Leiva PA-C        metroNIDAZOLE (FLAGYL) tablet 500 mg  500 mg Oral Q8H Dennis Ansari MD   500 mg at 07/07/25 0504    mupirocin (BACTROBAN) 2 % nasal ointment 1 Application  1 Application Each Nare BID Barbara Renteria, APRN   1 Application at 07/06/25 2009    nitroglycerin (NITROSTAT) SL tablet 0.4 mg  0.4 mg Sublingual Q5 Min PRN Shade Alberts MD        ondansetron (ZOFRAN) injection 4 mg  4 mg Intravenous Q6H PRN Barbara Renteria, APRN        pantoprazole (PROTONIX) EC tablet 40 mg  40 mg Oral BID AC Dennis Ansari MD   40 mg at 07/06/25 1655    potassium chloride (KLOR-CON M20) CR tablet 40 mEq  40 mEq Oral TID Miguel Chow MD   40 mEq at 07/06/25 2009    Potassium Replacement - Follow Nurse / BPA Driven Protocol   Not Applicable PRN London Leiva PA-C        sodium bicarbonate tablet 1,300 mg  1,300 mg Oral TID Miguel Chow MD   1,300 mg at 07/06/25 2009    sodium chloride 0.9 % flush 10 mL  10 mL Intravenous PRN London Leiva PA-C        sodium chloride 0.9 % flush 10 mL  10 mL Intravenous Q12H Barbara Renteria APRN   10 mL at 07/06/25 2010    sodium chloride 0.9 % flush 10 mL  10 mL Intravenous PRN Barbara Renteria, APRN         sodium chloride 0.9 % infusion 40 mL  40 mL Intravenous PRN Barbara Renteria APRN         Orders (last 48 hrs)        Start     Ordered    07/07/25 1012  Comprehensive Metabolic Panel  Morning Draw         07/06/25 1459    07/07/25 1012  Hepatitis Panel, Acute  Once         07/06/25 1508    07/07/25 1012  Hepatitis B Surface Antibody  Once         07/06/25 1508    07/07/25 1012  CBC Auto Differential  PROCEDURE ONCE         07/06/25 2200    07/07/25 1012  Type & Screen  Once         07/07/25 0858    07/07/25 0859  Basic Metabolic Panel  STAT,   Status:  Canceled         07/07/25 0858    07/07/25 0851  Case Request  Once         07/07/25 0858    07/07/25 0850  Follow Anesthesia Guidelines / Protocol  Continuous         07/07/25 0858    07/07/25 0600  CBC & Differential  Morning Draw         07/06/25 1459    07/07/25 0001  NPO Diet NPO Type: Strict NPO  Diet Effective Midnight         07/06/25 1526    07/06/25 1600  sodium bicarbonate tablet 1,300 mg  3 Times Daily         07/06/25 1459    07/06/25 1545  cholecalciferol (VITAMIN D3) tablet 5,000 Units  Daily         07/06/25 1459    07/06/25 1526  Inpatient Vascular Surgery Consult  Once        Specialty:  Vascular Surgery  Provider:  Daniel Lunsford MD    07/06/25 1526    07/06/25 1500  bumetanide (BUMEX) injection 1 mg  Every 12 Hours         07/06/25 1359    07/06/25 1400  metroNIDAZOLE (FLAGYL) tablet 250 mg  Every 8 Hours Scheduled,   Status:  Discontinued         07/06/25 0846    07/06/25 1400  metroNIDAZOLE (FLAGYL) tablet 500 mg  Every 8 Hours Scheduled         07/06/25 0948    07/06/25 1051  Specialty Bed Dolphin FIS Mattress  Once        Comments: For Dolphin FIS Mattress, call [employer] to order a [place]ola bed frame.  If bariatric/bariatric dolphin, [employer] provides frame, mattress, pump, and trapeze (if needed).  Nurse or HUC is to call [employer], the rental company, to order the equipment, provide patient's name, room number, and if in isolation. [phone].     07/06/25 1051    07/06/25 0906  Okay to transfer to floor with central line due to poor vascular access. Multiple peripheral IV's gone bad.  Nursing Communication  Continuous        Comments: Okay to transfer to floor with central line due to poor vascular access. Multiple peripheral IV's gone bad.    07/06/25 0906    07/06/25 0853  Vascular Access Consult  Once,   Status:  Canceled        Provider:  (Not yet assigned)    07/06/25 0853    07/06/25 0846  Transfer Patient  Once         07/06/25 0845    07/06/25 0846  Discontinue Cardiac Monitoring  Once         07/06/25 0845    07/06/25 0600  Comprehensive Metabolic Panel  Morning Draw,   Status:  Canceled         07/05/25 1335    07/06/25 0600  CBC & Differential  Morning Draw         07/05/25 1335    07/06/25 0600  CBC Auto Differential  PROCEDURE ONCE         07/05/25 2201    07/06/25 0400  Comprehensive Metabolic Panel  Morning Draw         07/06/25 0029    07/06/25 0400  Magnesium  Daily       07/06/25 0029    07/06/25 0400  Phosphorus  Daily       07/06/25 0029    07/06/25 0400  Basic Metabolic Panel  Daily       07/06/25 0029    07/06/25 0000  Potassium  Once         07/05/25 1833    07/05/25 2256  Insert Central Line At Bedside  Once        Comments: This order was created via procedure documentation    07/05/25 2255    07/05/25 2252  XR Chest 1 View  1 Time Imaging         07/05/25 2251    07/05/25 1554  Auto Discontinue GI Panel in 48 Hours if not Collected  ONCE GI PANEL         07/03/25 1554    07/05/25 1530  potassium chloride (KLOR-CON M20) CR tablet 40 mEq  3 times daily         07/05/25 1432    07/05/25 1430  sodium chloride 0.45 % 905 mL with potassium chloride 40 mEq, sodium bicarbonate 8.4 % 75 mEq infusion  Continuous,   Status:  Discontinued         07/05/25 1335    07/05/25 1430  sodium chloride 0.9 % bolus 500 mL  Once         07/05/25 1335    07/05/25 1343  STAT Lactic Acid, Reflex  PROCEDURE ONCE         07/05/25 1036    07/05/25 1334    Renal Bilateral  1 Time Imaging         07/05/25 1335    07/05/25 1333  Uric Acid  Once         07/05/25 1335    07/05/25 1333  Sodium, Urine, Random - Urine, Clean Catch  Once,   Status:  Canceled         07/05/25 1335    07/05/25 1333  Iron Profile w/o Ferritin  Once         07/05/25 1335    07/05/25 1333  Creatinine Urine Random (kidney function) GFR component - Urine, Clean Catch  Once,   Status:  Canceled         07/05/25 1335    07/05/25 1333  Microalbumin / Creatinine Urine Ratio - Urine, Clean Catch  Once         07/05/25 1335    07/05/25 1200  potassium chloride (KLOR-CON M20) CR tablet 20 mEq  Once         07/05/25 1106    07/05/25 1100  dextrose 5 % and sodium chloride 0.9 % with KCl 20 mEq/L infusion  Continuous,   Status:  Discontinued         07/05/25 1001    07/05/25 1100  potassium chloride (KLOR-CON M20) CR tablet 20 mEq  Daily,   Status:  Discontinued         07/05/25 1002    07/05/25 1100  potassium chloride 10 mEq in 100 mL IVPB  Every 1 Hour         07/05/25 1010    07/05/25 1015  pantoprazole (PROTONIX) EC tablet 40 mg  2 Times Daily Before Meals         07/05/25 1003    07/05/25 1000  aluminum-magnesium hydroxide-simethicone (MAALOX MAX) 400-400-40 MG/5ML suspension 15 mL  Every 6 Hours PRN         07/05/25 1003    07/04/25 0600  Basic Metabolic Panel  Daily,   Status:  Canceled       07/03/25 2000 07/04/25 0600  CBC Auto Differential  Daily       07/03/25 2000 07/04/25 0600  Magnesium  Daily,   Status:  Canceled       07/03/25 2000 07/04/25 0600  Phosphorus  Daily,   Status:  Canceled       07/03/25 2000 07/04/25 0400  Chlorhexidine Gluconate Cloth 2 % pads 1 Application  Every 24 Hours         07/03/25 2000 07/04/25 0142  calcium carbonate (TUMS) chewable tablet 500 mg (200 mg elemental)  3 Times Daily PRN         07/04/25 0143    07/04/25 0119  HYDROcodone-acetaminophen (NORCO) 5-325 MG per tablet 1 tablet  Every 6 Hours PRN         07/04/25 0120    07/03/25 2100  sodium  "chloride 0.9 % flush 10 mL  Every 12 Hours Scheduled         07/03/25 2000 07/03/25 2100  mupirocin (BACTROBAN) 2 % nasal ointment 1 Application  2 Times Daily         07/03/25 2000 07/03/25 2100  sennosides-docusate (PERICOLACE) 8.6-50 MG per tablet 2 tablet  2 Times Daily        Placed in \"And\" Linked Group    07/03/25 2000 07/03/25 2100  heparin (porcine) 5000 UNIT/ML injection 5,000 Units  Every 12 Hours Scheduled         07/03/25 2000 07/03/25 2100  cefTRIAXone (ROCEPHIN) 2,000 mg in sodium chloride 0.9 % 100 mL MBP  Every 24 Hours         07/03/25 2003 07/03/25 2100  metroNIDAZOLE (FLAGYL) IVPB 500 mg  Every 8 Hours,   Status:  Discontinued         07/03/25 2003 07/03/25 2001  Daily Weights  Daily       07/03/25 2000 07/03/25 2000  ondansetron (ZOFRAN) injection 4 mg  Every 6 Hours PRN         07/03/25 2000 07/03/25 2000  Vital Signs Every Hour and Per Hospital Policy Based on Patient Condition  Every Hour       07/03/25 2000 07/03/25 2000  Intake & Output  Every Hour       07/03/25 2000 07/03/25 1959  acetaminophen (TYLENOL) tablet 650 mg  Every 4 Hours PRN        Placed in \"Or\" Linked Group    07/03/25 2000 07/03/25 1959  acetaminophen (TYLENOL) suppository 650 mg  Every 4 Hours PRN        Placed in \"Or\" Linked Group    07/03/25 2000 07/03/25 1952  Oral Care - Patient Not on NPPV & Not Intubated  Every Shift       07/03/25 2000 07/03/25 1951  polyethylene glycol (MIRALAX) packet 17 g  Daily PRN        Placed in \"And\" Linked Group    07/03/25 2000 07/03/25 1951  bisacodyl (DULCOLAX) EC tablet 5 mg  Daily PRN        Placed in \"And\" Linked Group    07/03/25 2000 07/03/25 1951  bisacodyl (DULCOLAX) suppository 10 mg  Daily PRN        Placed in \"And\" Linked Group    07/03/25 2000 07/03/25 1951  sodium chloride 0.9 % flush 10 mL  As Needed         07/03/25 2000 07/03/25 1951  sodium chloride 0.9 % infusion 40 mL  As Needed         07/03/25 2000 07/03/25 1853  " "nitroglycerin (NITROSTAT) SL tablet 0.4 mg  Every 5 Minutes PRN         07/03/25 1854    07/03/25 1741  Calcium Replacement - Follow Nurse / BPA Driven Protocol  As Needed         07/03/25 1742    07/03/25 1741  Potassium Replacement - Follow Nurse / BPA Driven Protocol  As Needed         07/03/25 1742    07/03/25 1741  Magnesium Standard Dose Replacement - Follow Nurse / BPA Driven Protocol  As Needed         07/03/25 1742    07/03/25 1552  sodium chloride 0.9 % flush 10 mL  As Needed        Placed in \"And\" Linked Group    07/03/25 1554    --  albuterol sulfate  (90 Base) MCG/ACT inhaler  Every 4 Hours PRN         07/04/25 1052    Signed and Held  Follow Anesthesia Guidelines / Protocol  Once         Signed and Held    Signed and Held  Instructions on coughing, deep breathing, and incentive spirometry.  Every 4 Hours While Awake       Signed and Held    Signed and Held  ceFAZolin 2000 mg IVPB in 100 mL NS (MBP)  Once         Signed and Held                  "

## 2025-07-07 NOTE — PROGRESS NOTES
Nephrology ([facility]) Progress Note      Patient:  Lindsay Laughlin  YOB: 1955  Date of Service: 7/7/2025  MRN: [id number]   Acct: [id number]   Primary Care Physician: Provider, No Known  Advance Directive:   Code Status and Medical Interventions: CPR (Attempt to Resuscitate); Full Support; Discussed CODE STATUS with Mr. Laughlin.  Patient is ,  is in a nursing home.  She does not have any children.   Ordered at: 07/03/25 2000     Code Status (Patient has no pulse and is not breathing):    CPR (Attempt to Resuscitate)     Medical Interventions (Patient has pulse or is breathing):    Full Support     Comments:    Discussed CODE STATUS with Mr. Laughlin.  Patient is ,  is in a nursing home.  She does not have any children.     Level Of Support Discussed With:    Patient     Admit Date: 7/3/2025       Hospital Day: 4  Referring Provider: Shade Alberts MD      Patient personally seen and examined.  Complete chart including Consults, Notes, Operative Reports, Labs, Cardiology, and Radiology studies reviewed as able.        Subjective:  Lindsay Laughlin is a 69 y.o. female for whom we were consulted for evaluation and treatment of acute kidney injury. Baseline chronic kidney disease stage 3. Does not follow with nephrology. History of type 2 diabetes, hypertension, GERD, prior colectomy done at [facility] for unknown reason. Presented to ER with several days of nausea, vomiting and diarrhea. Unable to maintain PO intake. Has also been using NSAIDs frequently for arthritis pain. Labs on admission showing creatinine 5.0, hypokalemia, hypomagnesemia, metabolic acidosis. Hospital course remarkable for minimal improvement of renal function despite IV fluid resuscitation.     Today is drowsy, oriented X 3. Yesterday, the patient had agreed to have permcath placed and start dialysis.  Overnight was confused at times per nursing staff and was  stating that she did not want dialysis. At time of exam this AM patient agrees to proceed with permcath and dialysis.     Allergies:  Patient has no known allergies.    Home Meds:  Medications Prior to Admission   Medication Sig Dispense Refill Last Dose/Taking    albuterol sulfate  (90 Base) MCG/ACT inhaler Inhale 2 puffs Every 4 (Four) Hours As Needed for Wheezing.       ibuprofen (ADVIL,MOTRIN) 200 MG tablet Take 5 tablets by mouth Every 6 (Six) Hours As Needed for Mild Pain.       ondansetron ODT (ZOFRAN-ODT) 4 MG disintegrating tablet Place 1 tablet on the tongue Every 4 (Four) Hours As Needed for Nausea or Vomiting. 10 tablet 0        Medicines:  Current Facility-Administered Medications   Medication Dose Route Frequency Provider Last Rate Last Admin    acetaminophen (TYLENOL) tablet 650 mg  650 mg Oral Q4H PRN Barbara Renteria APRN   650 mg at 07/03/25 2242    Or    acetaminophen (TYLENOL) suppository 650 mg  650 mg Rectal Q4H PRN Barbara Renteria APRN        aluminum-magnesium hydroxide-simethicone (MAALOX MAX) 400-400-40 MG/5ML suspension 15 mL  15 mL Oral Q6H PRN Dennis Ansari MD   15 mL at 07/05/25 1331    sennosides-docusate (PERICOLACE) 8.6-50 MG per tablet 2 tablet  2 tablet Oral BID Barbara Renteria APRN        And    polyethylene glycol (MIRALAX) packet 17 g  17 g Oral Daily PRN Barbara Renteria APRN        And    bisacodyl (DULCOLAX) EC tablet 5 mg  5 mg Oral Daily PRN Barbara Renteria APRN        And    bisacodyl (DULCOLAX) suppository 10 mg  10 mg Rectal Daily PRN Barbara Renteria APRN        bumetanide (BUMEX) injection 1 mg  1 mg Intravenous Q12H Miguel Chow MD   1 mg at 07/07/25 0328    calcium carbonate (TUMS) chewable tablet 500 mg (200 mg elemental)  2 tablet Oral TID PRN Barbara Renteria APRN   2 tablet at 07/04/25 2208    Calcium Replacement - Follow Nurse / BPA Driven Protocol   Not Applicable PRN Leiva, London G, PA-C        cefTRIAXone (ROCEPHIN) 2,000 mg in sodium  chloride 0.9 % 100 mL MBP  2,000 mg Intravenous Q24H Barbara Renteria, APRN 200 mL/hr at 07/06/25 2008 2,000 mg at 07/06/25 2008    Chlorhexidine Gluconate Cloth 2 % pads 1 Application  1 Application Topical Q24H Barbara Renteria, APRN   1 Application at 07/07/25 0345    cholecalciferol (VITAMIN D3) tablet 5,000 Units  5,000 Units Oral Daily Miguel Chow MD   5,000 Units at 07/06/25 1654    heparin (porcine) 5000 UNIT/ML injection 5,000 Units  5,000 Units Subcutaneous Q12H Barbara Renteria, APRN   5,000 Units at 07/06/25 2009    HYDROcodone-acetaminophen (NORCO) 5-325 MG per tablet 1 tablet  1 tablet Oral Q6H PRN Barbara Renteria, APRN   1 tablet at 07/07/25 0327    Magnesium Standard Dose Replacement - Follow Nurse / BPA Driven Protocol   Not Applicable PRN London Leiva PA-C        metroNIDAZOLE (FLAGYL) tablet 500 mg  500 mg Oral Q8H Dennis Ansari MD   500 mg at 07/07/25 0504    mupirocin (BACTROBAN) 2 % nasal ointment 1 Application  1 Application Each Nare BID Barbara Renteria, APRN   1 Application at 07/06/25 2009    nitroglycerin (NITROSTAT) SL tablet 0.4 mg  0.4 mg Sublingual Q5 Min PRN Shade Alberts MD        ondansetron (ZOFRAN) injection 4 mg  4 mg Intravenous Q6H PRN Barbara Renteria, APRN        pantoprazole (PROTONIX) EC tablet 40 mg  40 mg Oral BID AC Dennis Ansari MD   40 mg at 07/06/25 1655    potassium chloride (KLOR-CON M20) CR tablet 40 mEq  40 mEq Oral TID Miguel Chow MD   40 mEq at 07/06/25 2009    Potassium Replacement - Follow Nurse / BPA Driven Protocol   Not Applicable PRN London Leiva PA-C        sodium bicarbonate tablet 1,300 mg  1,300 mg Oral TID Miguel Chow MD   1,300 mg at 07/06/25 2009    sodium chloride 0.9 % flush 10 mL  10 mL Intravenous PRN London Leiva PA-C        sodium chloride 0.9 % flush 10 mL  10 mL Intravenous Q12H Barbara Renteria APRN   10 mL at 07/06/25 2010    sodium chloride 0.9 % flush 10 mL  10 mL Intravenous PRN Barbara Renteria,  APRN        sodium chloride 0.9 % infusion 40 mL  40 mL Intravenous PRN Barbara Renteria, APRURIEL           Past Medical History:  Past Medical History:   Diagnosis Date    Acid reflux     Arthritis     High cholesterol     Hypertension     Neuropathy     Swollen lymph nodes     LEFT CERVICAL    Type 2 diabetes mellitus        Past Surgical History:  Past Surgical History:   Procedure Laterality Date    CARPAL TUNNEL RELEASE Bilateral     CERVICAL LYMPH NODE BIOPSY/EXCISION Left 2017    Procedure: LEFT NECK EXPLORATION WITH INCISIONAL BIOPSY/CULTURE;  Surgeon: Blue Sanchez MD;  Location: [facility] OR;  Service:      SECTION      COLON RESECTION      COLONOSCOPY N/A 2023    Procedure: COLONOSCOPY WITH ANESTHESIA;  Surgeon: Tamy Smith MD;  Location: [facility] ENDOSCOPY;  Service: Gastroenterology;  Laterality: N/A;  preop; abnormal GI scan   postop ; poor prep   PCP Ruth Noble MD    ENDOSCOPY N/A 2023    Procedure: ESOPHAGOGASTRODUODENOSCOPY WITH ANESTHESIA;  Surgeon: Tamy Smith MD;  Location: [facility] ENDOSCOPY;  Service: Gastroenterology;  Laterality: N/A;  preop; abnormal GI scan   postop esophagitis ; hiatal hernia  PCP Ruth Noble MD    EXCISION LESION      From back    REPLACEMENT TOTAL KNEE Left     TONSILLECTOMY         Family History  Family History   Problem Relation Age of Onset    No Known Problems Mother     Colon cancer Father         < 60 years old    Breast cancer Neg Hx        Social History  Social History     Socioeconomic History    Marital status:    Tobacco Use    Smoking status: Never    Smokeless tobacco: Never   Vaping Use    Vaping status: Never Used   Substance and Sexual Activity    Alcohol use: No    Drug use: No    Sexual activity: Defer       Review of Systems:  History obtained from chart review and the patient  General ROS: No fever or chills  Respiratory ROS: No cough, shortness of breath,  wheezing  Cardiovascular ROS: No chest pain or palpitations  Gastrointestinal ROS: No abdominal pain or melena  Genito-Urinary ROS: No dysuria or hematuria  Psych ROS: No anxiety and depression  14 point ROS reviewed with the patient and negative except as noted above and in the HPI unless unable to obtain.    Objective:  Patient Vitals for the past 24 hrs:   BP Temp Temp src Pulse Resp SpO2   07/07/25 0740 124/56 98 °F (36.7 °C) Oral 90 18 96 %   07/07/25 0500 114/46 97.3 °F (36.3 °C) -- 83 18 95 %   07/07/25 0014 121/49 97.4 °F (36.3 °C) -- 85 16 94 %   07/06/25 2010 116/46 97.6 °F (36.4 °C) -- 88 18 96 %   07/06/25 1600 115/71 97.5 °F (36.4 °C) Oral 80 18 100 %   07/06/25 1002 107/49 98.1 °F (36.7 °C) Oral 88 20 --       Intake/Output Summary (Last 24 hours) at 7/7/2025 0911  Last data filed at 7/6/2025 1600  Gross per 24 hour   Intake --   Output 300 ml   Net -300 ml     General: awake/alert   Chest:  clear to auscultation bilaterally without respiratory distress  CVS: regular rate and rhythm  Abdominal: soft, nontender, positive bowel sounds  Extremities: no cyanosis or edema  Skin: warm and dry without rash      Labs:  Results from last 7 days   Lab Units 07/06/25  0356 07/05/25 0223 07/04/25  1418   WBC 10*3/mm3 11.81* 17.28* 16.56*   HEMOGLOBIN g/dL 10.1* 11.0* 10.9*   HEMATOCRIT % 29.5* 32.4* 32.2*   PLATELETS 10*3/mm3 172 213 204         Results from last 7 days   Lab Units 07/06/25  0356 07/05/25  2348 07/05/25  0223 07/04/25  1418 07/04/25  0036 07/03/25  1706   SODIUM mmol/L 134*  --  132* 135*   < > 135*   POTASSIUM mmol/L 3.0* 3.5 2.2* 2.2*   < > 1.7*   CHLORIDE mmol/L 104  --  100 102   < > 100   CO2 mmol/L 15.0*  --  11.0* 12.0*   < > 12.0*   BUN mg/dL 36.5*  --  39.5* 41.4*   < > 44.5*   CREATININE mg/dL 4.65*  --  4.69* 4.71*   < > 5.04*   CALCIUM mg/dL 6.7*  --  7.6* 6.0*   < > 5.6*   EGFR mL/min/1.73 9.6*  --  9.5* 9.5*   < > 8.7*   BILIRUBIN mg/dL 0.2  --   --   --   --  0.3   ALK PHOS U/L 87   --   --   --   --  82   ALT (SGPT) U/L 32  --   --   --   --  15   AST (SGOT) U/L 61*  --   --   --   --  41*   GLUCOSE mg/dL 208*  --  215* 189*   < > 161*    < > = values in this interval not displayed.       Radiology:   Imaging Results (Last 72 Hours)       Procedure Component Value Units Date/Time    US Renal Bilateral [[id number]] Collected: 07/06/25 0858     Updated: 07/06/25 0904    Narrative:      EXAMINATION: US RENAL BILATERAL-     HISTORY: DAISY; K52.9-Noninfective gastroenteritis and colitis,  unspecified; E83.42-Hypomagnesemia; E87.6-Hypokalemia;  E83.51-Hypocalcemia; N17.9-Acute kidney failure, unspecified     Images are stored in PACS per institutional protocol.     Grayscale and color-flow renal ultrasound.     Portable exam with limited detail.     Normal size and position of both kidneys.  Normal cortical thickness and normal cortical echogenicity.     No hydronephrosis.     7-8 mm LEFT renal pelvis stone noted on CT exam from 3 days ago though  not well seen on today's exam.     The right kidney measures 101 x 50 x 46 mm.  RIGHT mid cyst = 29 mm.  RIGHT upper pole cyst = 13 mm.        The left kidney measures 95 x 53 x 46 mm.  Upper pole cyst = 16 mm.  Lower pole cyst = 19 mm.       Impression:      Impression:  1. No evidence of obstruction.           This report was signed and finalized on 7/6/2025 9:01 AM by Dr. Leonard Rogers MD.       XR Chest 1 View [[id number]] Collected: 07/06/25 0850     Updated: 07/06/25 0853    Narrative:      EXAMINATION: XR CHEST 1 VW-     HISTORY: line placement; K52.9-Noninfective gastroenteritis and colitis,  unspecified; E83.42-Hypomagnesemia; E87.6-Hypokalemia;  E83.51-Hypocalcemia; N17.9-Acute kidney failure, unspecified     Images are stored in PACS per institutional protocol.     1 view chest x-ray.     COMPARISON:  7/3/2025.     RIGHT jugular central line has been placed and is in good position.     Mild chronic interstitial lung disease.  No focal infiltrate.      The heart is magnified by the portable projection.       Impression:      1. Well-positioned RIGHT jugular central line.  2. Stable appearance of the lungs.           This report was signed and finalized on 7/6/2025 8:50 AM by Dr. Leonard Rogers MD.               Culture:  Blood Culture   Date Value Ref Range Status   07/03/2025 No growth at 3 days  Preliminary   07/03/2025 No growth at 3 days  Preliminary         Assessment    Acute kidney injury, ATN  Baseline chronic kidney disease stage 3  Type 2 diabetes  Hypertension  NSAID use  Hypokalemia  Hypomagnesemia  Anemia with iron deficiency  Metabolic acidosis  Gastroenteritis     Plan:   AM labs still pending. STAT BMP is ordered.  Unless labs today have significantly improved will plan to proceed with access placement and initiation of hemodialysis. Patient verbalizes agreement with this plan. Discussed with Maria Victoria Hodges vascular NATTY Kingston  7/7/2025  09:11 CDT

## 2025-07-07 NOTE — PROGRESS NOTES
Nutrition Services    Patient Name:  Lindsay Laughlin  YOB: 1955  MRN: [id number]  Admit Date:  7/3/2025    Patient Name: Lindsay Laughlin  YOB: 1955  MRN: [id number]  Admission date: 7/3/2025  Reason for Encounter: MST 2-3 or Nursing Admission Screen    [facility] Clinical Nutrition Assessment     Subjective    Subjective Information     Pt very confused and upset this morning per nurse. Visit not appropriate at this time. Per provider notes, pt c/o N/V, diarrhea, and decreased appetite for the past five days. Per wt records, wt fluctuates. PMH of CKD. Pt to start HD after perm cath placement. Currently NPO. A1C: 9.0. Blood glucose range over the last 72 hr: 193-215. Dietitian messaged pharmacist about adding insulin. Will try to visit during follow-up. Will monitor.     Assessment    H&P and Current Problems      H&P  Past Medical History:   Diagnosis Date    Acid reflux     Arthritis     High cholesterol     Hypertension     Neuropathy     Swollen lymph nodes     LEFT CERVICAL    Type 2 diabetes mellitus       Past Surgical History:   Procedure Laterality Date    CARPAL TUNNEL RELEASE Bilateral     CERVICAL LYMPH NODE BIOPSY/EXCISION Left 2017    Procedure: LEFT NECK EXPLORATION WITH INCISIONAL BIOPSY/CULTURE;  Surgeon: Blue Sanchez MD;  Location: [facility] OR;  Service:      SECTION      COLON RESECTION      COLONOSCOPY N/A 2023    Procedure: COLONOSCOPY WITH ANESTHESIA;  Surgeon: Tamy Smith MD;  Location: [facility] ENDOSCOPY;  Service: Gastroenterology;  Laterality: N/A;  preop; abnormal GI scan   postop ; poor prep   PCP Ruth Noble MD    ENDOSCOPY N/A 2023    Procedure: ESOPHAGOGASTRODUODENOSCOPY WITH ANESTHESIA;  Surgeon: Tamy Smith MD;  Location: [facility] ENDOSCOPY;  Service: Gastroenterology;  Laterality: N/A;  preop; abnormal GI scan   postop esophagitis ; hiatal hernia  PCP Ruth Noble MD    EXCISION LESION    "   From back    REPLACEMENT TOTAL KNEE Left     TONSILLECTOMY        Current Problems   Admission Diagnosis:  Enteritis [K52.9]    Problem List:    Enteritis      Other Applicable Nutrition Information:   Episodes of disorientation      Anthropometrics      BMI, Height, Weight Estimated body mass index is 36.72 kg/m² as calculated from the following:    Height as of this encounter: 165.1 cm (65\").    Weight as of this encounter: 100 kg (220 lb 10.9 oz).    Weight Method: Bed scale       Trending Weight Changes Unknown/Unable to Determine       Weight History  Wt Readings from Last 20 Encounters:   07/06/25 0405 100 kg (220 lb 10.9 oz)   07/05/25 0400 93.5 kg (206 lb 2.1 oz)   07/04/25 0600 88.8 kg (195 lb 12.3 oz)   07/03/25 2000 88.8 kg (195 lb 12.3 oz)   07/03/25 1537 97.1 kg (214 lb 1.6 oz)   02/24/25 1551 92.5 kg (204 lb)   05/17/23 1032 101 kg (223 lb)   04/24/23 1055 104 kg (229 lb)   02/22/23 1645 103 kg (226 lb)   01/29/20 0958 89.5 kg (197 lb 6.4 oz)   10/16/19 1602 85.3 kg (188 lb)   09/18/17 1351 104 kg (229 lb)   07/28/17 0847 109 kg (239 lb 3.2 oz)   07/24/17 1406 109 kg (240 lb)   07/23/17 1527 111 kg (245 lb)   07/21/17 0937 109 kg (241 lb)            Labs      Comment:      Results from last 7 days   Lab Units 07/07/25  1053 07/06/25  0356 07/05/25  2348 07/05/25  1509 07/05/25  0950 07/05/25  0223 07/04/25  0036 07/03/25  1706 07/03/25  1628   SODIUM mmol/L 137 134*  --   --   --  132*   < > 135*  --    POTASSIUM mmol/L 3.5 3.0* 3.5  --   --  2.2*   < > 1.7*  --    GLUCOSE mg/dL 193* 208*  --   --   --  215*   < > 161*  --    BUN mg/dL 36.5* 36.5*  --   --   --  39.5*   < > 44.5*  --    CREATININE mg/dL 4.19* 4.65*  --   --   --  4.69*   < > 5.04*  --    CALCIUM mg/dL 6.8* 6.7*  --   --   --  7.6*   < > 5.6*  --    PHOSPHORUS mg/dL 3.9 3.4  --   --   --  5.7*   < >  --   --    MAGNESIUM mg/dL 1.9 2.2  --   --   --  2.8*   < > 0.8*  --    ALBUMIN g/dL 2.6* 2.6*  --   --   --   --   --  2.9*  --  "   CRP mg/dL  --   --   --   --   --   --   --  11.05*  --    LACTATE mmol/L  --   --   --  1.5 3.1*  --   --   --  1.7   BILIRUBIN mg/dL 0.2 0.2  --   --   --   --   --  0.3  --    ALK PHOS U/L 88 87  --   --   --   --   --  82  --    AST (SGOT) U/L 28 61*  --   --   --   --   --  41*  --    ALT (SGPT) U/L 28 32  --   --   --   --   --  15  --     < > = values in this interval not displayed.     Results from last 7 days   Lab Units 07/07/25  1053 07/06/25  0356 07/05/25  0223   PLATELETS 10*3/mm3 150 172 213   HEMOGLOBIN g/dL 10.0* 10.1* 11.0*   HEMATOCRIT % 29.1* 29.5* 32.4*   IRON mcg/dL  --   --  22*     Lab Results   Component Value Date    HGBA1C 9.00 (H) 07/04/2025      Results from last 7 days   Lab Units 07/05/25  0223   URIC ACID mg/dL 8.1*     Medications       Scheduled Medications bumetanide, 1 mg, Intravenous, Q12H  cefTRIAXone, 2,000 mg, Intravenous, Q24H  Chlorhexidine Gluconate Cloth, 1 Application, Topical, Q24H  cholecalciferol, 5,000 Units, Oral, Daily  heparin (porcine), 5,000 Units, Subcutaneous, Q12H  metroNIDAZOLE, 500 mg, Oral, Q8H  mupirocin, 1 Application, Each Nare, BID  pantoprazole, 40 mg, Oral, BID AC  potassium chloride, 40 mEq, Oral, TID  senna-docusate sodium, 2 tablet, Oral, BID  sodium bicarbonate, 1,300 mg, Oral, TID  sodium chloride, 10 mL, Intravenous, Q12H        Infusions      PRN Medications   acetaminophen **OR** acetaminophen    aluminum-magnesium hydroxide-simethicone    senna-docusate sodium **AND** polyethylene glycol **AND** bisacodyl **AND** bisacodyl    calcium carbonate    Calcium Replacement - Follow Nurse / BPA Driven Protocol    HYDROcodone-acetaminophen    Magnesium Standard Dose Replacement - Follow Nurse / BPA Driven Protocol    nitroglycerin    ondansetron    Potassium Replacement - Follow Nurse / BPA Driven Protocol    [COMPLETED] Insert Peripheral IV **AND** sodium chloride    sodium chloride    sodium chloride     Physical Findings       Chewing/Swallowing  Teeth Status: Mouth/Teeth WDL: .WDL except, teeth Teeth Symptoms: teeth absent  Chewing/Swallowing Issues: No issues identified at this time   Edema            Leg, Left Edema: 2+ (Mild) Leg, Right Edema: 2+ (Mild)       Foot, Left Edema: 2+ (Mild) Foot, Right Edema: 2+ (Mild)   Bowel Function  Stool Output  Stool (mL): 1 mL (07/04/25 0900)  Stool Unmeasured Occurrence: 1 (07/07/25 0500)  Bowel Incontinence: No (07/07/25 0500)  Stool Amount: Medium (07/06/25 0700)  Stool Consistency: creamy, soft (07/07/25 0500)  Perineal Care: absorbent brief/pad changed, perineum cleansed (07/06/25 0700)  Last Bowel Movement: 07/06/25   I/Os  Intake & Output (last 3 days)         07/04 0701  07/05 0700 07/05 0701  07/06 0700 07/06 0701 07/07 0700 07/07 0701 07/08 0700    P.O. 2342 480      I.V. (mL/kg) 1073.6 (11.5) 1824 (18.2) 258.7 (2.6)     IV Piggyback 900 800      Total Intake(mL/kg) 4315.6 (46.2) 3104 (31) 258.7 (2.6)     Urine (mL/kg/hr) 250 (0.1) 500 (0.2) 300 (0.1)     Stool 1 0 0     Total Output 251 500 300     Net +4064.6 +2604 -41.3             Urine Unmeasured Occurrence 4 x 3 x 4 x     Stool Unmeasured Occurrence 2 x 6 x 2 x            Lines, Drains, Airways, & Wounds       Active LDAs       Name Placement date Placement time Site Days Last dressing change    CVC Triple Lumen 07/05/25 Right Internal jugular 07/05/25  2255  Internal jugular  1 07/07/25 0705 (4.56 hrs)    External Urinary Catheter 07/06/25  1657  --  less than 1     Wound 07/03/25 2001 coccyx 07/03/25 2001  -- 3     Wound 07/06/25 1023 Right posterior heel Pressure Injury 07/06/25  1023  -- 1     Wound 07/06/25 1023 Left posterior heel Pressure Injury 07/06/25  1023  -- 1     Wound 07/06/25 1047 Bilateral anterior abdomen 07/06/25  1047  -- 1                       Nutrition Focused Physical Exam     Trending NFPE      Malnutrition Severity Assessment              Estimated Needs      Energy Requirements    Weight for  Calculation 57kg IBW    Method for Estimation  25-30 kcals/kg   Daily Needs (kcal/day) 7941-3196   Protein Requirements    Weight for Calculation 57kg IBW    Method for Estimation 1.2 gm/kg   Daily Needs (g/day) 68   Fluid Requirements     Method for Estimation 1000 mL + urine Output    Daily Needs (mL/day)      Current Nutrition Orders & Evaluation of Intake      Oral Nutrition     Food Allergies  and Intolerances NKFA    Current PO Diet NPO Diet NPO Type: Strict NPO   Oral Nutrition Supplement None     Trending % PO Intake NPO      Enteral Nutrition     Current EN Order Patient isn't on Tube Feeding  Patient doesn't have any tube feeding modular orders     EN Route      EN Tolerance     EN Observation/Intake         Parenteral Nutrition     Current TPN Order    TPN Route    Lipids (mL/%/frequency)     Total # Days on TPN    TPN Observation/Intake       Assessment & Plan   Nutrition Diagnosis and Goals       Nutrition Diagnosis 1 Inadequate Oral Intake related to CDK and enteritis evidence by reported decrease intake in the last five days, NV, and diarrhea, and NPO status      Nutrition Diagnosis 2 None         Goal(s) Establish PO Intake and PO Diet Advances When Medically Appropriate      Nutrition Intervention and Prescription       Intervention  Monitor for diet advancement and Continue to monitor for plan of care      Diet Prescription     Supplement Prescription     Education Provided       Enteral Prescription        TPN Prescription      Monitoring/Evaluation       Monitor/Evaluation Per Protocol     RD Follow-Up Encounter 3-5 days     Electronically signed by:  Maria Guadalupe White RDN, BERNIE  07/07/25 11:38 CDT   Electronically signed by:  Maria Guadalupe White RDN, BERNIE  07/07/25 11:38 CDT

## 2025-07-07 NOTE — PAYOR COMM NOTE
" Javier Pastrana (69 y.o. Female) [id number]  --Admit In pt 07/03/2025   [facility] [phone]   [phone]       Date of Birth   1955    Social Security Number       Address   [address] [place] [id number]    Home Phone   [phone]    MRN   [id number]       Jainism   [facility]    Marital Status                               Admission Date   7/3/2025    Admission Type   Emergency    Admitting Provider   Shade Alberts MD    Attending Provider   Harpal Proctor MD    Department, Room/Bed   [facility] 3C, 391/1       Discharge Date       Discharge Disposition       Discharge Destination                                 Attending Provider: Harpal Proctor MD    Allergies: No Known Allergies    Isolation: None   Infection: None   Code Status: CPR    Ht: 165.1 cm (65\")   Wt: 100 kg (220 lb 10.9 oz)    Admission Cmt: None   Principal Problem: Enteritis [K52.9]                   Active Insurance as of 7/3/2025       Primary Coverage       Payor Plan Insurance Group Employer/Plan Group    UNITED [employer] MEDICARE REPLACEMENT UHC MEDICARE ADVANTAGE [facility] HMO NON PAR [id number]       Payor Plan Address Payor Plan Phone Number Payor Plan Fax Number Effective Dates    [address] [address]   7/1/2025 - None Entered    [facility] [id number]         Subscriber Name Subscriber Birth Date Member ID       JAVIER PASTRANA 1955 [id number]               Secondary Coverage       Payor Plan Insurance Group Employer/Plan Group    [id number]        Payor Plan Address Payor Plan Phone Number Payor Plan Fax Number Effective Dates    [address]   5/17/2023 - None Entered    [facility] [place] [address]         Subscriber Name Subscriber Birth Date Member ID       JAVIER PASTRANA 1955 [id number]                     Emergency Contacts        (Rel.) Home Phone Work Phone Mobile Phone    Rodger Marrufo (Friend) [phone] -- --    " Bertha. (Friend) -- -- [phone]    Laura Phelan -- -- [phone]                 History & Physical        Barbara Renteria APRN at 07/03/25 2016              [facility] Intensivist Services  HISTORY AND PHYSICAL    Date of Admission: 7/3/2025  Primary Care Physician: Provider, No Known    Subjective   Primary Historian: Patient, ER physician    Chief Complaint: Severe electrolyte abnormality, hypokalemia, hypomagnesia, hypocalcemia, nausea, vomiting and diarrhea, dehydration    History of Present Illness  Very pleasant 69-year-old female patient past medical history type 2 diabetes, hypertension, hyperlipidemia, arthritis, GERD, chronic diarrhea.  Recent endoscopy/colonoscopy in May 2023 which were unremarkable, exploratory lap in August 2028 with complications patient endorses colectomy at that time secondary to complications that she had performed at [place] presents to the emergency department today with generally not feeling well.    Patient endorses ongoing nausea, vomiting and diarrhea for the last 5 days along with abdominal pain with diarrhea.  She describes the diarrhea as none hematochezia or melena.  She denies any systemic symptoms such as fever chills or rigors.  She does endorse a mechanical fall this morning while in the bathroom that she feels was a direct result of her generalized weakness.  She does endorse left shoulder pain.  She denies hitting her head.  She called EMS for abdominal pain, nausea vomiting and diarrhea for the last week and generalized pain.    ER workup revealed critically low electrolytes.  Potassium 1.7, magnesium 0.8, calcium 5.6 leukocytosis 25.  With a shift no bandemia.  Stable lactate at 1.7.  CRP elevated at 11.05 BUN 44, creatinine 5 gap 23 CT of the abdomen and pelvis shows mild enteritis or diarrheal type illness there is no bowel wall thickening or obstruction.  Postoperative changes including previous partial right  colectomy, new small stones in the left renal pelvis no urinary tract obstruction identified.    Patient was given 2 g of magnesium, gram of calcium and 20 mill equivalents of potassium was ordered however not infused in the ED, along with IV fluid r resuscitation with 1 L of NS.  Patient is alert oriented cognitively intact.  She is hemodynamically stable surprisingly.    ICU was consulted for ongoing management of electrolytes in the setting of acute vomiting and diarrhea.    I have seen and evaluated the patient immediately upon arriving to ICU room 5.  Patient is alert oriented cognitively intact.  Vital signs stable.  She does have difficulty with raising her left shoulder above her head.  She is able to flex and extend from the elbow.  She has pain with passive range of motion and full extension of the left shoulder.    She does endorse that she sustained a ground-level fall and she feels that she hit her shoulder.  Once we increase her potassium and stabilize her we will obtain x-rays of the left shoulder.  She denies hitting her head she denies any neck pain.  She has some cramping noted to her feet and right hand I suspect this could be carpopedal spasms secondary to hypocalcemia with hypomagnesia and hypokalemia.    No systemic symptoms however in the setting of leukocytosis elevated CRP and shift with gastritis we will implement ceftriaxone and Flagyl after obtaining blood cultures.  Still pending a GI panel.  She denies being on any recent antibiotics as I considered C. difficile.    Will continue with electrolyte replacement and ongoing hydration.        Review of Systems   Otherwise complete ROS reviewed and negative except as mentioned in the HPI.    Past Medical History:   Past Medical History:   Diagnosis Date    Acid reflux     Arthritis     High cholesterol     Hypertension     Neuropathy     Swollen lymph nodes     LEFT CERVICAL    Type 2 diabetes mellitus      Past Surgical History:  Past  "Surgical History:   Procedure Laterality Date    CARPAL TUNNEL RELEASE Bilateral     CERVICAL LYMPH NODE BIOPSY/EXCISION Left 2017    Procedure: LEFT NECK EXPLORATION WITH INCISIONAL BIOPSY/CULTURE;  Surgeon: Blue Sanchez MD;  Location: [facility] OR;  Service:      SECTION      COLON RESECTION      COLONOSCOPY N/A 2023    Procedure: COLONOSCOPY WITH ANESTHESIA;  Surgeon: Tamy Smith MD;  Location: [facility] ENDOSCOPY;  Service: Gastroenterology;  Laterality: N/A;  preop; abnormal GI scan   postop ; poor prep   PCP Ruth Noble MD    ENDOSCOPY N/A 2023    Procedure: ESOPHAGOGASTRODUODENOSCOPY WITH ANESTHESIA;  Surgeon: Tamy Smith MD;  Location: [facility] ENDOSCOPY;  Service: Gastroenterology;  Laterality: N/A;  preop; abnormal GI scan   postop esophagitis ; hiatal hernia  PCP Ruth Noble MD    EXCISION LESION      From back    REPLACEMENT TOTAL KNEE Left     TONSILLECTOMY       Social History:  reports that she has never smoked. She has never used smokeless tobacco. She reports that she does not drink alcohol and does not use drugs.    Family History: family history includes Colon cancer in her father; No Known Problems in her mother.       Allergies:  No Known Allergies    Medications:  Prior to Admission medications    Medication Sig Start Date End Date Taking? Authorizing Provider   ibuprofen (ADVIL,MOTRIN) 200 MG tablet Take 1 tablet by mouth Every 6 (Six) Hours As Needed for Mild Pain. Pt reports taking \"5 at a time only when needed\"    Provider, MD Priyanka   ondansetron ODT (ZOFRAN-ODT) 4 MG disintegrating tablet Place 1 tablet on the tongue Every 4 (Four) Hours As Needed for Nausea or Vomiting. 25   Nishant Hdz Jr., MD     I have utilized all available immediate resources to obtain, update, or review the patient's current medications (including all prescriptions, over-the-counter products, herbals, cannabis/cannabidiol products, " "and vitamin/mineral/dietary (nutritional) supplements).    Objective     Vital Signs: /56   Pulse 91   Temp 97.8 °F (36.6 °C) (Oral)   Resp 17   Ht 165.1 cm (65\")   Wt 97.1 kg (214 lb 1.6 oz)   SpO2 94%   BMI 35.63 kg/m²   Physical Exam  Vitals and nursing note reviewed. Exam conducted with a chaperone present.   Constitutional:       General: She is not in acute distress.  HENT:      Head: Normocephalic and atraumatic.      Nose: Nose normal.      Mouth/Throat:      Mouth: Mucous membranes are dry.      Comments: Extremely dry mucosa.  Patient endorses she has not had anything to eat or drink in at least 24 to 48 hours now  Eyes:      Extraocular Movements: Extraocular movements intact.      Pupils: Pupils are equal, round, and reactive to light.   Cardiovascular:      Rate and Rhythm: Normal rate and regular rhythm.   Pulmonary:      Effort: Pulmonary effort is normal.      Breath sounds: Normal breath sounds.   Abdominal:      General: There is no distension.      Palpations: Abdomen is soft.      Tenderness: There is no guarding.      Comments: Well-healed vertical incisional site.  No rebound tenderness or concerns for peritonitis on exam.  Hyperactive bowel sounds   Musculoskeletal:         General: No deformity.      Cervical back: Normal range of motion.      Right lower leg: No edema.      Left lower leg: No edema.      Comments: Cramping noted to right hand and feet bilaterally.  Decreased range of motion noted to the left upper extremity.  Patient is able to flex and extend from the elbow however she is not able to independently lift the left arm above her head.  Radial pulse palpable and strong.  She has neurovascularly intact.   Skin:     General: Skin is warm and dry.      Capillary Refill: Capillary refill takes 2 to 3 seconds.      Findings: No bruising.   Neurological:      General: No focal deficit present.      Mental Status: She is alert and oriented to person, place, and time.    "         Results Reviewed:  Lab Results (last 24 hours)       Procedure Component Value Units Date/Time    Comprehensive Metabolic Panel [[id number]]  (Abnormal) Collected: 07/03/25 1706    Specimen: Blood Updated: 07/03/25 1739     Glucose 161 mg/dL      BUN 44.5 mg/dL      Creatinine 5.04 mg/dL      Sodium 135 mmol/L      Potassium 1.7 mmol/L      Chloride 100 mmol/L      CO2 12.0 mmol/L      Calcium 5.6 mg/dL      Total Protein 5.4 g/dL      Albumin 2.9 g/dL      ALT (SGPT) 15 U/L      AST (SGOT) 41 U/L      Alkaline Phosphatase 82 U/L      Total Bilirubin 0.3 mg/dL      Globulin 2.5 gm/dL      A/G Ratio 1.2 g/dL      BUN/Creatinine Ratio 8.8     Anion Gap 23.0 mmol/L      eGFR 8.7 mL/min/1.73     Narrative:      GFR Categories in Chronic Kidney Disease (CKD)              GFR Category          GFR (mL/min/1.73)    Interpretation  G1                    90 or greater        Normal or high (1)  G2                    60-89                Mild decrease (1)  G3a                   45-59                Mild to moderate decrease  G3b                   30-44                Moderate to severe decrease  G4                    15-29                Severe decrease  G5                    14 or less           Kidney failure    (1)In the absence of evidence of kidney disease, neither GFR category G1 or G2 fulfill the criteria for CKD.    eGFR calculation 2021 CKD-EPI creatinine equation, which does not include race as a factor    Magnesium [[id number]]  (Abnormal) Collected: 07/03/25 1706    Specimen: Blood Updated: 07/03/25 1738     Magnesium 0.8 mg/dL     C-reactive Protein [[id number]]  (Abnormal) Collected: 07/03/25 1706    Specimen: Blood Updated: 07/03/25 1730     C-Reactive Protein 11.05 mg/dL     Urinalysis With Culture If Indicated - Urine, Catheter [[id number]]  (Abnormal) Collected: 07/03/25 1647    Specimen: Urine, Catheter Updated: 07/03/25 1708     Color, UA Yellow     Appearance, UA Clear     pH, UA 5.5     Specific  Gravity, UA 1.011     Glucose, UA Negative     Ketones, UA Trace     Bilirubin, UA Negative     Blood, UA Large (3+)     Protein,  mg/dL (2+)     Leuk Esterase, UA Negative     Nitrite, UA Negative     Urobilinogen, UA 0.2 E.U./dL    Narrative:      In absence of clinical symptoms, the presence of pyuria, bacteria, and/or nitrites on the urinalysis result does not correlate with infection.    Urinalysis, Microscopic Only - Urine, Catheter [[id number]]  (Abnormal) Collected: 07/03/25 1647    Specimen: Urine, Catheter Updated: 07/03/25 1708     RBC, UA 0-2 /HPF      WBC, UA 0-2 /HPF      Comment: Urine culture not indicated.        Bacteria, UA None Seen /HPF      Squamous Epithelial Cells, UA 3-6 /HPF      Hyaline Casts, UA 0-2 /LPF      Amorphous Crystals, UA Small/1+ /HPF      Methodology Manual Light Microscopy    Amylase [[id number]]  (Abnormal) Collected: 07/03/25 1628    Specimen: Blood Updated: 07/03/25 1658     Amylase 19 U/L     Lactic Acid, Plasma [[id number]]  (Normal) Collected: 07/03/25 1628    Specimen: Blood Updated: 07/03/25 1657     Lactate 1.7 mmol/L     Lipase [[id number]]  (Normal) Collected: 07/03/25 1628    Specimen: Blood Updated: 07/03/25 1656     Lipase 21 U/L     CBC & Differential [[id number]]  (Abnormal) Collected: 07/03/25 1628    Specimen: Blood Updated: 07/03/25 1635    Narrative:      The following orders were created for panel order CBC & Differential.  Procedure                               Abnormality         Status                     ---------                               -----------         ------                     CBC Auto Differential[[id number]]        Abnormal            Final result                 Please view results for these tests on the individual orders.    CBC Auto Differential [[id number]]  (Abnormal) Collected: 07/03/25 1628    Specimen: Blood Updated: 07/03/25 1635     WBC 25.12 10*3/mm3      RBC 3.83 10*6/mm3      Hemoglobin 12.6 g/dL      Hematocrit 36.2 %       MCV 94.5 fL      MCH 32.9 pg      MCHC 34.8 g/dL      RDW 18.5 %      RDW-SD 64.1 fl      MPV 10.6 fL      Platelets 233 10*3/mm3      Neutrophil % 89.0 %      Lymphocyte % 4.9 %      Monocyte % 5.2 %      Eosinophil % 0.1 %      Basophil % 0.2 %      Immature Grans % 0.6 %      Neutrophils, Absolute 22.36 10*3/mm3      Lymphocytes, Absolute 1.24 10*3/mm3      Monocytes, Absolute 1.31 10*3/mm3      Eosinophils, Absolute 0.02 10*3/mm3      Basophils, Absolute 0.04 10*3/mm3      Immature Grans, Absolute 0.15 10*3/mm3      nRBC 0.0 /100 WBC              CT Abdomen Pelvis Without Contrast  Result Date: 7/3/2025  1. Fluid retention within the small intestine and portions of the colon compatible with the history of diarrhea, no evidence of bowel obstruction, no bowel wall thickening. Correlate clinically for mild enteritis or diarrheal type illness. 2. New small stone in the left renal pelvis, measuring 7 mm. No urinary tract obstruction is identified. 3. Postoperative changes, including previous partial right colectomy, appendectomy and there is stable dense material in the region of the raine hepatis which may be related to previous surgical intervention. The gallbladder is not visualized. 4. Stable accessory spleen measuring up to 4 cm.   This report was signed and finalized on 7/3/2025 6:19 PM by Dr. Venkata Muniz MD.            Result Review:  I have personally reviewed the results from the time of this admission to 7/3/2025 20:17 CDT and agree with these findings:  [x]  Laboratory list / accordion  []  Microbiology  [x]  Radiology  [x]  EKG/Telemetry   []  Cardiology/Vascular   [x]  Pathology  [x]  Old records-  []  Other:  Most notable findings include: Potassium 1.7, magnesium 0.8, calcium 5.6, leukocytosis 25 with a left shift, elevated CRP at 11      I have personally reviewed and interpreted the radiology studies and ECG obtained at time of admission.     Assessment / Plan   Assessment:   Active Hospital  Problems    Diagnosis     **Enteritis        Treatment Plan  The patient will be admitted to my service here at [facility].  The patient to ICU/CCU under intensivist services this patient is currently requiring ICU services for electrolyte management    69-year-old female patient past medical history acute on chronic diarrhea presented to the emergency department via EMS for a 5-day history of nausea, vomiting diarrhea and generalized bodyaches along with abdominal pain.  Critically low potassium 1.8, magnesium 0.8 and calcium 5.6.  EG changes prolonged QTc greater than 700.  CT concerning for enteritis otherwise no acute findings.    Hypokalemia  Hypomagnesia  Hypocalcemia  Enteritis  Leukocytosis  DAISY  Dehydration  8.   Past medical history type 2 diabetes  9.   Prolonged Qtc    Plan:  Continue with electrolyte replacement.  Will initiate oral potassium as well as IV.  Reassess electrolytes after replacement.  Continue with IV fluid hydration in the setting of acute dehydration and DAISY.  Reviewed previous labs from 2/24 2025 kidney function, creatinine 2, her decreased oral intake dehydration's probably worsening kidney function today.  Renally dose all medications.  Avoid any nephrotoxin agents.  Avoid any IV contrast  Reassess BMP in the AM.  Sitter nephrology consult if warranted  Leukocytosis, no concerns for sepsis on admission.  Leukocytosis felt to be secondary to enteritis.  Obtain GI panel.  Initiate Rocephin 2 g and Flagyl 500 every 8 after blood cultures.  De-escalate antibiotics based on cultures and GI panel.  Obtain a chest x-ray to rule out any acute process.  Urinalysis unremarkable for any acute infectious process.  Sliding scale insulin during this hospitalization  Avoid any QTc prolongation medications in the setting of prolonged QTc.    VTE: Heparin SQ  GI: Protonix , hx of Gerd  NTN: Diet  ABX: Flagyl 500 mg every 8 Rocephin 2 g daily  Lines/Tubes: Peripheral  CODE Status: Full  code          60 minutes of critical care provided. This time excludes other billable procedures. Time does include preparation of documents, medical consultations, review of old records, and direct bedside care. Patient is at high risk for life-threatening deterioration due to patient is a high risk for cardiac arrhythmia secondary to critically low electrolytes.  .       Medical Decision Making  Number and Complexity of problems: 7  Differential Diagnosis: Electrolyte abnormalities, acute on chronic.  Patient does have a history of chronic diarrhea.  Her previous labs from April of this year revealed critically low potassium of 2.7.  I question if she has been taking potassium and magnesium supplements in the setting of chronic diarrhea.  Acute on chronic kidney insufficiency.  I feel her acute dehydration is contributing to her worsening kidney function.  I considered sepsis in the setting of leukocytosis however no acute process other than enteritis.  Will search for additional sources.    Conditions and Status        Condition is unchanged.     [facility] Data  External documents reviewed: N/A I did review previous endoscopy, colonoscopy by Dr. Smith in April 2023 biopsies unremarkable.    Cardiac tracing (EKG, telemetry) interpretation: Reviewed admission EKG  Radiology interpretation: Reviewed admission CT abdomen pelvis imaging  Labs reviewed: Reviewed admission labs from ER  Any tests that were considered but not ordered: Chest x-ray, left shoulder x-ray     Decision rules/scores evaluated (example [id number], Wells, etc):      Discussed with: Patient.  Patient is decision-maker.  She endorses she is  however her  is in the nursing home.  She does have a close friend that lives with her and helps take care of her however she does not have any children.     Care Planning  Shared decision making: Patient  Code status and discussions: Full code    Disposition  Social Determinants of Health that impact  treatment or disposition: NA  Estimated length of stay is 2 to 5 days.     I confirmed that the patient's advanced care plan is present, code status is documented, and a surrogate decision maker is listed in the patient's medical record.     The patient's surrogate decision maker is patient.     The patient was seen and examined by me on 7/3/20/2025 at 1930 immediately upon arriving to ICU room 3.    I admitted the patient overnight to the intensivist services.  Case will be discussed with Dr. Alberts, In the a.m.  Final treatment plan and recommendations will be at his discretion.     Electronically signed by NATTY Major on 7/3/2025 at 20:17 CDT               Electronically signed by Barbara Renteria APRN at 25 2116          Emergency Department Notes        London Leiva PA-C at 25 1549       Attestation signed by Claudine Church DO at 25 1011          SUPERVISE: For this patient encounter, I reviewed the APC's documentation, treatment plan, and medical decision making.  Claudine Church DO 2025 10:11 CDT                             Subjective   History of Present Illness  States for the last week she has had constant diffuse abdominal pain, nausea, vomiting, and diarrhea.  She states the symptoms have not improved despite conservative measures.  She denies any melena, hematochezia, fever, chills, chest pain, shortness of breath, cough, dysuria, hematuria.  She states abdominal pain is constant and does not radiate.  She does not give a description of the pain at this time.  She has a past medical history of GERD, type 2 diabetes, colon resection, .        Review of Systems   Gastrointestinal:  Positive for abdominal pain, diarrhea, nausea and vomiting.   All other systems reviewed and are negative.      Past Medical History:   Diagnosis Date    Acid reflux     Arthritis     High cholesterol     Hypertension     Neuropathy     Swollen lymph nodes     LEFT  CERVICAL    Type 2 diabetes mellitus        No Known Allergies    Past Surgical History:   Procedure Laterality Date    CARPAL TUNNEL RELEASE Bilateral     CERVICAL LYMPH NODE BIOPSY/EXCISION Left 2017    Procedure: LEFT NECK EXPLORATION WITH INCISIONAL BIOPSY/CULTURE;  Surgeon: Blue Sanchez MD;  Location: [facility] OR;  Service:      SECTION      COLON RESECTION      COLONOSCOPY N/A 2023    Procedure: COLONOSCOPY WITH ANESTHESIA;  Surgeon: Tamy Smith MD;  Location: [facility] ENDOSCOPY;  Service: Gastroenterology;  Laterality: N/A;  preop; abnormal GI scan   postop ; poor prep   PCP Ruth Noble MD    ENDOSCOPY N/A 2023    Procedure: ESOPHAGOGASTRODUODENOSCOPY WITH ANESTHESIA;  Surgeon: Tamy Smith MD;  Location: [facility] ENDOSCOPY;  Service: Gastroenterology;  Laterality: N/A;  preop; abnormal GI scan   postop esophagitis ; hiatal hernia  PCP Ruth Noble MD    EXCISION LESION      From back    REPLACEMENT TOTAL KNEE Left     TONSILLECTOMY         Family History   Problem Relation Age of Onset    No Known Problems Mother     Colon cancer Father         < 60 years old    Breast cancer Neg Hx        Social History     Socioeconomic History    Marital status:    Tobacco Use    Smoking status: Never    Smokeless tobacco: Never   Vaping Use    Vaping status: Never Used   Substance and Sexual Activity    Alcohol use: No    Drug use: No    Sexual activity: Defer           Objective   Physical Exam  Vitals and nursing note reviewed.   Constitutional:       General: She is not in acute distress.     Appearance: She is well-developed. She is obese. She is ill-appearing. She is not toxic-appearing or diaphoretic.   HENT:      Head: Normocephalic and atraumatic.   Cardiovascular:      Rate and Rhythm: Normal rate.   Pulmonary:      Effort: Pulmonary effort is normal.   Abdominal:      General: There is distension.      Palpations: Abdomen is soft.       Tenderness: There is generalized abdominal tenderness.   Skin:     General: Skin is warm and dry.   Neurological:      Mental Status: She is alert.   Psychiatric:         Mood and Affect: Mood normal.         Behavior: Behavior normal.         Procedures          ED Course  ED Course as of 25 185   Thu 2025   1828 Corrected calcium is 6.5 [KT]      ED Course User Index  [KT] London Leiva PA-C                                                       Medical Decision Making  States for the last week she has had constant diffuse abdominal pain, nausea, vomiting, and diarrhea.  She states the symptoms have not improved despite conservative measures.  She denies any melena, hematochezia, fever, chills, chest pain, shortness of breath, cough, dysuria, hematuria.  She states abdominal pain is constant and does not radiate.  She does not give a description of the pain at this time.  She has a past medical history of GERD, type 2 diabetes, colon resection, .    DDX: Viral gastroenteritis, colitis, diverticulitis, gastritis, pancreatitis, electrolyte abnormality, UTI.     ECG 12 Lead Electrolyte Imbalance   Preliminary Result    Test Reason : Electrolyte Imbalance    Blood Pressure :   */*   mmHG    Vent. Rate :  89 BPM     Atrial Rate :   * BPM       P-R Int :   * ms          QRS Dur :  86 ms        QT Int : 582 ms       P-R-T Axes :   *  24  84 degrees      QTcB Int : 708 ms        ** Critical Test Result: Long QTc    Accelerated Junctional rhythm    ST & T wave abnormality, consider lateral ischemia    Abnormal ECG    When compared with ECG of 2017 08:47,    Junctional rhythm has replaced Sinus rhythm    ST now depressed in Anterior leads    Nonspecific T wave abnormality now evident in Inferior leads    T wave inversion now evident in Lateral leads    QT has lengthened        Referred By:            Confirmed By:     Labs Reviewed  COMPREHENSIVE METABOLIC PANEL - Abnormal; Notable for the  following components:     Glucose                       161 (*)                BUN                           44.5 (*)               Creatinine                    5.04 (*)               Sodium                        135 (*)                Potassium                     1.7 (*)                CO2                           12.0 (*)               Calcium                       5.6 (*)                Total Protein                 5.4 (*)                Albumin                       2.9 (*)                AST (SGOT)                    41 (*)                 Anion Gap                     23.0 (*)               eGFR                          8.7 (*)             All other components within normal limits         Narrative: GFR Categories in Chronic Kidney Disease (CKD)                                              GFR Category          GFR (mL/min/1.73)    Interpretation                  G1                    90 or greater        Normal or high (1)                  G2                    60-89                Mild decrease (1)                  G3a                   45-59                Mild to moderate decrease                  G3b                   30-44                Moderate to severe decrease                  G4                    15-29                Severe decrease                  G5                    14 or less           Kidney failure                                    (1)In the absence of evidence of kidney disease, neither GFR category G1 or G2 fulfill the criteria for CKD.                                    eGFR calculation 2021 CKD-EPI creatinine equation, which does not include race as a factor  AMYLASE - Abnormal; Notable for the following components:     Amylase                       19 (*)              All other components within normal limits  URINALYSIS W/ CULTURE IF INDICATED - Abnormal; Notable for the following components:     Ketones, UA                   Trace (*)               Blood, UA                      Large (3+) (*)               Protein, UA                     (*)               All other components within normal limits         Narrative: In absence of clinical symptoms, the presence of pyuria, bacteria, and/or nitrites on the urinalysis result does not correlate with infection.  C-REACTIVE PROTEIN - Abnormal; Notable for the following components:     C-Reactive Protein            11.05 (*)            All other components within normal limits  CBC WITH AUTO DIFFERENTIAL - Abnormal; Notable for the following components:     WBC                           25.12 (*)               RDW                           18.5 (*)               RDW-SD                        64.1 (*)               Neutrophil %                  89.0 (*)               Lymphocyte %                  4.9 (*)                Eosinophil %                  0.1 (*)                Immature Grans %              0.6 (*)                Neutrophils, Absolute         22.36 (*)               Monocytes, Absolute           1.31 (*)               Immature Grans, Absolute      0.15 (*)            All other components within normal limits  MAGNESIUM - Abnormal; Notable for the following components:     Magnesium                     0.8 (*)             All other components within normal limits  URINALYSIS, MICROSCOPIC ONLY - Abnormal; Notable for the following components:     Squamous Epithelial Cells, UA   3-6 (*)             All other components within normal limits  LIPASE - Normal  LACTIC ACID, PLASMA - Normal  GASTROINTESTINAL PANEL, PCR (PREFERRED) DOES NOT INCLUDE CDIFF  CBC AND DIFFERENTIAL  CT Abdomen Pelvis Without Contrast   Final Result    1. Fluid retention within the small intestine and portions of the colon    compatible with the history of diarrhea, no evidence of bowel    obstruction, no bowel wall thickening. Correlate clinically for mild    enteritis or diarrheal type illness.    2. New small stone in the left renal pelvis, measuring 7 mm. No  urinary    tract obstruction is identified.    3. Postoperative changes, including previous partial right colectomy,    appendectomy and there is stable dense material in the region of the    raine hepatis which may be related to previous surgical intervention.    The gallbladder is not visualized.    4. Stable accessory spleen measuring up to 4 cm.              This report was signed and finalized on 7/3/2025 6:19 PM by Dr. Venkata Muniz MD.        Spoke with NATTY Hu for ICU-she reviewed patient's labs and imaging.  Accepts admission for Dr. Alberts.    I discussed results with patient in the room, as well as need for admission.  Patient is agreeable to this at this time, and she has no complaints currently.  She is pleased with plan overall.  I discussed the plan with Dr. Ramirez, who is in agreement with the plan.      Amount and/or Complexity of Data Reviewed  Labs: ordered.  Radiology: ordered.  ECG/medicine tests: ordered.    Risk  OTC drugs.  Prescription drug management.        Final diagnoses:   None       ED Disposition  ED Disposition       None            No follow-up provider specified.       Medication List      No changes were made to your prescriptions during this visit.            London Leiva PA-C  07/03/25 2152      Electronically signed by Claudine Church DO at 07/04/25 1011       Vital Signs (last day)       Date/Time Temp Temp src Pulse Resp BP Patient Position SpO2    07/07/25 0740 98 (36.7) Oral 90 18 124/56 Lying 96    07/07/25 0500 97.3 (36.3) -- 83 18 114/46 -- 95    07/07/25 0014 97.4 (36.3) -- 85 16 121/49 -- 94    07/06/25 2010 97.6 (36.4) -- 88 18 116/46 -- 96    07/06/25 1600 97.5 (36.4) Oral 80 18 115/71 Lying 100    07/06/25 1002 98.1 (36.7) Oral 88 20 107/49 Lying --    07/06/25 0900 -- -- 90 -- -- -- 93    07/06/25 0800 98.4 (36.9) Oral 98 26 99/65 Lying 96    07/06/25 0700 -- -- 90 -- 103/86 -- 98    07/06/25 0603 -- -- 107 16 103/65 Lying 91    07/06/25 0534 --  -- 100 -- 101/64 -- 98    07/06/25 0527 -- -- 95 -- 95/49 -- 97    07/06/25 0405 98.2 (36.8) Oral 97 18 113/51 Lying 89    07/06/25 0312 -- -- 90 -- 113/81 -- --    07/06/25 0200 -- -- 94 19 125/83 Lying --    07/06/25 0117 -- -- 94 -- 125/47 -- --    07/06/25 0012 98.1 (36.7) Oral 105 15 100/64 Lying --          Prior to Admission Medications       Prescriptions Last Dose Informant Patient Reported? Taking?    albuterol sulfate  (90 Base) MCG/ACT inhaler  Self Yes No    Inhale 2 puffs Every 4 (Four) Hours As Needed for Wheezing.    ibuprofen (ADVIL,MOTRIN) 200 MG tablet  Pharmacy Yes No    Take 5 tablets by mouth Every 6 (Six) Hours As Needed for Mild Pain.    ondansetron ODT (ZOFRAN-ODT) 4 MG disintegrating tablet  Pharmacy No No    Place 1 tablet on the tongue Every 4 (Four) Hours As Needed for Nausea or Vomiting.             Physician Progress Notes (last 4 days)        Sudhir Erwin, APRN at 07/07/25 0910          Nephrology ([facility]) Progress Note      Patient:  Lindsay Laughlin  YOB: 1955  Date of Service: 7/7/2025  MRN: [id number]   Acct: [id number]   Primary Care Physician: Provider, No Known  Advance Directive:   Code Status and Medical Interventions: CPR (Attempt to Resuscitate); Full Support; Discussed CODE STATUS with Mr. Laughlin.  Patient is ,  is in a nursing home.  She does not have any children.   Ordered at: 07/03/25 2000     Code Status (Patient has no pulse and is not breathing):    CPR (Attempt to Resuscitate)     Medical Interventions (Patient has pulse or is breathing):    Full Support     Comments:    Discussed CODE STATUS with Mr. Laughlin.  Patient is ,  is in a nursing home.  She does not have any children.     Level Of Support Discussed With:    Patient     Admit Date: 7/3/2025       Hospital Day: 4  Referring Provider: Shade Alberts MD      Patient personally seen and examined.  Complete chart including  Consults, Notes, Operative Reports, Labs, Cardiology, and Radiology studies reviewed as able.        Subjective:  Lindsay Laughlin is a 69 y.o. female for whom we were consulted for evaluation and treatment of acute kidney injury. Baseline chronic kidney disease stage 3. Does not follow with nephrology. History of type 2 diabetes, hypertension, GERD, prior colectomy done at [facility] for unknown reason. Presented to ER with several days of nausea, vomiting and diarrhea. Unable to maintain PO intake. Has also been using NSAIDs frequently for arthritis pain. Labs on admission showing creatinine 5.0, hypokalemia, hypomagnesemia, metabolic acidosis. Hospital course remarkable for minimal improvement of renal function despite IV fluid resuscitation.     Today is drowsy, oriented X 3. Yesterday, the patient had agreed to have permcath placed and start dialysis.  Overnight was confused at times per nursing staff and was stating that she did not want dialysis. At time of exam this AM patient agrees to proceed with permcath and dialysis.     Allergies:  Patient has no known allergies.    Home Meds:  Medications Prior to Admission   Medication Sig Dispense Refill Last Dose/Taking    albuterol sulfate  (90 Base) MCG/ACT inhaler Inhale 2 puffs Every 4 (Four) Hours As Needed for Wheezing.       ibuprofen (ADVIL,MOTRIN) 200 MG tablet Take 5 tablets by mouth Every 6 (Six) Hours As Needed for Mild Pain.       ondansetron ODT (ZOFRAN-ODT) 4 MG disintegrating tablet Place 1 tablet on the tongue Every 4 (Four) Hours As Needed for Nausea or Vomiting. 10 tablet 0        Medicines:  Current Facility-Administered Medications   Medication Dose Route Frequency Provider Last Rate Last Admin    acetaminophen (TYLENOL) tablet 650 mg  650 mg Oral Q4H PRN Barbara Renteria APRN   650 mg at 07/03/25 8423    Or    acetaminophen (TYLENOL) suppository 650 mg  650 mg Rectal Q4H PRN Barbara Renteria APRN         aluminum-magnesium hydroxide-simethicone (MAALOX MAX) 400-400-40 MG/5ML suspension 15 mL  15 mL Oral Q6H PRN Dennis Ansari MD   15 mL at 07/05/25 1331    sennosides-docusate (PERICOLACE) 8.6-50 MG per tablet 2 tablet  2 tablet Oral BID Barbara Renteria APRN        And    polyethylene glycol (MIRALAX) packet 17 g  17 g Oral Daily PRN Barbara Renteria APRN        And    bisacodyl (DULCOLAX) EC tablet 5 mg  5 mg Oral Daily PRN Barbara Renteria APRN        And    bisacodyl (DULCOLAX) suppository 10 mg  10 mg Rectal Daily PRN Barbara Renteria APRN        bumetanide (BUMEX) injection 1 mg  1 mg Intravenous Q12H Miguel Chow MD   1 mg at 07/07/25 0328    calcium carbonate (TUMS) chewable tablet 500 mg (200 mg elemental)  2 tablet Oral TID PRN Barbara Renteria APRN   2 tablet at 07/04/25 2208    Calcium Replacement - Follow Nurse / BPA Driven Protocol   Not Applicable PRN London Leiva PA-C        cefTRIAXone (ROCEPHIN) 2,000 mg in sodium chloride 0.9 % 100 mL MBP  2,000 mg Intravenous Q24H Barbara Renteria APRN 200 mL/hr at 07/06/25 2008 2,000 mg at 07/06/25 2008    Chlorhexidine Gluconate Cloth 2 % pads 1 Application  1 Application Topical Q24H Barbara Renteria APRN   1 Application at 07/07/25 0345    cholecalciferol (VITAMIN D3) tablet 5,000 Units  5,000 Units Oral Daily Miguel Chow MD   5,000 Units at 07/06/25 1654    heparin (porcine) 5000 UNIT/ML injection 5,000 Units  5,000 Units Subcutaneous Q12H Barbara Renteria APRN   5,000 Units at 07/06/25 2009    HYDROcodone-acetaminophen (NORCO) 5-325 MG per tablet 1 tablet  1 tablet Oral Q6H PRN Barbara Renteria APRN   1 tablet at 07/07/25 0327    Magnesium Standard Dose Replacement - Follow Nurse / BPA Driven Protocol   Not Applicable PRN London Leiva PA-C        metroNIDAZOLE (FLAGYL) tablet 500 mg  500 mg Oral Q8H Dennis Ansari MD   500 mg at 07/07/25 0504    mupirocin (BACTROBAN) 2 % nasal ointment 1 Application  1 Application Each Nare BID  Barbara Renteria APRN   1 Application at 25    nitroglycerin (NITROSTAT) SL tablet 0.4 mg  0.4 mg Sublingual Q5 Min PRN Shade Alberts MD        ondansetron (ZOFRAN) injection 4 mg  4 mg Intravenous Q6H PRN Barbara Renteria APRN        pantoprazole (PROTONIX) EC tablet 40 mg  40 mg Oral BID AC Dennis Ansari MD   40 mg at 25    potassium chloride (KLOR-CON M20) CR tablet 40 mEq  40 mEq Oral TID Miguel Chow MD   40 mEq at 25    Potassium Replacement - Follow Nurse / BPA Driven Protocol   Not Applicable PRN London Leiva PA-C        sodium bicarbonate tablet 1,300 mg  1,300 mg Oral TID Miguel Chow MD   1,300 mg at 25    sodium chloride 0.9 % flush 10 mL  10 mL Intravenous PRN London Leiva PA-C        sodium chloride 0.9 % flush 10 mL  10 mL Intravenous Q12H Barbara Renteria APRN   10 mL at 25    sodium chloride 0.9 % flush 10 mL  10 mL Intravenous PRN Barbara Renteria APRN        sodium chloride 0.9 % infusion 40 mL  40 mL Intravenous PRN Barbara Renteria APRN           Past Medical History:  Past Medical History:   Diagnosis Date    Acid reflux     Arthritis     High cholesterol     Hypertension     Neuropathy     Swollen lymph nodes     LEFT CERVICAL    Type 2 diabetes mellitus        Past Surgical History:  Past Surgical History:   Procedure Laterality Date    CARPAL TUNNEL RELEASE Bilateral     CERVICAL LYMPH NODE BIOPSY/EXCISION Left 2017    Procedure: LEFT NECK EXPLORATION WITH INCISIONAL BIOPSY/CULTURE;  Surgeon: Blue Sanchez MD;  Location: [facility] OR;  Service:      SECTION      COLON RESECTION      COLONOSCOPY N/A 2023    Procedure: COLONOSCOPY WITH ANESTHESIA;  Surgeon: Tamy Smith MD;  Location: [facility] ENDOSCOPY;  Service: Gastroenterology;  Laterality: N/A;  preop; abnormal GI scan   postop ; poor prep   PCP Ruth Noble MD    ENDOSCOPY N/A 2023    Procedure:  ESOPHAGOGASTRODUODENOSCOPY WITH ANESTHESIA;  Surgeon: Tamy Smiht MD;  Location: [facility] ENDOSCOPY;  Service: Gastroenterology;  Laterality: N/A;  preop; abnormal GI scan   postop esophagitis ; hiatal hernia  PCP Ruth Noble MD    EXCISION LESION      From back    REPLACEMENT TOTAL KNEE Left     TONSILLECTOMY         Family History  Family History   Problem Relation Age of Onset    No Known Problems Mother     Colon cancer Father         < 60 years old    Breast cancer Neg Hx        Social History  Social History     Socioeconomic History    Marital status:    Tobacco Use    Smoking status: Never    Smokeless tobacco: Never   Vaping Use    Vaping status: Never Used   Substance and Sexual Activity    Alcohol use: No    Drug use: No    Sexual activity: Defer       Review of Systems:  History obtained from chart review and the patient  General ROS: No fever or chills  Respiratory ROS: No cough, shortness of breath, wheezing  Cardiovascular ROS: No chest pain or palpitations  Gastrointestinal ROS: No abdominal pain or melena  Genito-Urinary ROS: No dysuria or hematuria  Psych ROS: No anxiety and depression  14 point ROS reviewed with the patient and negative except as noted above and in the HPI unless unable to obtain.    Objective:  Patient Vitals for the past 24 hrs:   BP Temp Temp src Pulse Resp SpO2   07/07/25 0740 124/56 98 °F (36.7 °C) Oral 90 18 96 %   07/07/25 0500 114/46 97.3 °F (36.3 °C) -- 83 18 95 %   07/07/25 0014 121/49 97.4 °F (36.3 °C) -- 85 16 94 %   07/06/25 2010 116/46 97.6 °F (36.4 °C) -- 88 18 96 %   07/06/25 1600 115/71 97.5 °F (36.4 °C) Oral 80 18 100 %   07/06/25 1002 107/49 98.1 °F (36.7 °C) Oral 88 20 --       Intake/Output Summary (Last 24 hours) at 7/7/2025 0911  Last data filed at 7/6/2025 1600  Gross per 24 hour   Intake --   Output 300 ml   Net -300 ml     General: awake/alert   Chest:  clear to auscultation bilaterally without respiratory distress  CVS:  regular rate and rhythm  Abdominal: soft, nontender, positive bowel sounds  Extremities: no cyanosis or edema  Skin: warm and dry without rash      Labs:  Results from last 7 days   Lab Units 07/06/25  0356 07/05/25 0223 07/04/25  1418   WBC 10*3/mm3 11.81* 17.28* 16.56*   HEMOGLOBIN g/dL 10.1* 11.0* 10.9*   HEMATOCRIT % 29.5* 32.4* 32.2*   PLATELETS 10*3/mm3 172 213 204         Results from last 7 days   Lab Units 07/06/25  0356 07/05/25  2348 07/05/25  0223 07/04/25  1418 07/04/25  0036 07/03/25  1706   SODIUM mmol/L 134*  --  132* 135*   < > 135*   POTASSIUM mmol/L 3.0* 3.5 2.2* 2.2*   < > 1.7*   CHLORIDE mmol/L 104  --  100 102   < > 100   CO2 mmol/L 15.0*  --  11.0* 12.0*   < > 12.0*   BUN mg/dL 36.5*  --  39.5* 41.4*   < > 44.5*   CREATININE mg/dL 4.65*  --  4.69* 4.71*   < > 5.04*   CALCIUM mg/dL 6.7*  --  7.6* 6.0*   < > 5.6*   EGFR mL/min/1.73 9.6*  --  9.5* 9.5*   < > 8.7*   BILIRUBIN mg/dL 0.2  --   --   --   --  0.3   ALK PHOS U/L 87  --   --   --   --  82   ALT (SGPT) U/L 32  --   --   --   --  15   AST (SGOT) U/L 61*  --   --   --   --  41*   GLUCOSE mg/dL 208*  --  215* 189*   < > 161*    < > = values in this interval not displayed.       Radiology:   Imaging Results (Last 72 Hours)       Procedure Component Value Units Date/Time    US Renal Bilateral [[id number]] Collected: 07/06/25 0858     Updated: 07/06/25 0904    Narrative:      EXAMINATION: US RENAL BILATERAL-     HISTORY: DAISY; K52.9-Noninfective gastroenteritis and colitis,  unspecified; E83.42-Hypomagnesemia; E87.6-Hypokalemia;  E83.51-Hypocalcemia; N17.9-Acute kidney failure, unspecified     Images are stored in PACS per institutional protocol.     Grayscale and color-flow renal ultrasound.     Portable exam with limited detail.     Normal size and position of both kidneys.  Normal cortical thickness and normal cortical echogenicity.     No hydronephrosis.     7-8 mm LEFT renal pelvis stone noted on CT exam from 3 days ago though  not well  seen on today's exam.     The right kidney measures 101 x 50 x 46 mm.  RIGHT mid cyst = 29 mm.  RIGHT upper pole cyst = 13 mm.        The left kidney measures 95 x 53 x 46 mm.  Upper pole cyst = 16 mm.  Lower pole cyst = 19 mm.       Impression:      Impression:  1. No evidence of obstruction.           This report was signed and finalized on 7/6/2025 9:01 AM by Dr. Leonard Rogers MD.       XR Chest 1 View [[id number]] Collected: 07/06/25 0850     Updated: 07/06/25 0853    Narrative:      EXAMINATION: XR CHEST 1 VW-     HISTORY: line placement; K52.9-Noninfective gastroenteritis and colitis,  unspecified; E83.42-Hypomagnesemia; E87.6-Hypokalemia;  E83.51-Hypocalcemia; N17.9-Acute kidney failure, unspecified     Images are stored in PACS per institutional protocol.     1 view chest x-ray.     COMPARISON:  7/3/2025.     RIGHT jugular central line has been placed and is in good position.     Mild chronic interstitial lung disease.  No focal infiltrate.     The heart is magnified by the portable projection.       Impression:      1. Well-positioned RIGHT jugular central line.  2. Stable appearance of the lungs.           This report was signed and finalized on 7/6/2025 8:50 AM by Dr. Leonard Rogers MD.               Culture:  Blood Culture   Date Value Ref Range Status   07/03/2025 No growth at 3 days  Preliminary   07/03/2025 No growth at 3 days  Preliminary         Assessment    Acute kidney injury, ATN  Baseline chronic kidney disease stage 3  Type 2 diabetes  Hypertension  NSAID use  Hypokalemia  Hypomagnesemia  Anemia with iron deficiency  Metabolic acidosis  Gastroenteritis     Plan:   AM labs still pending. STAT BMP is ordered.  Unless labs today have significantly improved will plan to proceed with access placement and initiation of hemodialysis. Patient verbalizes agreement with this plan. Discussed with rachel Malcolm APRN  7/7/2025  09:11 CDT      Electronically  signed by Sudhir Erwin APRN at 07/07/25 0922       Miguel Chow MD at 07/06/25 1448          Nephrology ([facility]) Progress Note      Patient:  Lindsay Laughlin  YOB: 1955  Date of Service: 7/6/2025  MRN: [id number]   Acct: [id number]   Primary Care Physician: Provider, No Known  Advance Directive:   Code Status and Medical Interventions: CPR (Attempt to Resuscitate); Full Support; Discussed CODE STATUS with Mr. Laughlin.  Patient is ,  is in a nursing home.  She does not have any children.   Ordered at: 07/03/25 2000     Code Status (Patient has no pulse and is not breathing):    CPR (Attempt to Resuscitate)     Medical Interventions (Patient has pulse or is breathing):    Full Support     Comments:    Discussed CODE STATUS with Mr. Laughlin.  Patient is ,  is in a nursing home.  She does not have any children.     Level Of Support Discussed With:    Patient     Admit Date: 7/3/2025       Hospital Day: 3  Referring Provider: Shade Alberts MD      Patient personally seen and examined.  Complete chart including Consults, Notes, Operative Reports, Labs, Cardiology, and Radiology studies reviewed as able.    Chief complaint: Abnormal labs.    Subjective:  Lindsay Laughlin is a 69 y.o. female  whom we were consulted for acute kidney injury/chronic kidney disease.  Patient has history of stage III chronic kidney disease but no follow-up with nephrology in the past.  She has a history of type 2 diabetes, hypertension, gastroesophageal reflux disease, hyperlipidemia.  Patient has chronic diarrhea.  She has history of colectomy at [facility], reason for colectomy is unknown.  Presented to the emergency room complaining of nausea vomiting and diarrhea for the last 5 days.  She has chronic mild diarrhea, this time she has large-volume and more frequent watery bowel movements.  She has been using ibuprofen more frequently for the last  few days.  Routine lab data indicated serum creatinine was 5.0, metabolic acidemia, severe hypokalemia and hypomagnesemia.  Hospital course remarkable for IV fluid administration/resuscitation with minimal response.  Patient also endorses frequent use of ibuprofen for the treatment of generalized arthritis.     She was initially admitted to ICU now transferred to regular floor.  This afternoon complaining of shortness of breath and wheezing.  She has good urine output.  She has no improvement of renal function    Allergies:  Patient has no known allergies.    Home Meds:  Medications Prior to Admission   Medication Sig Dispense Refill Last Dose/Taking    albuterol sulfate  (90 Base) MCG/ACT inhaler Inhale 2 puffs Every 4 (Four) Hours As Needed for Wheezing.       ibuprofen (ADVIL,MOTRIN) 200 MG tablet Take 5 tablets by mouth Every 6 (Six) Hours As Needed for Mild Pain.       ondansetron ODT (ZOFRAN-ODT) 4 MG disintegrating tablet Place 1 tablet on the tongue Every 4 (Four) Hours As Needed for Nausea or Vomiting. 10 tablet 0        Medicines:  Current Facility-Administered Medications   Medication Dose Route Frequency Provider Last Rate Last Admin    acetaminophen (TYLENOL) tablet 650 mg  650 mg Oral Q4H PRN Barbara Renteria APRN   650 mg at 07/03/25 2242    Or    acetaminophen (TYLENOL) suppository 650 mg  650 mg Rectal Q4H PRN Barbara Renteria APRN        aluminum-magnesium hydroxide-simethicone (MAALOX MAX) 400-400-40 MG/5ML suspension 15 mL  15 mL Oral Q6H PRN Dennis Ansari MD   15 mL at 07/05/25 1331    sennosides-docusate (PERICOLACE) 8.6-50 MG per tablet 2 tablet  2 tablet Oral BID Barbara Renteria APRN        And    polyethylene glycol (MIRALAX) packet 17 g  17 g Oral Daily PRN Barbara Renteria APRN        And    bisacodyl (DULCOLAX) EC tablet 5 mg  5 mg Oral Daily PRN Barbara Renteria APRN        And    bisacodyl (DULCOLAX) suppository 10 mg  10 mg Rectal Daily PRN Barbara Renteria APRN         bumetanide (BUMEX) injection 1 mg  1 mg Intravenous Q12H Miguel Chow MD   1 mg at 07/06/25 1412    calcium carbonate (TUMS) chewable tablet 500 mg (200 mg elemental)  2 tablet Oral TID PRN Barbara Renteria APRN   2 tablet at 07/04/25 2208    Calcium Replacement - Follow Nurse / BPA Driven Protocol   Not Applicable PRN London Leiva PA-C        cefTRIAXone (ROCEPHIN) 2,000 mg in sodium chloride 0.9 % 100 mL MBP  2,000 mg Intravenous Q24H Barbara Renteria APRN 200 mL/hr at 07/05/25 2031 2,000 mg at 07/05/25 2031    Chlorhexidine Gluconate Cloth 2 % pads 1 Application  1 Application Topical Q24H Barbara Renteria APRN   1 Application at 07/06/25 0309    heparin (porcine) 5000 UNIT/ML injection 5,000 Units  5,000 Units Subcutaneous Q12H Barbara Renteria APRN   5,000 Units at 07/06/25 0830    HYDROcodone-acetaminophen (NORCO) 5-325 MG per tablet 1 tablet  1 tablet Oral Q6H PRN Barbara Renteria APRN   1 tablet at 07/05/25 0232    Magnesium Standard Dose Replacement - Follow Nurse / BPA Driven Protocol   Not Applicable PRN London Leiva PA-C        metroNIDAZOLE (FLAGYL) tablet 500 mg  500 mg Oral Q8H Dennis Ansari MD   500 mg at 07/06/25 1412    mupirocin (BACTROBAN) 2 % nasal ointment 1 Application  1 Application Each Nare BID Barbara Renteria, APRN   1 Application at 07/06/25 0830    nitroglycerin (NITROSTAT) SL tablet 0.4 mg  0.4 mg Sublingual Q5 Min PRN Shade Alberts MD        ondansetron (ZOFRAN) injection 4 mg  4 mg Intravenous Q6H PRN Barbara Renteria, NATTY        pantoprazole (PROTONIX) EC tablet 40 mg  40 mg Oral BID AC Dennis Ansari MD   40 mg at 07/06/25 0831    potassium chloride (KLOR-CON M20) CR tablet 40 mEq  40 mEq Oral TID Miguel Chow MD   40 mEq at 07/06/25 1412    Potassium Replacement - Follow Nurse / BPA Driven Protocol   Not Applicable PRN London Leiva PA-C        sodium chloride 0.45 % 905 mL with potassium chloride 40 mEq, sodium bicarbonate 8.4 % 75 mEq  infusion   Intravenous Continuous Dennis Ansari MD 75 mL/hr at 25 1412 New Bag at 25 1412    sodium chloride 0.9 % flush 10 mL  10 mL Intravenous PRN London Leiva PA-C        sodium chloride 0.9 % flush 10 mL  10 mL Intravenous Q12H Barbara Renteria, APRN   10 mL at 25 0832    sodium chloride 0.9 % flush 10 mL  10 mL Intravenous PRN Barbara Renteria, APRN        sodium chloride 0.9 % infusion 40 mL  40 mL Intravenous PRN Barbara Renteria, APRN           Past Medical History:  Past Medical History:   Diagnosis Date    Acid reflux     Arthritis     High cholesterol     Hypertension     Neuropathy     Swollen lymph nodes     LEFT CERVICAL    Type 2 diabetes mellitus        Past Surgical History:  Past Surgical History:   Procedure Laterality Date    CARPAL TUNNEL RELEASE Bilateral     CERVICAL LYMPH NODE BIOPSY/EXCISION Left 2017    Procedure: LEFT NECK EXPLORATION WITH INCISIONAL BIOPSY/CULTURE;  Surgeon: Blue Sanchez MD;  Location: [facility] OR;  Service:      SECTION      COLON RESECTION      COLONOSCOPY N/A 2023    Procedure: COLONOSCOPY WITH ANESTHESIA;  Surgeon: Tamy Smith MD;  Location: [facility] ENDOSCOPY;  Service: Gastroenterology;  Laterality: N/A;  preop; abnormal GI scan   postop ; poor prep   PCP Ruth Noble MD    ENDOSCOPY N/A 2023    Procedure: ESOPHAGOGASTRODUODENOSCOPY WITH ANESTHESIA;  Surgeon: Tamy Smith MD;  Location: [facility] ENDOSCOPY;  Service: Gastroenterology;  Laterality: N/A;  preop; abnormal GI scan   postop esophagitis ; hiatal hernia  PCP Ruth Noble MD    EXCISION LESION      From back    REPLACEMENT TOTAL KNEE Left     TONSILLECTOMY         Family History  Family History   Problem Relation Age of Onset    No Known Problems Mother     Colon cancer Father         < 60 years old    Breast cancer Neg Hx        Social History  Social History     Socioeconomic History    Marital status:     Tobacco Use    Smoking status: Never    Smokeless tobacco: Never   Vaping Use    Vaping status: Never Used   Substance and Sexual Activity    Alcohol use: No    Drug use: No    Sexual activity: Defer       Review of Systems:  History obtained from chart review and the patient  General ROS: No fever or chills  Respiratory ROS: positive for - shortness of breath  Cardiovascular ROS: No chest pain or palpitations  Gastrointestinal ROS: No abdominal pain or melena  Genito-Urinary ROS: No dysuria or hematuria  Psych ROS: No anxiety and depression  14 point ROS reviewed with the patient and negative except as noted above and in the HPI unless unable to obtain.    Objective:  Patient Vitals for the past 24 hrs:   BP Temp Temp src Pulse Resp SpO2 Weight   07/06/25 1002 107/49 98.1 °F (36.7 °C) Oral 88 20 -- --   07/06/25 0900 -- -- -- 90 -- 93 % --   07/06/25 0800 99/65 98.4 °F (36.9 °C) Oral 98 26 96 % --   07/06/25 0700 103/86 -- -- 90 -- 98 % --   07/06/25 0603 103/65 -- -- 107 16 91 % --   07/06/25 0534 101/64 -- -- 100 -- 98 % --   07/06/25 0527 95/49 -- -- 95 -- 97 % --   07/06/25 0405 113/51 98.2 °F (36.8 °C) Oral 97 18 (!) 89 % 100 kg (220 lb 10.9 oz)   07/06/25 0312 113/81 -- -- 90 -- -- --   07/06/25 0200 125/83 -- -- 94 19 -- --   07/06/25 0117 125/47 -- -- 94 -- -- --   07/06/25 0012 100/64 98.1 °F (36.7 °C) Oral 105 15 -- --   07/05/25 2312 110/71 -- -- 95 -- -- --   07/05/25 2200 132/66 -- -- 101 20 99 % --   07/05/25 2009 122/64 98.4 °F (36.9 °C) Oral 90 20 -- --   07/05/25 1923 131/73 -- -- 87 -- -- --   07/05/25 1900 -- -- -- 92 -- -- --   07/05/25 1700 110/50 -- -- 88 -- -- --   07/05/25 1600 115/69 -- -- 86 -- -- --   07/05/25 1500 114/79 -- -- 82 -- -- --       Intake/Output Summary (Last 24 hours) at 7/6/2025 1448  Last data filed at 7/6/2025 1412  Gross per 24 hour   Intake 2233.7 ml   Output 400 ml   Net 1833.7 ml     General: awake/alert   HEENT: Normocephalic atraumatic head  Neck: Supple with no  JVD or carotid bruits.  Chest:  Bilateral expiratory wheezing  CVS: regular rate and rhythm  Abdominal: soft, nontender, positive bowel sounds  Extremities: no cyanosis or edema  Skin: warm and dry without rash      Labs:  Results from last 7 days   Lab Units 07/06/25  0356 07/05/25 0223 07/04/25  1418   WBC 10*3/mm3 11.81* 17.28* 16.56*   HEMOGLOBIN g/dL 10.1* 11.0* 10.9*   HEMATOCRIT % 29.5* 32.4* 32.2*   PLATELETS 10*3/mm3 172 213 204         Results from last 7 days   Lab Units 07/06/25  0356 07/05/25  2348 07/05/25 0223 07/04/25  1418 07/04/25  0036 07/03/25  1706   SODIUM mmol/L 134*  --  132* 135*   < > 135*   POTASSIUM mmol/L 3.0* 3.5 2.2* 2.2*   < > 1.7*   CHLORIDE mmol/L 104  --  100 102   < > 100   CO2 mmol/L 15.0*  --  11.0* 12.0*   < > 12.0*   BUN mg/dL 36.5*  --  39.5* 41.4*   < > 44.5*   CREATININE mg/dL 4.65*  --  4.69* 4.71*   < > 5.04*   CALCIUM mg/dL 6.7*  --  7.6* 6.0*   < > 5.6*   EGFR mL/min/1.73 9.6*  --  9.5* 9.5*   < > 8.7*   BILIRUBIN mg/dL 0.2  --   --   --   --  0.3   ALK PHOS U/L 87  --   --   --   --  82   ALT (SGPT) U/L 32  --   --   --   --  15   AST (SGOT) U/L 61*  --   --   --   --  41*   GLUCOSE mg/dL 208*  --  215* 189*   < > 161*    < > = values in this interval not displayed.       Radiology:   Imaging Results (Last 72 Hours)       Procedure Component Value Units Date/Time    US Renal Bilateral [[id number]] Collected: 07/06/25 0858     Updated: 07/06/25 0904    Narrative:      EXAMINATION: US RENAL BILATERAL-     HISTORY: DAISY; K52.9-Noninfective gastroenteritis and colitis,  unspecified; E83.42-Hypomagnesemia; E87.6-Hypokalemia;  E83.51-Hypocalcemia; N17.9-Acute kidney failure, unspecified     Images are stored in PACS per institutional protocol.     Grayscale and color-flow renal ultrasound.     Portable exam with limited detail.     Normal size and position of both kidneys.  Normal cortical thickness and normal cortical echogenicity.     No hydronephrosis.     7-8 mm LEFT  renal pelvis stone noted on CT exam from 3 days ago though  not well seen on today's exam.     The right kidney measures 101 x 50 x 46 mm.  RIGHT mid cyst = 29 mm.  RIGHT upper pole cyst = 13 mm.        The left kidney measures 95 x 53 x 46 mm.  Upper pole cyst = 16 mm.  Lower pole cyst = 19 mm.       Impression:      Impression:  1. No evidence of obstruction.           This report was signed and finalized on 7/6/2025 9:01 AM by Dr. Leonard Rogers MD.       XR Chest 1 View [[id number]] Collected: 07/06/25 0850     Updated: 07/06/25 0853    Narrative:      EXAMINATION: XR CHEST 1 VW-     HISTORY: line placement; K52.9-Noninfective gastroenteritis and colitis,  unspecified; E83.42-Hypomagnesemia; E87.6-Hypokalemia;  E83.51-Hypocalcemia; N17.9-Acute kidney failure, unspecified     Images are stored in PACS per institutional protocol.     1 view chest x-ray.     COMPARISON:  7/3/2025.     RIGHT jugular central line has been placed and is in good position.     Mild chronic interstitial lung disease.  No focal infiltrate.     The heart is magnified by the portable projection.       Impression:      1. Well-positioned RIGHT jugular central line.  2. Stable appearance of the lungs.           This report was signed and finalized on 7/6/2025 8:50 AM by Dr. Leonard Rogers MD.       XR Shoulder 2+ View Left [[id number]] Collected: 07/03/25 2143     Updated: 07/03/25 2147    Narrative:      EXAMINATION: XR SHOULDER 2+ VW LEFT- 7/3/2025 9:43 PM     HISTORY: Pain with left shoulder decreased range of motion, status post  fall; K52.9-Noninfective gastroenteritis and colitis, unspecified;  E83.42-Hypomagnesemia; E87.6-Hypokalemia; E83.51-Hypocalcemia;  N17.9-Acute kidney failure, unspecified.     REPORT: 2 views of the left shoulder were obtained.     COMPARISON: There are no correlative imaging studies for comparison.     There is diffuse osteopenia, no fracture is identified. Osseous  alignment is normal. There is mild spurring of  the acromion process, AC  joint and greater tuberosity. The glenohumeral joint is preserved. The  soft tissues appear within normal limits.       Impression:      No fracture, no acute osseous abnormality. Degenerative  changes as described, mild diffuse osteopenia.     This report was signed and finalized on 7/3/2025 9:44 PM by Dr. Venkata Muniz MD.       XR Chest 1 View [[id number]] Collected: 07/03/25 2142     Updated: 07/03/25 2146    Narrative:      EXAMINATION: XR CHEST 1 VW- 7/3/2025 9:42 PM     HISTORY: Leukocytosis; K52.9-Noninfective gastroenteritis and colitis,  unspecified; E83.42-Hypomagnesemia; E87.6-Hypokalemia;  E83.51-Hypocalcemia; N17.9-Acute kidney failure, unspecified.     REPORT: Frontal view of the chest was obtained.     COMPARISON: Chest x-ray 2/24/2025.     The lungs are free of infiltrates, no pneumothorax or pleural effusion  is identified. Heart size is normal. The osseous structures and upper  abdomen are unremarkable.       Impression:      No acute cardiopulmonary abnormality.     This report was signed and finalized on 7/3/2025 9:43 PM by Dr. Venkata Muniz MD.       CT Abdomen Pelvis Without Contrast [[id number]] Collected: 07/03/25 1811     Updated: 07/03/25 1822    Narrative:      EXAMINATION: CT ABDOMEN PELVIS WO CONTRAST- 7/3/2025 6:11 PM     HISTORY: Diffuse abdominal pain; diarrhea/vomiting     TOTAL DOSE: 990.85 mGy.cm (Automatic exposure control technique was  implemented in an effort to keep the radiation dose as low as possible  without compromising image quality)     REPORT: Spiral CT of the abdomen and pelvis was performed without  contrast from the lung bases through the pubic symphysis. Reconstructed  coronal and sagittal images are also reviewed.     Comparison: CT abdomen and pelvis with contrast 2/22/2023.     Lung windows demonstrate mild respiratory motion artifact, the lung  bases are clear. There is a 3 mm subpleural nodule in the right middle  lobe image 3 of  series 2, too small to fully characterize. This is  likely benign.     Evaluation of the solid abdominal organs is limited without intravenous  contrast. Decreased attenuation of the liver likely represents  steatosis. The spleen appears within normal limits. There is a stable 4  cm soft tissue mass lateral to the inferior spleen which has the same  attenuation as the spleen, probably an accessory splenule.     The stomach is decompressed. The gallbladder is not visualized. Atrophy  of the pancreas is noted. There is dense material seen near the raine  hepatis with streak artifact, this is unchanged, presumably related to  the some type procedure. Adrenal glands are within normal limits. No  nephrolithiasis is identified and there is no hydronephrosis. There is a  nonobstructing 7 mm stone in the left renal pelvis which is new. The  ureters are decompressed. The bladder appears within normal limits.     No free fluid or free air is identified. Bowel loops are normal in  caliber, occasional diverticula are seen in the colon without evidence  of acute diverticulitis. There is some fluid retention within segments  of the colon and small intestine without evidence of obstruction and no  significant bowel wall thickening is identified. There is evidence of  previous partial right colectomy, the ileocolic anastomosis appears  within normal limits. The appendix is surgically absent. Review of bone  windows shows no acute osseous abnormality. Advanced degenerative  changes are present in the thoracic and lumbar spine.       Impression:      1. Fluid retention within the small intestine and portions of the colon  compatible with the history of diarrhea, no evidence of bowel  obstruction, no bowel wall thickening. Correlate clinically for mild  enteritis or diarrheal type illness.  2. New small stone in the left renal pelvis, measuring 7 mm. No urinary  tract obstruction is identified.  3. Postoperative changes, including  previous partial right colectomy,  appendectomy and there is stable dense material in the region of the  raine hepatis which may be related to previous surgical intervention.  The gallbladder is not visualized.  4. Stable accessory spleen measuring up to 4 cm.        This report was signed and finalized on 7/3/2025 6:19 PM by Dr. Venkata Muniz MD.               Culture:  Blood Culture   Date Value Ref Range Status   07/03/2025 No growth at 2 days  Preliminary   07/03/2025 No growth at 2 days  Preliminary         Assessment   1.  Acute kidney injury/stable.  2.  Nonoliguric acute tubular necrosis.  3.  Hemodynamic effect of nonsteroidal agent.  4.  Severe metabolic acidosis/anion gap and non-anion gap  5.  Persistent hypokalemia/improved.  6.  Hypomagnesemia now resolved.  7.  Stage III chronic kidney disease baseline  8.  Iron deficiency anemia.  9.  Persistent hypokalemia/improving    Plan:  1.  Patient has nonoliguric acute tubular necrosis caused by hemodynamic effect of nonsteroidal agent in the setting of volume depletion.  Patient has significant volume resuscitation, now has stable hemodynamics.  Today she has evidence of volume overload.  I have recommended to give her Bumex 1 mg IV every 12 hours.  I will go ahead and KVO IV fluid.  She also agreed to proceed with short-term dialysis.  I would request vascular surgery consultation to establish vascular access in the a.m.  I will make her n.p.o. tonight.  2.  Renal ultrasound consistent with normal studies.  3.  P.o. potassium supplement to keep potassium between 3.5 to 4.0 mmol.  4.  Intravenous ferric gluconate for the treatment of iron deficiency anemia.  5.  Plan was discussed with the patient.      Miguel Chow MD  7/6/2025  14:48 CDT      Electronically signed by Miguel Chow MD at 07/06/25 1456       Dennis Ansari MD at 07/06/25 8191               [facility] Intensivist Services    Date of Admission:  7/3/2025  Date of Note: 07/06/25  Primary Care Physician: Provider, No Known   LOS: 3 days     History   Next of Kin:  Primary Emergency Contact: Rodger Marrufo, [place] Phone: [phone]   Code Status: Code Status and Medical Interventions: CPR (Attempt to Resuscitate); Full Support; Discussed CODE STATUS with Mr. Laughlin.  Patient is ,  is in a nursing home.  She does not have any children.    She is a 69 y.o. female admitted 7/3/2025 with Enteritis who also  has a past medical history of Acid reflux, Arthritis, High cholesterol, Hypertension, Neuropathy, Swollen lymph nodes, and Type 2 diabetes mellitus.    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     She takes albuterol sulfate HFA, ibuprofen, and ondansetron ODT at home based on most current med reconciliation.   Complete list is below.     The ICU team was asked to evaluate the patient for severe dehydration with electrolyte abnormalities and acute kidney injury..    7/4 today patient is feeling a little bit better.  Her cramps has improved.  She can move all of her upper and lower extremities fine.  Denies having any major abdominal pain.  Have not had anything to eat  yet.  No nausea or vomiting noted    7/5 patient complaining of having a lot of throat pain.  No more diarrhea noted.  Seems slightly confused on conversation.  Denies having any chest discomfort.  No episodes of fever overnight.  On room air.  Blood pressures okay.    7/ 6 doing fair this morning fairly awake denies having any other new discomfort able to move all extremities fine tolerating oral okay with no fever    Past Medical History     Active and Resolved Problems  Active Hospital Problems    Diagnosis  POA    **Enteritis [K52.9]  Yes      Resolved Hospital Problems   No resolved problems to display.       Allergies     Allergies:   She has no known allergies.    Labs   Basic Labs:  CBC:      Lab 07/06/25 0356 07/05/25 0223 07/04/25  1418 07/03/25  1628   WBC 11.81* 17.28* 16.56* 25.12*   HEMOGLOBIN 10.1* 11.0* 10.9* 12.6   HEMATOCRIT 29.5* 32.4* 32.2* 36.2   PLATELETS 172 213 204 233   NEUTROS ABS 10.17* 16.17* 15.24* 22.36*   IMMATURE GRANS (ABS) 0.10* 0.12* 0.11* 0.15*   LYMPHS ABS 0.48* 0.37* 0.40* 1.24   MONOS ABS 0.65 0.51 0.67 1.31*   EOS ABS 0.38 0.08 0.11 0.02   MCV 93.4 94.2 96.1 94.5       LIVER FUNCTION AND COAG TESTS:      Lab 07/06/25 0356 07/05/25 0223 07/03/25  1706 07/03/25  1628   TOTAL PROTEIN 4.9*  --  5.4*  --    ALBUMIN 2.6*  --  2.9*  --    GLOBULIN 2.3  --  2.5  --    ALT (SGPT) 32  --  15  --    AST (SGOT) 61*  --  41*  --    BILIRUBIN 0.2  --  0.3  --    ALK PHOS 87  --  82  --    AMYLASE  --   --   --  19*   LIPASE  --  61*  --  21       ABG:      Lab 07/06/25  0356 07/05/25 0223 07/04/25  1418 07/04/25  0036 07/03/25  1706   ANION GAP 15.0 21.0* 21.0* 23.0* 23.0*       CMP:      Lab 07/06/25  0356 07/05/25  2348 07/05/25  0223 07/04/25  1418 07/04/25  0036 07/03/25  1706   SODIUM 134*  --  132* 135* 133* 135*   POTASSIUM 3.0* 3.5 2.2* 2.2* 2.1* 1.7*   CHLORIDE 104  --  100 102 101 100   BUN 36.5*  --  39.5* 41.4* 42.7* 44.5*   CREATININE 4.65*  --  4.69* 4.71* 4.69* 5.04*    CALCIUM 6.7*  --  7.6* 6.0* 5.9* 5.6*   MAGNESIUM 2.2  --  2.8* 2.3 1.4* 0.8*   PHOSPHORUS 3.4  --  5.7* 4.7*  --   --    GLUCOSE 208*  --  215* 189* 126* 161*   EGFR 9.6*  --  9.5* 9.5* 9.5* 8.7*       Inpatient Medications   Scheduled Meds:cefTRIAXone, 2,000 mg, Intravenous, Q24H  Chlorhexidine Gluconate Cloth, 1 Application, Topical, Q24H  heparin (porcine), 5,000 Units, Subcutaneous, Q12H  metroNIDAZOLE, 500 mg, Intravenous, Q8H  mupirocin, 1 Application, Each Nare, BID  pantoprazole, 40 mg, Oral, BID AC  potassium chloride, 40 mEq, Oral, TID  senna-docusate sodium, 2 tablet, Oral, BID  sodium chloride, 10 mL, Intravenous, Q12H      Continuous Infusions:sodium chloride 0.45 % 905 mL with potassium chloride 40 mEq, sodium bicarbonate 8.4 % 75 mEq infusion, , Last Rate: 150 mL/hr at 07/06/25 0312      PRN Meds:.  acetaminophen **OR** acetaminophen    aluminum-magnesium hydroxide-simethicone    senna-docusate sodium **AND** polyethylene glycol **AND** bisacodyl **AND** bisacodyl    calcium carbonate    Calcium Replacement - Follow Nurse / BPA Driven Protocol    HYDROcodone-acetaminophen    Magnesium Standard Dose Replacement - Follow Nurse / BPA Driven Protocol    nitroglycerin    ondansetron    Potassium Replacement - Follow Nurse / BPA Driven Protocol    [COMPLETED] Insert Peripheral IV **AND** sodium chloride    sodium chloride    sodium chloride    I have reviewed the patient's current medications.   Outpatient Medications     Current Outpatient Medications   Medication Instructions    albuterol sulfate  (90 Base) MCG/ACT inhaler 2 puffs, Inhalation, Every 4 Hours PRN    ibuprofen (ADVIL,MOTRIN) 1,000 mg, Oral, Every 6 Hours PRN    ondansetron ODT (ZOFRAN-ODT) 4 mg, Translingual, Every 4 Hours PRN       Current Antibiotics   cefTRIAXone (ROCEPHIN) 2000 mg IVPB in 100 mL NS (MBP)  metroNIDAZOLE - 500-0.79 MG/100ML-%    Exam   Vent settings for last 24 hours:       Vital signs for last 24 hours:  Temp:   "[98.1 °F (36.7 °C)-98.4 °F (36.9 °C)] 98.4 °F (36.9 °C)  Heart Rate:  [] 98  Resp:  [15-26] 26  BP: ()/(47-86) 99/65    Vitals: Her  height is 165.1 cm (65\") and weight is 100 kg (220 lb 10.9 oz). Her oral temperature is 98.4 °F (36.9 °C). Her blood pressure is 99/65 and her pulse is 98. Her respiration is 26 and oxygen saturation is 96%.     Constitutional:       General: She is not in acute distress.  Slightly confused  HENT:      Head: Normocephalic and atraumatic.      Nose: Nose normal.      Mouth/Throat:      Mouth: Mucous membranes are dry.      Comments: Extremely dry mucosa.    Eyes:      Extraocular Movements: Extraocular movements intact.      Pupils: Pupils are equal, round, and reactive to light.   Cardiovascular:      Rate and Rhythm: Normal rate and regular rhythm.   Pulmonary:      Effort: Pulmonary effort is normal.      Breath sounds: Normal breath sounds.   Abdominal:      General: There is no distension.      Palpations: Abdomen is soft.      Tenderness: There is no guarding.      Comments: Well-healed vertical incisional site.  No rebound tenderness or concerns for peritonitis on exam.  Bowel sounds are normal  Musculoskeletal:         General: No deformity.      Cervical back: Normal range of motion.      Right lower leg: No edema.      Left lower leg: No edema.      Comments: Cramping noted to right hand and feet bilateral improved ROM now.   Skin:     General: Skin is warm and dry.      Capillary Refill: Capillary refill takes 2 to 3 seconds.      Findings: No bruising.   Neurological:      General: No focal deficit present.      Mental Status: She is alert and oriented to person, place, and time.        Intake/Output   Intake/Output this shift:  I/O this shift:  In: 258.7 [I.V.:258.7]  Out: -     Intake/Output last 3 shifts:  I/O last 3 completed shifts:  In: 6205.6 [P.O.:2102; I.V.:2503.6; IV Piggyback:1600]  Out: 500 [Urine:500]    Results and Cultures Review     Result " Review:  I have personally reviewed the results from the time of this admission to 7/6/2025 08:42 CDT and agree with these findings:  [x]  Laboratory list / accordion  [x]  Microbiology  [x]  Radiology  []  EKG/Telemetry   []  Cardiology/Vascular   []  Pathology  []  Old records  []  Other:  Most notable findings include: mg 1.3  K .3.0         Culture Data:     Assessment/Plan       Enteritis  Came with abdominal pain with severe dehydration.    Continue current therapy with Flagyl.  No signs of infection noted.  No more diarrhea noted.    Try oral liquids to see if she tolerates.    2 Acute kidney injury associated with creatinine of 5.0- 4.69- 4.65   Prerenal associated with hypovolemia.  Patient uses NSAIDs routinely may be causing acute tubular necrosis  Will continue with IV hydration.    Appreciate help from nephrology     3 Electrolyte abnormalities with   Severe hypokalemia-replaced recheck 3.0  Magnesium replaced as well   May be associated with a GI disorder with the significant diarrhea but seems pretty refractory at this point I will recheck the electrolytes cannot rule out the possibility of  RTA   We will continue to closely monitor    4 Non obstructive renal stone  Cont hydration, no s/s for now.fu conservatively    5 Pem low protein / albumin level   Optimize oral when able to take  .      6 VTE Prophylaxis:    Pharmacologic & mechanical VTE prophylaxis orders are present.      7 Active VTE Prophylaxis  Pharmacologic:        Start     Dose Route Frequency Stop    07/03/25 2100  heparin (porcine) 5000 UNIT/ML injection 5,000 Units         5,000 Units SC Every 12 Hours Scheduled --                  Mechanical:        Start        07/03/25 1956  Maintain Sequential Compression Device  Continuous                            Code Status and Medical Interventions: CPR (Attempt to Resuscitate); Full Support; Discussed CODE STATUS with Klever Truman.  Patient is ,  is in a nursing home.  She does  not have any children.   Ordered at: 07/03/25 2000     Code Status (Patient has no pulse and is not breathing):    CPR (Attempt to Resuscitate)     Medical Interventions (Patient has pulse or is breathing):    Full Support     Comments:    Discussed CODE STATUS with Mr. Laughlin.  Patient is ,  is in a nursing home.  She does not have any children.     Level Of Support Discussed With:    Patient       Due to a high probability of clinically significant, life threatening deterioration, the patient required my highest level of preparedness to intervene emergently and I personally spent this critical care time directly and personally managing the patient.     This critical care time included obtaining a history; examining the patient; pulse oximetry; ordering and review of studies; arranging urgent treatment with development of a management plan; evaluation of patient's response to treatment; frequent reassessment; and, discussions with other providers.    This critical care time was performed to assess and manage the high probability of imminent, life-threatening deterioration that could result in multi-organ failure. It was exclusive of separately billable procedures and treating other patients and teaching time.    Please see MDM section and the rest of the note for further information on patient assessment and treatment.    Part of this note may be an electronic transcription/translation of spoken language to printed text using the Dragon Dictation System    Electronically signed by Dennis Ansari MD on 7/6/2025 at 08:42 CDT                         Electronically signed by Dennis Ansari MD at 07/06/25 0845       Dennis Ansari MD at 07/05/25 0902               [facility] Intensivist [facility]    Date of Admission: 7/3/2025  Date of Note: 07/05/25  Primary Care Physician: Provider, No Known   LOS: 2 days     History   Next of Kin:  Primary Emergency Contact:  Rodger Marrufo, [place] Phone: [phone]   Code Status: Code Status and Medical Interventions: CPR (Attempt to Resuscitate); Full Support; Discussed CODE STATUS with Mr. Laughlin.  Patient is ,  is in a nursing home.  She does not have any children.    She is a 69 y.o. female admitted 7/3/2025 with Enteritis who also  has a past medical history of Acid reflux, Arthritis, High cholesterol, Hypertension, Neuropathy, Swollen lymph nodes, and Type 2 diabetes mellitus.    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     She takes albuterol sulfate HFA, ibuprofen, and ondansetron ODT at home based on most current med reconciliation.   Complete list is below.     The ICU team was asked to evaluate the patient for severe dehydration with electrolyte abnormalities and acute kidney injury..    7/4 today patient is feeling a little bit better.  Her cramps has improved.  She can move all of her upper and lower extremities fine.  Denies having any major abdominal pain.  Have not had anything to eat yet.  No nausea or vomiting noted    7/5 patient complaining of having a lot of throat pain.  No more diarrhea noted.  Seems slightly confused on  conversation.  Denies having any chest discomfort.  No episodes of fever overnight.  On room air.  Blood pressures okay.    Past Medical History     Active and Resolved Problems  Active Hospital Problems    Diagnosis  POA    **Enteritis [K52.9]  Yes      Resolved Hospital Problems   No resolved problems to display.       Allergies     Allergies:   She has no known allergies.    Labs   Basic Labs:  CBC:      Lab 07/05/25 0223 07/04/25 1418 07/03/25  1628   WBC 17.28* 16.56* 25.12*   HEMOGLOBIN 11.0* 10.9* 12.6   HEMATOCRIT 32.4* 32.2* 36.2   PLATELETS 213 204 233   NEUTROS ABS 16.17* 15.24* 22.36*   IMMATURE GRANS (ABS) 0.12* 0.11* 0.15*   LYMPHS ABS 0.37* 0.40* 1.24   MONOS ABS 0.51 0.67 1.31*   EOS ABS 0.08 0.11 0.02   MCV 94.2 96.1 94.5       LIVER FUNCTION AND COAG TESTS:      Lab 07/05/25 0223 07/03/25  1706 07/03/25  1628   TOTAL PROTEIN  --  5.4*  --    ALBUMIN  --  2.9*  --    GLOBULIN  --  2.5  --    ALT (SGPT)  --  15  --    AST (SGOT)  --  41*  --    BILIRUBIN  --  0.3  --    ALK PHOS  --  82  --    AMYLASE  --   --  19*   LIPASE 61*  --  21       ABG:      Lab 07/05/25 0223 07/04/25 1418 07/04/25  0036 07/03/25  1706   ANION GAP 21.0* 21.0* 23.0* 23.0*       CMP:      Lab 07/05/25 0223 07/04/25 1418 07/04/25  0036 07/03/25  1706   SODIUM 132* 135* 133* 135*   POTASSIUM 2.2* 2.2* 2.1* 1.7*   CHLORIDE 100 102 101 100   BUN 39.5* 41.4* 42.7* 44.5*   CREATININE 4.69* 4.71* 4.69* 5.04*   CALCIUM 7.6* 6.0* 5.9* 5.6*   MAGNESIUM 2.8* 2.3 1.4* 0.8*   PHOSPHORUS 5.7* 4.7*  --   --    GLUCOSE 215* 189* 126* 161*   EGFR 9.5* 9.5* 9.5* 8.7*       Inpatient Medications   Scheduled Meds:cefTRIAXone, 2,000 mg, Intravenous, Q24H  Chlorhexidine Gluconate Cloth, 1 Application, Topical, Q24H  heparin (porcine), 5,000 Units, Subcutaneous, Q12H  metroNIDAZOLE, 500 mg, Intravenous, Q8H  mupirocin, 1 Application, Each Nare, BID  senna-docusate sodium, 2 tablet, Oral, BID  sodium chloride, 10 mL, Intravenous,  "Q12H      Continuous Infusions:sodium chloride, 50 mL/hr, Last Rate: 50 mL/hr (07/05/25 0604)      PRN Meds:.  acetaminophen **OR** acetaminophen    senna-docusate sodium **AND** polyethylene glycol **AND** bisacodyl **AND** bisacodyl    calcium carbonate    Calcium Replacement - Follow Nurse / BPA Driven Protocol    HYDROcodone-acetaminophen    Magnesium Standard Dose Replacement - Follow Nurse / BPA Driven Protocol    nitroglycerin    ondansetron    Potassium Replacement - Follow Nurse / BPA Driven Protocol    [COMPLETED] Insert Peripheral IV **AND** sodium chloride    sodium chloride    sodium chloride    I have reviewed the patient's current medications.   Outpatient Medications     Current Outpatient Medications   Medication Instructions    albuterol sulfate  (90 Base) MCG/ACT inhaler 2 puffs, Inhalation, Every 4 Hours PRN    ibuprofen (ADVIL,MOTRIN) 1,000 mg, Oral, Every 6 Hours PRN    ondansetron ODT (ZOFRAN-ODT) 4 mg, Translingual, Every 4 Hours PRN       Current Antibiotics   cefTRIAXone (ROCEPHIN) 2000 mg IVPB in 100 mL NS (MBP)  metroNIDAZOLE - 500-0.79 MG/100ML-%    Exam   Vent settings for last 24 hours:       Vital signs for last 24 hours:  Temp:  [98.2 °F (36.8 °C)-98.4 °F (36.9 °C)] 98.4 °F (36.9 °C)  Heart Rate:  [] 88  Resp:  [14-20] 14  BP: ()/() 106/78    Vitals: Her  height is 165.1 cm (65\") and weight is 93.5 kg (206 lb 2.1 oz). Her oral temperature is 98.4 °F (36.9 °C). Her blood pressure is 106/78 and her pulse is 88. Her respiration is 14 and oxygen saturation is 99%.     Constitutional:       General: She is not in acute distress.  Slightly confused  HENT:      Head: Normocephalic and atraumatic.      Nose: Nose normal.      Mouth/Throat:      Mouth: Mucous membranes are dry.      Comments: Extremely dry mucosa.    Eyes:      Extraocular Movements: Extraocular movements intact.      Pupils: Pupils are equal, round, and reactive to light.   Cardiovascular:      Rate " and Rhythm: Normal rate and regular rhythm.   Pulmonary:      Effort: Pulmonary effort is normal.      Breath sounds: Normal breath sounds.   Abdominal:      General: There is no distension.      Palpations: Abdomen is soft.      Tenderness: There is no guarding.      Comments: Well-healed vertical incisional site.  No rebound tenderness or concerns for peritonitis on exam.  Bowel sounds are normal  Musculoskeletal:         General: No deformity.      Cervical back: Normal range of motion.      Right lower leg: No edema.      Left lower leg: No edema.      Comments: Cramping noted to right hand and feet bilateral improved ROM now.   Skin:     General: Skin is warm and dry.      Capillary Refill: Capillary refill takes 2 to 3 seconds.      Findings: No bruising.   Neurological:      General: No focal deficit present.      Mental Status: She is alert and oriented to person, place, and time.        Intake/Output   Intake/Output this shift:  I/O this shift:  In: 264 [I.V.:264]  Out: -     Intake/Output last 3 shifts:  I/O last 3 completed shifts:  In: 6869.6 [P.O.:2696; I.V.:2673.6; IV Piggyback:1500]  Out: 501 [Urine:500; Stool:1]    Results and Cultures Review     Result Review:  I have personally reviewed the results from the time of this admission to 7/5/2025 09:02 CDT and agree with these findings:  [x]  Laboratory list / accordion  [x]  Microbiology  [x]  Radiology  []  EKG/Telemetry   []  Cardiology/Vascular   []  Pathology  []  Old records  []  Other:  Most notable findings include: mg 1.3  K 2.2        Culture Data:     Assessment/Plan       Enteritis  Came with abdominal pain with severe dehydration.    Continue current therapy with Flagyl.   No more diarrhea noted.    Try oral liquids to see if she tolerates.    2 Acute kidney injury associated with creatinine of 5.0- 4.69  Prerenal associated with hypovolemia.    Will continue with IV hydration.    Check urine Fena check urine sodium   continue with close  hydration for now.  Continue replacing electrolytes and recheck check renal ultrasound.  Nephrology consult    3 Electrolyte abnormalities with   Severe hypokalemia-replaced recheck 2.1   Hypomagnesemia magnesium level of 1.3 replace and recheck.  May be associated with a GI disorder with the significant diarrhea but seems pretty refractory at this point I will recheck the electrolytes cannot rule out the possibility of distal  RTA especially with an elevated anion gap.  Will check lactic acid since both diarrheal illness and RTA should be causing a normal anion gap acidosis well patient's anion gap is about 23 now.    4 Non obstructive renal stone  Cont hydration, no s/s for now.fu conservatively    5 Pem low protein / albumin level   Optimize oral when able to take  .      6 VTE Prophylaxis:    Pharmacologic & mechanical VTE prophylaxis orders are present.      7 Active VTE Prophylaxis  Pharmacologic:        Start     Dose Route Frequency Stop    07/03/25 2100  heparin (porcine) 5000 UNIT/ML injection 5,000 Units         5,000 Units SC Every 12 Hours Scheduled --                  Mechanical:        Start        07/03/25 1956  Maintain Sequential Compression Device  Continuous                            Code Status and Medical Interventions: CPR (Attempt to Resuscitate); Full Support; Discussed CODE STATUS with Mr. Laughlin.  Patient is ,  is in a nursing home.  She does not have any children.   Ordered at: 07/03/25 2000     Code Status (Patient has no pulse and is not breathing):    CPR (Attempt to Resuscitate)     Medical Interventions (Patient has pulse or is breathing):    Full Support     Comments:    Discussed CODE STATUS with Mr. Laughlin.  Patient is ,  is in a nursing home.  She does not have any children.     Level Of Support Discussed With:    Patient       Due to a high probability of clinically significant, life threatening deterioration, the patient required my highest level of  preparedness to intervene emergently and I personally spent this critical care time directly and personally managing the patient.     This critical care time included obtaining a history; examining the patient; pulse oximetry; ordering and review of studies; arranging urgent treatment with development of a management plan; evaluation of patient's response to treatment; frequent reassessment; and, discussions with other providers.    This critical care time was performed to assess and manage the high probability of imminent, life-threatening deterioration that could result in multi-organ failure. It was exclusive of separately billable procedures and treating other patients and teaching time.    Please see MDM section and the rest of the note for further information on patient assessment and treatment.    Part of this note may be an electronic transcription/translation of spoken language to printed text using the Dragon Dictation System    Electronically signed by Dennis Ansari MD on 7/5/2025 at 09:02 CDT                         Electronically signed by Dennis Ansari MD at 07/05/25 0916       Dennis Ansari MD at 07/04/25 0757               [facility]    Date of Admission: 7/3/2025  Date of Note: 07/04/25  Primary Care Physician: Provider, No Known   LOS: 1 day     History   Next of Kin:  Primary Emergency Contact: YaronRodger, [place] Phone: [phone]   Code Status: Code Status and Medical Interventions: CPR (Attempt to Resuscitate); Full Support; Discussed CODE STATUS with Mr. Laughlin.  Patient is ,  is in a nursing home.  She does not have any children.    She is a 69 y.o. female admitted 7/3/2025 with Enteritis who also  has a past medical history of Acid reflux, Arthritis, High cholesterol, Hypertension, Neuropathy, Swollen lymph nodes, and Type 2 diabetes mellitus.    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     She takes ibuprofen and ondansetron ODT at home based on most current med reconciliation.   Complete list is below.     The ICU team was asked to evaluate the patient for severe dehydration with electrolyte abnormalities and acute kidney injury..    7/4 today patient is feeling a little bit better.  Her cramps has improved.  She can move all of her upper and lower extremities fine.  Denies having any major abdominal pain.  Have not had anything to eat yet.  No nausea or vomiting noted    Past Medical History     Active and Resolved Problems  Active Hospital Problems    Diagnosis  POA    **Enteritis [K52.9]  Yes      Resolved Hospital Problems   No resolved problems to display.       Allergies     Allergies:   She has no known allergies.    Labs   Basic Labs:  CBC:      Lab 07/03/25  1628   WBC 25.12*   HEMOGLOBIN 12.6   HEMATOCRIT 36.2   PLATELETS 233   NEUTROS ABS 22.36*   IMMATURE GRANS (ABS) 0.15*   LYMPHS ABS 1.24   MONOS ABS 1.31*   EOS ABS 0.02   MCV 94.5       LIVER FUNCTION AND COAG TESTS:      Lab 07/03/25  1706 07/03/25  1628   TOTAL PROTEIN 5.4*  --    ALBUMIN 2.9*  --    GLOBULIN 2.5  --    ALT (SGPT) 15  --    AST (SGOT) 41*  --    BILIRUBIN 0.3  " --    ALK PHOS 82  --    AMYLASE  --  19*   LIPASE  --  21       ABG:      Lab 07/04/25  0036 07/03/25  1706   ANION GAP 23.0* 23.0*       CMP:      Lab 07/04/25  0036 07/03/25  1706   SODIUM 133* 135*   POTASSIUM 2.1* 1.7*   CHLORIDE 101 100   BUN 42.7* 44.5*   CREATININE 4.69* 5.04*   CALCIUM 5.9* 5.6*   MAGNESIUM 1.4* 0.8*   GLUCOSE 126* 161*   EGFR 9.5* 8.7*       Inpatient Medications   Scheduled Meds:cefTRIAXone, 2,000 mg, Intravenous, Q24H  Chlorhexidine Gluconate Cloth, 1 Application, Topical, Q24H  heparin (porcine), 5,000 Units, Subcutaneous, Q12H  metroNIDAZOLE, 500 mg, Intravenous, Q8H  mupirocin, 1 Application, Each Nare, BID  senna-docusate sodium, 2 tablet, Oral, BID  sodium chloride, 10 mL, Intravenous, Q12H      Continuous Infusions:   PRN Meds:.  acetaminophen **OR** acetaminophen    senna-docusate sodium **AND** polyethylene glycol **AND** bisacodyl **AND** bisacodyl    calcium carbonate    Calcium Replacement - Follow Nurse / BPA Driven Protocol    HYDROcodone-acetaminophen    Magnesium Standard Dose Replacement - Follow Nurse / BPA Driven Protocol    nitroglycerin    ondansetron    Potassium Replacement - Follow Nurse / BPA Driven Protocol    [COMPLETED] Insert Peripheral IV **AND** sodium chloride    sodium chloride    sodium chloride    I have reviewed the patient's current medications.   Outpatient Medications     Current Outpatient Medications   Medication Instructions    ibuprofen (ADVIL,MOTRIN) 200 mg, Every 6 Hours PRN    ondansetron ODT (ZOFRAN-ODT) 4 mg, Translingual, Every 4 Hours PRN       Current Antibiotics   cefTRIAXone (ROCEPHIN) 2000 mg IVPB in 100 mL NS (MBP)  metroNIDAZOLE - 500-0.79 MG/100ML-%    Exam   Vent settings for last 24 hours:       Vital signs for last 24 hours:  Temp:  [97.7 °F (36.5 °C)-97.9 °F (36.6 °C)] 97.9 °F (36.6 °C)  Heart Rate:  [82-92] 83  Resp:  [14-21] 21  BP: ()/(46-98) 140/48    Vitals: Her  height is 165.1 cm (65\") and weight is 88.8 kg (195 lb " 12.3 oz). Her oral temperature is 97.9 °F (36.6 °C). Her blood pressure is 140/48 and her pulse is 83. Her respiration is 21 and oxygen saturation is 94%.     Constitutional:       General: She is not in acute distress.  HENT:      Head: Normocephalic and atraumatic.      Nose: Nose normal.      Mouth/Throat:      Mouth: Mucous membranes are dry.      Comments: Extremely dry mucosa.    Eyes:      Extraocular Movements: Extraocular movements intact.      Pupils: Pupils are equal, round, and reactive to light.   Cardiovascular:      Rate and Rhythm: Normal rate and regular rhythm.   Pulmonary:      Effort: Pulmonary effort is normal.      Breath sounds: Normal breath sounds.   Abdominal:      General: There is no distension.      Palpations: Abdomen is soft.      Tenderness: There is no guarding.      Comments: Well-healed vertical incisional site.  No rebound tenderness or concerns for peritonitis on exam.  Hyperactive bowel sounds   Musculoskeletal:         General: No deformity.      Cervical back: Normal range of motion.      Right lower leg: No edema.      Left lower leg: No edema.      Comments: Cramping noted to right hand and feet bilateral improved ROM now.   Skin:     General: Skin is warm and dry.      Capillary Refill: Capillary refill takes 2 to 3 seconds.      Findings: No bruising.   Neurological:      General: No focal deficit present.      Mental Status: She is alert and oriented to person, place, and time.        Intake/Output   Intake/Output this shift:  No intake/output data recorded.    Intake/Output last 3 shifts:  I/O last 3 completed shifts:  In: 2554 [P.O.:354; I.V.:1600; IV Piggyback:600]  Out: 250 [Urine:250]    Results and Cultures Review     Result Review:  I have personally reviewed the results from the time of this admission to 7/4/2025 07:58 CDT and agree with these findings:  [x]  Laboratory list / accordion  [x]  Microbiology  [x]  Radiology  []  EKG/Telemetry   []   Cardiology/Vascular   []  Pathology  []  Old records  []  Other:  Most notable findings include: mg 1.3  K 2.3        Culture Data:     Assessment/Plan       Enteritis  Came with abdominal pain with severe dehydration.  Continue current therapy with Flagyl.  Left seems to be feeling a little bit better.  No more diarrhea noted.  Try oral liquids to see if she tolerates.  2 acute kidney injury associated with creatinine of 5.0  Prerenal associated with hypovolemia.  Creatinine seems to be improving down to 4.39.  Will continue with IV hydration.  Check urine Fena check urine sodium continue with close hydration for now.  3 electrolyte abnormalities with   Severe hypokalemia-replaced recheck 2.1   Hypomagnesemia magnesium level of 1.3 replace and recheck.  4 non obstructive renal stone  Cont hydration, no s/s for now.fu conservatively  5 Pem low protein / albumin level   Optimize oral when able to take  .    6 VTE Prophylaxis:    Pharmacologic & mechanical VTE prophylaxis orders are present.      7 Active VTE Prophylaxis  Pharmacologic:        Start     Dose Route Frequency Stop    07/03/25 2100  heparin (porcine) 5000 UNIT/ML injection 5,000 Units         5,000 Units SC Every 12 Hours Scheduled --                  Mechanical:        Start        07/03/25 1956  Maintain Sequential Compression Device  Continuous                            Code Status and Medical Interventions: CPR (Attempt to Resuscitate); Full Support; Discussed CODE STATUS with Mr. Laughlin.  Patient is ,  is in a nursing home.  She does not have any children.   Ordered at: 07/03/25 2000     Code Status (Patient has no pulse and is not breathing):    CPR (Attempt to Resuscitate)     Medical Interventions (Patient has pulse or is breathing):    Full Support     Comments:    Discussed CODE STATUS with Mr. Laughlin.  Patient is ,  is in a nursing home.  She does not have any children.     Level Of Support Discussed With:    Patient        Due to a high probability of clinically significant, life threatening deterioration, the patient required my highest level of preparedness to intervene emergently and I personally spent this critical care time directly and personally managing the patient.     This critical care time included obtaining a history; examining the patient; pulse oximetry; ordering and review of studies; arranging urgent treatment with development of a management plan; evaluation of patient's response to treatment; frequent reassessment; and, discussions with other providers.    This critical care time was performed to assess and manage the high probability of imminent, life-threatening deterioration that could result in multi-organ failure. It was exclusive of separately billable procedures and treating other patients and teaching time.    Please see MDM section and the rest of the note for further information on patient assessment and treatment.    Part of this note may be an electronic transcription/translation of spoken language to printed text using the Dragon Dictation System    Electronically signed by Dennis Ansari MD on 7/4/2025 at 07:58 CDT                         Electronically signed by Dennis Ansari MD at 07/04/25 0813          Consult Notes (last 4 days)        Miguel Chow MD at 07/05/25 1318          Nephrology ([facility]) Consult Note      Patient:  Lindsay Laughlin  YOB: 1955  Date of Service: 7/5/2025  MRN: [id number]   Acct: [id number]   Primary Care Physician: Provider, No Known  Advance Directive:   Code Status and Medical Interventions: CPR (Attempt to Resuscitate); Full Support; Discussed CODE STATUS with Mr. Laughlin.  Patient is ,  is in a nursing home.  She does not have any children.   Ordered at: 07/03/25 2000     Code Status (Patient has no pulse and is not breathing):    CPR (Attempt to Resuscitate)     Medical Interventions (Patient has pulse or  is breathing):    Full Support     Comments:    Discussed CODE STATUS with Mr. Laughlin.  Patient is ,  is in a nursing home.  She does not have any children.     Level Of Support Discussed With:    Patient     Admit Date: 7/3/2025       Hospital Day: 2  Referring Provider: Shade Alberts MD      Patient Seen, Chart, Consults, Notes, Labs, Radiology studies reviewed.    Chief complaint: Abnormal labs.    Subjective:  Lindsay Laughlin is a 69 y.o. female  whom we were consulted for acute kidney injury/chronic kidney disease.  Patient has history of stage III chronic kidney disease but no follow-up with nephrology in the past.  She has a history of type 2 diabetes, hypertension, gastroesophageal reflux disease, hyperlipidemia.  Patient has chronic diarrhea.  She has history of colectomy at [facility], reason for colectomy is unknown.  Presented to the emergency room complaining of nausea vomiting and diarrhea for the last 5 days.  She has chronic mild diarrhea, this time she has large-volume and more frequent watery bowel movements.  Routine lab data indicated serum creatinine was 5.0, metabolic acidemia, severe hypokalemia and hypomagnesemia.  Hospital course remarkable for IV fluid administration/resuscitation with minimal response.  Patient also endorses frequent use of ibuprofen for the treatment of generalized arthritis.    This morning patient has noticed large urine output.  She denies any swelling of legs or shortness of breath.  Her hemodynamics has been stabilized with blood pressure within normal range.    Allergies:  Patient has no known allergies.    Home Meds:  Medications Prior to Admission   Medication Sig Dispense Refill Last Dose/Taking    albuterol sulfate  (90 Base) MCG/ACT inhaler Inhale 2 puffs Every 4 (Four) Hours As Needed for Wheezing.       ibuprofen (ADVIL,MOTRIN) 200 MG tablet Take 5 tablets by mouth Every 6 (Six) Hours As Needed for Mild Pain.        ondansetron ODT (ZOFRAN-ODT) 4 MG disintegrating tablet Place 1 tablet on the tongue Every 4 (Four) Hours As Needed for Nausea or Vomiting. 10 tablet 0        Medicines:  Current Facility-Administered Medications   Medication Dose Route Frequency Provider Last Rate Last Admin    acetaminophen (TYLENOL) tablet 650 mg  650 mg Oral Q4H PRN Barbara Renteria APRN   650 mg at 07/03/25 2242    Or    acetaminophen (TYLENOL) suppository 650 mg  650 mg Rectal Q4H PRN Barbara Renteria APRN        aluminum-magnesium hydroxide-simethicone (MAALOX MAX) 400-400-40 MG/5ML suspension 15 mL  15 mL Oral Q6H PRN Dennis Ansari MD        sennosides-docusate (PERICOLACE) 8.6-50 MG per tablet 2 tablet  2 tablet Oral BID Barbara Renteria APRN        And    polyethylene glycol (MIRALAX) packet 17 g  17 g Oral Daily PRN Barbara Renteria APRN        And    bisacodyl (DULCOLAX) EC tablet 5 mg  5 mg Oral Daily PRN Barbara Renteria APRN        And    bisacodyl (DULCOLAX) suppository 10 mg  10 mg Rectal Daily PRN Barbara Renteria APRN        calcium carbonate (TUMS) chewable tablet 500 mg (200 mg elemental)  2 tablet Oral TID PRN Barbara Renteria APRN   2 tablet at 07/04/25 2208    Calcium Replacement - Follow Nurse / BPA Driven Protocol   Not Applicable PRN London Leiva PA-C        cefTRIAXone (ROCEPHIN) 2,000 mg in sodium chloride 0.9 % 100 mL MBP  2,000 mg Intravenous Q24H Barbara Renteria APRN 200 mL/hr at 07/04/25 2115 2,000 mg at 07/04/25 2115    Chlorhexidine Gluconate Cloth 2 % pads 1 Application  1 Application Topical Q24H Barbara Renteria APRN   1 Application at 07/05/25 0400    dextrose 5 % and sodium chloride 0.9 % with KCl 20 mEq/L infusion  50 mL/hr Intravenous Continuous Shade Soto APRN 50 mL/hr at 07/05/25 1114 50 mL/hr at 07/05/25 1114    heparin (porcine) 5000 UNIT/ML injection 5,000 Units  5,000 Units Subcutaneous Q12H Barbara Renteria APRN   5,000 Units at 07/05/25 0801    HYDROcodone-acetaminophen  (NORCO) 5-325 MG per tablet 1 tablet  1 tablet Oral Q6H PRN Barbara Renteria, APRN   1 tablet at 07/05/25 0232    Magnesium Standard Dose Replacement - Follow Nurse / BPA Driven Protocol   Not Applicable PRN London Leiva PA-C        metroNIDAZOLE (FLAGYL) IVPB 500 mg  500 mg Intravenous Q8H Barbara Renteria, APRN 200 mL/hr at 07/05/25 0431 500 mg at 07/05/25 0431    mupirocin (BACTROBAN) 2 % nasal ointment 1 Application  1 Application Each Nare BID Barbara Renteria, APRN   1 Application at 07/05/25 0802    nitroglycerin (NITROSTAT) SL tablet 0.4 mg  0.4 mg Sublingual Q5 Min PRN Shade Alberts MD        ondansetron (ZOFRAN) injection 4 mg  4 mg Intravenous Q6H PRN Barbara Renteria, APRN        pantoprazole (PROTONIX) EC tablet 40 mg  40 mg Oral BID Dennis Suero MD   40 mg at 07/05/25 1024    potassium chloride (KLOR-CON M20) CR tablet 20 mEq  20 mEq Oral Daily Shade Soto APRN   20 mEq at 07/05/25 1024    Potassium Replacement - Follow Nurse / BPA Driven Protocol   Not Applicable PRN London Leiva PA-C        sodium chloride 0.9 % flush 10 mL  10 mL Intravenous PRN London Leiva PA-C        sodium chloride 0.9 % flush 10 mL  10 mL Intravenous Q12H Barbara Renteria, APRN   10 mL at 07/05/25 0802    sodium chloride 0.9 % flush 10 mL  10 mL Intravenous PRN Barbara Renteria, APRURIEL        sodium chloride 0.9 % infusion 40 mL  40 mL Intravenous PRN Barbara Renteria, APRN           Past Medical History:  Past Medical History:   Diagnosis Date    Acid reflux     Arthritis     High cholesterol     Hypertension     Neuropathy     Swollen lymph nodes     LEFT CERVICAL    Type 2 diabetes mellitus        Past Surgical History:  Past Surgical History:   Procedure Laterality Date    CARPAL TUNNEL RELEASE Bilateral     CERVICAL LYMPH NODE BIOPSY/EXCISION Left 08/04/2017    Procedure: LEFT NECK EXPLORATION WITH INCISIONAL BIOPSY/CULTURE;  Surgeon: Blue Sanchez MD;  Location: [facility] OR;   "Service:      SECTION      COLON RESECTION      COLONOSCOPY N/A 2023    Procedure: COLONOSCOPY WITH ANESTHESIA;  Surgeon: Tamy Smith MD;  Location: [facility] ENDOSCOPY;  Service: Gastroenterology;  Laterality: N/A;  preop; abnormal GI scan   postop ; poor prep   PCP Ruth Noble MD    ENDOSCOPY N/A 2023    Procedure: ESOPHAGOGASTRODUODENOSCOPY WITH ANESTHESIA;  Surgeon: Tamy Smith MD;  Location: [facility] ENDOSCOPY;  Service: Gastroenterology;  Laterality: N/A;  preop; abnormal GI scan   postop esophagitis ; hiatal hernia  PCP Ruth Noble MD    EXCISION LESION      From back    REPLACEMENT TOTAL KNEE Left     TONSILLECTOMY         Family History  Family History   Problem Relation Age of Onset    No Known Problems Mother     Colon cancer Father         < 60 years old    Breast cancer Neg Hx        Social History  Social History     Socioeconomic History    Marital status:    Tobacco Use    Smoking status: Never    Smokeless tobacco: Never   Vaping Use    Vaping status: Never Used   Substance and Sexual Activity    Alcohol use: No    Drug use: No    Sexual activity: Defer         Review of Systems:  History obtained from chart review and the patient  General ROS: No fever or chills  Respiratory ROS: No cough, shortness of breath, wheezing  Cardiovascular ROS: no chest pain or dyspnea on exertion  Gastrointestinal ROS: Diarrhea.  Genito-Urinary ROS: No dysuria or hematuria  14 point ROS reviewed with the patient and negative except as noted above and in the HPI unless unable to obtain.    Objective:  /64   Pulse 86   Temp 98.4 °F (36.9 °C) (Oral)   Resp 14   Ht 165.1 cm (65\")   Wt 93.5 kg (206 lb 2.1 oz)   SpO2 99%   BMI 34.30 kg/m²     Intake/Output Summary (Last 24 hours) at 2025 1318  Last data filed at 2025 1200  Gross per 24 hour   Intake 4099.59 ml   Output 550 ml   Net 3549.59 ml     General: awake/alert   HEENT: " "Normocephalic/atraumatic head  Neck: Supple with no JVD or carotid bruits.  Chest:  clear to auscultation bilaterally without respiratory distress  CVS: regular rate and rhythm  Abdominal: soft, nontender, normal bowel sounds  Extremities: no cyanosis or edema  Skin: warm and dry without rash      Labs:    Results from last 7 days   Lab Units 07/05/25 0223 07/04/25  1418 07/03/25  1628   WBC 10*3/mm3 17.28* 16.56* 25.12*   HEMOGLOBIN g/dL 11.0* 10.9* 12.6   HEMATOCRIT % 32.4* 32.2* 36.2   PLATELETS 10*3/mm3 213 204 233       Results from last 7 days   Lab Units 07/05/25 0223 07/04/25  1418 07/04/25  0036 07/03/25  1706   SODIUM mmol/L 132* 135* 133* 135*   POTASSIUM mmol/L 2.2* 2.2* 2.1* 1.7*   CHLORIDE mmol/L 100 102 101 100   CO2 mmol/L 11.0* 12.0* 9.0* 12.0*   BUN mg/dL 39.5* 41.4* 42.7* 44.5*   CREATININE mg/dL 4.69* 4.71* 4.69* 5.04*   CALCIUM mg/dL 7.6* 6.0* 5.9* 5.6*   BILIRUBIN mg/dL  --   --   --  0.3   ALK PHOS U/L  --   --   --  82   ALT (SGPT) U/L  --   --   --  15   AST (SGOT) U/L  --   --   --  41*   GLUCOSE mg/dL 215* 189* 126* 161*   EGFR mL/min/1.73 9.5* 9.5* 9.5* 8.7*         Radiology:   Imaging Results (Last 24 Hours)       ** No results found for the last 24 hours. **            Culture:  No components found for: \"WOUNDCUL\", \"3\"  No components found for: \"CSFCUL\", \"3\"  No components found for: \"BC\", \"3\"  No components found for: \"URINECUL\", \"3\"      Assessment   1.  Acute kidney injury/some improvement.  2.  Intravascular volume depletion versus ATN.  3.  Hemodynamic effect of nonsteroidal agents leading to ATN.  4.  Chronic kidney disease stage III baseline.  5.  Severe hypokalemia/metabolic acidosis related to diarrhea.  6.  Hypomagnesemia related to loss of magnesium as well.    Plan:  1.  Change IV fluid composition with half-normal saline and potassium chloride.  I will also give bolus of normal saline 1000 cc followed by maintenance IV fluid.  2.  P.o. potassium supplement as well.  3.  " Urinary electrolytes/UACR/serum PTH/baseline iron studies  4.  Baseline renal ultrasound needed.  5.  Plan was discussed with the patient.      Thank you for the consult, we appreciate the opportunity to provide care to your patients.  Feel free to contact me if I can be of any further assistance.      Miguel Chow MD  7/5/2025  13:18 CDT    Electronically signed by Miguel Chow MD at 07/05/25 8929

## 2025-07-07 NOTE — CASE MANAGEMENT/SOCIAL WORK
Continued Stay Note  RIVERA Carrasquillo     Patient Name: Lindsay Laughlin  MRN: [id number]  Today's Date: 7/7/2025    Admit Date: 7/3/2025    Plan: Undetermined   Discharge Plan       Row Name 07/07/25 1407       Plan    Plan Undetermined    Plan Comments Noted that pt has limited family support at home. Also, noted she will be starting dialysis. Will begin arranging outpt HD chair time after first treatment and will talk with pt about assistance at home.                   Discharge Codes    No documentation.                       ELIJAH Chapman

## 2025-07-07 NOTE — CASE MANAGEMENT/SOCIAL WORK
Discharge Planning Assessment   [place]     Patient Name: Lindsay Laughlin  MRN: [id number]  Today's Date: 7/7/2025    Admit Date: 7/3/2025        Discharge Needs Assessment       Row Name 07/07/25 0842       Living Environment    People in Home alone    Current Living Arrangements home    Potentially Unsafe Housing Conditions none    In the past 12 months has the electric, gas, oil, or water company threatened to shut off services in your home? No    Primary Care Provided by self    Provides Primary Care For no one       Resource/Environmental Concerns    Resource/Environmental Concerns financial    Financial Concerns --  Lives off disability check. Has to pay for  in nursing home    Transportation Concerns none       Transportation Needs    In the past 12 months, has lack of transportation kept you from medical appointments or from getting medications? no    In the past 12 months, has lack of transportation kept you from meetings, work, or from getting things needed for daily living? No       Food Insecurity    Within the past 12 months, you worried that your food would run out before you got the money to buy more. Never true    Within the past 12 months, the food you bought just didn't last and you didn't have money to get more. Never true       Transition Planning    Patient/Family Anticipates Transition to home with help/services    Patient/Family Anticipated Services at Transition none    Transportation Anticipated family or friend will provide       Discharge Needs Assessment    Equipment Currently Used at Home shower chair;walker, rolling;walker, standard    Concerns to be Addressed discharge planning    Do you want help finding or keeping work or a job? I do not need or want help    Do you want help with school or training? For example, starting or completing job training or getting a high school diploma, GED or equivalent No    Anticipated Changes Related to Illness none    Outpatient/Agency/Support  Group Needs homecare agency    Discharge Coordination/Progress Patient lives alone. Her  is in a nursing home. She doesn't have any children. She has friends that assist if needed. Her home DME is listed.  She will be getting a permacath placed today and dialysis started.  I will discuss patient with SW as I foresee her needing assistance such as home health, possible placement or outpatient dialysis arranged.   Her support network is limited and she has occasional confusion.                   Discharge Plan    No documentation.                 Continued Care and Services - Admitted Since 7/3/2025    No active coordination exists.          Demographic Summary    No documentation.                  Functional Status    No documentation.                  Psychosocial    No documentation.                  Abuse/Neglect    No documentation.                  Legal    No documentation.                  Substance Abuse    No documentation.                  Patient Forms    No documentation.                     Marbella Ledesma RN

## 2025-07-07 NOTE — OP NOTE
Lindsay Laughlin  7/7/2025     PREOPERATIVE DIAGNOSIS: Acute kidney injury    POSTOPERATIVE DIAGNOSIS: Same     PROCEDURE PERFORMED:   1.  Exchange/removal over the wire of a triple-lumen central venous catheter  2.  Placement of a 14.5 x 19 cm tunneled PermCath in the right internal jugular vein  3.  Radiographic supervision and interpretation     SURGEON: Sterling Smith DO      ANESTHESIA: MAC    PREPARATION: Routine.    Estimated Blood Loss: minimal    SPECIMENS: None    COMPLICATIONS: None    INDICATIONS: Lindsay Laughlin is a 69 y.o. female who  we were consulted for PermCath placement.  She has a history of stage III chronic kidney disease, diabetes, hypertension, GERD, and hyperlipidemia.  She has had a previous colectomy at [facility] and chronic diarrhea.  Upon routine labs indicated creatinine was 5 with metabolic acidemia, severe hypokalemia and hypomagnesia.  Minimal response with IV fluid administration/resuscitation.  We have been consulted for PermCath placement to begin dialysis. The indications, risks, and possible complications of the procedure were explained to the patient, who voiced understanding and wished to proceed with surgery.     PROCEDURE IN DETAIL:     The patient was taken to the operating room and placed on the operating table in a supine position. After MAC anesthesia was obtained, the right neck and chest were prepped and draped in a sterile manner.  The triple-lumen right IJ catheter was accessed with a J-wire and the wire was placed down into the IVC.  The triple-lumen catheter was removed in its entirety off of the wire. Next, 10 mL of 0.5% Marcaine with epinephrine was used to infiltrate the subcutaneous tract.  Two small stab incisions were made with the 11 blade.  The tunneling device attached to the catheter was then tunneled in a subcutaneous manner up through the neck insertion site.  Next, serial dilatation was then made of the internal jugular vein under fluoroscopic  guidance.  Lastly, the tear-away sheath was placed and the inner dilator and wire were removed.  The catheter was placed through the tear-away sheath with the tip ending in the right atrial/SVC junction.  There were no kinks in the catheter and both ports had good blood return.  Each port was flushed with heparinized saline and straight heparin 1000 units per mL.  The caps were applied.  The catheter was secured to skin with a 2-0 nylon.  The neck insertion site was closed with a 4-0 Monocryl in a subcuticular fashion.  The wounds were then cleaned.  Sterile dressings were applied. The patient tolerated the procedure well. Sponge and needle counts were correct. The patient was then awakened in the operating room and taken to the recovery room in good condition.    Sterling Smith DO  7/7/2025  18:08 CDT

## 2025-07-07 NOTE — PLAN OF CARE
Goal Outcome Evaluation:  Plan of Care Reviewed With: patient        Progress: no change     Right IJ dressing changed with GERARD Phipps samira card present. PT a/o x 4, aggressive and combative with staff at times. Tearful and apologetic at other times. IV potassium replacement delayed dt waiting for sitter dt pt pulling at IJ when combative. NPO for Dialysis cath placement. Plan for dialysis tommorrow. Voiding. Incontinent at times. VSS. Safety maintained.

## 2025-07-08 LAB
ANION GAP SERPL CALCULATED.3IONS-SCNC: 16 MMOL/L (ref 5–15)
BACTERIA SPEC AEROBE CULT: NORMAL
BACTERIA SPEC AEROBE CULT: NORMAL
BASOPHILS # BLD AUTO: 0.05 10*3/MM3 (ref 0–0.2)
BASOPHILS NFR BLD AUTO: 0.5 % (ref 0–1.5)
BUN SERPL-MCNC: 34.4 MG/DL (ref 8–23)
BUN/CREAT SERPL: 8.2 (ref 7–25)
CALCIUM SPEC-SCNC: 7.1 MG/DL (ref 8.6–10.5)
CHLORIDE SERPL-SCNC: 109 MMOL/L (ref 98–107)
CO2 SERPL-SCNC: 13 MMOL/L (ref 22–29)
CREAT SERPL-MCNC: 4.19 MG/DL (ref 0.57–1)
DEPRECATED RDW RBC AUTO: 66.1 FL (ref 37–54)
EGFRCR SERPLBLD CKD-EPI 2021: 10.9 ML/MIN/1.73
EOSINOPHIL # BLD AUTO: 0.37 10*3/MM3 (ref 0–0.4)
EOSINOPHIL NFR BLD AUTO: 3.8 % (ref 0.3–6.2)
ERYTHROCYTE [DISTWIDTH] IN BLOOD BY AUTOMATED COUNT: 19.7 % (ref 12.3–15.4)
GLUCOSE BLDC GLUCOMTR-MCNC: 164 MG/DL (ref 70–130)
GLUCOSE BLDC GLUCOMTR-MCNC: 167 MG/DL (ref 70–130)
GLUCOSE SERPL-MCNC: 164 MG/DL (ref 65–99)
HCT VFR BLD AUTO: 29.9 % (ref 34–46.6)
HGB BLD-MCNC: 10.5 G/DL (ref 12–15.9)
IMM GRANULOCYTES # BLD AUTO: 0.16 10*3/MM3 (ref 0–0.05)
IMM GRANULOCYTES NFR BLD AUTO: 1.6 % (ref 0–0.5)
LYMPHOCYTES # BLD AUTO: 0.78 10*3/MM3 (ref 0.7–3.1)
LYMPHOCYTES NFR BLD AUTO: 8 % (ref 19.6–45.3)
MAGNESIUM SERPL-MCNC: 1.9 MG/DL (ref 1.6–2.4)
MCH RBC QN AUTO: 32.6 PG (ref 26.6–33)
MCHC RBC AUTO-ENTMCNC: 35.1 G/DL (ref 31.5–35.7)
MCV RBC AUTO: 92.9 FL (ref 79–97)
MONOCYTES # BLD AUTO: 0.81 10*3/MM3 (ref 0.1–0.9)
MONOCYTES NFR BLD AUTO: 8.3 % (ref 5–12)
NEUTROPHILS NFR BLD AUTO: 7.6 10*3/MM3 (ref 1.7–7)
NEUTROPHILS NFR BLD AUTO: 77.8 % (ref 42.7–76)
NRBC BLD AUTO-RTO: 0 /100 WBC (ref 0–0.2)
PHOSPHATE SERPL-MCNC: 4.2 MG/DL (ref 2.5–4.5)
PLATELET # BLD AUTO: 170 10*3/MM3 (ref 140–450)
PMV BLD AUTO: 10.9 FL (ref 6–12)
POTASSIUM SERPL-SCNC: 3.9 MMOL/L (ref 3.5–5.2)
RBC # BLD AUTO: 3.22 10*6/MM3 (ref 3.77–5.28)
SODIUM SERPL-SCNC: 138 MMOL/L (ref 136–145)
TSH SERPL DL<=0.05 MIU/L-ACNC: 3.77 UIU/ML (ref 0.27–4.2)
WBC NRBC COR # BLD AUTO: 9.77 10*3/MM3 (ref 3.4–10.8)

## 2025-07-08 PROCEDURE — 84443 ASSAY THYROID STIM HORMONE: CPT | Performed by: STUDENT IN AN ORGANIZED HEALTH CARE EDUCATION/TRAINING PROGRAM

## 2025-07-08 PROCEDURE — 5A1D70Z PERFORMANCE OF URINARY FILTRATION, INTERMITTENT, LESS THAN 6 HOURS PER DAY: ICD-10-PCS | Performed by: INTERNAL MEDICINE

## 2025-07-08 PROCEDURE — 82948 REAGENT STRIP/BLOOD GLUCOSE: CPT

## 2025-07-08 PROCEDURE — 25010000002 BUMETANIDE PER 0.5 MG: Performed by: SURGERY

## 2025-07-08 PROCEDURE — 25010000002 CEFAZOLIN PER 500 MG: Performed by: SURGERY

## 2025-07-08 PROCEDURE — 93005 ELECTROCARDIOGRAM TRACING: CPT | Performed by: HOSPITALIST

## 2025-07-08 PROCEDURE — 93010 ELECTROCARDIOGRAM REPORT: CPT | Performed by: HOSPITALIST

## 2025-07-08 PROCEDURE — 25010000002 HEPARIN (PORCINE) PER 1000 UNITS: Performed by: INTERNAL MEDICINE

## 2025-07-08 PROCEDURE — 97162 PT EVAL MOD COMPLEX 30 MIN: CPT

## 2025-07-08 PROCEDURE — 84100 ASSAY OF PHOSPHORUS: CPT | Performed by: SURGERY

## 2025-07-08 PROCEDURE — 80048 BASIC METABOLIC PNL TOTAL CA: CPT | Performed by: SURGERY

## 2025-07-08 PROCEDURE — 99223 1ST HOSP IP/OBS HIGH 75: CPT | Performed by: STUDENT IN AN ORGANIZED HEALTH CARE EDUCATION/TRAINING PROGRAM

## 2025-07-08 PROCEDURE — 85025 COMPLETE CBC W/AUTO DIFF WBC: CPT | Performed by: SURGERY

## 2025-07-08 PROCEDURE — 83735 ASSAY OF MAGNESIUM: CPT | Performed by: SURGERY

## 2025-07-08 RX ORDER — METOPROLOL TARTRATE 25 MG/1
25 TABLET, FILM COATED ORAL EVERY 12 HOURS SCHEDULED
Status: DISCONTINUED | OUTPATIENT
Start: 2025-07-08 | End: 2025-07-14 | Stop reason: HOSPADM

## 2025-07-08 RX ORDER — HEPARIN SODIUM 1000 [USP'U]/ML
3600 INJECTION, SOLUTION INTRAVENOUS; SUBCUTANEOUS AS NEEDED
Status: DISCONTINUED | OUTPATIENT
Start: 2025-07-08 | End: 2025-07-14 | Stop reason: HOSPADM

## 2025-07-08 RX ORDER — AMIODARONE HYDROCHLORIDE 200 MG/1
400 TABLET ORAL EVERY 12 HOURS SCHEDULED
Status: DISCONTINUED | OUTPATIENT
Start: 2025-07-08 | End: 2025-07-14 | Stop reason: HOSPADM

## 2025-07-08 RX ADMIN — PANTOPRAZOLE SODIUM 40 MG: 40 TABLET, DELAYED RELEASE ORAL at 06:32

## 2025-07-08 RX ADMIN — METRONIDAZOLE 500 MG: 500 TABLET ORAL at 13:39

## 2025-07-08 RX ADMIN — CEFAZOLIN 2000 MG: 2 INJECTION, POWDER, FOR SOLUTION INTRAMUSCULAR; INTRAVENOUS at 03:04

## 2025-07-08 RX ADMIN — HEPARIN SODIUM 3600 UNITS: 1000 INJECTION INTRAVENOUS; SUBCUTANEOUS at 14:18

## 2025-07-08 RX ADMIN — SODIUM BICARBONATE 1300 MG: 650 TABLET ORAL at 21:37

## 2025-07-08 RX ADMIN — METOPROLOL TARTRATE 25 MG: 25 TABLET, FILM COATED ORAL at 21:37

## 2025-07-08 RX ADMIN — AMIODARONE HYDROCHLORIDE 400 MG: 200 TABLET ORAL at 21:37

## 2025-07-08 RX ADMIN — POTASSIUM CHLORIDE 40 MEQ: 1500 TABLET, EXTENDED RELEASE ORAL at 21:37

## 2025-07-08 RX ADMIN — HYDROCODONE BITARTRATE AND ACETAMINOPHEN 1 TABLET: 5; 325 TABLET ORAL at 11:52

## 2025-07-08 RX ADMIN — CEFAZOLIN 2000 MG: 2 INJECTION, POWDER, FOR SOLUTION INTRAMUSCULAR; INTRAVENOUS at 13:40

## 2025-07-08 RX ADMIN — BUMETANIDE 1 MG: 0.25 INJECTION INTRAMUSCULAR; INTRAVENOUS at 03:04

## 2025-07-08 RX ADMIN — ACETAMINOPHEN 650 MG: 650 SOLUTION ORAL at 03:04

## 2025-07-08 RX ADMIN — ACETAMINOPHEN 650 MG: 325 TABLET ORAL at 19:53

## 2025-07-08 RX ADMIN — SODIUM BICARBONATE 1300 MG: 650 TABLET ORAL at 16:12

## 2025-07-08 RX ADMIN — POTASSIUM CHLORIDE 40 MEQ: 1500 TABLET, EXTENDED RELEASE ORAL at 16:12

## 2025-07-08 RX ADMIN — Medication 10 ML: at 21:38

## 2025-07-08 RX ADMIN — METRONIDAZOLE 500 MG: 500 TABLET ORAL at 06:32

## 2025-07-08 RX ADMIN — METOPROLOL TARTRATE 25 MG: 25 TABLET, FILM COATED ORAL at 13:40

## 2025-07-08 NOTE — PLAN OF CARE
Goal Outcome Evaluation:  Plan of Care Reviewed With: patient        Progress: no change     A/o x 4. Restless and tearful at times. Dialysis called requesting pain meds for pt dt uncontrollable crying. PRN meds given x 1. 1.5 L off in dialysis. Perma cath dressing changed per Vats. Incontinent. Voiding. BM x 1. Pt turns self q2. Tolerating diet. Cardiology consulted dt pt going into Afib after dialysis. EKG performed. Olga Fishman contacted about Afib, care transferred to Dr. Irizarry, He was notified of pt condition per Olga. Awaiting orders. Bed alarm set. Safety maintained.

## 2025-07-08 NOTE — THERAPY EVALUATION
Patient Name: Lindsay Laughlin  : 1955    MRN: [id number]                              Today's Date: 2025       Admit Date: 7/3/2025    Visit Dx:     ICD-10-CM ICD-9-CM   1. Enteritis  K52.9 558.9   2. Hypomagnesemia  E83.42 275.2   3. Hypokalemia  E87.6 276.8   4. Hypocalcemia  E83.51 275.41   5. Acute kidney injury  N17.9 584.9   6. 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     Pharyngoesophageal dysphagia    Gastroesophageal reflux disease    Heartburn    Acute bowel infarction    Acute renal failure (ARF)    DAISY (acute kidney injury)    Anuria    Anxiety    Chronic back pain    Hypertension    Neuropathy    Obesity, Class I, BMI 30.0-34.9 (see actual BMI)    Osteoarthritis    Type 2 diabetes mellitus    Type 2 DM with CKD stage 3 and hypertension    Gastroesophageal reflux disease with esophagitis and hemorrhage    Abnormal finding on GI tract imaging    Weight loss    Diarrhea    Generalized abdominal pain    Nausea & vomiting    Family history of colon cancer    Enteritis     Past Medical History:   Diagnosis Date    Acid reflux     Arthritis     High cholesterol     Hypertension     Neuropathy     Swollen lymph nodes     LEFT CERVICAL    Type 2 diabetes mellitus      Past Surgical History:   Procedure Laterality Date    CARPAL TUNNEL RELEASE Bilateral     CERVICAL LYMPH NODE BIOPSY/EXCISION Left 2017    Procedure: LEFT NECK EXPLORATION WITH INCISIONAL BIOPSY/CULTURE;  Surgeon: Blue Sanchez MD;  Location: [facility] OR;  Service:      SECTION      COLON RESECTION      COLONOSCOPY N/A 2023    Procedure: COLONOSCOPY WITH ANESTHESIA;  Surgeon: Tamy Smith MD;  Location: [facility] ENDOSCOPY;  Service: Gastroenterology;  Laterality: N/A;  preop; abnormal GI scan   postop ;  poor prep   PCP Ruth Noble MD    ENDOSCOPY N/A 5/17/2023    Procedure: ESOPHAGOGASTRODUODENOSCOPY WITH ANESTHESIA;  Surgeon: Tamy Smith MD;  Location: [facility] ENDOSCOPY;  Service: Gastroenterology;  Laterality: N/A;  preop; abnormal GI scan   postop esophagitis ; hiatal hernia  PCP Ruth Noble MD    EXCISION LESION      From back    INSERTION HEMODIALYSIS CATHETER Right 7/7/2025    Procedure: OR HEMODIALYSIS CATHETER INSERTION;  Surgeon: Sterling Smith DO;  Location: [facility] HYBRID OR;  Service: Vascular;  Laterality: Right;    REPLACEMENT TOTAL KNEE Left     TONSILLECTOMY        General Information       Row Name 07/08/25 1503          Physical Therapy Time and Intention    Document Type evaluation  Pt presents with n/v, diarrhea, abdominal pain, weakness, and stage 3 CKD. Hx: T2DM, HTN, arthritis, colectomy. Dx: enteritis, DAISY, electrolyte imbalance. Pt had perm cath placement 7/7 and started dialysis today, 7/8.  -MARK ANTHONY (r) GM (t) MARK ANTHONY (c)     Mode of Treatment physical therapy  -MARK ANTHONY (r) GM (t) MARK ANTHONY (c)       Row Name 07/08/25 1503          General Information    Patient Profile Reviewed yes  -MARK ANTHONY (r) GM (t) MARK ANTHONY (c)     Prior Level of Function independent:;gait;ADL's;all household mobility;mod assist:;cooking;cleaning;dependent:;driving  -MARK ANTHONY (r) GM (t) MARK ANTHONY (c)     Existing Precautions/Restrictions fall  -MARK ANTHONY (r) GM (t) MARK ANTHONY (c)     Barriers to Rehab medically complex  -MARK ANTHONY       Row Name 07/08/25 1503          Living Environment    Current Living Arrangements home  -MARK ANTHONY (r) GM (t) MARK ANTHONY (c)     People in Home alone  -MARK ANTHONY (r) GM (t) MARK ANTHONY (c)       Row Name 07/08/25 1503          Home Main Entrance    Number of Stairs, Main Entrance other (see comments)  ramp  -MARK ANTHONY (r) GM (t) MARK ANTHONY (c)     Stair Railings, Main Entrance none  -MARK ANTHONY (r) GM (t) MARK ANTHONY (c)       Row Name 07/08/25 1503          Stairs Within Home, Primary    Number of Stairs, Within Home, Primary none  -MARK ANTHONY (r) GM (t) MARK ANTHONY (c)       Row Name  07/08/25 1503          Cognition    Orientation Status (Cognition) oriented x 4  -MARK ANTHONY (r) GM (t) MARK ANTHONY (c)       Row Name 07/08/25 1503          Safety Issues/Impairments Affecting Functional Mobility    Safety Issues Affecting Function (Mobility) at risk behavior observed;awareness of need for assistance;friction/shear risk;insight into deficits/self-awareness;judgment;safety precaution awareness  -MARK ANTHONY (r) GM (t) MARK ANTHONY (c)     Impairments Affecting Function (Mobility) balance;endurance/activity tolerance;pain;postural/trunk control;range of motion (ROM);strength  -MARK ANTHONY (r) GM (t) MARK ANTHONY (c)               User Key  (r) = Recorded By, (t) = Taken By, (c) = Cosigned By      Initials Name Provider Type    James Townsend PT DPT Physical Therapist    Estela Veloz, PT Student PT Student                   Mobility       Row Name 07/08/25 1503          Bed Mobility    Bed Mobility supine-sit;sit-supine;scooting/bridging  -MARK ANTHONY (r) GM (t) MARK ANTHONY (c)     Scooting/Bridging [place] (Bed Mobility) maximum assist (25% patient effort);1 person assist;verbal cues  -MARK ANTHONY (r) GM (t) MARK ANTHONY (c)     Supine-Sit [place] (Bed Mobility) moderate assist (50% patient effort);1 person assist;verbal cues  -MARK ANTHONY (r) GM (t) MARK ANTHONY (c)     Sit-Supine [place] (Bed Mobility) moderate assist (50% patient effort);1 person assist;verbal cues  -MARK ANTHONY (r) GM (t) MARK ANTHONY (c)     Assistive Device (Bed Mobility) bed rails;head of bed elevated  -MARK ANTHONY (r) GM (t) MARK ANTHONY (c)               User Key  (r) = Recorded By, (t) = Taken By, (c) = Cosigned By      Initials Name Provider Type    James Townsend PT DPT Physical Therapist    Estela Veloz, PT Student PT Student                   Obj/Interventions       Row Name 07/08/25 1503          Range of Motion Comprehensive    General Range of Motion bilateral lower extremity ROM WFL;bilateral upper extremity ROM WFL  -MARK ANTHONY (r) GM (t) MARK ANTHONY (c)       Row Name 07/08/25 1503          Strength Comprehensive (MMT)    General  Manual Muscle Testing (MMT) Assessment lower extremity strength deficits identified  -MARK ANTHONY (r) GM (t) MARK ANTHONY (c)     Comment, General Manual Muscle Testing (MMT) Assessment BLE: knee, ankle 4/5, hip flx 3+/5.  -MARK ANTHONY (r) GM (t) MARK ANTHONY (c)       Row Name 07/08/25 1503          Balance    Balance Assessment sitting static balance;sitting dynamic balance  -MARK ANTHONY (r) GM (t) MARK ANTHONY (c)     Static Sitting Balance contact guard  -MARK ANTHONY (r) GM (t) MARK ANTHONY (c)     Dynamic Sitting Balance minimal assist;1-person assist  -MARK ANTHONY (r) GM (t) MARK ANTHONY (c)     Position, Sitting Balance unsupported;sitting edge of bed  -MARK ANTHONY (r) GM (t) MARK ANTHONY (c)       Row Name 07/08/25 1503          Sensory Assessment (Somatosensory)    Sensory Assessment (Somatosensory) sensation intact  -MARK ANTHONY (r) GM (t) MARK ANTHONY (c)               User Key  (r) = Recorded By, (t) = Taken By, (c) = Cosigned By      Initials Name Provider Type    James Townsend, PT DPT Physical Therapist    Estela Veloz, PT Student PT Student                   Goals/Plan       Row Name 07/08/25 1503          Bed Mobility Goal 1 (PT)    Activity/Assistive Device (Bed Mobility Goal 1, PT) scooting;sit to supine/supine to sit  -MARK ANTHONY (r) GM (t) MARK ANTHONY (c)     [place] Level/Cues Needed (Bed Mobility Goal 1, PT) standby assist  -MARK ANTHONY (r) GM (t) MARK ANTHONY (c)     Time Frame (Bed Mobility Goal 1, PT) long term goal (LTG);10 days  -MARK ANTHONY (r) GM (t) MARK ANTHONY (c)     Progress/Outcomes (Bed Mobility Goal 1, PT) new goal  -MARK ANTHONY (r) GM (t) MARK ANTHONY (c)       Row Name 07/08/25 1503          Transfer Goal 1 (PT)    Activity/Assistive Device (Transfer Goal 1, PT) sit-to-stand/stand-to-sit;bed-to-chair/chair-to-bed  -MARK ANTHONY (r) GM (t) MARK ANTHONY (c)     [place] Level/Cues Needed (Transfer Goal 1, PT) minimum assist (75% or more patient effort)  -MARK ANTHONY (r) GM (t) MARK ANTHONY (c)     Time Frame (Transfer Goal 1, PT) long term goal (LTG);10 days  -MARK ANTHONY (r) NERISSA (t) MARK ANTHONY (c)     Progress/Outcome (Transfer Goal 1, PT) new goal  -MARK ANTHONY (r) GM (t) MARK ANTHONY (c)       Row Name 07/08/25 1503          Gait  Training Goal 1 (PT)    Activity/Assistive Device (Gait Training Goal 1, PT) gait (walking locomotion)  -MARK ANTHONY (r) GM (t) MARK ANTHONY (c)     [place] Level (Gait Training Goal 1, PT) minimum assist (75% or more patient effort)  -MARK ANTHONY (r) GM (t) MARK ANTHONY (c)     Distance (Gait Training Goal 1, PT) 50  -MARK ANTHONY (r) GM (t) MARK ANTHONY (c)     Time Frame (Gait Training Goal 1, PT) long term goal (LTG);10 days  -MARK ANTHONY (r) GM (t) MARK ANTHONY (c)     Progress/Outcome (Gait Training Goal 1, PT) new goal  -MARK ANTHONY (r) GM (t) MARK ANTHONY (c)       Row Name 07/08/25 1503          Therapy Assessment/Plan (PT)    Planned Therapy Interventions (PT) balance training;bed mobility training;gait training;home exercise program;patient/family education;postural re-education;ROM (range of motion);strengthening;transfer training  -MARK ANTHONY (r) GM (t) MARK ANTHONY (c)               User Key  (r) = Recorded By, (t) = Taken By, (c) = Cosigned By      Initials Name Provider Type    MARK ANHTONY James Younger, PT DPT Physical Therapist    Estela Veloz, PT Student PT Student                   Clinical Impression       Row Name 07/08/25 1503          Pain    Pain Side/Orientation generalized  -MARK ANTHONY     Pain Management Interventions exercise or physical activity utilized  -MARK ANTHONY     Additional Documentation Pain Scale: FACES Pre/Post-Treatment (Group)  -MARK ANTHONY (r) GM (t) MARK ANTHONY (c)       Row Name 07/08/25 1503          Pain Scale: FACES Pre/Post-Treatment    Pain: FACES Scale, Pretreatment 6-->hurts even more  -MARK ANTHONY (r) GM (t) MARK ANTHONY (c)       Row Name 07/08/25 1503          Plan of Care Review    Plan of Care Reviewed With patient  -MARK ANTHONY (r) GM (t) MARK ANTHONY (c)     Progress no change  -MARK ANTHONY (r) GM (t) MARK ANTHONY (c)     Outcome Evaluation PT eval completed. Pt presents in bed and A&O x4 with slow responses. Pt was tearful on arrival and remained emotional throughout the session, but she was still agreeable to therapy evaluation. Pt reports that prior to hospitalization, she was living alone and was independent with functional mobility, most ADLs,  and ambulating with walker. She said she needed some assistance with cooking and cleaning and total assistance with driving. Currently, pt requires mod A with supine<>sit and max A with scooting up in bed with verbal cues. Pt fluctuated between CGA and min A with sitting balance. Pt was fearful of standing without support from at least two people, so sit<>stand was deferred today. Pt's BLE strength ranged from 3+/5 to 4/5. Pt would benefit from continued PT services due to the following impairments: weakness, decreased balance, decreased functional mobility, impaired activity tolerance, and pain. Recommend dc to SNF.  -MARK ANTHONY (r) GM (t) MARK ANTHONY (c)       Row Name 07/08/25 1503          Therapy Assessment/Plan (PT)    Patient/Family Therapy Goals Statement (PT) not stated  -MARK ANTHONY (r) GM (t) MARK ANTHONY (c)     Rehab Potential (PT) fair  -MARK ANTHONY (r) GM (t) MARK ANTHONY (c)     Criteria for Skilled Interventions Met (PT) yes;skilled treatment is necessary;meets criteria  -MARK ANTHONY (r) GM (t) MARK ANTHONY (c)     Therapy Frequency (PT) 2 times/day  -MARK ANTHONY (r) GM (t) MARK ANTHONY (c)     Predicted Duration of Therapy Intervention (PT) until dc  -MARK ANTHONY (r) GM (t) MARK ANTHONY (c)       Row Name 07/08/25 1503          Positioning and Restraints    Pre-Treatment Position in bed  -MARK ANTHONY (r) GM (t) MARK ANTHONY (c)     Post Treatment Position bed  -MARK ANTHONY (r) GM (t) MARK ANTHONY (c)     In Bed fowlers;call light within reach;encouraged to call for assist;exit alarm on;side rails up x3;notified nsg  -MARK ANTHONY (r) GM (t) MARK ANTHONY (c)               User Key  (r) = Recorded By, (t) = Taken By, (c) = Cosigned By      Initials Name Provider Type    James Townsend, PT DPT Physical Therapist    Estela Veloz, JAMAAL Student PT Student                   Outcome Measures       Row Name 07/08/25 1503 07/08/25 0859       How much help from another person do you currently need...    Turning from your back to your side while in flat bed without using bedrails? 3  -MARK ANTHONY (r) GM (t) MARK ANTHONY (c) 3  -EY    Moving from lying on back to sitting on the side of  a flat bed without bedrails? 2  -MARK ANTHONY (r) GM (t) MARK ANTHONY (c) 3  -EY    Moving to and from a bed to a chair (including a wheelchair)? 2  -MARK ANTHONY (r) GM (t) MARK ANTHONY (c) 2  -EY    Standing up from a chair using your arms (e.g., wheelchair, bedside chair)? 2  -MARK ANTHONY (r) GM (t) MARK ANTHONY (c) 3  -EY    Climbing 3-5 steps with a railing? 1  -MARK ANTHONY (r) GM (t) MARK ANTHONY (c) 1  -EY    To walk in hospital room? 2  -MARK ANTHONY (r) GM (t) MARK ANTHONY (c) 2  -EY    AM-PAC 6 Clicks Score (PT) 12  -MARK ANTHONY (r) GM (t) 14  -EY    Highest Level of Mobility Goal Move to Chair/Commode-4  -MARK ANTHONY (r) GM (t) Move to Chair/Commode-4  -EY      Row Name 07/08/25 1503          Functional Assessment    Outcome Measure Options AM-PAC 6 Clicks Basic Mobility (PT)  -MARK ANTHONY               User Key  (r) = Recorded By, (t) = Taken By, (c) = Cosigned By      Initials Name Provider Type    James Townsend, PT DPT Physical Therapist    Elsa Hurtado, RN Registered Nurse    Estela Veloz, PT Student PT Student                                 Physical Therapy Education       Title: PT OT SLP Therapies (Done)       Topic: Physical Therapy (Done)       Point: Mobility training (Done)       Learning Progress Summary            Patient Acceptance, E,TB, VU,DU,NR by  at 7/8/2025 1503    Comment: benefits of PT and mobility                      Point: Home exercise program (Done)       Learning Progress Summary            Patient Acceptance, E,TB, VU,DU,NR by  at 7/8/2025 1503    Comment: benefits of PT and mobility                      Point: Body mechanics (Done)       Learning Progress Summary            Patient Acceptance, E,TB, VU,DU,NR by  at 7/8/2025 1503    Comment: benefits of PT and mobility                      Point: Precautions (Done)       Learning Progress Summary            Patient Acceptance, E,TB, VU,DU,NR by  at 7/8/2025 1503    Comment: benefits of PT and mobility                                      User Key       Initials Effective Dates Name Provider Type [facility]  04/21/25 -  Estela Núñez, PT Student PT Student PT                  PT Recommendation and Plan  Planned Therapy Interventions (PT): balance training, bed mobility training, gait training, home exercise program, patient/family education, postural re-education, ROM (range of motion), strengthening, transfer training  Progress: no change  Outcome Evaluation: PT eval completed. Pt presents in bed and A&O x4 with slow responses. Pt was tearful on arrival and remained emotional throughout the session, but she was still agreeable to therapy evaluation. Pt reports that prior to hospitalization, she was living alone and was independent with functional mobility, most ADLs, and ambulating with walker. She said she needed some assistance with cooking and cleaning and total assistance with driving. Currently, pt requires mod A with supine<>sit and max A with scooting up in bed with verbal cues. Pt fluctuated between CGA and min A with sitting balance. Pt was fearful of standing without support from at least two people, so sit<>stand was deferred today. Pt's BLE strength ranged from 3+/5 to 4/5. Pt would benefit from continued PT services due to the following impairments: weakness, decreased balance, decreased functional mobility, impaired activity tolerance, and pain. Recommend dc to SNF.     Time Calculation:         PT Charges       Row Name 07/08/25 1503             Time Calculation    Start Time 1503  -MARK ANTHONY (r) GM (t) MARK ANTHONY (c)      Stop Time 1551  -MARK ANTHONY (r) GM (t) MARK ANTHONY (c)      Time Calculation (min) 48 min  -MARK ANTHONY (r) GM (t)      PT Received On 07/08/25  -MARK ANTHONY (r) GM (t) MARK ANTHONY (c)      PT Goal Re-Cert Due Date 07/18/25  -MARK ANTHONY (r) GM (t) MARK ANTHONY (c)         Untimed Charges    PT Eval/Re-eval Minutes 48  -MARK ANTHONY (r) GM (t) MARK ANTHONY (c)         Total Minutes    Untimed Charges Total Minutes 48  -MARK ANTHONY (r) GM (t)       Total Minutes 48  -MARK ANTHONY (r) GM (t)                User Key  (r) = Recorded By, (t) = Taken By, (c) = Cosigned By      Initials Name Provider  Type    MARK ANTHONY James Younger, PT DPT Physical Therapist    Estela Veloz, PT Student PT Student                      PT G-Codes  Outcome Measure Options: AM-PAC 6 Clicks Basic Mobility (PT)  AM-PAC 6 Clicks Score (PT): 12  PT Discharge Summary  Anticipated Discharge Disposition (PT): skilled nursing facility    Estela Núñez PT Student  7/8/2025

## 2025-07-08 NOTE — NURSING NOTE
[facility]  DIALYSIS TREATMENT SUMMARY     Note: Consult with the attending physician for patient treatment orders, this document is not a physician order.     Patient Information  Patient Lindsay Laughlin  Date of Birth November 07, 1955  Chart Number [id number]  Location [facility]  Location MRN [id number]  Gender Female  SSN (last 4)   Treatment Information  Treatment Type Hemodialysis  Treatment Id [id number]  Start Time July 08, 2025 09:34  End Time July 08, 2025 12:34  Acutal Duration 03:00  Treatment Balances  Total Saline Administered 500  Net Fluid Balance -1500  Hemodialysis Orders  Therapy Standard  Orders Verified Time 07/08/2025 09:15   Date Verified 07/08/2025  Duration 3:00  Isolated UF/Bypass No  BFR (mL) 250  DFR (mL) 500  Dialyzer Type OPTIFLUX 160NR  UF Order UF Goal Ordered  UF Goal Ordered (mL) 1500  With BP Parameters Yes  As Tolerated Yes  Crit-Line used No  Heparin Initial Units Bolus No  Heparin IV Maintenance Bolus No  Heparin IV Infusion No  Potassium (mEq/L) 3  Calcium (mEq/L) 2.5  Bicarb (mg/dL) 35  Sodium (mEq/L) 138  Clinician Cristiano Brownlee, GERARD  AV Access  Access Type Central Venous Catheter  Central Venous Catheter  Access Type Catheter - Non-Tunneled  Date Central Venous Catheter Placed 07/07/2025  Access Location Chest Wall - R  Current/New [facility] Patient Yes  1st Use Catheter Verified by X-Ray  Catheter Care Completed per Policy Yes  Dressing Dry and Intact on Arrival Yes  Dressing Changed No  Type of Dressing Film Biopatch/CHG  Last Date Changed 07/07/2025  If No select Reason Dressing Change Not Indicated per Policy  Type of HD Caps Curos  HD Caps Changed Yes  CVC Line Education Provided Yes - Infection Prevention  Vitals  Pre-Treatment Vitals  Time Is BP being recorded? Pre BP Map BP Method Pulse RR Temp How was Weight Obtained? Pre Weight Previous Dry Weight Previous Post Weight Metric Target Fluid Removal (mL) Dialysate  Confirmed Clinician  07/08/2025 09:31 BP/Map 140/57 (85) Noninvasive 90 18 97.7 How Obtained: Reported Floor Weight 99.6   Kgs 1500 Yes Cristiano Brownlee RN  Comments: pt stable for dialysis  Intra Procedure Vitals  Rec Type Time Is BP being recorded? BP Map Pulse RR BFR DFR AP  TMP UFR RMVD Bolus NSS Flush Chills Safety Check Crit-Line HCT Crit-Line BV% Clinician  E 07/08/2025 12:34 BP/Map 135/71 (92) 93 18 250 100 -100 90 40 670 2000       Cristiano Brownlee RN  Comments: pt treatment end, vss, pt tolerated treatment, UF goal achieved  Post Treatment Vitals  Time Is BP being recorded? BP Map Pulse RR Temp How was Weight Obtained? Post Weight Metric BVP UF Goal Ordered NSS Given Intra-Procedure Total Machine UF Removed (mL) Crit-Line Ending Profile Crit-Line Refill Crit-Line Ending HCT Crit-Line Max BV% Clinician  07/08/2025 12:49 BP/Map 147/77 (100) 94 18 98 How Obtained: Reported Floor Weight 98.5 Kgs 45.8 1500 0 2000  No   Cristiano Brownlee RN  Comments: pt tolerated treatment, vss, pt stable for transfer back to floor  Safety checks include: access uncovered and secured, Hemaclip secured for all central line access, machine checks performed, and alarm limits confirmed.  Labs  Hepatitis  HBsAG Lab Result HBsAG Lab Result HBsAG Draw Date Transient Antigenemia(MD Diagnosis Only) Anti-HBs Lab Result Anti-HBs Lab Value Anti-HBs Draw Date Documented By Documented Date Hepatitis Status Hepatitis Status  Negative  07/07/2025  Negative  07/07/2025 Cristiano Brownlee 07/08/2025  Susceptible  Notes:   Pre-Treatment Hepatitis Precautions Copy of hepatitis results verified in hospital EMR Yes Signed By Cristiano Brownlee RN  Patient tx with immune pts only Yes Hepatitis Information Entered By Cristiano Brownlee RN   Hard copy verified by nurse and placed in patient’s hospital chart Yes Signing   Pre-Treatment Handoff  Staff Report Received Yes  Report Given by Primary Nurse Elsa Flores  Time 08:35  Patient Arrival  Patient ID Verified Date  of Birth  MRN  Full Name  Patient Consent to treatment verified Yes  Blood Transfusion Consent Verified N/A  Treatment Comments  Treatment Notes pt tolerated treatment, vss, pt stable for transfer back to room  Post-Treatment Handoff  Report Given to Primary Nurse Daxa Yepez  Time Report Given 12:51  Report Given By Cristiano Brownlee RN  Machine Validation  Time 09:00  Date 7/8/2025  Auto Alarm Test Passed Yes  Machine Serial # [id number]  Portable RO Yes  RO Serial# [id number]  Residual Bleach Negative Yes  Was a manufactured mix used? Yes  Machine Log Completed Yes  Total Chlorine (less than 0.1)? Yes  Total Chlorine Log Completed Yes  Bicarb BiBag  Bibag Size 900  Machine Temp 36  Machine Conductivity 13.7  Meter Type N/A  Meter Conductivity   Independent Conductivity 13.6  pH Status Pass Pass  pH   TCD Value 13.6  TCD Alarm with +/- 0.5 Yes  NVL enabled validated 100 asymmetric Yes  Safety check complete Cristiano Brownlee RN  Second Verification Performed? Yes  Second Verification Performed By Alma Pat RN  Reason Not Verified   Serum Lab Values  Time BUN Creatinine Na K (mEq/L) Cl CO2 Ca (mEq/L) Phos Mg (mg/dL) Alb (g/dL) Glucose Hgb Hct WBC Plt PT aPTT INR Other Clinician  07/08/2025 02:42 34.4 4.19 138 3.9 109 13 7.1 - - - 164 10.5 29.9 9.77 17 - - - - Cristiano Brownlee RN  Comments:   Facility Information Room # 391 Isolation Information  Facility Information Bed # 4 Isolation Required? N  Admission Date 07/03/2025 Stat Treatment No Completed by  Ordering MD Patterson Patient Type New patient to dialysis with diagnosis of “Acute Kidney Injury” General Tx information Entered by Cristiano Brownlee RN  Account/Finance Number [id number] Code Status Full Code Facility Information  Admission Status InPatient Diagnosis Notes pt stable for dialysis  Location Dialysis Suite Multi Diagnosis [facility]   Start Treatment Time Out Completed 09:18 Treatment Start Date 07/08/2025  Treatment Initiation Saline line double  clamped Correct patient verified Yes Treatment Start Time 09:34  Hemaclip Applied Correct procedure verified Yes Patient/Family questions and concerns addressed Yes  Time Out Confirmed by Julito Brownlee RN and Alma Pat RN Correct access site verified Yes   Pre Focused Assessment Respiratory Efforts Unlabored LOC Alert and Oriented x3  Access Edema GI / Bowels  Date Inserted 07/07/2025 Location Generalized GI Bowel sounds present  Signs and Symptoms of Infection? No Edema Grade 1+   Pain Screening Cardiac  Voids  Does the patient have pain? No Heart Rhythm Regular Completed by  Respiratory Telemetry Yes Pre Treatment Focused Assessment Completed By Cristiano Brownlee RN  Lung Sounds Diminished Skin Time 09:20  Location Bilateral Skin Warm Signing  Position Anterior LOC Signed By Cristiano Brownlee RN  Education Focus or Topic New Access Caregiver Education Provided? N/A  Patient Education Method Knowledge / Understanding Assessed Teach back Patient Education Introduced By  Patient Educated? Yes Family Education Provided? N/A Patient Education Introduced By Cristiano Brownlee RN  Post-Treatment Delay N Post Treatment General Information  Post Treatment Machine Disinfection Requirement Notes pt tolerated tx well vss, pt s  Location Floor with Telemetry HD machine external disinfection completed per policy Yes Completed by  Post Treatment Delay PRO external disinfection completed per policy Yes Post Treatment Form Completed By Cristiano Brownlee RN  Post Focused Assessment Lung Sounds Diminished LOC Alert and Oriented x3  Changes from Pre Focused Assessment Location Bilateral GI / Bowels  Changes from Pre Treatment Focused Assessment? No Position Anterior GI Bowel sounds present  Access Respiratory Efforts Unlabored   Cath Packed with Heparin Edema  Voids  Access Port(ml) 1.8 Location Generalized Completed by  Return Port(ml) 1.6 Edema Grade 1+ Post Treatment Focused Assessment Completed by Cristiano Brownlee RN  Catheter  clamped and capped Yes Cardiac Date 07/08/2025  Access Flow Good Heart Rhythm Regular Time 12:50  Dialyzer Clearance Streaked Telemetry Yes Signing  Pain Screening Skin Signed By Cristiano Brownlee RN  Does the patient have pain? No Skin Warm   Respiratory LOC

## 2025-07-08 NOTE — CASE MANAGEMENT/SOCIAL WORK
Continued Stay Note   Foreign     Patient Name: Lindsay Laughlin  MRN: [id number]  Today's Date: 7/8/2025    Admit Date: 7/3/2025    Plan: SNF Referral   Discharge Plan       Row Name 07/08/25 1537       Plan    Plan SNF Referral    Patient/Family in Agreement with Plan yes    Provided Post Acute Provider List? Yes    Post Acute Provider List Nursing Home    Provided Post Acute Provider Quality & Resource List? Yes    Post Acute Provider Quality and Resource List Nursing Home    Delivered To Patient    Method of Delivery In person    Plan Comments Referral uploaded to [facility] to arrange outpatient HD chair time. Spoke with pt about d/c plans and she is in agreement to go to a snf. Discussed options and she wants a referral to [place] because that is where her  is. Referral sent.                   Discharge Codes    No documentation.                       ELIJAH Chapman

## 2025-07-08 NOTE — DISCHARGE PLACEMENT REQUEST
"Javier Pastrana (69 y.o. Female)       Date of Birth   1955    Social Security Number       Address   [address] [place] [address]    Home Phone   [phone]    MRN   [id number]       Advent   [facility]    Marital Status                               Admission Date   7/3/2025    Admission Type   Emergency    Admitting Provider   Shade Alberts MD    Attending Provider   Harpal Proctor MD    Department, Room/Bed   [facility] 3C, 391/1       Discharge Date       Discharge Disposition       Discharge Destination                                 Attending Provider: Harpal Proctor MD    Allergies: No Known Allergies    Isolation: None   Infection: None   Code Status: CPR    Ht: 165.1 cm (65\")   Wt: 99.6 kg (219 lb 9.3 oz)    Admission Cmt: None   Principal Problem: DAISY (acute kidney injury) [N17.9]                   Active Insurance as of 7/3/2025       Primary Coverage       Payor Plan Insurance Group Employer/Plan Group    UNITED HE[employer] MEDICARE REPLACEMENT UHC MEDICARE ADVANTAGE [facility] HMO NON PAR [id number]       Payor Plan Address Payor Plan Phone Number Payor Plan Fax Number Effective Dates    [address] [address]   7/1/2025 - None Entered    [facility] [id number]         Subscriber Name Subscriber Birth Date Member ID       JAVIER PASTRANA 1955 [id number]               Secondary Coverage       Payor Plan Insurance Group Employer/Plan Group    [id number]        Payor Plan Address Payor Plan Phone Number Payor Plan Fax Number Effective Dates    [address]   5/17/2023 - None Entered    [place] [address]         Subscriber Name Subscriber Birth Date Member ID       JAVEIR PASTRANA 1955 [id number]                     Emergency Contacts        (Rel.) Home Phone Work Phone Mobile Phone    Rodger Marrufo (Friend) [phone] -- --    Klever Stone (Friend) -- -- [phone]    Laura Phelan -- -- [phone]      "     [address] [address]  Phone:  [phone]  Fax:          Patient:     Lindsay Laughlin MRN:  [id number]   [facility] [address] :  1955  SSN:    Phone: [phone] Sex:  F      INSURANCE PAYOR PLAN GROUP # SUBSCRIBER ID   Primary:  Secondary:    [facility] MEDICARE REPLACEMENT  [place] [id number]  [id number] [place]    [id number]  [id number]   Admitting Diagnosis: Enteritis [K52.9]  Order Date:  2025        Hemodialysis Inpatient       (Order ID: [id number])     Diagnosis:         Priority:  Routine Expected Date:   Expiration Date:        Interval:  Once Count:    Duration of Treatment: 3.0 Hours  Access Site: Tunneled Dialysis Catheter  Dialyzer: F160  DFR: 500 mL/min  Dialysate Temperature (C): 36  Use Crit-Line? No  BFR-As tolerated to a maximum of: 250 mL/min  Prime Dialyzer With NS to Priming Volume? Yes  Dialysate Solution Bath: K+ = 3 mEq, CA = 2.5mg  Bicarb: 35 mEq  Na+: 138 mEq  Fluid Removal: 1500 cc's     Specimen Type: ,   Specimen Source:   Specimen Taken Date:   Specimen Taken Time:                  Verbal Order Mode: Telephone with readback   Authorizing Provider: Javier Patterson MD  Authorizing Provider's NPI: [id number]     Order Entered By: Alma Pat RN 2025  9:47 AM     Electronically signed by:         Sudhir Erwin APRN   Nurse Practitioner  Nephrology  Progress Notes      Attested  Date of Service:  25  Creation Time:  25     Attested            Attestation signed by Javier Patterson MD at 25 [phone]     I have independently interviewed and examined the patient and reviewed the laboratory, imaging, notes and all other records as available.  I have discussed key elements of the care plan with the APRN and agree with the findings and care plan documented above except as noted.       Subjective:  Initially consulted for  evaluation and treatment of acute kidney injury.  She has a history of baseline chronic kidney disease stage 3.  She did not follow with nephrology previously.  She has a history of type 2 diabetes, hypertension, GERD, prior colectomy done at [facility] for unknown reason.  She presented to ER with several days of nausea, vomiting and diarrhea.  She was unable to maintain PO intake.  She has also been using NSAIDs frequently for arthritis pain. Labs on admission showed creatinine 5, hypokalemia, hypomagnesemia and metabolic acidosis. Hospital course remarkable for minimal improvement of renal function despite IV fluid resuscitation.      Today, no overnight events aside from episodes of confusion.  Patient was awaiting access placement.  Seen with nursing at the bedside during potassium replacement.  Periods of confusion noted     Objective:  Vitals/labs/studies/notes all reviewed  Exam independently verified with additional comments or findings as noted:  EXT: No edema     Assessment:  Acute kidney injury/ATN  Chronic kidney disease  Diabetes type 2  Hypertension on NSAID usage  Hypokalemia  Hypomagnesemia  Anemia with iron deficiency  Metabolic acidosis  Gastroenteritis     Plan:  Discussed with patient, nursing  Workup reviewed today  Monitor labs  Vascular evaluation reviewed as current  Some improvement in renal function noted, reassess tomorrow for further dialysis needs        Javier Patterson MD  7/7/2025  17:41 CDT           This documentation may indicate a split shared visit as defined in CMS Final rule (CY) 2024 Physician Fee Schedule dated 11/2/2023: for Medicare billing purposes, the “substantive portion” means more than half of the total time spent by the physician or nonphysician practitioner performing the split (or shared) visit, or a substantive part of the medical decision making.  This visit involved face to face encounters with the patient by both providers on the  date of the visit.  Time spent is indicated to identify the substantive portion of the visit, whereas the level of service may be determined by complexity of medical decision making.                   Expand All Collapse All[]Expand All by Default  Nephrology ([facility]) Progress Note        Patient:  Lindsay Laughlin  YOB: 1955  Date of Service: 7/7/2025  MRN: [id number]        Acct: [id number]      Primary Care Physician: Provider, No Known  Advance Directive:       Code Status and Medical Interventions: CPR (Attempt to Resuscitate); Full Support; Discussed CODE STATUS with Mr. Laughlin.  Patient is ,  is in a nursing home.  She does not have any children.   Ordered at: 07/03/25 2000     Code Status (Patient has no pulse and is not breathing):     CPR (Attempt to Resuscitate)     Medical Interventions (Patient has pulse or is breathing):     Full Support     Comments:     Discussed CODE STATUS with Mr. Laughlin.  Patient is ,  is in a nursing home.  She does not have any children.     Level Of Support Discussed With:     Patient      Admit Date: 7/3/2025                          Hospital Day: 4  Referring Provider: Shade Alberts MD        Patient personally seen and examined.  Complete chart including Consults, Notes, Operative Reports, Labs, Cardiology, and Radiology studies reviewed as able.           Subjective:  Lindsay Laughlin is a 69 y.o. female for whom we were consulted for evaluation and treatment of acute kidney injury. Baseline chronic kidney disease stage 3. Does not follow with nephrology. History of type 2 diabetes, hypertension, GERD, prior colectomy done at [facility] for unknown reason. Presented to ER with several days of nausea, vomiting and diarrhea. Unable to maintain PO intake. Has also been using NSAIDs frequently for arthritis pain. Labs on admission showing creatinine 5.0, hypokalemia, hypomagnesemia,  metabolic acidosis. Hospital course remarkable for minimal improvement of renal function despite IV fluid resuscitation.      Today is drowsy, oriented X 3. Yesterday, the patient had agreed to have permcath placed and start dialysis.  Overnight was confused at times per nursing staff and was stating that she did not want dialysis. At time of exam this AM patient agrees to proceed with permcath and dialysis.      Allergies:  Patient has no known allergies.     Home Meds:  Prescriptions Prior to Admission           Medications Prior to Admission   Medication Sig Dispense Refill Last Dose/Taking    albuterol sulfate  (90 Base) MCG/ACT inhaler Inhale 2 puffs Every 4 (Four) Hours As Needed for Wheezing.          ibuprofen (ADVIL,MOTRIN) 200 MG tablet Take 5 tablets by mouth Every 6 (Six) Hours As Needed for Mild Pain.          ondansetron ODT (ZOFRAN-ODT) 4 MG disintegrating tablet Place 1 tablet on the tongue Every 4 (Four) Hours As Needed for Nausea or Vomiting. 10 tablet 0              Medicines:  Current Medications             Current Facility-Administered Medications   Medication Dose Route Frequency Provider Last Rate Last Admin    acetaminophen (TYLENOL) tablet 650 mg  650 mg Oral Q4H PRN Barbara Renteria APRN   650 mg at 07/03/25 2242     Or    acetaminophen (TYLENOL) suppository 650 mg  650 mg Rectal Q4H PRN Barbara Renteria APRN        aluminum-magnesium hydroxide-simethicone (MAALOX MAX) 400-400-40 MG/5ML suspension 15 mL  15 mL Oral Q6H PRN Dennis Ansari MD   15 mL at 07/05/25 1331    sennosides-docusate (PERICOLACE) 8.6-50 MG per tablet 2 tablet  2 tablet Oral BID Barbara Renteria APRN         And    polyethylene glycol (MIRALAX) packet 17 g  17 g Oral Daily PRN Barbara Renteria APRN         And    bisacodyl (DULCOLAX) EC tablet 5 mg  5 mg Oral Daily PRN Barbara Renteria APRN         And    bisacodyl (DULCOLAX) suppository 10 mg  10 mg Rectal Daily PRN Barbara Renteria APRN         bumetanide (BUMEX) injection 1 mg  1 mg Intravenous Q12H Miguel Chow MD   1 mg at 07/07/25 0328    calcium carbonate (TUMS) chewable tablet 500 mg (200 mg elemental)  2 tablet Oral TID PRN Barbara Renteria APRN   2 tablet at 07/04/25 2208    Calcium Replacement - Follow Nurse / BPA Driven Protocol   Not Applicable PRN London Leiva PA-C        cefTRIAXone (ROCEPHIN) 2,000 mg in sodium chloride 0.9 % 100 mL MBP  2,000 mg Intravenous Q24H Barbara Renteria APRN 200 mL/hr at 07/06/25 2008 2,000 mg at 07/06/25 2008    Chlorhexidine Gluconate Cloth 2 % pads 1 Application  1 Application Topical Q24H Barbara Renteria APRN   1 Application at 07/07/25 0345    cholecalciferol (VITAMIN D3) tablet 5,000 Units  5,000 Units Oral Daily Miguel Chow MD   5,000 Units at 07/06/25 1654    heparin (porcine) 5000 UNIT/ML injection 5,000 Units  5,000 Units Subcutaneous Q12H Barbara Renteria, APRN   5,000 Units at 07/06/25 2009    HYDROcodone-acetaminophen (NORCO) 5-325 MG per tablet 1 tablet  1 tablet Oral Q6H PRN Barbara Renteria APRN   1 tablet at 07/07/25 0327    Magnesium Standard Dose Replacement - Follow Nurse / BPA Driven Protocol   Not Applicable PRN London Leiva PA-C        metroNIDAZOLE (FLAGYL) tablet 500 mg  500 mg Oral Q8H Dennis Ansari MD   500 mg at 07/07/25 0504    mupirocin (BACTROBAN) 2 % nasal ointment 1 Application  1 Application Each Nare BID Barbara Renteria, APRN   1 Application at 07/06/25 2009    nitroglycerin (NITROSTAT) SL tablet 0.4 mg  0.4 mg Sublingual Q5 Min PRN Shade Alberts MD        ondansetron (ZOFRAN) injection 4 mg  4 mg Intravenous Q6H PRN Barbara Renteria, APRN        pantoprazole (PROTONIX) EC tablet 40 mg  40 mg Oral BID AC Dennis Ansari MD   40 mg at 07/06/25 1655    potassium chloride (KLOR-CON M20) CR tablet 40 mEq  40 mEq Oral TID Miguel Chow MD   40 mEq at 07/06/25 2009    Potassium Replacement - Follow Nurse / BPA Driven Protocol   Not Applicable PRN  London Leiva PA-C        sodium bicarbonate tablet 1,300 mg  1,300 mg Oral TID Miguel Chow MD   1,300 mg at 25    sodium chloride 0.9 % flush 10 mL  10 mL Intravenous PRN London Leiva PA-C        sodium chloride 0.9 % flush 10 mL  10 mL Intravenous Q12H Dexter, Barbara D, APRN   10 mL at 25    sodium chloride 0.9 % flush 10 mL  10 mL Intravenous PRN Dexter, Barbara D, APRN        sodium chloride 0.9 % infusion 40 mL  40 mL Intravenous PRN Dexter, Barbara D, APRN                Past Medical History:  Medical History        Past Medical History:   Diagnosis Date    Acid reflux      Arthritis      High cholesterol      Hypertension      Neuropathy      Swollen lymph nodes       LEFT CERVICAL    Type 2 diabetes mellitus              Past Surgical History:  Surgical History         Past Surgical History:   Procedure Laterality Date    CARPAL TUNNEL RELEASE Bilateral      CERVICAL LYMPH NODE BIOPSY/EXCISION Left 2017     Procedure: LEFT NECK EXPLORATION WITH INCISIONAL BIOPSY/CULTURE;  Surgeon: Blue Sanchez MD;  Location: [facility] OR;  Service:      SECTION        COLON RESECTION        COLONOSCOPY N/A 2023     Procedure: COLONOSCOPY WITH ANESTHESIA;  Surgeon: Tamy Smith MD;  Location: [facility] ENDOSCOPY;  Service: Gastroenterology;  Laterality: N/A;  preop; abnormal GI scan   postop ; poor prep   PCP Ruth Noble MD    ENDOSCOPY N/A 2023     Procedure: ESOPHAGOGASTRODUODENOSCOPY WITH ANESTHESIA;  Surgeon: Tamy Smith MD;  Location: [facility] ENDOSCOPY;  Service: Gastroenterology;  Laterality: N/A;  preop; abnormal GI scan   postop esophagitis ; hiatal hernia  PCP Ruth Noble MD    EXCISION LESION         From back    REPLACEMENT TOTAL KNEE Left      TONSILLECTOMY                Family History        Family History   Problem Relation Age of Onset    No Known Problems Mother      Colon cancer Father           < 60 years old     Breast cancer Neg Hx           Social History  Social History   Social History           Socioeconomic History    Marital status:    Tobacco Use    Smoking status: Never    Smokeless tobacco: Never   Vaping Use    Vaping status: Never Used   Substance and Sexual Activity    Alcohol use: No    Drug use: No    Sexual activity: Defer            Review of Systems:  History obtained from chart review and the patient  General ROS: No fever or chills  Respiratory ROS: No cough, shortness of breath, wheezing  Cardiovascular ROS: No chest pain or palpitations  Gastrointestinal ROS: No abdominal pain or melena  Genito-Urinary ROS: No dysuria or hematuria  Psych ROS: No anxiety and depression  14 point ROS reviewed with the patient and negative except as noted above and in the HPI unless unable to obtain.     Objective:  Patient Vitals for the past 24 hrs:    BP Temp Temp src Pulse Resp SpO2   07/07/25 0740 124/56 98 °F (36.7 °C) Oral 90 18 96 %   07/07/25 0500 114/46 97.3 °F (36.3 °C) -- 83 18 95 %   07/07/25 0014 121/49 97.4 °F (36.3 °C) -- 85 16 94 %   07/06/25 2010 116/46 97.6 °F (36.4 °C) -- 88 18 96 %   07/06/25 1600 115/71 97.5 °F (36.4 °C) Oral 80 18 100 %   07/06/25 1002 107/49 98.1 °F (36.7 °C) Oral 88 20 --         Intake/Output Summary (Last 24 hours) at 7/7/2025 0911  Last data filed at 7/6/2025 1600      Gross per 24 hour   Intake --   Output 300 ml   Net -300 ml      General: awake/alert   Chest:  clear to auscultation bilaterally without respiratory distress  CVS: regular rate and rhythm  Abdominal: soft, nontender, positive bowel sounds  Extremities: no cyanosis or edema  Skin: warm and dry without rash        Labs:         Results from last 7 days   Lab Units 07/06/25  0356 07/05/25  0223 07/04/25  1418   WBC 10*3/mm3 11.81* 17.28* 16.56*   HEMOGLOBIN g/dL 10.1* 11.0* 10.9*   HEMATOCRIT % 29.5* 32.4* 32.2*   PLATELETS 10*3/mm3 172 213 204                      Results from last 7 days   Lab Units  07/06/25  0356 07/05/25  2348 07/05/25  0223 07/04/25  1418 07/04/25  0036 07/03/25  1706   SODIUM mmol/L 134*  --  132* 135*   < > 135*   POTASSIUM mmol/L 3.0* 3.5 2.2* 2.2*   < > 1.7*   CHLORIDE mmol/L 104  --  100 102   < > 100   CO2 mmol/L 15.0*  --  11.0* 12.0*   < > 12.0*   BUN mg/dL 36.5*  --  39.5* 41.4*   < > 44.5*   CREATININE mg/dL 4.65*  --  4.69* 4.71*   < > 5.04*   CALCIUM mg/dL 6.7*  --  7.6* 6.0*   < > 5.6*   EGFR mL/min/1.73 9.6*  --  9.5* 9.5*   < > 8.7*   BILIRUBIN mg/dL 0.2  --   --   --   --  0.3   ALK PHOS U/L 87  --   --   --   --  82   ALT (SGPT) U/L 32  --   --   --   --  15   AST (SGOT) U/L 61*  --   --   --   --  41*   GLUCOSE mg/dL 208*  --  215* 189*   < > 161*    < > = values in this interval not displayed.         Radiology:   Imaging Results (Last 72 Hours)         Procedure Component Value Units Date/Time     US Renal Bilateral [[id number]] Collected: 07/06/25 0858       Updated: 07/06/25 0904     Narrative:       EXAMINATION: US RENAL BILATERAL-     HISTORY: DAISY; K52.9-Noninfective gastroenteritis and colitis,  unspecified; E83.42-Hypomagnesemia; E87.6-Hypokalemia;  E83.51-Hypocalcemia; N17.9-Acute kidney failure, unspecified     Images are stored in PACS per institutional protocol.     Grayscale and color-flow renal ultrasound.     Portable exam with limited detail.     Normal size and position of both kidneys.  Normal cortical thickness and normal cortical echogenicity.     No hydronephrosis.     7-8 mm LEFT renal pelvis stone noted on CT exam from 3 days ago though  not well seen on today's exam.     The right kidney measures 101 x 50 x 46 mm.  RIGHT mid cyst = 29 mm.  RIGHT upper pole cyst = 13 mm.        The left kidney measures 95 x 53 x 46 mm.  Upper pole cyst = 16 mm.  Lower pole cyst = 19 mm.        Impression:       Impression:  1. No evidence of obstruction.           This report was signed and finalized on 7/6/2025 9:01 AM by Dr. Leonard Rogers MD.        XR Chest 1 View  [[id number]] Collected: 07/06/25 0850       Updated: 07/06/25 0853     Narrative:       EXAMINATION: XR CHEST 1 VW-     HISTORY: line placement; K52.9-Noninfective gastroenteritis and colitis,  unspecified; E83.42-Hypomagnesemia; E87.6-Hypokalemia;  E83.51-Hypocalcemia; N17.9-Acute kidney failure, unspecified     Images are stored in PACS per institutional protocol.     1 view chest x-ray.     COMPARISON:  7/3/2025.     RIGHT jugular central line has been placed and is in good position.     Mild chronic interstitial lung disease.  No focal infiltrate.     The heart is magnified by the portable projection.        Impression:       1. Well-positioned RIGHT jugular central line.  2. Stable appearance of the lungs.           This report was signed and finalized on 7/6/2025 8:50 AM by Dr. Leonard Rogers MD.                   Culture:        Blood Culture   Date Value Ref Range Status   07/03/2025 No growth at 3 days   Preliminary   07/03/2025 No growth at 3 days   Preliminary            Assessment    Acute kidney injury, ATN  Baseline chronic kidney disease stage 3  Type 2 diabetes  Hypertension  NSAID use  Hypokalemia  Hypomagnesemia  Anemia with iron deficiency  Metabolic acidosis  Gastroenteritis      Plan:   AM labs still pending. STAT BMP is ordered.  Unless labs today have significantly improved will plan to proceed with access placement and initiation of hemodialysis. Patient verbalizes agreement with this plan. Discussed with rachel Malcolm APRN  7/7/2025  09:11 CDT               Cosigned by: Javier Patterson MD at 07/07/25 1743         Sterling Smith DO   Physician  Vascular Surgery  Op Note      Signed  Date of Service:  07/07/25 1613  Creation Time:  07/07/25 1747  Case Time:  Procedures:  Surgeons:    07/07/25 1752 OR HEMODIALYSIS CATHETER INSERTION    Sterling Smith DO               Signed        Lindsay Laughlin  7/7/2025     PREOPERATIVE  DIAGNOSIS: Acute kidney injury     POSTOPERATIVE DIAGNOSIS: Same     PROCEDURE PERFORMED:   1.  Exchange/removal over the wire of a triple-lumen central venous catheter  2.  Placement of a 14.5 x 19 cm tunneled PermCath in the right internal jugular vein  3.  Radiographic supervision and interpretation     SURGEON: Sterling Smith DO      ANESTHESIA: MAC     PREPARATION: Routine.     Estimated Blood Loss: minimal     SPECIMENS: None     COMPLICATIONS: None     INDICATIONS: Lindsay Laughlin is a 69 y.o. female who  we were consulted for PermCath placement.  She has a history of stage III chronic kidney disease, diabetes, hypertension, GERD, and hyperlipidemia.  She has had a previous colectomy at [facility] and chronic diarrhea.  Upon routine labs indicated creatinine was 5 with metabolic acidemia, severe hypokalemia and hypomagnesia.  Minimal response with IV fluid administration/resuscitation.  We have been consulted for PermCath placement to begin dialysis. The indications, risks, and possible complications of the procedure were explained to the patient, who voiced understanding and wished to proceed with surgery.     PROCEDURE IN DETAIL:      The patient was taken to the operating room and placed on the operating table in a supine position. After MAC anesthesia was obtained, the right neck and chest were prepped and draped in a sterile manner.  The triple-lumen right IJ catheter was accessed with a J-wire and the wire was placed down into the IVC.  The triple-lumen catheter was removed in its entirety off of the wire. Next, 10 mL of 0.5% Marcaine with epinephrine was used to infiltrate the subcutaneous tract.  Two small stab incisions were made with the 11 blade.  The tunneling device attached to the catheter was then tunneled in a subcutaneous manner up through the neck insertion site.  Next, serial dilatation was then made of the internal jugular vein under fluoroscopic guidance.  Lastly, the tear-away  sheath was placed and the inner dilator and wire were removed.  The catheter was placed through the tear-away sheath with the tip ending in the right atrial/SVC junction.  There were no kinks in the catheter and both ports had good blood return.  Each port was flushed with heparinized saline and straight heparin 1000 units per mL.  The caps were applied.  The catheter was secured to skin with a 2-0 nylon.  The neck insertion site was closed with a 4-0 Monocryl in a subcuticular fashion.  The wounds were then cleaned.  Sterile dressings were applied. The patient tolerated the procedure well. Sponge and needle counts were correct. The patient was then awakened in the operating room and taken to the recovery room in good condition.     Sterling Smith, DO  7/7/2025  18:08 CDT                Barbara Renteria APRN   Nurse Practitioner  Intensivist  H&P      Cosign Needed  Date of Service:  07/03/25 2016  Creation Time:  07/03/25 2016     Cosign Needed        Expand All Collapse All[]Expand All by Default       [facility] Intensivist Services  HISTORY AND PHYSICAL     Date of Admission: 7/3/2025  Primary Care Physician: Provider, No Known     Subjective   Primary Historian: Patient, ER physician     Chief Complaint: Severe electrolyte abnormality, hypokalemia, hypomagnesia, hypocalcemia, nausea, vomiting and diarrhea, dehydration     History of Present Illness  Very pleasant 69-year-old female patient past medical history type 2 diabetes, hypertension, hyperlipidemia, arthritis, GERD, chronic diarrhea.  Recent endoscopy/colonoscopy in May 2023 which were unremarkable, exploratory lap in August 2028 with complications patient endorses colectomy at that time secondary to complications that she had performed at [facility] presents to the emergency department today with generally not feeling well.     Patient endorses ongoing nausea, vomiting and diarrhea for the last 5 days along with  abdominal pain with diarrhea.  She describes the diarrhea as none hematochezia or melena.  She denies any systemic symptoms such as fever chills or rigors.  She does endorse a mechanical fall this morning while in the bathroom that she feels was a direct result of her generalized weakness.  She does endorse left shoulder pain.  She denies hitting her head.  She called EMS for abdominal pain, nausea vomiting and diarrhea for the last week and generalized pain.     ER workup revealed critically low electrolytes.  Potassium 1.7, magnesium 0.8, calcium 5.6 leukocytosis 25.  With a shift no bandemia.  Stable lactate at 1.7.  CRP elevated at 11.05 BUN 44, creatinine 5 gap 23 CT of the abdomen and pelvis shows mild enteritis or diarrheal type illness there is no bowel wall thickening or obstruction.  Postoperative changes including previous partial right colectomy, new small stones in the left renal pelvis no urinary tract obstruction identified.     Patient was given 2 g of magnesium, gram of calcium and 20 mill equivalents of potassium was ordered however not infused in the ED, along with IV fluid r resuscitation with 1 L of NS.  Patient is alert oriented cognitively intact.  She is hemodynamically stable surprisingly.     ICU was consulted for ongoing management of electrolytes in the setting of acute vomiting and diarrhea.     I have seen and evaluated the patient immediately upon arriving to ICU room 5.  Patient is alert oriented cognitively intact.  Vital signs stable.  She does have difficulty with raising her left shoulder above her head.  She is able to flex and extend from the elbow.  She has pain with passive range of motion and full extension of the left shoulder.     She does endorse that she sustained a ground-level fall and she feels that she hit her shoulder.  Once we increase her potassium and stabilize her we will obtain x-rays of the left shoulder.  She denies hitting her head she denies any neck pain.   She has some cramping noted to her feet and right hand I suspect this could be carpopedal spasms secondary to hypocalcemia with hypomagnesia and hypokalemia.     No systemic symptoms however in the setting of leukocytosis elevated CRP and shift with gastritis we will implement ceftriaxone and Flagyl after obtaining blood cultures.  Still pending a GI panel.  She denies being on any recent antibiotics as I considered C. difficile.     Will continue with electrolyte replacement and ongoing hydration.           Review of Systems   Otherwise complete ROS reviewed and negative except as mentioned in the HPI.     Past Medical History:   Medical History        Past Medical History:   Diagnosis Date    Acid reflux      Arthritis      High cholesterol      Hypertension      Neuropathy      Swollen lymph nodes       LEFT CERVICAL    Type 2 diabetes mellitus           Past Surgical History:  Surgical History         Past Surgical History:   Procedure Laterality Date    CARPAL TUNNEL RELEASE Bilateral      CERVICAL LYMPH NODE BIOPSY/EXCISION Left 2017     Procedure: LEFT NECK EXPLORATION WITH INCISIONAL BIOPSY/CULTURE;  Surgeon: Blue Sanchez MD;  Location: [facility] OR;  Service:      SECTION        COLON RESECTION        COLONOSCOPY N/A 2023     Procedure: COLONOSCOPY WITH ANESTHESIA;  Surgeon: Tamy Smith MD;  Location: [facility] ENDOSCOPY;  Service: Gastroenterology;  Laterality: N/A;  preop; abnormal GI scan   postop ; poor prep   PCP Ruth Noble MD    ENDOSCOPY N/A 2023     Procedure: ESOPHAGOGASTRODUODENOSCOPY WITH ANESTHESIA;  Surgeon: Tamy Smith MD;  Location: [facility] ENDOSCOPY;  Service: Gastroenterology;  Laterality: N/A;  preop; abnormal GI scan   postop esophagitis ; hiatal hernia  PCP Ruth Noble MD    EXCISION LESION         From back    REPLACEMENT TOTAL KNEE Left      TONSILLECTOMY             Social History:  reports that she has never  "smoked. She has never used smokeless tobacco. She reports that she does not drink alcohol and does not use drugs.     Family History: family history includes Colon cancer in her father; No Known Problems in her mother.        Allergies:  Allergies   No Known Allergies        Medications:          Prior to Admission medications    Medication Sig Start Date End Date Taking? Authorizing Provider   ibuprofen (ADVIL,MOTRIN) 200 MG tablet Take 1 tablet by mouth Every 6 (Six) Hours As Needed for Mild Pain. Pt reports taking \"5 at a time only when needed\"       Provider, MD Priyanka   ondansetron ODT (ZOFRAN-ODT) 4 MG disintegrating tablet Place 1 tablet on the tongue Every 4 (Four) Hours As Needed for Nausea or Vomiting. 2/24/25     Nishant Hdz Jr., MD      I have utilized all available immediate resources to obtain, update, or review the patient's current medications (including all prescriptions, over-the-counter products, herbals, cannabis/cannabidiol products, and vitamin/mineral/dietary (nutritional) supplements).     Objective      Vital Signs: /56   Pulse 91   Temp 97.8 °F (36.6 °C) (Oral)   Resp 17   Ht 165.1 cm (65\")   Wt 97.1 kg (214 lb 1.6 oz)   SpO2 94%   BMI 35.63 kg/m²   Physical Exam  Vitals and nursing note reviewed. Exam conducted with a chaperone present.   Constitutional:       General: She is not in acute distress.  HENT:      Head: Normocephalic and atraumatic.      Nose: Nose normal.      Mouth/Throat:      Mouth: Mucous membranes are dry.      Comments: Extremely dry mucosa.  Patient endorses she has not had anything to eat or drink in at least 24 to 48 hours now  Eyes:      Extraocular Movements: Extraocular movements intact.      Pupils: Pupils are equal, round, and reactive to light.   Cardiovascular:      Rate and Rhythm: Normal rate and regular rhythm.   Pulmonary:      Effort: Pulmonary effort is normal.      Breath sounds: Normal breath sounds.   Abdominal:      General: " There is no distension.      Palpations: Abdomen is soft.      Tenderness: There is no guarding.      Comments: Well-healed vertical incisional site.  No rebound tenderness or concerns for peritonitis on exam.  Hyperactive bowel sounds   Musculoskeletal:         General: No deformity.      Cervical back: Normal range of motion.      Right lower leg: No edema.      Left lower leg: No edema.      Comments: Cramping noted to right hand and feet bilaterally.  Decreased range of motion noted to the left upper extremity.  Patient is able to flex and extend from the elbow however she is not able to independently lift the left arm above her head.  Radial pulse palpable and strong.  She has neurovascularly intact.   Skin:     General: Skin is warm and dry.      Capillary Refill: Capillary refill takes 2 to 3 seconds.      Findings: No bruising.   Neurological:      General: No focal deficit present.      Mental Status: She is alert and oriented to person, place, and time.               Results Reviewed:  Lab Results (last 24 hours)         Procedure Component Value Units Date/Time     Comprehensive Metabolic Panel [[id number]]  (Abnormal) Collected: 07/03/25 1706     Specimen: Blood Updated: 07/03/25 1739       Glucose 161 mg/dL         BUN 44.5 mg/dL         Creatinine 5.04 mg/dL         Sodium 135 mmol/L         Potassium 1.7 mmol/L         Chloride 100 mmol/L         CO2 12.0 mmol/L         Calcium 5.6 mg/dL         Total Protein 5.4 g/dL         Albumin 2.9 g/dL         ALT (SGPT) 15 U/L         AST (SGOT) 41 U/L         Alkaline Phosphatase 82 U/L         Total Bilirubin 0.3 mg/dL         Globulin 2.5 gm/dL         A/G Ratio 1.2 g/dL         BUN/Creatinine Ratio 8.8       Anion Gap 23.0 mmol/L         eGFR 8.7 mL/min/1.73       Narrative:       GFR Categories in Chronic Kidney Disease (CKD)              GFR Category          GFR (mL/min/1.73)    Interpretation  G1                    90 or greater        Normal or high  (1)  G2                    60-89                Mild decrease (1)  G3a                   45-59                Mild to moderate decrease  G3b                   30-44                Moderate to severe decrease  G4                    15-29                Severe decrease  G5                    14 or less           Kidney failure     (1)In the absence of evidence of kidney disease, neither GFR category G1 or G2 fulfill the criteria for CKD.     eGFR calculation 2021 CKD-EPI creatinine equation, which does not include race as a factor     Magnesium [[id number]]  (Abnormal) Collected: 07/03/25 1706     Specimen: Blood Updated: 07/03/25 1738       Magnesium 0.8 mg/dL       C-reactive Protein [[id number]]  (Abnormal) Collected: 07/03/25 1706     Specimen: Blood Updated: 07/03/25 1730       C-Reactive Protein 11.05 mg/dL       Urinalysis With Culture If Indicated - Urine, Catheter [[id number]]  (Abnormal) Collected: 07/03/25 1647     Specimen: Urine, Catheter Updated: 07/03/25 1708       Color, UA Yellow       Appearance, UA Clear       pH, UA 5.5       Specific Gravity, UA 1.011       Glucose, UA Negative       Ketones, UA Trace       Bilirubin, UA Negative       Blood, UA Large (3+)       Protein,  mg/dL (2+)       Leuk Esterase, UA Negative       Nitrite, UA Negative       Urobilinogen, UA 0.2 E.U./dL     Narrative:       In absence of clinical symptoms, the presence of pyuria, bacteria, and/or nitrites on the urinalysis result does not correlate with infection.     Urinalysis, Microscopic Only - Urine, Catheter [[id number]]  (Abnormal) Collected: 07/03/25 1647     Specimen: Urine, Catheter Updated: 07/03/25 1708       RBC, UA 0-2 /HPF         WBC, UA 0-2 /HPF         Comment: Urine culture not indicated.          Bacteria, UA None Seen /HPF         Squamous Epithelial Cells, UA 3-6 /HPF         Hyaline Casts, UA 0-2 /LPF         Amorphous Crystals, UA Small/1+ /HPF         Methodology Manual Light Microscopy     Amylase  [[id number]]  (Abnormal) Collected: 07/03/25 1628     Specimen: Blood Updated: 07/03/25 1658       Amylase 19 U/L       Lactic Acid, Plasma [[id number]]  (Normal) Collected: 07/03/25 1628     Specimen: Blood Updated: 07/03/25 1657       Lactate 1.7 mmol/L       Lipase [[id number]]  (Normal) Collected: 07/03/25 1628     Specimen: Blood Updated: 07/03/25 1656       Lipase 21 U/L       CBC & Differential [[id number]]  (Abnormal) Collected: 07/03/25 1628     Specimen: Blood Updated: 07/03/25 1635     Narrative:       The following orders were created for panel order CBC & Differential.  Procedure                               Abnormality         Status                     ---------                               -----------         ------                     CBC Auto Differential[[id number]]        Abnormal            Final result                  Please view results for these tests on the individual orders.     CBC Auto Differential [[id number]]  (Abnormal) Collected: 07/03/25 1628     Specimen: Blood Updated: 07/03/25 1635       WBC 25.12 10*3/mm3         RBC 3.83 10*6/mm3         Hemoglobin 12.6 g/dL         Hematocrit 36.2 %         MCV 94.5 fL         MCH 32.9 pg         MCHC 34.8 g/dL         RDW 18.5 %         RDW-SD 64.1 fl         MPV 10.6 fL         Platelets 233 10*3/mm3         Neutrophil % 89.0 %         Lymphocyte % 4.9 %         Monocyte % 5.2 %         Eosinophil % 0.1 %         Basophil % 0.2 %         Immature Grans % 0.6 %         Neutrophils, Absolute 22.36 10*3/mm3         Lymphocytes, Absolute 1.24 10*3/mm3         Monocytes, Absolute 1.31 10*3/mm3         Eosinophils, Absolute 0.02 10*3/mm3         Basophils, Absolute 0.04 10*3/mm3         Immature Grans, Absolute 0.15 10*3/mm3         nRBC 0.0 /100 WBC                  CT Abdomen Pelvis Without Contrast  Result Date: 7/3/2025  1. Fluid retention within the small intestine and portions of the colon compatible with the history of diarrhea, no evidence of  bowel obstruction, no bowel wall thickening. Correlate clinically for mild enteritis or diarrheal type illness. 2. New small stone in the left renal pelvis, measuring 7 mm. No urinary tract obstruction is identified. 3. Postoperative changes, including previous partial right colectomy, appendectomy and there is stable dense material in the region of the raine hepatis which may be related to previous surgical intervention. The gallbladder is not visualized. 4. Stable accessory spleen measuring up to 4 cm.   This report was signed and finalized on 7/3/2025 6:19 PM by Dr. Venkata Muniz MD.                Result Review:  I have personally reviewed the results from the time of this admission to 7/3/2025 20:17 CDT and agree with these findings:  [x]  Laboratory list / accordion  []  Microbiology  [x]  Radiology  [x]  EKG/Telemetry   []  Cardiology/Vascular   [x]  Pathology  [x]  Old records-  []  Other:  Most notable findings include: Potassium 1.7, magnesium 0.8, calcium 5.6, leukocytosis 25 with a left shift, elevated CRP at 11        I have personally reviewed and interpreted the radiology studies and ECG obtained at time of admission.      Assessment / Plan   Assessment:        Active Hospital Problems     Diagnosis      **Enteritis           Treatment Plan  The patient will be admitted to my service here at [facility].  The patient to ICU/CCU under intensivist services this patient is currently requiring ICU services for electrolyte management     69-year-old female patient past medical history acute on chronic diarrhea presented to the emergency department via EMS for a 5-day history of nausea, vomiting diarrhea and generalized bodyaches along with abdominal pain.  Critically low potassium 1.8, magnesium 0.8 and calcium 5.6.  EG changes prolonged QTc greater than 700.  CT concerning for enteritis otherwise no acute findings.      Hypokalemia  Hypomagnesia  Hypocalcemia  Enteritis  Leukocytosis  DAISY  Dehydration  8.   Past medical history type 2 diabetes  9.   Prolonged Qtc     Plan:  Continue with electrolyte replacement.  Will initiate oral potassium as well as IV.  Reassess electrolytes after replacement.  Continue with IV fluid hydration in the setting of acute dehydration and DAISY.  Reviewed previous labs from 2/24 2025 kidney function, creatinine 2, her decreased oral intake dehydration's probably worsening kidney function today.  Renally dose all medications.  Avoid any nephrotoxin agents.  Avoid any IV contrast  Reassess BMP in the AM.  Sitter nephrology consult if warranted  Leukocytosis, no concerns for sepsis on admission.  Leukocytosis felt to be secondary to enteritis.  Obtain GI panel.  Initiate Rocephin 2 g and Flagyl 500 every 8 after blood cultures.  De-escalate antibiotics based on cultures and GI panel.  Obtain a chest x-ray to rule out any acute process.  Urinalysis unremarkable for any acute infectious process.  Sliding scale insulin during this hospitalization  Avoid any QTc prolongation medications in the setting of prolonged QTc.     VTE: Heparin SQ  GI: Protonix , hx of Gerd  NTN: Diet  ABX: Flagyl 500 mg every 8 Rocephin 2 g daily  Lines/Tubes: Peripheral  CODE Status: Full code              60 minutes of critical care provided. This time excludes other billable procedures. Time does include preparation of documents, medical consultations, review of old records, and direct bedside care. Patient is at high risk for life-threatening deterioration due to patient is a high risk for cardiac arrhythmia secondary to critically low electrolytes.  .        Medical Decision Making  Number and Complexity of problems: 7  Differential Diagnosis: Electrolyte abnormalities, acute on chronic.  Patient does have a history of chronic diarrhea.  Her previous labs from April of this year revealed critically low potassium of 2.7.  I  question if she has been taking potassium and magnesium supplements in the setting of chronic diarrhea.  Acute on chronic kidney insufficiency.  I feel her acute dehydration is contributing to her worsening kidney function.  I considered sepsis in the setting of leukocytosis however no acute process other than enteritis.  Will search for additional sources.     Conditions and Status        Condition is unchanged.     [facility] Data  External documents reviewed: N/A I did review previous endoscopy, colonoscopy by Dr. Smith in April 2023 biopsies unremarkable.    Cardiac tracing (EKG, telemetry) interpretation: Reviewed admission EKG  Radiology interpretation: Reviewed admission CT abdomen pelvis imaging  Labs reviewed: Reviewed admission labs from ER  Any tests that were considered but not ordered: Chest x-ray, left shoulder x-ray     Decision rules/scores evaluated (example [id number], Wells, etc):      Discussed with: Patient.  Patient is decision-maker.  She endorses she is  however her  is in the nursing home.  She does have a close friend that lives with her and helps take care of her however she does not have any children.     Care Planning  Shared decision making: Patient  Code status and discussions: Full code     Disposition  Social Determinants of Health that impact treatment or disposition: NA  Estimated length of stay is 2 to 5 days.      I confirmed that the patient's advanced care plan is present, code status is documented, and a surrogate decision maker is listed in the patient's medical record.      The patient's surrogate decision maker is patient.      The patient was seen and examined by me on 7/3/20/2025 at 1930 immediately upon arriving to ICU room 3.     I admitted the patient overnight to the intensivist services.  Case will be discussed with Dr. Alberts, In the a.m.  Final treatment plan and recommendations will be at his discretion.      Electronically signed by Barbara Renteria  APRN on 7/3/2025 at 20:17 CDT

## 2025-07-08 NOTE — PROGRESS NOTES
Nephrology ([facility]) Progress Note      Patient:  Lindsay Laughlin  YOB: 1955  Date of Service: 7/8/2025  MRN: [id number]   Acct: [id number]   Primary Care Physician: Provider, No Known  Advance Directive:   Code Status and Medical Interventions: CPR (Attempt to Resuscitate); Full Support; Discussed CODE STATUS with Mr. Laughlin.  Patient is ,  is in a nursing home.  She does not have any children.   Ordered at: 07/03/25 2000     Code Status (Patient has no pulse and is not breathing):    CPR (Attempt to Resuscitate)     Medical Interventions (Patient has pulse or is breathing):    Full Support     Comments:    Discussed CODE STATUS with Mr. Laughlin.  Patient is ,  is in a nursing home.  She does not have any children.     Level Of Support Discussed With:    Patient     Admit Date: 7/3/2025       Hospital Day: 5  Referring Provider: Shade Alberts MD      Patient personally seen and examined.  Complete chart including Consults, Notes, Operative Reports, Labs, Cardiology, and Radiology studies reviewed as able.        Subjective:  Lindsay Laughlin is a 69 y.o. female for whom we were consulted for evaluation and treatment of acute kidney injury. Baseline chronic kidney disease stage 3. Does not follow with nephrology. History of type 2 diabetes, hypertension, GERD, prior colectomy done at [facility] for unknown reason. Presented to ER with several days of nausea, vomiting and diarrhea. Unable to maintain PO intake. Has also been using NSAIDs frequently for arthritis pain. Labs on admission showing creatinine 5.0, hypokalemia, hypomagnesemia, metabolic acidosis. Hospital course remarkable for minimal improvement of renal function despite IV fluid resuscitation. Patient agreed to have permcath placed and start dialysis.  Dr Smith placed permcath on 7/07    Today is awake and alert. No complaints. Seen during first hemodialysis and is  tolerating well  Dialysis   Patient was seen and evaluated on renal replacement therapy and I have personally evaluated the patient and directed the therapy  Modality: Hemodialysis  Access: Catheter  Location: right upper  QB: 250  QD: 500  UF: 1500    Allergies:  Patient has no known allergies.    Home Meds:  Medications Prior to Admission   Medication Sig Dispense Refill Last Dose/Taking    albuterol sulfate  (90 Base) MCG/ACT inhaler Inhale 2 puffs Every 4 (Four) Hours As Needed for Wheezing.       ibuprofen (ADVIL,MOTRIN) 200 MG tablet Take 5 tablets by mouth Every 6 (Six) Hours As Needed for Mild Pain.       ondansetron ODT (ZOFRAN-ODT) 4 MG disintegrating tablet Place 1 tablet on the tongue Every 4 (Four) Hours As Needed for Nausea or Vomiting. 10 tablet 0        Medicines:  Current Facility-Administered Medications   Medication Dose Route Frequency Provider Last Rate Last Admin    acetaminophen (TYLENOL) tablet 650 mg  650 mg Oral Q8H Bicking, Sterling K, DO   650 mg at 07/07/25 2052    Or    acetaminophen (TYLENOL) 160 MG/5ML oral solution 650 mg  650 mg Oral Q8H Bicking, Sterling K, DO   650 mg at 07/08/25 0304    Or    acetaminophen (TYLENOL) suppository 650 mg  650 mg Rectal Q8H Bicking, Sterling K, DO        acetaminophen (TYLENOL) tablet 650 mg  650 mg Oral Q4H PRN Bicking, Sterling K, DO   650 mg at 07/03/25 2242    Or    acetaminophen (TYLENOL) suppository 650 mg  650 mg Rectal Q4H PRN Bicking, Sterling K, DO        aluminum-magnesium hydroxide-simethicone (MAALOX MAX) 400-400-40 MG/5ML suspension 15 mL  15 mL Oral Q6H PRN Bic, Sterling K, DO   15 mL at 07/05/25 1331    sennosides-docusate (PERICOLACE) 8.6-50 MG per tablet 2 tablet  2 tablet Oral BID BicSterling chiu DO        And    polyethylene glycol (MIRALAX) packet 17 g  17 g Oral Daily PRN Bicking, Sterling FLAHERTY, DO        And    bisacodyl (DULCOLAX) EC tablet 5 mg  5 mg Oral Daily PRN BickingSterling, DO        And    bisacodyl  (DULCOLAX) suppository 10 mg  10 mg Rectal Daily PRN BicSterling chiu DO        bumetanide (BUMEX) injection 1 mg  1 mg Intravenous Q12H BicSterling chiu DO   1 mg at 07/08/25 0304    calcium carbonate (TUMS) chewable tablet 500 mg (200 mg elemental)  2 tablet Oral TID PRN BicSterling chiu DO   2 tablet at 07/04/25 2208    Calcium Replacement - Follow Nurse / BPA Driven Protocol   Not Applicable PRN BickingSterling, DO        ceFAZolin 2000 mg IVPB in 100 mL NS (MBP)  2,000 mg Intravenous Q8H BickingSterling, DO   2,000 mg at 07/08/25 0304    cholecalciferol (VITAMIN D3) tablet 5,000 Units  5,000 Units Oral Daily BicSterling chiu DO   5,000 Units at 07/06/25 1654    dextrose (D50W) (25 g/50 mL) IV injection 25 g  25 g Intravenous Q15 Min PRN Sterling Smith DO        dextrose (GLUTOSE) oral gel 15 g  15 g Oral Q15 Min PRN Sterling Smith DO        glucagon (GLUCAGEN) injection 1 mg  1 mg Intramuscular Q15 Min PRN Sterling Smith DO        heparin (porcine) 5000 UNIT/ML injection 5,000 Units  5,000 Units Subcutaneous Q12H BicSterling chiu DO   5,000 Units at 07/07/25 2053    heparin (porcine) injection 3,600 Units  3,600 Units Intracatheter PRN Javier Patterson MD        [Transfer Hold] HYDROcodone-acetaminophen (NORCO) 5-325 MG per tablet 1 tablet  1 tablet Oral Q6H PRN Barbara Renteria APRN   1 tablet at 07/07/25 0327    HYDROcodone-acetaminophen (NORCO) 5-325 MG per tablet 1 tablet  1 tablet Oral Q6H PRN Sterling Smith DO        HYDROmorphone (DILAUDID) injection 0.25 mg  0.25 mg Intravenous Q1H PRN Sterling Smith DO        And    naloxone (NARCAN) injection 0.4 mg  0.4 mg Intravenous Q5 Min PRN Sterling Smith DO        insulin regular (humuLIN R,novoLIN R) injection 2-7 Units  2-7 Units Subcutaneous Q6H Sterling Smith DO        Magnesium Standard Dose Replacement - Follow Nurse / BPA Driven Protocol   Not Applicable PRN Sterling Smith DO         metroNIDAZOLE (FLAGYL) tablet 500 mg  500 mg Oral Q8H Dennis Ansari MD   500 mg at 07/08/25 0632    [Transfer Hold] mupirocin (BACTROBAN) 2 % nasal ointment 1 Application  1 Application Each Nare BID Barbara Renteria APRN   1 Application at 07/06/25 2009    nitroglycerin (NITROSTAT) SL tablet 0.4 mg  0.4 mg Sublingual Q5 Min PRN Sterling Smith, DO        ondansetron (ZOFRAN) injection 4 mg  4 mg Intravenous Q6H PRN BicSterling chiu, DO        ondansetron ODT (ZOFRAN-ODT) disintegrating tablet 4 mg  4 mg Oral Q6H PRN Sterling Smith,         Or    ondansetron (ZOFRAN) injection 4 mg  4 mg Intravenous Q6H PRN Bic, Sterling FLAHERTY, DO        pantoprazole (PROTONIX) EC tablet 40 mg  40 mg Oral BID AC BicSterling chiu, DO   40 mg at 07/08/25 0632    potassium chloride (KLOR-CON M20) CR tablet 40 mEq  40 mEq Oral TID BicSterling chiu K, DO   40 mEq at 07/07/25 2052    Potassium Replacement - Follow Nurse / BPA Driven Protocol   Not Applicable PRN Bicking, Sterling K, DO        sodium bicarbonate tablet 1,300 mg  1,300 mg Oral TID BickingSterling K, DO   1,300 mg at 07/07/25 2052    sodium chloride 0.9 % flush 10 mL  10 mL Intravenous PRN BicSterling chiu K, DO        sodium chloride 0.9 % flush 10 mL  10 mL Intravenous Q12H Bicking, Sterling K, DO   10 mL at 07/07/25 2054    sodium chloride 0.9 % flush 10 mL  10 mL Intravenous PRN Bicking, Sterling K, DO        sodium chloride 0.9 % infusion 40 mL  40 mL Intravenous PRN Bic, Sterling FLAHERTY, DO           Past Medical History:  Past Medical History:   Diagnosis Date    Acid reflux     Arthritis     High cholesterol     Hypertension     Neuropathy     Swollen lymph nodes     LEFT CERVICAL    Type 2 diabetes mellitus        Past Surgical History:  Past Surgical History:   Procedure Laterality Date    CARPAL TUNNEL RELEASE Bilateral     CERVICAL LYMPH NODE BIOPSY/EXCISION Left 08/04/2017    Procedure: LEFT NECK EXPLORATION WITH INCISIONAL BIOPSY/CULTURE;  Surgeon:  Blue Sanchez MD;  Location: [facility] OR;  Service:      SECTION      COLON RESECTION      COLONOSCOPY N/A 2023    Procedure: COLONOSCOPY WITH ANESTHESIA;  Surgeon: Tamy Smith MD;  Location: [facility] ENDOSCOPY;  Service: Gastroenterology;  Laterality: N/A;  preop; abnormal GI scan   postop ; poor prep   PCP Ruth Noble MD    ENDOSCOPY N/A 2023    Procedure: ESOPHAGOGASTRODUODENOSCOPY WITH ANESTHESIA;  Surgeon: Tamy Smith MD;  Location: [facility] ENDOSCOPY;  Service: Gastroenterology;  Laterality: N/A;  preop; abnormal GI scan   postop esophagitis ; hiatal hernia  PCP Ruth Noble MD    EXCISION LESION      From back    INSERTION HEMODIALYSIS CATHETER Right 2025    Procedure: OR HEMODIALYSIS CATHETER INSERTION;  Surgeon: Sterling Smith DO;  Location: [facility] HYBRID OR;  Service: Vascular;  Laterality: Right;    REPLACEMENT TOTAL KNEE Left     TONSILLECTOMY         Family History  Family History   Problem Relation Age of Onset    No Known Problems Mother     Colon cancer Father         < 60 years old    Breast cancer Neg Hx        Social History  Social History     Socioeconomic History    Marital status:    Tobacco Use    Smoking status: Never    Smokeless tobacco: Never   Vaping Use    Vaping status: Never Used   Substance and Sexual Activity    Alcohol use: No    Drug use: No    Sexual activity: Defer       Review of Systems:  History obtained from chart review and the patient  General ROS: No fever or chills  Respiratory ROS: No cough, shortness of breath, wheezing  Cardiovascular ROS: No chest pain or palpitations  Gastrointestinal ROS: No abdominal pain or melena  Genito-Urinary ROS: No dysuria or hematuria  Psych ROS: No anxiety and depression  14 point ROS reviewed with the patient and negative except as noted above and in the HPI unless unable to obtain.    Objective:  Patient Vitals for the past 24 hrs:   BP Temp Temp src Pulse  Resp SpO2 Weight   07/08/25 0858 137/77 97.7 °F (36.5 °C) Oral 94 16 96 % --   07/08/25 0300 120/83 98 °F (36.7 °C) Oral 95 16 95 % 99.6 kg (219 lb 9.3 oz)   07/07/25 2305 108/57 97.4 °F (36.3 °C) Oral 94 16 95 % --   07/07/25 1945 131/68 97.8 °F (36.6 °C) Oral 110 18 91 % --   07/07/25 1915 109/74 97.6 °F (36.4 °C) Oral 89 20 98 % --   07/07/25 1846 125/79 97.2 °F (36.2 °C) Oral 120 20 96 % --   07/07/25 1830 100/61 -- -- 85 20 95 % --   07/07/25 1825 105/61 -- -- 84 20 92 % --   07/07/25 1820 99/55 -- -- 82 20 92 % --   07/07/25 1816 95/56 96.9 °F (36.1 °C) Temporal 85 20 95 % --   07/07/25 1558 133/72 96.9 °F (36.1 °C) Axillary 87 18 96 % --       Intake/Output Summary (Last 24 hours) at 7/8/2025 1130  Last data filed at 7/8/2025 0459  Gross per 24 hour   Intake 200 ml   Output 1900 ml   Net -1700 ml     General: awake/alert   Chest:  clear to auscultation bilaterally without respiratory distress  CVS: regular rate and rhythm  Abdominal: soft, nontender, positive bowel sounds  Extremities: no cyanosis or edema  Skin: warm and dry without rash      Labs:  Results from last 7 days   Lab Units 07/08/25  0242 07/07/25  1053 07/06/25  0356   WBC 10*3/mm3 9.77 9.55 11.81*   HEMOGLOBIN g/dL 10.5* 10.0* 10.1*   HEMATOCRIT % 29.9* 29.1* 29.5*   PLATELETS 10*3/mm3 170 150 172         Results from last 7 days   Lab Units 07/08/25  0242 07/07/25 2125 07/07/25  1053 07/06/25  0356 07/04/25  0036 07/03/25  1706   SODIUM mmol/L 138  --  137 134*   < > 135*   POTASSIUM mmol/L 3.9 3.7 3.5 3.0*   < > 1.7*   CHLORIDE mmol/L 109*  --  108* 104   < > 100   CO2 mmol/L 13.0*  --  16.0* 15.0*   < > 12.0*   BUN mg/dL 34.4*  --  36.5* 36.5*   < > 44.5*   CREATININE mg/dL 4.19*  --  4.19* 4.65*   < > 5.04*   CALCIUM mg/dL 7.1*  --  6.8* 6.7*   < > 5.6*   EGFR mL/min/1.73 10.9*  --  10.9* 9.6*   < > 8.7*   BILIRUBIN mg/dL  --   --  0.2 0.2  --  0.3   ALK PHOS U/L  --   --  88 87  --  82   ALT (SGPT) U/L  --   --  28 32  --  15   AST (SGOT)  U/L  --   --  28 61*  --  41*   GLUCOSE mg/dL 164*  --  193* 208*   < > 161*    < > = values in this interval not displayed.       Radiology:   Imaging Results (Last 72 Hours)       Procedure Component Value Units Date/Time    IR Ins Tunneled Dialysis Catheter WO Port - In process [[id number]] Resulted: 07/07/25 1714     Updated: 07/07/25 1807    This result has not been signed. Information might be incomplete.      FL C Arm During Surgery [[id number]] Resulted: 07/07/25 1807     Updated: 07/07/25 1807    Narrative:      This procedure was auto-finalized with no dictation required.    US Renal Bilateral [[id number]] Collected: 07/06/25 0858     Updated: 07/06/25 0904    Narrative:      EXAMINATION: US RENAL BILATERAL-     HISTORY: DAISY; K52.9-Noninfective gastroenteritis and colitis,  unspecified; E83.42-Hypomagnesemia; E87.6-Hypokalemia;  E83.51-Hypocalcemia; N17.9-Acute kidney failure, unspecified     Images are stored in PACS per institutional protocol.     Grayscale and color-flow renal ultrasound.     Portable exam with limited detail.     Normal size and position of both kidneys.  Normal cortical thickness and normal cortical echogenicity.     No hydronephrosis.     7-8 mm LEFT renal pelvis stone noted on CT exam from 3 days ago though  not well seen on today's exam.     The right kidney measures 101 x 50 x 46 mm.  RIGHT mid cyst = 29 mm.  RIGHT upper pole cyst = 13 mm.        The left kidney measures 95 x 53 x 46 mm.  Upper pole cyst = 16 mm.  Lower pole cyst = 19 mm.       Impression:      Impression:  1. No evidence of obstruction.           This report was signed and finalized on 7/6/2025 9:01 AM by Dr. Leonard Rogers MD.       XR Chest 1 View [[id number]] Collected: 07/06/25 0850     Updated: 07/06/25 0853    Narrative:      EXAMINATION: XR CHEST 1 VW-     HISTORY: line placement; K52.9-Noninfective gastroenteritis and colitis,  unspecified; E83.42-Hypomagnesemia; E87.6-Hypokalemia;  E83.51-Hypocalcemia;  N17.9-Acute kidney failure, unspecified     Images are stored in PACS per institutional protocol.     1 view chest x-ray.     COMPARISON:  7/3/2025.     RIGHT jugular central line has been placed and is in good position.     Mild chronic interstitial lung disease.  No focal infiltrate.     The heart is magnified by the portable projection.       Impression:      1. Well-positioned RIGHT jugular central line.  2. Stable appearance of the lungs.           This report was signed and finalized on 7/6/2025 8:50 AM by Dr. Leonard Rogers MD.               Culture:  Blood Culture   Date Value Ref Range Status   07/03/2025 No growth at 3 days  Preliminary   07/03/2025 No growth at 3 days  Preliminary         Assessment    Acute kidney injury, ATN--now on dialysis  Baseline chronic kidney disease stage 3  Type 2 diabetes  Hypertension  NSAID use  Hypokalemia  Hypomagnesemia  Anemia with iron deficiency  Metabolic acidosis  Gastroenteritis     Plan:  Dialysis #1 today  Will plan for additional HD to be provided tomorrow      Sudhir Erwin, APRN  7/8/2025  11:30 CDT

## 2025-07-08 NOTE — DISCHARGE PLACEMENT REQUEST
"Javier Pastrana (69 y.o. Female)       Date of Birth   1955    Social Security Number       Address   [address] [place] [id number]    Home Phone   [phone]    MRN   [id number]       Scientologist   [facility]    Marital Status                               Admission Date   7/3/2025    Admission Type   Emergency    Admitting Provider   Shade Alberts MD    Attending Provider   Harpal Proctor MD    Department, Room/Bed   [facility] 3C, 391/1       Discharge Date       Discharge Disposition       Discharge Destination                                 Attending Provider: Harpal Proctor MD    Allergies: No Known Allergies    Isolation: None   Infection: None   Code Status: CPR    Ht: 165.1 cm (65\")   Wt: 99.6 kg (219 lb 9.3 oz)    Admission Cmt: None   Principal Problem: DAISY (acute kidney injury) [N17.9]                   Active Insurance as of 7/3/2025       Primary Coverage       Payor Plan Insurance Group Employer/Plan Group    UNITED [employer] MEDICARE REPLACEMENT UHC MEDICARE ADVANTAGE [facility] HMO NON PAR [id number]       Payor Plan Address Payor Plan Phone Number Payor Plan Fax Number Effective Dates    [address] [address]   7/1/2025 - None Entered    [place] [id number]         Subscriber Name Subscriber Birth Date Member ID       JAVIER PASTRANA 1955 [id number]               Secondary Coverage       Payor Plan Insurance Group Employer/Plan Group    [id number]        Payor Plan Address Payor Plan Phone Number Payor Plan Fax Number Effective Dates    [address]   5/17/2023 - None Entered    [place] [address]         Subscriber Name Subscriber Birth Date Member ID       JAVIER PASTRANA 1955 [id number]                     Emergency Contacts        (Rel.) Home Phone Work Phone Mobile Phone    Rodger Marrufo (Friend) [phone] -- --    Klever Stone (Friend) -- -- [phone]    Laura Phelan -- -- [phone]              "

## 2025-07-08 NOTE — PROGRESS NOTES
[facility] Intensivist Services    Date of Admission: 7/3/2025  Date of Note: 07/08/25  Primary Care Physician: Provider, No Known   LOS: 5 days     History   Next of Kin:  Primary Emergency Contact: Rodger Marrufo Home Phone: [phone]   Code Status: Code Status and Medical Interventions: CPR (Attempt to Resuscitate); Full Support; Discussed CODE STATUS with Mr. Laughlin.  Patient is ,  is in a nursing home.  She does not have any children.    She is a 69 y.o. female admitted 7/3/2025 with DAISY (acute kidney injury) who also  has a past medical history of Acid reflux, Arthritis, High cholesterol, Hypertension, Neuropathy, Swollen lymph nodes, and Type 2 diabetes mellitus.    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     She takes albuterol sulfate HFA, ibuprofen, and ondansetron ODT at home based on most current med reconciliation.   Complete list is below.     The ICU team was asked to evaluate the patient for severe dehydration with electrolyte abnormalities and acute kidney injury..    7/4 today patient is feeling a little bit better.  Her cramps has improved.  She can move all of  her upper and lower extremities fine.  Denies having any major abdominal pain.  Have not had anything to eat yet.  No nausea or vomiting noted    7/5 patient complaining of having a lot of throat pain.  No more diarrhea noted.  Seems slightly confused on conversation.  Denies having any chest discomfort.  No episodes of fever overnight.  On room air.  Blood pressures okay.    7/ 6 doing fair this morning fairly awake denies having any other new discomfort able to move all extremities fine tolerating oral okay with no fever  7/7  As before she has been transferred out of the unit presented originally to the ICU with severe dehydration DAISY ultrasound renal was unremarkable felt acute renal injury secondary to ATN acute on chronic renal failure patient was instructed to avoid NSAIDs per nephrology she might need to be started on dialysis repeat her labs today continue supportive care remains on Rocephin and Flagyl for enteritis  7/8  As  before the patient get vascular access, going for hemodialysis today being treated for enteritis with Rocephin and Flagyl her GI panel is unremarkable blood cultures remain unremarkable overall doing better while during dialysis the patient went into a flutter A-fib with PVCs we will consult with cardiology her last echo was showing EF to be 60%  Past Medical History     Active and Resolved Problems  Active Hospital Problems    Diagnosis  POA    **DAISY (acute kidney injury) [N17.9]  Yes    Enteritis [K52.9]  Yes      Resolved Hospital Problems   No resolved problems to display.       Allergies     Allergies:   She has no known allergies.    Labs   Basic Labs:  CBC:      Lab 07/08/25  0242 07/07/25  1053 07/06/25  0356 07/05/25  0223 07/04/25  1418   WBC 9.77 9.55 11.81* 17.28* 16.56*   HEMOGLOBIN 10.5* 10.0* 10.1* 11.0* 10.9*   HEMATOCRIT 29.9* 29.1* 29.5* 32.4* 32.2*   PLATELETS 170 150 172 213 204   NEUTROS ABS 7.60* 7.72* 10.17* 16.17* 15.24*   IMMATURE GRANS (ABS) 0.16* 0.11* 0.10*  0.12* 0.11*   LYMPHS ABS 0.78 0.63* 0.48* 0.37* 0.40*   MONOS ABS 0.81 0.73 0.65 0.51 0.67   EOS ABS 0.37 0.34 0.38 0.08 0.11   MCV 92.9 94.8 93.4 94.2 96.1       LIVER FUNCTION AND COAG TESTS:      Lab 07/07/25  1053 07/06/25  0356 07/05/25  0223 07/03/25  1706 07/03/25  1628   TOTAL PROTEIN 4.8* 4.9*  --  5.4*  --    ALBUMIN 2.6* 2.6*  --  2.9*  --    GLOBULIN 2.2 2.3  --  2.5  --    ALT (SGPT) 28 32  --  15  --    AST (SGOT) 28 61*  --  41*  --    BILIRUBIN 0.2 0.2  --  0.3  --    ALK PHOS 88 87  --  82  --    AMYLASE  --   --   --   --  19*   LIPASE  --   --  61*  --  21       ABG:      Lab 07/08/25  0242 07/07/25  1053 07/06/25  0356 07/05/25  0223 07/04/25  1418   ANION GAP 16.0* 13.0 15.0 21.0* 21.0*       CMP:      Lab 07/08/25  0242 07/07/25  2125 07/07/25  1053 07/06/25  0356 07/05/25  2348 07/05/25  0223 07/04/25  1418   SODIUM 138  --  137 134*  --  132* 135*   POTASSIUM 3.9 3.7 3.5 3.0* 3.5 2.2* 2.2*   CHLORIDE 109*  --  108* 104  --  100 102   BUN 34.4*  --  36.5* 36.5*  --  39.5* 41.4*   CREATININE 4.19*  --  4.19* 4.65*  --  4.69* 4.71*   CALCIUM 7.1*  --  6.8* 6.7*  --  7.6* 6.0*   MAGNESIUM 1.9  --  1.9 2.2  --  2.8* 2.3   PHOSPHORUS 4.2  --  3.9 3.4  --  5.7* 4.7*   GLUCOSE 164*  --  193* 208*  --  215* 189*   EGFR 10.9*  --  10.9* 9.6*  --  9.5* 9.5*       Inpatient Medications   Scheduled Meds:acetaminophen, 650 mg, Oral, Q8H   Or  acetaminophen, 650 mg, Oral, Q8H   Or  acetaminophen, 650 mg, Rectal, Q8H  bumetanide, 1 mg, Intravenous, Q12H  ceFAZolin, 2,000 mg, Intravenous, Q8H  cholecalciferol, 5,000 Units, Oral, Daily  heparin (porcine), 5,000 Units, Subcutaneous, Q12H  insulin regular, 2-7 Units, Subcutaneous, Q6H  metroNIDAZOLE, 500 mg, Oral, Q8H  [Transfer Hold] mupirocin, 1 Application, Each Nare, BID  pantoprazole, 40 mg, Oral, BID AC  potassium chloride, 40 mEq, Oral, TID  senna-docusate sodium, 2 tablet, Oral, BID  sodium bicarbonate, 1,300 mg, Oral, TID  sodium chloride, 10 mL,  "Intravenous, Q12H      Continuous Infusions:     PRN Meds:.  acetaminophen **OR** acetaminophen    aluminum-magnesium hydroxide-simethicone    senna-docusate sodium **AND** polyethylene glycol **AND** bisacodyl **AND** bisacodyl    calcium carbonate    Calcium Replacement - Follow Nurse / BPA Driven Protocol    dextrose    dextrose    glucagon (human recombinant)    heparin (porcine)    [Transfer Hold] HYDROcodone-acetaminophen    HYDROcodone-acetaminophen    HYDROmorphone **AND** naloxone    Magnesium Standard Dose Replacement - Follow Nurse / BPA Driven Protocol    nitroglycerin    ondansetron    ondansetron ODT **OR** ondansetron    Potassium Replacement - Follow Nurse / BPA Driven Protocol    [COMPLETED] Insert Peripheral IV **AND** sodium chloride    sodium chloride    sodium chloride    I have reviewed the patient's current medications.   Outpatient Medications     Current Outpatient Medications   Medication Instructions    albuterol sulfate  (90 Base) MCG/ACT inhaler 2 puffs, Inhalation, Every 4 Hours PRN    ibuprofen (ADVIL,MOTRIN) 1,000 mg, Oral, Every 6 Hours PRN    ondansetron ODT (ZOFRAN-ODT) 4 mg, Translingual, Every 4 Hours PRN       Current Antibiotics   ceFAZolin 2000 mg IVPB in 100 mL NS (MBP)  metroNIDAZOLE - 500 MG    Exam   Vent settings for last 24 hours:  FiO2 (%):  [19 %-84 %] 19 %    Vital signs for last 24 hours:  Temp:  [96.9 °F (36.1 °C)-98 °F (36.7 °C)] 97.7 °F (36.5 °C)  Heart Rate:  [] 94  Resp:  [0-20] 16  BP: ()/(39-83) 137/77  FiO2 (%):  [19 %-84 %] 19 %    Vitals: Her  height is 165.1 cm (65\") and weight is 99.6 kg (219 lb 9.3 oz). Her oral temperature is 97.7 °F (36.5 °C). Her blood pressure is 137/77 and her pulse is 94. Her respiration is 16 and oxygen saturation is 96%.     Constitutional:       General: She is not in acute distress.  Slightly confused  HENT:      Head: Normocephalic and atraumatic.      Nose: Nose normal.      Mouth/Throat:      Mouth: Mucous " membranes are dry.      Comments: Extremely dry mucosa.    Eyes:      Extraocular Movements: Extraocular movements intact.      Pupils: Pupils are equal, round, and reactive to light.   Cardiovascular:      Rate and Rhythm: Normal rate and regular rhythm.   Pulmonary:      Effort: Pulmonary effort is normal.      Breath sounds: Normal breath sounds.   Abdominal:      General: There is no distension.      Palpations: Abdomen is soft.      Tenderness: There is no guarding.      Comments: Well-healed vertical incisional site.  No rebound tenderness or concerns for peritonitis on exam.  Bowel sounds are normal  Musculoskeletal:         General: No deformity.      Cervical back: Normal range of motion.      Right lower leg: No edema.      Left lower leg: No edema.      Comments: Cramping noted to right hand and feet bilateral improved ROM now.   Skin:     General: Skin is warm and dry.      Capillary Refill: Capillary refill takes 2 to 3 seconds.      Findings: No bruising.   Neurological:      General: No focal deficit present.      Mental Status: She is alert and oriented to person, place, and time.        Intake/Output   Intake/Output this shift:  No intake/output data recorded.    Intake/Output last 3 shifts:  I/O last 3 completed shifts:  In: 200 [IV Piggyback:200]  Out: 1900 [Urine:1900]    Results and Cultures Review     Result Review:  I have personally reviewed the results from the time of this admission to 7/8/2025 12:08 CDT and agree with these findings:  [x]  Laboratory list / accordion  [x]  Microbiology  [x]  Radiology  []  EKG/Telemetry   []  Cardiology/Vascular   []  Pathology  []  Old records  []  Other:  Most notable findings include: mg 1.3  K .3.0         Culture Data:     Assessment/Plan       Enteritis  Came with abdominal pain with severe dehydration.    Continue current therapy with Flagyl.  No signs of infection noted.  No more diarrhea noted.    Try oral liquids to see if she tolerates.    2  Acute kidney injury associated with creatinine of 5.0- 4.69- 4.65   Prerenal associated with hypovolemia.  Patient uses NSAIDs routinely may be causing acute tubular necrosis  Will continue with IV hydration.    Appreciate help from nephrology     3 Electrolyte abnormalities with   Severe hypokalemia-replaced recheck 3.0  Magnesium replaced as well   May be associated with a GI disorder with the significant diarrhea but seems pretty refractory at this point I will recheck the electrolytes cannot rule out the possibility of  RTA   We will continue to closely monitor    4 Non obstructive renal stone  Cont hydration, no s/s for now.fu conservatively    5 Pem low protein / albumin level   Optimize oral when able to take  .      6 VTE Prophylaxis:    Pharmacologic & mechanical VTE prophylaxis orders are present.      7 Active VTE Prophylaxis  Pharmacologic:        Start     Dose Route Frequency Stop    07/08/25 0953  heparin (porcine) injection 3,600 Units         3,600 Units IK As Needed --    07/03/25 2100  heparin (porcine) 5000 UNIT/ML injection 5,000 Units         5,000 Units SC Every 12 Hours Scheduled --                  Mechanical:        Start        07/03/25 1956  Maintain Sequential Compression Device  Continuous                            Code Status and Medical Interventions: CPR (Attempt to Resuscitate); Full Support; Discussed CODE STATUS with Mr. Laughlin.  Patient is ,  is in a nursing home.  She does not have any children.   Ordered at: 07/03/25 2000     Code Status (Patient has no pulse and is not breathing):    CPR (Attempt to Resuscitate)     Medical Interventions (Patient has pulse or is breathing):    Full Support     Comments:    Discussed CODE STATUS with Mr. Laughlin.  Patient is ,  is in a nursing home.  She does not have any children.     Level Of Support Discussed With:    Patient       Due to a high probability of clinically significant, life threatening deterioration,  the patient required my highest level of preparedness to intervene emergently and I personally spent this critical care time directly and personally managing the patient.     This critical care time included obtaining a history; examining the patient; pulse oximetry; ordering and review of studies; arranging urgent treatment with development of a management plan; evaluation of patient's response to treatment; frequent reassessment; and, discussions with other providers.    This critical care time was performed to assess and manage the high probability of imminent, life-threatening deterioration that could result in multi-organ failure. It was exclusive of separately billable procedures and treating other patients and teaching time.    Please see MDM section and the rest of the note for further information on patient assessment and treatment.    Part of this note may be an electronic transcription/translation of spoken language to printed text using the Dragon Dictation System    Electronically signed by Harpal Proctor MD on 7/8/2025 at 12:08 CDT

## 2025-07-08 NOTE — ANESTHESIA POSTPROCEDURE EVALUATION
"Patient: Lindsay Laughlin    Procedure Summary       Date: 07/07/25 Room / Location:  [facility] OR  /  [facility] OR    Anesthesia Start: 1744 Anesthesia Stop: 1817    Procedure: OR HEMODIALYSIS CATHETER INSERTION (Right: Neck) Diagnosis:     Surgeons: Sterling Smith DO Provider: Anne Ho CRNA    Anesthesia Type: MAC ASA Status: 4 - Emergent            Anesthesia Type: MAC    Vitals  Vitals Value Taken Time   /61 07/07/25 18:30   Temp 96.9 °F (36.1 °C) 07/07/25 18:16   Pulse 85 07/07/25 18:33   Resp 20 07/07/25 18:30   SpO2 95 % 07/07/25 18:33   Vitals shown include unfiled device data.        Post Anesthesia Care and Evaluation    Patient location during evaluation: PACU  Patient participation: complete - patient participated  Level of consciousness: awake and alert  Pain management: adequate    Airway patency: patent  Anesthetic complications: No anesthetic complications    Cardiovascular status: acceptable  Respiratory status: acceptable  Hydration status: acceptable    Comments: Blood pressure 120/83, pulse 95, temperature 98 °F (36.7 °C), temperature source Oral, resp. rate 16, height 165.1 cm (65\"), weight 99.6 kg (219 lb 9.3 oz), SpO2 95%, not currently breastfeeding.    Pt discharged from PACU based on rajwinder score >8    "

## 2025-07-08 NOTE — PROGRESS NOTES
[facility] Intensivist Services    Date of Admission: 7/3/2025  Date of Note: 07/08/25  Primary Care Physician: Provider, No Known   LOS: 5 days     History   Next of Kin:  Primary Emergency Contact: Rodger Marrufo Home Phone: [phone]   Code Status: Code Status and Medical Interventions: CPR (Attempt to Resuscitate); Full Support; Discussed CODE STATUS with Mr. Laughlin.  Patient is ,  is in a nursing home.  She does not have any children.    She is a 69 y.o. female admitted 7/3/2025 with DAISY (acute kidney injury) who also  has a past medical history of Acid reflux, Arthritis, High cholesterol, Hypertension, Neuropathy, Swollen lymph nodes, and Type 2 diabetes mellitus.    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     She takes albuterol sulfate HFA, ibuprofen, and ondansetron ODT at home based on most current med reconciliation.   Complete list is below.     The ICU team was asked to evaluate the patient for severe dehydration with electrolyte abnormalities and acute kidney injury..    7/4 today patient is feeling a little bit better.  Her cramps has improved.  She can move all of  her upper and lower extremities fine.  Denies having any major abdominal pain.  Have not had anything to eat yet.  No nausea or vomiting noted    7/5 patient complaining of having a lot of throat pain.  No more diarrhea noted.  Seems slightly confused on conversation.  Denies having any chest discomfort.  No episodes of fever overnight.  On room air.  Blood pressures okay.    7/ 6 doing fair this morning fairly awake denies having any other new discomfort able to move all extremities fine tolerating oral okay with no fever  7/7  As before she has been transferred out of the unit presented originally to the ICU with severe dehydration DAISY ultrasound renal was unremarkable felt acute renal injury secondary to ATN acute on chronic renal failure patient was instructed to avoid NSAIDs per nephrology she might need to be started on dialysis repeat her labs today continue supportive care remains on Rocephin and Flagyl for enteritis  7/8  As  before the patient get vascular access, going for hemodialysis today being treated for enteritis with Rocephin and Flagyl her GI panel is unremarkable blood cultures remain unremarkable overall doing better  Past Medical History     Active and Resolved Problems  Active Hospital Problems    Diagnosis  POA    **DAISY (acute kidney injury) [N17.9]  Yes    Enteritis [K52.9]  Yes      Resolved Hospital Problems   No resolved problems to display.       Allergies     Allergies:   She has no known allergies.    Labs   Basic Labs:  CBC:      Lab 07/08/25  0242 07/07/25  1053 07/06/25  0356 07/05/25  0223 07/04/25  1418   WBC 9.77 9.55 11.81* 17.28* 16.56*   HEMOGLOBIN 10.5* 10.0* 10.1* 11.0* 10.9*   HEMATOCRIT 29.9* 29.1* 29.5* 32.4* 32.2*   PLATELETS 170 150 172 213 204   NEUTROS ABS 7.60* 7.72* 10.17* 16.17* 15.24*   IMMATURE GRANS (ABS) 0.16* 0.11* 0.10* 0.12* 0.11*   LYMPHS ABS 0.78 0.63* 0.48* 0.37* 0.40*   MONOS ABS 0.81 0.73 0.65 0.51 0.67   EOS ABS 0.37 0.34 0.38 0.08 0.11   MCV 92.9 94.8  93.4 94.2 96.1       LIVER FUNCTION AND COAG TESTS:      Lab 07/07/25  1053 07/06/25  0356 07/05/25  0223 07/03/25  1706 07/03/25  1628   TOTAL PROTEIN 4.8* 4.9*  --  5.4*  --    ALBUMIN 2.6* 2.6*  --  2.9*  --    GLOBULIN 2.2 2.3  --  2.5  --    ALT (SGPT) 28 32  --  15  --    AST (SGOT) 28 61*  --  41*  --    BILIRUBIN 0.2 0.2  --  0.3  --    ALK PHOS 88 87  --  82  --    AMYLASE  --   --   --   --  19*   LIPASE  --   --  61*  --  21       ABG:      Lab 07/08/25  0242 07/07/25  1053 07/06/25  0356 07/05/25  0223 07/04/25  1418   ANION GAP 16.0* 13.0 15.0 21.0* 21.0*       CMP:      Lab 07/08/25  0242 07/07/25  2125 07/07/25  1053 07/06/25  0356 07/05/25  2348 07/05/25  0223 07/04/25  1418   SODIUM 138  --  137 134*  --  132* 135*   POTASSIUM 3.9 3.7 3.5 3.0* 3.5 2.2* 2.2*   CHLORIDE 109*  --  108* 104  --  100 102   BUN 34.4*  --  36.5* 36.5*  --  39.5* 41.4*   CREATININE 4.19*  --  4.19* 4.65*  --  4.69* 4.71*   CALCIUM 7.1*  --  6.8* 6.7*  --  7.6* 6.0*   MAGNESIUM 1.9  --  1.9 2.2  --  2.8* 2.3   PHOSPHORUS 4.2  --  3.9 3.4  --  5.7* 4.7*   GLUCOSE 164*  --  193* 208*  --  215* 189*   EGFR 10.9*  --  10.9* 9.6*  --  9.5* 9.5*       Inpatient Medications   Scheduled Meds:acetaminophen, 650 mg, Oral, Q8H   Or  acetaminophen, 650 mg, Oral, Q8H   Or  acetaminophen, 650 mg, Rectal, Q8H  bumetanide, 1 mg, Intravenous, Q12H  ceFAZolin, 2,000 mg, Intravenous, Q8H  cholecalciferol, 5,000 Units, Oral, Daily  heparin (porcine), 5,000 Units, Subcutaneous, Q12H  insulin regular, 2-7 Units, Subcutaneous, Q6H  metroNIDAZOLE, 500 mg, Oral, Q8H  [Transfer Hold] mupirocin, 1 Application, Each Nare, BID  pantoprazole, 40 mg, Oral, BID AC  potassium chloride, 40 mEq, Oral, TID  senna-docusate sodium, 2 tablet, Oral, BID  sodium bicarbonate, 1,300 mg, Oral, TID  sodium chloride, 10 mL, Intravenous, Q12H      Continuous Infusions:     PRN Meds:.  acetaminophen **OR** acetaminophen    aluminum-magnesium hydroxide-simethicone     "senna-docusate sodium **AND** polyethylene glycol **AND** bisacodyl **AND** bisacodyl    calcium carbonate    Calcium Replacement - Follow Nurse / BPA Driven Protocol    dextrose    dextrose    glucagon (human recombinant)    heparin (porcine)    [Transfer Hold] HYDROcodone-acetaminophen    HYDROcodone-acetaminophen    HYDROmorphone **AND** naloxone    Magnesium Standard Dose Replacement - Follow Nurse / BPA Driven Protocol    nitroglycerin    ondansetron    ondansetron ODT **OR** ondansetron    Potassium Replacement - Follow Nurse / BPA Driven Protocol    [COMPLETED] Insert Peripheral IV **AND** sodium chloride    sodium chloride    sodium chloride    I have reviewed the patient's current medications.   Outpatient Medications     Current Outpatient Medications   Medication Instructions    albuterol sulfate  (90 Base) MCG/ACT inhaler 2 puffs, Inhalation, Every 4 Hours PRN    ibuprofen (ADVIL,MOTRIN) 1,000 mg, Oral, Every 6 Hours PRN    ondansetron ODT (ZOFRAN-ODT) 4 mg, Translingual, Every 4 Hours PRN       Current Antibiotics   ceFAZolin 2000 mg IVPB in 100 mL NS (MBP)  metroNIDAZOLE - 500 MG    Exam   Vent settings for last 24 hours:  FiO2 (%):  [19 %-84 %] 19 %    Vital signs for last 24 hours:  Temp:  [96.9 °F (36.1 °C)-98.4 °F (36.9 °C)] 97.7 °F (36.5 °C)  Heart Rate:  [] 94  Resp:  [0-20] 16  BP: ()/(39-83) 137/77  FiO2 (%):  [19 %-84 %] 19 %    Vitals: Her  height is 165.1 cm (65\") and weight is 99.6 kg (219 lb 9.3 oz). Her oral temperature is 97.7 °F (36.5 °C). Her blood pressure is 137/77 and her pulse is 94. Her respiration is 16 and oxygen saturation is 96%.     Constitutional:       General: She is not in acute distress.  Slightly confused  HENT:      Head: Normocephalic and atraumatic.      Nose: Nose normal.      Mouth/Throat:      Mouth: Mucous membranes are dry.      Comments: Extremely dry mucosa.    Eyes:      Extraocular Movements: Extraocular movements intact.      Pupils: " Pupils are equal, round, and reactive to light.   Cardiovascular:      Rate and Rhythm: Normal rate and regular rhythm.   Pulmonary:      Effort: Pulmonary effort is normal.      Breath sounds: Normal breath sounds.   Abdominal:      General: There is no distension.      Palpations: Abdomen is soft.      Tenderness: There is no guarding.      Comments: Well-healed vertical incisional site.  No rebound tenderness or concerns for peritonitis on exam.  Bowel sounds are normal  Musculoskeletal:         General: No deformity.      Cervical back: Normal range of motion.      Right lower leg: No edema.      Left lower leg: No edema.      Comments: Cramping noted to right hand and feet bilateral improved ROM now.   Skin:     General: Skin is warm and dry.      Capillary Refill: Capillary refill takes 2 to 3 seconds.      Findings: No bruising.   Neurological:      General: No focal deficit present.      Mental Status: She is alert and oriented to person, place, and time.        Intake/Output   Intake/Output this shift:  No intake/output data recorded.    Intake/Output last 3 shifts:  I/O last 3 completed shifts:  In: 200 [IV Piggyback:200]  Out: 1900 [Urine:1900]    Results and Cultures Review     Result Review:  I have personally reviewed the results from the time of this admission to 7/8/2025 10:32 CDT and agree with these findings:  [x]  Laboratory list / accordion  [x]  Microbiology  [x]  Radiology  []  EKG/Telemetry   []  Cardiology/Vascular   []  Pathology  []  Old records  []  Other:  Most notable findings include: mg 1.3  K .3.0         Culture Data:     Assessment/Plan       Enteritis  Came with abdominal pain with severe dehydration.    Continue current therapy with Flagyl.  No signs of infection noted.  No more diarrhea noted.    Try oral liquids to see if she tolerates.    2 Acute kidney injury associated with creatinine of 5.0- 4.69- 4.65   Prerenal associated with hypovolemia.  Patient uses NSAIDs routinely  may be causing acute tubular necrosis  Will continue with IV hydration.    Appreciate help from nephrology     3 Electrolyte abnormalities with   Severe hypokalemia-replaced recheck 3.0  Magnesium replaced as well   May be associated with a GI disorder with the significant diarrhea but seems pretty refractory at this point I will recheck the electrolytes cannot rule out the possibility of  RTA   We will continue to closely monitor    4 Non obstructive renal stone  Cont hydration, no s/s for now.fu conservatively    5 Pem low protein / albumin level   Optimize oral when able to take  .      6 VTE Prophylaxis:    Pharmacologic & mechanical VTE prophylaxis orders are present.      7 Active VTE Prophylaxis  Pharmacologic:        Start     Dose Route Frequency Stop    07/08/25 0953  heparin (porcine) injection 3,600 Units         3,600 Units IK As Needed --    07/03/25 2100  heparin (porcine) 5000 UNIT/ML injection 5,000 Units         5,000 Units SC Every 12 Hours Scheduled --                  Mechanical:        Start        07/03/25 1956  Maintain Sequential Compression Device  Continuous                            Code Status and Medical Interventions: CPR (Attempt to Resuscitate); Full Support; Discussed CODE STATUS with Mr. Laughlin.  Patient is ,  is in a nursing home.  She does not have any children.   Ordered at: 07/03/25 2000     Code Status (Patient has no pulse and is not breathing):    CPR (Attempt to Resuscitate)     Medical Interventions (Patient has pulse or is breathing):    Full Support     Comments:    Discussed CODE STATUS with Mr. Laughlin.  Patient is ,  is in a nursing home.  She does not have any children.     Level Of Support Discussed With:    Patient       Due to a high probability of clinically significant, life threatening deterioration, the patient required my highest level of preparedness to intervene emergently and I personally spent this critical care time directly and  personally managing the patient.     This critical care time included obtaining a history; examining the patient; pulse oximetry; ordering and review of studies; arranging urgent treatment with development of a management plan; evaluation of patient's response to treatment; frequent reassessment; and, discussions with other providers.    This critical care time was performed to assess and manage the high probability of imminent, life-threatening deterioration that could result in multi-organ failure. It was exclusive of separately billable procedures and treating other patients and teaching time.    Please see MDM section and the rest of the note for further information on patient assessment and treatment.    Part of this note may be an electronic transcription/translation of spoken language to printed text using the Dragon Dictation System    Electronically signed by Harpal Proctor MD on 7/8/2025 at 10:32 CDT

## 2025-07-08 NOTE — PLAN OF CARE
Goal Outcome Evaluation:  Plan of Care Reviewed With: patient        Progress: no change  Outcome Evaluation: Pt restless most of the night. A&O x4. Very tearful, when asked why she can't give an answer. Incontinent of stool, voiding via external cath. IV abx infused, po flagyl. Right permacath dressing changed, new IV placed. Turning q2h herself. Safety maintained.

## 2025-07-08 NOTE — PLAN OF CARE
Goal Outcome Evaluation:  Plan of Care Reviewed With: patient        Progress: no change  Outcome Evaluation: PT eval completed. Pt presents in bed and A&O x4 with slow responses. Pt was tearful on arrival and remained emotional throughout the session, but she was still agreeable to therapy evaluation. Pt reports that prior to hospitalization, she was living alone and was independent with functional mobility, most ADLs, and ambulating with walker. She said she needed some assistance with cooking and cleaning and total assistance with driving. Currently, pt requires mod A with supine<>sit and max A with scooting up in bed with verbal cues. Pt fluctuated between CGA and min A with sitting balance. Pt was fearful of standing without support from at least two people, so sit<>stand was deferred today. Pt's BLE strength ranged from 3+/5 to 4/5. Pt would benefit from continued PT services due to the following impairments: weakness, decreased balance, decreased functional mobility, impaired activity tolerance, and pain. Recommend dc to SNF.    Anticipated Discharge Disposition (PT): skilled nursing facility

## 2025-07-09 LAB
ALBUMIN SERPL-MCNC: 2.8 G/DL (ref 3.5–5.2)
ALBUMIN/GLOB SERPL: 1.2 G/DL
ALP SERPL-CCNC: 126 U/L (ref 39–117)
ALT SERPL W P-5'-P-CCNC: 6 U/L (ref 1–33)
ANION GAP SERPL CALCULATED.3IONS-SCNC: 13 MMOL/L (ref 5–15)
AST SERPL-CCNC: 20 U/L (ref 1–32)
BASOPHILS # BLD AUTO: 0.06 10*3/MM3 (ref 0–0.2)
BASOPHILS NFR BLD AUTO: 0.6 % (ref 0–1.5)
BILIRUB SERPL-MCNC: 0.6 MG/DL (ref 0–1.2)
BUN SERPL-MCNC: 9.6 MG/DL (ref 8–23)
BUN/CREAT SERPL: 6.4 (ref 7–25)
C-ANCA TITR SER IF: NORMAL TITER
CALCIUM SPEC-SCNC: 7.8 MG/DL (ref 8.6–10.5)
CHLORIDE SERPL-SCNC: 100 MMOL/L (ref 98–107)
CO2 SERPL-SCNC: 26 MMOL/L (ref 22–29)
CREAT SERPL-MCNC: 1.51 MG/DL (ref 0.57–1)
DEPRECATED RDW RBC AUTO: 68.6 FL (ref 37–54)
EGFRCR SERPLBLD CKD-EPI 2021: 37 ML/MIN/1.73
EOSINOPHIL # BLD AUTO: 0.28 10*3/MM3 (ref 0–0.4)
EOSINOPHIL NFR BLD AUTO: 2.6 % (ref 0.3–6.2)
ERYTHROCYTE [DISTWIDTH] IN BLOOD BY AUTOMATED COUNT: 20.1 % (ref 12.3–15.4)
GLOBULIN UR ELPH-MCNC: 2.4 GM/DL
GLUCOSE BLDC GLUCOMTR-MCNC: 117 MG/DL (ref 70–130)
GLUCOSE BLDC GLUCOMTR-MCNC: 159 MG/DL (ref 70–130)
GLUCOSE BLDC GLUCOMTR-MCNC: 181 MG/DL (ref 70–130)
GLUCOSE BLDC GLUCOMTR-MCNC: 218 MG/DL (ref 70–130)
GLUCOSE BLDC GLUCOMTR-MCNC: 99 MG/DL (ref 70–130)
GLUCOSE SERPL-MCNC: 113 MG/DL (ref 65–99)
HCT VFR BLD AUTO: 30.5 % (ref 34–46.6)
HGB BLD-MCNC: 10.8 G/DL (ref 12–15.9)
IMM GRANULOCYTES # BLD AUTO: 0.24 10*3/MM3 (ref 0–0.05)
IMM GRANULOCYTES NFR BLD AUTO: 2.3 % (ref 0–0.5)
LYMPHOCYTES # BLD AUTO: 1.08 10*3/MM3 (ref 0.7–3.1)
LYMPHOCYTES NFR BLD AUTO: 10.2 % (ref 19.6–45.3)
MAGNESIUM SERPL-MCNC: 1.7 MG/DL (ref 1.6–2.4)
MCH RBC QN AUTO: 33.1 PG (ref 26.6–33)
MCHC RBC AUTO-ENTMCNC: 35.4 G/DL (ref 31.5–35.7)
MCV RBC AUTO: 93.6 FL (ref 79–97)
MONOCYTES # BLD AUTO: 1.34 10*3/MM3 (ref 0.1–0.9)
MONOCYTES NFR BLD AUTO: 12.7 % (ref 5–12)
MYELOPEROXIDASE AB SER IA-ACNC: <0.2 UNITS (ref 0–0.9)
NEUTROPHILS NFR BLD AUTO: 7.57 10*3/MM3 (ref 1.7–7)
NEUTROPHILS NFR BLD AUTO: 71.6 % (ref 42.7–76)
NRBC BLD AUTO-RTO: 0.2 /100 WBC (ref 0–0.2)
P-ANCA ATYPICAL TITR SER IF: NORMAL TITER
P-ANCA TITR SER IF: NORMAL TITER
PHOSPHATE SERPL-MCNC: 2 MG/DL (ref 2.5–4.5)
PLATELET # BLD AUTO: 122 10*3/MM3 (ref 140–450)
PMV BLD AUTO: 10.6 FL (ref 6–12)
POTASSIUM SERPL-SCNC: 3.7 MMOL/L (ref 3.5–5.2)
PROT SERPL-MCNC: 5.2 G/DL (ref 6–8.5)
PROTEINASE3 AB SER IA-ACNC: <0.2 UNITS (ref 0–0.9)
RBC # BLD AUTO: 3.26 10*6/MM3 (ref 3.77–5.28)
SODIUM SERPL-SCNC: 139 MMOL/L (ref 136–145)
WBC NRBC COR # BLD AUTO: 10.57 10*3/MM3 (ref 3.4–10.8)

## 2025-07-09 PROCEDURE — 83735 ASSAY OF MAGNESIUM: CPT | Performed by: SURGERY

## 2025-07-09 PROCEDURE — 25010000002 MAGNESIUM SULFATE 2 GM/50ML SOLUTION: Performed by: FAMILY MEDICINE

## 2025-07-09 PROCEDURE — 97530 THERAPEUTIC ACTIVITIES: CPT

## 2025-07-09 PROCEDURE — 99232 SBSQ HOSP IP/OBS MODERATE 35: CPT | Performed by: STUDENT IN AN ORGANIZED HEALTH CARE EDUCATION/TRAINING PROGRAM

## 2025-07-09 PROCEDURE — 85025 COMPLETE CBC W/AUTO DIFF WBC: CPT | Performed by: HOSPITALIST

## 2025-07-09 PROCEDURE — 82948 REAGENT STRIP/BLOOD GLUCOSE: CPT | Performed by: FAMILY MEDICINE

## 2025-07-09 PROCEDURE — 80053 COMPREHEN METABOLIC PANEL: CPT | Performed by: HOSPITALIST

## 2025-07-09 PROCEDURE — 84100 ASSAY OF PHOSPHORUS: CPT | Performed by: SURGERY

## 2025-07-09 PROCEDURE — 82948 REAGENT STRIP/BLOOD GLUCOSE: CPT | Performed by: SURGERY

## 2025-07-09 PROCEDURE — 97110 THERAPEUTIC EXERCISES: CPT

## 2025-07-09 PROCEDURE — 82948 REAGENT STRIP/BLOOD GLUCOSE: CPT

## 2025-07-09 PROCEDURE — 63710000001 INSULIN REGULAR HUMAN PER 5 UNITS: Performed by: SURGERY

## 2025-07-09 PROCEDURE — 25010000002 HEPARIN (PORCINE) PER 1000 UNITS: Performed by: INTERNAL MEDICINE

## 2025-07-09 RX ORDER — MAGNESIUM SULFATE HEPTAHYDRATE 40 MG/ML
2 INJECTION, SOLUTION INTRAVENOUS ONCE
Status: COMPLETED | OUTPATIENT
Start: 2025-07-09 | End: 2025-07-09

## 2025-07-09 RX ADMIN — SODIUM BICARBONATE 1300 MG: 650 TABLET ORAL at 21:35

## 2025-07-09 RX ADMIN — MAGNESIUM SULFATE HEPTAHYDRATE 2 G: 40 INJECTION, SOLUTION INTRAVENOUS at 16:12

## 2025-07-09 RX ADMIN — METOPROLOL TARTRATE 25 MG: 25 TABLET, FILM COATED ORAL at 21:35

## 2025-07-09 RX ADMIN — POTASSIUM CHLORIDE 40 MEQ: 1500 TABLET, EXTENDED RELEASE ORAL at 16:12

## 2025-07-09 RX ADMIN — HYDROCODONE BITARTRATE AND ACETAMINOPHEN 1 TABLET: 5; 325 TABLET ORAL at 00:42

## 2025-07-09 RX ADMIN — Medication 10 ML: at 21:35

## 2025-07-09 RX ADMIN — ACETAMINOPHEN 650 MG: 325 TABLET ORAL at 03:48

## 2025-07-09 RX ADMIN — SODIUM BICARBONATE 1300 MG: 650 TABLET ORAL at 16:12

## 2025-07-09 RX ADMIN — POTASSIUM CHLORIDE 40 MEQ: 1500 TABLET, EXTENDED RELEASE ORAL at 12:18

## 2025-07-09 RX ADMIN — PANTOPRAZOLE SODIUM 40 MG: 40 TABLET, DELAYED RELEASE ORAL at 16:31

## 2025-07-09 RX ADMIN — AMIODARONE HYDROCHLORIDE 400 MG: 200 TABLET ORAL at 12:18

## 2025-07-09 RX ADMIN — POTASSIUM CHLORIDE 40 MEQ: 1500 TABLET, EXTENDED RELEASE ORAL at 21:35

## 2025-07-09 RX ADMIN — Medication 5000 UNITS: at 12:18

## 2025-07-09 RX ADMIN — SODIUM BICARBONATE 1300 MG: 650 TABLET ORAL at 12:18

## 2025-07-09 RX ADMIN — ACETAMINOPHEN 650 MG: 325 TABLET ORAL at 12:18

## 2025-07-09 RX ADMIN — METOPROLOL TARTRATE 25 MG: 25 TABLET, FILM COATED ORAL at 12:18

## 2025-07-09 RX ADMIN — HEPARIN SODIUM 3600 UNITS: 1000 INJECTION INTRAVENOUS; SUBCUTANEOUS at 11:28

## 2025-07-09 RX ADMIN — INSULIN HUMAN 3 UNITS: 100 INJECTION, SOLUTION PARENTERAL at 00:41

## 2025-07-09 RX ADMIN — HYDROCODONE BITARTRATE AND ACETAMINOPHEN 1 TABLET: 5; 325 TABLET ORAL at 12:18

## 2025-07-09 RX ADMIN — AMIODARONE HYDROCHLORIDE 400 MG: 200 TABLET ORAL at 21:34

## 2025-07-09 RX ADMIN — Medication 10 ML: at 12:19

## 2025-07-09 RX ADMIN — APIXABAN 5 MG: 5 TABLET, FILM COATED ORAL at 21:34

## 2025-07-09 RX ADMIN — PANTOPRAZOLE SODIUM 40 MG: 40 TABLET, DELAYED RELEASE ORAL at 06:30

## 2025-07-09 NOTE — PROGRESS NOTES
[facility] Medicine Services  INPATIENT PROGRESS NOTE    Patient Name: Lindsay Laughlin  Date of Admission: 7/3/2025  Today's Date: 07/09/25  Length of Stay: 6  Primary Care Physician: Provider, No Known    Subjective   Chief Complaint: Dehydration, vomiting and diarrhea  HPI   69-year-old female with history of diabetes mellitus type II, hypertension, hyperlipidemia, chronic kidney disease stage 3b.  Admitted to the hospital 7/5/2025, initially to the intensive care unit;  she presented to the hospital complaining of nausea, vomiting and diarrhea for at least 5 days.  No rectal bleeding or melena reported.  Had a fall in the morning prior to admission, due to generalized weakness.  Patient found to have severe hypokalemia and hypomagnesemia;   As well as acute kidney injury/renal insufficiency, consider secondary to intravascular volume depletion and possible acute tubular necrosis from chronic nonsteroidal anti-inflammatory use.  Patient was started on hemodialysis during this hospital admission on 7/8/2025.  She had a new permacath placed to right internal jugular vein by vascular surgery on 7/7/2025.  The patient has history of paroxysmal atrial fibrillation, wide-complex tachycardia with cardiac arrest.  She was evaluated by EP cardiology and is started on amiodarone. No ICD implant recommended at this time.    I started taking care of this patient on 7/9/2025;  I evaluated patient while having hemodialysis.  She reports no chest pain, no shortness of breath, no abdominal pain, no diarrhea, no vomiting.        Review of Systems   All pertinent negatives and positives are as above. All other systems have been reviewed and are negative unless otherwise stated.     Objective    Temp:  [97.7 °F (36.5 °C)-98.5 °F (36.9 °C)] 97.7 °F (36.5 °C)  Heart Rate:  [] 73  Resp:  [16-18] 18  BP: (105-117)/(50-76) 117/51  Physical Exam  Constitutional:       Appearance: Chronically  ill-appearing, alert oriented x 3, no respiratory distress.  HENT:      Head: Normocephalic and atraumatic.      Nose: Nose normal.      Mouth/Throat:      Mouth: Mucous membranes are moist.   Eyes:      Extraocular Movements: Extraocular movements intact.      Conjunctiva/sclera: Conjunctivae normal.      Pupils: Pupils are equal, round, and reactive to light.   Cardiovascular:      Rate and Rhythm: regular rhythm.      Pulses: Normal pulses.   Pulmonary:      Effort: No respiratory distress.      Breath sounds: Normal breath sounds.  Abdominal:      General: Abdomen is flat. Bowel sounds are normal.      Palpations: Abdomen is soft.   Extremities:  No lower extremity edema.  Skin:     Capillary Refill: Capillary refill takes less than 2 seconds.      Coloration: Skin is not jaundiced.   Neurological:      General: No focal deficit present.      Mental Status: Patient is alert, oriented to place time and person.     Sensory: No sensory deficit.           Results Review:  I have reviewed the labs, radiology results, and diagnostic studies.    Laboratory Data:   Results from last 7 days   Lab Units 07/09/25  1008 07/08/25  0242 07/07/25  1053   WBC 10*3/mm3 10.57 9.77 9.55   HEMOGLOBIN g/dL 10.8* 10.5* 10.0*   HEMATOCRIT % 30.5* 29.9* 29.1*   PLATELETS 10*3/mm3 122* 170 150        Results from last 7 days   Lab Units 07/08/25  0242 07/07/25  2125 07/07/25  1053 07/06/25  0356 07/04/25  0036 07/03/25  1706   SODIUM mmol/L 138  --  137 134*   < > 135*   POTASSIUM mmol/L 3.9 3.7 3.5 3.0*   < > 1.7*   CHLORIDE mmol/L 109*  --  108* 104   < > 100   CO2 mmol/L 13.0*  --  16.0* 15.0*   < > 12.0*   BUN mg/dL 34.4*  --  36.5* 36.5*   < > 44.5*   CREATININE mg/dL 4.19*  --  4.19* 4.65*   < > 5.04*   CALCIUM mg/dL 7.1*  --  6.8* 6.7*   < > 5.6*   BILIRUBIN mg/dL  --   --  0.2 0.2  --  0.3   ALK PHOS U/L  --   --  88 87  --  82   ALT (SGPT) U/L  --   --  28 32  --  15   AST (SGOT) U/L  --   --  28 61*  --  41*   GLUCOSE mg/dL  164*  --  193* 208*   < > 161*    < > = values in this interval not displayed.       Culture Data:   Blood Culture   Date Value Ref Range Status   07/03/2025 No growth at 5 days  Final   07/03/2025 No growth at 5 days  Final       Radiology Data:   Imaging Results (Last 24 Hours)       Procedure Component Value Units Date/Time    IR Ins Tunneled Dialysis Catheter WO Port [[id number]] Collected: 07/08/25 0756     Updated: 07/08/25 1614    Narrative:      Performed by Dr. Smith. Please see procedure note.     This report was signed and finalized on 7/8/2025 4:11 PM by Dr. Sterling Smith MD.       FL C Arm During Surgery [[id number]] Collected: 07/08/25 0756     Updated: 07/08/25 1614    Narrative:      Performed by Dr. Smith. Please see procedure note.     This report was signed and finalized on 7/8/2025 4:11 PM by Dr. Sterling Smith MD.               I have reviewed the patient's current medications.     Assessment/Plan   Assessment  Active Hospital Problems    Diagnosis     **DAISY (acute kidney injury)     Enteritis        Acute kidney injury/renal insufficiency requiring hemodialysis   Chronic kidney disease stage 3b  Paroxysmal atrial fibrillation  Wide-complex tachycardia possible SVT with aberrant conduction  Hypertension  Hyperlipidemia  Acute enteritis, resolved  Chronic cardiomyopathy with diastolic dysfunction.      A1C 9.0    Laboratory tests from today:  Sodium 139 potassium 3.7, BUN 9.6 and creatinine 1.51, GFR 37.  Phosphorus 2.0.  Normal liver function tests.  Magnesium 1.7  Hemoglobin 10.8, hematocrit 30.5, platelet count 122,000.      Treatment Plan  Patient is having hemodialysis as per nephrology recommendations, currently day #2.  She will continue hemodialysis tomorrow as per specialist orders.  Daily laboratory tests.      Avoid nephrotoxic medications.    Follow platelet count.    Follow hemoglobin hematocrit.    Continue amiodarone as per specialist recommendations.  Continue metoprolol  25 p.o. twice daily.  Start Eliquis 5 mg p.o. 12 hours.    Replace electrolytes as per protocol.  Replace magnesium IV    Continue insulin sliding scale.    Will follow recommendations by nephrology regarding long-term dialysis.        Medical Decision Making  Number and Complexity of problems: 8, moderate to high    Differential Diagnosis: see above    Conditions and Status        Condition is unchanged.     [facility] Data  External documents reviewed: no  Cardiac tracing (EKG, telemetry) interpretation: no new  Radiology interpretation: no new  Labs reviewed: yes  Any tests that were considered but not ordered: no     Decision rules/scores evaluated (example [id number], Wells, etc): see chart     Discussed with: patient     Care Planning  Shared decision making: patient  Code status and discussions: FC    Disposition  Social Determinants of Health that impact treatment or disposition: none  I expect the patient to be discharged to SNF once approved.      Electronically signed by Juan J Garcia MD, 07/09/25, 11:26 CDT.

## 2025-07-09 NOTE — PLAN OF CARE
"Goal Outcome Evaluation:  Plan of Care Reviewed With: patient           Outcome Evaluation: A/O x 4, vitals stable. Dialysis today, 2L off. plans for another treatment tomorrow. R chest permacath dressing CDI.  pt restless and tearful at times, c/o pain, see MAR. Incontinent of urine and stool, BM x 3. q2 turn on dolphin mattress. refused turns and refusing wedges. coccyx dressing changed this shift, applied bilateral heel dressings. up to chair this afternoon w/ PT, chair alarm set. saftey maintained.       Addendum 1850: pt ripped off coccyx and bilateral heel dressings, threw them in the floor; along with her brief. Pt states \"I need them off, I'm too hot.\" Refused reapplication of the dressings and brief. Continues to refuse turns and the use of wedges.                       "

## 2025-07-09 NOTE — PLAN OF CARE
Goal Outcome Evaluation:  Plan of Care Reviewed With: patient        Progress: no change  Outcome Evaluation: Patient restless overnight, repeated crying out and requests from staff. VSS. c/o pain, see mar. patient encouraged to repostion q 2 hours. speech clear with encouragment. remains in NSR. purewick in place. refuses heel protectors. bed alarm in use. call light within reach and safety maintained

## 2025-07-09 NOTE — PROGRESS NOTES
2 L removed. No issues during dialysis.     [facility] INPATIENT SERVICES  DIALYSIS TREATMENT SUMMARY     Note: Consult with the attending physician for patient treatment orders, this document is not a physician order.     Patient Information  Patient Lindsay Laughlin  Date of Birth November 07, 1955  Chart Number [id number]  Location [facility]  Location MRN [id number]  Gender Female  SSN (last 4)   Treatment Information  Treatment Type Hemodialysis  Treatment Id [id number]  Start Time July 09, 2025 08:16  End Time July 09, 2025 11:20  Acutal Duration 03:04  Treatment Balances  Total Saline Administered 500  Net Fluid Balance -2000  Hemodialysis Orders  Therapy Standard  Orders Verified Time 07/09/2025 07:41   Date Verified 07/09/2025  Duration 3:00  Isolated UF/Bypass No  BFR (mL) 350  DFR (mL) 600  Dialyzer Type OPTIFLUX 160NR  UF Order UF Range  UF Range (mL) 1000 - 2000  Crit-Line used No  Heparin Initial Units Bolus No  Heparin IV Maintenance Bolus No  Heparin IV Infusion No  Potassium (mEq/L) 3  Calcium (mEq/L) 2.5  Bicarb (mg/dL) 40  Sodium (mEq/L) 137  Additional Orders to keep sbp greater than 90  Clinician Chichi Ledesma RN  AV Access  Access Type Central Venous Catheter  Central Venous Catheter  Access Type Catheter - Tunneled  Date Central Venous Catheter Placed 07/07/2025  Access Location Chest Wall - R  Current/New [facility] Patient Yes  1st Use Catheter Verified by X-Ray  Catheter Care Completed per Policy Yes  Dressing Dry and Intact on Arrival Yes  Dressing Changed No  Type of Dressing Film Biopatch/CHG  Last Date Changed 07/07/2025  If No select Reason Dressing Change Not Indicated per Policy  Type of HD Caps Curos  HD Caps Changed Yes  CVC Line Education Provided Yes - Dressing Change Frequency  Vitals  Pre-Treatment Vitals  Time Is BP being recorded? Pre BP Map BP Method Pulse RR Temp How was Weight Obtained? Pre Weight Previous Dry Weight Previous Post Weight Metric Target Fluid  Removal (mL) Dialysate Confirmed Clinician  07/09/2025 08:02 BP/Map 121/65 (84) Noninvasive 72 20 97.7 How Obtained: Reported Floor Weight 93.6   Kgs 2500 Yes Chichi Ledesma RN  Comments: pt is a & o x 2. hemodynamically stable.  Intra Procedure Vitals  Rec Type Time Is BP being recorded? BP Map Pulse RR BFR DFR AP  TMP UFR RMVD Bolus NSS Flush Chills Safety Check Crit-Line HCT Crit-Line BV% Clinician  E 07/09/2025 11:20 BP/Map 125/52 (76) 74        2500    Yes   Hortencia Garcia  Comments: HD complete.  Post Treatment Vitals  Time Is BP being recorded? BP Map Pulse RR Temp How was Weight Obtained? Post Weight Metric BVP UF Goal Ordered NSS Given Intra-Procedure Total Machine UF Removed (mL) Crit-Line Ending Profile Crit-Line Refill Crit-Line Ending HCT Crit-Line Max BV% Clinician  07/09/2025 11:35 BP/Map 121/55 (77) 73 16 97.7 How Obtained: Reported Floor Weight 91.6 Kgs 60.7 2000 0 2500     Hortencia Garcia  Comments: Pt tolerated treatment hemodynamically stable, returned to floor via transport in stable condition.  Safety checks include: access uncovered and secured, Hemaclip secured for all central line access, machine checks performed, and alarm limits confirmed.  Labs  Hepatitis  HBsAG Lab Result HBsAG Lab Result HBsAG Draw Date Transient Antigenemia(MD Diagnosis Only) Anti-HBs Lab Result Anti-HBs Lab Value Anti-HBs Draw Date Documented By Documented Date Hepatitis Status Hepatitis Status  Negative  07/07/2025  Negative  07/07/2025 Chichi Ledesma 07/09/2025  Susceptible  Notes:   Pre-Treatment Hepatitis Precautions Copy of hepatitis results verified in hospital EMR Yes Signing  Patient tx with immune pts only Yes Hepatitis Information Entered By Chichi Ledesma RN Signed By Chichi Ledesma RN  Pre-Treatment Handoff  Staff Report Received Yes  Report Given by Primary Nurse Nelly De La Torre  Time 07:34  Patient Arrival  Patient ID Verified Date of Birth  Full Name  Patient Consent to treatment verified Yes  Blood  Transfusion Consent Verified N/A  Treatment Comments  Treatment Notes Pt tolerated treatment hemodynamically stable, returned to floor via transport in stable condition. 2 L removed.  Post-Treatment Handoff  Report Given to Primary Nurse eNlly De La oTrre  Time Report Given 11:39  Report Given By Chichi Ledesma RN  Machine Validation  Time 07:45  Date 7/9/2025  Auto Alarm Test Passed Yes  Machine Serial # [id number]  Portable RO Yes  RO Serial# 14  Residual Bleach Negative N/A  Was a manufactured mix used? Yes  Machine Log Completed Yes  Total Chlorine (less than 0.1)? Yes  Total Chlorine Log Completed Yes  Bicarb BiBag  Bibag Size 900  Machine Temp 36.0  Machine Conductivity 13.6  Meter Type N/A  Meter Conductivity   Independent Conductivity 13.7  pH Status Pass Pass  pH   TCD Value 13.7  TCD Alarm with +/- 0.5 Yes  NVL enabled validated 100 asymmetric Yes  Safety check complete Chichi Ledesma RN  Second Verification Performed? Yes  Second Verification Performed By Hortencia Garcia  Reason Not Verified   Serum Lab Values  Time BUN Creatinine Na K (mEq/L) Cl CO2 Ca (mEq/L) Phos Mg (mg/dL) Alb (g/dL) Glucose Hgb Hct WBC Plt PT aPTT INR Other Clinician  07/09/2025 - - - - - - - - - - - - - - - - - -  Chichi Ledesma RN  Comments: no labs drawn this date  Facility Information Bed # 4 Isolation Information  Facility Information Stat Treatment No Isolation Required? N  Admission Date 07/03/2025 Patient Type New patient to dialysis with diagnosis of “Acute Kidney Injury” Completed by  Ordering MD [place] Patient Chronic Unit [facility] information Entered by Chichi Ledesma RN  Account/Finance Number [id number] Patient Home Unit acutes [id number] Facility Information  Admission Status InPatient Code Status Full Code Notes pt is a & o x 2. hemodynamically stable.  Location Dialysis Suite Multi Diagnosis   Room # 391 Diagnosis DAISY   Start Treatment Time Out Confirmed by Chichi Ledesma/Hortencia Garcia Correct access site  verified Yes  Treatment Initiation Connections Secured Time Out Completed 08:00 Treatment Start Date 07/09/2025  Saline line double clamped Correct patient verified Yes Treatment Start Time 08:16  Hemaclip Applied Correct procedure verified Yes Patient/Family questions and concerns addressed Yes  Pre Focused Assessment Edema GI Bowel sounds present  Access Location Generalized  Soft  Signs and Symptoms of Infection? No Cardiac  Nontender  Pain Screening Heart Rhythm Regular  Non distended  Does the patient have pain? No Telemetry Yes   Respiratory Skin  Voids  Lung Sounds Diminished Skin Warm Completed by  Location Bilateral  Dry Pre Treatment Focused Assessment Completed By Chichi Ledesma RN  Bases LOC Time 08:10  Position Bases LOC Oriented to Person Signing  Anterior  Alert Signed By Chichi Ledesma RN  Respiratory Efforts Unlabored  Oriented to Place   Notes room air GI / Bowels   Education  S&S of Infection Caregiver Education Provided? Yes  Patient Education  Hemodialysis treatment review Focus or Topic Hemodialysis treatment review  Patient Educated? Yes  Treatment Adherence Method Knowledge / Understanding Assessed Teach back  Focus or Topic Access Maintenance Method Knowledge / Understanding Assessed Teach back Patient Education Introduced By  Infection Prevention Family Education Provided? N/A Patient Education Introduced By Chichi Ledesma RN  Post-Treatment HD machine external disinfection completed per policy Yes Completed by  Post Treatment Delay PRO external disinfection completed per policy Yes Post Treatment Form Completed By Chichi Ledesma RN  Delay N Post Treatment General Information   Machine Disinfection Requirement Notes Pt tolerated treatment hemodynamically stable, returned to floor via transport in stable condition. 2 L removed.   Post Focused Assessment  Bases  Oriented to Place  Changes from Pre Focused Assessment Position Bases  Alert  Changes from Pre Treatment Focused  Assessment? No  Anterior GI / Bowels  Access Respiratory Efforts Unlabored GI Soft  Cath Packed with heparin 1000 units/mL Notes room air  Bowel sounds present  Access Port(ml) 1.6 Edema  Non distended  Return Port(ml) 1.6 Location Generalized  Nontender  Catheter clamped and capped Yes Cardiac   Access Flow Good Heart Rhythm Regular  Voids  Dialyzer Clearance Streaked Telemetry Yes Completed by  Pain Screening Skin Post Treatment Focused Assessment Completed by Chichi Ledesma RN  Does the patient have pain? No Skin Warm Date 07/09/2025  Respiratory  Dry Time 11:36  Lung Sounds Diminished LOC Signing  Location Bilateral LOC Oriented to Person Signed By Chichi Ledesma RN

## 2025-07-09 NOTE — CONSULTS
"EP CONSULT NOTE    Subjective        History of Present Illness    EP Problems:  1.  Paroxysmal atrial fibrillation  2.  Wide-complex tachycardia  3.  Cardiac arrest    Cardiology Problems:  1.  Hypertension  2.  Hyperlipidemia    Medical Problems:  1.  DAISY on dialysis  2.  Type 2 diabetes  3.  Sialoadenitis  4.  Multinodular goiter  5.  GERD  6.  Chronic pain  7.  Nephrolithiasis  8.  Prior colectomy      Lindsay Laughlin is a 69 y.o. female with problem list as above for whom EP is consulted regarding paroxysmal atrial fibrillation wide-complex tachycardia.  She initially presented to this hospitalization with generalized weakness, nausea, vomiting, diarrhea.  She was found to have severe electrolyte abnormalities with a potassium 1.7 and magnesium 0.8.  Her creatinine was also found to be markedly elevated compared to previous baseline.  As part of her initial presentation, she had cardiac arrest en route to the CT scanner requiring CPR and subsequent ROSC.  She underwent fluid resuscitation and electrolyte replenishment, however continues to have renal dysfunction and was ultimately started on dialysis.  As part of her evaluation here, she has been found to have new diagnosis atrial fibrillation at times with RVR.  Today, she did have an episode of wide-complex tachycardia lasting for approximately 18 seconds in duration.  She had spontaneous return of sinus rhythm this afternoon.  Given these findings, EP is consulted for further evaluation.    Objective   Vital Signs:  /76 (BP Location: Left arm, Patient Position: Lying)   Pulse 106   Temp 98 °F (36.7 °C) (Axillary)   Resp 18   Ht 165.1 cm (65\")   Wt 99.6 kg (219 lb 9.3 oz)   SpO2 97%   BMI 36.54 kg/m²   Estimated body mass index is 36.54 kg/m² as calculated from the following:    Height as of this encounter: 165.1 cm (65\").    Weight as of this encounter: 99.6 kg (219 lb 9.3 oz).      Physical Exam   Chronically ill-appearing, confused  Regular rate " and rhythm  Clear to auscultation bilaterally  Warm, well-perfused  Obese    Result Review :  The following data was reviewed by: Torres Irizarry MD on 07/03/2025:  CMP          7/6/2025    03:56 7/7/2025    10:53 7/7/2025    21:25 7/8/2025    02:42   CMP   Glucose 208  193   164    BUN 36.5  36.5   34.4    Creatinine 4.65  4.19   4.19    EGFR 9.6  10.9   10.9    Sodium 134  137   138    Potassium 3.0  3.5  3.7  3.9    Chloride 104  108   109    Calcium 6.7  6.8   7.1    Total Protein 4.9  4.8      Albumin 2.6  2.6      Globulin 2.3  2.2      Total Bilirubin 0.2  0.2      Alkaline Phosphatase 87  88      AST (SGOT) 61  28      ALT (SGPT) 32  28      Albumin/Globulin Ratio 1.1  1.2      BUN/Creatinine Ratio 7.8  8.7   8.2    Anion Gap 15.0  13.0   16.0      CBC          7/6/2025    03:56 7/7/2025    10:53 7/8/2025    02:42   CBC   WBC 11.81  9.55  9.77    RBC 3.16  3.07  3.22    Hemoglobin 10.1  10.0  10.5    Hematocrit 29.5  29.1  29.9    MCV 93.4  94.8  92.9    MCH 32.0  32.6  32.6    MCHC 34.2  34.4  35.1    RDW 18.5  19.9  19.7    Platelets 172  150  170      TSH ordered and pending    Echo from 7/5/2025 directly visualized and independently reviewed: Normal biventricular function, pulmonary hypertension.      [id number] SCORE   [id number] Score: 4 (7/8/2025  7:22 PM)           Assessment and Plan   Problems:  Paroxysmal atrial fibrillation  Cardiac arrest  Hypokalemia  Hypomagnesemia  Wide-complex tachycardia    Lindsay Laughlin is a 69 y.o. female with problem list as above for whom EP is consulted regarding paroxysmal atrial fibrillation, wide-complex tachycardia, cardiac arrest, hypokalemia, hypomagnesemia.    In regards to her atrial fibrillation, unclear whether this is in the setting of acute medical illness or simply a reflection of her comorbidities.  I favor the latter.  As such, I do think that treatment is going to be appropriate.  She should be started on anticoagulation (most likely apixaban given her  renal dysfunction).  I do not see any obvious indication why she should not be started on this medication at this time however will discuss with primary team to ensure no appointment procedural interventions in the next several days.  Otherwise, I do think that she would do best with a rhythm control strategy given acute issues and we will start her on amiodarone (poor candidate for other interventions given recent cardiac arrest, renal dysfunction).    In regards to her wide-complex tachycardia seen on telemetry, it is difficult to know whether this was nonsustained ventricular tachycardia versus SVT with aberrant conduction.  I do favor the latter given multiple episodes of SVT around the same site clinic.  Regardless, I do think that treatment with amiodarone as above is going to be appropriate.    Regards to her cardiac arrest, this does appear to be reversible etiology with marked electrolyte derangements in the time of the episode.  At this time, I do not see indication for ICD implant.    Plan:  - Replete electrolytes to maintain K greater than 4 and mag greater than 2  - No ICD implant at this time as above  - Start amiodarone 400 mg twice daily for maintenance of sinus rhythm  - Continue metoprolol 25 mg twice daily for adjunctive therapy  - Start apixaban 5 mg twice daily if no surgical interventions are planned from a primary team standpoint             Part of this note may be an electronic transcription/translation of spoken language to printed text using the Dragon Dictation System.

## 2025-07-09 NOTE — THERAPY TREATMENT NOTE
Acute Care - Physical Therapy Treatment Note  [facility]     Patient Name: Lindsay Laughlin  : 1955  MRN: [id number]  Today's Date: 2025      Visit Dx:     ICD-10-CM ICD-9-CM   1. Enteritis  K52.9 558.9   2. Hypomagnesemia  E83.42 275.2   3. Hypokalemia  E87.6 276.8   4. Hypocalcemia  E83.51 275.41   5. Acute kidney injury  N17.9 584.9   6. DIASY (acute kidney injury)  N17.9 584.9   7. 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     Pharyngoesophageal dysphagia    Gastroesophageal reflux disease    Heartburn    Acute bowel infarction    Acute renal failure (ARF)    DAISY (acute kidney injury)    Anuria    Anxiety    Chronic back pain    Hypertension    Neuropathy    Obesity, Class I, BMI 30.0-34.9 (see actual BMI)    Osteoarthritis    Type 2 diabetes mellitus    Type 2 DM with CKD stage 3 and hypertension    Gastroesophageal reflux disease with esophagitis and hemorrhage    Abnormal finding on GI tract imaging    Weight loss    Diarrhea    Generalized abdominal pain    Nausea & vomiting    Family history of colon cancer    Enteritis     Past Medical History:   Diagnosis Date    Acid reflux     Arthritis     High cholesterol     Hypertension     Neuropathy     Swollen lymph nodes     LEFT CERVICAL    Type 2 diabetes mellitus      Past Surgical History:   Procedure Laterality Date    CARPAL TUNNEL RELEASE Bilateral     CERVICAL LYMPH NODE BIOPSY/EXCISION Left 2017    Procedure: LEFT NECK EXPLORATION WITH INCISIONAL BIOPSY/CULTURE;  Surgeon: Blue Sanhcez MD;  Location: [facility] OR;  Service:      SECTION      COLON RESECTION      COLONOSCOPY N/A 2023    Procedure: COLONOSCOPY WITH ANESTHESIA;  Surgeon: Tamy Smith MD;  Location: [facility] ENDOSCOPY;  Service: Gastroenterology;   Laterality: N/A;  preop; abnormal GI scan   postop ; poor prep   PCP Ruth Noble MD    ENDOSCOPY N/A 5/17/2023    Procedure: ESOPHAGOGASTRODUODENOSCOPY WITH ANESTHESIA;  Surgeon: Tamy Smith MD;  Location: [facility] ENDOSCOPY;  Service: Gastroenterology;  Laterality: N/A;  preop; abnormal GI scan   postop esophagitis ; hiatal hernia  PCP Ruth Noble MD    EXCISION LESION      From back    INSERTION HEMODIALYSIS CATHETER Right 7/7/2025    Procedure: OR HEMODIALYSIS CATHETER INSERTION;  Surgeon: Sterling Smith DO;  Location: [facility] HYBRID OR;  Service: Vascular;  Laterality: Right;    REPLACEMENT TOTAL KNEE Left     TONSILLECTOMY       PT Assessment (Last 12 Hours)       PT Evaluation and Treatment       Row Name 07/09/25 1450          Physical Therapy Time and Intention    Subjective Information complains of;weakness;fatigue  -LY     Document Type therapy note (daily note)  -LY     Mode of Treatment physical therapy  -LY     Comment fearful of falling  -LY       Row Name 07/09/25 1450          General Information    Existing Precautions/Restrictions fall  -LY       Row Name 07/09/25 1450          Pain    Pretreatment Pain Rating 0/10 - no pain  -LY       Row Name 07/09/25 1450          Bed Mobility    Supine-Sit Todd (Bed Mobility) verbal cues;minimum assist (75% patient effort)  -LY     Assistive Device (Bed Mobility) bed rails;head of bed elevated  -LY       Row Name 07/09/25 1450          Transfers    Transfers sit-stand transfer;stand-sit transfer;bed-chair transfer  -LY       Row Name 07/09/25 1450          Bed-Chair Transfer    Bed-Chair Todd (Transfers) verbal cues;minimum assist (75% patient effort)  -LY     Assistive Device (Bed-Chair Transfers) other (see comments)  HHA  -LY     Comment, (Bed-Chair Transfer) pivot to chair  -LY       Row Name 07/09/25 1450          Sit-Stand Transfer    Sit-Stand Todd (Transfers) verbal cues;minimum assist  (75% patient effort);moderate assist (50% patient effort)  -LY     Assistive Device (Sit-Stand Transfers) other (see comments)  HHA  -LY     Comment, (Sit-Stand Transfer) x4 reps  -LY       Row Name 07/09/25 1450          Stand-Sit Transfer    Stand-Sit [place] (Transfers) verbal cues;minimum assist (75% patient effort)  -LY       Row Name 07/09/25 1450          Gait/Stairs (Locomotion)    Comment, (Gait/Stairs) 3 side steps with min x1  -LY       Row Name 07/09/25 1450          Motor Skills    Comments, Therapeutic Exercise BLE AROM seated  -LY     Additional Documentation Comments, Therapeutic Exercise (Row)  -LY       Row Name             Wound 07/03/25 2001 coccyx    Wound - Properties Group Placement Date: 07/03/25 -KB Placement Time: 2001 -KB Location: coccyx  -KB    Retired Wound - Properties Group Placement Date: 07/03/25 -KB Placement Time: 2001 -KB Location: coccyx  -KB    Retired Wound - Properties Group Placement Date: 07/03/25 -KB Placement Time: 2001 -KB Location: coccyx  -KB    Retired Wound - Properties Group Date first assessed: 07/03/25  -KB Time first assessed: 2001 -KB Location: coccyx  -KB      Row Name             Wound 07/06/25 1023 Right posterior heel Pressure Injury    Wound - Properties Group Placement Date: 07/06/25  -EY Placement Time: 1023  -EY Side: Right  -EY Orientation: posterior  -EY Location: heel  -EY Primary Wound Type: Pressure Inj  -EY    Retired Wound - Properties Group Placement Date: 07/06/25  -EY Placement Time: 1023  -EY Side: Right  -EY Orientation: posterior  -EY Location: heel  -EY    Retired Wound - Properties Group Placement Date: 07/06/25  -EY Placement Time: 1023  -EY Side: Right  -EY Orientation: posterior  -EY Location: heel  -EY    Retired Wound - Properties Group Date first assessed: 07/06/25  -EY Time first assessed: 1023  -EY Side: Right  -EY Location: heel  -EY      Row Name             Wound 07/06/25 1023 Left posterior heel Pressure Injury     Wound - Properties Group Placement Date: 07/06/25  -EY Placement Time: 1023  -EY Side: Left  -EY Orientation: posterior  -EY Location: heel  -EY Primary Wound Type: Pressure Inj  -EY    Retired Wound - Properties Group Placement Date: 07/06/25  -EY Placement Time: 1023  -EY Side: Left  -EY Orientation: posterior  -EY Location: heel  -EY    Retired Wound - Properties Group Placement Date: 07/06/25  -EY Placement Time: 1023  -EY Side: Left  -EY Orientation: posterior  -EY Location: heel  -EY    Retired Wound - Properties Group Date first assessed: 07/06/25  -EY Time first assessed: 1023  -EY Side: Left  -EY Location: heel  -EY      Row Name             Wound 07/06/25 1047 Bilateral anterior abdomen    Wound - Properties Group Placement Date: 07/06/25  -EY Placement Time: 1047  -EY Side: Bilateral  -EY Orientation: anterior  -EY Location: abdomen  -EY    Retired Wound - Properties Group Placement Date: 07/06/25  -EY Placement Time: 1047  -EY Side: Bilateral  -EY Orientation: anterior  -EY Location: abdomen  -EY    Retired Wound - Properties Group Placement Date: 07/06/25  -EY Placement Time: 1047  -EY Side: Bilateral  -EY Orientation: anterior  -EY Location: abdomen  -EY    Retired Wound - Properties Group Date first assessed: 07/06/25  -EY Time first assessed: 1047  -EY Side: Bilateral  -EY Location: abdomen  -EY      Row Name 07/09/25 1450          Plan of Care Review    Plan of Care Reviewed With patient  -LY     Progress improving  -LY       Row Name 07/09/25 1450          Positioning and Restraints    Pre-Treatment Position in bed  -LY     Post Treatment Position chair  -LY     In Chair notified nsg;reclined;call light within reach;encouraged to call for assist;exit alarm on;waffle cushion  -LY               User Key  (r) = Recorded By, (t) = Taken By, (c) = Cosigned By      Initials Name Provider Type    Brianna Marrero, RN Registered Nurse    Frances Valenzuela, PTA Physical Therapist Assistant    SILVINA Flores  Elsa, RN Registered Nurse                    Physical Therapy Education       Title: PT OT SLP Therapies (Done)       Topic: Physical Therapy (Done)       Point: Mobility training (Done)       Learning Progress Summary            Patient Acceptance, E,TB, VU,DU,NR by  at 7/8/2025 1503    Comment: benefits of PT and mobility                      Point: Home exercise program (Done)       Learning Progress Summary            Patient Acceptance, E,TB, VU,DU,NR by  at 7/8/2025 1503    Comment: benefits of PT and mobility                      Point: Body mechanics (Done)       Learning Progress Summary            Patient Acceptance, E,TB, VU,DU,NR by  at 7/8/2025 1503    Comment: benefits of PT and mobility                      Point: Precautions (Done)       Learning Progress Summary            Patient Acceptance, E,TB, VU,DU,NR by  at 7/8/2025 1503    Comment: benefits of PT and mobility                                      User Key       Initials Effective Dates Name Provider Type Discipline     04/21/25 -  Estela Núñez, PT Student PT Student PT                  PT Recommendation and Plan     Plan of Care Reviewed With: patient  Progress: improving       Time Calculation:    PT Charges       Row Name 07/09/25 1534             Time Calculation    Start Time 1450  -LY      Stop Time 1530  -LY      Time Calculation (min) 40 min  -LY      PT Received On 07/09/25  -LY         Time Calculation- PT    Total Timed Code Minutes- PT 40 minute(s)  -LY         Timed Charges    70462 - PT Therapeutic Exercise Minutes 15  -LY      12779 - PT Therapeutic Activity Minutes 25  -LY         Total Minutes    Timed Charges Total Minutes 40  -LY       Total Minutes 40  -LY                User Key  (r) = Recorded By, (t) = Taken By, (c) = Cosigned By      Initials Name Provider Type    Frances Valenzuela, PTA Physical Therapist Assistant                  Therapy Charges for Today       Code Description Service Date Service  Provider Modifiers Qty    95216352503  PT THER PROC EA 15 MIN 7/9/2025 Frances Garcia, MARU GP 1    99977164221 HC PT THERAPEUTIC ACT EA 15 MIN 7/9/2025 Frances Garcia, MARU GP 2            PT G-Codes  Outcome Measure Options: AM-PAC 6 Clicks Basic Mobility (PT)  AM-PAC 6 Clicks Score (PT): 16    Frances Garcia PTA  7/9/2025

## 2025-07-09 NOTE — CASE MANAGEMENT/SOCIAL WORK
Continued Stay Note  [facility]     Patient Name: Lindsay Laughlin  MRN: [id number]  Today's Date: 7/9/2025    Admit Date: 7/3/2025    Plan: SNF Referrals   Discharge Plan       Row Name 07/09/25 1553       Plan    Plan SNF Referrals    Patient/Family in Agreement with Plan yes    Plan Comments [place] is unable to take pt. Pt wants to be in the [place] area. Referrals sent to [facility], [facility], and [facility]. HD currently arranged at the [facility] for Friday, July 11 at 0650 but will likely have to be changed due to transportation issues.                   Discharge Codes    No documentation.                       ELIJAH Chapman

## 2025-07-09 NOTE — PROGRESS NOTES
Nephrology ([facility]) Progress Note      Patient:  Lindsay Laughlin  YOB: 1955  Date of Service: 7/9/2025  MRN: [id number]   Acct: [id number]   Primary Care Physician: Provider, No Known  Advance Directive:   Code Status and Medical Interventions: CPR (Attempt to Resuscitate); Full Support; Discussed CODE STATUS with Mr. Laughlin.  Patient is ,  is in a nursing home.  She does not have any children.   Ordered at: 07/03/25 2000     Code Status (Patient has no pulse and is not breathing):    CPR (Attempt to Resuscitate)     Medical Interventions (Patient has pulse or is breathing):    Full Support     Comments:    Discussed CODE STATUS with Mr. Laughlin.  Patient is ,  is in a nursing home.  She does not have any children.     Level Of Support Discussed With:    Patient     Admit Date: 7/3/2025       Hospital Day: 6  Referring Provider: Shade Alberts MD      Patient personally seen and examined.  Complete chart including Consults, Notes, Operative Reports, Labs, Cardiology, and Radiology studies reviewed as able.        Subjective:  Lindsay Laughlin is a 69 y.o. female for whom we were consulted for evaluation and treatment of acute kidney injury. Baseline chronic kidney disease stage 3. Does not follow with nephrology. History of type 2 diabetes, hypertension, GERD, prior colectomy done at [facility] for unknown reason. Presented to ER with several days of nausea, vomiting and diarrhea. Unable to maintain PO intake. Has also been using NSAIDs frequently for arthritis pain. Labs on admission showing creatinine 5.0, hypokalemia, hypomagnesemia, metabolic acidosis. Hospital course remarkable for minimal improvement of renal function despite IV fluid resuscitation. Patient agreed to have permcath placed and start dialysis.  Dr Smith placed permcath on 7/07. First dialysis on 7/08 was tolerated well    Today is awake and alert. No  complaints. Seen during hemodialysis and is tolerating well  Dialysis   Patient was seen and evaluated on renal replacement therapy and I have personally evaluated the patient and directed the therapy  Modality: Hemodialysis  Access: Catheter  Location: right upper  QB: 350  QD: 600  UF: 1500    Allergies:  Patient has no known allergies.    Home Meds:  Medications Prior to Admission   Medication Sig Dispense Refill Last Dose/Taking    albuterol sulfate  (90 Base) MCG/ACT inhaler Inhale 2 puffs Every 4 (Four) Hours As Needed for Wheezing.       ibuprofen (ADVIL,MOTRIN) 200 MG tablet Take 5 tablets by mouth Every 6 (Six) Hours As Needed for Mild Pain.       ondansetron ODT (ZOFRAN-ODT) 4 MG disintegrating tablet Place 1 tablet on the tongue Every 4 (Four) Hours As Needed for Nausea or Vomiting. 10 tablet 0        Medicines:  Current Facility-Administered Medications   Medication Dose Route Frequency Provider Last Rate Last Admin    acetaminophen (TYLENOL) tablet 650 mg  650 mg Oral Q8H BicSterling chiu, DO   650 mg at 07/09/25 0348    Or    acetaminophen (TYLENOL) 160 MG/5ML oral solution 650 mg  650 mg Oral Q8H BicSterling chiu, DO   650 mg at 07/08/25 0304    Or    acetaminophen (TYLENOL) suppository 650 mg  650 mg Rectal Q8H BicSterling chiu, DO        acetaminophen (TYLENOL) tablet 650 mg  650 mg Oral Q4H PRN Sterling Smith, DO   650 mg at 07/03/25 2242    Or    acetaminophen (TYLENOL) suppository 650 mg  650 mg Rectal Q4H PRN Sterling Smith, DO        aluminum-magnesium hydroxide-simethicone (MAALOX MAX) 400-400-40 MG/5ML suspension 15 mL  15 mL Oral Q6H PRN Sterling Smith, DO   15 mL at 07/05/25 1331    amiodarone (PACERONE) tablet 400 mg  400 mg Oral Q12H Torres Irizarry MD   400 mg at 07/08/25 2137    sennosides-docusate (PERICOLACE) 8.6-50 MG per tablet 2 tablet  2 tablet Oral BID Sterling Smith DO        And    polyethylene glycol (MIRALAX) packet 17 g  17 g Oral Daily PRN  Sterling Smith DO        And    bisacodyl (DULCOLAX) EC tablet 5 mg  5 mg Oral Daily PRN BickingSterling DO        And    bisacodyl (DULCOLAX) suppository 10 mg  10 mg Rectal Daily PRN BicSterling chiu DO        bumetanide (BUMEX) injection 1 mg  1 mg Intravenous Q12H BickingSterling DO   1 mg at 07/08/25 0304    calcium carbonate (TUMS) chewable tablet 500 mg (200 mg elemental)  2 tablet Oral TID PRN BickingSterling DO   2 tablet at 07/04/25 2208    Calcium Replacement - Follow Nurse / BPA Driven Protocol   Not Applicable PRN BickingSterling DO        cholecalciferol (VITAMIN D3) tablet 5,000 Units  5,000 Units Oral Daily BicSterling chiu DO   5,000 Units at 07/06/25 1654    dextrose (D50W) (25 g/50 mL) IV injection 25 g  25 g Intravenous Q15 Min PRN BicSterling chiu DO        dextrose (GLUTOSE) oral gel 15 g  15 g Oral Q15 Min PRN BicSterling chiu DO        glucagon (GLUCAGEN) injection 1 mg  1 mg Intramuscular Q15 Min PRN Sterling Smith DO        heparin (porcine) injection 3,600 Units  3,600 Units Intracatheter PRN Javier Patterson MD   3,600 Units at 07/08/25 1418    HYDROcodone-acetaminophen (NORCO) 5-325 MG per tablet 1 tablet  1 tablet Oral Q6H PRN Sterling Smith DO   1 tablet at 07/09/25 0042    HYDROmorphone (DILAUDID) injection 0.25 mg  0.25 mg Intravenous Q1H PRN Sterling Smith DO        And    naloxone (NARCAN) injection 0.4 mg  0.4 mg Intravenous Q5 Min PRN BickingSterling DO        insulin regular (humuLIN R,novoLIN R) injection 2-7 Units  2-7 Units Subcutaneous Q6H BicSterling chiu DO   3 Units at 07/09/25 0041    Magnesium Standard Dose Replacement - Follow Nurse / BPA Driven Protocol   Not Applicable PRN BickingSterling DO        metoprolol tartrate (LOPRESSOR) tablet 25 mg  25 mg Oral Q12H Harpal Proctor MD   25 mg at 07/08/25 1164    nitroglycerin (NITROSTAT) SL tablet 0.4 mg  0.4 mg Sublingual Q5 Min PRN Sterling Smith, DO         ondansetron (ZOFRAN) injection 4 mg  4 mg Intravenous Q6H PRN Bicking, Sterling K, DO        ondansetron ODT (ZOFRAN-ODT) disintegrating tablet 4 mg  4 mg Oral Q6H PRN Bicking, Sterling K, DO        Or    ondansetron (ZOFRAN) injection 4 mg  4 mg Intravenous Q6H PRN Bicking, Sterling K, DO        pantoprazole (PROTONIX) EC tablet 40 mg  40 mg Oral BID AC Bicking, Sterling K, DO   40 mg at 25 0630    potassium chloride (KLOR-CON M20) CR tablet 40 mEq  40 mEq Oral TID Bicking, Sterilng K, DO   40 mEq at 25 2137    Potassium Replacement - Follow Nurse / BPA Driven Protocol   Not Applicable PRN Bicking, Sterling K, DO        sodium bicarbonate tablet 1,300 mg  1,300 mg Oral TID Bicking, Sterling K, DO   1,300 mg at 25 213    sodium chloride 0.9 % flush 10 mL  10 mL Intravenous PRN Bicking, Sterling K, DO        sodium chloride 0.9 % flush 10 mL  10 mL Intravenous Q12H Bicking, Sterling K, DO   10 mL at 25 2138    sodium chloride 0.9 % flush 10 mL  10 mL Intravenous PRN Bicking, Sterling K, DO        sodium chloride 0.9 % infusion 40 mL  40 mL Intravenous PRN Bicking, Sterling K, DO           Past Medical History:  Past Medical History:   Diagnosis Date    Acid reflux     Arthritis     High cholesterol     Hypertension     Neuropathy     Swollen lymph nodes     LEFT CERVICAL    Type 2 diabetes mellitus        Past Surgical History:  Past Surgical History:   Procedure Laterality Date    CARPAL TUNNEL RELEASE Bilateral     CERVICAL LYMPH NODE BIOPSY/EXCISION Left 2017    Procedure: LEFT NECK EXPLORATION WITH INCISIONAL BIOPSY/CULTURE;  Surgeon: Blue Sanchez MD;  Location: [facility] OR;  Service:      SECTION      COLON RESECTION      COLONOSCOPY N/A 2023    Procedure: COLONOSCOPY WITH ANESTHESIA;  Surgeon: Tamy Smith MD;  Location: [facility] ENDOSCOPY;  Service: Gastroenterology;  Laterality: N/A;  preop; abnormal GI scan   postop ; poor prep   PCP Ruth Noble  MD Angi    ENDOSCOPY N/A 5/17/2023    Procedure: ESOPHAGOGASTRODUODENOSCOPY WITH ANESTHESIA;  Surgeon: Tamy Smith MD;  Location: [facility] ENDOSCOPY;  Service: Gastroenterology;  Laterality: N/A;  preop; abnormal GI scan   postop esophagitis ; hiatal hernia  PCP Ruth Noble MD    EXCISION LESION      From back    INSERTION HEMODIALYSIS CATHETER Right 7/7/2025    Procedure: OR HEMODIALYSIS CATHETER INSERTION;  Surgeon: Sterling Smith DO;  Location: [facility] HYBRID OR;  Service: Vascular;  Laterality: Right;    REPLACEMENT TOTAL KNEE Left     TONSILLECTOMY         Family History  Family History   Problem Relation Age of Onset    No Known Problems Mother     Colon cancer Father         < 60 years old    Breast cancer Neg Hx        Social History  Social History     Socioeconomic History    Marital status:    Tobacco Use    Smoking status: Never    Smokeless tobacco: Never   Vaping Use    Vaping status: Never Used   Substance and Sexual Activity    Alcohol use: No    Drug use: No    Sexual activity: Defer       Review of Systems:  History obtained from chart review and the patient  General ROS: No fever or chills  Respiratory ROS: No cough, shortness of breath, wheezing  Cardiovascular ROS: No chest pain or palpitations  Gastrointestinal ROS: No abdominal pain or melena  Genito-Urinary ROS: No dysuria or hematuria  Psych ROS: No anxiety and depression  14 point ROS reviewed with the patient and negative except as noted above and in the HPI unless unable to obtain.    Objective:  Patient Vitals for the past 24 hrs:   BP Temp Temp src Pulse Resp SpO2 Weight   07/09/25 0730 117/51 97.7 °F (36.5 °C) Oral 73 18 96 % --   07/09/25 0300 112/50 97.8 °F (36.6 °C) Oral 95 18 100 % 93.6 kg (206 lb 5.6 oz)   07/08/25 1941 113/60 98.5 °F (36.9 °C) Oral 85 18 97 % --   07/08/25 1700 105/76 -- -- 106 18 -- --   07/08/25 1343 109/59 98 °F (36.7 °C) Axillary (!) 152 16 97 % --       Intake/Output  Summary (Last 24 hours) at 7/9/2025 0924  Last data filed at 7/9/2025 0238  Gross per 24 hour   Intake 400 ml   Output 300 ml   Net 100 ml     General: awake/alert   Chest:  clear to auscultation bilaterally without respiratory distress  CVS: regular rate and rhythm  Abdominal: soft, nontender, positive bowel sounds  Extremities: no cyanosis or edema  Skin: warm and dry without rash      Labs:  Results from last 7 days   Lab Units 07/08/25 0242 07/07/25  1053 07/06/25  0356   WBC 10*3/mm3 9.77 9.55 11.81*   HEMOGLOBIN g/dL 10.5* 10.0* 10.1*   HEMATOCRIT % 29.9* 29.1* 29.5*   PLATELETS 10*3/mm3 170 150 172         Results from last 7 days   Lab Units 07/08/25 0242 07/07/25 2125 07/07/25  1053 07/06/25  0356 07/04/25  0036 07/03/25  1706   SODIUM mmol/L 138  --  137 134*   < > 135*   POTASSIUM mmol/L 3.9 3.7 3.5 3.0*   < > 1.7*   CHLORIDE mmol/L 109*  --  108* 104   < > 100   CO2 mmol/L 13.0*  --  16.0* 15.0*   < > 12.0*   BUN mg/dL 34.4*  --  36.5* 36.5*   < > 44.5*   CREATININE mg/dL 4.19*  --  4.19* 4.65*   < > 5.04*   CALCIUM mg/dL 7.1*  --  6.8* 6.7*   < > 5.6*   EGFR mL/min/1.73 10.9*  --  10.9* 9.6*   < > 8.7*   BILIRUBIN mg/dL  --   --  0.2 0.2  --  0.3   ALK PHOS U/L  --   --  88 87  --  82   ALT (SGPT) U/L  --   --  28 32  --  15   AST (SGOT) U/L  --   --  28 61*  --  41*   GLUCOSE mg/dL 164*  --  193* 208*   < > 161*    < > = values in this interval not displayed.       Radiology:   Imaging Results (Last 72 Hours)       Procedure Component Value Units Date/Time    IR Ins Tunneled Dialysis Catheter WO Port [[id number]] Collected: 07/08/25 0756     Updated: 07/08/25 1614    Narrative:      Performed by Dr. Smith. Please see procedure note.     This report was signed and finalized on 7/8/2025 4:11 PM by Dr. Sterling Smith MD.       FL C Arm During Surgery [[id number]] Collected: 07/08/25 0756     Updated: 07/08/25 1614    Narrative:      Performed by Dr. Smith. Please see procedure note.     This  report was signed and finalized on 7/8/2025 4:11 PM by Dr. Sterling Smith MD.               Culture:  Blood Culture   Date Value Ref Range Status   07/03/2025 No growth at 3 days  Preliminary   07/03/2025 No growth at 3 days  Preliminary         Assessment    Acute kidney injury, ATN--now on dialysis  Baseline chronic kidney disease stage 3  Type 2 diabetes  Hypertension  NSAID use  Hypokalemia  Hypomagnesemia  Anemia with iron deficiency  Metabolic acidosis  Gastroenteritis     Plan:  Dialysis #2 today  Will plan for HD #3 to be provided tomorrow  Noted that no AM labs were drawn despite being ordered by multiple providers. Unable to appropriately manage patient without this data. Will have dialysis staff obtain labs at conclusion of HD this morning.      Sudhir Erwin, APRN  7/9/2025  09:24 CDT

## 2025-07-09 NOTE — PROGRESS NOTES
"EP Problems:  1.  Paroxysmal atrial fibrillation  2.  Wide-complex tachycardia  3.  Cardiac arrest     Cardiology Problems:  1.  Hypertension  2.  Hyperlipidemia     Medical Problems:  1.  DAISY on dialysis  2.  Type 2 diabetes  3.  Sialoadenitis  4.  Multinodular goiter  5.  GERD  6.  Chronic pain  7.  Nephrolithiasis  8.  Prior colectomy    Patient ID:  Lindsay Laughlin is a 69 y.o. female with problem list as above as above who EP is following for PAF, wide complex tachycardia, cardiac arrest.    Subjective:  Maintaining sinus rhythm overnight.  No significant arrhythmias.    Objective:  /50 (BP Location: Right arm, Patient Position: Lying)   Pulse 95   Temp 97.8 °F (36.6 °C) (Oral)   Resp 18   Ht 165.1 cm (65\")   Wt 93.6 kg (206 lb 5.6 oz)   SpO2 100%   BMI 34.34 kg/m²     Sleeping  Clear to auscultation bilaterally  Regular rate and rhythm  Warm, well-perfused      Labs today:  CBC, BMP, magnesium pending    Inpatient telemetry reviewed:  Maintaining sinus rhythm.  No significant sustained arrhythmias.    Assessment:  Paroxysmal atrial fibrillation  Cardiac arrest  Hypokalemia  Hypomagnesemia  Wide-complex tachycardia    Plan:  - Maintain K and mag greater than 4 and 2 respectively  - Continue amiodarone 400 mg twice daily until 7/18/2025, then 200 mg daily thereafter  - Continue adjunctive metoprolol  - Agree with apixaban initiation    Part of this note may be an electronic transcription/translation of spoken language to printed text using the Dragon Dictation System.    "

## 2025-07-10 LAB
ANION GAP SERPL CALCULATED.3IONS-SCNC: 10 MMOL/L (ref 5–15)
BUN SERPL-MCNC: 13.5 MG/DL (ref 8–23)
BUN/CREAT SERPL: 6.7 (ref 7–25)
CALCIUM SPEC-SCNC: 7.9 MG/DL (ref 8.6–10.5)
CHLORIDE SERPL-SCNC: 102 MMOL/L (ref 98–107)
CO2 SERPL-SCNC: 25 MMOL/L (ref 22–29)
CREAT SERPL-MCNC: 2.02 MG/DL (ref 0.57–1)
DEPRECATED RDW RBC AUTO: 69.5 FL (ref 37–54)
EGFRCR SERPLBLD CKD-EPI 2021: 26.1 ML/MIN/1.73
ERYTHROCYTE [DISTWIDTH] IN BLOOD BY AUTOMATED COUNT: 19.9 % (ref 12.3–15.4)
GLUCOSE BLDC GLUCOMTR-MCNC: 130 MG/DL (ref 70–130)
GLUCOSE BLDC GLUCOMTR-MCNC: 152 MG/DL (ref 70–130)
GLUCOSE BLDC GLUCOMTR-MCNC: 209 MG/DL (ref 70–130)
GLUCOSE BLDC GLUCOMTR-MCNC: 277 MG/DL (ref 70–130)
GLUCOSE SERPL-MCNC: 143 MG/DL (ref 65–99)
HCT VFR BLD AUTO: 30.9 % (ref 34–46.6)
HGB BLD-MCNC: 10.4 G/DL (ref 12–15.9)
MAGNESIUM SERPL-MCNC: 2.2 MG/DL (ref 1.6–2.4)
MCH RBC QN AUTO: 32.2 PG (ref 26.6–33)
MCHC RBC AUTO-ENTMCNC: 33.7 G/DL (ref 31.5–35.7)
MCV RBC AUTO: 95.7 FL (ref 79–97)
PHOSPHATE SERPL-MCNC: 2.1 MG/DL (ref 2.5–4.5)
PLATELET # BLD AUTO: 155 10*3/MM3 (ref 140–450)
PMV BLD AUTO: 11.3 FL (ref 6–12)
POTASSIUM SERPL-SCNC: 5 MMOL/L (ref 3.5–5.2)
QT INTERVAL: 308 MS
QTC INTERVAL: 494 MS
RBC # BLD AUTO: 3.23 10*6/MM3 (ref 3.77–5.28)
SODIUM SERPL-SCNC: 137 MMOL/L (ref 136–145)
WBC NRBC COR # BLD AUTO: 12.13 10*3/MM3 (ref 3.4–10.8)

## 2025-07-10 PROCEDURE — 99232 SBSQ HOSP IP/OBS MODERATE 35: CPT | Performed by: STUDENT IN AN ORGANIZED HEALTH CARE EDUCATION/TRAINING PROGRAM

## 2025-07-10 PROCEDURE — 82948 REAGENT STRIP/BLOOD GLUCOSE: CPT | Performed by: FAMILY MEDICINE

## 2025-07-10 PROCEDURE — 63710000001 INSULIN REGULAR HUMAN PER 5 UNITS: Performed by: SURGERY

## 2025-07-10 PROCEDURE — 82948 REAGENT STRIP/BLOOD GLUCOSE: CPT

## 2025-07-10 PROCEDURE — 80048 BASIC METABOLIC PNL TOTAL CA: CPT | Performed by: STUDENT IN AN ORGANIZED HEALTH CARE EDUCATION/TRAINING PROGRAM

## 2025-07-10 PROCEDURE — 85027 COMPLETE CBC AUTOMATED: CPT | Performed by: STUDENT IN AN ORGANIZED HEALTH CARE EDUCATION/TRAINING PROGRAM

## 2025-07-10 PROCEDURE — 84100 ASSAY OF PHOSPHORUS: CPT | Performed by: SURGERY

## 2025-07-10 PROCEDURE — 83735 ASSAY OF MAGNESIUM: CPT | Performed by: STUDENT IN AN ORGANIZED HEALTH CARE EDUCATION/TRAINING PROGRAM

## 2025-07-10 PROCEDURE — 93010 ELECTROCARDIOGRAM REPORT: CPT | Performed by: STUDENT IN AN ORGANIZED HEALTH CARE EDUCATION/TRAINING PROGRAM

## 2025-07-10 PROCEDURE — 25010000002 HEPARIN (PORCINE) PER 1000 UNITS: Performed by: INTERNAL MEDICINE

## 2025-07-10 PROCEDURE — 93005 ELECTROCARDIOGRAM TRACING: CPT | Performed by: FAMILY MEDICINE

## 2025-07-10 RX ORDER — DILTIAZEM HCL/D5W 125 MG/125
5-15 PLASTIC BAG, INJECTION (ML) INTRAVENOUS
Status: DISCONTINUED | OUTPATIENT
Start: 2025-07-10 | End: 2025-07-12

## 2025-07-10 RX ORDER — AMIODARONE HYDROCHLORIDE 200 MG/1
TABLET ORAL
Qty: 112 TABLET | Refills: 3 | Status: SHIPPED | OUTPATIENT
Start: 2025-07-10 | End: 2025-10-06

## 2025-07-10 RX ORDER — SODIUM BICARBONATE 650 MG/1
650 TABLET ORAL 3 TIMES DAILY
Status: DISCONTINUED | OUTPATIENT
Start: 2025-07-10 | End: 2025-07-14 | Stop reason: HOSPADM

## 2025-07-10 RX ORDER — METOPROLOL TARTRATE 25 MG/1
25 TABLET, FILM COATED ORAL EVERY 12 HOURS SCHEDULED
Qty: 60 TABLET | Refills: 11 | Status: SHIPPED | OUTPATIENT
Start: 2025-07-10

## 2025-07-10 RX ADMIN — APIXABAN 5 MG: 5 TABLET, FILM COATED ORAL at 15:40

## 2025-07-10 RX ADMIN — HEPARIN SODIUM 3600 UNITS: 1000 INJECTION INTRAVENOUS; SUBCUTANEOUS at 13:43

## 2025-07-10 RX ADMIN — HYDROCODONE BITARTRATE AND ACETAMINOPHEN 1 TABLET: 5; 325 TABLET ORAL at 10:17

## 2025-07-10 RX ADMIN — DOCUSATE SODIUM 50 MG AND SENNOSIDES 8.6 MG 2 TABLET: 8.6; 5 TABLET, FILM COATED ORAL at 21:44

## 2025-07-10 RX ADMIN — INSULIN HUMAN 2 UNITS: 100 INJECTION, SOLUTION PARENTERAL at 00:26

## 2025-07-10 RX ADMIN — AMIODARONE HYDROCHLORIDE 400 MG: 200 TABLET ORAL at 15:40

## 2025-07-10 RX ADMIN — SODIUM BICARBONATE 650 MG: 650 TABLET ORAL at 21:43

## 2025-07-10 RX ADMIN — METOPROLOL TARTRATE 25 MG: 25 TABLET, FILM COATED ORAL at 21:43

## 2025-07-10 RX ADMIN — SODIUM BICARBONATE 650 MG: 650 TABLET ORAL at 15:40

## 2025-07-10 RX ADMIN — APIXABAN 5 MG: 5 TABLET, FILM COATED ORAL at 21:44

## 2025-07-10 RX ADMIN — METOPROLOL TARTRATE 25 MG: 25 TABLET, FILM COATED ORAL at 16:17

## 2025-07-10 RX ADMIN — PANTOPRAZOLE SODIUM 40 MG: 40 TABLET, DELAYED RELEASE ORAL at 17:26

## 2025-07-10 RX ADMIN — Medication 10 ML: at 15:41

## 2025-07-10 RX ADMIN — HYDROCODONE BITARTRATE AND ACETAMINOPHEN 1 TABLET: 5; 325 TABLET ORAL at 16:59

## 2025-07-10 RX ADMIN — INSULIN HUMAN 3 UNITS: 100 INJECTION, SOLUTION PARENTERAL at 17:26

## 2025-07-10 RX ADMIN — AMIODARONE HYDROCHLORIDE 400 MG: 200 TABLET ORAL at 21:43

## 2025-07-10 RX ADMIN — Medication 10 ML: at 21:45

## 2025-07-10 NOTE — PROGRESS NOTES
"EP Problems:  1.  Paroxysmal atrial fibrillation  2.  Wide-complex tachycardia  3.  Cardiac arrest     Cardiology Problems:  1.  Hypertension  2.  Hyperlipidemia     Medical Problems:  1.  DAISY on dialysis  2.  Type 2 diabetes  3.  Sialoadenitis  4.  Multinodular goiter  5.  GERD  6.  Chronic pain  7.  Nephrolithiasis  8.  Prior colectomy    Patient ID:  Lindsay Laughlin is a 69 y.o. female with problem list as above as above who EP is following for PAF, wide complex tachycardia, cardiac arrest.    Subjective:  Doing well from a rhythm standpoint.  Complains of the telemetry monitor being uncomfortable during sleep.    Objective:  /91 (BP Location: Right arm, Patient Position: Lying)   Pulse 78   Temp 98 °F (36.7 °C) (Oral)   Resp 18   Ht 165.1 cm (65\")   Wt 92.6 kg (204 lb 2.3 oz)   SpO2 98%   BMI 33.97 kg/m²     Awake, alert  Clear to auscultation bilaterally  Regular rate and rhythm  Warm, well-perfused    Lab Results   Component Value Date    WBC 12.13 (H) 07/10/2025    HGB 10.4 (L) 07/10/2025    HCT 30.9 (L) 07/10/2025    MCV 95.7 07/10/2025     07/10/2025      Lab Results   Component Value Date    GLUCOSE 143 (H) 07/10/2025    CALCIUM 7.9 (L) 07/10/2025     07/10/2025    K 5.0 07/10/2025    CO2 25.0 07/10/2025     07/10/2025    BUN 13.5 07/10/2025    CREATININE 2.02 (H) 07/10/2025    EGFR 26.1 (L) 07/10/2025    BCR 6.7 (L) 07/10/2025    ANIONGAP 10.0 07/10/2025      Lab Results   Component Value Date    MG 2.2 07/10/2025            Inpatient telemetry reviewed:  Maintaining sinus rhythm.  No significant sustained arrhythmias.    Assessment:  Paroxysmal atrial fibrillation  Cardiac arrest  Hypokalemia  Hypomagnesemia  Wide-complex tachycardia    Plan:  - Maintain K and mag greater than 4 and 2 respectively  - Continue amiodarone 400 mg twice daily until 7/18/2025, then 200 mg daily thereafter  - Continue adjunctive metoprolol  - Continue apixaban for anticoagulation  - No ICD at this " time as per previous notes  - EP to sign off at this time; please call if additional questions arise; will arrange outpatient follow-up; please call if additional questions arise or if clinical condition significantly worsens  - Plan for 2-week Holter monitor at discharge to assess atrial fibrillation burden    Part of this note may be an electronic transcription/translation of spoken language to printed text using the Dragon Dictation System.

## 2025-07-10 NOTE — DISCHARGE PLACEMENT REQUEST
"Javier Pastrana (69 y.o. Female)       Date of Birth   1955    Social Security Number       Address   [address] [place] [address]    Home Phone   [phone]    MRN   [id number]       [facility]   [facility]    Marital Status                               Admission Date   7/3/2025    Admission Type   Emergency    Admitting Provider   Juan J Garcia MD    Attending Provider   Juan J Garcia MD    Department, Room/Bed   [facility] 3C, 391/1       Discharge Date       Discharge Disposition       Discharge Destination                                 Attending Provider: Juan J Garcia MD    Allergies: No Known Allergies    Isolation: None   Infection: None   Code Status: CPR    Ht: 165.1 cm (65\")   Wt: 92.6 kg (204 lb 2.3 oz)    Admission Cmt: None   Principal Problem: DAISY (acute kidney injury) [N17.9]                   Active Insurance as of 7/3/2025       Primary Coverage       Payor Plan Insurance Group Employer/Plan Group    UNITED HEALTHC[employer] MEDICARE REPLACEMENT UHC MEDICARE ADVANTAGE SNP HMO NON PAR [id number]       Payor Plan Address Payor Plan Phone Number Payor Plan Fax Number Effective Dates    [address] [address]   7/1/2025 - None Entered    [facility] [id number]         Subscriber Name Subscriber Birth Date Member ID       JAVIER PASTRANA 1955 [id number]               Secondary Coverage       Payor Plan Insurance Group Employer/Plan Group    [id number]        Payor Plan Address Payor Plan Phone Number Payor Plan Fax Number Effective Dates    [address]   5/17/2023 - None Entered    [place] [id number]         Subscriber Name Subscriber Birth Date Member ID       JAVIER PASTRANA 1955 [id number]                     Emergency Contacts        (Rel.) Home Phone Work Phone Mobile Phone    Rodger Marrufo (Friend) [phone] -- --    Klever Stone (Friend) -- -- [phone]    Laura Phelan -- -- [phone]      "     Juan J Garcia MD   Physician  Hospitalist  Progress Notes      Addendum  Date of Service:  07/09/25 1126  Creation Time:  07/09/25 1126          [facility] Medicine Services  INPATIENT PROGRESS NOTE     Patient Name: Lindsay Laughlin  Date of Admission: 7/3/2025  Today's Date: 07/09/25  Length of Stay: 6  Primary Care Physician: Provider, No Known     Subjective   Chief Complaint: Dehydration, vomiting and diarrhea  HPI   69-year-old female with history of diabetes mellitus type II, hypertension, hyperlipidemia, chronic kidney disease stage 3b.  Admitted to the hospital 7/5/2025, initially to the intensive care unit;  she presented to the hospital complaining of nausea, vomiting and diarrhea for at least 5 days.  No rectal bleeding or melena reported.  Had a fall in the morning prior to admission, due to generalized weakness.  Patient found to have severe hypokalemia and hypomagnesemia;   As well as acute kidney injury/renal insufficiency, consider secondary to intravascular volume depletion and possible acute tubular necrosis from chronic nonsteroidal anti-inflammatory use.  Patient was started on hemodialysis during this hospital admission on 7/8/2025.  She had a new permacath placed to right internal jugular vein by vascular surgery on 7/7/2025.  The patient has history of paroxysmal atrial fibrillation, wide-complex tachycardia with cardiac arrest.  She was evaluated by EP cardiology and is started on amiodarone. No ICD implant recommended at this time.     I started taking care of this patient on 7/9/2025;  I evaluated patient while having hemodialysis.  She reports no chest pain, no shortness of breath, no abdominal pain, no diarrhea, no vomiting.           Review of Systems   All pertinent negatives and positives are as above. All other systems have been reviewed and are negative unless otherwise stated.      Objective    Temp:  [97.7 °F (36.5 °C)-98.5 °F (36.9 °C)] 97.7  °F (36.5 °C)  Heart Rate:  [] 73  Resp:  [16-18] 18  BP: (105-117)/(50-76) 117/51  Physical Exam  Constitutional:       Appearance: Chronically ill-appearing, alert oriented x 3, no respiratory distress.  HENT:      Head: Normocephalic and atraumatic.      Nose: Nose normal.      Mouth/Throat:      Mouth: Mucous membranes are moist.   Eyes:      Extraocular Movements: Extraocular movements intact.      Conjunctiva/sclera: Conjunctivae normal.      Pupils: Pupils are equal, round, and reactive to light.   Cardiovascular:      Rate and Rhythm: regular rhythm.      Pulses: Normal pulses.   Pulmonary:      Effort: No respiratory distress.      Breath sounds: Normal breath sounds.  Abdominal:      General: Abdomen is flat. Bowel sounds are normal.      Palpations: Abdomen is soft.   Extremities:  No lower extremity edema.  Skin:     Capillary Refill: Capillary refill takes less than 2 seconds.      Coloration: Skin is not jaundiced.   Neurological:      General: No focal deficit present.      Mental Status: Patient is alert, oriented to place time and person.     Sensory: No sensory deficit.               Results Review:  I have reviewed the labs, radiology results, and diagnostic studies.     Laboratory Data:          Results from last 7 days   Lab Units 07/09/25  1008 07/08/25  0242 07/07/25  1053   WBC 10*3/mm3 10.57 9.77 9.55   HEMOGLOBIN g/dL 10.8* 10.5* 10.0*   HEMATOCRIT % 30.5* 29.9* 29.1*   PLATELETS 10*3/mm3 122* 170 150                   Results from last 7 days   Lab Units 07/08/25  0242 07/07/25  2125 07/07/25  1053 07/06/25  0356 07/04/25  0036 07/03/25  1706   SODIUM mmol/L 138  --  137 134*   < > 135*   POTASSIUM mmol/L 3.9 3.7 3.5 3.0*   < > 1.7*   CHLORIDE mmol/L 109*  --  108* 104   < > 100   CO2 mmol/L 13.0*  --  16.0* 15.0*   < > 12.0*   BUN mg/dL 34.4*  --  36.5* 36.5*   < > 44.5*   CREATININE mg/dL 4.19*  --  4.19* 4.65*   < > 5.04*   CALCIUM mg/dL 7.1*  --  6.8* 6.7*   < > 5.6*   BILIRUBIN  mg/dL  --   --  0.2 0.2  --  0.3   ALK PHOS U/L  --   --  88 87  --  82   ALT (SGPT) U/L  --   --  28 32  --  15   AST (SGOT) U/L  --   --  28 61*  --  41*   GLUCOSE mg/dL 164*  --  193* 208*   < > 161*    < > = values in this interval not displayed.         Culture Data:         Blood Culture   Date Value Ref Range Status   07/03/2025 No growth at 5 days   Final   07/03/2025 No growth at 5 days   Final         Radiology Data:   Imaging Results (Last 24 Hours)         Procedure Component Value Units Date/Time     IR Ins Tunneled Dialysis Catheter WO Port [[id number]] Collected: 07/08/25 0756       Updated: 07/08/25 1614     Narrative:       Performed by Dr. Smith. Please see procedure note.     This report was signed and finalized on 7/8/2025 4:11 PM by Dr. Sterling Smith MD.        FL C Arm During Surgery [[id number]] Collected: 07/08/25 0756       Updated: 07/08/25 1614     Narrative:       Performed by Dr. Smith. Please see procedure note.     This report was signed and finalized on 7/8/2025 4:11 PM by Dr. Sterling Smith MD.                   I have reviewed the patient's current medications.      Assessment/Plan   Assessment       Active Hospital Problems     Diagnosis      **DAISY (acute kidney injury)      Enteritis           Acute kidney injury/renal insufficiency requiring hemodialysis   Chronic kidney disease stage 3b  Paroxysmal atrial fibrillation  Wide-complex tachycardia possible SVT with aberrant conduction  Hypertension  Hyperlipidemia  Acute enteritis, resolved  Chronic cardiomyopathy with diastolic dysfunction.        A1C 9.0     Laboratory tests from today:  Sodium 139 potassium 3.7, BUN 9.6 and creatinine 1.51, GFR 37.  Phosphorus 2.0.  Normal liver function tests.  Magnesium 1.7  Hemoglobin 10.8, hematocrit 30.5, platelet count 122,000.        Treatment Plan  Patient is having hemodialysis as per nephrology recommendations, currently day #2.  She will continue hemodialysis tomorrow as per  specialist orders.  Daily laboratory tests.       Avoid nephrotoxic medications.     Follow platelet count.    Follow hemoglobin hematocrit.     Continue amiodarone as per specialist recommendations.  Continue metoprolol 25 p.o. twice daily.  Start Eliquis 5 mg p.o. 12 hours.     Replace electrolytes as per protocol.  Replace magnesium IV     Continue insulin sliding scale.     Will follow recommendations by nephrology regarding long-term dialysis.           Medical Decision Making  Number and Complexity of problems: 8, moderate to high     Differential Diagnosis: see above     Conditions and Status        Condition is unchanged.     [facility] Data  External documents reviewed: no  Cardiac tracing (EKG, telemetry) interpretation: no new  Radiology interpretation: no new  Labs reviewed: yes  Any tests that were considered but not ordered: no     Decision rules/scores evaluated (example [id number], Wells, etc): see chart     Discussed with: patient     Care Planning  Shared decision making: patient  Code status and discussions: FC     Disposition  Social Determinants of Health that impact treatment or disposition: none  I expect the patient to be discharged to SNF once approved.        Electronically signed by Juan J Garcia MD, 07/09/25, 11:26 CDT.             Torres Irizarry MD   Physician  Cardiac EP  Progress Notes      Addendum  Date of Service:  07/10/25 0735  Creation Time:  07/10/25 0735     EP Problems:  1.  Paroxysmal atrial fibrillation  2.  Wide-complex tachycardia  3.  Cardiac arrest     Cardiology Problems:  1.  Hypertension  2.  Hyperlipidemia     Medical Problems:  1.  DAISY on dialysis  2.  Type 2 diabetes  3.  Sialoadenitis  4.  Multinodular goiter  5.  GERD  6.  Chronic pain  7.  Nephrolithiasis  8.  Prior colectomy     Patient ID:  Lindsay Laughlin is a 69 y.o. female with problem list as above as above who EP is following for PAF, wide complex tachycardia, cardiac arrest.     Subjective:  Doing well from a  "rhythm standpoint.  Complains of the telemetry monitor being uncomfortable during sleep.     Objective:  /91 (BP Location: Right arm, Patient Position: Lying)   Pulse 78   Temp 98 °F (36.7 °C) (Oral)   Resp 18   Ht 165.1 cm (65\")   Wt 92.6 kg (204 lb 2.3 oz)   SpO2 98%   BMI 33.97 kg/m²      Awake, alert  Clear to auscultation bilaterally  Regular rate and rhythm  Warm, well-perfused           Lab Results   Component Value Date     WBC 12.13 (H) 07/10/2025     HGB 10.4 (L) 07/10/2025     HCT 30.9 (L) 07/10/2025     MCV 95.7 07/10/2025      07/10/2025            Lab Results   Component Value Date     GLUCOSE 143 (H) 07/10/2025     CALCIUM 7.9 (L) 07/10/2025      07/10/2025     K 5.0 07/10/2025     CO2 25.0 07/10/2025      07/10/2025     BUN 13.5 07/10/2025     CREATININE 2.02 (H) 07/10/2025     EGFR 26.1 (L) 07/10/2025     BCR 6.7 (L) 07/10/2025     ANIONGAP 10.0 07/10/2025            Lab Results   Component Value Date     MG 2.2 07/10/2025               Inpatient telemetry reviewed:  Maintaining sinus rhythm.  No significant sustained arrhythmias.     Assessment:  Paroxysmal atrial fibrillation  Cardiac arrest  Hypokalemia  Hypomagnesemia  Wide-complex tachycardia     Plan:  - Maintain K and mag greater than 4 and 2 respectively  - Continue amiodarone 400 mg twice daily until 7/18/2025, then 200 mg daily thereafter  - Continue adjunctive metoprolol  - Continue apixaban for anticoagulation  - No ICD at this time as per previous notes  - EP to sign off at this time; please call if additional questions arise; will arrange outpatient follow-up; please call if additional questions arise or if clinical condition significantly worsens  - Plan for 2-week Holter monitor at discharge to assess atrial fibrillation burden     Part of this note may be an electronic transcription/translation of spoken language to printed text using the Dragon Dictation System.          Sudhir Erwin, APRN "   Nurse Practitioner  Nephrology  Progress Notes      Cosign Needed  Date of Service:  07/10/25 0955  Creation Time:  07/10/25 0955     Cosign Needed        Expand All Collapse All[]Expand All by Default  Nephrology ([facility]) Progress Note        Patient:  Lindsay Laughlin  YOB: 1955  Date of Service: 7/10/2025  MRN: [id number]        Acct: [id number]      Primary Care Physician: Provider, No Known  Advance Directive:       Code Status and Medical Interventions: CPR (Attempt to Resuscitate); Full Support; Discussed CODE STATUS with Mr. Laughlin.  Patient is ,  is in a nursing home.  She does not have any children.   Ordered at: 07/03/25 2000     Code Status (Patient has no pulse and is not breathing):     CPR (Attempt to Resuscitate)     Medical Interventions (Patient has pulse or is breathing):     Full Support     Comments:     Discussed CODE STATUS with Mr. Laughlin.  Patient is ,  is in a nursing home.  She does not have any children.     Level Of Support Discussed With:     Patient      Admit Date: 7/3/2025                          Hospital Day: 7  Referring Provider: Shade Alberts MD        Patient personally seen and examined.  Complete chart including Consults, Notes, Operative Reports, Labs, Cardiology, and Radiology studies reviewed as able.           Subjective:  Lindsay Laughlin is a 69 y.o. female for whom we were consulted for evaluation and treatment of acute kidney injury. Baseline chronic kidney disease stage 3. Does not follow with nephrology. History of type 2 diabetes, hypertension, GERD, prior colectomy done at [facility] for unknown reason. Presented to ER with several days of nausea, vomiting and diarrhea. Unable to maintain PO intake. Has also been using NSAIDs frequently for arthritis pain. Labs on admission showing creatinine 5.0, hypokalemia, hypomagnesemia, metabolic acidosis. Hospital course remarkable  for minimal improvement of renal function despite IV fluid resuscitation. Patient agreed to have permcath placed and start dialysis.  Dr Smith placed permcath on 7/07. First dialysis on 7/08 was tolerated well. Additional HD provided 7/09.     Today is awake and alert. No complaints. Seen during hemodialysis and tolerating well  Dialysis   Patient was seen and evaluated on renal replacement therapy and I have personally evaluated the patient and directed the therapy  Modality: Hemodialysis  Access: Catheter  Location: right upper  QB: 400  QD: 600  UF: up to 2000 as tolerated     Allergies:  Patient has no known allergies.     Home Meds:  Prescriptions Prior to Admission           Medications Prior to Admission   Medication Sig Dispense Refill Last Dose/Taking    albuterol sulfate  (90 Base) MCG/ACT inhaler Inhale 2 puffs Every 4 (Four) Hours As Needed for Wheezing.          ibuprofen (ADVIL,MOTRIN) 200 MG tablet Take 5 tablets by mouth Every 6 (Six) Hours As Needed for Mild Pain.          ondansetron ODT (ZOFRAN-ODT) 4 MG disintegrating tablet Place 1 tablet on the tongue Every 4 (Four) Hours As Needed for Nausea or Vomiting. 10 tablet 0              Medicines:  Current Medications             Current Facility-Administered Medications   Medication Dose Route Frequency Provider Last Rate Last Admin    acetaminophen (TYLENOL) tablet 650 mg  650 mg Oral Q4H PRN Sterling Smith DO   650 mg at 07/03/25 2242     Or    acetaminophen (TYLENOL) suppository 650 mg  650 mg Rectal Q4H PRN Sterling Smith DO        aluminum-magnesium hydroxide-simethicone (MAALOX MAX) 400-400-40 MG/5ML suspension 15 mL  15 mL Oral Q6H PRN Sterling Smith DO   15 mL at 07/05/25 1331    amiodarone (PACERONE) tablet 400 mg  400 mg Oral Q12H Torres Irizarry MD   400 mg at 07/09/25 2134    apixaban (ELIQUIS) tablet 5 mg  5 mg Oral Q12H Juan J Garcia MD   5 mg at 07/09/25 2134    sennosides-docusate (PERICOLACE) 8.6-50 MG per  tablet 2 tablet  2 tablet Oral BID Sterling Smith DO         And    polyethylene glycol (MIRALAX) packet 17 g  17 g Oral Daily PRN BickingSterling DO         And    bisacodyl (DULCOLAX) EC tablet 5 mg  5 mg Oral Daily PRN BickingSterling DO         And    bisacodyl (DULCOLAX) suppository 10 mg  10 mg Rectal Daily PRN BickingSterling DO        calcium carbonate (TUMS) chewable tablet 500 mg (200 mg elemental)  2 tablet Oral TID PRN BickingSterling DO   2 tablet at 07/04/25 2208    Calcium Replacement - Follow Nurse / BPA Driven Protocol   Not Applicable PRN BickingSterling DO        cholecalciferol (VITAMIN D3) tablet 5,000 Units  5,000 Units Oral Daily Bicking, Sterling FLAHERTY DO   5,000 Units at 07/09/25 1218    dextrose (D50W) (25 g/50 mL) IV injection 25 g  25 g Intravenous Q15 Min PRN BicSterling chiu DO        dextrose (GLUTOSE) oral gel 15 g  15 g Oral Q15 Min PRN BickingSterling DO        glucagon (GLUCAGEN) injection 1 mg  1 mg Intramuscular Q15 Min PRN BicSterling chiu DO        heparin (porcine) injection 3,600 Units  3,600 Units Intracatheter PRN Javier Patterson MD   3,600 Units at 07/09/25 1128    HYDROcodone-acetaminophen (NORCO) 5-325 MG per tablet 1 tablet  1 tablet Oral Q6H PRN Sterling Smith DO   1 tablet at 07/09/25 1218    HYDROmorphone (DILAUDID) injection 0.25 mg  0.25 mg Intravenous Q1H PRN BicSterling chiu DO         And    naloxone (NARCAN) injection 0.4 mg  0.4 mg Intravenous Q5 Min PRN BicSterling chiu DO        insulin regular (humuLIN R,novoLIN R) injection 2-7 Units  2-7 Units Subcutaneous Q6H BicSterling chiu DO   2 Units at 07/10/25 0026    Magnesium Standard Dose Replacement - Follow Nurse / BPA Driven Protocol   Not Applicable PRN BickingSterling DO        metoprolol tartrate (LOPRESSOR) tablet 25 mg  25 mg Oral Q12H Harpal Proctor MD   25 mg at 07/09/25 7280    nitroglycerin (NITROSTAT) SL tablet 0.4 mg  0.4 mg Sublingual  Q5 Min PRN Bicking, Sterling K, DO        ondansetron ODT (ZOFRAN-ODT) disintegrating tablet 4 mg  4 mg Oral Q6H PRN Bicking, Sterling K, DO         Or    ondansetron (ZOFRAN) injection 4 mg  4 mg Intravenous Q6H PRN Bicking, Sterling K, DO        pantoprazole (PROTONIX) EC tablet 40 mg  40 mg Oral BID AC BickingReinaldoin K, DO   40 mg at 25 1631    potassium chloride (KLOR-CON M20) CR tablet 40 mEq  40 mEq Oral TID Bicking, Sterling K, DO   40 mEq at 25 2135    Potassium Replacement - Follow Nurse / BPA Driven Protocol   Not Applicable PRN Bicking, Sterling K, DO        sodium bicarbonate tablet 1,300 mg  1,300 mg Oral TID Bicking, Sterling K, DO   1,300 mg at 25 2135    sodium chloride 0.9 % flush 10 mL  10 mL Intravenous PRN Bicking, Sterling K, DO        sodium chloride 0.9 % flush 10 mL  10 mL Intravenous Q12H Bicking, Sterling K, DO   10 mL at 25 2135    sodium chloride 0.9 % flush 10 mL  10 mL Intravenous PRN Bicking, Sterling K, DO        sodium chloride 0.9 % infusion 40 mL  40 mL Intravenous PRN Bicking, Sterling K, DO                Past Medical History:  Medical History        Past Medical History:   Diagnosis Date    Acid reflux      Arthritis      High cholesterol      Hypertension      Neuropathy      Swollen lymph nodes       LEFT CERVICAL    Type 2 diabetes mellitus              Past Surgical History:  Surgical History         Past Surgical History:   Procedure Laterality Date    CARPAL TUNNEL RELEASE Bilateral      CERVICAL LYMPH NODE BIOPSY/EXCISION Left 2017     Procedure: LEFT NECK EXPLORATION WITH INCISIONAL BIOPSY/CULTURE;  Surgeon: Blue Sanchez MD;  Location: [facility] OR;  Service:      SECTION        COLON RESECTION        COLONOSCOPY N/A 2023     Procedure: COLONOSCOPY WITH ANESTHESIA;  Surgeon: Tamy Smith MD;  Location: [facility] ENDOSCOPY;  Service: Gastroenterology;  Laterality: N/A;  preop; abnormal GI scan   postop ; poor prep   PCP  Ruth Noble MD    ENDOSCOPY N/A 5/17/2023     Procedure: ESOPHAGOGASTRODUODENOSCOPY WITH ANESTHESIA;  Surgeon: Tamy Smith MD;  Location: [facility] ENDOSCOPY;  Service: Gastroenterology;  Laterality: N/A;  preop; abnormal GI scan   postop esophagitis ; hiatal hernia  PCP Ruth Noble MD    EXCISION LESION         From back    INSERTION HEMODIALYSIS CATHETER Right 7/7/2025     Procedure: OR HEMODIALYSIS CATHETER INSERTION;  Surgeon: Sterling Smith DO;  Location: [facility] HYBRID OR;  Service: Vascular;  Laterality: Right;    REPLACEMENT TOTAL KNEE Left      TONSILLECTOMY                Family History        Family History   Problem Relation Age of Onset    No Known Problems Mother      Colon cancer Father           < 60 years old    Breast cancer Neg Hx           Social History  Social History   Social History           Socioeconomic History    Marital status:    Tobacco Use    Smoking status: Never    Smokeless tobacco: Never   Vaping Use    Vaping status: Never Used   Substance and Sexual Activity    Alcohol use: No    Drug use: No    Sexual activity: Defer            Review of Systems:  History obtained from chart review and the patient  General ROS: No fever or chills  Respiratory ROS: No cough, shortness of breath, wheezing  Cardiovascular ROS: No chest pain or palpitations  Gastrointestinal ROS: No abdominal pain or melena  Genito-Urinary ROS: No dysuria or hematuria  Psych ROS: No anxiety and depression  14 point ROS reviewed with the patient and negative except as noted above and in the HPI unless unable to obtain.     Objective:  Patient Vitals for the past 24 hrs:    BP Temp Temp src Pulse Resp SpO2 Weight   07/10/25 0726 121/91 98 °F (36.7 °C) Oral 78 18 98 % --   07/10/25 0305 93/54 97.3 °F (36.3 °C) Oral 70 18 96 % 92.6 kg (204 lb 2.3 oz)   07/09/25 1914 (!) 104/36 98.1 °F (36.7 °C) Oral 70 18 98 % --   07/09/25 1537 122/47 97.6 °F (36.4 °C) Oral 68 18 98 %  --   07/09/25 1200 114/55 97.4 °F (36.3 °C) Oral 75 18 99 % --         Intake/Output Summary (Last 24 hours) at 7/10/2025 0955  Last data filed at 7/9/2025 1128      Gross per 24 hour   Intake --   Output 2000 ml   Net -2000 ml      General: awake/alert   Chest:  clear to auscultation bilaterally without respiratory distress  CVS: regular rate and rhythm  Abdominal: soft, nontender, positive bowel sounds  Extremities: no cyanosis or edema  Skin: warm and dry without rash        Labs:         Results from last 7 days   Lab Units 07/10/25  0346 07/09/25  1008 07/08/25  0242   WBC 10*3/mm3 12.13* 10.57 9.77   HEMOGLOBIN g/dL 10.4* 10.8* 10.5*   HEMATOCRIT % 30.9* 30.5* 29.9*   PLATELETS 10*3/mm3 155 122* 170                      Results from last 7 days   Lab Units 07/10/25  0346 07/09/25  1231 07/08/25  0242 07/07/25  2125 07/07/25  1053 07/06/25  0356   SODIUM mmol/L 137 139 138  --  137 134*   POTASSIUM mmol/L 5.0 3.7 3.9   < > 3.5 3.0*   CHLORIDE mmol/L 102 100 109*  --  108* 104   CO2 mmol/L 25.0 26.0 13.0*  --  16.0* 15.0*   BUN mg/dL 13.5 9.6 34.4*  --  36.5* 36.5*   CREATININE mg/dL 2.02* 1.51* 4.19*  --  4.19* 4.65*   CALCIUM mg/dL 7.9* 7.8* 7.1*  --  6.8* 6.7*   EGFR mL/min/1.73 26.1* 37.0* 10.9*  --  10.9* 9.6*   BILIRUBIN mg/dL  --  0.6  --   --  0.2 0.2   ALK PHOS U/L  --  126*  --   --  88 87   ALT (SGPT) U/L  --  6  --   --  28 32   AST (SGOT) U/L  --  20  --   --  28 61*   GLUCOSE mg/dL 143* 113* 164*  --  193* 208*    < > = values in this interval not displayed.         Radiology:   Imaging Results (Last 72 Hours)         Procedure Component Value Units Date/Time     IR Ins Tunneled Dialysis Catheter WO Port [[id number]] Collected: 07/08/25 0756       Updated: 07/08/25 1614     Narrative:       Performed by Dr. Smith. Please see procedure note.     This report was signed and finalized on 7/8/2025 4:11 PM by Dr. Sterling Smith MD.        FL C Arm During Surgery [[id number]] Collected: 07/08/25 0753        Updated: 07/08/25 1614     Narrative:       Performed by Dr. Smith. Please see procedure note.     This report was signed and finalized on 7/8/2025 4:11 PM by Dr. Sterling Smith MD.                   Culture:        Blood Culture   Date Value Ref Range Status   07/03/2025 No growth at 3 days   Preliminary   07/03/2025 No growth at 3 days   Preliminary            Assessment    Acute kidney injury, ATN--now on dialysis  Baseline chronic kidney disease stage 3  Type 2 diabetes  Hypertension  NSAID use  Hypokalemia--improved  Hypomagnesemia  Anemia with iron deficiency  Metabolic acidosis--improved  Gastroenteritis      Plan:  Dialysis #3 today  Hold PO potassium supplement  Working on outpatient dialysis arrangements        Sudhir Erwin, APRN  7/10/2025  09:55 CDT                     Sterling Smith DO   Physician  Vascular Surgery  Op Note      Signed  Date of Service:  07/07/25 1613  Creation Time:  07/07/25 1747  Case Time:  Procedures:  Surgeons:    07/07/25 1752 OR HEMODIALYSIS CATHETER INSERTION    Sterling Smith DO               Signed        Lindsay Laughlin  7/7/2025     PREOPERATIVE DIAGNOSIS: Acute kidney injury     POSTOPERATIVE DIAGNOSIS: Same     PROCEDURE PERFORMED:   1.  Exchange/removal over the wire of a triple-lumen central venous catheter  2.  Placement of a 14.5 x 19 cm tunneled PermCath in the right internal jugular vein  3.  Radiographic supervision and interpretation     SURGEON: Sterling Smith DO      ANESTHESIA: MAC     PREPARATION: Routine.     Estimated Blood Loss: minimal     SPECIMENS: None     COMPLICATIONS: None     INDICATIONS: Lindsay Laughlin is a 69 y.o. female who  we were consulted for PermCath placement.  She has a history of stage III chronic kidney disease, diabetes, hypertension, GERD, and hyperlipidemia.  She has had a previous colectomy at [facility] and chronic diarrhea.  Upon routine labs indicated creatinine was 5 with metabolic acidemia,  severe hypokalemia and hypomagnesia.  Minimal response with IV fluid administration/resuscitation.  We have been consulted for PermCath placement to begin dialysis. The indications, risks, and possible complications of the procedure were explained to the patient, who voiced understanding and wished to proceed with surgery.     PROCEDURE IN DETAIL:      The patient was taken to the operating room and placed on the operating table in a supine position. After MAC anesthesia was obtained, the right neck and chest were prepped and draped in a sterile manner.  The triple-lumen right IJ catheter was accessed with a J-wire and the wire was placed down into the IVC.  The triple-lumen catheter was removed in its entirety off of the wire. Next, 10 mL of 0.5% Marcaine with epinephrine was used to infiltrate the subcutaneous tract.  Two small stab incisions were made with the 11 blade.  The tunneling device attached to the catheter was then tunneled in a subcutaneous manner up through the neck insertion site.  Next, serial dilatation was then made of the internal jugular vein under fluoroscopic guidance.  Lastly, the tear-away sheath was placed and the inner dilator and wire were removed.  The catheter was placed through the tear-away sheath with the tip ending in the right atrial/SVC junction.  There were no kinks in the catheter and both ports had good blood return.  Each port was flushed with heparinized saline and straight heparin 1000 units per mL.  The caps were applied.  The catheter was secured to skin with a 2-0 nylon.  The neck insertion site was closed with a 4-0 Monocryl in a subcuticular fashion.  The wounds were then cleaned.  Sterile dressings were applied. The patient tolerated the procedure well. Sponge and needle counts were correct. The patient was then awakened in the operating room and taken to the recovery room in good condition.     Sterling Smith, DO  7/7/2025  18:08 CDT                Barbara Renteria  NATTY QUIROZ   Nurse Practitioner  Intensivist  H&P      Cosign Needed  Date of Service:  07/03/25 2016  Creation Time:  07/03/25 2016     Cosign Needed        Expand All Collapse All[]Expand All by Default       [facility] Intensivist Services  HISTORY AND PHYSICAL     Date of Admission: 7/3/2025  Primary Care Physician: Provider, No Known     Subjective   Primary Historian: Patient, ER physician     Chief Complaint: Severe electrolyte abnormality, hypokalemia, hypomagnesia, hypocalcemia, nausea, vomiting and diarrhea, dehydration     History of Present Illness  Very pleasant 69-year-old female patient past medical history type 2 diabetes, hypertension, hyperlipidemia, arthritis, GERD, chronic diarrhea.  Recent endoscopy/colonoscopy in May 2023 which were unremarkable, exploratory lap in August 2028 with complications patient endorses colectomy at that time secondary to complications that she had performed at [place] presents to the emergency department today with generally not feeling well.     Patient endorses ongoing nausea, vomiting and diarrhea for the last 5 days along with abdominal pain with diarrhea.  She describes the diarrhea as none hematochezia or melena.  She denies any systemic symptoms such as fever chills or rigors.  She does endorse a mechanical fall this morning while in the bathroom that she feels was a direct result of her generalized weakness.  She does endorse left shoulder pain.  She denies hitting her head.  She called EMS for abdominal pain, nausea vomiting and diarrhea for the last week and generalized pain.     ER workup revealed critically low electrolytes.  Potassium 1.7, magnesium 0.8, calcium 5.6 leukocytosis 25.  With a shift no bandemia.  Stable lactate at 1.7.  CRP elevated at 11.05 BUN 44, creatinine 5 gap 23 CT of the abdomen and pelvis shows mild enteritis or diarrheal type illness there is no bowel wall thickening or obstruction.  Postoperative  changes including previous partial right colectomy, new small stones in the left renal pelvis no urinary tract obstruction identified.     Patient was given 2 g of magnesium, gram of calcium and 20 mill equivalents of potassium was ordered however not infused in the ED, along with IV fluid r resuscitation with 1 L of NS.  Patient is alert oriented cognitively intact.  She is hemodynamically stable surprisingly.     ICU was consulted for ongoing management of electrolytes in the setting of acute vomiting and diarrhea.     I have seen and evaluated the patient immediately upon arriving to ICU room 5.  Patient is alert oriented cognitively intact.  Vital signs stable.  She does have difficulty with raising her left shoulder above her head.  She is able to flex and extend from the elbow.  She has pain with passive range of motion and full extension of the left shoulder.     She does endorse that she sustained a ground-level fall and she feels that she hit her shoulder.  Once we increase her potassium and stabilize her we will obtain x-rays of the left shoulder.  She denies hitting her head she denies any neck pain.  She has some cramping noted to her feet and right hand I suspect this could be carpopedal spasms secondary to hypocalcemia with hypomagnesia and hypokalemia.     No systemic symptoms however in the setting of leukocytosis elevated CRP and shift with gastritis we will implement ceftriaxone and Flagyl after obtaining blood cultures.  Still pending a GI panel.  She denies being on any recent antibiotics as I considered C. difficile.     Will continue with electrolyte replacement and ongoing hydration.           Review of Systems   Otherwise complete ROS reviewed and negative except as mentioned in the HPI.     Past Medical History:   Medical History        Past Medical History:   Diagnosis Date    Acid reflux      Arthritis      High cholesterol      Hypertension      Neuropathy      Swollen lymph nodes        "LEFT CERVICAL    Type 2 diabetes mellitus           Past Surgical History:  Surgical History         Past Surgical History:   Procedure Laterality Date    CARPAL TUNNEL RELEASE Bilateral      CERVICAL LYMPH NODE BIOPSY/EXCISION Left 2017     Procedure: LEFT NECK EXPLORATION WITH INCISIONAL BIOPSY/CULTURE;  Surgeon: Blue Sanchez MD;  Location: [facility] OR;  Service:      SECTION        COLON RESECTION        COLONOSCOPY N/A 2023     Procedure: COLONOSCOPY WITH ANESTHESIA;  Surgeon: Tamy Smith MD;  Location: [facility] ENDOSCOPY;  Service: Gastroenterology;  Laterality: N/A;  preop; abnormal GI scan   postop ; poor prep   PCP Ruth Noble MD    ENDOSCOPY N/A 2023     Procedure: ESOPHAGOGASTRODUODENOSCOPY WITH ANESTHESIA;  Surgeon: Tamy Smith MD;  Location: [facility] ENDOSCOPY;  Service: Gastroenterology;  Laterality: N/A;  preop; abnormal GI scan   postop esophagitis ; hiatal hernia  PCP Ruth Noble MD    EXCISION LESION         From back    REPLACEMENT TOTAL KNEE Left      TONSILLECTOMY             Social History:  reports that she has never smoked. She has never used smokeless tobacco. She reports that she does not drink alcohol and does not use drugs.     Family History: family history includes Colon cancer in her father; No Known Problems in her mother.        Allergies:  Allergies   No Known Allergies        Medications:          Prior to Admission medications    Medication Sig Start Date End Date Taking? Authorizing Provider   ibuprofen (ADVIL,MOTRIN) 200 MG tablet Take 1 tablet by mouth Every 6 (Six) Hours As Needed for Mild Pain. Pt reports taking \"5 at a time only when needed\"       Provider, MD Priyanka   ondansetron ODT (ZOFRAN-ODT) 4 MG disintegrating tablet Place 1 tablet on the tongue Every 4 (Four) Hours As Needed for Nausea or Vomiting. 25     Nishant Hdz Jr., MD      I have utilized all available immediate resources to " "obtain, update, or review the patient's current medications (including all prescriptions, over-the-counter products, herbals, cannabis/cannabidiol products, and vitamin/mineral/dietary (nutritional) supplements).     Objective      Vital Signs: /56   Pulse 91   Temp 97.8 °F (36.6 °C) (Oral)   Resp 17   Ht 165.1 cm (65\")   Wt 97.1 kg (214 lb 1.6 oz)   SpO2 94%   BMI 35.63 kg/m²   Physical Exam  Vitals and nursing note reviewed. Exam conducted with a chaperone present.   Constitutional:       General: She is not in acute distress.  HENT:      Head: Normocephalic and atraumatic.      Nose: Nose normal.      Mouth/Throat:      Mouth: Mucous membranes are dry.      Comments: Extremely dry mucosa.  Patient endorses she has not had anything to eat or drink in at least 24 to 48 hours now  Eyes:      Extraocular Movements: Extraocular movements intact.      Pupils: Pupils are equal, round, and reactive to light.   Cardiovascular:      Rate and Rhythm: Normal rate and regular rhythm.   Pulmonary:      Effort: Pulmonary effort is normal.      Breath sounds: Normal breath sounds.   Abdominal:      General: There is no distension.      Palpations: Abdomen is soft.      Tenderness: There is no guarding.      Comments: Well-healed vertical incisional site.  No rebound tenderness or concerns for peritonitis on exam.  Hyperactive bowel sounds   Musculoskeletal:         General: No deformity.      Cervical back: Normal range of motion.      Right lower leg: No edema.      Left lower leg: No edema.      Comments: Cramping noted to right hand and feet bilaterally.  Decreased range of motion noted to the left upper extremity.  Patient is able to flex and extend from the elbow however she is not able to independently lift the left arm above her head.  Radial pulse palpable and strong.  She has neurovascularly intact.   Skin:     General: Skin is warm and dry.      Capillary Refill: Capillary refill takes 2 to 3 seconds.      " Findings: No bruising.   Neurological:      General: No focal deficit present.      Mental Status: She is alert and oriented to person, place, and time.               Results Reviewed:  Lab Results (last 24 hours)         Procedure Component Value Units Date/Time     Comprehensive Metabolic Panel [[id number]]  (Abnormal) Collected: 07/03/25 1706     Specimen: Blood Updated: 07/03/25 1739       Glucose 161 mg/dL         BUN 44.5 mg/dL         Creatinine 5.04 mg/dL         Sodium 135 mmol/L         Potassium 1.7 mmol/L         Chloride 100 mmol/L         CO2 12.0 mmol/L         Calcium 5.6 mg/dL         Total Protein 5.4 g/dL         Albumin 2.9 g/dL         ALT (SGPT) 15 U/L         AST (SGOT) 41 U/L         Alkaline Phosphatase 82 U/L         Total Bilirubin 0.3 mg/dL         Globulin 2.5 gm/dL         A/G Ratio 1.2 g/dL         BUN/Creatinine Ratio 8.8       Anion Gap 23.0 mmol/L         eGFR 8.7 mL/min/1.73       Narrative:       GFR Categories in Chronic Kidney Disease (CKD)              GFR Category          GFR (mL/min/1.73)    Interpretation  G1                    90 or greater        Normal or high (1)  G2                    60-89                Mild decrease (1)  G3a                   45-59                Mild to moderate decrease  G3b                   30-44                Moderate to severe decrease  G4                    15-29                Severe decrease  G5                    14 or less           Kidney failure     (1)In the absence of evidence of kidney disease, neither GFR category G1 or G2 fulfill the criteria for CKD.     eGFR calculation 2021 CKD-EPI creatinine equation, which does not include race as a factor     Magnesium [[id number]]  (Abnormal) Collected: 07/03/25 1706     Specimen: Blood Updated: 07/03/25 1738       Magnesium 0.8 mg/dL       C-reactive Protein [[id number]]  (Abnormal) Collected: 07/03/25 1706     Specimen: Blood Updated: 07/03/25 1730       C-Reactive Protein 11.05 mg/dL        Urinalysis With Culture If Indicated - Urine, Catheter [[id number]]  (Abnormal) Collected: 07/03/25 1647     Specimen: Urine, Catheter Updated: 07/03/25 1708       Color, UA Yellow       Appearance, UA Clear       pH, UA 5.5       Specific Gravity, UA 1.011       Glucose, UA Negative       Ketones, UA Trace       Bilirubin, UA Negative       Blood, UA Large (3+)       Protein,  mg/dL (2+)       Leuk Esterase, UA Negative       Nitrite, UA Negative       Urobilinogen, UA 0.2 E.U./dL     Narrative:       In absence of clinical symptoms, the presence of pyuria, bacteria, and/or nitrites on the urinalysis result does not correlate with infection.     Urinalysis, Microscopic Only - Urine, Catheter [[id number]]  (Abnormal) Collected: 07/03/25 1647     Specimen: Urine, Catheter Updated: 07/03/25 1708       RBC, UA 0-2 /HPF         WBC, UA 0-2 /HPF         Comment: Urine culture not indicated.          Bacteria, UA None Seen /HPF         Squamous Epithelial Cells, UA 3-6 /HPF         Hyaline Casts, UA 0-2 /LPF         Amorphous Crystals, UA Small/1+ /HPF         Methodology Manual Light Microscopy     Amylase [[id number]]  (Abnormal) Collected: 07/03/25 1628     Specimen: Blood Updated: 07/03/25 1658       Amylase 19 U/L       Lactic Acid, Plasma [[id number]]  (Normal) Collected: 07/03/25 1628     Specimen: Blood Updated: 07/03/25 1657       Lactate 1.7 mmol/L       Lipase [[id number]]  (Normal) Collected: 07/03/25 1628     Specimen: Blood Updated: 07/03/25 1656       Lipase 21 U/L       CBC & Differential [[id number]]  (Abnormal) Collected: 07/03/25 1628     Specimen: Blood Updated: 07/03/25 1635     Narrative:       The following orders were created for panel order CBC & Differential.  Procedure                               Abnormality         Status                     ---------                               -----------         ------                     CBC Auto Differential[[id number]]        Abnormal            Final  result                  Please view results for these tests on the individual orders.     CBC Auto Differential [[id number]]  (Abnormal) Collected: 07/03/25 1628     Specimen: Blood Updated: 07/03/25 1635       WBC 25.12 10*3/mm3         RBC 3.83 10*6/mm3         Hemoglobin 12.6 g/dL         Hematocrit 36.2 %         MCV 94.5 fL         MCH 32.9 pg         MCHC 34.8 g/dL         RDW 18.5 %         RDW-SD 64.1 fl         MPV 10.6 fL         Platelets 233 10*3/mm3         Neutrophil % 89.0 %         Lymphocyte % 4.9 %         Monocyte % 5.2 %         Eosinophil % 0.1 %         Basophil % 0.2 %         Immature Grans % 0.6 %         Neutrophils, Absolute 22.36 10*3/mm3         Lymphocytes, Absolute 1.24 10*3/mm3         Monocytes, Absolute 1.31 10*3/mm3         Eosinophils, Absolute 0.02 10*3/mm3         Basophils, Absolute 0.04 10*3/mm3         Immature Grans, Absolute 0.15 10*3/mm3         nRBC 0.0 /100 WBC                  CT Abdomen Pelvis Without Contrast  Result Date: 7/3/2025  1. Fluid retention within the small intestine and portions of the colon compatible with the history of diarrhea, no evidence of bowel obstruction, no bowel wall thickening. Correlate clinically for mild enteritis or diarrheal type illness. 2. New small stone in the left renal pelvis, measuring 7 mm. No urinary tract obstruction is identified. 3. Postoperative changes, including previous partial right colectomy, appendectomy and there is stable dense material in the region of the arine hepatis which may be related to previous surgical intervention. The gallbladder is not visualized. 4. Stable accessory spleen measuring up to 4 cm.   This report was signed and finalized on 7/3/2025 6:19 PM by Dr. Venkata Muniz MD.                Result Review:  I have personally reviewed the results from the time of this admission to 7/3/2025 20:17 CDT and agree with these findings:  [x]  Laboratory list / accordion  []  Microbiology  [x]  Radiology  [x]   EKG/Telemetry   []  Cardiology/Vascular   [x]  Pathology  [x]  Old records-  []  Other:  Most notable findings include: Potassium 1.7, magnesium 0.8, calcium 5.6, leukocytosis 25 with a left shift, elevated CRP at 11        I have personally reviewed and interpreted the radiology studies and ECG obtained at time of admission.      Assessment / Plan   Assessment:        Active Hospital Problems     Diagnosis      **Enteritis           Treatment Plan  The patient will be admitted to my service here at [facility].  The patient to ICU/CCU under intensivist services this patient is currently requiring ICU services for electrolyte management     69-year-old female patient past medical history acute on chronic diarrhea presented to the emergency department via EMS for a 5-day history of nausea, vomiting diarrhea and generalized bodyaches along with abdominal pain.  Critically low potassium 1.8, magnesium 0.8 and calcium 5.6.  EG changes prolonged QTc greater than 700.  CT concerning for enteritis otherwise no acute findings.     Hypokalemia  Hypomagnesia  Hypocalcemia  Enteritis  Leukocytosis  DAISY  Dehydration  8.   Past medical history type 2 diabetes  9.   Prolonged Qtc     Plan:  Continue with electrolyte replacement.  Will initiate oral potassium as well as IV.  Reassess electrolytes after replacement.  Continue with IV fluid hydration in the setting of acute dehydration and DAISY.  Reviewed previous labs from 2/24 2025 kidney function, creatinine 2, her decreased oral intake dehydration's probably worsening kidney function today.  Renally dose all medications.  Avoid any nephrotoxin agents.  Avoid any IV contrast  Reassess BMP in the AM.  Sitter nephrology consult if warranted  Leukocytosis, no concerns for sepsis on admission.  Leukocytosis felt to be secondary to enteritis.  Obtain GI panel.  Initiate Rocephin 2 g and Flagyl 500 every 8 after blood cultures.  De-escalate antibiotics based on cultures and  GI panel.  Obtain a chest x-ray to rule out any acute process.  Urinalysis unremarkable for any acute infectious process.  Sliding scale insulin during this hospitalization  Avoid any QTc prolongation medications in the setting of prolonged QTc.     VTE: Heparin SQ  GI: Protonix , hx of Gerd  NTN: Diet  ABX: Flagyl 500 mg every 8 Rocephin 2 g daily  Lines/Tubes: Peripheral  CODE Status: Full code              60 minutes of critical care provided. This time excludes other billable procedures. Time does include preparation of documents, medical consultations, review of old records, and direct bedside care. Patient is at high risk for life-threatening deterioration due to patient is a high risk for cardiac arrhythmia secondary to critically low electrolytes.  .        Medical Decision Making  Number and Complexity of problems: 7  Differential Diagnosis: Electrolyte abnormalities, acute on chronic.  Patient does have a history of chronic diarrhea.  Her previous labs from April of this year revealed critically low potassium of 2.7.  I question if she has been taking potassium and magnesium supplements in the setting of chronic diarrhea.  Acute on chronic kidney insufficiency.  I feel her acute dehydration is contributing to her worsening kidney function.  I considered sepsis in the setting of leukocytosis however no acute process other than enteritis.  Will search for additional sources.     Conditions and Status        Condition is unchanged.     [facility] Data  External documents reviewed: N/A I did review previous endoscopy, colonoscopy by Dr. Smith in April 2023 biopsies unremarkable.    Cardiac tracing (EKG, telemetry) interpretation: Reviewed admission EKG  Radiology interpretation: Reviewed admission CT abdomen pelvis imaging  Labs reviewed: Reviewed admission labs from ER  Any tests that were considered but not ordered: Chest x-ray, left shoulder x-ray     Decision rules/scores evaluated (example [id number], Wells,  etc):      Discussed with: Patient.  Patient is decision-maker.  She endorses she is  however her  is in the nursing home.  She does have a close friend that lives with her and helps take care of her however she does not have any children.     Care Planning  Shared decision making: Patient  Code status and discussions: Full code     Disposition  Social Determinants of Health that impact treatment or disposition: NA  Estimated length of stay is 2 to 5 days.      I confirmed that the patient's advanced care plan is present, code status is documented, and a surrogate decision maker is listed in the patient's medical record.      The patient's surrogate decision maker is patient.      The patient was seen and examined by me on 7/3/20/2025 at 1930 immediately upon arriving to ICU room 3.     I admitted the patient overnight to the intensivist services.  Case will be discussed with Dr. Alberts, In the a.m.  Final treatment plan and recommendations will be at his discretion.      Electronically signed by NATTY Major on 7/3/2025 at 20:17 CDT

## 2025-07-10 NOTE — PLAN OF CARE
Goal Outcome Evaluation:           Progress: no change  Outcome Evaluation: A/O x4, denies pain so far this shift, IID, permacath to right chest dressing C/D/I, incontinent, refuses turns, refuses mepilex dressings

## 2025-07-10 NOTE — CASE MANAGEMENT/SOCIAL WORK
Continued Stay Note   Foreign     Patient Name: Lindsay Laughlin  MRN: [id number]  Today's Date: 7/10/2025    Admit Date: 7/3/2025    Plan: SNF Referrals   Discharge Plan       Row Name 07/10/25 1215       Plan    Plan SNF Referrals    Patient/Family in Agreement with Plan yes    Plan Comments [facility] unable to accept pt. [facility] is reviewing. Referral also sent to [facility]. Will follow.                   Discharge Codes    No documentation.                 Expected Discharge Date and Time       Expected Discharge Date Expected Discharge Time    Jul 11, 2025               ELIJAH Chapman

## 2025-07-10 NOTE — NURSING NOTE
[facility]  DIALYSIS TREATMENT SUMMARY     Note: Consult with the attending physician for patient treatment orders, this document is not a physician order.     Patient Information  Patient Lindsay Laughlin  Date of Birth November 07, 1955  Chart Number [id number]  Location [facility]  Location MRN [id number]  Gender Female  SSN (last 4)   Treatment Information  Treatment Type Hemodialysis  Treatment Id [id number]  Start Time July 10, 2025 09:59  End Time July 10, 2025 13:40  Acutal Duration 03:41  Treatment Balances  Total Saline Administered 500  Other 350  Net Fluid Balance -700  Reason Goal Not Met Hypotension - MD Aware  Hemodialysis Orders  Therapy Standard  Orders Verified Time 07/10/2025 08:54   Date Verified 07/10/2025  Duration 3:30  Isolated UF/Bypass No  BFR (mL) 400  DFR X1.5 BFR  DFR (mL) 600  Dialyzer Type OPTIFLUX 160NR  UF Order UF Range  UF Range (mL) 1000 - 2000  With BP Parameters Yes  As Tolerated Yes  Crit-Line used No  Heparin Initial Units Bolus No  Heparin IV Maintenance Bolus No  Heparin IV Infusion No  Potassium (mEq/L) 2  Calcium (mEq/L) 2.5  Bicarb (mg/dL) 35  Sodium (mEq/L) 138  Additional Orders KEEP SYSTOLIC BLOOD PRESSURE GREATER THAN 90.  Clinician Haider Wyman RN  AV Access  Access Type Central Venous Catheter  Central Venous Catheter  Access Type Catheter - Tunneled  Date Central Venous Catheter Placed 07/07/2025  Access Location Chest Wall - R  Current/New [facility] Patient Yes  Surgeon DR. MATTHEWS  1st Use Catheter Verified by X-Ray  Catheter Care Completed per Policy Yes  Dressing Dry and Intact on Arrival Yes  Dressing Changed No  Type of Dressing Film Biopatch/CHG  Last Date Changed 07/10/2025  If No select Reason Dressing Change Not Indicated per Policy  Notes CHANGED PER FLOOR NURSE  Type of HD Caps Curos  HD Caps Changed Yes  CVC Line Education Provided Yes - Dressing Change Frequency  Comments/Notes RIGHT IJ PERMCATH, PATENT X 2 PORTS, SITE  CLEAR  Vitals  Pre-Treatment Vitals  Time Is BP being recorded? Pre BP Map BP Method Pulse RR Temp How was Weight Obtained? Pre Weight Previous Dry Weight Previous Post Weight Metric Target Fluid Removal (mL) Dialysate Confirmed Clinician  07/10/2025 09:55 BP/Map 134/53 (80) Noninvasive 77 20 97.5 How Obtained: Bed Scale 92.7   Kgs 1000 Yes Haider Wyman, RN  Comments: UF FLUID REMOVAL RANGE 1000 ML TO 2000 ML AS TOLERATED. PATIENT IDENTIFICATION VERIFIED, ALLERGIES AND MEDICATIONS REVIEWED. PATIENT IS ALERT, ORIENTED X 3, NO PAIN, NO CHEST PAIN. RESPIRATIONS EVEN AND UNLABORED. LUNGS DIMINISHED. HEART RATE REGULAR, S1 S2 SINUS RHYTHM PER TELEMETRY. SKIN WARM AND DRY, +2 EDEMA LEGS. LAYING IN BED, HEAD OF BED ELEVATED 30 DEGREES.  Intra Procedure Vitals  Rec Type Time Is BP being recorded? BP Map Pulse RR BFR DFR AP  TMP UFR RMVD Bolus NSS Flush Chills Safety Check Crit-Line HCT Crit-Line BV% Clinician  E 07/10/2025 13:40 BP/Map 123/56 (78) 88 20 150 300 -10 20 50 300 1550  250  Yes   Haider Wyman, RN  Comments: HEMODIALYSIS FINISHED, BLOOD RETURNED.  Post Treatment Vitals  Time Is BP being recorded? BP Map Pulse RR Temp How was Weight Obtained? Post Weight Metric BVP UF Goal Ordered NSS Given Intra-Procedure Total Machine UF Removed (mL) Crit-Line Ending Profile Crit-Line Refill Crit-Line Ending HCT Crit-Line Max BV% Clinician  07/10/2025 13:55 BP/Map 127/66 (86) 87 20 97.6 How Obtained: Bed scale 92 Kgs 77.5 [phone]     Haider Wyman, RN  Comments: PATIENT IS ALERT, ORIENTED X 3, NO PAIN, NO CHEST PAIN. RESPIRATIONS EVEN AND UNLABORED. LUNGS DIMINISHED. HEART RATE IRREGULAR, ATRIAL FIBRILLATION PER TELEMETRY. SKIN WARM AND DRY. + 2 EDEMA LEGS. TOLERATED TREATMENT WELL.  Safety checks include: access uncovered and secured, Hemaclip secured for all central line access, machine checks performed, and alarm limits confirmed.  Labs  Hepatitis  HBsAG Lab Result HBsAG Lab Result HBsAG Draw Date Transient  Antigenemia(MD Diagnosis Only) Anti-HBs Lab Result Anti-HBs Lab Value Anti-HBs Draw Date Documented By Documented Date Hepatitis Status Hepatitis Status  Negative  07/07/2025  Negative  07/07/2025 Haider Wyman 07/10/2025  Susceptible  Notes:   Pre-Treatment Hepatitis Precautions Patient tx outside buffer zone Yes Hepatitis Information Entered By Haider Wyman RN  Patient tx at bedside 1:1 Yes Hard copy verified by nurse and placed in patient’s hospital chart Yes Signing  Patient tx with immune pts only Yes Copy of hepatitis results verified in hospital EMR Yes Signed By Haider Wyman RN  Pre-Treatment Handoff  Staff Report Received Yes  Report Given by Primary Nurse DEBBY RECINOS RN  Time 09:15  Patient Arrival  Patient ID Verified Date of Birth  MRN  Full Name  Patient Consent to treatment verified Yes  Blood Transfusion Consent Verified N/A  Treatment Comments  Treatment Notes PATIENT IS ALERT, ORIENTED X 3, NO PAIN, NO CHEST PAIN. RESPIRATIONS 20 EVEN AND UNLABORED. LUNGS DIMINISHED. SKIN WARM AND DRY. +2 EDEMA LEGS. HEART RATE IRREGULAR, ATRIAL FIBRILLATION AT 87 PER TELEMETRY. BLOOD PRESSURE 127/66, MAP 86, TEMPERATURE 97.6 DEGREES. REMOVED 700 ML OF FLUID ON DIALYSIS. POST WEIGHT IS 92 KG. TOLERATED TREATMENT WELL.  Post-Treatment Handoff  Report Given to Primary Nurse MARA DIXON RN  Time Report Given 14:07  Report Given By Haider Wyman RN  Machine Validation  Time 09:08  Date 7/10/2025  Auto Alarm Test Passed Yes  Machine Serial # [id number]  Portable RO Yes  RO Serial# [id number]  Residual Bleach Negative Yes  Was a manufactured mix used? Yes  Machine Log Completed Yes  Total Chlorine (less than 0.1)? Yes  Total Chlorine Log Completed Yes  Bicarb BiBag  Bibag Size 900  Machine Temp 36.5  Machine Conductivity 13.8  Meter Type N/A  Meter Conductivity   Independent Conductivity 13.7  pH Status Pass Pass  pH   TCD Value 13.7  TCD Alarm with +/- 0.5 Yes  NVL enabled validated 100 asymmetric Yes  Safety  check complete Haider Wyman RN  Second Verification Performed? Yes  Second Verification Performed By Haider Wyman RN  Reason Not Verified   Serum Lab Values  Time BUN Creatinine Na K (mEq/L) Cl CO2 Ca (mEq/L) Phos Mg (mg/dL) Alb (g/dL) Glucose Hgb Hct WBC Plt PT aPTT INR Other Clinician  07/10/2025 03:46 13.5 2.02 137 5 102 25 7.9 2.1 2.2 - 143 10.4 30.9 12.13 155 - - - - Haider Wyman RN  Comments:   Administered Medications  Time Medication Dose Route Reason for Admin Clinician  07/10/2025 13:43 HEPARIN 1000 UNITS / ML 3.6 ML / 3600 UNITS IVP CATHETER LOCKS Haider Wyman RN  Comments: POST DIALYSIS, INSTILL HEPARIN 1000 UNITS / ML, 1.8 ML, 1800 UNITS INTO EACH PORT OF RIGHT IJ PERMCATH, X 2 PORTS, 3.6 ML TOTAL, 3600 UNITS HEPARIN TOTAL. AS CATHETER LOCKS.  Facility Information Room # 391 -  Diagnosis GASTROENTERITIS; DAISY CKD 3; DIABETES TYPE 2; HYPERTENSION; GERD; METABOLIC ACIDOSIS; ANEMIA; HISTORY ATRIAL FIBRILLATION  Facility Information Stat Treatment No Isolation Information  Admission Date 07/03/2025 Patient Type New patient to dialysis with diagnosis of “Acute Kidney Injury” Isolation Required? N  Ordering MD DR. TAYLOR Patient Chronic Unit [facility] Completed by  Account/Finance Number [id number] Patient Home Unit [address] [place] [address] information Entered by Haider Wyman RN  Admission Status InPatient Code Status Full Code Facility Information  Location Dialysis Suite Multi Diagnosis Notes PATIENT HAS HISTORY OF ATRIAL FIBRILLATION  Start Treatment Time Out Confirmed by DEBBY RECINOS RN AND HAIDER WYMAN RN Correct access site verified Yes  Treatment Initiation Connections Secured Time Out Completed 09:51 Treatment Start Date 07/10/2025  Saline line double clamped Correct patient verified Yes Treatment Start Time 09:59  Hemaclip Applied Correct procedure verified Yes Patient/Family questions and concerns addressed Yes  Pre Focused Assessment Respiratory  Efforts Unlabored LOC  Access Notes RESPIRATIONS 20 LOC Alert and Oriented x3  Date Inserted 07/07/2025 Edema GI / Bowels  Signs and Symptoms of Infection? No Location Generalized GI Soft  Notes RIGHT IJ PERMCATH, PATENT X 2 PORTS, SITE CLEAR Edema Grade 2+  Bowel sounds present  Pain Screening Notes +2 edema legs  Non distended  Does the patient have pain? Yes Cardiac [email or web address] Nurse Notified  Heart Rhythm Regular [facility] Nurse Notified Name  Telemetry Yes Completed by  Hospital Nurse Notified Time  Notes SINUS RHYTHM CURRENTLY Pre Treatment Focused Assessment Completed By Haider Wyman RN  Respiratory Skin Time 09:53  Lung Sounds Diminished Skin Warm Signing  Location Bilateral  Dry Signed By Haider Wyman RN  Position Anterior  Bruising   Education  Fluid Intake  Medications  Patient Education  Hemodialysis treatment review  Diet and/or exercise  Patient Educated? Yes Method Knowledge / Understanding Assessed Demonstration  Fluid Intake  Focus or Topic Treatment Options Family Education Provided? N/A  Hemodialysis treatment review  Access Maintenance Caregiver Education Provided? Yes Method Knowledge / Understanding Assessed Teach back  Medications Focus or Topic Treatment Options Patient Education Introduced By  Diet and/or exercise  Access Maintenance Patient Education Introduced By Haider Wyman RN  Post-Treatment HD machine external disinfection completed per policy Yes Notes PATIENT TO BE TRANSFERRED TO ROOM 412-4B POST DIALYSIS, FOR CLOSER CARDIAC MONITORING AND AMIODARONE DRIP. VERBAL REPORT GIVEN TO MARA DIXON RN  Post Treatment Delay PRO external disinfection completed per policy Yes Completed by  Delay N Post Treatment General Information Post Treatment Form Completed By Haider Wyman RN  Machine Disinfection Requirement Care Transition Document Completed Yes   Post Focused Assessment Lung Sounds Diminished  Bruising  Changes from Pre Focused  Assessment Location Bilateral LOC  Changes from Pre Treatment Focused Assessment? Yes Position Anterior LOC Alert and Oriented x3  Access Respiratory Efforts Unlabored GI / Bowels  Cath Packed with HEPARIN 1000 UNITS / ML Notes RESPIRATIONS 20 GI Soft  Access Port(ml) 1.6 Edema  Bowel sounds present  Return Port(ml) 1.6 Location Generalized  Non distended  Catheter clamped and capped Yes Edema Grade 2+   Access Flow Good Notes +2 edema legs  Voids  Positional Cardiac Completed by  Dialyzer Clearance Streaked Heart Rhythm Irregular Post Treatment Focused Assessment Completed by Haider Wyman RN  Dialyzer Clearance Times RIGHT IJ PERMCATH, FLUSHED, HEPARINIZED, CLAMPED, CAPPED Telemetry Yes Date 07/10/2025  Notes RIGHT IJ PERMCATH, FLUSHED, HEPARINIZED, CLAMPED, CAPPED Notes ATRIAL FIBRILLATION WITH HISTORY OF ATRIAL FIBRILLATION Time 13:57  Pain Screening Skin Signing  Does the patient have pain? No Skin Warm Signed By Haider Wyman, RN  Respiratory  Dry

## 2025-07-10 NOTE — PROGRESS NOTES
"Nutrition Services    Patient Name:  Lindsay Laughlin  YOB: 1955  MRN: [id number]  Admit Date:  7/3/2025    Patient Name: Lindsay Laughlin  YOB: 1955  MRN: [id number]  Admission date: 7/3/2025  Reason for Encounter: Follow up     [facility] Clinical Nutrition Assessment     Subjective    Subjective Information     7/10: Pt transferred from  to . Pt was having her routine dialysis and went into A-fib. Pt pleasantly confused and teary at times during visit but easy to redirect. No PO intake recorded in the last 72hr. When asked about appetite, pt noted it was fair and that she is liking the food options while inpatient. Pt shared that at home she has poor intake because she does not like \"how people fix her food.\" When asked about UBW, pt noted it was 114lb but was unsure of the accuracy. Per recorded wt's, pt's wt fluctuates but has stayed above 190lb. Dietitian encouraged pt to eat well and explained calorie and protein needs for HD. Pt agreeable to trying Boost BID for additional nutrition. Will monitor.    07/07: Pt very confused and upset this morning per nurse. Visit not appropriate at this time. Per provider notes, pt c/o N/V, diarrhea, and decreased appetite for the past five days. Per wt records, wt fluctuates. PMH of CKD. Pt to start HD after perm cath placement. Currently NPO. A1C: 9.0. Blood glucose range over the last 72 hr: 193-215. Dietitian messaged pharmacist about adding insulin. Will try to visit during follow-up. Will monitor.     Assessment    H&P and Current Problems      H&P  Past Medical History:   Diagnosis Date    Acid reflux     Arthritis     High cholesterol     Hypertension     Neuropathy     Swollen lymph nodes     LEFT CERVICAL    Type 2 diabetes mellitus       Past Surgical History:   Procedure Laterality Date    CARPAL TUNNEL RELEASE Bilateral     CERVICAL LYMPH NODE BIOPSY/EXCISION Left 08/04/2017    Procedure: LEFT NECK EXPLORATION WITH INCISIONAL " "BIOPSY/CULTURE;  Surgeon: Blue Sanchez MD;  Location: [facility] OR;  Service:      SECTION      COLON RESECTION      COLONOSCOPY N/A 2023    Procedure: COLONOSCOPY WITH ANESTHESIA;  Surgeon: Tamy Smith MD;  Location: [facility] ENDOSCOPY;  Service: Gastroenterology;  Laterality: N/A;  preop; abnormal GI scan   postop ; poor prep   PCP Ruth Noble MD    ENDOSCOPY N/A 2023    Procedure: ESOPHAGOGASTRODUODENOSCOPY WITH ANESTHESIA;  Surgeon: Tamy Smith MD;  Location: [facility] ENDOSCOPY;  Service: Gastroenterology;  Laterality: N/A;  preop; abnormal GI scan   postop esophagitis ; hiatal hernia  PCP Ruth Noble MD    EXCISION LESION      From back    INSERTION HEMODIALYSIS CATHETER Right 2025    Procedure: OR HEMODIALYSIS CATHETER INSERTION;  Surgeon: Sterling Smith DO;  Location: [facility] HYBRID OR;  Service: Vascular;  Laterality: Right;    REPLACEMENT TOTAL KNEE Left     TONSILLECTOMY        Current Problems   Admission Diagnosis:  Enteritis [K52.9]  Atrial fibrillation with rapid ventricular response [I48.91]    Problem List:    DAISY (acute kidney injury)    Enteritis      Other Applicable Nutrition Information:   Episodes of disorientation      Anthropometrics      BMI, Height, Weight Estimated body mass index is 33.97 kg/m² as calculated from the following:    Height as of this encounter: 165.1 cm (65\").    Weight as of this encounter: 92.6 kg (204 lb 2.3 oz).    Weight Method: Bed scale       Trending Weight Changes Unknown/Unable to Determine       Weight History  Wt Readings from Last 20 Encounters:   07/10/25 0305 92.6 kg (204 lb 2.3 oz)   25 0300 93.6 kg (206 lb 5.6 oz)   25 0300 99.6 kg (219 lb 9.3 oz)   25 0405 100 kg (220 lb 10.9 oz)   25 0400 93.5 kg (206 lb 2.1 oz)   25 0600 88.8 kg (195 lb 12.3 oz)   25 88.8 kg (195 lb 12.3 oz)   25 1537 97.1 kg (214 lb 1.6 oz)   25 1551 92.5 kg (204 " lb)   05/17/23 1032 101 kg (223 lb)   04/24/23 1055 104 kg (229 lb)   02/22/23 1645 103 kg (226 lb)   01/29/20 0958 89.5 kg (197 lb 6.4 oz)   10/16/19 1602 85.3 kg (188 lb)   09/18/17 1351 104 kg (229 lb)   07/28/17 0847 109 kg (239 lb 3.2 oz)   07/24/17 1406 109 kg (240 lb)   07/23/17 1527 111 kg (245 lb)   07/21/17 0937 109 kg (241 lb)            Labs      Comment:      Results from last 7 days   Lab Units 07/10/25  0346 07/09/25  1231 07/08/25  0242 07/07/25  2125 07/07/25  1053 07/06/25  0356 07/05/25  2348 07/05/25  1509 07/05/25  0950 07/04/25  0036 07/03/25  1706 07/03/25  1628   0000   SODIUM mmol/L 137 139 138  --  137 134*  --   --   --    < > 135*  --   --    POTASSIUM mmol/L 5.0 3.7 3.9   < > 3.5 3.0*   < >  --   --    < > 1.7*  --   --    GLUCOSE mg/dL 143* 113* 164*  --  193* 208*  --   --   --    < > 161*  --   --    BUN mg/dL 13.5 9.6 34.4*  --  36.5* 36.5*  --   --   --    < > 44.5*  --   --    CREATININE mg/dL 2.02* 1.51* 4.19*  --  4.19* 4.65*  --   --   --    < > 5.04*  --   --    CALCIUM mg/dL 7.9* 7.8* 7.1*  --  6.8* 6.7*  --   --   --    < > 5.6*  --   --    PHOSPHORUS mg/dL 2.1* 2.0* 4.2  --  3.9 3.4  --   --   --    < >  --   --   --    MAGNESIUM mg/dL 2.2 1.7 1.9  --  1.9 2.2  --   --   --    < > 0.8*  --   --    ALBUMIN g/dL  --  2.8*  --   --  2.6* 2.6*  --   --   --   --  2.9*  --    < >   CRP mg/dL  --   --   --   --   --   --   --   --   --   --  11.05*  --   --    LACTATE mmol/L  --   --   --   --   --   --   --  1.5 3.1*  --   --  1.7  --    BILIRUBIN mg/dL  --  0.6  --   --  0.2 0.2  --   --   --   --  0.3  --    < >   ALK PHOS U/L  --  126*  --   --  88 87  --   --   --   --  82  --    < >   AST (SGOT) U/L  --  20  --   --  28 61*  --   --   --   --  41*  --    < >   ALT (SGPT) U/L  --  6  --   --  28 32  --   --   --   --  15  --    < >    < > = values in this interval not displayed.     Results from last 7 days   Lab Units 07/10/25  0346 07/09/25  1008 07/08/25  0242  07/06/25  0356 07/05/25  0223   PLATELETS 10*3/mm3 155 122* 170   < > 213   HEMOGLOBIN g/dL 10.4* 10.8* 10.5*   < > 11.0*   HEMATOCRIT % 30.9* 30.5* 29.9*   < > 32.4*   IRON mcg/dL  --   --   --   --  22*    < > = values in this interval not displayed.     Lab Results   Component Value Date    HGBA1C 9.00 (H) 07/04/2025      Results from last 7 days   Lab Units 07/05/25  0223   URIC ACID mg/dL 8.1*     Medications       Scheduled Medications amiodarone, 400 mg, Oral, Q12H  apixaban, 5 mg, Oral, Q12H  cholecalciferol, 5,000 Units, Oral, Daily  insulin regular, 2-7 Units, Subcutaneous, Q6H  metoprolol tartrate, 25 mg, Oral, Q12H  pantoprazole, 40 mg, Oral, BID AC  senna-docusate sodium, 2 tablet, Oral, BID  sodium bicarbonate, 650 mg, Oral, TID  sodium chloride, 10 mL, Intravenous, Q12H        Infusions dilTIAZem, 5-15 mg/hr        PRN Medications   acetaminophen **OR** acetaminophen    aluminum-magnesium hydroxide-simethicone    senna-docusate sodium **AND** polyethylene glycol **AND** bisacodyl **AND** bisacodyl    calcium carbonate    Calcium Replacement - Follow Nurse / BPA Driven Protocol    dextrose    dextrose    glucagon (human recombinant)    heparin (porcine)    HYDROcodone-acetaminophen    Magnesium Standard Dose Replacement - Follow Nurse / BPA Driven Protocol    nitroglycerin    ondansetron ODT **OR** ondansetron    Potassium Replacement - Follow Nurse / BPA Driven Protocol    [COMPLETED] Insert Peripheral IV **AND** sodium chloride    sodium chloride    sodium chloride     Physical Findings      Chewing/Swallowing  Teeth Status: Mouth/Teeth WDL: .WDL except, teeth Teeth Symptoms: teeth absent  Chewing/Swallowing Issues: No issues identified at this time   Edema            Leg, Left Edema: 2+ (Mild) Leg, Right Edema: 2+ (Mild)       Foot, Left Edema: 2+ (Mild) Foot, Right Edema: 2+ (Mild)   Bowel Function  Stool Output  Stool (mL): 1 mL (07/04/25 0900)  Stool Unmeasured Occurrence: 1 (07/10/25  0643)  Bowel Incontinence: Yes (07/10/25 0643)  Stool Amount: Small (07/10/25 0643)  Stool Consistency: soft (07/09/25 2130)  Perineal Care: absorbent brief/pad changed, perineum cleansed (07/09/25 1849)  Last Bowel Movement: 07/09/25   I/Os  Intake & Output (last 3 days)         07/07 0701 07/08 0700 07/08 0701 07/09 0700 07/09 0701  07/10 0700 07/10 0701 07/11 0700    P.O.  400      I.V. (mL/kg)        IV Piggyback 200       Total Intake(mL/kg) 200 (2) 400 (4.3)      Urine (mL/kg/hr) 1900 (0.8) 300 (0.1) 0 (0)     Stool 0 0 0     Dialysis   2000     Total Output 5667 912 3056     Net -1700 +100 -2000             Urine Unmeasured Occurrence 5 x 3 x 2 x     Stool Unmeasured Occurrence 3 x 5 x 5 x            Lines, Drains, Airways, & Wounds       Active LDAs       Name Placement date Placement time Site Days Last dressing change    CVC Triple Lumen 07/05/25 Right Internal jugular 07/05/25  2255  Internal jugular  1 07/07/25 0705 (4.56 hrs)    External Urinary Catheter 07/06/25  1657  --  less than 1     Wound 07/03/25 2001 coccyx 07/03/25 2001  -- 3     Wound 07/06/25 1023 Right posterior heel Pressure Injury 07/06/25  1023  -- 1     Wound 07/06/25 1023 Left posterior heel Pressure Injury 07/06/25  1023  -- 1     Wound 07/06/25 1047 Bilateral anterior abdomen 07/06/25  1047  -- 1                       Nutrition Focused Physical Exam     Trending NFPE      Malnutrition Severity Assessment              Estimated Needs      Energy Requirements    Weight for Calculation 57kg IBW    Method for Estimation  25-30 kcals/kg   Daily Needs (kcal/day) 4153-2658   Protein Requirements    Weight for Calculation 57kg IBW    Method for Estimation 1.2 gm/kg   Daily Needs (g/day) 68   Fluid Requirements     Method for Estimation 1000 mL + urine Output    Daily Needs (mL/day)      Current Nutrition Orders & Evaluation of Intake      Oral Nutrition     Food Allergies  and Intolerances NKFA    Current PO Diet Diet: Regular/House;  Fluid Consistency: Thin (IDDSI 0)   Oral Nutrition Supplement None     Trending % PO Intake NPO      Enteral Nutrition     Current EN Order Patient isn't on Tube Feeding  Patient doesn't have any tube feeding modular orders     EN Route      EN Tolerance     EN Observation/Intake         Parenteral Nutrition     Current TPN Order    TPN Route    Lipids (mL/%/frequency)     Total # Days on TPN    TPN Observation/Intake       Assessment & Plan   Nutrition Diagnosis and Goals       Nutrition Diagnosis 1 Inadequate Oral Intake related to CDK and enteritis evidence by reported decrease intake in the last five days, NV, and diarrhea, and NPO status      Nutrition Diagnosis 2 None         Goal(s) Average Meal Intake at Least 75%, Meets Estimated Needs , Accepts Oral Nutrition Supplement, and No Significant Weight Loss     Nutrition Intervention and Prescription       Intervention  Start oral nutrition supplement and Continue to monitor for plan of care      Diet Prescription     Supplement Prescription Boost Original BID (Provides 480 kcals, 20 g protein if consumed)      Education Provided       Enteral Prescription        TPN Prescription      Monitoring/Evaluation       Monitor/Evaluation Per Protocol     RD Follow-Up Encounter 3-5 days     Electronically signed by:  Maria Guadalupe White RDN, LD  07/10/25 15:09 CDT   Electronically signed by:  Maria Guadalupe White RDN, LD  07/10/25 15:09 CDT

## 2025-07-10 NOTE — PLAN OF CARE
Problem: Adult Inpatient Plan of Care  Goal: Absence of Hospital-Acquired Illness or Injury  Intervention: Identify and Manage Fall Risk  Recent Flowsheet Documentation  Taken 7/10/2025 1600 by Mark Savage RN  Safety Promotion/Fall Prevention: safety round/check completed  Taken 7/10/2025 1450 by Mark Savage RN  Safety Promotion/Fall Prevention: safety round/check completed  Intervention: Prevent Skin Injury  Recent Flowsheet Documentation  Taken 7/10/2025 1450 by Mark Savage RN  Body Position: position changed independently  Skin Protection: silicone foam dressing in place  Intervention: Prevent and Manage VTE (Venous Thromboembolism) Risk  Recent Flowsheet Documentation  Taken 7/10/2025 1450 by Mark Savage RN  VTE Prevention/Management: (see MAR) other (see comments)  Intervention: Prevent Infection  Recent Flowsheet Documentation  Taken 7/10/2025 1450 by Mark Savage RN  Infection Prevention: single patient room provided  Goal: Optimal Comfort and Wellbeing  Intervention: Monitor Pain and Promote Comfort  Recent Flowsheet Documentation  Taken 7/10/2025 1450 by Mark Savage RN  Pain Management Interventions: relaxation techniques promoted  Intervention: Provide Person-Centered Care  Recent Flowsheet Documentation  Taken 7/10/2025 1450 by Mark Savage RN  Trust Relationship/Rapport:   care explained   choices provided   questions answered   thoughts/feelings acknowledged     Problem: Skin Injury Risk Increased  Goal: Skin Health and Integrity  Intervention: Optimize Skin Protection  Recent Flowsheet Documentation  Taken 7/10/2025 1450 by Mark Savage RN  Pressure Reduction Techniques:   frequent weight shift encouraged   weight shift assistance provided  Head of Bed (HOB) Positioning: HOB elevated  Pressure Reduction Devices: pressure-redistributing mattress utilized  Skin Protection: silicone foam dressing in place     Problem: Fall Injury Risk  Goal: Absence of Fall and Fall-Related  Injury  Intervention: Identify and Manage Contributors  Recent Flowsheet Documentation  Taken 7/10/2025 1450 by Mark Savage RN  Medication Review/Management: medications reviewed  Intervention: Promote Injury-Free Environment  Recent Flowsheet Documentation  Taken 7/10/2025 1600 by Mark Savage RN  Safety Promotion/Fall Prevention: safety round/check completed  Taken 7/10/2025 1450 by Mark Savage RN  Safety Promotion/Fall Prevention: safety round/check completed     Problem: Violence Risk or Actual  Goal: Anger and Impulse Control  Intervention: Minimize Safety Risk  Recent Flowsheet Documentation  Taken 7/10/2025 1450 by Mark Savage RN  Enhanced Safety Measures: room near unit station     Problem: Acute Kidney Injury/Impairment  Goal: Effective Renal Function  Intervention: Monitor and Support Renal Function  Recent Flowsheet Documentation  Taken 7/10/2025 1450 by Mark Savage RN  Medication Review/Management: medications reviewed   Goal Outcome Evaluation: Safety maintained, call light in reach. Complaints of pain relieved by pain medication. TELLE: SA 86-90 (6r) pvc (up to 180's)

## 2025-07-10 NOTE — PROGRESS NOTES
[facility] Medicine Services  INPATIENT PROGRESS NOTE    Patient Name: Lindsay Laughlin  Date of Admission: 7/3/2025  Today's Date: 07/10/25  Length of Stay: 7  Primary Care Physician: Provider, No Known    Subjective   Chief Complaint: Dehydration, vomiting and diarrhea  Abdominal Pain     69-year-old female with history of diabetes mellitus type II, hypertension, hyperlipidemia, chronic kidney disease stage 3b.  Admitted to the hospital 7/5/2025, initially to the intensive care unit;  she presented to the hospital complaining of nausea, vomiting and diarrhea for at least 5 days.  No rectal bleeding or melena reported.  Had a fall in the morning prior to admission, due to generalized weakness.  Patient found to have severe hypokalemia and hypomagnesemia;   As well as acute kidney injury/renal insufficiency, consider secondary to intravascular volume depletion and possible acute tubular necrosis from chronic nonsteroidal anti-inflammatory use.  Patient was started on hemodialysis during this hospital admission on 7/8/2025.  She had a new permacath placed to right internal jugular vein by vascular surgery on 7/7/2025.  The patient has history of paroxysmal atrial fibrillation, wide-complex tachycardia with cardiac arrest.  She was evaluated by EP cardiology and is started on amiodarone. No ICD implant recommended at this time.    I started taking care of this patient on 7/9/2025;  I evaluated patient while having hemodialysis.    Dialysis day #3.  She went into atrial fibrillation with RVR while in dialysis.        Review of Systems   Gastrointestinal:  Positive for abdominal pain.      All pertinent negatives and positives are as above. All other systems have been reviewed and are negative unless otherwise stated.     Objective    Temp:  [97.3 °F (36.3 °C)-98.1 °F (36.7 °C)] 98 °F (36.7 °C)  Heart Rate:  [68-78] 78  Resp:  [18] 18  BP: ()/(36-91) 121/91  Physical  Exam  Constitutional:       Appearance: Chronically ill-appearing, alert oriented x 3, no respiratory distress.  HENT:      Head: Normocephalic and atraumatic.      Nose: Nose normal.      Mouth/Throat:      Mouth: Mucous membranes are moist.   Eyes:      Extraocular Movements: Extraocular movements intact.      Conjunctiva/sclera: Conjunctivae normal.      Pupils: Pupils are equal, round, and reactive to light.   Cardiovascular:      Rate and Rhythm: irregular rhythm.      Pulses: Normal pulses.   Pulmonary:      Effort: No respiratory distress.      Breath sounds: Normal breath sounds.  Abdominal:      General: Abdomen is flat. Bowel sounds are normal.      Palpations: Abdomen is soft.   Extremities:  No lower extremity edema.  Skin:     Capillary Refill: Capillary refill takes less than 2 seconds.      Coloration: Skin is not jaundiced.   Neurological:      General: No focal deficit present.      Mental Status: Patient is alert, oriented to place time and person.     Sensory: No sensory deficit.           Results Review:  I have reviewed the labs, radiology results, and diagnostic studies.    Laboratory Data:   Results from last 7 days   Lab Units 07/10/25  0346 07/09/25  1008 07/08/25  0242   WBC 10*3/mm3 12.13* 10.57 9.77   HEMOGLOBIN g/dL 10.4* 10.8* 10.5*   HEMATOCRIT % 30.9* 30.5* 29.9*   PLATELETS 10*3/mm3 155 122* 170        Results from last 7 days   Lab Units 07/10/25  0346 07/09/25  1231 07/08/25  0242 07/07/25  2125 07/07/25  1053 07/06/25  0356   SODIUM mmol/L 137 139 138  --  137 134*   POTASSIUM mmol/L 5.0 3.7 3.9   < > 3.5 3.0*   CHLORIDE mmol/L 102 100 109*  --  108* 104   CO2 mmol/L 25.0 26.0 13.0*  --  16.0* 15.0*   BUN mg/dL 13.5 9.6 34.4*  --  36.5* 36.5*   CREATININE mg/dL 2.02* 1.51* 4.19*  --  4.19* 4.65*   CALCIUM mg/dL 7.9* 7.8* 7.1*  --  6.8* 6.7*   BILIRUBIN mg/dL  --  0.6  --   --  0.2 0.2   ALK PHOS U/L  --  126*  --   --  88 87   ALT (SGPT) U/L  --  6  --   --  28 32   AST (SGOT) U/L   --  20  --   --  28 61*   GLUCOSE mg/dL 143* 113* 164*  --  193* 208*    < > = values in this interval not displayed.       Culture Data:   Blood Culture   Date Value Ref Range Status   07/03/2025 No growth at 5 days  Final   07/03/2025 No growth at 5 days  Final       Radiology Data:   Imaging Results (Last 24 Hours)       ** No results found for the last 24 hours. **            I have reviewed the patient's current medications.     Assessment/Plan   Assessment  Active Hospital Problems    Diagnosis     **DAISY (acute kidney injury)     Enteritis        Acute kidney injury/renal insufficiency requiring hemodialysis   Paroxysmal atrial fibrillation  Wide-complex tachycardia possible SVT with aberrant conduction  Hypertension  Hyperlipidemia  Acute enteritis, resolved  Chronic cardiomyopathy with diastolic dysfunction.      A1C 9.0    Sodium 137 potassium 4.0.  Creatinine 2.00.  Glucose 143.  Phosphorus 2.1.  Magnesium 2.2  Hemoglobin 10.4, platelet count 155,000.      7/5/2025 echocardiogram    Left ventricular systolic function is normal. Left ventricular ejection fraction appears to be 66 - 70%.    Left ventricular diastolic function is consistent with (grade Ia w/high LAP) impaired relaxation.    There is mild calcification of the aortic valve.    Mild aortic insufficiency    Estimated right ventricular systolic pressure from tricuspid regurgitation is moderately elevated (45-55 mmHg).    Moderate pulmonary hypertension is present.      Treatment Plan  Patient is having hemodialysis as per nephrology recommendations, currently day #3.  She will continue hemodialysis tomorrow as per specialist orders.  Daily laboratory tests.    Avoid nephrotoxic medications.    Start Cardizem drip due to atrial fibrillation with rapid regular response.  Continue amiodarone as per specialist recommendations.    Continue Eliquis 5 mg p.o. 12 hours.    Replace electrolytes as per protocol.    Continue insulin sliding scale.    Will  follow recommendations by nephrology regarding long-term dialysis and outpatient arrangements.    With a new Cardizem drip due to A-fib with RVR, patient will be transferred to fourth floor, as medication cannot be titrated in the third floor.        Medical Decision Making  Number and Complexity of problems: 8, moderate to high    Differential Diagnosis: see above    Conditions and Status        Condition is unchanged.     [facility] Data  External documents reviewed: no  Cardiac tracing (EKG, telemetry) interpretation: no new  Radiology interpretation: no new  Labs reviewed: yes  Any tests that were considered but not ordered: no     Decision rules/scores evaluated (example [id number], Wells, etc): see chart     Discussed with: patient     Care Planning  Shared decision making: patient  Code status and discussions: FC    Disposition  Social Determinants of Health that impact treatment or disposition: none  I expect the patient to be discharged to SNF once approved.      Electronically signed by Juan J Garcia MD, 07/10/25, 11:38 CDT.

## 2025-07-10 NOTE — CASE MANAGEMENT/SOCIAL WORK
Continued Stay Note   Foreign     Patient Name: Lindsay Laughlin  MRN: [id number]  Today's Date: 7/10/2025    Admit Date: 7/3/2025    Plan: SNF Referrals   Discharge Plan       Row Name 07/10/25 1553       Plan    Plan SNF Referrals    Patient/Family in Agreement with Plan yes    Plan Comments Correction, [facility] has offered a bed. [place] and [facility] reviewing still. Pt has been moved to  so will discuss with her to see if she accepts. HD chair time was arranged but time too early for public transportation so have requested [facility] to change the time.                   Discharge Codes    No documentation.                 Expected Discharge Date and Time       Expected Discharge Date Expected Discharge Time    Jul 11, 2025               ELIJAH Chapman

## 2025-07-10 NOTE — PROGRESS NOTES
Nephrology ([facility]) Progress Note      Patient:  Lindsay Laughlin  YOB: 1955  Date of Service: 7/10/2025  MRN: [id number]   Acct: [id number]   Primary Care Physician: Provider, No Known  Advance Directive:   Code Status and Medical Interventions: CPR (Attempt to Resuscitate); Full Support; Discussed CODE STATUS with Mr. Laughlin.  Patient is ,  is in a nursing home.  She does not have any children.   Ordered at: 07/03/25 2000     Code Status (Patient has no pulse and is not breathing):    CPR (Attempt to Resuscitate)     Medical Interventions (Patient has pulse or is breathing):    Full Support     Comments:    Discussed CODE STATUS with Mr. Laughlin.  Patient is ,  is in a nursing home.  She does not have any children.     Level Of Support Discussed With:    Patient     Admit Date: 7/3/2025       Hospital Day: 7  Referring Provider: Shade Alberts MD      Patient personally seen and examined.  Complete chart including Consults, Notes, Operative Reports, Labs, Cardiology, and Radiology studies reviewed as able.        Subjective:  Lindsay Laughlin is a 69 y.o. female for whom we were consulted for evaluation and treatment of acute kidney injury. Baseline chronic kidney disease stage 3. Does not follow with nephrology. History of type 2 diabetes, hypertension, GERD, prior colectomy done at [facility] for unknown reason. Presented to ER with several days of nausea, vomiting and diarrhea. Unable to maintain PO intake. Has also been using NSAIDs frequently for arthritis pain. Labs on admission showing creatinine 5.0, hypokalemia, hypomagnesemia, metabolic acidosis. Hospital course remarkable for minimal improvement of renal function despite IV fluid resuscitation. Patient agreed to have permcath placed and start dialysis.  Dr Smith placed permcath on 7/07. First dialysis on 7/08 was tolerated well. Additional HD provided 7/09.    Today  is awake and alert. No complaints. Seen during hemodialysis and tolerating well  Dialysis   Patient was seen and evaluated on renal replacement therapy and I have personally evaluated the patient and directed the therapy  Modality: Hemodialysis  Access: Catheter  Location: right upper  QB: 400  QD: 600  UF: up to 2000 as tolerated    Allergies:  Patient has no known allergies.    Home Meds:  Medications Prior to Admission   Medication Sig Dispense Refill Last Dose/Taking    albuterol sulfate  (90 Base) MCG/ACT inhaler Inhale 2 puffs Every 4 (Four) Hours As Needed for Wheezing.       ibuprofen (ADVIL,MOTRIN) 200 MG tablet Take 5 tablets by mouth Every 6 (Six) Hours As Needed for Mild Pain.       ondansetron ODT (ZOFRAN-ODT) 4 MG disintegrating tablet Place 1 tablet on the tongue Every 4 (Four) Hours As Needed for Nausea or Vomiting. 10 tablet 0        Medicines:  Current Facility-Administered Medications   Medication Dose Route Frequency Provider Last Rate Last Admin    acetaminophen (TYLENOL) tablet 650 mg  650 mg Oral Q4H PRN Sterling Smith DO   650 mg at 07/03/25 2242    Or    acetaminophen (TYLENOL) suppository 650 mg  650 mg Rectal Q4H PRN BickingSterling DO        aluminum-magnesium hydroxide-simethicone (MAALOX MAX) 400-400-40 MG/5ML suspension 15 mL  15 mL Oral Q6H PRN Sterling Smith DO   15 mL at 07/05/25 1331    amiodarone (PACERONE) tablet 400 mg  400 mg Oral Q12H Torres Irizarry MD   400 mg at 07/09/25 2134    apixaban (ELIQUIS) tablet 5 mg  5 mg Oral Q12H Juan J Garcia MD   5 mg at 07/09/25 2134    sennosides-docusate (PERICOLACE) 8.6-50 MG per tablet 2 tablet  2 tablet Oral BID BickingSterling DO        And    polyethylene glycol (MIRALAX) packet 17 g  17 g Oral Daily PRN BicSterling chiu DO        And    bisacodyl (DULCOLAX) EC tablet 5 mg  5 mg Oral Daily PRN BickingSterling DO        And    bisacodyl (DULCOLAX) suppository 10 mg  10 mg Rectal Daily PRN Bicking,  Sterling FLAHERTY DO        calcium carbonate (TUMS) chewable tablet 500 mg (200 mg elemental)  2 tablet Oral TID PRN BickingSterling DO   2 tablet at 07/04/25 2208    Calcium Replacement - Follow Nurse / BPA Driven Protocol   Not Applicable PRN BickingSterling DO        cholecalciferol (VITAMIN D3) tablet 5,000 Units  5,000 Units Oral Daily BickingSterling DO   5,000 Units at 07/09/25 1218    dextrose (D50W) (25 g/50 mL) IV injection 25 g  25 g Intravenous Q15 Min PRN BicSterling chiu DO        dextrose (GLUTOSE) oral gel 15 g  15 g Oral Q15 Min PRN BickingSterling DO        glucagon (GLUCAGEN) injection 1 mg  1 mg Intramuscular Q15 Min PRN BicSterling chiu DO        heparin (porcine) injection 3,600 Units  3,600 Units Intracatheter PRN Javier Patterson MD   3,600 Units at 07/09/25 1128    HYDROcodone-acetaminophen (NORCO) 5-325 MG per tablet 1 tablet  1 tablet Oral Q6H PRN BicSterling chiu DO   1 tablet at 07/09/25 1218    HYDROmorphone (DILAUDID) injection 0.25 mg  0.25 mg Intravenous Q1H PRN BicSterling chiu DO        And    naloxone (NARCAN) injection 0.4 mg  0.4 mg Intravenous Q5 Min PRN BickingSterling DO        insulin regular (humuLIN R,novoLIN R) injection 2-7 Units  2-7 Units Subcutaneous Q6H BicSterling hciu DO   2 Units at 07/10/25 0026    Magnesium Standard Dose Replacement - Follow Nurse / BPA Driven Protocol   Not Applicable PRN BicSterling chiu DO        metoprolol tartrate (LOPRESSOR) tablet 25 mg  25 mg Oral Q12H Harpal Proctor MD   25 mg at 07/09/25 2135    nitroglycerin (NITROSTAT) SL tablet 0.4 mg  0.4 mg Sublingual Q5 Min PRN BicSterling chiu DO        ondansetron ODT (ZOFRAN-ODT) disintegrating tablet 4 mg  4 mg Oral Q6H PRN BicSterling chiu DO        Or    ondansetron (ZOFRAN) injection 4 mg  4 mg Intravenous Q6H PRN Sterling Smith DO        pantoprazole (PROTONIX) EC tablet 40 mg  40 mg Oral BID AC Sterling Smith DO   40 mg at  25 1631    potassium chloride (KLOR-CON M20) CR tablet 40 mEq  40 mEq Oral TID Bicking, Sterling K, DO   40 mEq at 25    Potassium Replacement - Follow Nurse / BPA Driven Protocol   Not Applicable PRN Bicking, Sterling K, DO        sodium bicarbonate tablet 1,300 mg  1,300 mg Oral TID Bicking, Sterling K, DO   1,300 mg at 25    sodium chloride 0.9 % flush 10 mL  10 mL Intravenous PRN Bicking, Sterling K, DO        sodium chloride 0.9 % flush 10 mL  10 mL Intravenous Q12H Bicking, Sterling K, DO   10 mL at 25    sodium chloride 0.9 % flush 10 mL  10 mL Intravenous PRN Bicking, Sterling K, DO        sodium chloride 0.9 % infusion 40 mL  40 mL Intravenous PRN Bicking, Sterling K, DO           Past Medical History:  Past Medical History:   Diagnosis Date    Acid reflux     Arthritis     High cholesterol     Hypertension     Neuropathy     Swollen lymph nodes     LEFT CERVICAL    Type 2 diabetes mellitus        Past Surgical History:  Past Surgical History:   Procedure Laterality Date    CARPAL TUNNEL RELEASE Bilateral     CERVICAL LYMPH NODE BIOPSY/EXCISION Left 2017    Procedure: LEFT NECK EXPLORATION WITH INCISIONAL BIOPSY/CULTURE;  Surgeon: Blue Sanchez MD;  Location: [facility] OR;  Service:      SECTION      COLON RESECTION      COLONOSCOPY N/A 2023    Procedure: COLONOSCOPY WITH ANESTHESIA;  Surgeon: Tamy Smith MD;  Location: [facility] ENDOSCOPY;  Service: Gastroenterology;  Laterality: N/A;  preop; abnormal GI scan   postop ; poor prep   PCP Ruth Noble MD    ENDOSCOPY N/A 2023    Procedure: ESOPHAGOGASTRODUODENOSCOPY WITH ANESTHESIA;  Surgeon: Tamy Smith MD;  Location: [facility] ENDOSCOPY;  Service: Gastroenterology;  Laterality: N/A;  preop; abnormal GI scan   postop esophagitis ; hiatal hernia  PCP Ruth Noble MD    EXCISION LESION      From back    INSERTION HEMODIALYSIS CATHETER Right 2025    Procedure:  OR HEMODIALYSIS CATHETER INSERTION;  Surgeon: Sterling Smith DO;  Location:  [facility];  Service: Vascular;  Laterality: Right;    REPLACEMENT TOTAL KNEE Left     TONSILLECTOMY         Family History  Family History   Problem Relation Age of Onset    No Known Problems Mother     Colon cancer Father         < 60 years old    Breast cancer Neg Hx        Social History  Social History     Socioeconomic History    Marital status:    Tobacco Use    Smoking status: Never    Smokeless tobacco: Never   Vaping Use    Vaping status: Never Used   Substance and Sexual Activity    Alcohol use: No    Drug use: No    Sexual activity: Defer       Review of Systems:  History obtained from chart review and the patient  General ROS: No fever or chills  Respiratory ROS: No cough, shortness of breath, wheezing  Cardiovascular ROS: No chest pain or palpitations  Gastrointestinal ROS: No abdominal pain or melena  Genito-Urinary ROS: No dysuria or hematuria  Psych ROS: No anxiety and depression  14 point ROS reviewed with the patient and negative except as noted above and in the HPI unless unable to obtain.    Objective:  Patient Vitals for the past 24 hrs:   BP Temp Temp src Pulse Resp SpO2 Weight   07/10/25 0726 121/91 98 °F (36.7 °C) Oral 78 18 98 % --   07/10/25 0305 93/54 97.3 °F (36.3 °C) Oral 70 18 96 % 92.6 kg (204 lb 2.3 oz)   07/09/25 1914 (!) 104/36 98.1 °F (36.7 °C) Oral 70 18 98 % --   07/09/25 1537 122/47 97.6 °F (36.4 °C) Oral 68 18 98 % --   07/09/25 1200 114/55 97.4 °F (36.3 °C) Oral 75 18 99 % --       Intake/Output Summary (Last 24 hours) at 7/10/2025 0955  Last data filed at 7/9/2025 1128  Gross per 24 hour   Intake --   Output 2000 ml   Net -2000 ml     General: awake/alert   Chest:  clear to auscultation bilaterally without respiratory distress  CVS: regular rate and rhythm  Abdominal: soft, nontender, positive bowel sounds  Extremities: no cyanosis or edema  Skin: warm and dry without  rash      Labs:  Results from last 7 days   Lab Units 07/10/25  0346 07/09/25  1008 07/08/25  0242   WBC 10*3/mm3 12.13* 10.57 9.77   HEMOGLOBIN g/dL 10.4* 10.8* 10.5*   HEMATOCRIT % 30.9* 30.5* 29.9*   PLATELETS 10*3/mm3 155 122* 170         Results from last 7 days   Lab Units 07/10/25  0346 07/09/25  1231 07/08/25  0242 07/07/25  2125 07/07/25  1053 07/06/25  0356   SODIUM mmol/L 137 139 138  --  137 134*   POTASSIUM mmol/L 5.0 3.7 3.9   < > 3.5 3.0*   CHLORIDE mmol/L 102 100 109*  --  108* 104   CO2 mmol/L 25.0 26.0 13.0*  --  16.0* 15.0*   BUN mg/dL 13.5 9.6 34.4*  --  36.5* 36.5*   CREATININE mg/dL 2.02* 1.51* 4.19*  --  4.19* 4.65*   CALCIUM mg/dL 7.9* 7.8* 7.1*  --  6.8* 6.7*   EGFR mL/min/1.73 26.1* 37.0* 10.9*  --  10.9* 9.6*   BILIRUBIN mg/dL  --  0.6  --   --  0.2 0.2   ALK PHOS U/L  --  126*  --   --  88 87   ALT (SGPT) U/L  --  6  --   --  28 32   AST (SGOT) U/L  --  20  --   --  28 61*   GLUCOSE mg/dL 143* 113* 164*  --  193* 208*    < > = values in this interval not displayed.       Radiology:   Imaging Results (Last 72 Hours)       Procedure Component Value Units Date/Time    IR Ins Tunneled Dialysis Catheter WO Port [[id number]] Collected: 07/08/25 0756     Updated: 07/08/25 1614    Narrative:      Performed by Dr. Smith. Please see procedure note.     This report was signed and finalized on 7/8/2025 4:11 PM by Dr. Sterling Smith MD.       FL C Arm During Surgery [[id number]] Collected: 07/08/25 0756     Updated: 07/08/25 1614    Narrative:      Performed by Dr. Smith. Please see procedure note.     This report was signed and finalized on 7/8/2025 4:11 PM by Dr. Sterling Smith MD.               Culture:  Blood Culture   Date Value Ref Range Status   07/03/2025 No growth at 3 days  Preliminary   07/03/2025 No growth at 3 days  Preliminary         Assessment    Acute kidney injury, ATN--now on dialysis  Baseline chronic kidney disease stage 3  Type 2 diabetes  Hypertension  NSAID  use  Hypokalemia--improved  Hypomagnesemia  Anemia with iron deficiency  Metabolic acidosis--improved  Gastroenteritis     Plan:  Dialysis #3 today  Hold PO potassium supplement  Working on outpatient dialysis arrangements      Sudhir Erwin, NATTY  7/10/2025  09:55 CDT

## 2025-07-11 LAB
ANION GAP SERPL CALCULATED.3IONS-SCNC: 10 MMOL/L (ref 5–15)
BH BB BLOOD EXPIRATION DATE: NORMAL
BH BB BLOOD EXPIRATION DATE: NORMAL
BH BB BLOOD TYPE BARCODE: 5100
BH BB BLOOD TYPE BARCODE: 6200
BH BB DISPENSE STATUS: NORMAL
BH BB DISPENSE STATUS: NORMAL
BH BB PRODUCT CODE: NORMAL
BH BB PRODUCT CODE: NORMAL
BH BB UNIT NUMBER: NORMAL
BH BB UNIT NUMBER: NORMAL
BUN SERPL-MCNC: 10.1 MG/DL (ref 8–23)
BUN/CREAT SERPL: 7 (ref 7–25)
CALCIUM SPEC-SCNC: 7.9 MG/DL (ref 8.6–10.5)
CHLORIDE SERPL-SCNC: 100 MMOL/L (ref 98–107)
CO2 SERPL-SCNC: 26 MMOL/L (ref 22–29)
CREAT SERPL-MCNC: 1.45 MG/DL (ref 0.57–1)
CROSSMATCH INTERPRETATION: NORMAL
CROSSMATCH INTERPRETATION: NORMAL
DEPRECATED RDW RBC AUTO: 74.5 FL (ref 37–54)
EGFRCR SERPLBLD CKD-EPI 2021: 38.9 ML/MIN/1.73
ERYTHROCYTE [DISTWIDTH] IN BLOOD BY AUTOMATED COUNT: 20.7 % (ref 12.3–15.4)
GLUCOSE BLDC GLUCOMTR-MCNC: 161 MG/DL (ref 70–130)
GLUCOSE BLDC GLUCOMTR-MCNC: 203 MG/DL (ref 70–130)
GLUCOSE BLDC GLUCOMTR-MCNC: 204 MG/DL (ref 70–130)
GLUCOSE BLDC GLUCOMTR-MCNC: 257 MG/DL (ref 70–130)
GLUCOSE SERPL-MCNC: 298 MG/DL (ref 65–99)
HCT VFR BLD AUTO: 30.8 % (ref 34–46.6)
HGB BLD-MCNC: 9.9 G/DL (ref 12–15.9)
MAGNESIUM SERPL-MCNC: 2.1 MG/DL (ref 1.6–2.4)
MCH RBC QN AUTO: 31.7 PG (ref 26.6–33)
MCHC RBC AUTO-ENTMCNC: 32.1 G/DL (ref 31.5–35.7)
MCV RBC AUTO: 98.7 FL (ref 79–97)
PHOSPHATE SERPL-MCNC: 2.2 MG/DL (ref 2.5–4.5)
PHOSPHATE SERPL-MCNC: 3.4 MG/DL (ref 2.5–4.5)
PLATELET # BLD AUTO: 152 10*3/MM3 (ref 140–450)
PMV BLD AUTO: 11.3 FL (ref 6–12)
POTASSIUM SERPL-SCNC: 4.3 MMOL/L (ref 3.5–5.2)
QT INTERVAL: 388 MS
QTC INTERVAL: 461 MS
RBC # BLD AUTO: 3.12 10*6/MM3 (ref 3.77–5.28)
SODIUM SERPL-SCNC: 136 MMOL/L (ref 136–145)
UNIT  ABO: NORMAL
UNIT  ABO: NORMAL
UNIT  RH: NORMAL
UNIT  RH: NORMAL
WBC NRBC COR # BLD AUTO: 10.47 10*3/MM3 (ref 3.4–10.8)

## 2025-07-11 PROCEDURE — 83735 ASSAY OF MAGNESIUM: CPT | Performed by: STUDENT IN AN ORGANIZED HEALTH CARE EDUCATION/TRAINING PROGRAM

## 2025-07-11 PROCEDURE — 97110 THERAPEUTIC EXERCISES: CPT

## 2025-07-11 PROCEDURE — 82948 REAGENT STRIP/BLOOD GLUCOSE: CPT

## 2025-07-11 PROCEDURE — 84100 ASSAY OF PHOSPHORUS: CPT | Performed by: SURGERY

## 2025-07-11 PROCEDURE — 63710000001 INSULIN GLARGINE PER 5 UNITS: Performed by: FAMILY MEDICINE

## 2025-07-11 PROCEDURE — 84100 ASSAY OF PHOSPHORUS: CPT | Performed by: FAMILY MEDICINE

## 2025-07-11 PROCEDURE — 85027 COMPLETE CBC AUTOMATED: CPT | Performed by: STUDENT IN AN ORGANIZED HEALTH CARE EDUCATION/TRAINING PROGRAM

## 2025-07-11 PROCEDURE — 80048 BASIC METABOLIC PNL TOTAL CA: CPT | Performed by: STUDENT IN AN ORGANIZED HEALTH CARE EDUCATION/TRAINING PROGRAM

## 2025-07-11 PROCEDURE — 97530 THERAPEUTIC ACTIVITIES: CPT

## 2025-07-11 PROCEDURE — 63710000001 INSULIN REGULAR HUMAN PER 5 UNITS: Performed by: FAMILY MEDICINE

## 2025-07-11 PROCEDURE — 82948 REAGENT STRIP/BLOOD GLUCOSE: CPT | Performed by: FAMILY MEDICINE

## 2025-07-11 PROCEDURE — 25810000003 SODIUM CHLORIDE 0.9 % SOLUTION: Performed by: FAMILY MEDICINE

## 2025-07-11 RX ADMIN — DOCUSATE SODIUM 50 MG AND SENNOSIDES 8.6 MG 2 TABLET: 8.6; 5 TABLET, FILM COATED ORAL at 09:45

## 2025-07-11 RX ADMIN — INSULIN HUMAN 3 UNITS: 100 INJECTION, SOLUTION PARENTERAL at 09:50

## 2025-07-11 RX ADMIN — Medication 10 ML: at 09:51

## 2025-07-11 RX ADMIN — Medication 5000 UNITS: at 09:44

## 2025-07-11 RX ADMIN — PANTOPRAZOLE SODIUM 40 MG: 40 TABLET, DELAYED RELEASE ORAL at 09:44

## 2025-07-11 RX ADMIN — HYDROCODONE BITARTRATE AND ACETAMINOPHEN 1 TABLET: 5; 325 TABLET ORAL at 01:21

## 2025-07-11 RX ADMIN — APIXABAN 5 MG: 5 TABLET, FILM COATED ORAL at 22:37

## 2025-07-11 RX ADMIN — Medication 10 ML: at 23:01

## 2025-07-11 RX ADMIN — SODIUM CHLORIDE 15 MMOL: 9 INJECTION, SOLUTION INTRAVENOUS at 09:50

## 2025-07-11 RX ADMIN — APIXABAN 5 MG: 5 TABLET, FILM COATED ORAL at 09:43

## 2025-07-11 RX ADMIN — INSULIN HUMAN 2 UNITS: 100 INJECTION, SOLUTION PARENTERAL at 22:36

## 2025-07-11 RX ADMIN — AMIODARONE HYDROCHLORIDE 400 MG: 200 TABLET ORAL at 22:37

## 2025-07-11 RX ADMIN — DOCUSATE SODIUM 50 MG AND SENNOSIDES 8.6 MG 2 TABLET: 8.6; 5 TABLET, FILM COATED ORAL at 22:37

## 2025-07-11 RX ADMIN — INSULIN HUMAN 4 UNITS: 100 INJECTION, SOLUTION PARENTERAL at 12:17

## 2025-07-11 RX ADMIN — PANTOPRAZOLE SODIUM 40 MG: 40 TABLET, DELAYED RELEASE ORAL at 17:00

## 2025-07-11 RX ADMIN — INSULIN HUMAN 3 UNITS: 100 INJECTION, SOLUTION PARENTERAL at 17:00

## 2025-07-11 RX ADMIN — METOPROLOL TARTRATE 25 MG: 25 TABLET, FILM COATED ORAL at 22:38

## 2025-07-11 RX ADMIN — HYDROCODONE BITARTRATE AND ACETAMINOPHEN 1 TABLET: 5; 325 TABLET ORAL at 15:34

## 2025-07-11 RX ADMIN — SODIUM BICARBONATE 650 MG: 650 TABLET ORAL at 22:37

## 2025-07-11 RX ADMIN — INSULIN GLARGINE 5 UNITS: 100 INJECTION, SOLUTION SUBCUTANEOUS at 12:17

## 2025-07-11 RX ADMIN — METOPROLOL TARTRATE 25 MG: 25 TABLET, FILM COATED ORAL at 09:44

## 2025-07-11 RX ADMIN — AMIODARONE HYDROCHLORIDE 400 MG: 200 TABLET ORAL at 09:44

## 2025-07-11 RX ADMIN — HYDROCODONE BITARTRATE AND ACETAMINOPHEN 1 TABLET: 5; 325 TABLET ORAL at 09:43

## 2025-07-11 RX ADMIN — SODIUM BICARBONATE 650 MG: 650 TABLET ORAL at 09:44

## 2025-07-11 RX ADMIN — SODIUM BICARBONATE 650 MG: 650 TABLET ORAL at 15:35

## 2025-07-11 NOTE — PROGRESS NOTES
Nephrology ([facility]) Progress Note      Patient:  Lindsay Laughlin  YOB: 1955  Date of Service: 7/11/2025  MRN: [id number]   Acct: [id number]   Primary Care Physician: Provider, No Known  Advance Directive:   Code Status and Medical Interventions: CPR (Attempt to Resuscitate); Full Support; Discussed CODE STATUS with Mr. Laughlin.  Patient is ,  is in a nursing home.  She does not have any children.   Ordered at: 07/03/25 2000     Code Status (Patient has no pulse and is not breathing):    CPR (Attempt to Resuscitate)     Medical Interventions (Patient has pulse or is breathing):    Full Support     Comments:    Discussed CODE STATUS with Mr. Laughlin.  Patient is ,  is in a nursing home.  She does not have any children.     Level Of Support Discussed With:    Patient     Admit Date: 7/3/2025       Hospital Day: 8  Referring Provider: Shade Alberts MD      Patient personally seen and examined.  Complete chart including Consults, Notes, Operative Reports, Labs, Cardiology, and Radiology studies reviewed as able.        Subjective:  Lindsay Laughlin is a 69 y.o. female for whom we were consulted for evaluation and treatment of acute kidney injury. Baseline chronic kidney disease stage 3. Does not follow with nephrology. History of type 2 diabetes, hypertension, GERD, prior colectomy done at [facility] for unknown reason. Presented to ER with several days of nausea, vomiting and diarrhea. Unable to maintain PO intake. Has also been using NSAIDs frequently for arthritis pain. Labs on admission showing creatinine 5.0, hypokalemia, hypomagnesemia, metabolic acidosis. Hospital course remarkable for minimal improvement of renal function despite IV fluid resuscitation. Patient agreed to have permcath placed and start dialysis.  Dr Smith placed permcath on 7/07. First dialysis on 7/08 was tolerated well. Additional HD provided 7/09 and 7/10.   After dialysis on 7/10 she did have some RVR with her chronic atrial fibrillation    Today is awake and alert. No complaints. No new overnight issues.      Allergies:  Patient has no known allergies.    Home Meds:  Medications Prior to Admission   Medication Sig Dispense Refill Last Dose/Taking    albuterol sulfate  (90 Base) MCG/ACT inhaler Inhale 2 puffs Every 4 (Four) Hours As Needed for Wheezing.       ibuprofen (ADVIL,MOTRIN) 200 MG tablet Take 5 tablets by mouth Every 6 (Six) Hours As Needed for Mild Pain.       ondansetron ODT (ZOFRAN-ODT) 4 MG disintegrating tablet Place 1 tablet on the tongue Every 4 (Four) Hours As Needed for Nausea or Vomiting. 10 tablet 0        Medicines:  Current Facility-Administered Medications   Medication Dose Route Frequency Provider Last Rate Last Admin    acetaminophen (TYLENOL) tablet 650 mg  650 mg Oral Q4H PRN Sterling Smith DO   650 mg at 07/03/25 2242    Or    acetaminophen (TYLENOL) suppository 650 mg  650 mg Rectal Q4H PRN Sterling Smith DO        aluminum-magnesium hydroxide-simethicone (MAALOX MAX) 400-400-40 MG/5ML suspension 15 mL  15 mL Oral Q6H PRN Sterling Smith DO   15 mL at 07/05/25 1331    amiodarone (PACERONE) tablet 400 mg  400 mg Oral Q12H Torres Irizarry MD   400 mg at 07/11/25 0944    apixaban (ELIQUIS) tablet 5 mg  5 mg Oral Q12H Juan J Garcia MD   5 mg at 07/11/25 0943    sennosides-docusate (PERICOLACE) 8.6-50 MG per tablet 2 tablet  2 tablet Oral BID Sterling Smith DO   2 tablet at 07/11/25 0945    And    polyethylene glycol (MIRALAX) packet 17 g  17 g Oral Daily PRN Sterling Smith DO        And    bisacodyl (DULCOLAX) EC tablet 5 mg  5 mg Oral Daily PRN Sterling Smith DO        And    bisacodyl (DULCOLAX) suppository 10 mg  10 mg Rectal Daily PRN Sterling Smith DO        calcium carbonate (TUMS) chewable tablet 500 mg (200 mg elemental)  2 tablet Oral TID PRN Sterling Smith DO   2 tablet at 07/04/25  2208    Calcium Replacement - Follow Nurse / BPA Driven Protocol   Not Applicable PRN BicSterling chiu DO        cholecalciferol (VITAMIN D3) tablet 5,000 Units  5,000 Units Oral Daily Sterling Smith DO   5,000 Units at 07/11/25 0944    dextrose (D50W) (25 g/50 mL) IV injection 25 g  25 g Intravenous Q15 Min PRN Sterling Smith DO        dextrose (GLUTOSE) oral gel 15 g  15 g Oral Q15 Min PRN Sterling Smith DO        dilTIAZem (CARDIZEM) 125 mg in 125 mL D5W infusion  5-15 mg/hr Intravenous Titrated Juan J Garcia MD   Held at 07/10/25 1528    glucagon (GLUCAGEN) injection 1 mg  1 mg Intramuscular Q15 Min PRN Sterling Smith DO        heparin (porcine) injection 3,600 Units  3,600 Units Intracatheter PRN Javier Patterson MD   3,600 Units at 07/10/25 1343    HYDROcodone-acetaminophen (NORCO) 5-325 MG per tablet 1 tablet  1 tablet Oral Q6H PRN Sterling Smith DO   1 tablet at 07/11/25 0943    insulin regular (humuLIN R,novoLIN R) injection 2-7 Units  2-7 Units Subcutaneous 4x Daily AC & at Bedtime Juan J Garcia MD        Magnesium Standard Dose Replacement - Follow Nurse / BPA Driven Protocol   Not Applicable PRN Sterling Smith DO        metoprolol tartrate (LOPRESSOR) tablet 25 mg  25 mg Oral Q12H Harpal Proctor MD   25 mg at 07/11/25 0944    nitroglycerin (NITROSTAT) SL tablet 0.4 mg  0.4 mg Sublingual Q5 Min PRN Sterling Smith DO        ondansetron ODT (ZOFRAN-ODT) disintegrating tablet 4 mg  4 mg Oral Q6H PRN Sterling Smith DO        Or    ondansetron (ZOFRAN) injection 4 mg  4 mg Intravenous Q6H PRN BicSterling chiu DO        pantoprazole (PROTONIX) EC tablet 40 mg  40 mg Oral BID AC BickingSterling DO   40 mg at 07/11/25 0944    Phosphorus Replacement - Follow Nurse / BPA Driven Protocol   Not Applicable PRN Juan J Garcia MD        Potassium Replacement - Follow Nurse / BPA Driven Protocol   Not Applicable PRN Sterling Smith, DO         sodium bicarbonate tablet 650 mg  650 mg Oral TID Sudhir Erwin, APRN   650 mg at 25 0944    sodium chloride 0.9 % flush 10 mL  10 mL Intravenous PRN Sterling Smith, DO        sodium chloride 0.9 % flush 10 mL  10 mL Intravenous Q12H Sterling Smith K, DO   10 mL at 07/10/25 2145    sodium chloride 0.9 % flush 10 mL  10 mL Intravenous PRN BicSterling chiu K, DO        sodium chloride 0.9 % infusion 40 mL  40 mL Intravenous PRN BicSterling chiu K, DO        sodium phosphates 15 mmol in 250 mL 0.9% sodium chloride IVPB  15 mmol Intravenous Once Juan J Garcia MD           Past Medical History:  Past Medical History:   Diagnosis Date    Acid reflux     Arthritis     High cholesterol     Hypertension     Neuropathy     Swollen lymph nodes     LEFT CERVICAL    Type 2 diabetes mellitus        Past Surgical History:  Past Surgical History:   Procedure Laterality Date    CARPAL TUNNEL RELEASE Bilateral     CERVICAL LYMPH NODE BIOPSY/EXCISION Left 2017    Procedure: LEFT NECK EXPLORATION WITH INCISIONAL BIOPSY/CULTURE;  Surgeon: Blue Sanchez MD;  Location: [facility] OR;  Service:      SECTION      COLON RESECTION      COLONOSCOPY N/A 2023    Procedure: COLONOSCOPY WITH ANESTHESIA;  Surgeon: Tamy Smith MD;  Location: [facility] ENDOSCOPY;  Service: Gastroenterology;  Laterality: N/A;  preop; abnormal GI scan   postop ; poor prep   PCP Ruth Noble MD    ENDOSCOPY N/A 2023    Procedure: ESOPHAGOGASTRODUODENOSCOPY WITH ANESTHESIA;  Surgeon: Tamy Smith MD;  Location: [facility] ENDOSCOPY;  Service: Gastroenterology;  Laterality: N/A;  preop; abnormal GI scan   postop esophagitis ; hiatal hernia  PCP Ruth Noble MD    EXCISION LESION      From back    INSERTION HEMODIALYSIS CATHETER Right 2025    Procedure: OR HEMODIALYSIS CATHETER INSERTION;  Surgeon: Sterling Smith DO;  Location: [facility] HYBRID OR;  Service: Vascular;  Laterality:  Right;    REPLACEMENT TOTAL KNEE Left     TONSILLECTOMY         Family History  Family History   Problem Relation Age of Onset    No Known Problems Mother     Colon cancer Father         < 60 years old    Breast cancer Neg Hx        Social History  Social History     Socioeconomic History    Marital status:    Tobacco Use    Smoking status: Never    Smokeless tobacco: Never   Vaping Use    Vaping status: Never Used   Substance and Sexual Activity    Alcohol use: No    Drug use: No    Sexual activity: Defer       Review of Systems:  History obtained from chart review and the patient  General ROS: No fever or chills  Respiratory ROS: No cough, shortness of breath, wheezing  Cardiovascular ROS: No chest pain or palpitations  Gastrointestinal ROS: No abdominal pain or melena  Genito-Urinary ROS: No dysuria or hematuria  Psych ROS: No anxiety and depression  14 point ROS reviewed with the patient and negative except as noted above and in the HPI unless unable to obtain.    Objective:  Patient Vitals for the past 24 hrs:   BP Temp Temp src Pulse Resp SpO2   07/11/25 0814 136/70 97.7 °F (36.5 °C) Oral 68 20 --   07/11/25 0431 101/70 99.5 °F (37.5 °C) Oral 69 20 90 %   07/10/25 2034 97/58 97.2 °F (36.2 °C) Oral 73 20 90 %   07/10/25 1617 118/71 -- -- (!) 134 -- --   07/10/25 1442 96/51 97.4 °F (36.3 °C) Oral (!) 130 18 98 %     No intake or output data in the 24 hours ending 07/11/25 0948    General: awake/alert   Chest:  clear to auscultation bilaterally without respiratory distress  CVS: regular rate and rhythm  Abdominal: soft, nontender, positive bowel sounds  Extremities: no cyanosis or edema  Skin: warm and dry without rash      Labs:  Results from last 7 days   Lab Units 07/11/25  0431 07/10/25  0346 07/09/25  1008   WBC 10*3/mm3 10.47 12.13* 10.57   HEMOGLOBIN g/dL 9.9* 10.4* 10.8*   HEMATOCRIT % 30.8* 30.9* 30.5*   PLATELETS 10*3/mm3 152 155 122*         Results from last 7 days   Lab Units 07/11/25  0431  07/10/25  0346 07/09/25  1231 07/07/25  2125 07/07/25  1053 07/06/25  0356   SODIUM mmol/L 136 137 139   < > 137 134*   POTASSIUM mmol/L 4.3 5.0 3.7   < > 3.5 3.0*   CHLORIDE mmol/L 100 102 100   < > 108* 104   CO2 mmol/L 26.0 25.0 26.0   < > 16.0* 15.0*   BUN mg/dL 10.1 13.5 9.6   < > 36.5* 36.5*   CREATININE mg/dL 1.45* 2.02* 1.51*   < > 4.19* 4.65*   CALCIUM mg/dL 7.9* 7.9* 7.8*   < > 6.8* 6.7*   EGFR mL/min/1.73 38.9* 26.1* 37.0*   < > 10.9* 9.6*   BILIRUBIN mg/dL  --   --  0.6  --  0.2 0.2   ALK PHOS U/L  --   --  126*  --  88 87   ALT (SGPT) U/L  --   --  6  --  28 32   AST (SGOT) U/L  --   --  20  --  28 61*   GLUCOSE mg/dL 298* 143* 113*   < > 193* 208*    < > = values in this interval not displayed.       Radiology:   Imaging Results (Last 72 Hours)       Procedure Component Value Units Date/Time    IR Ins Tunneled Dialysis Catheter WO Port [[id number]] Collected: 07/08/25 0756     Updated: 07/08/25 1614    Narrative:      Performed by Dr. Smith. Please see procedure note.     This report was signed and finalized on 7/8/2025 4:11 PM by Dr. Sterling Smith MD.       FL C Arm During Surgery [[id number]] Collected: 07/08/25 0756     Updated: 07/08/25 1614    Narrative:      Performed by Dr. Smith. Please see procedure note.     This report was signed and finalized on 7/8/2025 4:11 PM by Dr. Sterling Smith MD.               Culture:  Blood Culture   Date Value Ref Range Status   07/03/2025 No growth at 3 days  Preliminary   07/03/2025 No growth at 3 days  Preliminary         Assessment    Acute kidney injury, ATN--now on dialysis  Baseline chronic kidney disease stage 3  Type 2 diabetes  Hypertension  NSAID use  Hypokalemia--improved  Hypomagnesemia  Anemia with iron deficiency  Metabolic acidosis--improved  Gastroenteritis   Chronic atrial fibrillation    Plan:  Dialysis today to synchronize with outpatient MWF schedule  Hold PO potassium supplement  Working on outpatient discharge  arrangements      Sudhir Erwin, APRN  7/11/2025  09:48 CDT

## 2025-07-11 NOTE — PROGRESS NOTES
[facility] Medicine Services  INPATIENT PROGRESS NOTE    Patient Name: Lindsay Laughlin  Date of Admission: 7/3/2025  Today's Date: 07/11/25  Length of Stay: 8  Primary Care Physician: Provider, No Known    Subjective   Chief Complaint: Dehydration, vomiting and diarrhea  Abdominal Pain     69-year-old female with history of diabetes mellitus type II, hypertension, hyperlipidemia, chronic kidney disease stage 3b.  Admitted to the hospital 7/5/2025, initially to the intensive care unit;  she presented to the hospital complaining of nausea, vomiting and diarrhea for at least 5 days.  No rectal bleeding or melena reported.  Had a fall in the morning prior to admission, due to generalized weakness.  Patient found to have severe hypokalemia and hypomagnesemia;   As well as acute kidney injury/renal insufficiency, consider secondary to intravascular volume depletion and possible acute tubular necrosis from chronic nonsteroidal anti-inflammatory use.  Patient was started on hemodialysis during this hospital admission on 7/8/2025.  She had a new permacath placed to right internal jugular vein by vascular surgery on 7/7/2025.  The patient has history of paroxysmal atrial fibrillation, wide-complex tachycardia with cardiac arrest.  She was evaluated by EP cardiology and is started on amiodarone. No ICD implant recommended at this time.    I started taking care of this patient on 7/9/2025;   Has had hemodialysis x 3  Atrial fibrillation with RVR while in dialysis 7/10/25; did not require Cardizem drip; was transferred to 4th floor for monitoring.  Now sinus rhythm.        Review of Systems   Gastrointestinal:  Positive for abdominal pain.      All pertinent negatives and positives are as above. All other systems have been reviewed and are negative unless otherwise stated.     Objective    Temp:  [97.2 °F (36.2 °C)-99.5 °F (37.5 °C)] 97.7 °F (36.5 °C)  Heart Rate:  [] 68  Resp:  [18-20]  20  BP: ()/(51-71) 136/70  Physical Exam  Constitutional:       Appearance: Chronically ill-appearing, alert oriented x 3, no respiratory distress.  HENT:      Head: Normocephalic and atraumatic.      Nose: Nose normal.      Mouth/Throat:      Mouth: Mucous membranes are moist.   Eyes:      Extraocular Movements: Extraocular movements intact.      Conjunctiva/sclera: Conjunctivae normal.      Pupils: Pupils are equal, round, and reactive to light.   Cardiovascular:      Rate and Rhythm: regular rhythm.      Pulses: Normal pulses.   Pulmonary:      Effort: No respiratory distress.      Breath sounds: Normal breath sounds.  Abdominal:      General: Abdomen is flat. Bowel sounds are normal.      Palpations: Abdomen is soft.   Extremities:  No lower extremity edema.  Skin:     Capillary Refill: Capillary refill takes less than 2 seconds.      Coloration: Skin is not jaundiced.   Neurological:      General: No focal deficit present.      Mental Status: Patient is alert, oriented to place time and person.     Sensory: No sensory deficit.           Results Review:  I have reviewed the labs, radiology results, and diagnostic studies.    Laboratory Data:   Results from last 7 days   Lab Units 07/11/25  0431 07/10/25  0346 07/09/25  1008   WBC 10*3/mm3 10.47 12.13* 10.57   HEMOGLOBIN g/dL 9.9* 10.4* 10.8*   HEMATOCRIT % 30.8* 30.9* 30.5*   PLATELETS 10*3/mm3 152 155 122*        Results from last 7 days   Lab Units 07/11/25  0431 07/10/25  0346 07/09/25  1231 07/07/25  2125 07/07/25  1053 07/06/25  0356   SODIUM mmol/L 136 137 139   < > 137 134*   POTASSIUM mmol/L 4.3 5.0 3.7   < > 3.5 3.0*   CHLORIDE mmol/L 100 102 100   < > 108* 104   CO2 mmol/L 26.0 25.0 26.0   < > 16.0* 15.0*   BUN mg/dL 10.1 13.5 9.6   < > 36.5* 36.5*   CREATININE mg/dL 1.45* 2.02* 1.51*   < > 4.19* 4.65*   CALCIUM mg/dL 7.9* 7.9* 7.8*   < > 6.8* 6.7*   BILIRUBIN mg/dL  --   --  0.6  --  0.2 0.2   ALK PHOS U/L  --   --  126*  --  88 87   ALT (SGPT)  U/L  --   --  6  --  28 32   AST (SGOT) U/L  --   --  20  --  28 61*   GLUCOSE mg/dL 298* 143* 113*   < > 193* 208*    < > = values in this interval not displayed.       Culture Data:   Blood Culture   Date Value Ref Range Status   07/03/2025 No growth at 5 days  Final   07/03/2025 No growth at 5 days  Final       Radiology Data:   Imaging Results (Last 24 Hours)       ** No results found for the last 24 hours. **            I have reviewed the patient's current medications.     Assessment/Plan   Assessment  Active Hospital Problems    Diagnosis     **DAISY (acute kidney injury)     Enteritis        Acute kidney injury/renal insufficiency requiring hemodialysis   Paroxysmal atrial fibrillation  Wide-complex tachycardia possible SVT with aberrant conduction  Hypertension  Hyperlipidemia  Acute enteritis, resolved  Chronic cardiomyopathy with diastolic dysfunction.      A1C 9.0    Creatinine 1.45.Sodium 136 potassium 4.3.  CO2 26.  Phosphorus 2.2  Glucose fasting 298      7/5/2025 Echocardiogram    Left ventricular systolic function is normal. Left ventricular ejection fraction appears to be 66 - 70%.    Left ventricular diastolic function is consistent with (grade Ia w/high LAP) impaired relaxation.    There is mild calcification of the aortic valve.    Mild aortic insufficiency    Estimated right ventricular systolic pressure from tricuspid regurgitation is moderately elevated (45-55 mmHg).    Moderate pulmonary hypertension is present.      Treatment Plan  Patient has had hemodialysis as per nephrology recommendations, #3.    Continue hemodialysis per specialist orders.    Daily laboratory tests.    Avoid nephrotoxic medications.    Continue amiodarone as per specialist recommendations.    Continue Eliquis 5 mg p.o. 12 hours.    Replace electrolytes as per protocol.    Continue insulin sliding scale. Start lantus 5 units daily AM due to hyperglycemia since yesterday.    Will follow recommendations by nephrology  regarding long-term dialysis and outpatient arrangements.    Placement in process.      Medical Decision Making  Number and Complexity of problems: 8, moderate to high    Differential Diagnosis: see above    Conditions and Status        Condition is unchanged.     MDM Data  External documents reviewed: no  Cardiac tracing (EKG, telemetry) interpretation: no new  Radiology interpretation: no new  Labs reviewed: yes  Any tests that were considered but not ordered: no     Decision rules/scores evaluated (example [id number], Wells, etc): see chart     Discussed with: patient     Care Planning  Shared decision making: patient  Code status and discussions: FC    Disposition  Social Determinants of Health that impact treatment or disposition: none  I expect the patient to be discharged to SNF once approved.      Electronically signed by Juan J Garcia MD, 07/11/25, 09:48 CDT.

## 2025-07-11 NOTE — PLAN OF CARE
Goal Outcome Evaluation:  Plan of Care Reviewed With: patient        Progress: improving  Outcome Evaluation: PT tx completed. Pt agreeable to therapy, cont to repor increase fear of falling. Min x1 and increased time for supine to sit. Able to work through BLE AROM while seated with cues for correct technique. Stood x4 reps with min x1 and cues for hand placement. CGA for static standing balance with use of RWX. Able to take 2 steps forward/backward x3 sets and seated rest breaks. Pt then able to take steps to chair with min x1 and RWX. Will cont to follow.    Anticipated Discharge Disposition (PT): skilled nursing facility

## 2025-07-11 NOTE — PLAN OF CARE
Problem: Adult Inpatient Plan of Care  Goal: Plan of Care Review  Outcome: Progressing  Flowsheets (Taken 7/11/2025 0649)  Progress: no change  Outcome Evaluation: Pt restless throughout night. Called out frequently throughout night c/o being uncomfortbale VSS. S 62-70 per tele. Referrals sent to nursing homes. Safety maintained and call light within reach..  Plan of Care Reviewed With: patient  Goal: Patient-Specific Goal (Individualized)  Outcome: Progressing  Goal: Absence of Hospital-Acquired Illness or Injury  Outcome: Progressing  Intervention: Identify and Manage Fall Risk  Recent Flowsheet Documentation  Taken 7/10/2025 2200 by Anjana Zimmerman RN  Safety Promotion/Fall Prevention: safety round/check completed  Taken 7/10/2025 2100 by Anjana Zimmerman RN  Safety Promotion/Fall Prevention: safety round/check completed  Intervention: Prevent Skin Injury  Recent Flowsheet Documentation  Taken 7/11/2025 0200 by Anjana iZmmerman RN  Body Position: position changed independently  Taken 7/11/2025 0000 by Anjana Zimmerman RN  Body Position: position changed independently  Taken 7/10/2025 2200 by Anjana Zimmerman RN  Body Position: position changed independently  Taken 7/10/2025 2100 by Anjana Zimmerman RN  Body Position: position changed independently  Taken 7/10/2025 2000 by Anjana Zimmerman RN  Body Position: position changed independently  Taken 7/10/2025 1900 by Anjana Zimmerman RN  Body Position: position changed independently  Intervention: Prevent and Manage VTE (Venous Thromboembolism) Risk  Recent Flowsheet Documentation  Taken 7/10/2025 2200 by Anjana Zimmerman RN  VTE Prevention/Management: (see mar) other (see comments)  Goal: Optimal Comfort and Wellbeing  Outcome: Progressing  Intervention: Provide Person-Centered Care  Recent Flowsheet Documentation  Taken 7/10/2025 2200 by Anjana Zimmerman RN  Trust Relationship/Rapport:   care explained   choices provided   empathic listening provided   reassurance provided   thoughts/feelings  acknowledged  Goal: Readiness for Transition of Care  Outcome: Progressing     Problem: Skin Injury Risk Increased  Goal: Skin Health and Integrity  Outcome: Progressing     Problem: Fall Injury Risk  Goal: Absence of Fall and Fall-Related Injury  Outcome: Progressing  Intervention: Promote Injury-Free Environment  Recent Flowsheet Documentation  Taken 7/10/2025 2200 by Anjana Zimmerman RN  Safety Promotion/Fall Prevention: safety round/check completed  Taken 7/10/2025 2100 by Anjana Zimmerman RN  Safety Promotion/Fall Prevention: safety round/check completed     Problem: Violence Risk or Actual  Goal: Anger and Impulse Control  Outcome: Progressing     Problem: Acute Kidney Injury/Impairment  Goal: Fluid and Electrolyte Balance  Outcome: Progressing  Goal: Improved Oral Intake  Outcome: Progressing  Goal: Effective Renal Function  Outcome: Progressing   Goal Outcome Evaluation:  Plan of Care Reviewed With: patient        Progress: no change  Outcome Evaluation: Pt restless throughout night. Called out frequently throughout night c/o being uncomfortbale VSS. S 62-70 per tele. Referrals sent to nursing homes. Safety maintained and call light within reach..

## 2025-07-11 NOTE — CASE MANAGEMENT/SOCIAL WORK
Continued Stay Note   [place]     Patient Name: Lindsay Laughlin  MRN: [id number]  Today's Date: 7/11/2025    Admit Date: 7/3/2025    Plan: SNF   Discharge Plan       Row Name 07/11/25 0956       Plan    Plan SNF    Patient/Family in Agreement with Plan yes    Plan Comments [facility] has offered a bed on pt.  SW spoke to pt about this bed offer and she stated she was going to [facility] where her  is.  SW spoke to Mayra at [facility] and she stated that they are not  and have different insurance polices.  They are out of network and pt does not have out of network benefits.  Pt has requested a referral to [facility].  SW has faxed and will await response.    Final Discharge Disposition Code 03 - skilled nursing facility (SNF)                   Discharge Codes    No documentation.                 Expected Discharge Date and Time       Expected Discharge Date Expected Discharge Time    Jul 11, 2025               BRANDY Coleman

## 2025-07-11 NOTE — PLAN OF CARE
Problem: Adult Inpatient Plan of Care  Goal: Plan of Care Review  Outcome: Progressing  Goal: Patient-Specific Goal (Individualized)  Outcome: Progressing  Goal: Absence of Hospital-Acquired Illness or Injury  Outcome: Progressing  Intervention: Identify and Manage Fall Risk  Recent Flowsheet Documentation  Taken 7/11/2025 0800 by Barbara Rosales RN  Safety Promotion/Fall Prevention:   clutter free environment maintained   nonskid shoes/slippers when out of bed   safety round/check completed  Intervention: Prevent Skin Injury  Recent Flowsheet Documentation  Taken 7/11/2025 0800 by Barbara Rosales RN  Body Position: position changed independently  Intervention: Prevent and Manage VTE (Venous Thromboembolism) Risk  Recent Flowsheet Documentation  Taken 7/11/2025 0800 by Barbara Rosales RN  VTE Prevention/Management: (see MAR)   SCDs (sequential compression devices) off   patient refused intervention   other (see comments)  Goal: Optimal Comfort and Wellbeing  Outcome: Progressing  Goal: Readiness for Transition of Care  Outcome: Progressing     Problem: Skin Injury Risk Increased  Goal: Skin Health and Integrity  Outcome: Progressing  Intervention: Optimize Skin Protection  Recent Flowsheet Documentation  Taken 7/11/2025 0800 by Barbara Rosales RN  Activity Management:   up in chair   activity encouraged  Head of Bed (HOB) Positioning: HOB elevated     Problem: Fall Injury Risk  Goal: Absence of Fall and Fall-Related Injury  Outcome: Progressing  Intervention: Identify and Manage Contributors  Recent Flowsheet Documentation  Taken 7/11/2025 0800 by Barbara Rosales RN  Medication Review/Management: medications reviewed  Intervention: Promote Injury-Free Environment  Recent Flowsheet Documentation  Taken 7/11/2025 0800 by Barbara Rosales RN  Safety Promotion/Fall Prevention:   clutter free environment maintained   nonskid shoes/slippers when out of bed   safety round/check completed     Problem: Violence Risk  or Actual  Goal: Anger and Impulse Control  Outcome: Progressing     Problem: Acute Kidney Injury/Impairment  Goal: Fluid and Electrolyte Balance  Outcome: Progressing  Goal: Improved Oral Intake  Outcome: Progressing  Goal: Effective Renal Function  Outcome: Progressing  Intervention: Monitor and Support Renal Function  Recent Flowsheet Documentation  Taken 7/11/2025 0800 by Barbara Rosales RN  Medication Review/Management: medications reviewed   Goal Outcome Evaluation:         No significant changes to patient status this shift. Pt remained AAOx4. Pt up to chair. Has multiple incontinent bowel movements. Tech got patient in the shower after 3rd one. Pt independent with most ADL's. Pt has been SR on monitor. Bed remained in low locked position with side rails up X2 and call light in reach. Chair alarm used and activated when up to chair.

## 2025-07-11 NOTE — THERAPY TREATMENT NOTE
Acute Care - Physical Therapy Treatment Note  [facility]     Patient Name: Lindsay Laughlin  : 1955  MRN: [id number]  Today's Date: 2025      Visit Dx:     ICD-10-CM ICD-9-CM   1. Enteritis  K52.9 558.9   2. Hypomagnesemia  E83.42 275.2   3. Hypokalemia  E87.6 276.8   4. Hypocalcemia  E83.51 275.41   5. Acute kidney injury  N17.9 584.9   6. DAISY (acute kidney injury)  N17.9 584.9   7. Impaired mobility [Z74.09]  Z74.09 799.89   8. 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     Pharyngoesophageal dysphagia    Gastroesophageal reflux disease    Heartburn    Acute bowel infarction    Acute renal failure (ARF)    DAISY (acute kidney injury)    Anuria    Anxiety    Chronic back pain    Hypertension    Neuropathy    Obesity, Class I, BMI 30.0-34.9 (see actual BMI)    Osteoarthritis    Type 2 diabetes mellitus    Type 2 DM with CKD stage 3 and hypertension    Gastroesophageal reflux disease with esophagitis and hemorrhage    Abnormal finding on GI tract imaging    Weight loss    Diarrhea    Generalized abdominal pain    Nausea & vomiting    Family history of colon cancer    Enteritis     Past Medical History:   Diagnosis Date    Acid reflux     Arthritis     High cholesterol     Hypertension     Neuropathy     Swollen lymph nodes     LEFT CERVICAL    Type 2 diabetes mellitus      Past Surgical History:   Procedure Laterality Date    CARPAL TUNNEL RELEASE Bilateral     CERVICAL LYMPH NODE BIOPSY/EXCISION Left 2017    Procedure: LEFT NECK EXPLORATION WITH INCISIONAL BIOPSY/CULTURE;  Surgeon: Blue Sanchez MD;  Location: [facility];  Service:      SECTION      COLON RESECTION      COLONOSCOPY N/A 2023    Procedure: COLONOSCOPY WITH ANESTHESIA;  Surgeon: Tamy Smith MD;   Location:  [facility];  Service: Gastroenterology;  Laterality: N/A;  preop; abnormal GI scan   postop ; poor prep   PCP Ruth Noble MD    ENDOSCOPY N/A 5/17/2023    Procedure: ESOPHAGOGASTRODUODENOSCOPY WITH ANESTHESIA;  Surgeon: Tamy Smith MD;  Location: [facility] ENDOSCOPY;  Service: Gastroenterology;  Laterality: N/A;  preop; abnormal GI scan   postop esophagitis ; hiatal hernia  PCP Ruth Noble MD    EXCISION LESION      From back    INSERTION HEMODIALYSIS CATHETER Right 7/7/2025    Procedure: OR HEMODIALYSIS CATHETER INSERTION;  Surgeon: Sterling Smith DO;  Location: [facility] HYBRID OR;  Service: Vascular;  Laterality: Right;    REPLACEMENT TOTAL KNEE Left     TONSILLECTOMY       PT Assessment (Last 12 Hours)       PT Evaluation and Treatment       Row Name 07/11/25 0816          Physical Therapy Time and Intention    Subjective Information complains of;weakness;fatigue  -LY     Document Type therapy note (daily note)  -LY     Mode of Treatment physical therapy  -LY       Row Name 07/11/25 0816          General Information    Existing Precautions/Restrictions fall  -LY       Row Name 07/11/25 0816          Pain    Pretreatment Pain Rating 0/10 - no pain  -LY     Posttreatment Pain Rating 0/10 - no pain  -LY       Row Name 07/11/25 0816          Bed Mobility    Supine-Sit [place] (Bed Mobility) verbal cues;minimum assist (75% patient effort)  -LY     Assistive Device (Bed Mobility) bed rails;head of bed elevated  -LY       Row Name 07/11/25 0816          Transfers    Comment, (Transfers) stood x4  -LY       Row Name 07/11/25 0816          Bed-Chair Transfer    Bed-Chair [place] (Transfers) verbal cues;minimum assist (75% patient effort)  -LY     Assistive Device (Bed-Chair Transfers) walker, front-wheeled  -LY       Row Name 07/11/25 0816          Sit-Stand Transfer    Sit-Stand [place] (Transfers) verbal cues;minimum assist (75% patient effort)   -LY     Assistive Device (Sit-Stand Transfers) walker, front-wheeled  -LY     Comment, (Sit-Stand Transfer) cues for hand placement  -LY       Row Name 07/11/25 0816          Stand-Sit Transfer    Stand-Sit [place] (Transfers) verbal cues;minimum assist (75% patient effort)  -LY       Row Name 07/11/25 0816          Gait/Stairs (Locomotion)    [place] Level (Gait) verbal cues;minimum assist (75% patient effort)  -LY     Assistive Device (Gait) walker, front-wheeled  -LY     Comment, (Gait/Stairs) 2 steps forward/backward x3 sets  -LY       Row Name 07/11/25 0816          Motor Skills    Comments, Therapeutic Exercise BLE AROM seated x15  -LY       Row Name             Wound 07/03/25 2001 coccyx    Wound - Properties Group Placement Date: 07/03/25  -KB Placement Time: 2001 -KB Location: coccyx  -KB    Retired Wound - Properties Group Placement Date: 07/03/25  -KB Placement Time: 2001 -KB Location: coccyx  -KB    Retired Wound - Properties Group Placement Date: 07/03/25  -KB Placement Time: 2001 -KB Location: coccyx  -KB    Retired Wound - Properties Group Date first assessed: 07/03/25  -KB Time first assessed: 2001 -KB Location: coccyx  -KB      Row Name             Wound 07/06/25 1023 Right posterior heel Pressure Injury    Wound - Properties Group Placement Date: 07/06/25  -EY Placement Time: 1023  -EY Side: Right  -EY Orientation: posterior  -EY Location: heel  -EY Primary Wound Type: Pressure Inj  -EY    Retired Wound - Properties Group Placement Date: 07/06/25  -EY Placement Time: 1023  -EY Side: Right  -EY Orientation: posterior  -EY Location: heel  -EY    Retired Wound - Properties Group Placement Date: 07/06/25  -EY Placement Time: 1023  -EY Side: Right  -EY Orientation: posterior  -EY Location: heel  -EY    Retired Wound - Properties Group Date first assessed: 07/06/25  -EY Time first assessed: 1023  -EY Side: Right  -EY Location: heel  -EY      Row Name             Wound 07/06/25 1023 Left  posterior heel Pressure Injury    Wound - Properties Group Placement Date: 07/06/25  -EY Placement Time: 1023  -EY Side: Left  -EY Orientation: posterior  -EY Location: heel  -EY Primary Wound Type: Pressure Inj  -EY    Retired Wound - Properties Group Placement Date: 07/06/25  -EY Placement Time: 1023  -EY Side: Left  -EY Orientation: posterior  -EY Location: heel  -EY    Retired Wound - Properties Group Placement Date: 07/06/25  -EY Placement Time: 1023  -EY Side: Left  -EY Orientation: posterior  -EY Location: heel  -EY    Retired Wound - Properties Group Date first assessed: 07/06/25  -EY Time first assessed: 1023  -EY Side: Left  -EY Location: heel  -EY      Row Name             Wound 07/06/25 1047 Bilateral anterior abdomen    Wound - Properties Group Placement Date: 07/06/25  -EY Placement Time: 1047  -EY Side: Bilateral  -EY Orientation: anterior  -EY Location: abdomen  -EY    Retired Wound - Properties Group Placement Date: 07/06/25  -EY Placement Time: 1047  -EY Side: Bilateral  -EY Orientation: anterior  -EY Location: abdomen  -EY    Retired Wound - Properties Group Placement Date: 07/06/25  -EY Placement Time: 1047  -EY Side: Bilateral  -EY Orientation: anterior  -EY Location: abdomen  -EY    Retired Wound - Properties Group Date first assessed: 07/06/25  -EY Time first assessed: 1047  -EY Side: Bilateral  -EY Location: abdomen  -EY      Row Name 07/11/25 0816          Plan of Care Review    Plan of Care Reviewed With patient  -LY     Progress improving  -LY     Outcome Evaluation PT tx completed. Pt agreeable to therapy, cont to repor increase fear of falling. Min x1 and increased time for supine to sit. Able to work through BLE AROM while seated with cues for correct technique. Stood x4 reps with min x1 and cues for hand placement. CGA for static standing balance with use of RWX. Able to take 2 steps forward/backward x3 sets and seated rest breaks. Pt then able to take steps to chair with min x1 and  RWX. Will cont to follow.  -LY       Row Name 07/11/25 0816          Positioning and Restraints    Pre-Treatment Position in bed  -LY     Post Treatment Position chair  -LY     In Chair reclined;call light within reach;encouraged to call for assist;waffle cushion  -LY               User Key  (r) = Recorded By, (t) = Taken By, (c) = Cosigned By      Initials Name Provider Type     Brianna Mckeon, RN Registered Nurse    Frances Valenzuela, PTA Physical Therapist Assistant    Elsa Hurtado, RN Registered Nurse                    Physical Therapy Education       Title: PT OT SLP Therapies (Done)       Topic: Physical Therapy (Done)       Point: Mobility training (Done)       Learning Progress Summary            Patient Acceptance, E,TB, VU,DU,NR by  at 7/8/2025 1503    Comment: benefits of PT and mobility                      Point: Home exercise program (Done)       Learning Progress Summary            Patient Acceptance, E,TB, VU,DU,NR by  at 7/8/2025 1503    Comment: benefits of PT and mobility                      Point: Body mechanics (Done)       Learning Progress Summary            Patient Acceptance, E,TB, VU,DU,NR by  at 7/8/2025 1503    Comment: benefits of PT and mobility                      Point: Precautions (Done)       Learning Progress Summary            Patient Acceptance, E,TB, VU,DU,NR by  at 7/8/2025 1503    Comment: benefits of PT and mobility                                      User Key       Initials Effective Dates Name Provider Type [facility] 04/21/25 -  Estela Núñez, PT Student PT Student PT                  PT Recommendation and Plan  Anticipated Discharge Disposition (PT): skilled nursing facility  Plan of Care Reviewed With: patient  Progress: improving  Outcome Evaluation: PT tx completed. Pt agreeable to therapy, cont to repor increase fear of falling. Min x1 and increased time for supine to sit. Able to work through BLE AROM while seated with cues for correct  technique. Stood x4 reps with min x1 and cues for hand placement. CGA for static standing balance with use of RWX. Able to take 2 steps forward/backward x3 sets and seated rest breaks. Pt then able to take steps to chair with min x1 and RWX. Will cont to follow.       Time Calculation:    PT Charges       Row Name 07/11/25 0926             Time Calculation    Start Time 0816  -LY      Stop Time 0856  -LY      Time Calculation (min) 40 min  -LY      PT Received On 07/11/25  -LY         Time Calculation- PT    Total Timed Code Minutes- PT 40 minute(s)  -LY         Timed Charges    55052 - PT Therapeutic Exercise Minutes 10  -LY      76303 - PT Therapeutic Activity Minutes 30  -LY         Total Minutes    Timed Charges Total Minutes 40  -LY       Total Minutes 40  -LY                User Key  (r) = Recorded By, (t) = Taken By, (c) = Cosigned By      Initials Name Provider Type    LY Frances Garcia PTA Physical Therapist Assistant                  Therapy Charges for Today       Code Description Service Date Service Provider Modifiers Qty    24956794616 HC PT THER PROC EA 15 MIN 7/11/2025 Frances Garcia PTA GP 1    87926247657 HC PT THERAPEUTIC ACT EA 15 MIN 7/11/2025 Frances Garcia PTA GP 2            PT G-Codes  Outcome Measure Options: AM-PAC 6 Clicks Basic Mobility (PT)  AM-PAC 6 Clicks Score (PT): 17    Frances Garcia PTA  7/11/2025

## 2025-07-11 NOTE — CONSULTS
Upon rounds noted dialysis dressing was wet with a saturated BP and excoriated skin under dressing, dressing was changed yesterday and had similar symptoms, with this dressing change used [employer], which makes it a 48 hour dressing change, in order to assist with extra moisture under and surrounding dressing. Patient's primary RN updated on dressing change.

## 2025-07-12 LAB
ANION GAP SERPL CALCULATED.3IONS-SCNC: 13 MMOL/L (ref 5–15)
BUN SERPL-MCNC: 18.7 MG/DL (ref 8–23)
BUN/CREAT SERPL: 8.1 (ref 7–25)
CALCIUM SPEC-SCNC: 8 MG/DL (ref 8.6–10.5)
CHLORIDE SERPL-SCNC: 99 MMOL/L (ref 98–107)
CO2 SERPL-SCNC: 24 MMOL/L (ref 22–29)
CREAT SERPL-MCNC: 2.31 MG/DL (ref 0.57–1)
DEPRECATED RDW RBC AUTO: 73.5 FL (ref 37–54)
EGFRCR SERPLBLD CKD-EPI 2021: 22.2 ML/MIN/1.73
ERYTHROCYTE [DISTWIDTH] IN BLOOD BY AUTOMATED COUNT: 20.6 % (ref 12.3–15.4)
GLUCOSE BLDC GLUCOMTR-MCNC: 144 MG/DL (ref 70–130)
GLUCOSE BLDC GLUCOMTR-MCNC: 188 MG/DL (ref 70–130)
GLUCOSE BLDC GLUCOMTR-MCNC: 247 MG/DL (ref 70–130)
GLUCOSE BLDC GLUCOMTR-MCNC: 253 MG/DL (ref 70–130)
GLUCOSE SERPL-MCNC: 233 MG/DL (ref 65–99)
HCT VFR BLD AUTO: 30.7 % (ref 34–46.6)
HGB BLD-MCNC: 9.9 G/DL (ref 12–15.9)
MAGNESIUM SERPL-MCNC: 1.7 MG/DL (ref 1.6–2.4)
MCH RBC QN AUTO: 32 PG (ref 26.6–33)
MCHC RBC AUTO-ENTMCNC: 32.2 G/DL (ref 31.5–35.7)
MCV RBC AUTO: 99.4 FL (ref 79–97)
PHOSPHATE SERPL-MCNC: 3.7 MG/DL (ref 2.5–4.5)
PLATELET # BLD AUTO: 180 10*3/MM3 (ref 140–450)
PMV BLD AUTO: 10.8 FL (ref 6–12)
POTASSIUM SERPL-SCNC: 4.1 MMOL/L (ref 3.5–5.2)
RBC # BLD AUTO: 3.09 10*6/MM3 (ref 3.77–5.28)
SODIUM SERPL-SCNC: 136 MMOL/L (ref 136–145)
WBC NRBC COR # BLD AUTO: 12.75 10*3/MM3 (ref 3.4–10.8)

## 2025-07-12 PROCEDURE — 97110 THERAPEUTIC EXERCISES: CPT

## 2025-07-12 PROCEDURE — 97116 GAIT TRAINING THERAPY: CPT

## 2025-07-12 PROCEDURE — 63710000001 INSULIN REGULAR HUMAN PER 5 UNITS: Performed by: FAMILY MEDICINE

## 2025-07-12 PROCEDURE — 80048 BASIC METABOLIC PNL TOTAL CA: CPT | Performed by: STUDENT IN AN ORGANIZED HEALTH CARE EDUCATION/TRAINING PROGRAM

## 2025-07-12 PROCEDURE — 82948 REAGENT STRIP/BLOOD GLUCOSE: CPT

## 2025-07-12 PROCEDURE — 25010000002 HEPARIN (PORCINE) PER 1000 UNITS: Performed by: INTERNAL MEDICINE

## 2025-07-12 PROCEDURE — 83735 ASSAY OF MAGNESIUM: CPT | Performed by: STUDENT IN AN ORGANIZED HEALTH CARE EDUCATION/TRAINING PROGRAM

## 2025-07-12 PROCEDURE — 63710000001 INSULIN GLARGINE PER 5 UNITS: Performed by: FAMILY MEDICINE

## 2025-07-12 PROCEDURE — 82948 REAGENT STRIP/BLOOD GLUCOSE: CPT | Performed by: FAMILY MEDICINE

## 2025-07-12 PROCEDURE — 85027 COMPLETE CBC AUTOMATED: CPT | Performed by: STUDENT IN AN ORGANIZED HEALTH CARE EDUCATION/TRAINING PROGRAM

## 2025-07-12 PROCEDURE — 84100 ASSAY OF PHOSPHORUS: CPT | Performed by: SURGERY

## 2025-07-12 RX ADMIN — Medication 10 ML: at 21:57

## 2025-07-12 RX ADMIN — INSULIN GLARGINE 10 UNITS: 100 INJECTION, SOLUTION SUBCUTANEOUS at 08:32

## 2025-07-12 RX ADMIN — METOPROLOL TARTRATE 25 MG: 25 TABLET, FILM COATED ORAL at 21:56

## 2025-07-12 RX ADMIN — HEPARIN SODIUM 3600 UNITS: 1000 INJECTION INTRAVENOUS; SUBCUTANEOUS at 12:19

## 2025-07-12 RX ADMIN — HYDROCODONE BITARTRATE AND ACETAMINOPHEN 1 TABLET: 5; 325 TABLET ORAL at 17:19

## 2025-07-12 RX ADMIN — PANTOPRAZOLE SODIUM 40 MG: 40 TABLET, DELAYED RELEASE ORAL at 17:19

## 2025-07-12 RX ADMIN — APIXABAN 5 MG: 5 TABLET, FILM COATED ORAL at 21:56

## 2025-07-12 RX ADMIN — Medication 5000 UNITS: at 08:33

## 2025-07-12 RX ADMIN — INSULIN HUMAN 4 UNITS: 100 INJECTION, SOLUTION PARENTERAL at 08:32

## 2025-07-12 RX ADMIN — AMIODARONE HYDROCHLORIDE 400 MG: 200 TABLET ORAL at 21:56

## 2025-07-12 RX ADMIN — HYDROCODONE BITARTRATE AND ACETAMINOPHEN 1 TABLET: 5; 325 TABLET ORAL at 08:32

## 2025-07-12 RX ADMIN — AMIODARONE HYDROCHLORIDE 400 MG: 200 TABLET ORAL at 08:33

## 2025-07-12 RX ADMIN — APIXABAN 5 MG: 5 TABLET, FILM COATED ORAL at 08:33

## 2025-07-12 RX ADMIN — PANTOPRAZOLE SODIUM 40 MG: 40 TABLET, DELAYED RELEASE ORAL at 08:33

## 2025-07-12 RX ADMIN — METOPROLOL TARTRATE 25 MG: 25 TABLET, FILM COATED ORAL at 08:33

## 2025-07-12 RX ADMIN — SODIUM BICARBONATE 650 MG: 650 TABLET ORAL at 17:19

## 2025-07-12 RX ADMIN — Medication 10 ML: at 08:34

## 2025-07-12 RX ADMIN — INSULIN HUMAN 2 UNITS: 100 INJECTION, SOLUTION PARENTERAL at 17:19

## 2025-07-12 RX ADMIN — SODIUM BICARBONATE 650 MG: 650 TABLET ORAL at 08:33

## 2025-07-12 RX ADMIN — INSULIN HUMAN 3 UNITS: 100 INJECTION, SOLUTION PARENTERAL at 21:57

## 2025-07-12 NOTE — PLAN OF CARE
Problem: Adult Inpatient Plan of Care  Goal: Plan of Care Review  Outcome: Progressing  Goal: Patient-Specific Goal (Individualized)  Outcome: Progressing  Goal: Absence of Hospital-Acquired Illness or Injury  Outcome: Progressing  Intervention: Identify and Manage Fall Risk  Recent Flowsheet Documentation  Taken 7/12/2025 0800 by Barbara Rosales RN  Safety Promotion/Fall Prevention:   clutter free environment maintained   nonskid shoes/slippers when out of bed   safety round/check completed  Intervention: Prevent Skin Injury  Recent Flowsheet Documentation  Taken 7/12/2025 0800 by Barbara Rosales RN  Body Position: position changed independently  Skin Protection: silicone foam dressing in place  Intervention: Prevent and Manage VTE (Venous Thromboembolism) Risk  Recent Flowsheet Documentation  Taken 7/12/2025 0800 by Barbara Rosales RN  VTE Prevention/Management: (see MAR) other (see comments)  Goal: Optimal Comfort and Wellbeing  Outcome: Progressing  Goal: Readiness for Transition of Care  Outcome: Progressing     Problem: Skin Injury Risk Increased  Goal: Skin Health and Integrity  Outcome: Progressing  Intervention: Optimize Skin Protection  Recent Flowsheet Documentation  Taken 7/12/2025 0800 by Barbara Rosales RN  Activity Management:   activity encouraged   ambulated to bathroom  Pressure Reduction Techniques: frequent weight shift encouraged  Head of Bed (HOB) Positioning: HOB elevated  Pressure Reduction Devices: pressure-redistributing mattress utilized  Skin Protection: silicone foam dressing in place     Problem: Fall Injury Risk  Goal: Absence of Fall and Fall-Related Injury  Outcome: Progressing  Intervention: Identify and Manage Contributors  Recent Flowsheet Documentation  Taken 7/12/2025 0800 by Barbara Rosales RN  Medication Review/Management: medications reviewed  Intervention: Promote Injury-Free Environment  Recent Flowsheet Documentation  Taken 7/12/2025 0800 by Barbara Rosales  RN  Safety Promotion/Fall Prevention:   clutter free environment maintained   nonskid shoes/slippers when out of bed   safety round/check completed     Problem: Violence Risk or Actual  Goal: Anger and Impulse Control  Outcome: Progressing     Problem: Acute Kidney Injury/Impairment  Goal: Fluid and Electrolyte Balance  Outcome: Progressing  Goal: Improved Oral Intake  Outcome: Progressing  Goal: Effective Renal Function  Outcome: Progressing  Intervention: Monitor and Support Renal Function  Recent Flowsheet Documentation  Taken 7/12/2025 0800 by Barbara Rosales RN  Medication Review/Management: medications reviewed   Goal Outcome Evaluation:            No significant changes to patient status this shift. Pt remained   AAOx4. Pt went to dialysis this shift. Per HD, 1.5L of fluid was removed during dialysis. Pt had 2 bowel movements this shift. Pt still continues to just cry for no reason, and doesn't know why. She has remained SR on monitor. Bed remained in low locked position with side rails up X2 and call light in reach.

## 2025-07-12 NOTE — PROGRESS NOTES
[facility] Medicine Services  INPATIENT PROGRESS NOTE    Patient Name: Lindsay Laughlin  Date of Admission: 7/3/2025  Today's Date: 07/12/25  Length of Stay: 9  Primary Care Physician: Provider, No Known    Subjective   Chief Complaint: Dehydration, vomiting and diarrhea  Abdominal Pain     69-year-old female with history of diabetes mellitus type II, hypertension, hyperlipidemia, chronic kidney disease stage 3b.  Admitted to the hospital 7/5/2025, initially to the intensive care unit;  she presented to the hospital complaining of nausea, vomiting and diarrhea for at least 5 days.  No rectal bleeding or melena reported.  Had a fall in the morning prior to admission, due to generalized weakness.  Patient found to have severe hypokalemia and hypomagnesemia;   As well as acute kidney injury/renal insufficiency, consider secondary to intravascular volume depletion and possible acute tubular necrosis from chronic nonsteroidal anti-inflammatory use.  Patient was started on hemodialysis during this hospital admission on 7/8/2025.  She had a new permacath placed to right internal jugular vein by vascular surgery on 7/7/2025.  The patient has history of paroxysmal atrial fibrillation, wide-complex tachycardia with cardiac arrest.  She was evaluated by EP cardiology and is started on amiodarone. No ICD implant recommended at this time.      Hemodialysis, started during this admission  Atrial fibrillation with RVR while in dialysis 7/10/25; did not require Cardizem drip; was transferred to 4th floor for monitoring.  Now sinus rhythm.  No other events        Review of Systems   Gastrointestinal:  Positive for abdominal pain.      All pertinent negatives and positives are as above. All other systems have been reviewed and are negative unless otherwise stated.     Objective    Temp:  [97.4 °F (36.3 °C)-98.5 °F (36.9 °C)] 97.9 °F (36.6 °C)  Heart Rate:  [60-79] 79  Resp:  [18-20] 18  BP:  (103-130)/(56-79) 107/56  Physical Exam  Constitutional:       Appearance: Chronically ill-appearing, alert oriented x 3, no respiratory distress.  HENT:      Head: Normocephalic and atraumatic.      Nose: Nose normal.      Mouth/Throat:      Mouth: Mucous membranes are moist.   Eyes:      Extraocular Movements: Extraocular movements intact.      Conjunctiva/sclera: Conjunctivae normal.      Pupils: Pupils are equal, round, and reactive to light.   Cardiovascular:      Rate and Rhythm: regular rhythm.      Pulses: Normal pulses.   Pulmonary:      Effort: No respiratory distress.      Breath sounds: Normal breath sounds.  Abdominal:      General: Abdomen is flat. Bowel sounds are normal.      Palpations: Abdomen is soft.   Extremities:  No lower extremity edema.  Skin:     Capillary Refill: Capillary refill takes less than 2 seconds.      Coloration: Skin is not jaundiced.   Neurological:      General: No focal deficit present.      Mental Status: Patient is alert, oriented to place time and person.     Sensory: No sensory deficit.           Results Review:  I have reviewed the labs, radiology results, and diagnostic studies.    Laboratory Data:   Results from last 7 days   Lab Units 07/12/25  0349 07/11/25  0431 07/10/25  0346   WBC 10*3/mm3 12.75* 10.47 12.13*   HEMOGLOBIN g/dL 9.9* 9.9* 10.4*   HEMATOCRIT % 30.7* 30.8* 30.9*   PLATELETS 10*3/mm3 180 152 155        Results from last 7 days   Lab Units 07/12/25  0349 07/11/25  0431 07/10/25  0346 07/09/25  1231 07/07/25  2125 07/07/25  1053 07/06/25  0356   SODIUM mmol/L 136 136 137 139   < > 137 134*   POTASSIUM mmol/L 4.1 4.3 5.0 3.7   < > 3.5 3.0*   CHLORIDE mmol/L 99 100 102 100   < > 108* 104   CO2 mmol/L 24.0 26.0 25.0 26.0   < > 16.0* 15.0*   BUN mg/dL 18.7 10.1 13.5 9.6   < > 36.5* 36.5*   CREATININE mg/dL 2.31* 1.45* 2.02* 1.51*   < > 4.19* 4.65*   CALCIUM mg/dL 8.0* 7.9* 7.9* 7.8*   < > 6.8* 6.7*   BILIRUBIN mg/dL  --   --   --  0.6  --  0.2 0.2   ALK PHOS  U/L  --   --   --  126*  --  88 87   ALT (SGPT) U/L  --   --   --  6  --  28 32   AST (SGOT) U/L  --   --   --  20  --  28 61*   GLUCOSE mg/dL 233* 298* 143* 113*   < > 193* 208*    < > = values in this interval not displayed.       Culture Data:   Blood Culture   Date Value Ref Range Status   07/03/2025 No growth at 5 days  Final   07/03/2025 No growth at 5 days  Final       Radiology Data:   Imaging Results (Last 24 Hours)       ** No results found for the last 24 hours. **            I have reviewed the patient's current medications.     Assessment/Plan   Assessment  Active Hospital Problems    Diagnosis     **DAISY (acute kidney injury)     Enteritis        Acute kidney injury/renal insufficiency requiring hemodialysis   Paroxysmal atrial fibrillation  Wide-complex tachycardia possible SVT with aberrant conduction  Hypertension  Hyperlipidemia  Acute enteritis, resolved  Chronic cardiomyopathy with diastolic dysfunction.      A1C 9.0    Labs reviewed  Glucose fasting 233      7/5/2025 Echocardiogram    Left ventricular systolic function is normal. Left ventricular ejection fraction appears to be 66 - 70%.    Left ventricular diastolic function is consistent with (grade Ia w/high LAP) impaired relaxation.    There is mild calcification of the aortic valve.    Mild aortic insufficiency    Estimated right ventricular systolic pressure from tricuspid regurgitation is moderately elevated (45-55 mmHg).    Moderate pulmonary hypertension is present.      Treatment Plan  Patient has had hemodialysis as per nephrology recommendations.    Continue hemodialysis per specialist orders.    Daily laboratory tests.      Continue amiodarone as per specialist recommendations.    Continue Eliquis 5 mg p.o. 12 hours.    Replace electrolytes as per protocol.    Continue insulin sliding scale. Increase Lantus to 10 units daily AM.    Will follow recommendations by nephrology regarding long-term dialysis and outpatient  arrangements.    Placement in process.      Medical Decision Making  Number and Complexity of problems: 8, moderate to high    Differential Diagnosis: see above    Conditions and Status        Condition is unchanged.     [facility] Data  External documents reviewed: no  Cardiac tracing (EKG, telemetry) interpretation: no new  Radiology interpretation: no new  Labs reviewed: yes  Any tests that were considered but not ordered: no     Decision rules/scores evaluated (example [id number], [place], etc): see chart     Discussed with: patient     Care Planning  Shared decision making: patient  Code status and discussions: FC    Disposition  Social Determinants of Health that impact treatment or disposition: none  I expect the patient to be discharged to SNF once approved.      Electronically signed by Juan J Garcia MD, 07/12/25, 10:09 CDT.

## 2025-07-12 NOTE — THERAPY TREATMENT NOTE
Acute Care - Physical Therapy Treatment Note  [facility]     Patient Name: Lindsay Laughlin  : 1955  MRN: [id number]  Today's Date: 2025      Visit Dx:     ICD-10-CM ICD-9-CM   1. Enteritis  K52.9 558.9   2. Hypomagnesemia  E83.42 275.2   3. Hypokalemia  E87.6 276.8   4. Hypocalcemia  E83.51 275.41   5. Acute kidney injury  N17.9 584.9   6. DAISY (acute kidney injury)  N17.9 584.9   7. Impaired mobility [Z74.09]  Z74.09 799.89   8. 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     Pharyngoesophageal dysphagia    Gastroesophageal reflux disease    Heartburn    Acute bowel infarction    Acute renal failure (ARF)    DAISY (acute kidney injury)    Anuria    Anxiety    Chronic back pain    Hypertension    Neuropathy    Obesity, Class I, BMI 30.0-34.9 (see actual BMI)    Osteoarthritis    Type 2 diabetes mellitus    Type 2 DM with CKD stage 3 and hypertension    Gastroesophageal reflux disease with esophagitis and hemorrhage    Abnormal finding on GI tract imaging    Weight loss    Diarrhea    Generalized abdominal pain    Nausea & vomiting    Family history of colon cancer    Enteritis     Past Medical History:   Diagnosis Date    Acid reflux     Arthritis     High cholesterol     Hypertension     Neuropathy     Swollen lymph nodes     LEFT CERVICAL    Type 2 diabetes mellitus      Past Surgical History:   Procedure Laterality Date    CARPAL TUNNEL RELEASE Bilateral     CERVICAL LYMPH NODE BIOPSY/EXCISION Left 2017    Procedure: LEFT NECK EXPLORATION WITH INCISIONAL BIOPSY/CULTURE;  Surgeon: Blue Sanchez MD;  Location: [facility];  Service:      SECTION      COLON RESECTION      COLONOSCOPY N/A 2023    Procedure: COLONOSCOPY WITH ANESTHESIA;  Surgeon: Tamy Smith MD;   Location:  [facility];  Service: Gastroenterology;  Laterality: N/A;  preop; abnormal GI scan   postop ; poor prep   PCP Ruth Noble MD    ENDOSCOPY N/A 5/17/2023    Procedure: ESOPHAGOGASTRODUODENOSCOPY WITH ANESTHESIA;  Surgeon: Tamy Smith MD;  Location: [facility] ENDOSCOPY;  Service: Gastroenterology;  Laterality: N/A;  preop; abnormal GI scan   postop esophagitis ; hiatal hernia  PCP Ruth Noble MD    EXCISION LESION      From back    INSERTION HEMODIALYSIS CATHETER Right 7/7/2025    Procedure: OR HEMODIALYSIS CATHETER INSERTION;  Surgeon: Sterling Smith DO;  Location: [facility] HYBRID OR;  Service: Vascular;  Laterality: Right;    REPLACEMENT TOTAL KNEE Left     TONSILLECTOMY       PT Assessment (Last 12 Hours)       PT Evaluation and Treatment       Row Name 07/12/25 0823          Physical Therapy Time and Intention    Subjective Information complains of;weakness;fatigue  -LY     Document Type therapy note (daily note)  -LY     Mode of Treatment physical therapy  -LY       Row Name 07/12/25 0823          General Information    Existing Precautions/Restrictions fall  -LY       Row Name 07/12/25 0823          Pain    Pretreatment Pain Rating 0/10 - no pain  -LY     Posttreatment Pain Rating 0/10 - no pain  -LY       Row Name 07/12/25 0823          Bed Mobility    Supine-Sit [place] (Bed Mobility) verbal cues;minimum assist (75% patient effort)  -LY     Sit-Supine [place] (Bed Mobility) verbal cues;minimum assist (75% patient effort)  -LY     Assistive Device (Bed Mobility) bed rails;head of bed elevated  -LY       Row Name 07/12/25 0823          Sit-Stand Transfer    Sit-Stand [place] (Transfers) verbal cues;minimum assist (75% patient effort)  -LY     Assistive Device (Sit-Stand Transfers) walker, front-wheeled  -LY       Row Name 07/12/25 0823          Stand-Sit Transfer    Stand-Sit [place] (Transfers) verbal cues;minimum assist (75% patient  effort)  -LY     Assistive Device (Stand-Sit Transfers) walker, front-wheeled  -LY       Row Name 07/12/25 0823          Gait/Stairs (Locomotion)    [place] Level (Gait) verbal cues;minimum assist (75% patient effort)  -LY     Assistive Device (Gait) walker, front-wheeled  -LY     Distance in Feet (Gait) 20  x2 with seated rest break and chair follow  -LY     Deviations/Abnormal Patterns (Gait) gait speed decreased;stride length decreased  -LY     Bilateral Gait Deviations forward flexed posture;heel strike decreased  -LY     Comment, (Gait/Stairs) fatigues quickly, forward posture increases as she fatigues  -LY       Row Name 07/12/25 0823          Motor Skills    Comments, Therapeutic Exercise BLE AROM seated  -LY       Row Name             Wound 07/03/25 2001 coccyx    Wound - Properties Group Placement Date: 07/03/25  -KB Placement Time: 2001 -KB Location: coccyx  -KB    Retired Wound - Properties Group Placement Date: 07/03/25  -KB Placement Time: 2001 -KB Location: coccyx  -KB    Retired Wound - Properties Group Placement Date: 07/03/25  -KB Placement Time: 2001 -KB Location: coccyx  -KB    Retired Wound - Properties Group Date first assessed: 07/03/25  -KB Time first assessed: 2001 -KB Location: megan WINN      Row Name             Wound 07/06/25 1023 Right posterior heel Pressure Injury    Wound - Properties Group Placement Date: 07/06/25  -EY Placement Time: 1023  -EY Side: Right  -EY Orientation: posterior  -EY Location: heel  -EY Primary Wound Type: Pressure Inj  -EY    Retired Wound - Properties Group Placement Date: 07/06/25  -EY Placement Time: 1023  -EY Side: Right  -EY Orientation: posterior  -EY Location: heel  -EY    Retired Wound - Properties Group Placement Date: 07/06/25  -EY Placement Time: 1023  -EY Side: Right  -EY Orientation: posterior  -EY Location: heel  -EY    Retired Wound - Properties Group Date first assessed: 07/06/25  -EY Time first assessed: 1023  -EY Side: Right  -EY  Location: heel  -EY      Row Name             Wound 07/06/25 1023 Left posterior heel Pressure Injury    Wound - Properties Group Placement Date: 07/06/25  -EY Placement Time: 1023  -EY Side: Left  -EY Orientation: posterior  -EY Location: heel  -EY Primary Wound Type: Pressure Inj  -EY    Retired Wound - Properties Group Placement Date: 07/06/25  -EY Placement Time: 1023  -EY Side: Left  -EY Orientation: posterior  -EY Location: heel  -EY    Retired Wound - Properties Group Placement Date: 07/06/25  -EY Placement Time: 1023  -EY Side: Left  -EY Orientation: posterior  -EY Location: heel  -EY    Retired Wound - Properties Group Date first assessed: 07/06/25  -EY Time first assessed: 1023  -EY Side: Left  -EY Location: heel  -EY      Row Name             Wound 07/06/25 1047 Bilateral anterior abdomen    Wound - Properties Group Placement Date: 07/06/25  -EY Placement Time: 1047  -EY Side: Bilateral  -EY Orientation: anterior  -EY Location: abdomen  -EY    Retired Wound - Properties Group Placement Date: 07/06/25  -EY Placement Time: 1047  -EY Side: Bilateral  -EY Orientation: anterior  -EY Location: abdomen  -EY    Retired Wound - Properties Group Placement Date: 07/06/25  -EY Placement Time: 1047  -EY Side: Bilateral  -EY Orientation: anterior  -EY Location: abdomen  -EY    Retired Wound - Properties Group Date first assessed: 07/06/25  -EY Time first assessed: 1047  -EY Side: Bilateral  -EY Location: abdomen  -EY      Row Name 07/12/25 0823          Plan of Care Review    Plan of Care Reviewed With patient  -LY     Progress improving  -LY       Row Name 07/12/25 0823          Positioning and Restraints    Pre-Treatment Position in bed  -LY     Post Treatment Position bed  -LY     In Bed fowlers;call light within reach;encouraged to call for assist  -LY               User Key  (r) = Recorded By, (t) = Taken By, (c) = Cosigned By      Initials Name Provider Type    Brianna Marrero, RN Registered Nurse    STEPHANIE Garcia  Frances THAKKAR, MARU Physical Therapist Assistant    Elsa Hurtado, RN Registered Nurse                    Physical Therapy Education       Title: PT OT SLP Therapies (Done)       Topic: Physical Therapy (Done)       Point: Mobility training (Done)       Learning Progress Summary            Patient Acceptance, E,TB, VU,DU,NR by  at 7/8/2025 1503    Comment: benefits of PT and mobility                      Point: Home exercise program (Done)       Learning Progress Summary            Patient Acceptance, E,TB, VU,DU,NR by  at 7/8/2025 1503    Comment: benefits of PT and mobility                      Point: Body mechanics (Done)       Learning Progress Summary            Patient Acceptance, E,TB, VU,DU,NR by  at 7/8/2025 1503    Comment: benefits of PT and mobility                      Point: Precautions (Done)       Learning Progress Summary            Patient Acceptance, E,TB, VU,DU,NR by  at 7/8/2025 1503    Comment: benefits of PT and mobility                                      User Key       Initials Effective Dates Name Provider Type Discipline     04/21/25 -  Estela Núñez, PT Student PT Student PT                  PT Recommendation and Plan  Anticipated Discharge Disposition (PT): skilled nursing facility  Plan of Care Reviewed With: patient  Progress: improving  Outcome Evaluation: PT tx completed. Pt agreeable to therapy, cont to repor increase fear of falling. Min x1 and increased time for supine to sit. Able to work through BLE AROM while seated with cues for correct technique. Stood x4 reps with min x1 and cues for hand placement. CGA for static standing balance with use of RWX. Able to take 2 steps forward/backward x3 sets and seated rest breaks. Pt then able to take steps to chair with min x1 and RWX. Will cont to follow.       Time Calculation:    PT Charges       Row Name 07/12/25 0927             Time Calculation    Start Time 0823  -LY      Stop Time 0901  -LY      Time Calculation  (min) 38 min  -LY      PT Received On 07/12/25  -LY         Time Calculation- PT    Total Timed Code Minutes- PT 38 minute(s)  -LY         Timed Charges    46732 - PT Therapeutic Exercise Minutes 15  -LY      31675 - Gait Training Minutes  23  -LY         Total Minutes    Timed Charges Total Minutes 38  -LY       Total Minutes 38  -LY                User Key  (r) = Recorded By, (t) = Taken By, (c) = Cosigned By      Initials Name Provider Type    Frances Valenzuela PTA Physical Therapist Assistant                  Therapy Charges for Today       Code Description Service Date Service Provider Modifiers Qty    24768889758 HC PT THER PROC EA 15 MIN 7/11/2025 Frances Garcia, PTA GP 1    71295281090 HC PT THERAPEUTIC ACT EA 15 MIN 7/11/2025 Frances Garcia, PTA GP 2    24327949303 HC PT THER PROC EA 15 MIN 7/12/2025 Frances Garcia, PTA GP 1    82492858431 HC GAIT TRAINING EA 15 MIN 7/12/2025 Frances Garcia, PTA GP 2            PT G-Codes  Outcome Measure Options: AM-PAC 6 Clicks Basic Mobility (PT)  AM-PAC 6 Clicks Score (PT): 19    Frances Garcia PTA  7/12/2025

## 2025-07-12 NOTE — PLAN OF CARE
Problem: Adult Inpatient Plan of Care  Goal: Plan of Care Review  Outcome: Progressing  Flowsheets (Taken 7/12/2025 0611)  Progress: no change  Outcome Evaluation: Pt restless all night long. Constantly calling out and crying about everything. VSS. S 68-82 per tele. SNF situation being sorted to have placement decided. Safety maintained.  Plan of Care Reviewed With: patient  Goal: Patient-Specific Goal (Individualized)  Outcome: Progressing  Goal: Absence of Hospital-Acquired Illness or Injury  Outcome: Progressing  Intervention: Identify and Manage Fall Risk  Recent Flowsheet Documentation  Taken 7/12/2025 0200 by Anjana Zimmerman RN  Safety Promotion/Fall Prevention: safety round/check completed  Taken 7/11/2025 2200 by Anjana Zimmerman RN  Safety Promotion/Fall Prevention: safety round/check completed  Taken 7/11/2025 2100 by Anjana Zimmerman RN  Safety Promotion/Fall Prevention: safety round/check completed  Taken 7/11/2025 2000 by Anjana Zimmerman RN  Safety Promotion/Fall Prevention: safety round/check completed  Taken 7/11/2025 1900 by Anjana Zimmerman RN  Safety Promotion/Fall Prevention: safety round/check completed  Intervention: Prevent Skin Injury  Recent Flowsheet Documentation  Taken 7/12/2025 0400 by Anjana Zimmerman RN  Body Position: position changed independently  Taken 7/12/2025 0200 by Anjana Zimmerman RN  Body Position: position changed independently  Taken 7/12/2025 0000 by Anjana Zimmerman RN  Body Position: position changed independently  Taken 7/11/2025 2200 by Anjana Zimmerman RN  Body Position: position changed independently  Taken 7/11/2025 2100 by Anjana Zimmerman RN  Body Position: position changed independently  Taken 7/11/2025 2000 by Anjana Zimmerman RN  Body Position: position changed independently  Taken 7/11/2025 1900 by Anjana Zimmerman RN  Body Position: position changed independently  Intervention: Prevent and Manage VTE (Venous Thromboembolism) Risk  Recent Flowsheet Documentation  Taken 7/11/2025 2200 by Anjana Zimmerman  RN  VTE Prevention/Management: (see mar) other (see comments)  Goal: Optimal Comfort and Wellbeing  Outcome: Progressing  Intervention: Provide Person-Centered Care  Recent Flowsheet Documentation  Taken 7/11/2025 2200 by Anjana Zimmerman RN  Trust Relationship/Rapport:   care explained   choices provided   thoughts/feelings acknowledged  Goal: Readiness for Transition of Care  Outcome: Progressing     Problem: Skin Injury Risk Increased  Goal: Skin Health and Integrity  Outcome: Progressing     Problem: Fall Injury Risk  Goal: Absence of Fall and Fall-Related Injury  Outcome: Progressing  Intervention: Promote Injury-Free Environment  Recent Flowsheet Documentation  Taken 7/12/2025 0200 by Anjana Zimmerman RN  Safety Promotion/Fall Prevention: safety round/check completed  Taken 7/11/2025 2200 by Anjana Zimmerman RN  Safety Promotion/Fall Prevention: safety round/check completed  Taken 7/11/2025 2100 by Anjana Zimmerman RN  Safety Promotion/Fall Prevention: safety round/check completed  Taken 7/11/2025 2000 by Anjana Zimmerman RN  Safety Promotion/Fall Prevention: safety round/check completed  Taken 7/11/2025 1900 by Anjana Zimmerman RN  Safety Promotion/Fall Prevention: safety round/check completed     Problem: Violence Risk or Actual  Goal: Anger and Impulse Control  Outcome: Progressing     Problem: Acute Kidney Injury/Impairment  Goal: Fluid and Electrolyte Balance  Outcome: Progressing  Goal: Improved Oral Intake  Outcome: Progressing  Goal: Effective Renal Function  Outcome: Progressing   Goal Outcome Evaluation:  Plan of Care Reviewed With: patient        Progress: no change  Outcome Evaluation: Pt restless all night long. Constantly calling out and crying about everything. VSS. S 68-82 per tele. SNF situation being sorted to have placement decided. Safety maintained.

## 2025-07-12 NOTE — PROGRESS NOTES
Nephrology ([facility]) Progress Note      Patient:  Lindsay Laughlin  YOB: 1955  Date of Service: 7/12/2025  MRN: [id number]   Acct: [id number]   Primary Care Physician: Provider, No Known  Advance Directive:   Code Status and Medical Interventions: CPR (Attempt to Resuscitate); Full Support; Discussed CODE STATUS with Mr. Laughlin.  Patient is ,  is in a nursing home.  She does not have any children.   Ordered at: 07/03/25 2000     Code Status (Patient has no pulse and is not breathing):    CPR (Attempt to Resuscitate)     Medical Interventions (Patient has pulse or is breathing):    Full Support     Comments:    Discussed CODE STATUS with Mr. Laughlin.  Patient is ,  is in a nursing home.  She does not have any children.     Level Of Support Discussed With:    Patient     Admit Date: 7/3/2025       Hospital Day: 9  Referring Provider: Shade Alberts MD      Patient personally seen and examined.  Complete chart including Consults, Notes, Operative Reports, Labs, Cardiology, and Radiology studies reviewed as able.        Subjective:  Lindsay Laughlin is a 69 y.o. female for whom we were consulted for evaluation and treatment of acute kidney injury.  She has a history of baseline chronic kidney disease stage 3.  She did not follow with nephrology previously.  She has a history of type 2 diabetes, hypertension, GERD, prior colectomy done at [facility] for unknown reason.  She presented to ER with several days of nausea, vomiting and diarrhea.  She was unable to maintain PO intake.  She has also been using NSAIDs frequently for arthritis pain. Labs on admission showed creatinine 5, hypokalemia, hypomagnesemia and metabolic acidosis. Hospital course remarkable for minimal improvement of renal function despite IV fluid resuscitation.      Today, no overnight events.  Tolerated dialysis without issue so far today.  Asking about discharge  plans.  Denied current chest pain, shortness of air at rest, nausea or vomiting.    Dialysis   Patient was seen and evaluated on renal replacement therapy and I have personally evaluated the patient and directed the therapy  Modality: Hemodialysis  Access: Catheter  Location: right upper  QB: 400  QD: 600  UF: 560      Allergies:  Patient has no known allergies.    Home Meds:  Medications Prior to Admission   Medication Sig Dispense Refill Last Dose/Taking    albuterol sulfate  (90 Base) MCG/ACT inhaler Inhale 2 puffs Every 4 (Four) Hours As Needed for Wheezing.       ibuprofen (ADVIL,MOTRIN) 200 MG tablet Take 5 tablets by mouth Every 6 (Six) Hours As Needed for Mild Pain.       ondansetron ODT (ZOFRAN-ODT) 4 MG disintegrating tablet Place 1 tablet on the tongue Every 4 (Four) Hours As Needed for Nausea or Vomiting. 10 tablet 0        Medicines:  Current Facility-Administered Medications   Medication Dose Route Frequency Provider Last Rate Last Admin    acetaminophen (TYLENOL) tablet 650 mg  650 mg Oral Q4H PRN Sterling Smith DO   650 mg at 07/03/25 2242    Or    acetaminophen (TYLENOL) suppository 650 mg  650 mg Rectal Q4H PRN Sterling Smith DO        aluminum-magnesium hydroxide-simethicone (MAALOX MAX) 400-400-40 MG/5ML suspension 15 mL  15 mL Oral Q6H PRN Sterling Smith DO   15 mL at 07/05/25 1331    amiodarone (PACERONE) tablet 400 mg  400 mg Oral Q12H Torres Irizarry MD   400 mg at 07/12/25 0833    apixaban (ELIQUIS) tablet 5 mg  5 mg Oral Q12H Juan J Garcia MD   5 mg at 07/12/25 0833    sennosides-docusate (PERICOLACE) 8.6-50 MG per tablet 2 tablet  2 tablet Oral BID Sterling Smith DO   2 tablet at 07/11/25 2237    And    polyethylene glycol (MIRALAX) packet 17 g  17 g Oral Daily PRN Sterling Smith DO        And    bisacodyl (DULCOLAX) EC tablet 5 mg  5 mg Oral Daily PRN Sterling Smith DO        And    bisacodyl (DULCOLAX) suppository 10 mg  10 mg Rectal Daily PRN  Sterling Smith DO        calcium carbonate (TUMS) chewable tablet 500 mg (200 mg elemental)  2 tablet Oral TID PRN Sterling Smith DO   2 tablet at 07/04/25 2208    Calcium Replacement - Follow Nurse / BPA Driven Protocol   Not Applicable PRN BicSterling chiu,         cholecalciferol (VITAMIN D3) tablet 5,000 Units  5,000 Units Oral Daily BickingSterling DO   5,000 Units at 07/12/25 0833    dextrose (D50W) (25 g/50 mL) IV injection 25 g  25 g Intravenous Q15 Min PRN Sterling Smith,         dextrose (GLUTOSE) oral gel 15 g  15 g Oral Q15 Min PRN Sterling Smith DO        glucagon (GLUCAGEN) injection 1 mg  1 mg Intramuscular Q15 Min PRN Sterling Smith DO        heparin (porcine) injection 3,600 Units  3,600 Units Intracatheter PRN Javier Patterson MD   3,600 Units at 07/12/25 1219    HYDROcodone-acetaminophen (NORCO) 5-325 MG per tablet 1 tablet  1 tablet Oral Q6H PRN Sterling Smith DO   1 tablet at 07/12/25 0832    insulin glargine (LANTUS, SEMGLEE) injection 10 Units  10 Units Subcutaneous Daily Juan J Garcia MD   10 Units at 07/12/25 0832    insulin regular (humuLIN R,novoLIN R) injection 2-7 Units  2-7 Units Subcutaneous 4x Daily AC & at Bedtime Juan J Garcia MD   4 Units at 07/12/25 0832    Magnesium Standard Dose Replacement - Follow Nurse / BPA Driven Protocol   Not Applicable PRN Sterling Smith DO        metoprolol tartrate (LOPRESSOR) tablet 25 mg  25 mg Oral Q12H Harpal Proctor MD   25 mg at 07/12/25 0833    nitroglycerin (NITROSTAT) SL tablet 0.4 mg  0.4 mg Sublingual Q5 Min PRN Sterling Smith DO        ondansetron ODT (ZOFRAN-ODT) disintegrating tablet 4 mg  4 mg Oral Q6H PRN Sterling Smith DO        Or    ondansetron (ZOFRAN) injection 4 mg  4 mg Intravenous Q6H PRN Sterling Smith DO        pantoprazole (PROTONIX) EC tablet 40 mg  40 mg Oral BID AC Sterling Smith DO   40 mg at 07/12/25 0833    Phosphorus Replacement -  Follow Nurse / BPA Driven Protocol   Not Applicable PRN Juan J Garcia MD        Potassium Replacement - Follow Nurse / BPA Driven Protocol   Not Applicable PRN Bicking, Sterling K, DO        sodium bicarbonate tablet 650 mg  650 mg Oral TID Sudhir Erwin, APRN   650 mg at 25 0833    sodium chloride 0.9 % flush 10 mL  10 mL Intravenous PRN Bicking, Sterling K, DO        sodium chloride 0.9 % flush 10 mL  10 mL Intravenous Q12H Bicking, Sterling K, DO   10 mL at 25 0834    sodium chloride 0.9 % flush 10 mL  10 mL Intravenous PRN Bicking, Sterling K, DO        sodium chloride 0.9 % infusion 40 mL  40 mL Intravenous PRN Bicking, Sterling K, DO           Past Medical History:  Past Medical History:   Diagnosis Date    Acid reflux     Arthritis     High cholesterol     Hypertension     Neuropathy     Swollen lymph nodes     LEFT CERVICAL    Type 2 diabetes mellitus        Past Surgical History:  Past Surgical History:   Procedure Laterality Date    CARPAL TUNNEL RELEASE Bilateral     CERVICAL LYMPH NODE BIOPSY/EXCISION Left 2017    Procedure: LEFT NECK EXPLORATION WITH INCISIONAL BIOPSY/CULTURE;  Surgeon: Blue Sanchez MD;  Location: [facility] OR;  Service:      SECTION      COLON RESECTION      COLONOSCOPY N/A 2023    Procedure: COLONOSCOPY WITH ANESTHESIA;  Surgeon: Tamy Smith MD;  Location: [facility] ENDOSCOPY;  Service: Gastroenterology;  Laterality: N/A;  preop; abnormal GI scan   postop ; poor prep   PCP Ruth Noble MD    ENDOSCOPY N/A 2023    Procedure: ESOPHAGOGASTRODUODENOSCOPY WITH ANESTHESIA;  Surgeon: Tamy Smith MD;  Location: [facility] ENDOSCOPY;  Service: Gastroenterology;  Laterality: N/A;  preop; abnormal GI scan   postop esophagitis ; hiatal hernia  PCP Ruth Noble MD    EXCISION LESION      From back    INSERTION HEMODIALYSIS CATHETER Right 2025    Procedure: OR HEMODIALYSIS CATHETER INSERTION;  Surgeon: Luis  Sterling FLAHERTY DO;  Location:  [facility];  Service: Vascular;  Laterality: Right;    REPLACEMENT TOTAL KNEE Left     TONSILLECTOMY         Family History  Family History   Problem Relation Age of Onset    No Known Problems Mother     Colon cancer Father         < 60 years old    Breast cancer Neg Hx        Social History  Social History     Socioeconomic History    Marital status:    Tobacco Use    Smoking status: Never    Smokeless tobacco: Never   Vaping Use    Vaping status: Never Used   Substance and Sexual Activity    Alcohol use: No    Drug use: No    Sexual activity: Defer       Review of Systems:  History obtained from chart review and the patient  General ROS: No fever or chills  Respiratory ROS: No cough, shortness of breath, wheezing  Cardiovascular ROS: No chest pain or palpitations  Gastrointestinal ROS: No abdominal pain or melena  Genito-Urinary ROS: No dysuria or hematuria  Psych ROS: No anxiety and depression  14 point ROS reviewed with the patient and negative except as noted above and in the HPI unless unable to obtain.    Objective:  Patient Vitals for the past 24 hrs:   BP Temp Temp src Pulse Resp SpO2 Weight   07/12/25 0812 107/56 97.9 °F (36.6 °C) Axillary 79 18 99 % --   07/12/25 0600 -- -- -- -- -- -- 91.7 kg (202 lb 2.6 oz)   07/12/25 0419 123/58 98.5 °F (36.9 °C) Oral 60 18 98 % --   07/11/25 2029 103/63 97.5 °F (36.4 °C) Oral 68 18 97 % --   07/11/25 1625 130/66 97.4 °F (36.3 °C) Oral 66 18 90 % --   07/11/25 1254 118/79 97.8 °F (36.6 °C) Oral 62 20 90 % --       Intake/Output Summary (Last 24 hours) at 7/12/2025 1236  Last data filed at 7/11/2025 1500  Gross per 24 hour   Intake 120 ml   Output --   Net 120 ml     General: awake/alert   Chest:  clear to auscultation bilaterally without respiratory distress  CVS: regular rate and rhythm  Abdominal: soft, nontender, positive bowel sounds  Extremities: no cyanosis or edema  Skin: warm and dry without rash      Labs:  Results from  "last 7 days   Lab Units 07/12/25  0349 07/11/25  0431 07/10/25  0346   WBC 10*3/mm3 12.75* 10.47 12.13*   HEMOGLOBIN g/dL 9.9* 9.9* 10.4*   HEMATOCRIT % 30.7* 30.8* 30.9*   PLATELETS 10*3/mm3 180 152 155         Results from last 7 days   Lab Units 07/12/25  0349 07/11/25  0431 07/10/25  0346 07/09/25  1231 07/07/25  2125 07/07/25  1053 07/06/25  0356   SODIUM mmol/L 136 136 137 139   < > 137 134*   POTASSIUM mmol/L 4.1 4.3 5.0 3.7   < > 3.5 3.0*   CHLORIDE mmol/L 99 100 102 100   < > 108* 104   CO2 mmol/L 24.0 26.0 25.0 26.0   < > 16.0* 15.0*   BUN mg/dL 18.7 10.1 13.5 9.6   < > 36.5* 36.5*   CREATININE mg/dL 2.31* 1.45* 2.02* 1.51*   < > 4.19* 4.65*   CALCIUM mg/dL 8.0* 7.9* 7.9* 7.8*   < > 6.8* 6.7*   EGFR mL/min/1.73 22.2* 38.9* 26.1* 37.0*   < > 10.9* 9.6*   BILIRUBIN mg/dL  --   --   --  0.6  --  0.2 0.2   ALK PHOS U/L  --   --   --  126*  --  88 87   ALT (SGPT) U/L  --   --   --  6  --  28 32   AST (SGOT) U/L  --   --   --  20  --  28 61*   GLUCOSE mg/dL 233* 298* 143* 113*   < > 193* 208*    < > = values in this interval not displayed.       Radiology:   Imaging Results (Last 72 Hours)       ** No results found for the last 72 hours. **            Culture:  No results found for: \"BLOODCX\", \"URINECX\", \"WOUNDCX\", \"MRSACX\", \"RESPCX\", \"STOOLCX\"      Assessment   Acute kidney injury/ATN  Chronic kidney disease  Diabetes type 2  Hypertension on NSAID usage  Hypokalemia  Hypomagnesemia  Anemia with iron deficiency  Metabolic acidosis  Gastroenteritis     Plan:  Discussed with patient, nursing, Hospitalist  Workup reviewed today  Monitor labs  Vascular evaluation reviewed as current  Dialysis initiation began 7/8, planned treatment 7/12 then Monday Wednesday Friday going forward to match outpatient scheduling          Javier Patterson MD  7/12/2025  12:36 CDT      "

## 2025-07-12 NOTE — PROGRESS NOTES
Pt tolerated tx well without complications. 1.5 L removed. Pt stable.    [facility]  DIALYSIS TREATMENT SUMMARY     Note: Consult with the attending physician for patient treatment orders, this document is not a physician order.     Patient Information  Patient Lindsay Laughlin  Date of Birth November 07, 1955  Chart Number [id number]  Location [facility]  Location MRN [id number]  Gender Female  SSN (last 4)   Treatment Information  Treatment Type Hemodialysis  Treatment Id [id number]  Start Time July 12, 2025 09:33  End Time July 12, 2025 13:04  Acutal Duration 03:31  Treatment Balances  Total Saline Administered 500  Net Fluid Balance -1500  Hemodialysis Orders  Therapy Standard  Orders Verified Time 07/12/2025 08:00   Date Verified 07/12/2025  Duration 3:30  Isolated UF/Bypass No  BFR (mL) 400  DFR (mL) 600  Dialyzer Type OPTIFLUX 160NR  UF Order UF Range  UF Range (mL) 1500 - 2500  With BP Parameters Yes  Crit-Line used No  Heparin Initial Units Bolus No  Heparin IV Maintenance Bolus No  Heparin IV Infusion No  Potassium (mEq/L) 3  Calcium (mEq/L) 2.5  Bicarb (mg/dL) 35  Sodium (mEq/L) 137  Additional Orders to keep sbp greater than 90  Clinician Chichi Ledesma RN  AV Access  Access Type Central Venous Catheter  Central Venous Catheter  Access Type Catheter - Tunneled  Date Central Venous Catheter Placed 07/07/2025  Access Location Chest Wall - R  Current/New [facility] Patient Yes  Surgeon DR. MATTHEWS  1st Use Catheter Verified by X-Ray  Catheter Care Completed per Policy Yes  Dressing Dry and Intact on Arrival Yes  Dressing Changed Yes  Type of Dressing Film Biopatch/CHG  Dressing Changed By [facility] Staff  Notes CHANGED PER FLOOR NURSE  Type of HD Caps Curos  HD Caps Changed Yes  CVC Line Education Provided Yes - Dressing Change Frequency  Vitals  Pre-Treatment Vitals  Time Is BP being recorded? Pre BP Map BP Method Pulse RR Temp How was Weight Obtained? Pre Weight Previous Dry  Weight Previous Post Weight Metric Target Fluid Removal (mL) Dialysate Confirmed Clinician  07/12/2025 09:29 BP/Map 136/57 (83) Noninvasive 81 20 97.9 How Obtained: Reported Floor Weight 91.7   Kgs 3000 Yes Chichi Ledesma RN  Comments: pt is a & o x 2. hemodynamically stable.  Intra Procedure Vitals  Rec Type Time Is BP being recorded? BP Map Pulse RR BFR DFR AP  TMP UFR RMVD Bolus NSS Flush Chills Safety Check Crit-Line HCT Crit-Line BV% Clinician  E 07/12/2025 13:04 BP/Map 105/63 (77) 70 18 400 600 -150 150 40 520 2000    Yes   Chichi Ledesma RN  Comments: tx complete. blood returning.  Post Treatment Vitals  Time Is BP being recorded? BP Map Pulse RR Temp How was Weight Obtained? Post Weight Metric BVP UF Goal Ordered NSS Given Intra-Procedure Total Machine UF Removed (mL) Crit-Line Ending Profile Crit-Line Refill Crit-Line Ending HCT Crit-Line Max BV% Clinician  07/12/2025 13:19 BP/Map 100/75 (83) 74 20 97.8 How Obtained: Reported Floor Weight 90.2 Kgs 78.6 1500 0 2000     Chichi Ledesma RN  Comments: blood returned.  Safety checks include: access uncovered and secured, Hemaclip secured for all central line access, machine checks performed, and alarm limits confirmed.  Labs  Hepatitis  HBsAG Lab Result HBsAG Lab Result HBsAG Draw Date Transient Antigenemia(MD Diagnosis Only) Anti-HBs Lab Result Anti-HBs Lab Value Anti-HBs Draw Date Documented By Documented Date Hepatitis Status Hepatitis Status  Negative  07/07/2025  Negative  07/07/2025 Chichi Ledesma 07/12/2025  Susceptible  Notes:   Pre-Treatment Hepatitis Precautions Copy of hepatitis results verified in hospital EMR Yes Signed By Chichi Ledesma RN  Patient tx with immune pts only Yes Hepatitis Information Entered By Chichi Ledesma RN   Patient tx outside buffer zone Yes Signing   Pre-Treatment Handoff  Staff Report Received Yes  Report Given by Primary Nurse Barbara Rosales  Time 08:45  Patient Arrival  Patient ID Verified Date of Birth  Full Name  Patient  Consent to treatment verified Yes  Blood Transfusion Consent Verified N/A  Treatment Comments  Treatment Notes pt tolerated tx well without complications. 1.5 L removed. lumens flushed, hep locked and capped. CVC dressing changed. pt stable.  Post-Treatment Handoff  Report Given to Primary Nurse Barbara Rosales  Time Report Given 13:21  Report Given By Chichi Ledesma RN  Machine Validation  Time 07:40  Date 7/12/2025  Auto Alarm Test Passed Yes  Machine Serial # [id number]  Portable RO Yes  RO Serial# 14  Residual Bleach Negative N/A  Was a manufactured mix used? Yes  Machine Log Completed Yes  Total Chlorine (less than 0.1)? Yes  Total Chlorine Log Completed Yes  Bicarb BiBag  Bibag Size 900  Machine Temp 36.0  Machine Conductivity 13.7  Meter Type N/A  Meter Conductivity   Independent Conductivity 13.8  pH Status Pass Pass  pH   TCD Value 13.7  TCD Alarm with +/- 0.5 Yes  NVL enabled validated 100 asymmetric Yes  Safety check complete Chichi Ledesma RN  Second Verification Performed? No  Second Verification Performed By   Reason Not Verified No other staff members located at the hospital  Serum Lab Values  Time BUN Creatinine Na K (mEq/L) Cl CO2 Ca (mEq/L) Phos Mg (mg/dL) Alb (g/dL) Glucose Hgb Hct WBC Plt PT aPTT INR Other Clinician  07/12/2025 03:49 18.7 2.31 136 4.1 99 24 8.0 3.7 1.7 - 233 9.9 30.7 12.75 180     Chichi Ledesma RN  Comments:   Facility Information Bed # station 4 Isolation Information  Facility Information Stat Treatment No Isolation Required? N  Admission Date 07/03/2025 Patient Type New patient to dialysis with diagnosis of “Acute Kidney Injury” Completed by  Ordering MD DR. TAYLOR Patient Chronic Unit [facility] information Entered by Chichi Ledesma RN  Account/Finance Number [id number] Patient Home Unit [address] [place] [address] Facility Information  Admission Status InPatient Code Status Full Code Notes pt is a & o x 2. hemodynamically stable.  Location Dialysis Suite Multi Diagnosis   Room  # 412 Diagnosis DAISY   Start Treatment Time Out Confirmed by Chichi Ledesma/Barbara Rosales Correct access site verified Yes  Treatment Initiation Connections Secured Time Out Completed 09:25 Treatment Start Date 07/12/2025  Saline line double clamped Correct patient verified Yes Treatment Start Time 09:33  Hemaclip Applied Correct procedure verified Yes Patient/Family questions and concerns addressed Yes  Pre Focused Assessment Edema GI / Bowels  Access Location Generalized GI Bowel sounds present  Signs and Symptoms of Infection? No Edema Grade 1+  Soft  Pain Screening Cardiac  Nontender  Does the patient have pain? No Heart Rhythm Regular   Respiratory Telemetry Yes  Voids  Lung Sounds Diminished Skin Completed by  Location Bilateral Skin Warm Pre Treatment Focused Assessment Completed By Chichi Ledesma RN  Bases  Dry Time 09:31  Position Anterior LOC Signing  Bases LOC Alert Signed By Chichi Ledesma RN  Respiratory Efforts Unlabored  Oriented to Person   Notes room air  Oriented to Place   Education  Catheter Reduction Caregiver Education Provided? Yes  Patient Education  Hemodialysis treatment review Focus or Topic Hemodialysis treatment review  Patient Educated? Yes  Treatment Adherence Method Knowledge / Understanding Assessed Teach back  Focus or Topic Access Maintenance Method Knowledge / Understanding Assessed Teach back Patient Education Introduced By  Infection Prevention Family Education Provided? N/A Patient Education Introduced By Chichi Ledesma RN  Post-Treatment HD machine external disinfection completed per policy Yes Completed by  Post Treatment Delay PRO external disinfection completed per policy Yes Post Treatment Form Completed By Chichi Ledesma RN  Delay N Post Treatment General Information   Machine Disinfection Requirement Notes pt tolerated tx well without complications. 1.5 L removed. lumens flushed, hep locked and capped. CVC dressing changed. pt stable.   Post Focused  Assessment  Bases  Oriented to Place  Changes from Pre Focused Assessment Position Bases  Alert  Changes from Pre Treatment Focused Assessment? No  Anterior GI / Bowels  Access Respiratory Efforts Unlabored GI Soft  Cath Packed with heparin 1000 units/mL Notes room air  Bowel sounds present  Access Port(ml) 1. Edema  Nontender  Return Port(ml) 1.6 Location Generalized   Catheter clamped and capped Yes Edema Grade 1+  Voids  Access Flow Good Cardiac Completed by  Positional Heart Rhythm Regular Post Treatment Focused Assessment Completed by Chichi Ledesma RN  Dialyzer Clearance Streaked Telemetry Yes Date 07/12/2025  Pain Screening Skin Time 13:20  Does the patient have pain? No Skin Warm Signing  Respiratory  Dry Signed By Chichi Ledesma RN  Lung Sounds Diminished LOC   Location Bilateral LOC Oriented to Person

## 2025-07-13 LAB
ANION GAP SERPL CALCULATED.3IONS-SCNC: 9 MMOL/L (ref 5–15)
BUN SERPL-MCNC: 16.6 MG/DL (ref 8–23)
BUN/CREAT SERPL: 8.7 (ref 7–25)
CALCIUM SPEC-SCNC: 8.1 MG/DL (ref 8.6–10.5)
CHLORIDE SERPL-SCNC: 103 MMOL/L (ref 98–107)
CO2 SERPL-SCNC: 25 MMOL/L (ref 22–29)
CREAT SERPL-MCNC: 1.9 MG/DL (ref 0.57–1)
DEPRECATED RDW RBC AUTO: 72.2 FL (ref 37–54)
EGFRCR SERPLBLD CKD-EPI 2021: 28.1 ML/MIN/1.73
ERYTHROCYTE [DISTWIDTH] IN BLOOD BY AUTOMATED COUNT: 20.2 % (ref 12.3–15.4)
GLUCOSE BLDC GLUCOMTR-MCNC: 152 MG/DL (ref 70–130)
GLUCOSE BLDC GLUCOMTR-MCNC: 165 MG/DL (ref 70–130)
GLUCOSE BLDC GLUCOMTR-MCNC: 175 MG/DL (ref 70–130)
GLUCOSE BLDC GLUCOMTR-MCNC: 220 MG/DL (ref 70–130)
GLUCOSE SERPL-MCNC: 173 MG/DL (ref 65–99)
HCT VFR BLD AUTO: 28.4 % (ref 34–46.6)
HGB BLD-MCNC: 9.2 G/DL (ref 12–15.9)
MAGNESIUM SERPL-MCNC: 1.6 MG/DL (ref 1.6–2.4)
MCH RBC QN AUTO: 32.1 PG (ref 26.6–33)
MCHC RBC AUTO-ENTMCNC: 32.4 G/DL (ref 31.5–35.7)
MCV RBC AUTO: 99 FL (ref 79–97)
PHOSPHATE SERPL-MCNC: 3.2 MG/DL (ref 2.5–4.5)
PLATELET # BLD AUTO: 154 10*3/MM3 (ref 140–450)
PMV BLD AUTO: 11.4 FL (ref 6–12)
POTASSIUM SERPL-SCNC: 4.4 MMOL/L (ref 3.5–5.2)
RBC # BLD AUTO: 2.87 10*6/MM3 (ref 3.77–5.28)
SODIUM SERPL-SCNC: 137 MMOL/L (ref 136–145)
WBC NRBC COR # BLD AUTO: 9.96 10*3/MM3 (ref 3.4–10.8)

## 2025-07-13 PROCEDURE — 82948 REAGENT STRIP/BLOOD GLUCOSE: CPT | Performed by: FAMILY MEDICINE

## 2025-07-13 PROCEDURE — 63710000001 INSULIN REGULAR HUMAN PER 5 UNITS: Performed by: FAMILY MEDICINE

## 2025-07-13 PROCEDURE — 84100 ASSAY OF PHOSPHORUS: CPT | Performed by: SURGERY

## 2025-07-13 PROCEDURE — 83735 ASSAY OF MAGNESIUM: CPT | Performed by: STUDENT IN AN ORGANIZED HEALTH CARE EDUCATION/TRAINING PROGRAM

## 2025-07-13 PROCEDURE — 63710000001 INSULIN GLARGINE PER 5 UNITS: Performed by: FAMILY MEDICINE

## 2025-07-13 PROCEDURE — 82948 REAGENT STRIP/BLOOD GLUCOSE: CPT

## 2025-07-13 PROCEDURE — 85027 COMPLETE CBC AUTOMATED: CPT | Performed by: STUDENT IN AN ORGANIZED HEALTH CARE EDUCATION/TRAINING PROGRAM

## 2025-07-13 PROCEDURE — 80048 BASIC METABOLIC PNL TOTAL CA: CPT | Performed by: STUDENT IN AN ORGANIZED HEALTH CARE EDUCATION/TRAINING PROGRAM

## 2025-07-13 RX ORDER — HYDROCODONE BITARTRATE AND ACETAMINOPHEN 5; 325 MG/1; MG/1
1 TABLET ORAL EVERY 8 HOURS PRN
Refills: 0 | Status: DISCONTINUED | OUTPATIENT
Start: 2025-07-13 | End: 2025-07-14 | Stop reason: HOSPADM

## 2025-07-13 RX ORDER — HYDROCODONE BITARTRATE AND ACETAMINOPHEN 5; 325 MG/1; MG/1
1 TABLET ORAL ONCE AS NEEDED
Refills: 0 | Status: COMPLETED | OUTPATIENT
Start: 2025-07-13 | End: 2025-07-13

## 2025-07-13 RX ORDER — GABAPENTIN 100 MG/1
100 CAPSULE ORAL NIGHTLY
Status: DISCONTINUED | OUTPATIENT
Start: 2025-07-13 | End: 2025-07-14 | Stop reason: HOSPADM

## 2025-07-13 RX ADMIN — SODIUM BICARBONATE 650 MG: 650 TABLET ORAL at 08:49

## 2025-07-13 RX ADMIN — APIXABAN 5 MG: 5 TABLET, FILM COATED ORAL at 20:20

## 2025-07-13 RX ADMIN — INSULIN GLARGINE 10 UNITS: 100 INJECTION, SOLUTION SUBCUTANEOUS at 08:48

## 2025-07-13 RX ADMIN — Medication 10 ML: at 20:22

## 2025-07-13 RX ADMIN — INSULIN HUMAN 2 UNITS: 100 INJECTION, SOLUTION PARENTERAL at 08:48

## 2025-07-13 RX ADMIN — GABAPENTIN 100 MG: 100 CAPSULE ORAL at 20:20

## 2025-07-13 RX ADMIN — METOPROLOL TARTRATE 25 MG: 25 TABLET, FILM COATED ORAL at 08:50

## 2025-07-13 RX ADMIN — SODIUM BICARBONATE 650 MG: 650 TABLET ORAL at 20:19

## 2025-07-13 RX ADMIN — AMIODARONE HYDROCHLORIDE 400 MG: 200 TABLET ORAL at 20:19

## 2025-07-13 RX ADMIN — INSULIN HUMAN 2 UNITS: 100 INJECTION, SOLUTION PARENTERAL at 16:57

## 2025-07-13 RX ADMIN — APIXABAN 5 MG: 5 TABLET, FILM COATED ORAL at 08:50

## 2025-07-13 RX ADMIN — HYDROCODONE BITARTRATE AND ACETAMINOPHEN 1 TABLET: 5; 325 TABLET ORAL at 20:20

## 2025-07-13 RX ADMIN — INSULIN HUMAN 2 UNITS: 100 INJECTION, SOLUTION PARENTERAL at 20:20

## 2025-07-13 RX ADMIN — HYDROCODONE BITARTRATE AND ACETAMINOPHEN 1 TABLET: 5; 325 TABLET ORAL at 05:27

## 2025-07-13 RX ADMIN — SODIUM BICARBONATE 650 MG: 650 TABLET ORAL at 16:58

## 2025-07-13 RX ADMIN — AMIODARONE HYDROCHLORIDE 400 MG: 200 TABLET ORAL at 08:49

## 2025-07-13 RX ADMIN — METOPROLOL TARTRATE 25 MG: 25 TABLET, FILM COATED ORAL at 20:20

## 2025-07-13 RX ADMIN — Medication 5000 UNITS: at 08:49

## 2025-07-13 RX ADMIN — PANTOPRAZOLE SODIUM 40 MG: 40 TABLET, DELAYED RELEASE ORAL at 08:49

## 2025-07-13 RX ADMIN — INSULIN HUMAN 2 UNITS: 100 INJECTION, SOLUTION PARENTERAL at 12:01

## 2025-07-13 RX ADMIN — Medication 10 ML: at 08:50

## 2025-07-13 RX ADMIN — HYDROCODONE BITARTRATE AND ACETAMINOPHEN 1 TABLET: 5; 325 TABLET ORAL at 11:14

## 2025-07-13 RX ADMIN — PANTOPRAZOLE SODIUM 40 MG: 40 TABLET, DELAYED RELEASE ORAL at 16:58

## 2025-07-13 NOTE — PLAN OF CARE
Problem: Adult Inpatient Plan of Care  Goal: Plan of Care Review  Outcome: Progressing  Flowsheets (Taken 7/13/2025 0322)  Progress: no change  Outcome Evaluation: S/SA 70-94.  IV dislodged/occluded.  Placed new one.  Patient seemed to rest a little better tonight, safety maintained. Weight seems to have increased but went from being weighed on bedscale to a standing scale this morning.   Plan of Care Reviewed With: patient

## 2025-07-13 NOTE — PROGRESS NOTES
[facility] Medicine Services  INPATIENT PROGRESS NOTE    Patient Name: Lindsay Laughlin  Date of Admission: 7/3/2025  Today's Date: 07/13/25  Length of Stay: 10  Primary Care Physician: Provider, No Known    Subjective   Chief Complaint: Dehydration, vomiting and diarrhea  Abdominal Pain     69-year-old female with history of diabetes mellitus type II, hypertension, hyperlipidemia, chronic kidney disease stage 3b.  Admitted to the hospital 7/5/2025, initially to the intensive care unit;  she presented to the hospital complaining of nausea, vomiting and diarrhea for at least 5 days.  No rectal bleeding or melena reported.  Had a fall in the morning prior to admission, due to generalized weakness.  Patient found to have severe hypokalemia and hypomagnesemia;   As well as acute kidney injury/renal insufficiency, consider secondary to intravascular volume depletion and possible acute tubular necrosis from chronic nonsteroidal anti-inflammatory use.  Patient was started on hemodialysis during this hospital admission on 7/8/2025.  She had a new permacath placed to right internal jugular vein by vascular surgery on 7/7/2025.  The patient has history of paroxysmal atrial fibrillation, wide-complex tachycardia with cardiac arrest.  She was evaluated by EP cardiology and is started on amiodarone. No ICD implant recommended at this time.      Hemodialysis started during this admission  Atrial fibrillation with RVR while in dialysis 7/10/25; did not require Cardizem drip; was transferred to 4th floor for monitoring.    Has been on sinus rhythm.  Reports pain bilateral feet.  No other events        Review of Systems   Gastrointestinal:  Positive for abdominal pain.      All pertinent negatives and positives are as above. All other systems have been reviewed and are negative unless otherwise stated.     Objective    Temp:  [97.2 °F (36.2 °C)-97.7 °F (36.5 °C)] 97.2 °F (36.2 °C)  Heart Rate:   [70-84] 70  Resp:  [18] 18  BP: (102-127)/(42-90) 109/42  Physical Exam  Constitutional:       Appearance: Chronically ill-appearing, alert oriented x 3, no respiratory distress.  HENT:      Head: Normocephalic and atraumatic.      Nose: Nose normal.      Mouth/Throat:      Mouth: Mucous membranes are moist.   Eyes:      Extraocular Movements: Extraocular movements intact.      Conjunctiva/sclera: Conjunctivae normal.      Pupils: Pupils are equal, round, and reactive to light.   Cardiovascular:      Rate and Rhythm: regular rhythm.      Pulses: Normal pulses.   Pulmonary:      Effort: No respiratory distress.      Breath sounds: Normal breath sounds.  Abdominal:      General: Abdomen is flat. Bowel sounds are normal.      Palpations: Abdomen is soft.   Extremities:  No lower extremity edema. Pedal pulses present.  Skin:     Capillary Refill: Capillary refill takes less than 2 seconds.      Coloration: Skin is not jaundiced.   Neurological:      General: No focal deficit present.      Mental Status: Patient is alert, oriented to place time and person.     Sensory: No sensory deficit.           Results Review:  I have reviewed the labs, radiology results, and diagnostic studies.    Laboratory Data:   Results from last 7 days   Lab Units 07/13/25  0413 07/12/25  0349 07/11/25  0431   WBC 10*3/mm3 9.96 12.75* 10.47   HEMOGLOBIN g/dL 9.2* 9.9* 9.9*   HEMATOCRIT % 28.4* 30.7* 30.8*   PLATELETS 10*3/mm3 154 180 152        Results from last 7 days   Lab Units 07/13/25  0413 07/12/25  0349 07/11/25  0431 07/10/25  0346 07/09/25  1231 07/07/25  2125 07/07/25  1053   SODIUM mmol/L 137 136 136   < > 139   < > 137   POTASSIUM mmol/L 4.4 4.1 4.3   < > 3.7   < > 3.5   CHLORIDE mmol/L 103 99 100   < > 100   < > 108*   CO2 mmol/L 25.0 24.0 26.0   < > 26.0   < > 16.0*   BUN mg/dL 16.6 18.7 10.1   < > 9.6   < > 36.5*   CREATININE mg/dL 1.90* 2.31* 1.45*   < > 1.51*   < > 4.19*   CALCIUM mg/dL 8.1* 8.0* 7.9*   < > 7.8*   < > 6.8*    BILIRUBIN mg/dL  --   --   --   --  0.6  --  0.2   ALK PHOS U/L  --   --   --   --  126*  --  88   ALT (SGPT) U/L  --   --   --   --  6  --  28   AST (SGOT) U/L  --   --   --   --  20  --  28   GLUCOSE mg/dL 173* 233* 298*   < > 113*   < > 193*    < > = values in this interval not displayed.       Culture Data:   Blood Culture   Date Value Ref Range Status   07/03/2025 No growth at 5 days  Final   07/03/2025 No growth at 5 days  Final       Radiology Data:   Imaging Results (Last 24 Hours)       ** No results found for the last 24 hours. **            I have reviewed the patient's current medications.     Assessment/Plan   Assessment  Active Hospital Problems    Diagnosis     **DAISY (acute kidney injury)     Enteritis        Acute kidney injury/renal insufficiency requiring hemodialysis   Paroxysmal atrial fibrillation  Wide-complex tachycardia possible SVT with aberrant conduction  Hypertension  Hyperlipidemia  Acute enteritis, resolved  Chronic cardiomyopathy with diastolic dysfunction.      A1C 9.0    Labs reviewed  Glucose fasting 173      7/5/2025 Echocardiogram    Left ventricular systolic function is normal. Left ventricular ejection fraction appears to be 66 - 70%.    Left ventricular diastolic function is consistent with (grade Ia w/high LAP) impaired relaxation.    There is mild calcification of the aortic valve.    Mild aortic insufficiency    Estimated right ventricular systolic pressure from tricuspid regurgitation is moderately elevated (45-55 mmHg).    Moderate pulmonary hypertension is present.      Treatment Plan  Patient has had hemodialysis as per nephrology recommendations.    Continue hemodialysis per specialist orders.  Meocde-Ildabyjdl-Uzqsun.    Continue amiodarone as per specialist recommendations.    Continue Eliquis 5 mg p.o. 12 hours.    Replace electrolytes as per protocol.    Continue insulin sliding scale. Lantus to 10 units daily AM.    Placement in process. Outpatient  hemodialysis.      Medical Decision Making  Number and Complexity of problems: 8, moderate to high    Differential Diagnosis: see above    Conditions and Status        Condition is unchanged.     MDM Data  External documents reviewed: no  Cardiac tracing (EKG, telemetry) interpretation: no new  Radiology interpretation: no new  Labs reviewed: yes  Any tests that were considered but not ordered: no     Decision rules/scores evaluated (example [id number], Wells, etc): see chart     Discussed with: patient     Care Planning  Shared decision making: patient  Code status and discussions: FC    Disposition  Social Determinants of Health that impact treatment or disposition: none  I expect the patient to be discharged to SNF once approved.      Electronically signed by Juan J Garcia MD, 07/13/25, 09:11 CDT.

## 2025-07-13 NOTE — PLAN OF CARE
Goal Outcome Evaluation:  Plan of Care Reviewed With: patient        Progress: no change  Outcome Evaluation: S 67-80 PAC/ Int. AF per tele. VSS. Safety maintained.

## 2025-07-13 NOTE — PROGRESS NOTES
Nephrology ([facility]) Progress Note      Patient:  Lindsay Laughlin  YOB: 1955  Date of Service: 7/13/2025  MRN: [id number]   Acct: [id number]   Primary Care Physician: Provider, No Known  Advance Directive:   Code Status and Medical Interventions: CPR (Attempt to Resuscitate); Full Support; Discussed CODE STATUS with Mr. Laughlin.  Patient is ,  is in a nursing home.  She does not have any children.   Ordered at: 07/03/25 2000     Code Status (Patient has no pulse and is not breathing):    CPR (Attempt to Resuscitate)     Medical Interventions (Patient has pulse or is breathing):    Full Support     Comments:    Discussed CODE STATUS with Mr. Laughlin.  Patient is ,  is in a nursing home.  She does not have any children.     Level Of Support Discussed With:    Patient     Admit Date: 7/3/2025       Hospital Day: 10  Referring Provider: Shade Alberts MD      Patient personally seen and examined.  Complete chart including Consults, Notes, Operative Reports, Labs, Cardiology, and Radiology studies reviewed as able.        Subjective:  Lindsay Laughlin is a 69 y.o. female for whom we were consulted for evaluation and treatment of acute kidney injury.  She has a history of baseline chronic kidney disease stage 3.  She did not follow with nephrology previously.  She has a history of type 2 diabetes, hypertension, GERD, prior colectomy done at [facility] for unknown reason.  She presented to ER with several days of nausea, vomiting and diarrhea.  She was unable to maintain PO intake.  She has also been using NSAIDs frequently for arthritis pain. Labs on admission showed creatinine 5, hypokalemia, hypomagnesemia and metabolic acidosis. Hospital course remarkable for minimal improvement of renal function despite IV fluid resuscitation.      Today, no overnight events.  Tolerated dialysis without issue yesterday.  Asking about discharge plans.   Denied current chest pain, shortness of air at rest, nausea or vomiting.  Seen with nursing and patient requesting help to use the restroom          Allergies:  Patient has no known allergies.    Home Meds:  Medications Prior to Admission   Medication Sig Dispense Refill Last Dose/Taking    albuterol sulfate  (90 Base) MCG/ACT inhaler Inhale 2 puffs Every 4 (Four) Hours As Needed for Wheezing.       ibuprofen (ADVIL,MOTRIN) 200 MG tablet Take 5 tablets by mouth Every 6 (Six) Hours As Needed for Mild Pain.       ondansetron ODT (ZOFRAN-ODT) 4 MG disintegrating tablet Place 1 tablet on the tongue Every 4 (Four) Hours As Needed for Nausea or Vomiting. 10 tablet 0        Medicines:  Current Facility-Administered Medications   Medication Dose Route Frequency Provider Last Rate Last Admin    acetaminophen (TYLENOL) tablet 650 mg  650 mg Oral Q4H PRN Sterling Smith DO   650 mg at 07/03/25 2242    Or    acetaminophen (TYLENOL) suppository 650 mg  650 mg Rectal Q4H PRN Sterling Smith DO        aluminum-magnesium hydroxide-simethicone (MAALOX MAX) 400-400-40 MG/5ML suspension 15 mL  15 mL Oral Q6H PRN Sterling Smith DO   15 mL at 07/05/25 1331    amiodarone (PACERONE) tablet 400 mg  400 mg Oral Q12H Torres Irizarry MD   400 mg at 07/13/25 0849    apixaban (ELIQUIS) tablet 5 mg  5 mg Oral Q12H Juan J Garcia MD   5 mg at 07/13/25 0850    sennosides-docusate (PERICOLACE) 8.6-50 MG per tablet 2 tablet  2 tablet Oral BID Sterling Smith DO   2 tablet at 07/11/25 2237    And    polyethylene glycol (MIRALAX) packet 17 g  17 g Oral Daily PRN Sterling Smith DO        And    bisacodyl (DULCOLAX) EC tablet 5 mg  5 mg Oral Daily PRN Sterling Smith DO        And    bisacodyl (DULCOLAX) suppository 10 mg  10 mg Rectal Daily PRN Sterling Smith DO        calcium carbonate (TUMS) chewable tablet 500 mg (200 mg elemental)  2 tablet Oral TID PRN Sterling Smith DO   2 tablet at 07/04/25 3358     Calcium Replacement - Follow Nurse / BPA Driven Protocol   Not Applicable PRN Sterling Smith DO        cholecalciferol (VITAMIN D3) tablet 5,000 Units  5,000 Units Oral Daily Sterling Smith DO   5,000 Units at 07/13/25 0849    dextrose (D50W) (25 g/50 mL) IV injection 25 g  25 g Intravenous Q15 Min PRN Sterling Smith DO        dextrose (GLUTOSE) oral gel 15 g  15 g Oral Q15 Min PRN Sterling Smith DO        gabapentin (NEURONTIN) capsule 100 mg  100 mg Oral Nightly Juan J Garcia MD        glucagon (GLUCAGEN) injection 1 mg  1 mg Intramuscular Q15 Min PRN Sterling Smith DO        heparin (porcine) injection 3,600 Units  3,600 Units Intracatheter PRN Javier Patterson MD   3,600 Units at 07/12/25 1219    HYDROcodone-acetaminophen (NORCO) 5-325 MG per tablet 1 tablet  1 tablet Oral Q8H PRN Juan J Garcia MD   1 tablet at 07/13/25 1114    insulin glargine (LANTUS, SEMGLEE) injection 10 Units  10 Units Subcutaneous Daily Juan J Garcia MD   10 Units at 07/13/25 0848    insulin regular (humuLIN R,novoLIN R) injection 2-7 Units  2-7 Units Subcutaneous 4x Daily AC & at Bedtime Juan J Garcia MD   2 Units at 07/13/25 1657    Magnesium Standard Dose Replacement - Follow Nurse / BPA Driven Protocol   Not Applicable PRN Sterling Smith DO        metoprolol tartrate (LOPRESSOR) tablet 25 mg  25 mg Oral Q12H Harpal Proctor MD   25 mg at 07/13/25 0850    nitroglycerin (NITROSTAT) SL tablet 0.4 mg  0.4 mg Sublingual Q5 Min PRN Sterling Smith DO        ondansetron ODT (ZOFRAN-ODT) disintegrating tablet 4 mg  4 mg Oral Q6H PRN Sterling Smith DO        Or    ondansetron (ZOFRAN) injection 4 mg  4 mg Intravenous Q6H PRN Sterling Smith DO        pantoprazole (PROTONIX) EC tablet 40 mg  40 mg Oral BID AC Sterling Smith K, DO   40 mg at 07/13/25 1651    Phosphorus Replacement - Follow Nurse / BPA Driven Protocol   Not Applicable Juan J Moctezuma MD         Potassium Replacement - Follow Nurse / BPA Driven Protocol   Not Applicable PRN Sterling Smith DO        sodium bicarbonate tablet 650 mg  650 mg Oral TID Sudhir Erwin, APRN   650 mg at 25 1658    sodium chloride 0.9 % flush 10 mL  10 mL Intravenous PRN Sterling Smith DO        sodium chloride 0.9 % flush 10 mL  10 mL Intravenous Q12H Sterling Smith DO   10 mL at 25 0850    sodium chloride 0.9 % infusion 40 mL  40 mL Intravenous PRN Sterling Smith DO           Past Medical History:  Past Medical History:   Diagnosis Date    Acid reflux     Arthritis     High cholesterol     Hypertension     Neuropathy     Swollen lymph nodes     LEFT CERVICAL    Type 2 diabetes mellitus        Past Surgical History:  Past Surgical History:   Procedure Laterality Date    CARPAL TUNNEL RELEASE Bilateral     CERVICAL LYMPH NODE BIOPSY/EXCISION Left 2017    Procedure: LEFT NECK EXPLORATION WITH INCISIONAL BIOPSY/CULTURE;  Surgeon: Blue Sanchez MD;  Location: [facility] OR;  Service:      SECTION      COLON RESECTION      COLONOSCOPY N/A 2023    Procedure: COLONOSCOPY WITH ANESTHESIA;  Surgeon: Tamy Smith MD;  Location: [facility] ENDOSCOPY;  Service: Gastroenterology;  Laterality: N/A;  preop; abnormal GI scan   postop ; poor prep   PCP Ruth Noble MD    ENDOSCOPY N/A 2023    Procedure: ESOPHAGOGASTRODUODENOSCOPY WITH ANESTHESIA;  Surgeon: Tamy Smith MD;  Location: [facility] ENDOSCOPY;  Service: Gastroenterology;  Laterality: N/A;  preop; abnormal GI scan   postop esophagitis ; hiatal hernia  PCP Ruth Noble MD    EXCISION LESION      From back    INSERTION HEMODIALYSIS CATHETER Right 2025    Procedure: OR HEMODIALYSIS CATHETER INSERTION;  Surgeon: Sterling Smith DO;  Location: [facility] HYBRID OR;  Service: Vascular;  Laterality: Right;    REPLACEMENT TOTAL KNEE Left     TONSILLECTOMY         Family History  Family History    Problem Relation Age of Onset    No Known Problems Mother     Colon cancer Father         < 60 years old    Breast cancer Neg Hx        Social History  Social History     Socioeconomic History    Marital status:    Tobacco Use    Smoking status: Never    Smokeless tobacco: Never   Vaping Use    Vaping status: Never Used   Substance and Sexual Activity    Alcohol use: No    Drug use: No    Sexual activity: Defer       Review of Systems:  History obtained from chart review and the patient  General ROS: No fever or chills  Respiratory ROS: No cough, shortness of breath, wheezing  Cardiovascular ROS: No chest pain or palpitations  Gastrointestinal ROS: No abdominal pain or melena  Genito-Urinary ROS: No dysuria or hematuria  Psych ROS: No anxiety and depression  14 point ROS reviewed with the patient and negative except as noted above and in the HPI unless unable to obtain.    Objective:  Patient Vitals for the past 24 hrs:   BP Temp Temp src Pulse Resp SpO2 Weight   07/13/25 1548 107/44 97.9 °F (36.6 °C) Oral 54 18 92 % --   07/13/25 1227 116/50 97.7 °F (36.5 °C) Oral 68 18 100 % --   07/13/25 0916 121/40 97.5 °F (36.4 °C) Axillary 74 18 92 % --   07/13/25 0245 109/42 97.2 °F (36.2 °C) Oral 70 18 97 % 97 kg (213 lb 12.8 oz)   07/12/25 2031 102/88 97.5 °F (36.4 °C) Oral 84 18 98 % --       Intake/Output Summary (Last 24 hours) at 7/13/2025 1734  Last data filed at 7/12/2025 2212  Gross per 24 hour   Intake 350 ml   Output --   Net 350 ml     General: awake/alert   Chest:  clear to auscultation bilaterally without respiratory distress  CVS: regular rate and rhythm  Abdominal: soft, nontender, positive bowel sounds  Extremities: no cyanosis or edema  Skin: warm and dry without rash      Labs:  Results from last 7 days   Lab Units 07/13/25  0413 07/12/25  0349 07/11/25  0431   WBC 10*3/mm3 9.96 12.75* 10.47   HEMOGLOBIN g/dL 9.2* 9.9* 9.9*   HEMATOCRIT % 28.4* 30.7* 30.8*   PLATELETS 10*3/mm3 154 180 152  "        Results from last 7 days   Lab Units 07/13/25  0413 07/12/25  0349 07/11/25  0431 07/10/25  0346 07/09/25  1231 07/07/25  2125 07/07/25  1053   SODIUM mmol/L 137 136 136   < > 139   < > 137   POTASSIUM mmol/L 4.4 4.1 4.3   < > 3.7   < > 3.5   CHLORIDE mmol/L 103 99 100   < > 100   < > 108*   CO2 mmol/L 25.0 24.0 26.0   < > 26.0   < > 16.0*   BUN mg/dL 16.6 18.7 10.1   < > 9.6   < > 36.5*   CREATININE mg/dL 1.90* 2.31* 1.45*   < > 1.51*   < > 4.19*   CALCIUM mg/dL 8.1* 8.0* 7.9*   < > 7.8*   < > 6.8*   EGFR mL/min/1.73 28.1* 22.2* 38.9*   < > 37.0*   < > 10.9*   BILIRUBIN mg/dL  --   --   --   --  0.6  --  0.2   ALK PHOS U/L  --   --   --   --  126*  --  88   ALT (SGPT) U/L  --   --   --   --  6  --  28   AST (SGOT) U/L  --   --   --   --  20  --  28   GLUCOSE mg/dL 173* 233* 298*   < > 113*   < > 193*    < > = values in this interval not displayed.       Radiology:   Imaging Results (Last 72 Hours)       ** No results found for the last 72 hours. **            Culture:  No results found for: \"BLOODCX\", \"URINECX\", \"WOUNDCX\", \"MRSACX\", \"RESPCX\", \"STOOLCX\"      Assessment   Acute kidney injury/ATN  Chronic kidney disease  Diabetes type 2  Hypertension on NSAID usage  Hypokalemia  Hypomagnesemia  Anemia with iron deficiency  Metabolic acidosis  Gastroenteritis     Plan:  Discussed with patient, nursing, Hospitalist  Workup reviewed today  Monitor labs  Vascular evaluation reviewed as current  Dialysis initiation began 7/8, planned treatment 7/12 then Monday Wednesday Friday going forward to match outpatient scheduling          Javier Patterson MD  7/13/2025  17:34 CDT      "

## 2025-07-13 NOTE — PAYOR COMM NOTE
"7/12 CLINICAL   [id number]        Javier Pastrana (69 y.o. Female)       Date of Birth   1955    Social Security Number       Address   [address] [place] [address]    Home Phone   [phone]    MRN   [id number]       [facility]   [facility]    Marital Status                               Admission Date   7/3/2025    Admission Type   Emergency    Admitting Provider   Juan J Garcia MD    Attending Provider   Juan J Garcia MD    Department, Room/Bed   [facility] 4B, 412/1       Discharge Date       Discharge Disposition       Discharge Destination                                 Attending Provider: Juan J Garcia MD    Allergies: No Known Allergies    Isolation: None   Infection: None   Code Status: CPR    Ht: 165.1 cm (65\")   Wt: 97 kg (213 lb 12.8 oz)    Admission Cmt: None   Principal Problem: DAISY (acute kidney injury) [N17.9]                   Active Insurance as of 7/3/2025       Primary Coverage       Payor Plan Insurance Group Employer/Plan Group    UNITED HEALTHCARE MEDICARE REPLACEMENT UHC MEDICARE ADVANTAGE [facility] HMO NON PAR [id number]       Payor Plan Address Payor Plan Phone Number Payor Plan Fax Number Effective Dates    [address] [address]   7/1/2025 - None Entered    [facility] [id number]         Subscriber Name Subscriber Birth Date Member ID       JAVIER PASTRANA 1955 [id number]               Secondary Coverage       Payor Plan Insurance Group Employer/Plan Group    [id number]        Payor Plan Address Payor Plan Phone Number Payor Plan Fax Number Effective Dates    [address]   5/17/2023 - None Entered    [facility] [id number]         Subscriber Name Subscriber Birth Date Member ID       JAVIER PASTRANA 1955 [id number]                     Emergency Contacts        (Rel.) Home Phone Work Phone Mobile Phone    Rodger Marrufo (Friend) [phone] -- --    Klever Stone (Friend) -- -- [phone]    " Laura Phelan -- -- [phone]              Current Facility-Administered Medications   Medication Dose Route Frequency Provider Last Rate Last Admin    acetaminophen (TYLENOL) tablet 650 mg  650 mg Oral Q4H PRN BicSterling chiu DO   650 mg at 07/03/25 2242    Or    acetaminophen (TYLENOL) suppository 650 mg  650 mg Rectal Q4H PRN BickingSterling DO        aluminum-magnesium hydroxide-simethicone (MAALOX MAX) 400-400-40 MG/5ML suspension 15 mL  15 mL Oral Q6H PRN BicSterling chiu DO   15 mL at 07/05/25 1331    amiodarone (PACERONE) tablet 400 mg  400 mg Oral Q12H Torres Irizarry MD   400 mg at 07/13/25 0849    apixaban (ELIQUIS) tablet 5 mg  5 mg Oral Q12H Juan J Garcia MD   5 mg at 07/13/25 0850    sennosides-docusate (PERICOLACE) 8.6-50 MG per tablet 2 tablet  2 tablet Oral BID Sterling Smith DO   2 tablet at 07/11/25 2237    And    polyethylene glycol (MIRALAX) packet 17 g  17 g Oral Daily PRN Sterling Smith DO        And    bisacodyl (DULCOLAX) EC tablet 5 mg  5 mg Oral Daily PRN BicSterling chiu DO        And    bisacodyl (DULCOLAX) suppository 10 mg  10 mg Rectal Daily PRN Sterling Smith DO        calcium carbonate (TUMS) chewable tablet 500 mg (200 mg elemental)  2 tablet Oral TID PRN Sterling Smith DO   2 tablet at 07/04/25 2208    Calcium Replacement - Follow Nurse / BPA Driven Protocol   Not Applicable PRN BickingSterling DO        cholecalciferol (VITAMIN D3) tablet 5,000 Units  5,000 Units Oral Daily BicSterling chiu DO   5,000 Units at 07/13/25 0849    dextrose (D50W) (25 g/50 mL) IV injection 25 g  25 g Intravenous Q15 Min PRN BicSterling chiu DO        dextrose (GLUTOSE) oral gel 15 g  15 g Oral Q15 Min PRN BicSterling chiu DO        gabapentin (NEURONTIN) capsule 100 mg  100 mg Oral Nightly Juan J Garcia MD        glucagon (GLUCAGEN) injection 1 mg  1 mg Intramuscular Q15 Min PRN Sterling Smith DO        heparin (porcine) injection 3,600  Units  3,600 Units Intracatheter PRN Javier Patterson MD   3,600 Units at 07/12/25 1219    HYDROcodone-acetaminophen (NORCO) 5-325 MG per tablet 1 tablet  1 tablet Oral Q8H PRN Juan J Garcia MD   1 tablet at 07/13/25 1114    insulin glargine (LANTUS, SEMGLEE) injection 10 Units  10 Units Subcutaneous Daily Juan J Garcia MD   10 Units at 07/13/25 0848    insulin regular (humuLIN R,novoLIN R) injection 2-7 Units  2-7 Units Subcutaneous 4x Daily AC & at Bedtime Juan J Garcia MD   2 Units at 07/13/25 0848    Magnesium Standard Dose Replacement - Follow Nurse / BPA Driven Protocol   Not Applicable PRN Sterling Smith DO        metoprolol tartrate (LOPRESSOR) tablet 25 mg  25 mg Oral Q12H Harpal Proctor MD   25 mg at 07/13/25 0850    nitroglycerin (NITROSTAT) SL tablet 0.4 mg  0.4 mg Sublingual Q5 Min PRN Sterling Smith DO        ondansetron ODT (ZOFRAN-ODT) disintegrating tablet 4 mg  4 mg Oral Q6H PRN Sterling Smith DO        Or    ondansetron (ZOFRAN) injection 4 mg  4 mg Intravenous Q6H PRN BickingSterling DO        pantoprazole (PROTONIX) EC tablet 40 mg  40 mg Oral BID AC BickingSterling DO   40 mg at 07/13/25 0849    Phosphorus Replacement - Follow Nurse / BPA Driven Protocol   Not Applicable PRN Juan J Garcia MD        Potassium Replacement - Follow Nurse / BPA Driven Protocol   Not Applicable PRN Sterling Smith DO        sodium bicarbonate tablet 650 mg  650 mg Oral TID Sudhir Erwin APRN   650 mg at 07/13/25 0849    sodium chloride 0.9 % flush 10 mL  10 mL Intravenous PRN Sterling Smith DO        sodium chloride 0.9 % flush 10 mL  10 mL Intravenous Q12H BicSterling chiu DO   10 mL at 07/13/25 0850    sodium chloride 0.9 % infusion 40 mL  40 mL Intravenous PRN Bicking, Sterling K, DO         Orders (last 24 hrs)        Start     Ordered    07/13/25 2100  gabapentin (NEURONTIN) capsule 100 mg  Nightly         07/13/25 0913    07/13/25 100   HYDROcodone-acetaminophen (NORCO) 5-325 MG per tablet 1 tablet  Every 8 Hours PRN         07/13/25 1009    07/13/25 0446  HYDROcodone-acetaminophen (NORCO) 5-325 MG per tablet 1 tablet  Once As Needed         07/13/25 0447    07/12/25 2053  POC Glucose Once  PROCEDURE ONCE        Comments: Complete no more than 45 minutes prior to patient eating      07/12/25 2039    07/12/25 1648  POC Glucose Once  PROCEDURE ONCE        Comments: Complete no more than 45 minutes prior to patient eating      07/12/25 1644    07/12/25 1324  POC Glucose Once  PROCEDURE ONCE        Comments: Complete no more than 45 minutes prior to patient eating      07/12/25 1321    07/12/25 0900  insulin glargine (LANTUS, SEMGLEE) injection 10 Units  Daily         07/12/25 0743    07/10/25 2330  insulin regular (humuLIN R,novoLIN R) injection 2-7 Units  4 Times Daily Before Meals & Nightly         07/10/25 2315    07/10/25 1800  Dietary Nutrition Supplements Other; Boost Original-Chocolate  Daily With Breakfast & Dinner       07/10/25 1523    07/10/25 1631  Phosphorus Replacement - Follow Nurse / BPA Driven Protocol  As Needed         07/10/25 1631    07/10/25 1600  sodium bicarbonate tablet 650 mg  3 Times Daily         07/10/25 0957    07/10/25 0600  Basic Metabolic Panel  Daily       07/09/25 0654    07/10/25 0600  CBC (No Diff)  Daily       07/09/25 0654    07/10/25 0600  Magnesium  Daily       07/09/25 0654    07/10/25 0000  amiodarone (PACERONE) 200 MG tablet  Multiple Frequencies         07/10/25 0739    07/10/25 0000  apixaban (ELIQUIS) 5 MG tablet tablet  Every 12 Hours Scheduled         07/10/25 0739    07/10/25 0000  metoprolol tartrate (LOPRESSOR) 25 MG tablet  Every 12 Hours Scheduled         07/10/25 0739    07/09/25 2100  apixaban (ELIQUIS) tablet 5 mg  Every 12 Hours Scheduled         07/09/25 1441    07/09/25 1700  POC Glucose 4x Daily Before Meals & at Bedtime  4 Times Daily Before Meals & at Bedtime      Comments: Complete no  "more than 45 minutes prior to patient eating      07/09/25 1444    07/08/25 2100  amiodarone (PACERONE) tablet 400 mg  Every 12 Hours Scheduled         07/08/25 1913    07/08/25 1300  metoprolol tartrate (LOPRESSOR) tablet 25 mg  Every 12 Hours Scheduled         07/08/25 1210    07/08/25 1200  Vital Signs Every Hour and Per Hospital Policy Based on Patient Condition  Every 4 Hours       07/08/25 1027    07/08/25 0953  heparin (porcine) injection 3,600 Units  As Needed         07/08/25 0953    07/07/25 2000  Incentive Spirometry  Every 2 Hours While Awake       07/07/25 1844    07/07/25 1844  HYDROcodone-acetaminophen (NORCO) 5-325 MG per tablet 1 tablet  Every 6 Hours PRN         07/07/25 1844    07/07/25 1844  ondansetron ODT (ZOFRAN-ODT) disintegrating tablet 4 mg  Every 6 Hours PRN        Placed in \"Or\" Linked Group    07/07/25 1844    07/07/25 1844  ondansetron (ZOFRAN) injection 4 mg  Every 6 Hours PRN        Placed in \"Or\" Linked Group    07/07/25 1844    07/07/25 1447  dextrose (GLUTOSE) oral gel 15 g  Every 15 Minutes PRN         07/07/25 1448    07/07/25 1447  dextrose (D50W) (25 g/50 mL) IV injection 25 g  Every 15 Minutes PRN         07/07/25 1448    07/07/25 1447  glucagon (GLUCAGEN) injection 1 mg  Every 15 Minutes PRN         07/07/25 1448    07/06/25 1545  cholecalciferol (VITAMIN D3) tablet 5,000 Units  Daily         07/06/25 1459    07/06/25 0400  Phosphorus  Daily,   Status:  Canceled       07/06/25 0029    07/05/25 1015  pantoprazole (PROTONIX) EC tablet 40 mg  2 Times Daily Before Meals         07/05/25 1003    07/05/25 1000  aluminum-magnesium hydroxide-simethicone (MAALOX MAX) 400-400-40 MG/5ML suspension 15 mL  Every 6 Hours PRN         07/05/25 1003    07/04/25 0142  calcium carbonate (TUMS) chewable tablet 500 mg (200 mg elemental)  3 Times Daily PRN         07/04/25 0143    07/03/25 2100  sodium chloride 0.9 % flush 10 mL  Every 12 Hours Scheduled         07/03/25 2000 07/03/25 2100  " "sennosides-docusate (PERICOLACE) 8.6-50 MG per tablet 2 tablet  2 Times Daily        Placed in \"And\" Linked Group    07/03/25 2000 07/03/25 2001  Daily Weights  Daily       07/03/25 2000 07/03/25 2000  Intake & Output  Every Hour       07/03/25 2000 07/03/25 1959  acetaminophen (TYLENOL) tablet 650 mg  Every 4 Hours PRN        Placed in \"Or\" Linked Group    07/03/25 2000 07/03/25 1959  acetaminophen (TYLENOL) suppository 650 mg  Every 4 Hours PRN        Placed in \"Or\" Linked Group    07/03/25 2000 07/03/25 1952  Oral Care - Patient Not on NPPV & Not Intubated  Every Shift       07/03/25 2000 07/03/25 1951  polyethylene glycol (MIRALAX) packet 17 g  Daily PRN        Placed in \"And\" Linked Group    07/03/25 2000 07/03/25 1951  bisacodyl (DULCOLAX) EC tablet 5 mg  Daily PRN        Placed in \"And\" Linked Group    07/03/25 2000 07/03/25 1951  bisacodyl (DULCOLAX) suppository 10 mg  Daily PRN        Placed in \"And\" Linked Group    07/03/25 2000 07/03/25 1951  sodium chloride 0.9 % flush 10 mL  As Needed,   Status:  Discontinued         07/03/25 2000 07/03/25 1951  sodium chloride 0.9 % infusion 40 mL  As Needed         07/03/25 2000 07/03/25 1853  nitroglycerin (NITROSTAT) SL tablet 0.4 mg  Every 5 Minutes PRN         07/03/25 1854 07/03/25 1741  Calcium Replacement - Follow Nurse / BPA Driven Protocol  As Needed         07/03/25 1742    07/03/25 1741  Potassium Replacement - Follow Nurse / BPA Driven Protocol  As Needed         07/03/25 1742    07/03/25 1741  Magnesium Standard Dose Replacement - Follow Nurse / BPA Driven Protocol  As Needed         07/03/25 1742    07/03/25 1552  sodium chloride 0.9 % flush 10 mL  As Needed        Placed in \"And\" Linked Group    07/03/25 1554    Unscheduled  Follow Hypoglycemia Standing Orders For Blood Glucose <70 & Notify Provider of Treatment  As Needed      Comments: Follow Hypoglycemia Orders As Outlined in Process Instructions (Open Order Report " to View Full Instructions)  Notify Provider Any Time Hypoglycemia Treatment is Administered    07/07/25 1448    Unscheduled  ECG 12 Lead Rhythm Change  As Needed       07/09/25 1444    --  albuterol sulfate  (90 Base) MCG/ACT inhaler  Every 4 Hours PRN         07/04/25 1052    Signed and Held  ceFAZolin 2000 mg IVPB in 100 mL NS (MBP)  Once,   Status:  Canceled         Signed and Held                     Physician Progress Notes (last 72 hours)        Juan J Garcia MD at 07/13/25 0910              [facility] Medicine Services  INPATIENT PROGRESS NOTE    Patient Name: Lindsay Laughlin  Date of Admission: 7/3/2025  Today's Date: 07/13/25  Length of Stay: 10  Primary Care Physician: Provider, No Known    Subjective   Chief Complaint: Dehydration, vomiting and diarrhea  Abdominal Pain     69-year-old female with history of diabetes mellitus type II, hypertension, hyperlipidemia, chronic kidney disease stage 3b.  Admitted to the hospital 7/5/2025, initially to the intensive care unit;  she presented to the hospital complaining of nausea, vomiting and diarrhea for at least 5 days.  No rectal bleeding or melena reported.  Had a fall in the morning prior to admission, due to generalized weakness.  Patient found to have severe hypokalemia and hypomagnesemia;   As well as acute kidney injury/renal insufficiency, consider secondary to intravascular volume depletion and possible acute tubular necrosis from chronic nonsteroidal anti-inflammatory use.  Patient was started on hemodialysis during this hospital admission on 7/8/2025.  She had a new permacath placed to right internal jugular vein by vascular surgery on 7/7/2025.  The patient has history of paroxysmal atrial fibrillation, wide-complex tachycardia with cardiac arrest.  She was evaluated by EP cardiology and is started on amiodarone. No ICD implant recommended at this time.      Hemodialysis started during this admission  Atrial  fibrillation with RVR while in dialysis 7/10/25; did not require Cardizem drip; was transferred to 4th floor for monitoring.    Has been on sinus rhythm.  Reports pain bilateral feet.  No other events        Review of Systems   Gastrointestinal:  Positive for abdominal pain.      All pertinent negatives and positives are as above. All other systems have been reviewed and are negative unless otherwise stated.     Objective    Temp:  [97.2 °F (36.2 °C)-97.7 °F (36.5 °C)] 97.2 °F (36.2 °C)  Heart Rate:  [70-84] 70  Resp:  [18] 18  BP: (102-127)/(42-90) 109/42  Physical Exam  Constitutional:       Appearance: Chronically ill-appearing, alert oriented x 3, no respiratory distress.  HENT:      Head: Normocephalic and atraumatic.      Nose: Nose normal.      Mouth/Throat:      Mouth: Mucous membranes are moist.   Eyes:      Extraocular Movements: Extraocular movements intact.      Conjunctiva/sclera: Conjunctivae normal.      Pupils: Pupils are equal, round, and reactive to light.   Cardiovascular:      Rate and Rhythm: regular rhythm.      Pulses: Normal pulses.   Pulmonary:      Effort: No respiratory distress.      Breath sounds: Normal breath sounds.  Abdominal:      General: Abdomen is flat. Bowel sounds are normal.      Palpations: Abdomen is soft.   Extremities:  No lower extremity edema. Pedal pulses present.  Skin:     Capillary Refill: Capillary refill takes less than 2 seconds.      Coloration: Skin is not jaundiced.   Neurological:      General: No focal deficit present.      Mental Status: Patient is alert, oriented to place time and person.     Sensory: No sensory deficit.           Results Review:  I have reviewed the labs, radiology results, and diagnostic studies.    Laboratory Data:   Results from last 7 days   Lab Units 07/13/25  0413 07/12/25  0349 07/11/25  0431   WBC 10*3/mm3 9.96 12.75* 10.47   HEMOGLOBIN g/dL 9.2* 9.9* 9.9*   HEMATOCRIT % 28.4* 30.7* 30.8*   PLATELETS 10*3/mm3 154 180 152         Results from last 7 days   Lab Units 07/13/25  0413 07/12/25  0349 07/11/25  0431 07/10/25  0346 07/09/25  1231 07/07/25  2125 07/07/25  1053   SODIUM mmol/L 137 136 136   < > 139   < > 137   POTASSIUM mmol/L 4.4 4.1 4.3   < > 3.7   < > 3.5   CHLORIDE mmol/L 103 99 100   < > 100   < > 108*   CO2 mmol/L 25.0 24.0 26.0   < > 26.0   < > 16.0*   BUN mg/dL 16.6 18.7 10.1   < > 9.6   < > 36.5*   CREATININE mg/dL 1.90* 2.31* 1.45*   < > 1.51*   < > 4.19*   CALCIUM mg/dL 8.1* 8.0* 7.9*   < > 7.8*   < > 6.8*   BILIRUBIN mg/dL  --   --   --   --  0.6  --  0.2   ALK PHOS U/L  --   --   --   --  126*  --  88   ALT (SGPT) U/L  --   --   --   --  6  --  28   AST (SGOT) U/L  --   --   --   --  20  --  28   GLUCOSE mg/dL 173* 233* 298*   < > 113*   < > 193*    < > = values in this interval not displayed.       Culture Data:   Blood Culture   Date Value Ref Range Status   07/03/2025 No growth at 5 days  Final   07/03/2025 No growth at 5 days  Final       Radiology Data:   Imaging Results (Last 24 Hours)       ** No results found for the last 24 hours. **            I have reviewed the patient's current medications.     Assessment/Plan   Assessment  Active Hospital Problems    Diagnosis     **DAISY (acute kidney injury)     Enteritis        Acute kidney injury/renal insufficiency requiring hemodialysis   Paroxysmal atrial fibrillation  Wide-complex tachycardia possible SVT with aberrant conduction  Hypertension  Hyperlipidemia  Acute enteritis, resolved  Chronic cardiomyopathy with diastolic dysfunction.      A1C 9.0    Labs reviewed  Glucose fasting 173      7/5/2025 Echocardiogram    Left ventricular systolic function is normal. Left ventricular ejection fraction appears to be 66 - 70%.    Left ventricular diastolic function is consistent with (grade Ia w/high LAP) impaired relaxation.    There is mild calcification of the aortic valve.    Mild aortic insufficiency    Estimated right ventricular systolic pressure from tricuspid  regurgitation is moderately elevated (45-55 mmHg).    Moderate pulmonary hypertension is present.      Treatment Plan  Patient has had hemodialysis as per nephrology recommendations.    Continue hemodialysis per specialist orders.  Tzrwxa-Gvdqqhlha-Arzaoq.    Continue amiodarone as per specialist recommendations.    Continue Eliquis 5 mg p.o. 12 hours.    Replace electrolytes as per protocol.    Continue insulin sliding scale. Lantus to 10 units daily AM.    Placement in process. Outpatient hemodialysis.      Medical Decision Making  Number and Complexity of problems: 8, moderate to high    Differential Diagnosis: see above    Conditions and Status        Condition is unchanged.     [facility] Data  External documents reviewed: no  Cardiac tracing (EKG, telemetry) interpretation: no new  Radiology interpretation: no new  Labs reviewed: yes  Any tests that were considered but not ordered: no     Decision rules/scores evaluated (example [id number], Wells, etc): see chart     Discussed with: patient     Care Planning  Shared decision making: patient  Code status and discussions: FC    Disposition  Social Determinants of Health that impact treatment or disposition: none  I expect the patient to be discharged to SNF once approved.      Electronically signed by Juan J Garcia MD, 07/13/25, 09:11 CDT.     Electronically signed by Juan J Garcia MD at 07/13/25 0912       Javier Patterson MD at 07/12/25 1236          Nephrology ([facility]) Progress Note      Patient:  Lindsay Laughlin  YOB: 1955  Date of Service: 7/12/2025  MRN: [id number]   Acct: [id number]   Primary Care Physician: Provider, No Known  Advance Directive:   Code Status and Medical Interventions: CPR (Attempt to Resuscitate); Full Support; Discussed CODE STATUS with Mr. Laughlin.  Patient is ,  is in a nursing home.  She does not have any children.   Ordered at: 07/03/25 2000     Code Status  (Patient has no pulse and is not breathing):    CPR (Attempt to Resuscitate)     Medical Interventions (Patient has pulse or is breathing):    Full Support     Comments:    Discussed CODE STATUS with Mr. Laughlin.  Patient is ,  is in a nursing home.  She does not have any children.     Level Of Support Discussed With:    Patient     Admit Date: 7/3/2025       Hospital Day: 9  Referring Provider: Shade Alberts MD      Patient personally seen and examined.  Complete chart including Consults, Notes, Operative Reports, Labs, Cardiology, and Radiology studies reviewed as able.        Subjective:  Lindsay Laughlin is a 69 y.o. female for whom we were consulted for evaluation and treatment of acute kidney injury.  She has a history of baseline chronic kidney disease stage 3.  She did not follow with nephrology previously.  She has a history of type 2 diabetes, hypertension, GERD, prior colectomy done at [facility] for unknown reason.  She presented to ER with several days of nausea, vomiting and diarrhea.  She was unable to maintain PO intake.  She has also been using NSAIDs frequently for arthritis pain. Labs on admission showed creatinine 5, hypokalemia, hypomagnesemia and metabolic acidosis. Hospital course remarkable for minimal improvement of renal function despite IV fluid resuscitation.      Today, no overnight events.  Tolerated dialysis without issue so far today.  Asking about discharge plans.  Denied current chest pain, shortness of air at rest, nausea or vomiting.    Dialysis   Patient was seen and evaluated on renal replacement therapy and I have personally evaluated the patient and directed the therapy  Modality: Hemodialysis  Access: Catheter  Location: right upper  QB: 400  QD: 600  UF: 560      Allergies:  Patient has no known allergies.    Home Meds:  Medications Prior to Admission   Medication Sig Dispense Refill Last Dose/Taking    albuterol sulfate  (90 Base)  MCG/ACT inhaler Inhale 2 puffs Every 4 (Four) Hours As Needed for Wheezing.       ibuprofen (ADVIL,MOTRIN) 200 MG tablet Take 5 tablets by mouth Every 6 (Six) Hours As Needed for Mild Pain.       ondansetron ODT (ZOFRAN-ODT) 4 MG disintegrating tablet Place 1 tablet on the tongue Every 4 (Four) Hours As Needed for Nausea or Vomiting. 10 tablet 0        Medicines:  Current Facility-Administered Medications   Medication Dose Route Frequency Provider Last Rate Last Admin    acetaminophen (TYLENOL) tablet 650 mg  650 mg Oral Q4H PRN BicSterling chiu DO   650 mg at 07/03/25 2242    Or    acetaminophen (TYLENOL) suppository 650 mg  650 mg Rectal Q4H PRN BicSterling chiu DO        aluminum-magnesium hydroxide-simethicone (MAALOX MAX) 400-400-40 MG/5ML suspension 15 mL  15 mL Oral Q6H PRN BicSterling chiu DO   15 mL at 07/05/25 1331    amiodarone (PACERONE) tablet 400 mg  400 mg Oral Q12H Torres Irizarry MD   400 mg at 07/12/25 0833    apixaban (ELIQUIS) tablet 5 mg  5 mg Oral Q12H Juan J Garcia MD   5 mg at 07/12/25 0833    sennosides-docusate (PERICOLACE) 8.6-50 MG per tablet 2 tablet  2 tablet Oral BID Sterling Smith DO   2 tablet at 07/11/25 2237    And    polyethylene glycol (MIRALAX) packet 17 g  17 g Oral Daily PRN Sterling Smith DO        And    bisacodyl (DULCOLAX) EC tablet 5 mg  5 mg Oral Daily PRN BicSterling chiu DO        And    bisacodyl (DULCOLAX) suppository 10 mg  10 mg Rectal Daily PRN BicSterling chiu DO        calcium carbonate (TUMS) chewable tablet 500 mg (200 mg elemental)  2 tablet Oral TID PRN BicSterling chiu DO   2 tablet at 07/04/25 2208    Calcium Replacement - Follow Nurse / BPA Driven Protocol   Not Applicable PRN BickingSterling DO        cholecalciferol (VITAMIN D3) tablet 5,000 Units  5,000 Units Oral Daily Bicking, Sterling K, DO   5,000 Units at 07/12/25 0833    dextrose (D50W) (25 g/50 mL) IV injection 25 g  25 g Intravenous Q15 Min PRN Sterling Smith  DO REYNOLD        dextrose (GLUTOSE) oral gel 15 g  15 g Oral Q15 Min PRN Sterling Smith DO        glucagon (GLUCAGEN) injection 1 mg  1 mg Intramuscular Q15 Min PRN Sterling Smith DO        heparin (porcine) injection 3,600 Units  3,600 Units Intracatheter PRN Javier Patterson MD   3,600 Units at 07/12/25 1219    HYDROcodone-acetaminophen (NORCO) 5-325 MG per tablet 1 tablet  1 tablet Oral Q6H PRN Sterling Smith DO   1 tablet at 07/12/25 0832    insulin glargine (LANTUS, SEMGLEE) injection 10 Units  10 Units Subcutaneous Daily Juan J Garcia MD   10 Units at 07/12/25 0832    insulin regular (humuLIN R,novoLIN R) injection 2-7 Units  2-7 Units Subcutaneous 4x Daily AC & at Bedtime Juan J Garcia MD   4 Units at 07/12/25 0832    Magnesium Standard Dose Replacement - Follow Nurse / BPA Driven Protocol   Not Applicable PRN Sterling Smith DO        metoprolol tartrate (LOPRESSOR) tablet 25 mg  25 mg Oral Q12H Harpal Proctor MD   25 mg at 07/12/25 0833    nitroglycerin (NITROSTAT) SL tablet 0.4 mg  0.4 mg Sublingual Q5 Min PRN Sterling Smith DO        ondansetron ODT (ZOFRAN-ODT) disintegrating tablet 4 mg  4 mg Oral Q6H PRN Sterling Smith DO        Or    ondansetron (ZOFRAN) injection 4 mg  4 mg Intravenous Q6H PRN Sterling Smith DO        pantoprazole (PROTONIX) EC tablet 40 mg  40 mg Oral BID AC BicSterling chiu DO   40 mg at 07/12/25 0833    Phosphorus Replacement - Follow Nurse / BPA Driven Protocol   Not Applicable PRN Juan J Garcia MD        Potassium Replacement - Follow Nurse / BPA Driven Protocol   Not Applicable PRN Sterling Smith DO        sodium bicarbonate tablet 650 mg  650 mg Oral TID Sudhir Erwin, APRN   650 mg at 07/12/25 0833    sodium chloride 0.9 % flush 10 mL  10 mL Intravenous PRN Bicking, Sterling K, DO        sodium chloride 0.9 % flush 10 mL  10 mL Intravenous Q12H BickingSterling, DO   10 mL at 07/12/25 0834    sodium  chloride 0.9 % flush 10 mL  10 mL Intravenous PRN Sterling Smith DO        sodium chloride 0.9 % infusion 40 mL  40 mL Intravenous PRN Sterling Smith DO           Past Medical History:  Past Medical History:   Diagnosis Date    Acid reflux     Arthritis     High cholesterol     Hypertension     Neuropathy     Swollen lymph nodes     LEFT CERVICAL    Type 2 diabetes mellitus        Past Surgical History:  Past Surgical History:   Procedure Laterality Date    CARPAL TUNNEL RELEASE Bilateral     CERVICAL LYMPH NODE BIOPSY/EXCISION Left 2017    Procedure: LEFT NECK EXPLORATION WITH INCISIONAL BIOPSY/CULTURE;  Surgeon: Blue Sanchez MD;  Location: [facility] OR;  Service:      SECTION      COLON RESECTION      COLONOSCOPY N/A 2023    Procedure: COLONOSCOPY WITH ANESTHESIA;  Surgeon: Tamy Smith MD;  Location: [facility] ENDOSCOPY;  Service: Gastroenterology;  Laterality: N/A;  preop; abnormal GI scan   postop ; poor prep   PCP Ruth Noble MD    ENDOSCOPY N/A 2023    Procedure: ESOPHAGOGASTRODUODENOSCOPY WITH ANESTHESIA;  Surgeon: Tamy Smith MD;  Location: [facility] ENDOSCOPY;  Service: Gastroenterology;  Laterality: N/A;  preop; abnormal GI scan   postop esophagitis ; hiatal hernia  PCP Ruth Noble MD    EXCISION LESION      From back    INSERTION HEMODIALYSIS CATHETER Right 2025    Procedure: OR HEMODIALYSIS CATHETER INSERTION;  Surgeon: Sterling Smith DO;  Location: [facility] HYBRID OR;  Service: Vascular;  Laterality: Right;    REPLACEMENT TOTAL KNEE Left     TONSILLECTOMY         Family History  Family History   Problem Relation Age of Onset    No Known Problems Mother     Colon cancer Father         < 60 years old    Breast cancer Neg Hx        Social History  Social History     Socioeconomic History    Marital status:    Tobacco Use    Smoking status: Never    Smokeless tobacco: Never   Vaping Use    Vaping status: Never Used    Substance and Sexual Activity    Alcohol use: No    Drug use: No    Sexual activity: Defer       Review of Systems:  History obtained from chart review and the patient  General ROS: No fever or chills  Respiratory ROS: No cough, shortness of breath, wheezing  Cardiovascular ROS: No chest pain or palpitations  Gastrointestinal ROS: No abdominal pain or melena  Genito-Urinary ROS: No dysuria or hematuria  Psych ROS: No anxiety and depression  14 point ROS reviewed with the patient and negative except as noted above and in the HPI unless unable to obtain.    Objective:  Patient Vitals for the past 24 hrs:   BP Temp Temp src Pulse Resp SpO2 Weight   07/12/25 0812 107/56 97.9 °F (36.6 °C) Axillary 79 18 99 % --   07/12/25 0600 -- -- -- -- -- -- 91.7 kg (202 lb 2.6 oz)   07/12/25 0419 123/58 98.5 °F (36.9 °C) Oral 60 18 98 % --   07/11/25 2029 103/63 97.5 °F (36.4 °C) Oral 68 18 97 % --   07/11/25 1625 130/66 97.4 °F (36.3 °C) Oral 66 18 90 % --   07/11/25 1254 118/79 97.8 °F (36.6 °C) Oral 62 20 90 % --       Intake/Output Summary (Last 24 hours) at 7/12/2025 1236  Last data filed at 7/11/2025 1500  Gross per 24 hour   Intake 120 ml   Output --   Net 120 ml     General: awake/alert   Chest:  clear to auscultation bilaterally without respiratory distress  CVS: regular rate and rhythm  Abdominal: soft, nontender, positive bowel sounds  Extremities: no cyanosis or edema  Skin: warm and dry without rash      Labs:  Results from last 7 days   Lab Units 07/12/25  0349 07/11/25  0431 07/10/25  0346   WBC 10*3/mm3 12.75* 10.47 12.13*   HEMOGLOBIN g/dL 9.9* 9.9* 10.4*   HEMATOCRIT % 30.7* 30.8* 30.9*   PLATELETS 10*3/mm3 180 152 155         Results from last 7 days   Lab Units 07/12/25  0349 07/11/25  0431 07/10/25  0346 07/09/25  1231 07/07/25  2125 07/07/25  1053 07/06/25  0356   SODIUM mmol/L 136 136 137 139   < > 137 134*   POTASSIUM mmol/L 4.1 4.3 5.0 3.7   < > 3.5 3.0*   CHLORIDE mmol/L 99 100 102 100   < > 108* 104   CO2  "mmol/L 24.0 26.0 25.0 26.0   < > 16.0* 15.0*   BUN mg/dL 18.7 10.1 13.5 9.6   < > 36.5* 36.5*   CREATININE mg/dL 2.31* 1.45* 2.02* 1.51*   < > 4.19* 4.65*   CALCIUM mg/dL 8.0* 7.9* 7.9* 7.8*   < > 6.8* 6.7*   EGFR mL/min/1.73 22.2* 38.9* 26.1* 37.0*   < > 10.9* 9.6*   BILIRUBIN mg/dL  --   --   --  0.6  --  0.2 0.2   ALK PHOS U/L  --   --   --  126*  --  88 87   ALT (SGPT) U/L  --   --   --  6  --  28 32   AST (SGOT) U/L  --   --   --  20  --  28 61*   GLUCOSE mg/dL 233* 298* 143* 113*   < > 193* 208*    < > = values in this interval not displayed.       Radiology:   Imaging Results (Last 72 Hours)       ** No results found for the last 72 hours. **            Culture:  No results found for: \"BLOODCX\", \"URINECX\", \"WOUNDCX\", \"MRSACX\", \"RESPCX\", \"STOOLCX\"      Assessment   Acute kidney injury/ATN  Chronic kidney disease  Diabetes type 2  Hypertension on NSAID usage  Hypokalemia  Hypomagnesemia  Anemia with iron deficiency  Metabolic acidosis  Gastroenteritis     Plan:  Discussed with patient, nursing, Hospitalist  Workup reviewed today  Monitor labs  Vascular evaluation reviewed as current  Dialysis initiation began 7/8, planned treatment 7/12 then Monday Wednesday Friday going forward to match outpatient scheduling          Javier Patterson MD  7/12/2025  12:36 CDT        Electronically signed by Javier Patterson MD at 07/12/25 3745       Juan J Garcia MD at 07/12/25 1004              [facility] Medicine Services  INPATIENT PROGRESS NOTE    Patient Name: Lindsay Laughlin  Date of Admission: 7/3/2025  Today's Date: 07/12/25  Length of Stay: 9  Primary Care Physician: Provider, No Known    Subjective   Chief Complaint: Dehydration, vomiting and diarrhea  Abdominal Pain     69-year-old female with history of diabetes mellitus type II, hypertension, hyperlipidemia, chronic kidney disease stage 3b.  Admitted to the hospital 7/5/2025, initially to the intensive care " unit;  she presented to the hospital complaining of nausea, vomiting and diarrhea for at least 5 days.  No rectal bleeding or melena reported.  Had a fall in the morning prior to admission, due to generalized weakness.  Patient found to have severe hypokalemia and hypomagnesemia;   As well as acute kidney injury/renal insufficiency, consider secondary to intravascular volume depletion and possible acute tubular necrosis from chronic nonsteroidal anti-inflammatory use.  Patient was started on hemodialysis during this hospital admission on 7/8/2025.  She had a new permacath placed to right internal jugular vein by vascular surgery on 7/7/2025.  The patient has history of paroxysmal atrial fibrillation, wide-complex tachycardia with cardiac arrest.  She was evaluated by EP cardiology and is started on amiodarone. No ICD implant recommended at this time.      Hemodialysis, started during this admission  Atrial fibrillation with RVR while in dialysis 7/10/25; did not require Cardizem drip; was transferred to 4th floor for monitoring.  Now sinus rhythm.  No other events        Review of Systems   Gastrointestinal:  Positive for abdominal pain.      All pertinent negatives and positives are as above. All other systems have been reviewed and are negative unless otherwise stated.     Objective    Temp:  [97.4 °F (36.3 °C)-98.5 °F (36.9 °C)] 97.9 °F (36.6 °C)  Heart Rate:  [60-79] 79  Resp:  [18-20] 18  BP: (103-130)/(56-79) 107/56  Physical Exam  Constitutional:       Appearance: Chronically ill-appearing, alert oriented x 3, no respiratory distress.  HENT:      Head: Normocephalic and atraumatic.      Nose: Nose normal.      Mouth/Throat:      Mouth: Mucous membranes are moist.   Eyes:      Extraocular Movements: Extraocular movements intact.      Conjunctiva/sclera: Conjunctivae normal.      Pupils: Pupils are equal, round, and reactive to light.   Cardiovascular:      Rate and Rhythm: regular rhythm.      Pulses: Normal  pulses.   Pulmonary:      Effort: No respiratory distress.      Breath sounds: Normal breath sounds.  Abdominal:      General: Abdomen is flat. Bowel sounds are normal.      Palpations: Abdomen is soft.   Extremities:  No lower extremity edema.  Skin:     Capillary Refill: Capillary refill takes less than 2 seconds.      Coloration: Skin is not jaundiced.   Neurological:      General: No focal deficit present.      Mental Status: Patient is alert, oriented to place time and person.     Sensory: No sensory deficit.           Results Review:  I have reviewed the labs, radiology results, and diagnostic studies.    Laboratory Data:   Results from last 7 days   Lab Units 07/12/25  0349 07/11/25  0431 07/10/25  0346   WBC 10*3/mm3 12.75* 10.47 12.13*   HEMOGLOBIN g/dL 9.9* 9.9* 10.4*   HEMATOCRIT % 30.7* 30.8* 30.9*   PLATELETS 10*3/mm3 180 152 155        Results from last 7 days   Lab Units 07/12/25  0349 07/11/25  0431 07/10/25  0346 07/09/25  1231 07/07/25  2125 07/07/25  1053 07/06/25  0356   SODIUM mmol/L 136 136 137 139   < > 137 134*   POTASSIUM mmol/L 4.1 4.3 5.0 3.7   < > 3.5 3.0*   CHLORIDE mmol/L 99 100 102 100   < > 108* 104   CO2 mmol/L 24.0 26.0 25.0 26.0   < > 16.0* 15.0*   BUN mg/dL 18.7 10.1 13.5 9.6   < > 36.5* 36.5*   CREATININE mg/dL 2.31* 1.45* 2.02* 1.51*   < > 4.19* 4.65*   CALCIUM mg/dL 8.0* 7.9* 7.9* 7.8*   < > 6.8* 6.7*   BILIRUBIN mg/dL  --   --   --  0.6  --  0.2 0.2   ALK PHOS U/L  --   --   --  126*  --  88 87   ALT (SGPT) U/L  --   --   --  6  --  28 32   AST (SGOT) U/L  --   --   --  20  --  28 61*   GLUCOSE mg/dL 233* 298* 143* 113*   < > 193* 208*    < > = values in this interval not displayed.       Culture Data:   Blood Culture   Date Value Ref Range Status   07/03/2025 No growth at 5 days  Final   07/03/2025 No growth at 5 days  Final       Radiology Data:   Imaging Results (Last 24 Hours)       ** No results found for the last 24 hours. **            I have reviewed the patient's  current medications.     Assessment/Plan   Assessment  Active Hospital Problems    Diagnosis     **DAISY (acute kidney injury)     Enteritis        Acute kidney injury/renal insufficiency requiring hemodialysis   Paroxysmal atrial fibrillation  Wide-complex tachycardia possible SVT with aberrant conduction  Hypertension  Hyperlipidemia  Acute enteritis, resolved  Chronic cardiomyopathy with diastolic dysfunction.      A1C 9.0    Labs reviewed  Glucose fasting 233      7/5/2025 Echocardiogram    Left ventricular systolic function is normal. Left ventricular ejection fraction appears to be 66 - 70%.    Left ventricular diastolic function is consistent with (grade Ia w/high LAP) impaired relaxation.    There is mild calcification of the aortic valve.    Mild aortic insufficiency    Estimated right ventricular systolic pressure from tricuspid regurgitation is moderately elevated (45-55 mmHg).    Moderate pulmonary hypertension is present.      Treatment Plan  Patient has had hemodialysis as per nephrology recommendations.    Continue hemodialysis per specialist orders.    Daily laboratory tests.      Continue amiodarone as per specialist recommendations.    Continue Eliquis 5 mg p.o. 12 hours.    Replace electrolytes as per protocol.    Continue insulin sliding scale. Increase Lantus to 10 units daily AM.    Will follow recommendations by nephrology regarding long-term dialysis and outpatient arrangements.    Placement in process.      Medical Decision Making  Number and Complexity of problems: 8, moderate to high    Differential Diagnosis: see above    Conditions and Status        Condition is unchanged.     [facility] Data  External documents reviewed: no  Cardiac tracing (EKG, telemetry) interpretation: no new  Radiology interpretation: no new  Labs reviewed: yes  Any tests that were considered but not ordered: no     Decision rules/scores evaluated (example [id number], Wells, etc): see chart     Discussed with: patient      Care Planning  Shared decision making: patient  Code status and discussions: FC    Disposition  Social Determinants of Health that impact treatment or disposition: none  I expect the patient to be discharged to SNF once approved.      Electronically signed by Juan J Garcia MD, 07/12/25, 10:09 CDT.     Electronically signed by Juan J Garcia MD at 07/12/25 1011       Sudhir Erwin, APRN at 07/11/25 0998       Attestation signed by Javier Patterson MD at 07/11/25 6474      I have independently interviewed and examined the patient and reviewed the laboratory, imaging, notes and all other records as available.  I have discussed key elements of the care plan with the APRN and agree with the findings and care plan documented above except as noted.      Subjective:  Initially consulted for evaluation and treatment of acute kidney injury.  She has a history of baseline chronic kidney disease stage 3.  She did not follow with nephrology previously.  She has a history of type 2 diabetes, hypertension, GERD, prior colectomy done at [facility] for unknown reason.  She presented to ER with several days of nausea, vomiting and diarrhea.  She was unable to maintain PO intake.  She has also been using NSAIDs frequently for arthritis pain. Labs on admission showed creatinine 5, hypokalemia, hypomagnesemia and metabolic acidosis. Hospital course remarkable for minimal improvement of renal function despite IV fluid resuscitation.     Today, no overnight events.  Tolerated dialysis without issue yesterday morning.  Asking about discharge plans.  Denied current chest pain, shortness of air at rest, nausea or vomiting.    Objective:  Vitals/labs/studies/notes all reviewed  Exam independently verified with additional comments or findings as noted:  EXT: No edema    Assessment:  Acute kidney injury/ATN  Chronic kidney disease  Diabetes type 2  Hypertension on NSAID  usage  Hypokalemia  Hypomagnesemia  Anemia with iron deficiency  Metabolic acidosis  Gastroenteritis    Plan:  Discussed with patient, nursing, Hospitalist  Workup reviewed today  Monitor labs  Vascular evaluation reviewed as current  Dialysis initiation began 7/8, plan treatment 7/12 then Monday Wednesday Friday going forward to match outpatient scheduling      Javier Patterson MD  7/11/2025  17:35 CDT        This documentation may indicate a split shared visit as defined in CMS Final rule (CY) 2024 Physician Fee Schedule dated 11/2/2023: for Medicare billing purposes, the “substantive portion” means more than half of the total time spent by the physician or nonphysician practitioner performing the split (or shared) visit, or a substantive part of the medical decision making.  This visit involved face to face encounters with the patient by both providers on the date of the visit.  Time spent is indicated to identify the substantive portion of the visit, whereas the level of service may be determined by complexity of medical decision making.                          Nephrology ([facility]) Progress Note      Patient:  Lindsay Laughlin  YOB: 1955  Date of Service: 7/11/2025  MRN: [id number]   Acct: [id number]   Primary Care Physician: Provider, No Known  Advance Directive:   Code Status and Medical Interventions: CPR (Attempt to Resuscitate); Full Support; Discussed CODE STATUS with Mr. Laughlin.  Patient is ,  is in a nursing home.  She does not have any children.   Ordered at: 07/03/25 2000     Code Status (Patient has no pulse and is not breathing):    CPR (Attempt to Resuscitate)     Medical Interventions (Patient has pulse or is breathing):    Full Support     Comments:    Discussed CODE STATUS with Mr. Laughlin.  Patient is ,  is in a nursing home.  She does not have any children.     Level Of Support Discussed With:    Patient     Admit Date:  7/3/2025       Hospital Day: 8  Referring Provider: Shade Alberts MD      Patient personally seen and examined.  Complete chart including Consults, Notes, Operative Reports, Labs, Cardiology, and Radiology studies reviewed as able.        Subjective:  Lindsay Laughlin is a 69 y.o. female for whom we were consulted for evaluation and treatment of acute kidney injury. Baseline chronic kidney disease stage 3. Does not follow with nephrology. History of type 2 diabetes, hypertension, GERD, prior colectomy done at [facility] for unknown reason. Presented to ER with several days of nausea, vomiting and diarrhea. Unable to maintain PO intake. Has also been using NSAIDs frequently for arthritis pain. Labs on admission showing creatinine 5.0, hypokalemia, hypomagnesemia, metabolic acidosis. Hospital course remarkable for minimal improvement of renal function despite IV fluid resuscitation. Patient agreed to have permcath placed and start dialysis.  Dr Smith placed permcath on 7/07. First dialysis on 7/08 was tolerated well. Additional HD provided 7/09 and 7/10.  After dialysis on 7/10 she did have some RVR with her chronic atrial fibrillation    Today is awake and alert. No complaints. No new overnight issues.      Allergies:  Patient has no known allergies.    Home Meds:  Medications Prior to Admission   Medication Sig Dispense Refill Last Dose/Taking    albuterol sulfate  (90 Base) MCG/ACT inhaler Inhale 2 puffs Every 4 (Four) Hours As Needed for Wheezing.       ibuprofen (ADVIL,MOTRIN) 200 MG tablet Take 5 tablets by mouth Every 6 (Six) Hours As Needed for Mild Pain.       ondansetron ODT (ZOFRAN-ODT) 4 MG disintegrating tablet Place 1 tablet on the tongue Every 4 (Four) Hours As Needed for Nausea or Vomiting. 10 tablet 0        Medicines:  Current Facility-Administered Medications   Medication Dose Route Frequency Provider Last Rate Last Admin    acetaminophen (TYLENOL) tablet 650 mg  650  mg Oral Q4H PRN BicSterling chiu DO   650 mg at 07/03/25 2242    Or    acetaminophen (TYLENOL) suppository 650 mg  650 mg Rectal Q4H PRN BicSterling chiu DO        aluminum-magnesium hydroxide-simethicone (MAALOX MAX) 400-400-40 MG/5ML suspension 15 mL  15 mL Oral Q6H PRN BicSterling chiu DO   15 mL at 07/05/25 1331    amiodarone (PACERONE) tablet 400 mg  400 mg Oral Q12H Torres Irizarry MD   400 mg at 07/11/25 0944    apixaban (ELIQUIS) tablet 5 mg  5 mg Oral Q12H Juan J Garcia MD   5 mg at 07/11/25 0943    sennosides-docusate (PERICOLACE) 8.6-50 MG per tablet 2 tablet  2 tablet Oral BID BicSterling chiu DO   2 tablet at 07/11/25 0945    And    polyethylene glycol (MIRALAX) packet 17 g  17 g Oral Daily PRN BicSterling chiu DO        And    bisacodyl (DULCOLAX) EC tablet 5 mg  5 mg Oral Daily PRN BicSterling chiu DO        And    bisacodyl (DULCOLAX) suppository 10 mg  10 mg Rectal Daily PRN BicSterling chiu DO        calcium carbonate (TUMS) chewable tablet 500 mg (200 mg elemental)  2 tablet Oral TID PRN BicSterling chiu DO   2 tablet at 07/04/25 2208    Calcium Replacement - Follow Nurse / BPA Driven Protocol   Not Applicable PRN BickingSterling DO        cholecalciferol (VITAMIN D3) tablet 5,000 Units  5,000 Units Oral Daily BickingSterling DO   5,000 Units at 07/11/25 0944    dextrose (D50W) (25 g/50 mL) IV injection 25 g  25 g Intravenous Q15 Min PRN BicSterling chiu DO        dextrose (GLUTOSE) oral gel 15 g  15 g Oral Q15 Min PRN BicSterling chiu DO        dilTIAZem (CARDIZEM) 125 mg in 125 mL D5W infusion  5-15 mg/hr Intravenous Titrated Juan J Garcia MD   Held at 07/10/25 1528    glucagon (GLUCAGEN) injection 1 mg  1 mg Intramuscular Q15 Min PRN Sterling Smith DO        heparin (porcine) injection 3,600 Units  3,600 Units Intracatheter PRN Javier Patterson MD   3,600 Units at 07/10/25 1343    HYDROcodone-acetaminophen (NORCO) 5-325 MG per tablet  1 tablet  1 tablet Oral Q6H PRN Sterling Smith DO   1 tablet at 07/11/25 0943    insulin regular (humuLIN R,novoLIN R) injection 2-7 Units  2-7 Units Subcutaneous 4x Daily AC & at Bedtime Juan J Garcia MD        Magnesium Standard Dose Replacement - Follow Nurse / BPA Driven Protocol   Not Applicable PRN Sterling Smith, DO        metoprolol tartrate (LOPRESSOR) tablet 25 mg  25 mg Oral Q12H Harpal Proctor MD   25 mg at 07/11/25 0944    nitroglycerin (NITROSTAT) SL tablet 0.4 mg  0.4 mg Sublingual Q5 Min PRN BickingSterling DO        ondansetron ODT (ZOFRAN-ODT) disintegrating tablet 4 mg  4 mg Oral Q6H PRN BicSterling chiu,         Or    ondansetron (ZOFRAN) injection 4 mg  4 mg Intravenous Q6H PRN BickingSterling,         pantoprazole (PROTONIX) EC tablet 40 mg  40 mg Oral BID AC BickingSterling, DO   40 mg at 07/11/25 0944    Phosphorus Replacement - Follow Nurse / BPA Driven Protocol   Not Applicable PRN Juan J Garcia MD        Potassium Replacement - Follow Nurse / BPA Driven Protocol   Not Applicable PRN BicSterling chiu, DO        sodium bicarbonate tablet 650 mg  650 mg Oral TID Sudhir Erwin, APRN   650 mg at 07/11/25 0944    sodium chloride 0.9 % flush 10 mL  10 mL Intravenous PRN BickingSterling,         sodium chloride 0.9 % flush 10 mL  10 mL Intravenous Q12H BickingSterling, DO   10 mL at 07/10/25 2145    sodium chloride 0.9 % flush 10 mL  10 mL Intravenous PRN BickingSterling,         sodium chloride 0.9 % infusion 40 mL  40 mL Intravenous PRN BicSterling chiu DO        sodium phosphates 15 mmol in 250 mL 0.9% sodium chloride IVPB  15 mmol Intravenous Once Juan J Garcia MD           Past Medical History:  Past Medical History:   Diagnosis Date    Acid reflux     Arthritis     High cholesterol     Hypertension     Neuropathy     Swollen lymph nodes     LEFT CERVICAL    Type 2 diabetes mellitus        Past Surgical History:  Past Surgical  History:   Procedure Laterality Date    CARPAL TUNNEL RELEASE Bilateral     CERVICAL LYMPH NODE BIOPSY/EXCISION Left 2017    Procedure: LEFT NECK EXPLORATION WITH INCISIONAL BIOPSY/CULTURE;  Surgeon: Blue Sanchez MD;  Location: [facility] OR;  Service:      SECTION      COLON RESECTION      COLONOSCOPY N/A 2023    Procedure: COLONOSCOPY WITH ANESTHESIA;  Surgeon: Tamy Smith MD;  Location: [facility] ENDOSCOPY;  Service: Gastroenterology;  Laterality: N/A;  preop; abnormal GI scan   postop ; poor prep   PCP Ruth Noble MD    ENDOSCOPY N/A 2023    Procedure: ESOPHAGOGASTRODUODENOSCOPY WITH ANESTHESIA;  Surgeon: Tamy Smith MD;  Location: [facility] ENDOSCOPY;  Service: Gastroenterology;  Laterality: N/A;  preop; abnormal GI scan   postop esophagitis ; hiatal hernia  PCP Ruth Noble MD    EXCISION LESION      From back    INSERTION HEMODIALYSIS CATHETER Right 2025    Procedure: OR HEMODIALYSIS CATHETER INSERTION;  Surgeon: Sterling Smith DO;  Location: [facility] HYBRID OR;  Service: Vascular;  Laterality: Right;    REPLACEMENT TOTAL KNEE Left     TONSILLECTOMY         Family History  Family History   Problem Relation Age of Onset    No Known Problems Mother     Colon cancer Father         < 60 years old    Breast cancer Neg Hx        Social History  Social History     Socioeconomic History    Marital status:    Tobacco Use    Smoking status: Never    Smokeless tobacco: Never   Vaping Use    Vaping status: Never Used   Substance and Sexual Activity    Alcohol use: No    Drug use: No    Sexual activity: Defer       Review of Systems:  History obtained from chart review and the patient  General ROS: No fever or chills  Respiratory ROS: No cough, shortness of breath, wheezing  Cardiovascular ROS: No chest pain or palpitations  Gastrointestinal ROS: No abdominal pain or melena  Genito-Urinary ROS: No dysuria or hematuria  Psych ROS: No anxiety  and depression  14 point ROS reviewed with the patient and negative except as noted above and in the HPI unless unable to obtain.    Objective:  Patient Vitals for the past 24 hrs:   BP Temp Temp src Pulse Resp SpO2   07/11/25 0814 136/70 97.7 °F (36.5 °C) Oral 68 20 --   07/11/25 0431 101/70 99.5 °F (37.5 °C) Oral 69 20 90 %   07/10/25 2034 97/58 97.2 °F (36.2 °C) Oral 73 20 90 %   07/10/25 1617 118/71 -- -- (!) 134 -- --   07/10/25 1442 96/51 97.4 °F (36.3 °C) Oral (!) 130 18 98 %     No intake or output data in the 24 hours ending 07/11/25 0948    General: awake/alert   Chest:  clear to auscultation bilaterally without respiratory distress  CVS: regular rate and rhythm  Abdominal: soft, nontender, positive bowel sounds  Extremities: no cyanosis or edema  Skin: warm and dry without rash      Labs:  Results from last 7 days   Lab Units 07/11/25  0431 07/10/25  0346 07/09/25  1008   WBC 10*3/mm3 10.47 12.13* 10.57   HEMOGLOBIN g/dL 9.9* 10.4* 10.8*   HEMATOCRIT % 30.8* 30.9* 30.5*   PLATELETS 10*3/mm3 152 155 122*         Results from last 7 days   Lab Units 07/11/25  0431 07/10/25  0346 07/09/25  1231 07/07/25  2125 07/07/25  1053 07/06/25  0356   SODIUM mmol/L 136 137 139   < > 137 134*   POTASSIUM mmol/L 4.3 5.0 3.7   < > 3.5 3.0*   CHLORIDE mmol/L 100 102 100   < > 108* 104   CO2 mmol/L 26.0 25.0 26.0   < > 16.0* 15.0*   BUN mg/dL 10.1 13.5 9.6   < > 36.5* 36.5*   CREATININE mg/dL 1.45* 2.02* 1.51*   < > 4.19* 4.65*   CALCIUM mg/dL 7.9* 7.9* 7.8*   < > 6.8* 6.7*   EGFR mL/min/1.73 38.9* 26.1* 37.0*   < > 10.9* 9.6*   BILIRUBIN mg/dL  --   --  0.6  --  0.2 0.2   ALK PHOS U/L  --   --  126*  --  88 87   ALT (SGPT) U/L  --   --  6  --  28 32   AST (SGOT) U/L  --   --  20  --  28 61*   GLUCOSE mg/dL 298* 143* 113*   < > 193* 208*    < > = values in this interval not displayed.       Radiology:   Imaging Results (Last 72 Hours)       Procedure Component Value Units Date/Time    IR Ins Tunneled Dialysis Catheter WO  Port [[id number]] Collected: 07/08/25 0756     Updated: 07/08/25 1614    Narrative:      Performed by Dr. Smith. Please see procedure note.     This report was signed and finalized on 7/8/2025 4:11 PM by Dr. Sterling Smith MD.       FL C Arm During Surgery [[id number]] Collected: 07/08/25 0756     Updated: 07/08/25 1614    Narrative:      Performed by Dr. Smith. Please see procedure note.     This report was signed and finalized on 7/8/2025 4:11 PM by Dr. Sterling Smith MD.               Culture:  Blood Culture   Date Value Ref Range Status   07/03/2025 No growth at 3 days  Preliminary   07/03/2025 No growth at 3 days  Preliminary         Assessment    Acute kidney injury, ATN--now on dialysis  Baseline chronic kidney disease stage 3  Type 2 diabetes  Hypertension  NSAID use  Hypokalemia--improved  Hypomagnesemia  Anemia with iron deficiency  Metabolic acidosis--improved  Gastroenteritis   Chronic atrial fibrillation    Plan:  Dialysis today to synchronize with outpatient MWF schedule  Hold PO potassium supplement  Working on outpatient discharge arrangements      Sudhir Erwin, NATTY  7/11/2025  09:48 CDT      Electronically signed by Javier Patterson MD at 07/11/25 4984       Juan J Garcia MD at 07/11/25 0935              [facility] Medicine Services  INPATIENT PROGRESS NOTE    Patient Name: Lindsay Laughlin  Date of Admission: 7/3/2025  Today's Date: 07/11/25  Length of Stay: 8  Primary Care Physician: Provider, No Known    Subjective   Chief Complaint: Dehydration, vomiting and diarrhea  Abdominal Pain     69-year-old female with history of diabetes mellitus type II, hypertension, hyperlipidemia, chronic kidney disease stage 3b.  Admitted to the hospital 7/5/2025, initially to the intensive care unit;  she presented to the hospital complaining of nausea, vomiting and diarrhea for at least 5 days.  No rectal bleeding or melena reported.  Had a fall in the  morning prior to admission, due to generalized weakness.  Patient found to have severe hypokalemia and hypomagnesemia;   As well as acute kidney injury/renal insufficiency, consider secondary to intravascular volume depletion and possible acute tubular necrosis from chronic nonsteroidal anti-inflammatory use.  Patient was started on hemodialysis during this hospital admission on 7/8/2025.  She had a new permacath placed to right internal jugular vein by vascular surgery on 7/7/2025.  The patient has history of paroxysmal atrial fibrillation, wide-complex tachycardia with cardiac arrest.  She was evaluated by EP cardiology and is started on amiodarone. No ICD implant recommended at this time.    I started taking care of this patient on 7/9/2025;   Has had hemodialysis x 3  Atrial fibrillation with RVR while in dialysis 7/10/25; did not require Cardizem drip; was transferred to 4th floor for monitoring.  Now sinus rhythm.        Review of Systems   Gastrointestinal:  Positive for abdominal pain.      All pertinent negatives and positives are as above. All other systems have been reviewed and are negative unless otherwise stated.     Objective    Temp:  [97.2 °F (36.2 °C)-99.5 °F (37.5 °C)] 97.7 °F (36.5 °C)  Heart Rate:  [] 68  Resp:  [18-20] 20  BP: ()/(51-71) 136/70  Physical Exam  Constitutional:       Appearance: Chronically ill-appearing, alert oriented x 3, no respiratory distress.  HENT:      Head: Normocephalic and atraumatic.      Nose: Nose normal.      Mouth/Throat:      Mouth: Mucous membranes are moist.   Eyes:      Extraocular Movements: Extraocular movements intact.      Conjunctiva/sclera: Conjunctivae normal.      Pupils: Pupils are equal, round, and reactive to light.   Cardiovascular:      Rate and Rhythm: regular rhythm.      Pulses: Normal pulses.   Pulmonary:      Effort: No respiratory distress.      Breath sounds: Normal breath sounds.  Abdominal:      General: Abdomen is flat.  Bowel sounds are normal.      Palpations: Abdomen is soft.   Extremities:  No lower extremity edema.  Skin:     Capillary Refill: Capillary refill takes less than 2 seconds.      Coloration: Skin is not jaundiced.   Neurological:      General: No focal deficit present.      Mental Status: Patient is alert, oriented to place time and person.     Sensory: No sensory deficit.           Results Review:  I have reviewed the labs, radiology results, and diagnostic studies.    Laboratory Data:   Results from last 7 days   Lab Units 07/11/25  0431 07/10/25  0346 07/09/25  1008   WBC 10*3/mm3 10.47 12.13* 10.57   HEMOGLOBIN g/dL 9.9* 10.4* 10.8*   HEMATOCRIT % 30.8* 30.9* 30.5*   PLATELETS 10*3/mm3 152 155 122*        Results from last 7 days   Lab Units 07/11/25  0431 07/10/25  0346 07/09/25  1231 07/07/25  2125 07/07/25  1053 07/06/25  0356   SODIUM mmol/L 136 137 139   < > 137 134*   POTASSIUM mmol/L 4.3 5.0 3.7   < > 3.5 3.0*   CHLORIDE mmol/L 100 102 100   < > 108* 104   CO2 mmol/L 26.0 25.0 26.0   < > 16.0* 15.0*   BUN mg/dL 10.1 13.5 9.6   < > 36.5* 36.5*   CREATININE mg/dL 1.45* 2.02* 1.51*   < > 4.19* 4.65*   CALCIUM mg/dL 7.9* 7.9* 7.8*   < > 6.8* 6.7*   BILIRUBIN mg/dL  --   --  0.6  --  0.2 0.2   ALK PHOS U/L  --   --  126*  --  88 87   ALT (SGPT) U/L  --   --  6  --  28 32   AST (SGOT) U/L  --   --  20  --  28 61*   GLUCOSE mg/dL 298* 143* 113*   < > 193* 208*    < > = values in this interval not displayed.       Culture Data:   Blood Culture   Date Value Ref Range Status   07/03/2025 No growth at 5 days  Final   07/03/2025 No growth at 5 days  Final       Radiology Data:   Imaging Results (Last 24 Hours)       ** No results found for the last 24 hours. **            I have reviewed the patient's current medications.     Assessment/Plan   Assessment  Active Hospital Problems    Diagnosis     **DAISY (acute kidney injury)     Enteritis        Acute kidney injury/renal insufficiency requiring hemodialysis    Paroxysmal atrial fibrillation  Wide-complex tachycardia possible SVT with aberrant conduction  Hypertension  Hyperlipidemia  Acute enteritis, resolved  Chronic cardiomyopathy with diastolic dysfunction.      A1C 9.0    Creatinine 1.45.Sodium 136 potassium 4.3.  CO2 26.  Phosphorus 2.2  Glucose fasting 298      7/5/2025 Echocardiogram    Left ventricular systolic function is normal. Left ventricular ejection fraction appears to be 66 - 70%.    Left ventricular diastolic function is consistent with (grade Ia w/high LAP) impaired relaxation.    There is mild calcification of the aortic valve.    Mild aortic insufficiency    Estimated right ventricular systolic pressure from tricuspid regurgitation is moderately elevated (45-55 mmHg).    Moderate pulmonary hypertension is present.      Treatment Plan  Patient has had hemodialysis as per nephrology recommendations, #3.    Continue hemodialysis per specialist orders.    Daily laboratory tests.    Avoid nephrotoxic medications.    Continue amiodarone as per specialist recommendations.    Continue Eliquis 5 mg p.o. 12 hours.    Replace electrolytes as per protocol.    Continue insulin sliding scale. Start lantus 5 units daily AM due to hyperglycemia since yesterday.    Will follow recommendations by nephrology regarding long-term dialysis and outpatient arrangements.    Placement in process.      Medical Decision Making  Number and Complexity of problems: 8, moderate to high    Differential Diagnosis: see above    Conditions and Status        Condition is unchanged.     MDM Data  External documents reviewed: no  Cardiac tracing (EKG, telemetry) interpretation: no new  Radiology interpretation: no new  Labs reviewed: yes  Any tests that were considered but not ordered: no     Decision rules/scores evaluated (example [id number], Wells, etc): see chart     Discussed with: patient     Care Planning  Shared decision making: patient  Code status and discussions:  FC    Disposition  Social Determinants of Health that impact treatment or disposition: none  I expect the patient to be discharged to SNF once approved.      Electronically signed by Juan J Garcia MD, 07/11/25, 09:48 CDT.     Electronically signed by Juan J Garcia MD at 07/11/25 1151       Juan J Garcia MD at 07/10/25 1138              [facility] Medicine Services  INPATIENT PROGRESS NOTE    Patient Name: Lindsay Laughlin  Date of Admission: 7/3/2025  Today's Date: 07/10/25  Length of Stay: 7  Primary Care Physician: Provider, No Known    Subjective   Chief Complaint: Dehydration, vomiting and diarrhea  Abdominal Pain     69-year-old female with history of diabetes mellitus type II, hypertension, hyperlipidemia, chronic kidney disease stage 3b.  Admitted to the hospital 7/5/2025, initially to the intensive care unit;  she presented to the hospital complaining of nausea, vomiting and diarrhea for at least 5 days.  No rectal bleeding or melena reported.  Had a fall in the morning prior to admission, due to generalized weakness.  Patient found to have severe hypokalemia and hypomagnesemia;   As well as acute kidney injury/renal insufficiency, consider secondary to intravascular volume depletion and possible acute tubular necrosis from chronic nonsteroidal anti-inflammatory use.  Patient was started on hemodialysis during this hospital admission on 7/8/2025.  She had a new permacath placed to right internal jugular vein by vascular surgery on 7/7/2025.  The patient has history of paroxysmal atrial fibrillation, wide-complex tachycardia with cardiac arrest.  She was evaluated by EP cardiology and is started on amiodarone. No ICD implant recommended at this time.    I started taking care of this patient on 7/9/2025;  I evaluated patient while having hemodialysis.    Dialysis day #3.  She went into atrial fibrillation with RVR while in dialysis.        Review of Systems    Gastrointestinal:  Positive for abdominal pain.      All pertinent negatives and positives are as above. All other systems have been reviewed and are negative unless otherwise stated.     Objective    Temp:  [97.3 °F (36.3 °C)-98.1 °F (36.7 °C)] 98 °F (36.7 °C)  Heart Rate:  [68-78] 78  Resp:  [18] 18  BP: ()/(36-91) 121/91  Physical Exam  Constitutional:       Appearance: Chronically ill-appearing, alert oriented x 3, no respiratory distress.  HENT:      Head: Normocephalic and atraumatic.      Nose: Nose normal.      Mouth/Throat:      Mouth: Mucous membranes are moist.   Eyes:      Extraocular Movements: Extraocular movements intact.      Conjunctiva/sclera: Conjunctivae normal.      Pupils: Pupils are equal, round, and reactive to light.   Cardiovascular:      Rate and Rhythm: irregular rhythm.      Pulses: Normal pulses.   Pulmonary:      Effort: No respiratory distress.      Breath sounds: Normal breath sounds.  Abdominal:      General: Abdomen is flat. Bowel sounds are normal.      Palpations: Abdomen is soft.   Extremities:  No lower extremity edema.  Skin:     Capillary Refill: Capillary refill takes less than 2 seconds.      Coloration: Skin is not jaundiced.   Neurological:      General: No focal deficit present.      Mental Status: Patient is alert, oriented to place time and person.     Sensory: No sensory deficit.           Results Review:  I have reviewed the labs, radiology results, and diagnostic studies.    Laboratory Data:   Results from last 7 days   Lab Units 07/10/25  0346 07/09/25  1008 07/08/25  0242   WBC 10*3/mm3 12.13* 10.57 9.77   HEMOGLOBIN g/dL 10.4* 10.8* 10.5*   HEMATOCRIT % 30.9* 30.5* 29.9*   PLATELETS 10*3/mm3 155 122* 170        Results from last 7 days   Lab Units 07/10/25  0346 07/09/25  1231 07/08/25  0242 07/07/25  2125 07/07/25  1053 07/06/25  0356   SODIUM mmol/L 137 139 138  --  137 134*   POTASSIUM mmol/L 5.0 3.7 3.9   < > 3.5 3.0*   CHLORIDE mmol/L 102 100 109*   --  108* 104   CO2 mmol/L 25.0 26.0 13.0*  --  16.0* 15.0*   BUN mg/dL 13.5 9.6 34.4*  --  36.5* 36.5*   CREATININE mg/dL 2.02* 1.51* 4.19*  --  4.19* 4.65*   CALCIUM mg/dL 7.9* 7.8* 7.1*  --  6.8* 6.7*   BILIRUBIN mg/dL  --  0.6  --   --  0.2 0.2   ALK PHOS U/L  --  126*  --   --  88 87   ALT (SGPT) U/L  --  6  --   --  28 32   AST (SGOT) U/L  --  20  --   --  28 61*   GLUCOSE mg/dL 143* 113* 164*  --  193* 208*    < > = values in this interval not displayed.       Culture Data:   Blood Culture   Date Value Ref Range Status   07/03/2025 No growth at 5 days  Final   07/03/2025 No growth at 5 days  Final       Radiology Data:   Imaging Results (Last 24 Hours)       ** No results found for the last 24 hours. **            I have reviewed the patient's current medications.     Assessment/Plan   Assessment  Active Hospital Problems    Diagnosis     **DAISY (acute kidney injury)     Enteritis        Acute kidney injury/renal insufficiency requiring hemodialysis   Paroxysmal atrial fibrillation  Wide-complex tachycardia possible SVT with aberrant conduction  Hypertension  Hyperlipidemia  Acute enteritis, resolved  Chronic cardiomyopathy with diastolic dysfunction.      A1C 9.0    Sodium 137 potassium 4.0.  Creatinine 2.00.  Glucose 143.  Phosphorus 2.1.  Magnesium 2.2  Hemoglobin 10.4, platelet count 155,000.      7/5/2025 echocardiogram    Left ventricular systolic function is normal. Left ventricular ejection fraction appears to be 66 - 70%.    Left ventricular diastolic function is consistent with (grade Ia w/high LAP) impaired relaxation.    There is mild calcification of the aortic valve.    Mild aortic insufficiency    Estimated right ventricular systolic pressure from tricuspid regurgitation is moderately elevated (45-55 mmHg).    Moderate pulmonary hypertension is present.      Treatment Plan  Patient is having hemodialysis as per nephrology recommendations, currently day #3.  She will continue hemodialysis tomorrow  as per specialist orders.  Daily laboratory tests.    Avoid nephrotoxic medications.    Start Cardizem drip due to atrial fibrillation with rapid regular response.  Continue amiodarone as per specialist recommendations.    Continue Eliquis 5 mg p.o. 12 hours.    Replace electrolytes as per protocol.    Continue insulin sliding scale.    Will follow recommendations by nephrology regarding long-term dialysis and outpatient arrangements.    With a new Cardizem drip due to A-fib with RVR, patient will be transferred to fourth floor, as medication cannot be titrated in the third floor.        Medical Decision Making  Number and Complexity of problems: 8, moderate to high    Differential Diagnosis: see above    Conditions and Status        Condition is unchanged.     [facility] Data  External documents reviewed: no  Cardiac tracing (EKG, telemetry) interpretation: no new  Radiology interpretation: no new  Labs reviewed: yes  Any tests that were considered but not ordered: no     Decision rules/scores evaluated (example [id number], Wells, etc): see chart     Discussed with: patient     Care Planning  Shared decision making: patient  Code status and discussions: FC    Disposition  Social Determinants of Health that impact treatment or disposition: none  I expect the patient to be discharged to SNF once approved.      Electronically signed by Juan J Garcia MD, 07/10/25, 11:38 CDT.     Electronically signed by Juan J Garcia MD at 07/10/25 1143       Consult Notes (last 72 hours)  Notes from 07/10/25 1127 through 07/13/25 1127   No notes of this type exist for this encounter.

## 2025-07-14 ENCOUNTER — HOSPITAL ENCOUNTER (INPATIENT)
Dept: CARDIOLOGY | Facility: HOSPITAL | Age: 70
Discharge: HOME | DRG: 674 | End: 2025-07-14
Payer: MEDICARE

## 2025-07-14 VITALS
OXYGEN SATURATION: 92 % | HEIGHT: 65 IN | SYSTOLIC BLOOD PRESSURE: 105 MMHG | HEART RATE: 91 BPM | RESPIRATION RATE: 16 BRPM | WEIGHT: 217.2 LBS | DIASTOLIC BLOOD PRESSURE: 93 MMHG | TEMPERATURE: 98.7 F | BODY MASS INDEX: 36.19 KG/M2

## 2025-07-14 DIAGNOSIS — I48.0 PAROXYSMAL ATRIAL FIBRILLATION: ICD-10-CM

## 2025-07-14 PROBLEM — E83.42 HYPOMAGNESEMIA: Status: ACTIVE | Noted: 2025-07-14

## 2025-07-14 PROBLEM — E87.6 HYPOKALEMIA: Status: ACTIVE | Noted: 2025-07-14

## 2025-07-14 LAB
ANION GAP SERPL CALCULATED.3IONS-SCNC: 10 MMOL/L (ref 5–15)
BUN SERPL-MCNC: 24.8 MG/DL (ref 8–23)
BUN/CREAT SERPL: 11 (ref 7–25)
CALCIUM SPEC-SCNC: 8.5 MG/DL (ref 8.6–10.5)
CHLORIDE SERPL-SCNC: 102 MMOL/L (ref 98–107)
CO2 SERPL-SCNC: 26 MMOL/L (ref 22–29)
CREAT SERPL-MCNC: 2.25 MG/DL (ref 0.57–1)
DEPRECATED RDW RBC AUTO: 74.3 FL (ref 37–54)
EGFRCR SERPLBLD CKD-EPI 2021: 23 ML/MIN/1.73
ERYTHROCYTE [DISTWIDTH] IN BLOOD BY AUTOMATED COUNT: 20.3 % (ref 12.3–15.4)
GLUCOSE BLDC GLUCOMTR-MCNC: 220 MG/DL (ref 70–130)
GLUCOSE BLDC GLUCOMTR-MCNC: 252 MG/DL (ref 70–130)
GLUCOSE SERPL-MCNC: 133 MG/DL (ref 65–99)
HCT VFR BLD AUTO: 31 % (ref 34–46.6)
HGB BLD-MCNC: 9.7 G/DL (ref 12–15.9)
MAGNESIUM SERPL-MCNC: 1.5 MG/DL (ref 1.6–2.4)
MCH RBC QN AUTO: 31.7 PG (ref 26.6–33)
MCHC RBC AUTO-ENTMCNC: 31.3 G/DL (ref 31.5–35.7)
MCV RBC AUTO: 101.3 FL (ref 79–97)
PLATELET # BLD AUTO: 199 10*3/MM3 (ref 140–450)
PMV BLD AUTO: 11.2 FL (ref 6–12)
POTASSIUM SERPL-SCNC: 4.9 MMOL/L (ref 3.5–5.2)
RBC # BLD AUTO: 3.06 10*6/MM3 (ref 3.77–5.28)
SODIUM SERPL-SCNC: 138 MMOL/L (ref 136–145)
WBC NRBC COR # BLD AUTO: 10.79 10*3/MM3 (ref 3.4–10.8)

## 2025-07-14 PROCEDURE — 25010000002 MAGNESIUM SULFATE IN D5W 1G/100ML (PREMIX) 1-5 GM/100ML-% SOLUTION: Performed by: FAMILY MEDICINE

## 2025-07-14 PROCEDURE — 63710000001 INSULIN GLARGINE PER 5 UNITS: Performed by: FAMILY MEDICINE

## 2025-07-14 PROCEDURE — 85027 COMPLETE CBC AUTOMATED: CPT | Performed by: STUDENT IN AN ORGANIZED HEALTH CARE EDUCATION/TRAINING PROGRAM

## 2025-07-14 PROCEDURE — 25010000002 HEPARIN (PORCINE) PER 1000 UNITS: Performed by: INTERNAL MEDICINE

## 2025-07-14 PROCEDURE — 80048 BASIC METABOLIC PNL TOTAL CA: CPT | Performed by: STUDENT IN AN ORGANIZED HEALTH CARE EDUCATION/TRAINING PROGRAM

## 2025-07-14 PROCEDURE — 63710000001 INSULIN REGULAR HUMAN PER 5 UNITS: Performed by: FAMILY MEDICINE

## 2025-07-14 PROCEDURE — 93246 EXT ECG>7D<15D RECORDING: CPT

## 2025-07-14 PROCEDURE — 83735 ASSAY OF MAGNESIUM: CPT | Performed by: STUDENT IN AN ORGANIZED HEALTH CARE EDUCATION/TRAINING PROGRAM

## 2025-07-14 PROCEDURE — 82948 REAGENT STRIP/BLOOD GLUCOSE: CPT

## 2025-07-14 PROCEDURE — 25010000002 DIPHENHYDRAMINE PER 50 MG

## 2025-07-14 PROCEDURE — 82948 REAGENT STRIP/BLOOD GLUCOSE: CPT | Performed by: FAMILY MEDICINE

## 2025-07-14 RX ORDER — CHOLECALCIFEROL (VITAMIN D3) 25 MCG
5000 TABLET ORAL DAILY
Start: 2025-07-15

## 2025-07-14 RX ORDER — MAGNESIUM SULFATE 1 G/100ML
1 INJECTION INTRAVENOUS
Status: COMPLETED | OUTPATIENT
Start: 2025-07-14 | End: 2025-07-14

## 2025-07-14 RX ORDER — SODIUM BICARBONATE 650 MG/1
650 TABLET ORAL 3 TIMES DAILY
Start: 2025-07-14

## 2025-07-14 RX ORDER — DIPHENHYDRAMINE HYDROCHLORIDE 50 MG/ML
25 INJECTION, SOLUTION INTRAMUSCULAR; INTRAVENOUS ONCE
Status: COMPLETED | OUTPATIENT
Start: 2025-07-14 | End: 2025-07-14

## 2025-07-14 RX ORDER — HYDROCODONE BITARTRATE AND ACETAMINOPHEN 5; 325 MG/1; MG/1
1 TABLET ORAL EVERY 8 HOURS PRN
Qty: 9 TABLET | Refills: 0 | Status: SHIPPED | OUTPATIENT
Start: 2025-07-14 | End: 2025-07-17

## 2025-07-14 RX ORDER — GABAPENTIN 100 MG/1
100 CAPSULE ORAL NIGHTLY
Qty: 3 CAPSULE | Refills: 0 | Status: SHIPPED | OUTPATIENT
Start: 2025-07-14 | End: 2025-07-17

## 2025-07-14 RX ORDER — CALCIUM CARBONATE 500 MG/1
2 TABLET, CHEWABLE ORAL 3 TIMES DAILY PRN
Start: 2025-07-14

## 2025-07-14 RX ORDER — PANTOPRAZOLE SODIUM 40 MG/1
40 TABLET, DELAYED RELEASE ORAL
Start: 2025-07-14

## 2025-07-14 RX ORDER — ACETAMINOPHEN 325 MG/1
650 TABLET ORAL EVERY 4 HOURS PRN
Start: 2025-07-14

## 2025-07-14 RX ADMIN — PANTOPRAZOLE SODIUM 40 MG: 40 TABLET, DELAYED RELEASE ORAL at 08:31

## 2025-07-14 RX ADMIN — MAGNESIUM SULFATE IN DEXTROSE 1 G: 10 INJECTION, SOLUTION INTRAVENOUS at 05:56

## 2025-07-14 RX ADMIN — INSULIN HUMAN 4 UNITS: 100 INJECTION, SOLUTION PARENTERAL at 08:30

## 2025-07-14 RX ADMIN — HEPARIN SODIUM 3600 UNITS: 1000 INJECTION INTRAVENOUS; SUBCUTANEOUS at 13:43

## 2025-07-14 RX ADMIN — INSULIN GLARGINE 10 UNITS: 100 INJECTION, SOLUTION SUBCUTANEOUS at 08:30

## 2025-07-14 RX ADMIN — INSULIN HUMAN 4 UNITS: 100 INJECTION, SOLUTION PARENTERAL at 17:12

## 2025-07-14 RX ADMIN — DIPHENHYDRAMINE HYDROCHLORIDE 25 MG: 50 INJECTION, SOLUTION INTRAMUSCULAR; INTRAVENOUS at 04:07

## 2025-07-14 RX ADMIN — MAGNESIUM SULFATE IN DEXTROSE 1 G: 10 INJECTION, SOLUTION INTRAVENOUS at 07:21

## 2025-07-14 RX ADMIN — ACETAMINOPHEN 650 MG: 325 TABLET ORAL at 17:12

## 2025-07-14 RX ADMIN — MAGNESIUM SULFATE IN DEXTROSE 1 G: 10 INJECTION, SOLUTION INTRAVENOUS at 08:30

## 2025-07-14 RX ADMIN — AMIODARONE HYDROCHLORIDE 400 MG: 200 TABLET ORAL at 08:31

## 2025-07-14 RX ADMIN — APIXABAN 5 MG: 5 TABLET, FILM COATED ORAL at 08:32

## 2025-07-14 RX ADMIN — SODIUM BICARBONATE 650 MG: 650 TABLET ORAL at 08:32

## 2025-07-14 RX ADMIN — METOPROLOL TARTRATE 25 MG: 25 TABLET, FILM COATED ORAL at 08:31

## 2025-07-14 RX ADMIN — SODIUM BICARBONATE 650 MG: 650 TABLET ORAL at 17:12

## 2025-07-14 RX ADMIN — ACETAMINOPHEN 650 MG: 325 TABLET ORAL at 08:31

## 2025-07-14 RX ADMIN — Medication 5000 UNITS: at 08:30

## 2025-07-14 RX ADMIN — PANTOPRAZOLE SODIUM 40 MG: 40 TABLET, DELAYED RELEASE ORAL at 17:12

## 2025-07-14 NOTE — CASE MANAGEMENT/SOCIAL WORK
Post-Acute Authorization Submission      Post Acute Pre-Cert Documentation  Request Submitted by Facility - Type:: Hospital  Post-Acute Authorization Type Submitted:: SNF  Date Post Acute Pre-Cert Inititated per Facility: 07/14/25  Verification from Payer: Yes  Date Post Acute Pre-Cert Completed: 07/14/25  Accepting Facility: [facility] Discharge Date Requested: 07/14/25  All Clinicals Submitted?: Yes  Had Accepting Facility at Time of Submission: Yes  Response Received from Insurance?: Approval  Response Communicated to:: , Accepting Facility Liaison  Authorization Number:: [id number]  Post Acute Pre-Cert Initiated Comment: Precert approved for [facility].              Elli Stewart RN

## 2025-07-14 NOTE — DISCHARGE PLACEMENT REQUEST
"Javier Pastrana (69 y.o. Female)       Date of Birth   1955    Social Security Number       Address   [address] [place] [address]    Home Phone   [phone]    MRN   [id number]       Worship   [employer]    Marital Status                               Admission Date   7/3/2025    Admission Type   Emergency    Admitting Provider   Sanchez Lopez DO    Attending Provider   Sanchez Lopez DO    Department, Room/Bed   [facility] 4B, 412/1       Discharge Date       Discharge Disposition       Discharge Destination                                 Attending Provider: Sanchez Lopez DO    Allergies: No Known Allergies    Isolation: None   Infection: None   Code Status: CPR    Ht: 165.1 cm (65\")   Wt: 98.5 kg (217 lb 3.2 oz)    Admission Cmt: None   Principal Problem: DAISY (acute kidney injury) [N17.9]                   Active Insurance as of 7/3/2025       Primary Coverage       Payor Plan Insurance Group Employer/Plan Group    UNITED [employer]ALTHC[employer] MEDICARE REPLACEMENT UHC MEDICARE ADVANTAGE [facility] HMO NON PAR [id number]       Payor Plan Address Payor Plan Phone Number Payor Plan Fax Number Effective Dates    [address] [address]   7/1/2025 - None Entered    [facility] [id number]         Subscriber Name Subscriber Birth Date Member ID       JAVIER PASTRANA 1955 [id number]               Secondary Coverage       Payor Plan Insurance Group Employer/Plan Group    [id number]        Payor Plan Address Payor Plan Phone Number Payor Plan Fax Number Effective Dates    [address]   5/17/2023 - None Entered    [place] [address]         Subscriber Name Subscriber Birth Date Member ID       JAVIER PASTRANA 1955 [id number]                     Emergency Contacts        (Rel.) Home Phone Work Phone Mobile Phone    Rodger Marrufo (Friend) [phone] -- --    Klever Stone (Friend) -- -- [phone]    Laura Phelan -- -- [phone]                "

## 2025-07-14 NOTE — CASE MANAGEMENT/SOCIAL WORK
Continued Stay Note  [facility]     Patient Name: Lindsay Laughlin  MRN: [id number]  Today's Date: 7/14/2025    Admit Date: 7/3/2025    Plan: [facility]   Discharge Plan       Row Name 07/14/25 0946       Plan    Plan [facility]( can only take pt if she can use [facility] for transportation to and from dialysis)    Patient/Family in Agreement with Plan yes    Final Discharge Disposition Code 03 - skilled nursing facility (SNF)    Final Note Pt will be discharged to [facility] today, ins. approved and she will be admitted at SNF level care.  Per RN, pt will need to transport today via EMS- [facility] [phone].    [facility]   [phone]  Fax: [phone]  [facility] Cell [phone]      Row Name 07/14/25 0924       Plan    Plan [facility]- upon ins. precert approval    Patient/Family in Agreement with Plan yes    Plan Comments [facility] at [facility] saying pt is out of network with them.  Only bed offer is from [facility] and pt accepts. Elli Stewart- [facility]  is starting ins. precert request. [facility] if ins. approves and informed of dialysis set up at [facility] at 10 05am at [address] location. Will follow.                   Discharge Codes    No documentation.                 Expected Discharge Date and Time       Expected Discharge Date Expected Discharge Time    Jul 14, 2025               Brandie Moise, NICOLEW

## 2025-07-14 NOTE — NURSING NOTE
[facility]  DIALYSIS TREATMENT SUMMARY     Note: Consult with the attending physician for patient treatment orders, this document is not a physician order.     Patient Information  Patient Lindsay Laughlin  Date of Birth November 07, 1955  Chart Number [id number]  Location [facility]  Location MRN [id number]  Gender Female  SSN (last 4)   Treatment Information  Treatment Type Hemodialysis  Treatment Id [id number]  Start Time July 14, 2025 10:02  End Time July 14, 2025 13:40  Acutal Duration 03:38  Treatment Balances  Total Saline Administered 500  Other 400  Net Fluid Balance -1700  Hemodialysis Orders  Therapy Standard  Orders Verified Time 07/14/2025 09:17   Date Verified 07/14/2025  Duration 3:30  Isolated UF/Bypass No  BFR (mL) 450  DFR X1.5 BFR  DFR (mL) 700  Dialyzer Type OPTIFLUX 160NR  UF Order UF Range  UF Range (mL) 1000 - 2500  With BP Parameters Yes  As Tolerated Yes  Crit-Line used No  Heparin Initial Units Bolus No  Heparin IV Maintenance Bolus No  Heparin IV Infusion No  Potassium (mEq/L) 2  Calcium (mEq/L) 2.5  Bicarb (mg/dL) 33  Sodium (mEq/L) 138  Additional Orders KEEP SYSTOLIC BLOOD PRESSURE GREATER THAN 89.  Clinician Haider Wyman RN  AV Access  Access Type Central Venous Catheter  Central Venous Catheter  Access Type Catheter - Tunneled  Date Central Venous Catheter Placed 07/07/2025  Access Location Chest Wall - R  Current/New [facility] Patient Yes  Surgeon DR. MATTHEWS  1st Use Catheter Verified by X-Ray  Catheter Care Completed per Policy Yes  Dressing Dry and Intact on Arrival Yes  Dressing Changed Yes  Type of Dressing Film Biopatch/CHG  Dressing Changed By [facility] Staff  Notes CHANGED PER FLOOR NURSE  Type of HD Caps Curos  HD Caps Changed Yes  CVC Line Education Provided Yes - Infection Prevention  Comments/Notes RIGHT IJ PERMCATH, PATENT X 2 PORTS, SITE CLEAR  Vitals  Pre-Treatment Vitals  Time Is BP being recorded? Pre BP Map BP Method Pulse RR Temp How was  Weight Obtained? Pre Weight Previous Dry Weight Previous Post Weight Metric Target Fluid Removal (mL) Dialysate Confirmed Clinician  07/14/2025 09:58 BP/Map 119/60 (80) Noninvasive 71 20 97.9 How Obtained: Bed Scale 98.6   Kgs 1000 Yes Haider Wyman, GERARD  Comments: UF FLUID REMOVAL RANGE 1000 ML TO 2500 ML AS TOLERATED. PATIENT IDENTIFICATION VERIFIED, ALLERGIES AND MEDICATIONS REVIEWED. PATIENT IS ALERT, ORIENTED X 3, NO PAIN, NO CHEST PAIN. RESPIRATIONS EVEN, LUNGS WHEEZES AND DIMINISHED. HEART RATE REGULAR, S1 S2, SINUS RHYTHM PER TELEMETRY. SKIN WARM AND DRY, +2 EDEMA LEGS. LAYING IN BED, HEAD OF BED ELEVATED 30 DEGREES.  Intra Procedure Vitals  Rec Type Time Is BP being recorded? BP Map Pulse RR BFR DFR AP  TMP UFR RMVD Bolus NSS Flush Chills Safety Check Crit-Line HCT Crit-Line BV% Clinician  E 07/14/2025 13:40 BP/Map 112/69 (83) 70 20 150 500 -30 50 40 300 2600  250  Yes   Haider Wyman, GERARD  Comments: HEMODIALYSIS FINISHED, BLOOD RETURNED.  Post Treatment Vitals  Time Is BP being recorded? BP Map Pulse RR Temp How was Weight Obtained? Post Weight Metric BVP UF Goal Ordered NSS Given Intra-Procedure Total Machine UF Removed (mL) Crit-Line Ending Profile Crit-Line Refill Crit-Line Ending HCT Crit-Line Max BV% Clinician  07/14/2025 13:55 BP/Map 104/66 (79) 74 20 97.6 How Obtained: Bed scale 96.9 Kgs 89.4 3600 760 1354     Haider Wyman, GERARD  Comments: PATIENT IS ALERT, ORIENTED X 3, NO PAIN, NO CHEST PAIN. RESPIRATIONS EVEN AND UNLABORED. LUNGS DIMINISHED. O2 SAT 97% ON O2 AT 3 LITERS / MINUTE PER NASAL CANNULA. SKIN WARM AND DRY. +2 EDEMA LEGS. HEART RATE REGULAR, S1 S2, SINUS RHYTHM PER TELEMETRY. TOLERATED TREATMENT WELL.  Safety checks include: access uncovered and secured, Hemaclip secured for all central line access, machine checks performed, and alarm limits confirmed.  Labs  Hepatitis  HBsAG Lab Result HBsAG Lab Result HBsAG Draw Date Transient Antigenemia(MD Diagnosis Only) Anti-HBs Lab Result Anti-HBs  Lab Value Anti-HBs Draw Date Documented By Documented Date Hepatitis Status Hepatitis Status  Negative  07/07/2025  Negative  07/07/2025 Haider Wyman 07/14/2025  Susceptible  Notes:   Pre-Treatment Hepatitis Precautions Patient tx outside buffer zone Yes Hepatitis Information Entered By Haider Wyman RN  Patient tx at bedside 1:1 Yes Hard copy verified by nurse and placed in patient’s hospital chart Yes Signing  Patient tx with immune pts only Yes Copy of hepatitis results verified in hospital EMR Yes Signed By Haider Wyman RN  Pre-Treatment Handoff  Staff Report Received Yes  Report Given by Primary Nurse MARGUERITE OCAMPO RN  Time 09:15  Patient Arrival  Patient ID Verified Date of Birth  MRN  Full Name  Patient Consent to treatment verified Yes  Blood Transfusion Consent Verified N/A  Treatment Comments  Treatment Notes PATIENT IS ALERT, ORIENTED X 3, NO PAIN, NO CHEST PAIN. RESPIRATIONS 20 EVEN AND UNLABORED. LUNGS DIMINISHED. O2 SAT 97% ON O2 AT 3 LITERS / MINUTE PER NASAL CANNULA. SKIN WARM AND DRY. +2 EDEMA LEGS. HEART RATE REGULAR, S1 S2, SINUS RHYTHM PER TELEMETRY AT 74. BLOOD PRESSURE 104/66, MAP 79, TEMPERATURE 97.6 DEGREES. REMOVED 1700 ML OF FLUID. POST WEIGHT IS 96.9 KG. TOLERATED TREATMENT WELL.  Post-Treatment Handoff  Report Given to Primary Nurse MARGUERITE OCAMPO RN  Time Report Given 14:07  Report Given By Haider Wyman RN  Machine Validation  Time 09:45  Date 7/14/2025  Auto Alarm Test Passed Yes  Machine Serial # [id number]  Portable RO Yes  RO Serial# [id number]  Residual Bleach Negative Yes  Was a manufactured mix used? Yes  Machine Log Completed Yes  Total Chlorine (less than 0.1)? Yes  Total Chlorine Log Completed Yes  Bicarb BiBag  Bibag Size 900  Machine Temp 36.7  Machine Conductivity 13.8  Meter Type N/A  Meter Conductivity   Independent Conductivity 13.8  pH Status Pass Pass  pH   TCD Value 13.7  TCD Alarm with +/- 0.5 Yes  NVL enabled validated 100 asymmetric Yes  Safety check  complete Haider Wyman RN  Second Verification Performed? Yes  Second Verification Performed By Haider Wyman RN  Reason Not Verified   Administered Medications  Time Medication Dose Route Reason for Admin Clinician  07/14/2025 13:43 HEPARIN 1000 UNITS / ML 3.6 ML / 3600 UNITS IVP CATHETER LOCKS Haider Wyman RN  Comments: POST DIALYSIS, INSTILL HEPARIN 1000 UNITS / ML, 1.8 ML, 1800 UNITS HEPARIN INTO EACH PORT OF RIGHT IJ PERMCATH, X 2 PORTS, 3.6 ML TOTAL, 3600 UNITS HEPARIN TOTAL AS CATHETER LOCKS.  Facility Information Room # 412-4B Diagnosis DAISY ON CKD 3; DIABETES TYPE 2; HYPERTENSION; ANEMIA; GASTROENTERITIS; PULMONARY HYPERTENSION; ARTHRITIS; HX OF ATRIAL FIBRILLATION  Facility Information Stat Treatment No Isolation Information  Admission Date 07/03/2025 Patient Type New patient to dialysis with diagnosis of “Acute Kidney Injury” Isolation Required? N  Ordering MD DR. HOANG Patient Chronic Unit [facility] Completed by  Account/Finance Number [id number] Patient Home Unit [address] [place] [address] information Entered by Haider Wyman RN  Admission Status InPatient Code Status Full Code   Location Dialysis Suite Multi Diagnosis   Start Treatment Time Out Confirmed by PEPE LIN RN AND HAIDER WYMAN RN Correct access site verified Yes  Treatment Initiation Connections Secured Time Out Completed 09:54 Treatment Start Date 07/14/2025  Saline line double clamped Correct patient verified Yes Treatment Start Time 10:02  Hemaclip Applied Correct procedure verified Yes Patient/Family questions and concerns addressed Yes  Pre Focused Assessment Notes RESPIRATIONS 20 LOC Alert and Oriented x3  Access Edema GI / Bowels  Date Inserted 07/07/2025 Location Generalized GI Soft  Signs and Symptoms of Infection? No Edema Grade 2+  Bowel sounds present  Notes RIGHT IJ PERMCATH, PATENT X 2 PORTS, SITE CLEAR Notes +2 EDEMA LEGS  Non distended  Pain Screening Cardiac   Does the patient have pain? No Heart  Rhythm Regular  Voids  Respiratory Telemetry Yes Completed by  Lung Sounds Diminished Notes SINUS RHYTHM Pre Treatment Focused Assessment Completed By Haider Wyman RN  Wheezing Skin Time 09:56  Location Bilateral Skin Warm Signing  Position Anterior  Dry Signed By Haider Wyman RN  Respiratory Efforts Unlabored LOC   Education  Fluid Intake  Medications  Patient Education  Hemodialysis treatment review  Diet and/or exercise  Patient Educated? Yes Method Knowledge / Understanding Assessed Demonstration  Fluid Intake  Focus or Topic Treatment Options Family Education Provided? N/A  Hemodialysis treatment review  Access Maintenance Caregiver Education Provided? Yes Method Knowledge / Understanding Assessed Teach back  Medications Focus or Topic Access Maintenance Patient Education Introduced By  Diet and/or exercise  Treatment Options Patient Education Introduced By Haider Wyman RN  Post-Treatment HD machine external disinfection completed per policy Yes Notes VERBAL REPORT GIVEN TO MARGUERITE OCAMPO RN  Post Treatment Delay PRO external disinfection completed per policy Yes Completed by  Delay N Post Treatment General Information Post Treatment Form Completed By Haider Wyman RN  Machine Disinfection Requirement Care Transition Document Completed Yes   Post Focused Assessment Lung Sounds Diminished  Dry  Changes from Pre Focused Assessment Location Bilateral LOC  Changes from Pre Treatment Focused Assessment? Yes Position Anterior LOC Alert and Oriented x3  Access Respiratory Efforts Unlabored GI / Bowels  Cath Packed with HEPARIN 1000 UNITS / ML Notes RESPIRATIONS 20, O2 SAT 97% ON O2 AT 3 LITERS / MINUTE PER NASAL CANNULA GI Soft  Access Port(ml) 1.8 Edema  Bowel sounds present  Return Port(ml) 1.8 Location Generalized  Non distended  Catheter clamped and capped Yes Edema Grade 2+   Access Flow Good Notes +2 EDEMA LEGS  Voids  Dialyzer Clearance Streaked Cardiac Completed by  Dialyzer Clearance Times RIGHT  IJ PERMCATH, FLUSHED, HEPARINIZED, CLAMPED, CAPPED Heart Rhythm Regular Post Treatment Focused Assessment Completed by Haider Wyman, GERARD  Notes RIGHT IJ PERMCATH, FLUSHED, HEPARINIZED, CLAMPED, CAPPED Telemetry Yes Date 07/14/2025  Pain Screening Notes SINUS RHYTHM Time 13:57  Does the patient have pain? No Skin Signing  Respiratory Skin Warm Signed By Haider Wyman, RN

## 2025-07-14 NOTE — CASE MANAGEMENT/SOCIAL WORK
Post-Acute Authorization Submission      Post Acute Pre-Cert Documentation  Request Submitted by Facility - Type:: Hospital  Post-Acute Authorization Type Submitted:: SNF  Date Post Acute Pre-Cert Inititated per Facility: 07/14/25  Verification from Payer: Yes  Accepting Facility: [facility] Discharge Date Requested: 07/14/25  All Clinicals Submitted?: Yes  Had Accepting Facility at Time of Submission: Yes  Post Acute Pre-Cert Initiated Comment: Precert initiated for [facility].              Elli Stewart RN

## 2025-07-14 NOTE — PLAN OF CARE
Problem: Adult Inpatient Plan of Care  Goal: Plan of Care Review  Outcome: Progressing  Flowsheets (Taken 7/14/2025 7007)  Progress: improving  Outcome Evaluation: S 70-91 w/ PAC.  Patient given 1x dose of benadryl for itching & replaced telemtry stickers with hypoallergenic ones as she has been itching a lot where she's had stickers placed.  Weight trending up.  Safety maintained.  Plan of Care Reviewed With: patient

## 2025-07-14 NOTE — PROGRESS NOTES
Nephrology ([facility]) Progress Note      Patient:  Lindsay Laughlin  YOB: 1955  Date of Service: 7/14/2025  MRN: [id number]   Acct: [id number]   Primary Care Physician: Provider, No Known  Advance Directive:   Code Status and Medical Interventions: CPR (Attempt to Resuscitate); Full Support; Discussed CODE STATUS with Mr. Laughlin.  Patient is ,  is in a nursing home.  She does not have any children.   Ordered at: 07/03/25 2000     Code Status (Patient has no pulse and is not breathing):    CPR (Attempt to Resuscitate)     Medical Interventions (Patient has pulse or is breathing):    Full Support     Comments:    Discussed CODE STATUS with Mr. Laughlin.  Patient is ,  is in a nursing home.  She does not have any children.     Level Of Support Discussed With:    Patient     Admit Date: 7/3/2025       Hospital Day: 11  Referring Provider: Shade Alberts MD      Patient personally seen and examined.  Complete chart including Consults, Notes, Operative Reports, Labs, Cardiology, and Radiology studies reviewed as able.        Subjective:  Lindsay Laughlin is a 69 y.o. female for whom we were consulted for evaluation and treatment of acute kidney injury. Baseline chronic kidney disease stage 3. Does not follow with nephrology. History of type 2 diabetes, hypertension, GERD, prior colectomy done at [facility] for unknown reason. Presented to ER with several days of nausea, vomiting and diarrhea. Unable to maintain PO intake. Has also been using NSAIDs frequently for arthritis pain. Labs on admission showing creatinine 5.0, hypokalemia, hypomagnesemia, metabolic acidosis. Hospital course remarkable for minimal improvement of renal function despite IV fluid resuscitation. Patient agreed to have permcath placed and start dialysis.  Dr Smith placed permcath on 7/07. First dialysis on 7/08 was tolerated well. Additional HD provided 7/09 and  7/10.  After dialysis on 7/10 she did have some RVR with her chronic atrial fibrillation    Today is awake and alert. No complaints. No new overnight issues. Seen during HD and tolerating well  Dialysis   Patient was seen and evaluated on renal replacement therapy and I have personally evaluated the patient and directed the therapy  Modality: Hemodialysis  Access: Catheter  Location: right upper  QB: 400  QD: 600  UF: 2500      Allergies:  Patient has no known allergies.    Home Meds:  Medications Prior to Admission   Medication Sig Dispense Refill Last Dose/Taking    albuterol sulfate  (90 Base) MCG/ACT inhaler Inhale 2 puffs Every 4 (Four) Hours As Needed for Wheezing.       ibuprofen (ADVIL,MOTRIN) 200 MG tablet Take 5 tablets by mouth Every 6 (Six) Hours As Needed for Mild Pain.       ondansetron ODT (ZOFRAN-ODT) 4 MG disintegrating tablet Place 1 tablet on the tongue Every 4 (Four) Hours As Needed for Nausea or Vomiting. 10 tablet 0        Medicines:  Current Facility-Administered Medications   Medication Dose Route Frequency Provider Last Rate Last Admin    acetaminophen (TYLENOL) tablet 650 mg  650 mg Oral Q4H PRN Sterling Smith DO   650 mg at 07/14/25 0831    Or    acetaminophen (TYLENOL) suppository 650 mg  650 mg Rectal Q4H PRN Sterling Smith DO        aluminum-magnesium hydroxide-simethicone (MAALOX MAX) 400-400-40 MG/5ML suspension 15 mL  15 mL Oral Q6H PRN Sterling Smith DO   15 mL at 07/05/25 1331    amiodarone (PACERONE) tablet 400 mg  400 mg Oral Q12H Torres Irizarry MD   400 mg at 07/14/25 0831    apixaban (ELIQUIS) tablet 5 mg  5 mg Oral Q12H Juan J Garcia MD   5 mg at 07/14/25 0832    sennosides-docusate (PERICOLACE) 8.6-50 MG per tablet 2 tablet  2 tablet Oral BID Sterling Smith DO   2 tablet at 07/11/25 2237    And    polyethylene glycol (MIRALAX) packet 17 g  17 g Oral Daily PRN Sterling Smith DO        And    bisacodyl (DULCOLAX) EC tablet 5 mg  5 mg Oral  Daily PRN Sterling Smith DO        And    bisacodyl (DULCOLAX) suppository 10 mg  10 mg Rectal Daily PRN Sterling Smith DO        calcium carbonate (TUMS) chewable tablet 500 mg (200 mg elemental)  2 tablet Oral TID PRN Sterling Smith DO   2 tablet at 07/04/25 2208    Calcium Replacement - Follow Nurse / BPA Driven Protocol   Not Applicable PRN Sterling Smith DO        cholecalciferol (VITAMIN D3) tablet 5,000 Units  5,000 Units Oral Daily BickingSterling DO   5,000 Units at 07/14/25 0830    dextrose (D50W) (25 g/50 mL) IV injection 25 g  25 g Intravenous Q15 Min PRN Sterling Smith DO        dextrose (GLUTOSE) oral gel 15 g  15 g Oral Q15 Min PRN Sterling Smith DO        gabapentin (NEURONTIN) capsule 100 mg  100 mg Oral Nightly Juan J Garcia MD   100 mg at 07/13/25 2020    glucagon (GLUCAGEN) injection 1 mg  1 mg Intramuscular Q15 Min PRN Sterling Smith DO        heparin (porcine) injection 3,600 Units  3,600 Units Intracatheter PRN Javier Patterson MD   3,600 Units at 07/12/25 1219    HYDROcodone-acetaminophen (NORCO) 5-325 MG per tablet 1 tablet  1 tablet Oral Q8H PRN Juan J Garcia MD   1 tablet at 07/13/25 2020    insulin glargine (LANTUS, SEMGLEE) injection 10 Units  10 Units Subcutaneous Daily Juan J Garcia MD   10 Units at 07/14/25 0830    insulin regular (humuLIN R,novoLIN R) injection 2-7 Units  2-7 Units Subcutaneous 4x Daily AC & at Bedtime Juan J Garcia MD   4 Units at 07/14/25 0830    Magnesium Standard Dose Replacement - Follow Nurse / BPA Driven Protocol   Not Applicable PRN Sterling Smith DO        metoprolol tartrate (LOPRESSOR) tablet 25 mg  25 mg Oral Q12H Harpal Proctor MD   25 mg at 07/14/25 0831    nitroglycerin (NITROSTAT) SL tablet 0.4 mg  0.4 mg Sublingual Q5 Min PRN Sterling Smith,         ondansetron ODT (ZOFRAN-ODT) disintegrating tablet 4 mg  4 mg Oral Q6H PRN Sterling Smith,         Or    ondansetron  (ZOFRAN) injection 4 mg  4 mg Intravenous Q6H PRN BicReinaldo chiuin K, DO        pantoprazole (PROTONIX) EC tablet 40 mg  40 mg Oral BID AC Sterling Smith K, DO   40 mg at 25 0831    Phosphorus Replacement - Follow Nurse / BPA Driven Protocol   Not Applicable PRN Juan J Garcia MD        Potassium Replacement - Follow Nurse / BPA Driven Protocol   Not Applicable PRN Bic, Sterling K, DO        sodium bicarbonate tablet 650 mg  650 mg Oral TID Sudhir Erwin, APRN   650 mg at 25 0832    sodium chloride 0.9 % flush 10 mL  10 mL Intravenous PRN Bicking, Sterling K, DO        sodium chloride 0.9 % flush 10 mL  10 mL Intravenous Q12H Bic, Sterling K, DO   10 mL at 25    sodium chloride 0.9 % infusion 40 mL  40 mL Intravenous PRN Reinaldo Smithin K, DO           Past Medical History:  Past Medical History:   Diagnosis Date    Acid reflux     Arthritis     High cholesterol     Hypertension     Neuropathy     Swollen lymph nodes     LEFT CERVICAL    Type 2 diabetes mellitus        Past Surgical History:  Past Surgical History:   Procedure Laterality Date    CARPAL TUNNEL RELEASE Bilateral     CERVICAL LYMPH NODE BIOPSY/EXCISION Left 2017    Procedure: LEFT NECK EXPLORATION WITH INCISIONAL BIOPSY/CULTURE;  Surgeon: Blue Sanchez MD;  Location: [facility] OR;  Service:      SECTION      COLON RESECTION      COLONOSCOPY N/A 2023    Procedure: COLONOSCOPY WITH ANESTHESIA;  Surgeon: Tamy Smith MD;  Location: [facility] ENDOSCOPY;  Service: Gastroenterology;  Laterality: N/A;  preop; abnormal GI scan   postop ; poor prep   PCP Ruth Noble MD    ENDOSCOPY N/A 2023    Procedure: ESOPHAGOGASTRODUODENOSCOPY WITH ANESTHESIA;  Surgeon: Tamy Smith MD;  Location: [facility] ENDOSCOPY;  Service: Gastroenterology;  Laterality: N/A;  preop; abnormal GI scan   postop esophagitis ; hiatal hernia  PCP Ruth Noble MD    EXCISION LESION       From back    INSERTION HEMODIALYSIS CATHETER Right 7/7/2025    Procedure: OR HEMODIALYSIS CATHETER INSERTION;  Surgeon: Sterling Smith DO;  Location:  [facility];  Service: Vascular;  Laterality: Right;    REPLACEMENT TOTAL KNEE Left     TONSILLECTOMY         Family History  Family History   Problem Relation Age of Onset    No Known Problems Mother     Colon cancer Father         < 60 years old    Breast cancer Neg Hx        Social History  Social History     Socioeconomic History    Marital status:    Tobacco Use    Smoking status: Never    Smokeless tobacco: Never   Vaping Use    Vaping status: Never Used   Substance and Sexual Activity    Alcohol use: No    Drug use: No    Sexual activity: Defer       Review of Systems:  History obtained from chart review and the patient  General ROS: No fever or chills  Respiratory ROS: No cough, shortness of breath, wheezing  Cardiovascular ROS: No chest pain or palpitations  Gastrointestinal ROS: No abdominal pain or melena  Genito-Urinary ROS: No dysuria or hematuria  Psych ROS: No anxiety and depression  14 point ROS reviewed with the patient and negative except as noted above and in the HPI unless unable to obtain.    Objective:  Patient Vitals for the past 24 hrs:   BP Temp Temp src Pulse Resp SpO2 Weight   07/14/25 0758 127/78 97.3 °F (36.3 °C) Oral 79 18 96 % --   07/14/25 0329 106/44 98 °F (36.7 °C) Oral 78 18 92 % 98.5 kg (217 lb 3.2 oz)   07/13/25 2013 110/44 98.3 °F (36.8 °C) Oral 81 18 94 % --   07/13/25 1548 107/44 97.9 °F (36.6 °C) Oral 54 18 92 % --   07/13/25 1227 116/50 97.7 °F (36.5 °C) Oral 68 18 100 % --     No intake or output data in the 24 hours ending 07/14/25 1048    General: awake/alert   Chest:  clear to auscultation bilaterally without respiratory distress  CVS: regular rate and rhythm  Abdominal: soft, nontender, positive bowel sounds  Extremities: no cyanosis or edema  Skin: warm and dry without rash      Labs:  Results from last  7 days   Lab Units 07/14/25 0328 07/13/25 0413 07/12/25 0349   WBC 10*3/mm3 10.79 9.96 12.75*   HEMOGLOBIN g/dL 9.7* 9.2* 9.9*   HEMATOCRIT % 31.0* 28.4* 30.7*   PLATELETS 10*3/mm3 199 154 180         Results from last 7 days   Lab Units 07/14/25  0328 07/13/25  0413 07/12/25  0349 07/10/25  0346 07/09/25  1231 07/07/25  2125 07/07/25  1053   SODIUM mmol/L 138 137 136   < > 139   < > 137   POTASSIUM mmol/L 4.9 4.4 4.1   < > 3.7   < > 3.5   CHLORIDE mmol/L 102 103 99   < > 100   < > 108*   CO2 mmol/L 26.0 25.0 24.0   < > 26.0   < > 16.0*   BUN mg/dL 24.8* 16.6 18.7   < > 9.6   < > 36.5*   CREATININE mg/dL 2.25* 1.90* 2.31*   < > 1.51*   < > 4.19*   CALCIUM mg/dL 8.5* 8.1* 8.0*   < > 7.8*   < > 6.8*   EGFR mL/min/1.73 23.0* 28.1* 22.2*   < > 37.0*   < > 10.9*   BILIRUBIN mg/dL  --   --   --   --  0.6  --  0.2   ALK PHOS U/L  --   --   --   --  126*  --  88   ALT (SGPT) U/L  --   --   --   --  6  --  28   AST (SGOT) U/L  --   --   --   --  20  --  28   GLUCOSE mg/dL 133* 173* 233*   < > 113*   < > 193*    < > = values in this interval not displayed.       Radiology:   Imaging Results (Last 72 Hours)       ** No results found for the last 72 hours. **            Culture:  Blood Culture   Date Value Ref Range Status   07/03/2025 No growth at 3 days  Preliminary   07/03/2025 No growth at 3 days  Preliminary         Assessment    Acute kidney injury, ATN--now on dialysis  Baseline chronic kidney disease stage 3  Type 2 diabetes  Hypertension  NSAID use  Hypokalemia--improved  Hypomagnesemia  Anemia with iron deficiency  Metabolic acidosis--improved  Gastroenteritis   Chronic atrial fibrillation    Plan:  Dialysis today   OK for discharge from nephrology standpoint when OK with others. Follow up will be via dialysis clinic      Sudhir Erwin, APRN  7/14/2025  10:48 CDT

## 2025-07-14 NOTE — CASE MANAGEMENT/SOCIAL WORK
Continued Stay Note  [facility]     Patient Name: Lindsay Laughlin  MRN: [id number]  Today's Date: 7/14/2025    Admit Date: 7/3/2025    Plan: [facility]- upon ins. precert approval   Discharge Plan       Row Name 07/14/25 0924       Plan    Plan [facility]- upon ins. precert approval    Patient/Family in Agreement with Plan yes    Plan Comments Brianna at [facility] saying pt is out of network with them.  Only bed offer is from [facility] and pt accepts. Elli Correa of  is starting ins. precert request. Informed Crissy from [facility] of possible d/c today if ins. approves and informed of dialysis set up at [facility] at 10 05am at [address] location. Will follow.                   Discharge Codes    No documentation.                 Expected Discharge Date and Time       Expected Discharge Date Expected Discharge Time    Jul 14, 2025               NICOLE KentW

## 2025-07-14 NOTE — DISCHARGE SUMMARY
[facility] Medicine Services  DISCHARGE SUMMARY       Date of Admission: 7/3/2025  Date of Discharge:  7/14/2025  Primary Care Physician: Provider, No Known    Presenting Problem/History of Present Illness:  Nausea, vomiting, diarrhea    Final Discharge Diagnoses:  Active Hospital Problems    Diagnosis     **DAISY (acute kidney injury)     Paroxysmal atrial fibrillation     Hypokalemia     Hypomagnesemia     Enteritis     Type 2 diabetes mellitus        Consults:   Dr. Chow, nephrology  Dr. Smith, vascular surgery  Dr. Irizarry, EP    Procedures Performed:   Central line, RIJ 7/5  Central exchange fro RIJ perm cath on 7/7    Pertinent Test Results:   Results for orders placed during the hospital encounter of 07/03/25    Adult Transthoracic Echo Complete W/ Cont if Necessary Per Protocol    Interpretation Summary    Left ventricular systolic function is normal. Left ventricular ejection fraction appears to be 66 - 70%.    Left ventricular diastolic function is consistent with (grade Ia w/high LAP) impaired relaxation.    There is mild calcification of the aortic valve.    Mild aortic insufficiency    Estimated right ventricular systolic pressure from tricuspid regurgitation is moderately elevated (45-55 mmHg).    Moderate pulmonary hypertension is present.      Imaging Results (All)       Procedure Component Value Units Date/Time    IR Ins Tunneled Dialysis Catheter WO Port [[id number]] Collected: 07/08/25 0756     Updated: 07/08/25 1614    Narrative:      Performed by Dr. Smith. Please see procedure note.     This report was signed and finalized on 7/8/2025 4:11 PM by Dr. Sterling Smith MD.       FL C Arm During Surgery [[id number]] Collected: 07/08/25 0756     Updated: 07/08/25 1614    Narrative:      Performed by Dr. Smith. Please see procedure note.     This report was signed and finalized on 7/8/2025 4:11 PM by Dr. Sterling Smith MD.       US Renal Bilateral [[id number]]  Collected: 07/06/25 0858     Updated: 07/06/25 0904    Narrative:      EXAMINATION: US RENAL BILATERAL-     HISTORY: DAISY; K52.9-Noninfective gastroenteritis and colitis,  unspecified; E83.42-Hypomagnesemia; E87.6-Hypokalemia;  E83.51-Hypocalcemia; N17.9-Acute kidney failure, unspecified     Images are stored in PACS per institutional protocol.     Grayscale and color-flow renal ultrasound.     Portable exam with limited detail.     Normal size and position of both kidneys.  Normal cortical thickness and normal cortical echogenicity.     No hydronephrosis.     7-8 mm LEFT renal pelvis stone noted on CT exam from 3 days ago though  not well seen on today's exam.     The right kidney measures 101 x 50 x 46 mm.  RIGHT mid cyst = 29 mm.  RIGHT upper pole cyst = 13 mm.        The left kidney measures 95 x 53 x 46 mm.  Upper pole cyst = 16 mm.  Lower pole cyst = 19 mm.       Impression:      Impression:  1. No evidence of obstruction.           This report was signed and finalized on 7/6/2025 9:01 AM by Dr. Leonard Rogers MD.       XR Chest 1 View [[id number]] Collected: 07/06/25 0850     Updated: 07/06/25 0853    Narrative:      EXAMINATION: XR CHEST 1 VW-     HISTORY: line placement; K52.9-Noninfective gastroenteritis and colitis,  unspecified; E83.42-Hypomagnesemia; E87.6-Hypokalemia;  E83.51-Hypocalcemia; N17.9-Acute kidney failure, unspecified     Images are stored in PACS per institutional protocol.     1 view chest x-ray.     COMPARISON:  7/3/2025.     RIGHT jugular central line has been placed and is in good position.     Mild chronic interstitial lung disease.  No focal infiltrate.     The heart is magnified by the portable projection.       Impression:      1. Well-positioned RIGHT jugular central line.  2. Stable appearance of the lungs.           This report was signed and finalized on 7/6/2025 8:50 AM by Dr. Leonard Rogers MD.       XR Shoulder 2+ View Left [[id number]] Collected: 07/03/25 214     Updated:  07/03/25 2147    Narrative:      EXAMINATION: XR SHOULDER 2+ VW LEFT- 7/3/2025 9:43 PM     HISTORY: Pain with left shoulder decreased range of motion, status post  fall; K52.9-Noninfective gastroenteritis and colitis, unspecified;  E83.42-Hypomagnesemia; E87.6-Hypokalemia; E83.51-Hypocalcemia;  N17.9-Acute kidney failure, unspecified.     REPORT: 2 views of the left shoulder were obtained.     COMPARISON: There are no correlative imaging studies for comparison.     There is diffuse osteopenia, no fracture is identified. Osseous  alignment is normal. There is mild spurring of the acromion process, AC  joint and greater tuberosity. The glenohumeral joint is preserved. The  soft tissues appear within normal limits.       Impression:      No fracture, no acute osseous abnormality. Degenerative  changes as described, mild diffuse osteopenia.     This report was signed and finalized on 7/3/2025 9:44 PM by Dr. Venkata Muniz MD.       XR Chest 1 View [[id number]] Collected: 07/03/25 2142     Updated: 07/03/25 2146    Narrative:      EXAMINATION: XR CHEST 1 VW- 7/3/2025 9:42 PM     HISTORY: Leukocytosis; K52.9-Noninfective gastroenteritis and colitis,  unspecified; E83.42-Hypomagnesemia; E87.6-Hypokalemia;  E83.51-Hypocalcemia; N17.9-Acute kidney failure, unspecified.     REPORT: Frontal view of the chest was obtained.     COMPARISON: Chest x-ray 2/24/2025.     The lungs are free of infiltrates, no pneumothorax or pleural effusion  is identified. Heart size is normal. The osseous structures and upper  abdomen are unremarkable.       Impression:      No acute cardiopulmonary abnormality.     This report was signed and finalized on 7/3/2025 9:43 PM by Dr. Venkata Muniz MD.       CT Abdomen Pelvis Without Contrast [[id number]] Collected: 07/03/25 1811     Updated: 07/03/25 1822    Narrative:      EXAMINATION: CT ABDOMEN PELVIS WO CONTRAST- 7/3/2025 6:11 PM     HISTORY: Diffuse abdominal pain; diarrhea/vomiting     TOTAL  DOSE: 990.85 mGy.cm (Automatic exposure control technique was  implemented in an effort to keep the radiation dose as low as possible  without compromising image quality)     REPORT: Spiral CT of the abdomen and pelvis was performed without  contrast from the lung bases through the pubic symphysis. Reconstructed  coronal and sagittal images are also reviewed.     Comparison: CT abdomen and pelvis with contrast 2/22/2023.     Lung windows demonstrate mild respiratory motion artifact, the lung  bases are clear. There is a 3 mm subpleural nodule in the right middle  lobe image 3 of series 2, too small to fully characterize. This is  likely benign.     Evaluation of the solid abdominal organs is limited without intravenous  contrast. Decreased attenuation of the liver likely represents  steatosis. The spleen appears within normal limits. There is a stable 4  cm soft tissue mass lateral to the inferior spleen which has the same  attenuation as the spleen, probably an accessory splenule.     The stomach is decompressed. The gallbladder is not visualized. Atrophy  of the pancreas is noted. There is dense material seen near the raine  hepatis with streak artifact, this is unchanged, presumably related to  the some type procedure. Adrenal glands are within normal limits. No  nephrolithiasis is identified and there is no hydronephrosis. There is a  nonobstructing 7 mm stone in the left renal pelvis which is new. The  ureters are decompressed. The bladder appears within normal limits.     No free fluid or free air is identified. Bowel loops are normal in  caliber, occasional diverticula are seen in the colon without evidence  of acute diverticulitis. There is some fluid retention within segments  of the colon and small intestine without evidence of obstruction and no  significant bowel wall thickening is identified. There is evidence of  previous partial right colectomy, the ileocolic anastomosis appears  within normal limits.  The appendix is surgically absent. Review of bone  windows shows no acute osseous abnormality. Advanced degenerative  changes are present in the thoracic and lumbar spine.       Impression:      1. Fluid retention within the small intestine and portions of the colon  compatible with the history of diarrhea, no evidence of bowel  obstruction, no bowel wall thickening. Correlate clinically for mild  enteritis or diarrheal type illness.  2. New small stone in the left renal pelvis, measuring 7 mm. No urinary  tract obstruction is identified.  3. Postoperative changes, including previous partial right colectomy,  appendectomy and there is stable dense material in the region of the  raine hepatis which may be related to previous surgical intervention.  The gallbladder is not visualized.  4. Stable accessory spleen measuring up to 4 cm.        This report was signed and finalized on 7/3/2025 6:19 PM by Dr. Venkata Muniz MD.             LAB RESULTS:      Lab 07/14/25 0328 07/13/25 0413 07/12/25 0349 07/11/25  0431 07/10/25  0346 07/09/25  1008 07/08/25  0242   WBC 10.79 9.96 12.75* 10.47 12.13* 10.57 9.77   HEMOGLOBIN 9.7* 9.2* 9.9* 9.9* 10.4* 10.8* 10.5*   HEMATOCRIT 31.0* 28.4* 30.7* 30.8* 30.9* 30.5* 29.9*   PLATELETS 199 154 180 152 155 122* 170   NEUTROS ABS  --   --   --   --   --  7.57* 7.60*   IMMATURE GRANS (ABS)  --   --   --   --   --  0.24* 0.16*   LYMPHS ABS  --   --   --   --   --  1.08 0.78   MONOS ABS  --   --   --   --   --  1.34* 0.81   EOS ABS  --   --   --   --   --  0.28 0.37   .3* 99.0* 99.4* 98.7* 95.7 93.6 92.9         Lab 07/14/25 0328 07/13/25  0413 07/12/25  0349 07/11/25  1831 07/11/25  0431 07/10/25  0346 07/09/25  1231 07/08/25  0242   SODIUM 138 137 136  --  136 137   < > 138   POTASSIUM 4.9 4.4 4.1  --  4.3 5.0   < > 3.9   CHLORIDE 102 103 99  --  100 102   < > 109*   CO2 26.0 25.0 24.0  --  26.0 25.0   < > 13.0*   ANION GAP 10.0 9.0 13.0  --  10.0 10.0   < > 16.0*   BUN 24.8*  16.6 18.7  --  10.1 13.5   < > 34.4*   CREATININE 2.25* 1.90* 2.31*  --  1.45* 2.02*   < > 4.19*   EGFR 23.0* 28.1* 22.2*  --  38.9* 26.1*   < > 10.9*   GLUCOSE 133* 173* 233*  --  298* 143*   < > 164*   CALCIUM 8.5* 8.1* 8.0*  --  7.9* 7.9*   < > 7.1*   MAGNESIUM 1.5* 1.6 1.7  --  2.1 2.2   < > 1.9   PHOSPHORUS  --  3.2 3.7 3.4 2.2* 2.1*   < > 4.2   TSH  --   --   --   --   --   --   --  3.770    < > = values in this interval not displayed.         Lab 07/09/25  1231   TOTAL PROTEIN 5.2*   ALBUMIN 2.8*   GLOBULIN 2.4   ALT (SGPT) 6   AST (SGOT) 20   BILIRUBIN 0.6   ALK PHOS 126*                     Brief Urine Lab Results  (Last result in the past 365 days)        Color   Clarity   Blood   Leuk Est   Nitrite   Protein   CREAT   Urine HCG        07/05/25 1533             84.5               Microbiology Results (last 10 days)       Procedure Component Value - Date/Time    Gastrointestinal Panel, PCR - Stool, Per Rectum [[id number]]  (Normal) Collected: 07/05/25 1015    Lab Status: Final result Specimen: Stool from Per Rectum Updated: 07/05/25 1139     Campylobacter Not Detected     Plesiomonas shigelloides Not Detected     Salmonella Not Detected     Vibrio Not Detected     Vibrio cholerae Not Detected     Yersinia enterocolitica Not Detected     Enteroaggregative E. coli (EAEC) Not Detected     Enteropathogenic E. coli (EPEC) Not Detected     Enterotoxigenic E. coli (ETEC) lt/st Not Detected     Shiga-like toxin-producing E. coli (STEC) stx1/stx2 Not Detected     Shigella/Enteroinvasive E. coli (EIEC) Not Detected     Cryptosporidium Not Detected     Cyclospora cayetanensis Not Detected     Entamoeba histolytica Not Detected     Giardia lamblia Not Detected     Adenovirus F40/41 Not Detected     Astrovirus Not Detected     Norovirus GI/GII Not Detected     Rotavirus A Not Detected     Sapovirus (I, II, IV or V) Not Detected            Hospital Course:   Patient is a 69-year-old female with a history of CKD 3,  diabetes, hypertension, hyperlipidemia, GERD.  Reported history of chronic diarrhea with a possible prior colectomy in 2023.  Unclear.  Presented to our ER on 7/3 with nausea, vomiting, diarrhea.  Was found to be in DAISY and CKD with significant electrolyte abnormalities including potassium of 1.7, magnesium 0.8.  CT abdomen pelvis in the ER showed enteritis/diarrheal illness.  Also showed prior partial right colectomy.  Patient was started on fluids and electrolyte replacements and was admitted to the ICU with consultation to nephrology.  Initially attempts were made at fluid resuscitation for renal improvement but patient was not progressing and ultimately decision was made to start on dialysis.  PermCath was placed on 7/7 and treatment was begun.  She has been tolerating dialysis fairly well since that time.  Additional issues that occurred during hospital stay was events of wide-complex tachycardia early on in stay in association with her electrolyte maladies and subsequent A-fib later in stay.  Patient was seen by EP.  She was given amiodarone load and is back in sinus rhythm.  She has been in sinus since at least 7/10 looking back.  EP recommends continuing amiodarone 400 mg twice daily until 7/18 and then 200 mg daily thereafter.  In addition to the amiodarone patient is also on metoprolol which she is tolerating.  Patient has been started on Eliquis twice daily which she is tolerating.  Patient have a Zio patch placed prior to discharge and plan to follow-up with EP after discharge.  In the meantime other ongoing issues were generalized weakness.  Patient has been working with therapies.  Needs more rehabilitation prior to return home.  Have bed available at [facility] today for short-term rehab.  Will discharge their at this time as she has maximized benefits of inpatient stay.      Physical Exam on Discharge:  /78 (BP Location: Right arm, Patient Position: Lying)   Pulse 79   Temp  "97.3 °F (36.3 °C) (Oral)   Resp 18   Ht 165.1 cm (65\")   Wt 98.5 kg (217 lb 3.2 oz)   SpO2 96%   BMI 36.14 kg/m²   Physical Exam  GEN: Awake, alert, interactive, in NAD  HEENT: PERRLA, EOMI, Anicteric, Trachea midline  Lungs: no wheezing/rales/rhonchi  Heart: RRR, +S1/s2, no rub  ABD: soft, nt/nd, +BS, no guarding/rebound  Extremities: no cyanosis, no pitting edema  Skin: heal wounds b/l, coccyx wound  Neuro: AAOx3, no focal deficits  Psych: normal mood & affect      Condition on Discharge: stable/improved    Discharge Disposition:  Skilled Nursing Facility (DC - External)    Discharge Medications:     Discharge Medications        New Medications        Instructions Start Date   acetaminophen 325 MG tablet  Commonly known as: TYLENOL   650 mg, Oral, Every 4 Hours PRN      amiodarone 200 MG tablet  Commonly known as: PACERONE   Take 2 tablets by mouth 2 (Two) Times a Day for 8 days, THEN 1 tablet Daily for 80 days.   Start Date: July 10, 2025     apixaban 5 MG tablet tablet  Commonly known as: ELIQUIS   5 mg, Oral, Every 12 Hours Scheduled      calcium carbonate 500 MG chewable tablet  Commonly known as: TUMS   2 tablets, Oral, 3 Times Daily PRN      cholecalciferol 25 MCG (1000 UT) tablet  Commonly known as: VITAMIN D3   5,000 Units, Oral, Daily   Start Date: July 15, 2025     gabapentin 100 MG capsule  Commonly known as: NEURONTIN   100 mg, Oral, Nightly      HYDROcodone-acetaminophen 5-325 MG per tablet  Commonly known as: NORCO   1 tablet, Oral, Every 8 Hours PRN      insulin glargine 100 UNIT/ML injection  Commonly known as: LANTUS, SEMGLEE   10 Units, Subcutaneous, Daily   Start Date: July 15, 2025     metoprolol tartrate 25 MG tablet  Commonly known as: LOPRESSOR   25 mg, Oral, Every 12 Hours Scheduled      pantoprazole 40 MG EC tablet  Commonly known as: PROTONIX   40 mg, Oral, 2 Times Daily Before Meals      sodium bicarbonate 650 MG tablet   650 mg, Oral, 3 Times Daily             Continue These " Medications        Instructions Start Date   albuterol sulfate  (90 Base) MCG/ACT inhaler  Commonly known as: PROVENTIL HFA;VENTOLIN HFA;PROAIR HFA   2 puffs, Inhalation, Every 4 Hours PRN      ondansetron ODT 4 MG disintegrating tablet  Commonly known as: ZOFRAN-ODT   4 mg, Translingual, Every 4 Hours PRN             Stop These Medications      ibuprofen 200 MG tablet  Commonly known as: ADVIL,MOTRIN              Discharge Diet:   Dietary Orders (From admission, onward)       Start     Ordered    07/10/25 1800  Dietary Nutrition Supplements Other; Boost Original-Chocolate  Daily With Breakfast & Dinner      Question Answer Comment   Select Supplement: Other    Other Boost Original-Chocolate        07/10/25 1523    07/07/25 1845  Diet: Regular/House; Fluid Consistency: Thin (IDDSI 0)  Diet Effective Now        References:    Diet Order Definitions   Question Answer Comment   Diets: Regular/House    Fluid Consistency: Thin (IDDSI 0)        07/07/25 1844                      Activity at Discharge:   Increase to baseline as tolerated    Follow-up Appointments:   Future Appointments   Date Time Provider Department Center   8/12/2025  1:15 PM Gideon Parsons APRN [facility] P[facility] PAD       Test Results Pending at Discharge: none    Electronically signed by Sanchez Lopez DO, 07/14/25, 11:42 CDT.    Time: 35 minutes.

## 2025-07-15 NOTE — THERAPY DISCHARGE NOTE
Acute Care - Physical Therapy Discharge Summary  [facility]       Patient Name: Lindsay Laughlin  : 1955  MRN: [id number]    Today's Date: 7/15/2025                 Admit Date: 7/3/2025      PT Recommendation and Plan    Visit Dx:    ICD-10-CM ICD-9-CM   1. Enteritis  K52.9 558.9   2. Hypomagnesemia  E83.42 275.2   3. Hypokalemia  E87.6 276.8   4. Hypocalcemia  E83.51 275.41   5. Acute kidney injury  N17.9 584.9   6. DAISY (acute kidney injury)  N17.9 584.9   7. Impaired mobility [Z74.09]  Z74.09 799.89   8. Paroxysmal atrial fibrillation  I48.0 427.31   9. Chronic low back pain, unspecified back pain laterality, unspecified whether sciatica present  M54.50 724.2    G89.29 338.29   10. Neuropathy  G62.9 355.9   11. Other chronic pain  G89.29 338.29                PT Rehab Goals       Row Name 07/15/25 0900             Bed Mobility Goal 1 (PT)    Activity/Assistive Device (Bed Mobility Goal 1, PT) scooting;sit to supine/supine to sit  -AB      [place] Level/Cues Needed (Bed Mobility Goal 1, PT) standby assist  -AB      Time Frame (Bed Mobility Goal 1, PT) long term goal (LTG);10 days  -AB      Progress/Outcomes (Bed Mobility Goal 1, PT) goal not met  -AB         Transfer Goal 1 (PT)    Activity/Assistive Device (Transfer Goal 1, PT) sit-to-stand/stand-to-sit;bed-to-chair/chair-to-bed  -AB      [place] Level/Cues Needed (Transfer Goal 1, PT) minimum assist (75% or more patient effort)  -AB      Time Frame (Transfer Goal 1, PT) long term goal (LTG);10 days  -AB      Progress/Outcome (Transfer Goal 1, PT) goal met  -AB         Gait Training Goal 1 (PT)    Activity/Assistive Device (Gait Training Goal 1, PT) gait (walking locomotion)  -AB      [place] Level (Gait Training Goal 1, PT) minimum assist (75% or more patient effort)  -AB      Distance (Gait Training Goal 1, PT) 50  -AB      Time Frame (Gait Training Goal 1, PT) long term goal (LTG);10 days  -AB      Progress/Outcome (Gait Training Goal 1, PT)  goal partially met  -AB                User Key  (r) = Recorded By, (t) = Taken By, (c) = Cosigned By      Initials Name Provider Type Discipline    Anne Aguirre, PTA Physical Therapist Assistant PT                        PT Discharge Summary  Anticipated Discharge Disposition (PT): skilled nursing facility  Reason for Discharge: Discharge from facility  Outcomes Achieved: Refer to plan of care for updates on goals achieved  Discharge Destination: SNF      Anne Garcia PTA   7/15/2025

## 2025-07-15 NOTE — PAYOR COMM NOTE
"DC TO SNF 7-14-25    Javier Pastrana (69 y.o. Female)       Date of Birth   1955    Social Security Number       Address   [address] [place] [address]    Home Phone   [phone]    MRN   [id number]       Latter day   Hinduism    Marital Status                               Admission Date   7/3/2025    Admission Type   Emergency    Admitting Provider   Sanchez Lopez DO    Attending Provider       Department, Room/Bed   [facility] 4B, 412/1       Discharge Date   7/14/2025    Discharge Disposition   Skilled Nursing Facility (DC - External)    Discharge Destination                                 Attending Provider: (none)   Allergies: No Known Allergies    Isolation: None   Infection: None   Code Status: CPR    Ht: 165.1 cm (65\")   Wt: 98.5 kg (217 lb 3.2 oz)    Admission Cmt: None   Principal Problem: DAISY (acute kidney injury) [N17.9]                   Active Insurance as of 7/3/2025       Primary Coverage       Payor Plan Insurance Group Employer/Plan Group    UNITED HE[employer] MEDICARE REPLACEMENT UHC MEDICARE ADVANTAGE [facility] HMO NON PAR [id number]       Payor Plan Address Payor Plan Phone Number Payor Plan Fax Number Effective Dates    [address] [address]   7/1/2025 - None Entered    [facility] [id number]         Subscriber Name Subscriber Birth Date Member ID       JAVIER PASTRANA 1955 [id number]               Secondary Coverage       Payor Plan Insurance Group Employer/Plan Group    [id number]        Payor Plan Address Payor Plan Phone Number Payor Plan Fax Number Effective Dates    [address]   5/17/2023 - None Entered    [facility] [id number]         Subscriber Name Subscriber Birth Date Member ID       JAVIER PASTRANA 1955 [id number]                     Emergency Contacts        (Rel.) Home Phone Work Phone Mobile Phone    Rodger Marrufo (Friend) [phone] -- --    Klever Stone (Friend) -- -- [phone]    Laura Phelan " -- -- [phone]                 Discharge Summary        Sanchez Lopez, DO at 07/14/25 1142                [facility] Medicine Services  DISCHARGE SUMMARY       Date of Admission: 7/3/2025  Date of Discharge:  7/14/2025  Primary Care Physician: Provider, No Known    Presenting Problem/History of Present Illness:  Nausea, vomiting, diarrhea    Final Discharge Diagnoses:  Active Hospital Problems    Diagnosis     **DAISY (acute kidney injury)     Paroxysmal atrial fibrillation     Hypokalemia     Hypomagnesemia     Enteritis     Type 2 diabetes mellitus        Consults:   Dr. Chow, nephrology  Dr. Smith, vascular surgery  Dr. Irizarry, EP    Procedures Performed:   Central line, RIJ 7/5  Central exchange fro RIJ perm cath on 7/7    Pertinent Test Results:   Results for orders placed during the hospital encounter of 07/03/25    Adult Transthoracic Echo Complete W/ Cont if Necessary Per Protocol    Interpretation Summary    Left ventricular systolic function is normal. Left ventricular ejection fraction appears to be 66 - 70%.    Left ventricular diastolic function is consistent with (grade Ia w/high LAP) impaired relaxation.    There is mild calcification of the aortic valve.    Mild aortic insufficiency    Estimated right ventricular systolic pressure from tricuspid regurgitation is moderately elevated (45-55 mmHg).    Moderate pulmonary hypertension is present.      Imaging Results (All)       Procedure Component Value Units Date/Time    IR Ins Tunneled Dialysis Catheter WO Port [[id number]] Collected: 07/08/25 0756     Updated: 07/08/25 1614    Narrative:      Performed by Dr. Smith. Please see procedure note.     This report was signed and finalized on 7/8/2025 4:11 PM by Dr. Sterling Smith MD.       FL C Arm During Surgery [[id number]] Collected: 07/08/25 0756     Updated: 07/08/25 1614    Narrative:      Performed by Dr. Smith. Please see procedure note.     This report was  signed and finalized on 7/8/2025 4:11 PM by Dr. Sterling Smith MD.       US Renal Bilateral [[id number]] Collected: 07/06/25 0858     Updated: 07/06/25 0904    Narrative:      EXAMINATION: US RENAL BILATERAL-     HISTORY: DAISY; K52.9-Noninfective gastroenteritis and colitis,  unspecified; E83.42-Hypomagnesemia; E87.6-Hypokalemia;  E83.51-Hypocalcemia; N17.9-Acute kidney failure, unspecified     Images are stored in PACS per institutional protocol.     Grayscale and color-flow renal ultrasound.     Portable exam with limited detail.     Normal size and position of both kidneys.  Normal cortical thickness and normal cortical echogenicity.     No hydronephrosis.     7-8 mm LEFT renal pelvis stone noted on CT exam from 3 days ago though  not well seen on today's exam.     The right kidney measures 101 x 50 x 46 mm.  RIGHT mid cyst = 29 mm.  RIGHT upper pole cyst = 13 mm.        The left kidney measures 95 x 53 x 46 mm.  Upper pole cyst = 16 mm.  Lower pole cyst = 19 mm.       Impression:      Impression:  1. No evidence of obstruction.           This report was signed and finalized on 7/6/2025 9:01 AM by Dr. Leonard Rogers MD.       XR Chest 1 View [[id number]] Collected: 07/06/25 0850     Updated: 07/06/25 0853    Narrative:      EXAMINATION: XR CHEST 1 VW-     HISTORY: line placement; K52.9-Noninfective gastroenteritis and colitis,  unspecified; E83.42-Hypomagnesemia; E87.6-Hypokalemia;  E83.51-Hypocalcemia; N17.9-Acute kidney failure, unspecified     Images are stored in PACS per institutional protocol.     1 view chest x-ray.     COMPARISON:  7/3/2025.     RIGHT jugular central line has been placed and is in good position.     Mild chronic interstitial lung disease.  No focal infiltrate.     The heart is magnified by the portable projection.       Impression:      1. Well-positioned RIGHT jugular central line.  2. Stable appearance of the lungs.           This report was signed and finalized on 7/6/2025 8:50 AM by  Dr. Leonard Rogers MD.       XR Shoulder 2+ View Left [[id number]] Collected: 07/03/25 2143     Updated: 07/03/25 2147    Narrative:      EXAMINATION: XR SHOULDER 2+ VW LEFT- 7/3/2025 9:43 PM     HISTORY: Pain with left shoulder decreased range of motion, status post  fall; K52.9-Noninfective gastroenteritis and colitis, unspecified;  E83.42-Hypomagnesemia; E87.6-Hypokalemia; E83.51-Hypocalcemia;  N17.9-Acute kidney failure, unspecified.     REPORT: 2 views of the left shoulder were obtained.     COMPARISON: There are no correlative imaging studies for comparison.     There is diffuse osteopenia, no fracture is identified. Osseous  alignment is normal. There is mild spurring of the acromion process, AC  joint and greater tuberosity. The glenohumeral joint is preserved. The  soft tissues appear within normal limits.       Impression:      No fracture, no acute osseous abnormality. Degenerative  changes as described, mild diffuse osteopenia.     This report was signed and finalized on 7/3/2025 9:44 PM by Dr. Venkata Muniz MD.       XR Chest 1 View [[id number]] Collected: 07/03/25 2142     Updated: 07/03/25 2146    Narrative:      EXAMINATION: XR CHEST 1 VW- 7/3/2025 9:42 PM     HISTORY: Leukocytosis; K52.9-Noninfective gastroenteritis and colitis,  unspecified; E83.42-Hypomagnesemia; E87.6-Hypokalemia;  E83.51-Hypocalcemia; N17.9-Acute kidney failure, unspecified.     REPORT: Frontal view of the chest was obtained.     COMPARISON: Chest x-ray 2/24/2025.     The lungs are free of infiltrates, no pneumothorax or pleural effusion  is identified. Heart size is normal. The osseous structures and upper  abdomen are unremarkable.       Impression:      No acute cardiopulmonary abnormality.     This report was signed and finalized on 7/3/2025 9:43 PM by Dr. Venkata Muniz MD.       CT Abdomen Pelvis Without Contrast [[id number]] Collected: 07/03/25 1811     Updated: 07/03/25 1822    Narrative:      EXAMINATION: CT ABDOMEN  PELVIS WO CONTRAST- 7/3/2025 6:11 PM     HISTORY: Diffuse abdominal pain; diarrhea/vomiting     TOTAL DOSE: 990.85 mGy.cm (Automatic exposure control technique was  implemented in an effort to keep the radiation dose as low as possible  without compromising image quality)     REPORT: Spiral CT of the abdomen and pelvis was performed without  contrast from the lung bases through the pubic symphysis. Reconstructed  coronal and sagittal images are also reviewed.     Comparison: CT abdomen and pelvis with contrast 2/22/2023.     Lung windows demonstrate mild respiratory motion artifact, the lung  bases are clear. There is a 3 mm subpleural nodule in the right middle  lobe image 3 of series 2, too small to fully characterize. This is  likely benign.     Evaluation of the solid abdominal organs is limited without intravenous  contrast. Decreased attenuation of the liver likely represents  steatosis. The spleen appears within normal limits. There is a stable 4  cm soft tissue mass lateral to the inferior spleen which has the same  attenuation as the spleen, probably an accessory splenule.     The stomach is decompressed. The gallbladder is not visualized. Atrophy  of the pancreas is noted. There is dense material seen near the raine  hepatis with streak artifact, this is unchanged, presumably related to  the some type procedure. Adrenal glands are within normal limits. No  nephrolithiasis is identified and there is no hydronephrosis. There is a  nonobstructing 7 mm stone in the left renal pelvis which is new. The  ureters are decompressed. The bladder appears within normal limits.     No free fluid or free air is identified. Bowel loops are normal in  caliber, occasional diverticula are seen in the colon without evidence  of acute diverticulitis. There is some fluid retention within segments  of the colon and small intestine without evidence of obstruction and no  significant bowel wall thickening is identified. There is  evidence of  previous partial right colectomy, the ileocolic anastomosis appears  within normal limits. The appendix is surgically absent. Review of bone  windows shows no acute osseous abnormality. Advanced degenerative  changes are present in the thoracic and lumbar spine.       Impression:      1. Fluid retention within the small intestine and portions of the colon  compatible with the history of diarrhea, no evidence of bowel  obstruction, no bowel wall thickening. Correlate clinically for mild  enteritis or diarrheal type illness.  2. New small stone in the left renal pelvis, measuring 7 mm. No urinary  tract obstruction is identified.  3. Postoperative changes, including previous partial right colectomy,  appendectomy and there is stable dense material in the region of the  raine hepatis which may be related to previous surgical intervention.  The gallbladder is not visualized.  4. Stable accessory spleen measuring up to 4 cm.        This report was signed and finalized on 7/3/2025 6:19 PM by Dr. Venkata Muniz MD.             LAB RESULTS:      Lab 07/14/25 0328 07/13/25 0413 07/12/25  0349 07/11/25  0431 07/10/25  0346 07/09/25  1008 07/08/25  0242   WBC 10.79 9.96 12.75* 10.47 12.13* 10.57 9.77   HEMOGLOBIN 9.7* 9.2* 9.9* 9.9* 10.4* 10.8* 10.5*   HEMATOCRIT 31.0* 28.4* 30.7* 30.8* 30.9* 30.5* 29.9*   PLATELETS 199 154 180 152 155 122* 170   NEUTROS ABS  --   --   --   --   --  7.57* 7.60*   IMMATURE GRANS (ABS)  --   --   --   --   --  0.24* 0.16*   LYMPHS ABS  --   --   --   --   --  1.08 0.78   MONOS ABS  --   --   --   --   --  1.34* 0.81   EOS ABS  --   --   --   --   --  0.28 0.37   .3* 99.0* 99.4* 98.7* 95.7 93.6 92.9         Lab 07/14/25  0328 07/13/25  0413 07/12/25  0349 07/11/25  1831 07/11/25  0431 07/10/25  0346 07/09/25  1231 07/08/25  0242   SODIUM 138 137 136  --  136 137   < > 138   POTASSIUM 4.9 4.4 4.1  --  4.3 5.0   < > 3.9   CHLORIDE 102 103 99  --  100 102   < > 109*   CO2  26.0 25.0 24.0  --  26.0 25.0   < > 13.0*   ANION GAP 10.0 9.0 13.0  --  10.0 10.0   < > 16.0*   BUN 24.8* 16.6 18.7  --  10.1 13.5   < > 34.4*   CREATININE 2.25* 1.90* 2.31*  --  1.45* 2.02*   < > 4.19*   EGFR 23.0* 28.1* 22.2*  --  38.9* 26.1*   < > 10.9*   GLUCOSE 133* 173* 233*  --  298* 143*   < > 164*   CALCIUM 8.5* 8.1* 8.0*  --  7.9* 7.9*   < > 7.1*   MAGNESIUM 1.5* 1.6 1.7  --  2.1 2.2   < > 1.9   PHOSPHORUS  --  3.2 3.7 3.4 2.2* 2.1*   < > 4.2   TSH  --   --   --   --   --   --   --  3.770    < > = values in this interval not displayed.         Lab 07/09/25  1231   TOTAL PROTEIN 5.2*   ALBUMIN 2.8*   GLOBULIN 2.4   ALT (SGPT) 6   AST (SGOT) 20   BILIRUBIN 0.6   ALK PHOS 126*                     Brief Urine Lab Results  (Last result in the past 365 days)        Color   Clarity   Blood   Leuk Est   Nitrite   Protein   CREAT   Urine HCG        07/05/25 1533             84.5               Microbiology Results (last 10 days)       Procedure Component Value - Date/Time    Gastrointestinal Panel, PCR - Stool, Per Rectum [[id number]]  (Normal) Collected: 07/05/25 1015    Lab Status: Final result Specimen: Stool from Per Rectum Updated: 07/05/25 1139     Campylobacter Not Detected     Plesiomonas shigelloides Not Detected     Salmonella Not Detected     Vibrio Not Detected     Vibrio cholerae Not Detected     Yersinia enterocolitica Not Detected     Enteroaggregative E. coli (EAEC) Not Detected     Enteropathogenic E. coli (EPEC) Not Detected     Enterotoxigenic E. coli (ETEC) lt/st Not Detected     Shiga-like toxin-producing E. coli (STEC) stx1/stx2 Not Detected     Shigella/Enteroinvasive E. coli (EIEC) Not Detected     Cryptosporidium Not Detected     Cyclospora cayetanensis Not Detected     Entamoeba histolytica Not Detected     Giardia lamblia Not Detected     Adenovirus F40/41 Not Detected     Astrovirus Not Detected     Norovirus GI/GII Not Detected     Rotavirus A Not Detected     Sapovirus (I, II, IV or V)  Not Detected            Hospital Course:   Patient is a 69-year-old female with a history of CKD 3, diabetes, hypertension, hyperlipidemia, GERD.  Reported history of chronic diarrhea with a possible prior colectomy in 2023.  Unclear.  Presented to our ER on 7/3 with nausea, vomiting, diarrhea.  Was found to be in DAISY and CKD with significant electrolyte abnormalities including potassium of 1.7, magnesium 0.8.  CT abdomen pelvis in the ER showed enteritis/diarrheal illness.  Also showed prior partial right colectomy.  Patient was started on fluids and electrolyte replacements and was admitted to the ICU with consultation to nephrology.  Initially attempts were made at fluid resuscitation for renal improvement but patient was not progressing and ultimately decision was made to start on dialysis.  PermCath was placed on 7/7 and treatment was begun.  She has been tolerating dialysis fairly well since that time.  Additional issues that occurred during hospital stay was events of wide-complex tachycardia early on in stay in association with her electrolyte maladies and subsequent A-fib later in stay.  Patient was seen by EP.  She was given amiodarone load and is back in sinus rhythm.  She has been in sinus since at least 7/10 looking back.  EP recommends continuing amiodarone 400 mg twice daily until 7/18 and then 200 mg daily thereafter.  In addition to the amiodarone patient is also on metoprolol which she is tolerating.  Patient has been started on Eliquis twice daily which she is tolerating.  Patient have a Zio patch placed prior to discharge and plan to follow-up with EP after discharge.  In the meantime other ongoing issues were generalized weakness.  Patient has been working with therapies.  Needs more rehabilitation prior to return home.  Have bed available at [facility] for [facility] rehab.  Will discharge their at this time as she has maximized benefits of inpatient stay.      Physical  "Exam on Discharge:  /78 (BP Location: Right arm, Patient Position: Lying)   Pulse 79   Temp 97.3 °F (36.3 °C) (Oral)   Resp 18   Ht 165.1 cm (65\")   Wt 98.5 kg (217 lb 3.2 oz)   SpO2 96%   BMI 36.14 kg/m²   Physical Exam  GEN: Awake, alert, interactive, in NAD  HEENT: PERRLA, EOMI, Anicteric, Trachea midline  Lungs: no wheezing/rales/rhonchi  Heart: RRR, +S1/s2, no rub  ABD: soft, nt/nd, +BS, no guarding/rebound  Extremities: no cyanosis, no pitting edema  Skin: heal wounds b/l, coccyx wound  Neuro: AAOx3, no focal deficits  Psych: normal mood & affect      Condition on Discharge: stable/improved    Discharge Disposition:  Skilled Nursing Facility (DC - External)    Discharge Medications:     Discharge Medications        New Medications        Instructions Start Date   acetaminophen 325 MG tablet  Commonly known as: TYLENOL   650 mg, Oral, Every 4 Hours PRN      amiodarone 200 MG tablet  Commonly known as: PACERONE   Take 2 tablets by mouth 2 (Two) Times a Day for 8 days, THEN 1 tablet Daily for 80 days.   Start Date: July 10, 2025     apixaban 5 MG tablet tablet  Commonly known as: ELIQUIS   5 mg, Oral, Every 12 Hours Scheduled      calcium carbonate 500 MG chewable tablet  Commonly known as: TUMS   2 tablets, Oral, 3 Times Daily PRN      cholecalciferol 25 MCG (1000 UT) tablet  Commonly known as: VITAMIN D3   5,000 Units, Oral, Daily   Start Date: July 15, 2025     gabapentin 100 MG capsule  Commonly known as: NEURONTIN   100 mg, Oral, Nightly      HYDROcodone-acetaminophen 5-325 MG per tablet  Commonly known as: NORCO   1 tablet, Oral, Every 8 Hours PRN      insulin glargine 100 UNIT/ML injection  Commonly known as: LANTUS, SEMGLEE   10 Units, Subcutaneous, Daily   Start Date: July 15, 2025     metoprolol tartrate 25 MG tablet  Commonly known as: LOPRESSOR   25 mg, Oral, Every 12 Hours Scheduled      pantoprazole 40 MG EC tablet  Commonly known as: PROTONIX   40 mg, Oral, 2 Times Daily Before Meals   "    sodium bicarbonate 650 MG tablet   650 mg, Oral, 3 Times Daily             Continue These Medications        Instructions Start Date   albuterol sulfate  (90 Base) MCG/ACT inhaler  Commonly known as: PROVENTIL HFA;VENTOLIN HFA;PROAIR HFA   2 puffs, Inhalation, Every 4 Hours PRN      ondansetron ODT 4 MG disintegrating tablet  Commonly known as: ZOFRAN-ODT   4 mg, Translingual, Every 4 Hours PRN             Stop These Medications      ibuprofen 200 MG tablet  Commonly known as: ADVIL,MOTRIN              Discharge Diet:   Dietary Orders (From admission, onward)       Start     Ordered    07/10/25 1800  Dietary Nutrition Supplements Other; Boost Original-Chocolate  Daily With Breakfast & Dinner      Question Answer Comment   Select Supplement: Other    Other Boost Original-Chocolate        07/10/25 1523    07/07/25 1845  Diet: Regular/House; Fluid Consistency: Thin (IDDSI 0)  Diet Effective Now        References:    Diet Order Definitions   Question Answer Comment   Diets: Regular/House    Fluid Consistency: Thin (IDDSI 0)        07/07/25 1844                      Activity at Discharge:   Increase to baseline as tolerated    Follow-up Appointments:   Future Appointments   Date Time Provider Department Center   8/12/2025  1:15 PM Gideon Parsons APRN [facility]       Test Results Pending at Discharge: none    Electronically signed by Sanchez Lopez DO, 07/14/25, 11:42 CDT.    Time: 35 minutes.           Electronically signed by Sanchez Lopez DO at 07/14/25 0929

## 2025-07-17 LAB
ANA PLUS 12 INTERPRETATION: ABNORMAL
ANA SER QL IF: POSITIVE
ANA SPECKLED TITR SER: ABNORMAL {TITER}
C3 SERPL-MCNC: 62 MG/DL (ref 90–180)
C4 SERPL-MCNC: 14 MG/DL (ref 10–40)
CARDIOLIPIN IGA SER IA-ACNC: <12 APL U/ML
CARDIOLIPIN IGG SER IA-ACNC: <15 GPL U/ML
CARDIOLIPIN IGM SER IA-ACNC: <13 MPL U/ML
CCP IGA+IGG SERPL IA-ACNC: <20 UNITS
CENTROMERE AB TITR SER IF: ABNORMAL {TITER}
CHROMATIN IGG SERPL-ACNC: <20 UNITS
DSDNA AB SER FARR-ACNC: <8 IU/ML
ENA SCL70 AB SER IA-ACNC: <20 UNITS
ENA SM AB SER-ACNC: <20 UNITS
ENA SS-A AB SER IA-ACNC: <20 UNITS
ENA SS-B AB SER IA-ACNC: <20 UNITS
LABORATORY COMMENT REPORT: ABNORMAL
RHEUMATOID FACT SERPL-ACNC: <14 IU/ML
THYROPEROXIDASE AB SERPL-ACNC: <9 IU/ML
U1 SNRNP AB SER IA-ACNC: <20 UNITS

## 2025-07-19 ENCOUNTER — APPOINTMENT (OUTPATIENT)
Dept: GENERAL RADIOLOGY | Age: 70
DRG: 682 | End: 2025-07-19
Payer: MEDICARE

## 2025-07-19 ENCOUNTER — APPOINTMENT (OUTPATIENT)
Dept: CT IMAGING | Age: 70
DRG: 682 | End: 2025-07-19
Payer: MEDICARE

## 2025-07-19 ENCOUNTER — HOSPITAL ENCOUNTER (INPATIENT)
Age: 70
LOS: 18 days | Discharge: NURSING, REHAB OR LONG-TERM CARE | DRG: 682 | End: 2025-08-06
Attending: PEDIATRICS | Admitting: INTERNAL MEDICINE
Payer: MEDICARE

## 2025-07-19 DIAGNOSIS — E87.20 METABOLIC ACIDOSIS: ICD-10-CM

## 2025-07-19 DIAGNOSIS — R07.9 CHEST PAIN, UNSPECIFIED TYPE: ICD-10-CM

## 2025-07-19 DIAGNOSIS — N18.6 END STAGE RENAL DISEASE ON DIALYSIS (HCC): ICD-10-CM

## 2025-07-19 DIAGNOSIS — I46.9 CARDIAC ARREST (HCC): Primary | ICD-10-CM

## 2025-07-19 DIAGNOSIS — R79.89 ELEVATED TROPONIN: ICD-10-CM

## 2025-07-19 DIAGNOSIS — Z99.2 END STAGE RENAL DISEASE ON DIALYSIS (HCC): ICD-10-CM

## 2025-07-19 DIAGNOSIS — A41.9 SEPSIS WITH ACUTE ORGAN DYSFUNCTION AND SEPTIC SHOCK, DUE TO UNSPECIFIED ORGANISM, UNSPECIFIED ORGAN DYSFUNCTION TYPE (HCC): ICD-10-CM

## 2025-07-19 DIAGNOSIS — R65.21 SEPSIS WITH ACUTE ORGAN DYSFUNCTION AND SEPTIC SHOCK, DUE TO UNSPECIFIED ORGANISM, UNSPECIFIED ORGAN DYSFUNCTION TYPE (HCC): ICD-10-CM

## 2025-07-19 DIAGNOSIS — J81.1 CHRONIC PULMONARY EDEMA: ICD-10-CM

## 2025-07-19 LAB
ALBUMIN SERPL-MCNC: 3.3 G/DL (ref 3.5–5.2)
ALLENS TEST: ABNORMAL
ALP SERPL-CCNC: 109 U/L (ref 35–104)
ALT SERPL-CCNC: 24 U/L (ref 10–35)
ANION GAP SERPL CALCULATED.3IONS-SCNC: 15 MMOL/L (ref 8–16)
ANISOCYTOSIS BLD QL SMEAR: ABNORMAL
AST SERPL-CCNC: 44 U/L (ref 10–35)
B PARAP IS1001 DNA NPH QL NAA+NON-PROBE: NOT DETECTED
B PERT.PT PRMT NPH QL NAA+NON-PROBE: NOT DETECTED
BASE EXCESS ARTERIAL: -7 MMOL/L (ref -2–2)
BASOPHILS # BLD: 0.1 K/UL (ref 0–0.2)
BASOPHILS NFR BLD: 0.7 % (ref 0–1)
BILIRUB SERPL-MCNC: 0.3 MG/DL (ref 0.2–1.2)
BILIRUB UR QL STRIP: NEGATIVE
BNP BLD-MCNC: 1781 PG/ML (ref 0–124)
BUN SERPL-MCNC: 29 MG/DL (ref 8–23)
C PNEUM DNA NPH QL NAA+NON-PROBE: NOT DETECTED
CALCIUM SERPL-MCNC: 8.1 MG/DL (ref 8.8–10.2)
CARBOXYHEMOGLOBIN ARTERIAL: 0.8 % (ref 0–5)
CHLORIDE SERPL-SCNC: 97 MMOL/L (ref 98–107)
CLARITY UR: ABNORMAL
CO2 SERPL-SCNC: 20 MMOL/L (ref 22–29)
COLOR UR: YELLOW
CREAT SERPL-MCNC: 2.6 MG/DL (ref 0.5–0.9)
CREAT UR-MCNC: 136 MG/DL (ref 28–217)
D DIMER PPP FEU-MCNC: 3.96 UG/ML FEU (ref 0–0.48)
EOSINOPHIL # BLD: 0.2 K/UL (ref 0–0.6)
EOSINOPHIL NFR BLD: 1.1 % (ref 0–5)
ERYTHROCYTE [DISTWIDTH] IN BLOOD BY AUTOMATED COUNT: 18.2 % (ref 11.5–14.5)
FERRITIN SERPL-MCNC: 687 NG/ML (ref 13–150)
FLUAV RNA NPH QL NAA+NON-PROBE: NOT DETECTED
FLUBV RNA NPH QL NAA+NON-PROBE: NOT DETECTED
GLUCOSE BLD-MCNC: 197 MG/DL (ref 70–99)
GLUCOSE BLD-MCNC: 205 MG/DL (ref 70–99)
GLUCOSE BLD-MCNC: 254 MG/DL (ref 70–99)
GLUCOSE SERPL-MCNC: 303 MG/DL (ref 70–99)
GLUCOSE UR STRIP.AUTO-MCNC: 100 MG/DL
HADV DNA NPH QL NAA+NON-PROBE: NOT DETECTED
HCO3 ARTERIAL: 19.7 MMOL/L (ref 22–26)
HCOV 229E RNA NPH QL NAA+NON-PROBE: NOT DETECTED
HCOV HKU1 RNA NPH QL NAA+NON-PROBE: NOT DETECTED
HCOV NL63 RNA NPH QL NAA+NON-PROBE: NOT DETECTED
HCOV OC43 RNA NPH QL NAA+NON-PROBE: NOT DETECTED
HCT VFR BLD AUTO: 29.4 % (ref 37–47)
HEMOGLOBIN, ART, EXTENDED: 9.1 G/DL (ref 12–16)
HGB BLD-MCNC: 9.3 G/DL (ref 12–16)
HGB UR STRIP.AUTO-MCNC: ABNORMAL MG/L
HMPV RNA NPH QL NAA+NON-PROBE: NOT DETECTED
HPIV1 RNA NPH QL NAA+NON-PROBE: NOT DETECTED
HPIV2 RNA NPH QL NAA+NON-PROBE: NOT DETECTED
HPIV3 RNA NPH QL NAA+NON-PROBE: NOT DETECTED
HPIV4 RNA NPH QL NAA+NON-PROBE: NOT DETECTED
HYALINE CASTS #/AREA URNS LPF: ABNORMAL /LPF (ref 0–5)
HYPOCHROMIA BLD QL SMEAR: ABNORMAL
IMM GRANULOCYTES # BLD: 0.5 K/UL
IRON SATN MFR SERPL: 26 % (ref 15–50)
IRON SERPL-MCNC: 47 UG/DL (ref 37–145)
KETONES UR STRIP.AUTO-MCNC: ABNORMAL MG/DL
LACTATE BLDV-SCNC: 1.5 MG/DL (ref 0.5–1.9)
LACTATE BLDV-SCNC: 5.5 MG/DL (ref 0.5–1.9)
LEUKOCYTE ESTERASE UR QL STRIP.AUTO: NEGATIVE
LYMPHOCYTES # BLD: 4.3 K/UL (ref 1.1–4.5)
LYMPHOCYTES NFR BLD: 28.3 % (ref 20–40)
M PNEUMO DNA NPH QL NAA+NON-PROBE: NOT DETECTED
MACROCYTES BLD QL SMEAR: ABNORMAL
MAGNESIUM SERPL-MCNC: 1.6 MG/DL (ref 1.6–2.4)
MCH RBC QN AUTO: 33.8 PG (ref 27–31)
MCHC RBC AUTO-ENTMCNC: 31.6 G/DL (ref 33–37)
MCV RBC AUTO: 106.9 FL (ref 81–99)
METHEMOGLOBIN ARTERIAL: 0.8 %
MICROALBUMIN UR-MCNC: 91.1 MG/DL (ref 0–1.99)
MICROALBUMIN/CREAT UR-RTO: 669.9 MG/G (ref 0–29)
MONOCYTES # BLD: 0.5 K/UL (ref 0–0.9)
MONOCYTES NFR BLD: 3.5 % (ref 0–10)
NEUTROPHILS # BLD: 9.5 K/UL (ref 1.5–7.5)
NEUTS SEG NFR BLD: 63.4 % (ref 50–65)
NITRITE UR QL STRIP.AUTO: NEGATIVE
O2 CONTENT ARTERIAL: 12.7 ML/DL
O2 DELIVERY DEVICE: ABNORMAL
O2 SAT, ARTERIAL: 96.7 %
O2 THERAPY: ABNORMAL
OVALOCYTES BLD QL SMEAR: ABNORMAL
OXYGEN FLOW: 15
PCO2 ARTERIAL: 44 MMHG (ref 35–45)
PERFORMED ON: ABNORMAL
PH ARTERIAL: 7.26 (ref 7.35–7.45)
PH UR STRIP.AUTO: 5.5 [PH] (ref 5–8)
PLATELET # BLD AUTO: 73 K/UL (ref 130–400)
PLATELET SLIDE REVIEW: ABNORMAL
PMV BLD AUTO: 11.9 FL (ref 9.4–12.3)
PO2 ARTERIAL: 147 MMHG (ref 80–100)
POLYCHROMASIA BLD QL SMEAR: ABNORMAL
POTASSIUM BLD-SCNC: 5.1 MMOL/L
POTASSIUM SERPL-SCNC: 5.1 MMOL/L (ref 3.5–5.1)
PROCALCITONIN: 2.09 NG/ML (ref 0–0.09)
PROT SERPL-MCNC: 6.4 G/DL (ref 6.4–8.3)
PROT UR STRIP.AUTO-MCNC: 300 MG/DL
PTH-INTACT SERPL-MCNC: 194 PG/ML (ref 15–65)
RBC # BLD AUTO: 2.75 M/UL (ref 4.2–5.4)
RBC #/AREA URNS HPF: ABNORMAL /HPF (ref 0–2)
RSV RNA NPH QL NAA+NON-PROBE: NOT DETECTED
RV+EV RNA NPH QL NAA+NON-PROBE: NOT DETECTED
SAMPLE SOURCE: ABNORMAL
SARS-COV-2 RNA NPH QL NAA+NON-PROBE: NOT DETECTED
SODIUM SERPL-SCNC: 132 MMOL/L (ref 136–145)
SODIUM UR-SCNC: 44 MMOL/L
SP GR UR STRIP.AUTO: 1.02 (ref 1–1.03)
SQUAMOUS #/AREA URNS HPF: ABNORMAL /HPF
TIBC SERPL-MCNC: 184 UG/DL (ref 250–400)
TROPONIN, HIGH SENSITIVITY: 176 NG/L (ref 0–14)
UROBILINOGEN UR STRIP.AUTO-MCNC: 0.2 E.U./DL
WBC # BLD AUTO: 15 K/UL (ref 4.8–10.8)
WBC #/AREA URNS HPF: ABNORMAL /HPF (ref 0–5)
YEAST #/AREA URNS HPF: PRESENT /HPF

## 2025-07-19 PROCEDURE — 6360000004 HC RX CONTRAST MEDICATION: Performed by: PEDIATRICS

## 2025-07-19 PROCEDURE — 83735 ASSAY OF MAGNESIUM: CPT

## 2025-07-19 PROCEDURE — 82570 ASSAY OF URINE CREATININE: CPT

## 2025-07-19 PROCEDURE — 1200000000 HC SEMI PRIVATE

## 2025-07-19 PROCEDURE — 84300 ASSAY OF URINE SODIUM: CPT

## 2025-07-19 PROCEDURE — 6360000002 HC RX W HCPCS: Performed by: INTERNAL MEDICINE

## 2025-07-19 PROCEDURE — 82962 GLUCOSE BLOOD TEST: CPT

## 2025-07-19 PROCEDURE — 36415 COLL VENOUS BLD VENIPUNCTURE: CPT

## 2025-07-19 PROCEDURE — 84484 ASSAY OF TROPONIN QUANT: CPT

## 2025-07-19 PROCEDURE — 2500000003 HC RX 250 WO HCPCS

## 2025-07-19 PROCEDURE — 84145 PROCALCITONIN (PCT): CPT

## 2025-07-19 PROCEDURE — 2000000000 HC ICU R&B

## 2025-07-19 PROCEDURE — 93005 ELECTROCARDIOGRAM TRACING: CPT | Performed by: PEDIATRICS

## 2025-07-19 PROCEDURE — 83540 ASSAY OF IRON: CPT

## 2025-07-19 PROCEDURE — 85025 COMPLETE CBC W/AUTO DIFF WBC: CPT

## 2025-07-19 PROCEDURE — 36600 WITHDRAWAL OF ARTERIAL BLOOD: CPT

## 2025-07-19 PROCEDURE — 96375 TX/PRO/DX INJ NEW DRUG ADDON: CPT

## 2025-07-19 PROCEDURE — 83605 ASSAY OF LACTIC ACID: CPT

## 2025-07-19 PROCEDURE — 82728 ASSAY OF FERRITIN: CPT

## 2025-07-19 PROCEDURE — 2500000003 HC RX 250 WO HCPCS: Performed by: INTERNAL MEDICINE

## 2025-07-19 PROCEDURE — 85379 FIBRIN DEGRADATION QUANT: CPT

## 2025-07-19 PROCEDURE — 81001 URINALYSIS AUTO W/SCOPE: CPT

## 2025-07-19 PROCEDURE — 6360000002 HC RX W HCPCS: Performed by: PEDIATRICS

## 2025-07-19 PROCEDURE — 99285 EMERGENCY DEPT VISIT HI MDM: CPT

## 2025-07-19 PROCEDURE — 83550 IRON BINDING TEST: CPT

## 2025-07-19 PROCEDURE — 6360000002 HC RX W HCPCS

## 2025-07-19 PROCEDURE — 0202U NFCT DS 22 TRGT SARS-COV-2: CPT

## 2025-07-19 PROCEDURE — 87040 BLOOD CULTURE FOR BACTERIA: CPT

## 2025-07-19 PROCEDURE — 93005 ELECTROCARDIOGRAM TRACING: CPT | Performed by: INTERNAL MEDICINE

## 2025-07-19 PROCEDURE — 71275 CT ANGIOGRAPHY CHEST: CPT

## 2025-07-19 PROCEDURE — 82803 BLOOD GASES ANY COMBINATION: CPT

## 2025-07-19 PROCEDURE — 96365 THER/PROPH/DIAG IV INF INIT: CPT

## 2025-07-19 PROCEDURE — 83880 ASSAY OF NATRIURETIC PEPTIDE: CPT

## 2025-07-19 PROCEDURE — 2580000003 HC RX 258: Performed by: PEDIATRICS

## 2025-07-19 PROCEDURE — 51702 INSERT TEMP BLADDER CATH: CPT

## 2025-07-19 PROCEDURE — 83970 ASSAY OF PARATHORMONE: CPT

## 2025-07-19 PROCEDURE — 2700000000 HC OXYGEN THERAPY PER DAY

## 2025-07-19 PROCEDURE — 6370000000 HC RX 637 (ALT 250 FOR IP): Performed by: PEDIATRICS

## 2025-07-19 PROCEDURE — 96374 THER/PROPH/DIAG INJ IV PUSH: CPT

## 2025-07-19 PROCEDURE — 71045 X-RAY EXAM CHEST 1 VIEW: CPT

## 2025-07-19 PROCEDURE — 2580000003 HC RX 258: Performed by: INTERNAL MEDICINE

## 2025-07-19 PROCEDURE — 80053 COMPREHEN METABOLIC PANEL: CPT

## 2025-07-19 PROCEDURE — 82043 UR ALBUMIN QUANTITATIVE: CPT

## 2025-07-19 PROCEDURE — 2580000003 HC RX 258

## 2025-07-19 RX ORDER — FENTANYL CITRATE 50 UG/ML
50 INJECTION, SOLUTION INTRAMUSCULAR; INTRAVENOUS ONCE
Status: COMPLETED | OUTPATIENT
Start: 2025-07-19 | End: 2025-07-19

## 2025-07-19 RX ORDER — DEXTROSE MONOHYDRATE 100 MG/ML
INJECTION, SOLUTION INTRAVENOUS CONTINUOUS PRN
Status: DISCONTINUED | OUTPATIENT
Start: 2025-07-19 | End: 2025-08-06 | Stop reason: HOSPADM

## 2025-07-19 RX ORDER — INSULIN LISPRO 100 [IU]/ML
0-8 INJECTION, SOLUTION INTRAVENOUS; SUBCUTANEOUS
Status: DISCONTINUED | OUTPATIENT
Start: 2025-07-20 | End: 2025-08-06 | Stop reason: HOSPADM

## 2025-07-19 RX ORDER — MORPHINE SULFATE 2 MG/ML
2 INJECTION, SOLUTION INTRAMUSCULAR; INTRAVENOUS ONCE
Status: COMPLETED | OUTPATIENT
Start: 2025-07-20 | End: 2025-07-20

## 2025-07-19 RX ORDER — MORPHINE SULFATE 2 MG/ML
2 INJECTION, SOLUTION INTRAMUSCULAR; INTRAVENOUS ONCE
Refills: 0 | Status: COMPLETED | OUTPATIENT
Start: 2025-07-19 | End: 2025-07-19

## 2025-07-19 RX ORDER — MORPHINE SULFATE 2 MG/ML
2 INJECTION, SOLUTION INTRAMUSCULAR; INTRAVENOUS ONCE
Refills: 0 | Status: DISCONTINUED | OUTPATIENT
Start: 2025-07-19 | End: 2025-07-19

## 2025-07-19 RX ORDER — GLUCAGON 1 MG/ML
1 KIT INJECTION PRN
Status: DISCONTINUED | OUTPATIENT
Start: 2025-07-19 | End: 2025-08-06 | Stop reason: HOSPADM

## 2025-07-19 RX ORDER — POTASSIUM CHLORIDE 7.45 MG/ML
10 INJECTION INTRAVENOUS PRN
Status: DISCONTINUED | OUTPATIENT
Start: 2025-07-19 | End: 2025-08-06 | Stop reason: HOSPADM

## 2025-07-19 RX ORDER — ONDANSETRON 4 MG/1
4 TABLET, ORALLY DISINTEGRATING ORAL EVERY 8 HOURS PRN
Status: DISCONTINUED | OUTPATIENT
Start: 2025-07-19 | End: 2025-08-06 | Stop reason: HOSPADM

## 2025-07-19 RX ORDER — FENTANYL CITRATE 50 UG/ML
25 INJECTION, SOLUTION INTRAMUSCULAR; INTRAVENOUS ONCE
Status: COMPLETED | OUTPATIENT
Start: 2025-07-19 | End: 2025-07-19

## 2025-07-19 RX ORDER — SODIUM CHLORIDE 0.9 % (FLUSH) 0.9 %
10 SYRINGE (ML) INJECTION PRN
Status: DISCONTINUED | OUTPATIENT
Start: 2025-07-19 | End: 2025-08-06 | Stop reason: HOSPADM

## 2025-07-19 RX ORDER — MAGNESIUM SULFATE IN WATER 40 MG/ML
2000 INJECTION, SOLUTION INTRAVENOUS PRN
Status: DISCONTINUED | OUTPATIENT
Start: 2025-07-19 | End: 2025-08-06 | Stop reason: HOSPADM

## 2025-07-19 RX ORDER — DEXMEDETOMIDINE HYDROCHLORIDE 4 UG/ML
INJECTION, SOLUTION INTRAVENOUS
Status: COMPLETED
Start: 2025-07-19 | End: 2025-07-19

## 2025-07-19 RX ORDER — ASPIRIN 81 MG/1
324 TABLET, CHEWABLE ORAL ONCE
Status: COMPLETED | OUTPATIENT
Start: 2025-07-19 | End: 2025-07-19

## 2025-07-19 RX ORDER — MORPHINE SULFATE 2 MG/ML
INJECTION, SOLUTION INTRAMUSCULAR; INTRAVENOUS
Status: COMPLETED
Start: 2025-07-19 | End: 2025-07-19

## 2025-07-19 RX ORDER — NALOXONE HYDROCHLORIDE 0.4 MG/ML
0.4 INJECTION, SOLUTION INTRAMUSCULAR; INTRAVENOUS; SUBCUTANEOUS PRN
Status: DISCONTINUED | OUTPATIENT
Start: 2025-07-19 | End: 2025-08-06 | Stop reason: HOSPADM

## 2025-07-19 RX ORDER — ENOXAPARIN SODIUM 100 MG/ML
30 INJECTION SUBCUTANEOUS DAILY
Status: DISCONTINUED | OUTPATIENT
Start: 2025-07-19 | End: 2025-07-20 | Stop reason: ALTCHOICE

## 2025-07-19 RX ORDER — DEXMEDETOMIDINE HYDROCHLORIDE 4 UG/ML
.1-1.5 INJECTION, SOLUTION INTRAVENOUS CONTINUOUS
Status: DISCONTINUED | OUTPATIENT
Start: 2025-07-19 | End: 2025-07-21

## 2025-07-19 RX ORDER — SODIUM CHLORIDE 0.9 % (FLUSH) 0.9 %
10 SYRINGE (ML) INJECTION EVERY 12 HOURS SCHEDULED
Status: DISCONTINUED | OUTPATIENT
Start: 2025-07-19 | End: 2025-08-06 | Stop reason: HOSPADM

## 2025-07-19 RX ORDER — IOPAMIDOL 755 MG/ML
75 INJECTION, SOLUTION INTRAVASCULAR
Status: COMPLETED | OUTPATIENT
Start: 2025-07-19 | End: 2025-07-19

## 2025-07-19 RX ORDER — ONDANSETRON 2 MG/ML
4 INJECTION INTRAMUSCULAR; INTRAVENOUS EVERY 6 HOURS PRN
Status: DISCONTINUED | OUTPATIENT
Start: 2025-07-19 | End: 2025-08-06 | Stop reason: HOSPADM

## 2025-07-19 RX ORDER — BUMETANIDE 0.25 MG/ML
5 INJECTION, SOLUTION INTRAMUSCULAR; INTRAVENOUS ONCE
Status: COMPLETED | OUTPATIENT
Start: 2025-07-19 | End: 2025-07-19

## 2025-07-19 RX ORDER — ACETAMINOPHEN 325 MG/1
650 TABLET ORAL EVERY 6 HOURS PRN
Status: DISCONTINUED | OUTPATIENT
Start: 2025-07-19 | End: 2025-08-06 | Stop reason: HOSPADM

## 2025-07-19 RX ORDER — ACETAMINOPHEN 650 MG/1
650 SUPPOSITORY RECTAL EVERY 6 HOURS PRN
Status: DISCONTINUED | OUTPATIENT
Start: 2025-07-19 | End: 2025-08-06 | Stop reason: HOSPADM

## 2025-07-19 RX ORDER — SODIUM CHLORIDE 9 MG/ML
INJECTION, SOLUTION INTRAVENOUS PRN
Status: DISCONTINUED | OUTPATIENT
Start: 2025-07-19 | End: 2025-08-06 | Stop reason: HOSPADM

## 2025-07-19 RX ORDER — POTASSIUM CHLORIDE 1500 MG/1
40 TABLET, EXTENDED RELEASE ORAL PRN
Status: DISCONTINUED | OUTPATIENT
Start: 2025-07-19 | End: 2025-08-06 | Stop reason: HOSPADM

## 2025-07-19 RX ADMIN — SODIUM CHLORIDE 5 MG: 9 INJECTION INTRAMUSCULAR; INTRAVENOUS; SUBCUTANEOUS at 19:40

## 2025-07-19 RX ADMIN — FENTANYL CITRATE 25 MCG: 50 INJECTION INTRAMUSCULAR; INTRAVENOUS at 12:37

## 2025-07-19 RX ADMIN — DEXMEDETOMIDINE HYDROCHLORIDE 0.2 MCG/KG/HR: 400 INJECTION, SOLUTION INTRAVENOUS at 18:43

## 2025-07-19 RX ADMIN — MORPHINE SULFATE 2 MG: 2 INJECTION, SOLUTION INTRAMUSCULAR; INTRAVENOUS at 15:01

## 2025-07-19 RX ADMIN — SODIUM CHLORIDE, PRESERVATIVE FREE 10 ML: 5 INJECTION INTRAVENOUS at 21:03

## 2025-07-19 RX ADMIN — SODIUM CHLORIDE 2.5 MCG/MIN: 0.9 INJECTION, SOLUTION INTRAVENOUS at 11:41

## 2025-07-19 RX ADMIN — FENTANYL CITRATE 50 MCG: 50 INJECTION INTRAMUSCULAR; INTRAVENOUS at 11:20

## 2025-07-19 RX ADMIN — MORPHINE SULFATE 2 MG: 2 INJECTION, SOLUTION INTRAMUSCULAR; INTRAVENOUS at 11:08

## 2025-07-19 RX ADMIN — IOPAMIDOL 75 ML: 755 INJECTION, SOLUTION INTRAVENOUS at 13:24

## 2025-07-19 RX ADMIN — ASPIRIN 324 MG: 81 TABLET, CHEWABLE ORAL at 11:32

## 2025-07-19 RX ADMIN — CEFEPIME 2000 MG: 2 INJECTION, POWDER, FOR SOLUTION INTRAVENOUS at 11:58

## 2025-07-19 RX ADMIN — VANCOMYCIN HYDROCHLORIDE 2500 MG: 5 INJECTION, POWDER, LYOPHILIZED, FOR SOLUTION INTRAVENOUS at 12:41

## 2025-07-19 RX ADMIN — PHENYLEPHRINE HYDROCHLORIDE 30 MCG/MIN: 10 INJECTION INTRAVENOUS at 12:21

## 2025-07-19 RX ADMIN — SODIUM BICARBONATE: 84 INJECTION, SOLUTION INTRAVENOUS at 13:39

## 2025-07-19 RX ADMIN — BUMETANIDE 5 MG: 0.25 INJECTION INTRAMUSCULAR; INTRAVENOUS at 14:01

## 2025-07-20 LAB
ALBUMIN SERPL-MCNC: 3 G/DL (ref 3.5–5.2)
ALP SERPL-CCNC: 97 U/L (ref 35–104)
ALT SERPL-CCNC: 27 U/L (ref 10–35)
ANION GAP SERPL CALCULATED.3IONS-SCNC: 13 MMOL/L (ref 8–16)
ANION GAP SERPL CALCULATED.3IONS-SCNC: 15 MMOL/L (ref 8–16)
ANTI-XA UNFRAC HEPARIN: >1.1 IU/ML
APTT PPP: 41.1 SEC (ref 26–36.2)
AST SERPL-CCNC: 45 U/L (ref 10–35)
BASOPHILS # BLD: 0.1 K/UL (ref 0–0.2)
BASOPHILS NFR BLD: 1.1 % (ref 0–1)
BILIRUB SERPL-MCNC: 0.4 MG/DL (ref 0.2–1.2)
BUN SERPL-MCNC: 34 MG/DL (ref 8–23)
BUN SERPL-MCNC: 38 MG/DL (ref 8–23)
CALCIUM SERPL-MCNC: 7.1 MG/DL (ref 8.8–10.2)
CALCIUM SERPL-MCNC: 7.5 MG/DL (ref 8.8–10.2)
CHLORIDE SERPL-SCNC: 100 MMOL/L (ref 98–107)
CHLORIDE SERPL-SCNC: 96 MMOL/L (ref 98–107)
CO2 SERPL-SCNC: 21 MMOL/L (ref 22–29)
CO2 SERPL-SCNC: 21 MMOL/L (ref 22–29)
CREAT SERPL-MCNC: 2.8 MG/DL (ref 0.5–0.9)
CREAT SERPL-MCNC: 3 MG/DL (ref 0.5–0.9)
EOSINOPHIL # BLD: 0.2 K/UL (ref 0–0.6)
EOSINOPHIL NFR BLD: 1.9 % (ref 0–5)
ERYTHROCYTE [DISTWIDTH] IN BLOOD BY AUTOMATED COUNT: 17.9 % (ref 11.5–14.5)
ERYTHROCYTE [DISTWIDTH] IN BLOOD BY AUTOMATED COUNT: 17.9 % (ref 11.5–14.5)
GLUCOSE BLD-MCNC: 167 MG/DL (ref 70–99)
GLUCOSE BLD-MCNC: 266 MG/DL (ref 70–99)
GLUCOSE SERPL-MCNC: 183 MG/DL (ref 70–99)
GLUCOSE SERPL-MCNC: 266 MG/DL (ref 70–99)
HCT VFR BLD AUTO: 25.3 % (ref 37–47)
HCT VFR BLD AUTO: 25.7 % (ref 37–47)
HGB BLD-MCNC: 7.8 G/DL (ref 12–16)
HGB BLD-MCNC: 8.1 G/DL (ref 12–16)
IMM GRANULOCYTES # BLD: 0.1 K/UL
INR PPP: 1.5 (ref 0.88–1.18)
LYMPHOCYTES # BLD: 0.9 K/UL (ref 1.1–4.5)
LYMPHOCYTES NFR BLD: 8.4 % (ref 20–40)
MCH RBC QN AUTO: 32.8 PG (ref 27–31)
MCH RBC QN AUTO: 32.9 PG (ref 27–31)
MCHC RBC AUTO-ENTMCNC: 30.8 G/DL (ref 33–37)
MCHC RBC AUTO-ENTMCNC: 31.5 G/DL (ref 33–37)
MCV RBC AUTO: 104.5 FL (ref 81–99)
MCV RBC AUTO: 106.3 FL (ref 81–99)
MONOCYTES # BLD: 0.7 K/UL (ref 0–0.9)
MONOCYTES NFR BLD: 7.1 % (ref 0–10)
NEUTROPHILS # BLD: 8.2 K/UL (ref 1.5–7.5)
NEUTS SEG NFR BLD: 80.9 % (ref 50–65)
PERFORMED ON: ABNORMAL
PERFORMED ON: ABNORMAL
PHOSPHATE SERPL-MCNC: 6 MG/DL (ref 2.5–4.5)
PLATELET # BLD AUTO: 108 K/UL (ref 130–400)
PLATELET # BLD AUTO: 110 K/UL (ref 130–400)
PMV BLD AUTO: 11.1 FL (ref 9.4–12.3)
PMV BLD AUTO: 12.1 FL (ref 9.4–12.3)
POTASSIUM SERPL-SCNC: 5.3 MMOL/L (ref 3.5–5)
POTASSIUM SERPL-SCNC: 5.3 MMOL/L (ref 3.5–5.1)
POTASSIUM SERPL-SCNC: 5.7 MMOL/L (ref 3.5–5)
PROT SERPL-MCNC: 6 G/DL (ref 6.4–8.3)
PROTHROMBIN TIME: 17.8 SEC (ref 12–14.6)
RBC # BLD AUTO: 2.38 M/UL (ref 4.2–5.4)
RBC # BLD AUTO: 2.46 M/UL (ref 4.2–5.4)
SODIUM SERPL-SCNC: 132 MMOL/L (ref 136–145)
SODIUM SERPL-SCNC: 134 MMOL/L (ref 136–145)
VANCOMYCIN SERPL-MCNC: 17.6 UG/ML
WBC # BLD AUTO: 10.2 K/UL (ref 4.8–10.8)
WBC # BLD AUTO: 8.4 K/UL (ref 4.8–10.8)

## 2025-07-20 PROCEDURE — 84100 ASSAY OF PHOSPHORUS: CPT

## 2025-07-20 PROCEDURE — 94760 N-INVAS EAR/PLS OXIMETRY 1: CPT

## 2025-07-20 PROCEDURE — 85520 HEPARIN ASSAY: CPT

## 2025-07-20 PROCEDURE — 93005 ELECTROCARDIOGRAM TRACING: CPT | Performed by: INTERNAL MEDICINE

## 2025-07-20 PROCEDURE — 36415 COLL VENOUS BLD VENIPUNCTURE: CPT

## 2025-07-20 PROCEDURE — 85025 COMPLETE CBC W/AUTO DIFF WBC: CPT

## 2025-07-20 PROCEDURE — 2700000000 HC OXYGEN THERAPY PER DAY

## 2025-07-20 PROCEDURE — 82962 GLUCOSE BLOOD TEST: CPT

## 2025-07-20 PROCEDURE — 1200000000 HC SEMI PRIVATE

## 2025-07-20 PROCEDURE — 2500000003 HC RX 250 WO HCPCS: Performed by: INTERNAL MEDICINE

## 2025-07-20 PROCEDURE — 99222 1ST HOSP IP/OBS MODERATE 55: CPT | Performed by: INTERNAL MEDICINE

## 2025-07-20 PROCEDURE — 6360000002 HC RX W HCPCS: Performed by: HOSPITALIST

## 2025-07-20 PROCEDURE — 85610 PROTHROMBIN TIME: CPT

## 2025-07-20 PROCEDURE — 85730 THROMBOPLASTIN TIME PARTIAL: CPT

## 2025-07-20 PROCEDURE — 85027 COMPLETE CBC AUTOMATED: CPT

## 2025-07-20 PROCEDURE — 2000000000 HC ICU R&B

## 2025-07-20 PROCEDURE — 2580000003 HC RX 258: Performed by: INTERNAL MEDICINE

## 2025-07-20 PROCEDURE — 6360000002 HC RX W HCPCS: Performed by: INTERNAL MEDICINE

## 2025-07-20 PROCEDURE — 80202 ASSAY OF VANCOMYCIN: CPT

## 2025-07-20 PROCEDURE — 6370000000 HC RX 637 (ALT 250 FOR IP): Performed by: HOSPITALIST

## 2025-07-20 PROCEDURE — 80053 COMPREHEN METABOLIC PANEL: CPT

## 2025-07-20 RX ORDER — HEPARIN SODIUM 10000 [USP'U]/100ML
5-30 INJECTION, SOLUTION INTRAVENOUS CONTINUOUS
Status: DISCONTINUED | OUTPATIENT
Start: 2025-07-20 | End: 2025-07-22

## 2025-07-20 RX ORDER — HEPARIN SODIUM 1000 [USP'U]/ML
4000 INJECTION, SOLUTION INTRAVENOUS; SUBCUTANEOUS PRN
Status: DISCONTINUED | OUTPATIENT
Start: 2025-07-20 | End: 2025-07-22

## 2025-07-20 RX ORDER — HEPARIN SODIUM 1000 [USP'U]/ML
4000 INJECTION, SOLUTION INTRAVENOUS; SUBCUTANEOUS ONCE
Status: COMPLETED | OUTPATIENT
Start: 2025-07-20 | End: 2025-07-20

## 2025-07-20 RX ORDER — MORPHINE SULFATE 2 MG/ML
1 INJECTION, SOLUTION INTRAMUSCULAR; INTRAVENOUS
Status: DISCONTINUED | OUTPATIENT
Start: 2025-07-20 | End: 2025-07-24

## 2025-07-20 RX ORDER — HEPARIN SODIUM 10000 [USP'U]/100ML
5-30 INJECTION, SOLUTION INTRAVENOUS CONTINUOUS
Status: DISCONTINUED | OUTPATIENT
Start: 2025-07-20 | End: 2025-07-20 | Stop reason: ALTCHOICE

## 2025-07-20 RX ORDER — MORPHINE SULFATE 2 MG/ML
2 INJECTION, SOLUTION INTRAMUSCULAR; INTRAVENOUS ONCE
Refills: 0 | Status: COMPLETED | OUTPATIENT
Start: 2025-07-20 | End: 2025-07-20

## 2025-07-20 RX ORDER — HEPARIN SODIUM 1000 [USP'U]/ML
2000 INJECTION, SOLUTION INTRAVENOUS; SUBCUTANEOUS PRN
Status: DISCONTINUED | OUTPATIENT
Start: 2025-07-20 | End: 2025-07-22

## 2025-07-20 RX ADMIN — DEXMEDETOMIDINE HYDROCHLORIDE 1.4 MCG/KG/HR: 400 INJECTION, SOLUTION INTRAVENOUS at 17:34

## 2025-07-20 RX ADMIN — SODIUM CHLORIDE, PRESERVATIVE FREE 10 ML: 5 INJECTION INTRAVENOUS at 19:39

## 2025-07-20 RX ADMIN — DEXMEDETOMIDINE HYDROCHLORIDE 1.2 MCG/KG/HR: 400 INJECTION, SOLUTION INTRAVENOUS at 19:42

## 2025-07-20 RX ADMIN — MORPHINE SULFATE 2 MG: 2 INJECTION, SOLUTION INTRAMUSCULAR; INTRAVENOUS at 08:07

## 2025-07-20 RX ADMIN — SODIUM ZIRCONIUM CYCLOSILICATE 10 G: 10 POWDER, FOR SUSPENSION ORAL at 06:08

## 2025-07-20 RX ADMIN — MORPHINE SULFATE 1 MG: 2 INJECTION, SOLUTION INTRAMUSCULAR; INTRAVENOUS at 15:46

## 2025-07-20 RX ADMIN — DEXMEDETOMIDINE HYDROCHLORIDE 0.4 MCG/KG/HR: 400 INJECTION, SOLUTION INTRAVENOUS at 00:29

## 2025-07-20 RX ADMIN — SODIUM BICARBONATE: 84 INJECTION, SOLUTION INTRAVENOUS at 03:12

## 2025-07-20 RX ADMIN — ENOXAPARIN SODIUM 30 MG: 100 INJECTION SUBCUTANEOUS at 08:12

## 2025-07-20 RX ADMIN — MORPHINE SULFATE 2 MG: 2 INJECTION, SOLUTION INTRAMUSCULAR; INTRAVENOUS at 00:03

## 2025-07-20 RX ADMIN — DEXMEDETOMIDINE HYDROCHLORIDE 1.2 MCG/KG/HR: 400 INJECTION, SOLUTION INTRAVENOUS at 11:50

## 2025-07-20 RX ADMIN — MORPHINE SULFATE 1 MG: 2 INJECTION, SOLUTION INTRAMUSCULAR; INTRAVENOUS at 21:02

## 2025-07-20 RX ADMIN — MORPHINE SULFATE 1 MG: 2 INJECTION, SOLUTION INTRAMUSCULAR; INTRAVENOUS at 17:40

## 2025-07-20 RX ADMIN — SODIUM BICARBONATE: 84 INJECTION, SOLUTION INTRAVENOUS at 18:21

## 2025-07-20 RX ADMIN — PHENYLEPHRINE HYDROCHLORIDE 20 MCG/MIN: 10 INJECTION INTRAVENOUS at 10:12

## 2025-07-20 RX ADMIN — DEXMEDETOMIDINE HYDROCHLORIDE 0.6 MCG/KG/HR: 400 INJECTION, SOLUTION INTRAVENOUS at 07:56

## 2025-07-20 RX ADMIN — MORPHINE SULFATE 1 MG: 2 INJECTION, SOLUTION INTRAMUSCULAR; INTRAVENOUS at 23:18

## 2025-07-20 RX ADMIN — HEPARIN SODIUM AND DEXTROSE 8 UNITS/KG/HR: 10000; 5 INJECTION INTRAVENOUS at 19:37

## 2025-07-20 RX ADMIN — HEPARIN SODIUM 4000 UNITS: 1000 INJECTION INTRAVENOUS; SUBCUTANEOUS at 19:33

## 2025-07-20 RX ADMIN — INSULIN LISPRO 4 UNITS: 100 INJECTION, SOLUTION INTRAVENOUS; SUBCUTANEOUS at 20:51

## 2025-07-20 RX ADMIN — INSULIN LISPRO 4 UNITS: 100 INJECTION, SOLUTION INTRAVENOUS; SUBCUTANEOUS at 17:02

## 2025-07-20 RX ADMIN — HEPARIN SODIUM AND DEXTROSE 8 UNITS/KG/HR: 10000; 5 INJECTION INTRAVENOUS at 22:30

## 2025-07-20 RX ADMIN — DEXMEDETOMIDINE HYDROCHLORIDE 1.4 MCG/KG/HR: 400 INJECTION, SOLUTION INTRAVENOUS at 14:28

## 2025-07-20 RX ADMIN — DEXMEDETOMIDINE HYDROCHLORIDE 1.5 MCG/KG/HR: 400 INJECTION, SOLUTION INTRAVENOUS at 21:40

## 2025-07-20 RX ADMIN — INSULIN LISPRO 2 UNITS: 100 INJECTION, SOLUTION INTRAVENOUS; SUBCUTANEOUS at 00:02

## 2025-07-20 ASSESSMENT — PAIN DESCRIPTION - LOCATION
LOCATION: CHEST;OTHER (COMMENT)
LOCATION: BACK
LOCATION: CHEST
LOCATION: BACK
LOCATION: CHEST;OTHER (COMMENT)

## 2025-07-20 ASSESSMENT — PAIN DESCRIPTION - FREQUENCY
FREQUENCY: INTERMITTENT

## 2025-07-20 ASSESSMENT — PAIN DESCRIPTION - ONSET
ONSET: GRADUAL
ONSET: AWAKENED FROM SLEEP
ONSET: ON-GOING

## 2025-07-20 ASSESSMENT — PAIN DESCRIPTION - ORIENTATION
ORIENTATION: MID

## 2025-07-20 ASSESSMENT — PAIN SCALES - GENERAL
PAINLEVEL_OUTOF10: 7
PAINLEVEL_OUTOF10: 9
PAINLEVEL_OUTOF10: 10
PAINLEVEL_OUTOF10: 3
PAINLEVEL_OUTOF10: 3
PAINLEVEL_OUTOF10: 10
PAINLEVEL_OUTOF10: 10
PAINLEVEL_OUTOF10: 6

## 2025-07-20 ASSESSMENT — PAIN DESCRIPTION - PAIN TYPE
TYPE: ACUTE PAIN

## 2025-07-20 ASSESSMENT — PAIN - FUNCTIONAL ASSESSMENT
PAIN_FUNCTIONAL_ASSESSMENT: PREVENTS OR INTERFERES SOME ACTIVE ACTIVITIES AND ADLS

## 2025-07-20 ASSESSMENT — PAIN SCALES - WONG BAKER
WONGBAKER_NUMERICALRESPONSE: NO HURT
WONGBAKER_NUMERICALRESPONSE: NO HURT

## 2025-07-20 ASSESSMENT — PAIN DESCRIPTION - DESCRIPTORS
DESCRIPTORS: ACHING;DULL;SORE
DESCRIPTORS: ACHING;DISCOMFORT
DESCRIPTORS: SORE;DULL;ACHING

## 2025-07-21 ENCOUNTER — APPOINTMENT (OUTPATIENT)
Age: 70
DRG: 682 | End: 2025-07-21
Attending: INTERNAL MEDICINE
Payer: MEDICARE

## 2025-07-21 PROBLEM — N18.6 END STAGE RENAL DISEASE ON DIALYSIS (HCC): Status: ACTIVE | Noted: 2025-07-21

## 2025-07-21 PROBLEM — Z99.2 END STAGE RENAL DISEASE ON DIALYSIS (HCC): Status: ACTIVE | Noted: 2025-07-21

## 2025-07-21 PROBLEM — F41.9 ANXIETY: Status: ACTIVE | Noted: 2025-07-21

## 2025-07-21 PROBLEM — Z51.5 PALLIATIVE CARE PATIENT: Status: ACTIVE | Noted: 2025-07-21

## 2025-07-21 PROBLEM — R52 ACUTE PAIN: Status: ACTIVE | Noted: 2025-07-21

## 2025-07-21 LAB
25(OH)D3 SERPL-MCNC: 13.5 NG/ML
ALBUMIN SERPL-MCNC: 2.9 G/DL (ref 3.5–5.2)
ALP SERPL-CCNC: 96 U/L (ref 35–104)
ALT SERPL-CCNC: 25 U/L (ref 10–35)
ANION GAP SERPL CALCULATED.3IONS-SCNC: 16 MMOL/L (ref 8–16)
APTT PPP: 81.2 SEC (ref 26–36.2)
APTT PPP: 90.8 SEC (ref 26–36.2)
AST SERPL-CCNC: 28 U/L (ref 10–35)
BILIRUB SERPL-MCNC: 0.4 MG/DL (ref 0.2–1.2)
BUN SERPL-MCNC: 38 MG/DL (ref 8–23)
CALCIUM SERPL-MCNC: 7.2 MG/DL (ref 8.8–10.2)
CHLORIDE SERPL-SCNC: 96 MMOL/L (ref 98–107)
CO2 SERPL-SCNC: 22 MMOL/L (ref 22–29)
CREAT SERPL-MCNC: 3 MG/DL (ref 0.5–0.9)
ECHO BSA: 2.39 M2
ECHO LV EDV A2C: 93 ML
ECHO LV EDV A4C: 110 ML
ECHO LV EDV INDEX A4C: 49 ML/M2
ECHO LV EDV NDEX A2C: 41 ML/M2
ECHO LV EF PHYSICIAN: 60 %
ECHO LV EJECTION FRACTION A2C: 53 %
ECHO LV EJECTION FRACTION A4C: 70 %
ECHO LV EJECTION FRACTION BIPLANE: 63 % (ref 55–100)
ECHO LV ESV A2C: 43 ML
ECHO LV ESV A4C: 34 ML
ECHO LV ESV INDEX A2C: 19 ML/M2
ECHO LV ESV INDEX A4C: 15 ML/M2
ECHO LV FRACTIONAL SHORTENING: 33 % (ref 28–44)
ECHO LV INTERNAL DIMENSION DIASTOLE INDEX: 2.04 CM/M2
ECHO LV INTERNAL DIMENSION DIASTOLIC: 4.6 CM (ref 3.9–5.3)
ECHO LV INTERNAL DIMENSION SYSTOLIC INDEX: 1.37 CM/M2
ECHO LV INTERNAL DIMENSION SYSTOLIC: 3.1 CM
ECHO LV IVSD: 0.8 CM (ref 0.6–0.9)
ECHO LV MASS 2D: 137.7 G (ref 67–162)
ECHO LV MASS INDEX 2D: 60.9 G/M2 (ref 43–95)
ECHO LV POSTERIOR WALL DIASTOLIC: 1 CM (ref 0.6–0.9)
ECHO LV RELATIVE WALL THICKNESS RATIO: 0.43
ECHO RV INTERNAL DIMENSION: 2.4 CM
ERYTHROCYTE [DISTWIDTH] IN BLOOD BY AUTOMATED COUNT: 17.8 % (ref 11.5–14.5)
GLUCOSE BLD-MCNC: 139 MG/DL (ref 70–99)
GLUCOSE BLD-MCNC: 143 MG/DL (ref 70–99)
GLUCOSE BLD-MCNC: 167 MG/DL (ref 70–99)
GLUCOSE BLD-MCNC: 174 MG/DL (ref 70–99)
GLUCOSE SERPL-MCNC: 144 MG/DL (ref 70–99)
HCT VFR BLD AUTO: 22.8 % (ref 37–47)
HGB BLD-MCNC: 7.3 G/DL (ref 12–16)
MCH RBC QN AUTO: 33.6 PG (ref 27–31)
MCHC RBC AUTO-ENTMCNC: 32 G/DL (ref 33–37)
MCV RBC AUTO: 105.1 FL (ref 81–99)
PERFORMED ON: ABNORMAL
PLATELET # BLD AUTO: 107 K/UL (ref 130–400)
PMV BLD AUTO: 11.2 FL (ref 9.4–12.3)
POTASSIUM SERPL-SCNC: 5.1 MMOL/L (ref 3.5–5.1)
PROT SERPL-MCNC: 5.9 G/DL (ref 6.4–8.3)
RBC # BLD AUTO: 2.17 M/UL (ref 4.2–5.4)
SODIUM SERPL-SCNC: 134 MMOL/L (ref 136–145)
TROPONIN, HIGH SENSITIVITY: 212 NG/L (ref 0–14)
VANCOMYCIN TROUGH SERPL-MCNC: 14 UG/ML (ref 10–20)
WBC # BLD AUTO: 9.7 K/UL (ref 4.8–10.8)

## 2025-07-21 PROCEDURE — 99223 1ST HOSP IP/OBS HIGH 75: CPT

## 2025-07-21 PROCEDURE — 80202 ASSAY OF VANCOMYCIN: CPT

## 2025-07-21 PROCEDURE — 85027 COMPLETE CBC AUTOMATED: CPT

## 2025-07-21 PROCEDURE — 80053 COMPREHEN METABOLIC PANEL: CPT

## 2025-07-21 PROCEDURE — 84484 ASSAY OF TROPONIN QUANT: CPT

## 2025-07-21 PROCEDURE — 6360000002 HC RX W HCPCS: Performed by: INTERNAL MEDICINE

## 2025-07-21 PROCEDURE — 2500000003 HC RX 250 WO HCPCS: Performed by: INTERNAL MEDICINE

## 2025-07-21 PROCEDURE — 82962 GLUCOSE BLOOD TEST: CPT

## 2025-07-21 PROCEDURE — 6370000000 HC RX 637 (ALT 250 FOR IP): Performed by: INTERNAL MEDICINE

## 2025-07-21 PROCEDURE — C8924 2D TTE W OR W/O FOL W/CON,FU: HCPCS

## 2025-07-21 PROCEDURE — 82306 VITAMIN D 25 HYDROXY: CPT

## 2025-07-21 PROCEDURE — 36415 COLL VENOUS BLD VENIPUNCTURE: CPT

## 2025-07-21 PROCEDURE — 6370000000 HC RX 637 (ALT 250 FOR IP)

## 2025-07-21 PROCEDURE — 93308 TTE F-UP OR LMTD: CPT | Performed by: INTERNAL MEDICINE

## 2025-07-21 PROCEDURE — 8010000000 HC HEMODIALYSIS ACUTE INPT

## 2025-07-21 PROCEDURE — 85730 THROMBOPLASTIN TIME PARTIAL: CPT

## 2025-07-21 PROCEDURE — 2700000000 HC OXYGEN THERAPY PER DAY

## 2025-07-21 PROCEDURE — 2580000003 HC RX 258: Performed by: INTERNAL MEDICINE

## 2025-07-21 PROCEDURE — 94760 N-INVAS EAR/PLS OXIMETRY 1: CPT

## 2025-07-21 PROCEDURE — 99232 SBSQ HOSP IP/OBS MODERATE 35: CPT | Performed by: INTERNAL MEDICINE

## 2025-07-21 PROCEDURE — 2060000000 HC ICU INTERMEDIATE R&B

## 2025-07-21 PROCEDURE — 6360000004 HC RX CONTRAST MEDICATION: Performed by: INTERNAL MEDICINE

## 2025-07-21 PROCEDURE — 5A1D70Z PERFORMANCE OF URINARY FILTRATION, INTERMITTENT, LESS THAN 6 HOURS PER DAY: ICD-10-PCS | Performed by: INTERNAL MEDICINE

## 2025-07-21 RX ORDER — CALCITRIOL 0.25 UG/1
0.25 CAPSULE, LIQUID FILLED ORAL DAILY
Status: DISCONTINUED | OUTPATIENT
Start: 2025-07-21 | End: 2025-08-06 | Stop reason: HOSPADM

## 2025-07-21 RX ORDER — OXYCODONE AND ACETAMINOPHEN 5; 325 MG/1; MG/1
1 TABLET ORAL EVERY 4 HOURS PRN
Refills: 0 | Status: DISCONTINUED | OUTPATIENT
Start: 2025-07-21 | End: 2025-07-25

## 2025-07-21 RX ORDER — MIDODRINE HYDROCHLORIDE 5 MG/1
5 TABLET ORAL 2 TIMES DAILY PRN
Status: DISCONTINUED | OUTPATIENT
Start: 2025-07-21 | End: 2025-08-06 | Stop reason: HOSPADM

## 2025-07-21 RX ORDER — HYDROXYZINE HYDROCHLORIDE 10 MG/1
10 TABLET, FILM COATED ORAL 3 TIMES DAILY PRN
Status: DISCONTINUED | OUTPATIENT
Start: 2025-07-21 | End: 2025-08-06 | Stop reason: HOSPADM

## 2025-07-21 RX ORDER — SERTRALINE HYDROCHLORIDE 25 MG/1
25 TABLET, FILM COATED ORAL DAILY
Status: DISCONTINUED | OUTPATIENT
Start: 2025-07-21 | End: 2025-07-25

## 2025-07-21 RX ADMIN — OXYCODONE AND ACETAMINOPHEN 1 TABLET: 5; 325 TABLET ORAL at 19:22

## 2025-07-21 RX ADMIN — HYDROXYZINE HYDROCHLORIDE 10 MG: 10 TABLET, FILM COATED ORAL at 20:33

## 2025-07-21 RX ADMIN — MORPHINE SULFATE 1 MG: 2 INJECTION, SOLUTION INTRAMUSCULAR; INTRAVENOUS at 18:18

## 2025-07-21 RX ADMIN — SODIUM CHLORIDE, PRESERVATIVE FREE 10 ML: 5 INJECTION INTRAVENOUS at 20:34

## 2025-07-21 RX ADMIN — MORPHINE SULFATE 1 MG: 2 INJECTION, SOLUTION INTRAMUSCULAR; INTRAVENOUS at 09:31

## 2025-07-21 RX ADMIN — IRON SUCROSE 200 MG: 20 INJECTION, SOLUTION INTRAVENOUS at 11:34

## 2025-07-21 RX ADMIN — ALTEPLASE 3 MG: 2.2 INJECTION, POWDER, LYOPHILIZED, FOR SOLUTION INTRAVENOUS at 17:25

## 2025-07-21 RX ADMIN — MORPHINE SULFATE 1 MG: 2 INJECTION, SOLUTION INTRAMUSCULAR; INTRAVENOUS at 03:48

## 2025-07-21 RX ADMIN — SERTRALINE HYDROCHLORIDE 25 MG: 50 TABLET ORAL at 12:19

## 2025-07-21 RX ADMIN — HEPARIN SODIUM AND DEXTROSE 8 UNITS/KG/HR: 10000; 5 INJECTION INTRAVENOUS at 20:41

## 2025-07-21 RX ADMIN — CALCITRIOL CAPSULES 0.25 MCG 0.25 MCG: 0.25 CAPSULE ORAL at 11:34

## 2025-07-21 RX ADMIN — MIDODRINE HYDROCHLORIDE 5 MG: 5 TABLET ORAL at 11:31

## 2025-07-21 RX ADMIN — SULFUR HEXAFLUORIDE 2 ML: 60.7; .19; .19 INJECTION, POWDER, LYOPHILIZED, FOR SUSPENSION INTRAVENOUS; INTRAVESICAL at 07:44

## 2025-07-21 RX ADMIN — MORPHINE SULFATE 1 MG: 2 INJECTION, SOLUTION INTRAMUSCULAR; INTRAVENOUS at 20:33

## 2025-07-21 RX ADMIN — SODIUM CHLORIDE, PRESERVATIVE FREE 10 ML: 5 INJECTION INTRAVENOUS at 07:26

## 2025-07-21 RX ADMIN — MORPHINE SULFATE 1 MG: 2 INJECTION, SOLUTION INTRAMUSCULAR; INTRAVENOUS at 07:25

## 2025-07-21 RX ADMIN — MORPHINE SULFATE 1 MG: 2 INJECTION, SOLUTION INTRAMUSCULAR; INTRAVENOUS at 11:34

## 2025-07-21 RX ADMIN — EPOETIN ALFA-EPBX 10000 UNITS: 10000 INJECTION, SOLUTION INTRAVENOUS; SUBCUTANEOUS at 18:19

## 2025-07-21 RX ADMIN — AMIODARONE HYDROCHLORIDE 150 MG: 50 INJECTION, SOLUTION INTRAVENOUS at 12:26

## 2025-07-21 RX ADMIN — OXYCODONE AND ACETAMINOPHEN 1 TABLET: 5; 325 TABLET ORAL at 14:43

## 2025-07-21 ASSESSMENT — PAIN SCALES - GENERAL
PAINLEVEL_OUTOF10: 5
PAINLEVEL_OUTOF10: 3
PAINLEVEL_OUTOF10: 10
PAINLEVEL_OUTOF10: 6
PAINLEVEL_OUTOF10: 10
PAINLEVEL_OUTOF10: 10
PAINLEVEL_OUTOF10: 7
PAINLEVEL_OUTOF10: 8
PAINLEVEL_OUTOF10: 9
PAINLEVEL_OUTOF10: 8

## 2025-07-21 ASSESSMENT — PAIN DESCRIPTION - ORIENTATION
ORIENTATION: MID
ORIENTATION: INNER;MID
ORIENTATION: RIGHT;LEFT

## 2025-07-21 ASSESSMENT — PAIN DESCRIPTION - PAIN TYPE: TYPE: ACUTE PAIN

## 2025-07-21 ASSESSMENT — PAIN DESCRIPTION - DESCRIPTORS
DESCRIPTORS: ACHING;DULL;SORE
DESCRIPTORS: ACHING;STABBING
DESCRIPTORS: SHARP

## 2025-07-21 ASSESSMENT — PAIN DESCRIPTION - LOCATION
LOCATION: BACK;CHEST
LOCATION: CHEST
LOCATION: BACK
LOCATION: CHEST

## 2025-07-21 ASSESSMENT — PAIN SCALES - WONG BAKER: WONGBAKER_NUMERICALRESPONSE: NO HURT

## 2025-07-21 ASSESSMENT — PAIN DESCRIPTION - FREQUENCY: FREQUENCY: INTERMITTENT

## 2025-07-21 ASSESSMENT — PAIN - FUNCTIONAL ASSESSMENT
PAIN_FUNCTIONAL_ASSESSMENT: PREVENTS OR INTERFERES SOME ACTIVE ACTIVITIES AND ADLS
PAIN_FUNCTIONAL_ASSESSMENT: ACTIVITIES ARE NOT PREVENTED

## 2025-07-21 ASSESSMENT — PAIN DESCRIPTION - ONSET: ONSET: ON-GOING

## 2025-07-21 NOTE — PAYOR COMM NOTE
"7/21/25. [facility] [phone]  [phone]      FAXING CLINICAL FOR CONT STAY. 7/10/25 THRU 7/13/25. D/C ON 7/14/25.              Lindsay Laughlin (69 y.o. Female)       Date of Birth   1955    Social Security Number       Address   [address] [place], [place] [address]    Home Phone   [phone]    MRN   [id number]       [facility]   [facility]    Marital Status                               Admission Date   7/3/2025    Admission Type   Emergency    Admitting Provider   Sanchez Lopez DO    Attending Provider       Department, Room/Bed   [facility] 4B, 412/1       Discharge Date   7/14/2025    Discharge Disposition   Skilled Nursing Facility (DC - External)    Discharge Destination                                 Attending Provider: (none)   Allergies: No Known Allergies    Isolation: None   Infection: None   Code Status: Prior    Ht: 165.1 cm (65\")   Wt: 98.5 kg (217 lb 3.2 oz)    Admission Cmt: None   Principal Problem: DAISY (acute kidney injury) [N17.9]                   Active Insurance as of 7/3/2025       Primary Coverage       Payor Plan Insurance Group Employer/Plan Group    UNITED HEALTHC[employer] MEDICARE REPLACEMENT UHC MEDICARE ADVANTAGE \[facility] [place] [facility]\"" HMO NON PAR KYDSNP       Payor Plan Address Payor Plan Phone Number Payor Plan Fax Number Effective Dates    [address] [address]   7/1/2025 - None Entered    [facility] [id number]         Subscriber Name Subscriber Birth Date Member ID       LINDSAY LAUGHLIN 1955 [id number]               Secondary Coverage       Payor Plan Insurance Group Employer/Plan Group    [place]        Payor Plan Address Payor Plan Phone Number Payor Plan Fax Number Effective Dates    [address]   5/17/2023 - None Entered    [place] [address]         Subscriber Name Subscriber Birth Date Member ID       LINDSAY LAUGHLIN 1955 [id number]                     Emergency Contacts        " (Rel.) Home Phone Work Phone Mobile Phone    Rodger Marrufo (Friend) [phone] -- --    Klever Stone (Friend) -- -- [phone]    AdamaHair pritchardi -- -- [phone]             [facility]Time: 7/3/2025 [facility]   Hospital Account: [id number]    MRN: [id number]   Patient:  Javier Pastrana   Contact Serial #: [id number]   SSN:          ENCOUNTER             Patient Class: Inpatient   Unit: [address] Service: Medicine     Bed: 412/1   Admitting Provider: Sanchez Lopez DO   Referring Physician: Shade Alberts   Attending Provider:      Diagnosis: Enteritis [K52.9]               PATIENT             Name: Javier Pastrana : 1955 (69 yrs)   Address: [address] [place] [address] Sex: Female   City: [place] [address]   County: [place]   Marital Status:  Ethnicity: NOT        Race: WHITE   Primary Care Provider: Provider, No Known Patients Phone: Home Phone: [phone]  Work Phone: [phone]        EMERGENCY CONTACT   Contact Name Legal Guardian? Relationship to Patient Home Phone Work Phone Mobile Phone   1. Rodger Marrufo  2. Klever Stone      Friend  Friend [phone]   GUARANTOR             Guarantor: Javier Pastrana     : 1955   Address: [address] [place] [address] Sex: [place] [address]     Relation to Patient: Self       Home Phone: [phone]   Guarantor ID: [id number]       Work Phone: [phone]   GUARANTOR EMPLOYER   Employer:           Status: DISABLED   COVERAGE          PRIMARY INSURANCE   Payor: [facility] MEDICARE REPLACEMENT Plan: [facility] MEDICARE ADVANTAGE SNP HMO NON PAR   Group Number: [id number] Insurance Type: INDEMNITY   Subscriber Name: JAVIER PASTRANA Subscriber : 1955   Subscriber ID: [id number] Coverage Address: [facility] [address]  [place] [address]   Pat. Rel. to Subscriber: Self Coverage Phone:     SECONDARY INSURANCE   [place]    Group Number:   Insurance Type: INDEMNITY   Subscriber Name: LINDSAY LAUGHLIN Subscriber : 1955   Subscriber ID: [id number] Coverage Address: [place] [address]   Pat. Rel. to Subscriber: SELF Coverage Phone: [phone]      Contact Serial # ([id number])         2025    Chart ID ([id number])          Juan J Garcia MD   Physician  Hospitalist     Progress Notes      Signed     Date of Service: 07/10/25 1138  Creation Time: 07/10/25 113     Signed              [facility] Medicine Services  INPATIENT PROGRESS NOTE     Patient Name: Lindsay Laughlin  Date of Admission: 7/3/2025  Today's Date: 07/10/25  Length of Stay: 7  Primary Care Physician: Provider, No Known     Subjective   Chief Complaint: Dehydration, vomiting and diarrhea  Abdominal Pain     69-year-old female with history of diabetes mellitus type II, hypertension, hyperlipidemia, chronic kidney disease stage 3b.  Admitted to the hospital 2025, initially to the intensive care unit;  she presented to the hospital complaining of nausea, vomiting and diarrhea for at least 5 days.  No rectal bleeding or melena reported.  Had a fall in the morning prior to admission, due to generalized weakness.  Patient found to have severe hypokalemia and hypomagnesemia;   As well as acute kidney injury/renal insufficiency, consider secondary to intravascular volume depletion and possible acute tubular necrosis from chronic nonsteroidal anti-inflammatory use.  Patient was started on hemodialysis during this hospital admission on 2025.  She had a new permacath placed to right internal jugular vein by vascular surgery on 2025.  The patient has history of paroxysmal atrial fibrillation, wide-complex tachycardia with cardiac arrest.  She was evaluated by EP cardiology and is started on amiodarone. No ICD implant recommended at this time.     I started taking care of this patient on  7/9/2025;  I evaluated patient while having hemodialysis.    Dialysis day #3.  She went into atrial fibrillation with RVR while in dialysis.           Review of Systems   Gastrointestinal:  Positive for abdominal pain.      All pertinent negatives and positives are as above. All other systems have been reviewed and are negative unless otherwise stated.      Objective    Temp:  [97.3 °F (36.3 °C)-98.1 °F (36.7 °C)] 98 °F (36.7 °C)  Heart Rate:  [68-78] 78  Resp:  [18] 18  BP: ()/(36-91) 121/91  Physical Exam  Constitutional:       Appearance: Chronically ill-appearing, alert oriented x 3, no respiratory distress.  HENT:      Head: Normocephalic and atraumatic.      Nose: Nose normal.      Mouth/Throat:      Mouth: Mucous membranes are moist.   Eyes:      Extraocular Movements: Extraocular movements intact.      Conjunctiva/sclera: Conjunctivae normal.      Pupils: Pupils are equal, round, and reactive to light.   Cardiovascular:      Rate and Rhythm: irregular rhythm.      Pulses: Normal pulses.   Pulmonary:      Effort: No respiratory distress.      Breath sounds: Normal breath sounds.  Abdominal:      General: Abdomen is flat. Bowel sounds are normal.      Palpations: Abdomen is soft.   Extremities:  No lower extremity edema.  Skin:     Capillary Refill: Capillary refill takes less than 2 seconds.      Coloration: Skin is not jaundiced.   Neurological:      General: No focal deficit present.      Mental Status: Patient is alert, oriented to place time and person.     Sensory: No sensory deficit.               Results Review:  I have reviewed the labs, radiology results, and diagnostic studies.     Laboratory Data:          Results from last 7 days   Lab Units 07/10/25  0346 07/09/25  1008 07/08/25  0242   WBC 10*3/mm3 12.13* 10.57 9.77   HEMOGLOBIN g/dL 10.4* 10.8* 10.5*   HEMATOCRIT % 30.9* 30.5* 29.9*   PLATELETS 10*3/mm3 155 122* 170                   Results from last 7 days   Lab Units 07/10/25  0346  07/09/25  1231 07/08/25  0242 07/07/25  2125 07/07/25  1053 07/06/25  0356   SODIUM mmol/L 137 139 138  --  137 134*   POTASSIUM mmol/L 5.0 3.7 3.9   < > 3.5 3.0*   CHLORIDE mmol/L 102 100 109*  --  108* 104   CO2 mmol/L 25.0 26.0 13.0*  --  16.0* 15.0*   BUN mg/dL 13.5 9.6 34.4*  --  36.5* 36.5*   CREATININE mg/dL 2.02* 1.51* 4.19*  --  4.19* 4.65*   CALCIUM mg/dL 7.9* 7.8* 7.1*  --  6.8* 6.7*   BILIRUBIN mg/dL  --  0.6  --   --  0.2 0.2   ALK PHOS U/L  --  126*  --   --  88 87   ALT (SGPT) U/L  --  6  --   --  28 32   AST (SGOT) U/L  --  20  --   --  28 61*   GLUCOSE mg/dL 143* 113* 164*  --  193* 208*    < > = values in this interval not displayed.         Culture Data:         Blood Culture   Date Value Ref Range Status   07/03/2025 No growth at 5 days   Final   07/03/2025 No growth at 5 days   Final         Radiology Data:   Imaging Results (Last 24 Hours)         ** No results found for the last 24 hours. **                I have reviewed the patient's current medications.      Assessment/Plan   Assessment       Active Hospital Problems     Diagnosis      **DAISY (acute kidney injury)      Enteritis           Acute kidney injury/renal insufficiency requiring hemodialysis   Paroxysmal atrial fibrillation  Wide-complex tachycardia possible SVT with aberrant conduction  Hypertension  Hyperlipidemia  Acute enteritis, resolved  Chronic cardiomyopathy with diastolic dysfunction.        A1C 9.0     Sodium 137 potassium 4.0.  Creatinine 2.00.  Glucose 143.  Phosphorus 2.1.  Magnesium 2.2  Hemoglobin 10.4, platelet count 155,000.        7/5/2025 echocardiogram    Left ventricular systolic function is normal. Left ventricular ejection fraction appears to be 66 - 70%.    Left ventricular diastolic function is consistent with (grade Ia w/high LAP) impaired relaxation.    There is mild calcification of the aortic valve.    Mild aortic insufficiency    Estimated right ventricular systolic pressure from tricuspid regurgitation  is moderately elevated (45-55 mmHg).    Moderate pulmonary hypertension is present.        Treatment Plan  Patient is having hemodialysis as per nephrology recommendations, currently day #3.  She will continue hemodialysis tomorrow as per specialist orders.  Daily laboratory tests.    Avoid nephrotoxic medications.     Start Cardizem drip due to atrial fibrillation with rapid regular response.  Continue amiodarone as per specialist recommendations.    Continue Eliquis 5 mg p.o. 12 hours.     Replace electrolytes as per protocol.     Continue insulin sliding scale.     Will follow recommendations by nephrology regarding long-term dialysis and outpatient arrangements.     With a new Cardizem drip due to A-fib with RVR, patient will be transferred to fourth floor, as medication cannot be titrated in the third floor.           Medical Decision Making  Number and Complexity of problems: 8, moderate to high     Differential Diagnosis: see above     Conditions and Status        Condition is unchanged.     [facility] Data  External documents reviewed: no  Cardiac tracing (EKG, telemetry) interpretation: no new  Radiology interpretation: no new  Labs reviewed: yes  Any tests that were considered but not ordered: no     Decision rules/scores evaluated (example [id number], Wells, etc): see chart     Discussed with: patient     Care Planning  Shared decision making: patient  Code status and discussions: FC     Disposition  Social Determinants of Health that impact treatment or disposition: none  I expect the patient to be discharged to SNF once approved.        Electronically signed by Juan J Garcia MD, 07/10/25, 11:38 CDT.                     Juan J Garcia MD   Physician  Hospitalist     Progress Notes      Signed     Date of Service: 07/11/25 0947  Creation Time: 07/11/25 0947     Signed              [facility] Medicine Services  INPATIENT PROGRESS NOTE     Patient Name: Lindsay Laughlin  Date  of Admission: 7/3/2025  Today's Date: 07/11/25  Length of Stay: 8  Primary Care Physician: Provider, No Known     Subjective   Chief Complaint: Dehydration, vomiting and diarrhea  Abdominal Pain     69-year-old female with history of diabetes mellitus type II, hypertension, hyperlipidemia, chronic kidney disease stage 3b.  Admitted to the hospital 7/5/2025, initially to the intensive care unit;  she presented to the hospital complaining of nausea, vomiting and diarrhea for at least 5 days.  No rectal bleeding or melena reported.  Had a fall in the morning prior to admission, due to generalized weakness.  Patient found to have severe hypokalemia and hypomagnesemia;   As well as acute kidney injury/renal insufficiency, consider secondary to intravascular volume depletion and possible acute tubular necrosis from chronic nonsteroidal anti-inflammatory use.  Patient was started on hemodialysis during this hospital admission on 7/8/2025.  She had a new permacath placed to right internal jugular vein by vascular surgery on 7/7/2025.  The patient has history of paroxysmal atrial fibrillation, wide-complex tachycardia with cardiac arrest.  She was evaluated by EP cardiology and is started on amiodarone. No ICD implant recommended at this time.     I started taking care of this patient on 7/9/2025;   Has had hemodialysis x 3  Atrial fibrillation with RVR while in dialysis 7/10/25; did not require Cardizem drip; was transferred to 4th floor for monitoring.  Now sinus rhythm.           Review of Systems   Gastrointestinal:  Positive for abdominal pain.      All pertinent negatives and positives are as above. All other systems have been reviewed and are negative unless otherwise stated.      Objective    Temp:  [97.2 °F (36.2 °C)-99.5 °F (37.5 °C)] 97.7 °F (36.5 °C)  Heart Rate:  [] 68  Resp:  [18-20] 20  BP: ()/(51-71) 136/70  Physical Exam  Constitutional:       Appearance: Chronically ill-appearing, alert oriented  x 3, no respiratory distress.  HENT:      Head: Normocephalic and atraumatic.      Nose: Nose normal.      Mouth/Throat:      Mouth: Mucous membranes are moist.   Eyes:      Extraocular Movements: Extraocular movements intact.      Conjunctiva/sclera: Conjunctivae normal.      Pupils: Pupils are equal, round, and reactive to light.   Cardiovascular:      Rate and Rhythm: regular rhythm.      Pulses: Normal pulses.   Pulmonary:      Effort: No respiratory distress.      Breath sounds: Normal breath sounds.  Abdominal:      General: Abdomen is flat. Bowel sounds are normal.      Palpations: Abdomen is soft.   Extremities:  No lower extremity edema.  Skin:     Capillary Refill: Capillary refill takes less than 2 seconds.      Coloration: Skin is not jaundiced.   Neurological:      General: No focal deficit present.      Mental Status: Patient is alert, oriented to place time and person.     Sensory: No sensory deficit.               Results Review:  I have reviewed the labs, radiology results, and diagnostic studies.     Laboratory Data:          Results from last 7 days   Lab Units 07/11/25  0431 07/10/25  0346 07/09/25  1008   WBC 10*3/mm3 10.47 12.13* 10.57   HEMOGLOBIN g/dL 9.9* 10.4* 10.8*   HEMATOCRIT % 30.8* 30.9* 30.5*   PLATELETS 10*3/mm3 152 155 122*                   Results from last 7 days   Lab Units 07/11/25  0431 07/10/25  0346 07/09/25  1231 07/07/25  2125 07/07/25  1053 07/06/25  0356   SODIUM mmol/L 136 137 139   < > 137 134*   POTASSIUM mmol/L 4.3 5.0 3.7   < > 3.5 3.0*   CHLORIDE mmol/L 100 102 100   < > 108* 104   CO2 mmol/L 26.0 25.0 26.0   < > 16.0* 15.0*   BUN mg/dL 10.1 13.5 9.6   < > 36.5* 36.5*   CREATININE mg/dL 1.45* 2.02* 1.51*   < > 4.19* 4.65*   CALCIUM mg/dL 7.9* 7.9* 7.8*   < > 6.8* 6.7*   BILIRUBIN mg/dL  --   --  0.6  --  0.2 0.2   ALK PHOS U/L  --   --  126*  --  88 87   ALT (SGPT) U/L  --   --  6  --  28 32   AST (SGOT) U/L  --   --  20  --  28 61*   GLUCOSE mg/dL 298* 143* 113*    < > 193* 208*    < > = values in this interval not displayed.         Culture Data:         Blood Culture   Date Value Ref Range Status   07/03/2025 No growth at 5 days   Final   07/03/2025 No growth at 5 days   Final         Radiology Data:   Imaging Results (Last 24 Hours)         ** No results found for the last 24 hours. **                I have reviewed the patient's current medications.      Assessment/Plan   Assessment       Active Hospital Problems     Diagnosis      **DAISY (acute kidney injury)      Enteritis           Acute kidney injury/renal insufficiency requiring hemodialysis   Paroxysmal atrial fibrillation  Wide-complex tachycardia possible SVT with aberrant conduction  Hypertension  Hyperlipidemia  Acute enteritis, resolved  Chronic cardiomyopathy with diastolic dysfunction.        A1C 9.0     Creatinine 1.45.Sodium 136 potassium 4.3.  CO2 26.  Phosphorus 2.2  Glucose fasting 298        7/5/2025 Echocardiogram    Left ventricular systolic function is normal. Left ventricular ejection fraction appears to be 66 - 70%.    Left ventricular diastolic function is consistent with (grade Ia w/high LAP) impaired relaxation.    There is mild calcification of the aortic valve.    Mild aortic insufficiency    Estimated right ventricular systolic pressure from tricuspid regurgitation is moderately elevated (45-55 mmHg).    Moderate pulmonary hypertension is present.        Treatment Plan  Patient has had hemodialysis as per nephrology recommendations, #3.    Continue hemodialysis per specialist orders.    Daily laboratory tests.    Avoid nephrotoxic medications.     Continue amiodarone as per specialist recommendations.    Continue Eliquis 5 mg p.o. 12 hours.     Replace electrolytes as per protocol.     Continue insulin sliding scale. Start lantus 5 units daily AM due to hyperglycemia since yesterday.     Will follow recommendations by nephrology regarding long-term dialysis and outpatient arrangements.      Placement in process.        Medical Decision Making  Number and Complexity of problems: 8, moderate to high     Differential Diagnosis: see above     Conditions and Status        Condition is unchanged.     [facility] Data  External documents reviewed: no  Cardiac tracing (EKG, telemetry) interpretation: no new  Radiology interpretation: no new  Labs reviewed: yes  Any tests that were considered but not ordered: no     Decision rules/scores evaluated (example [id number], Wells, etc): see chart     Discussed with: patient     Care Planning  Shared decision making: patient  Code status and discussions: FC     Disposition  Social Determinants of Health that impact treatment or disposition: none  I expect the patient to be discharged to SNF once approved.        Electronically signed by Juan J Garcia MD, 07/11/25, 09:48 CDT.               Juan J Garcia MD   Physician  Hospitalist     Progress Notes      Signed     Date of Service: 07/12/25 1009  Creation Time: 07/12/25 1009     Signed              [facility] Medicine Services  INPATIENT PROGRESS NOTE     Patient Name: Lindsay Laughlin  Date of Admission: 7/3/2025  Today's Date: 07/12/25  Length of Stay: 9  Primary Care Physician: Provider, No Known     Subjective   Chief Complaint: Dehydration, vomiting and diarrhea  Abdominal Pain     69-year-old female with history of diabetes mellitus type II, hypertension, hyperlipidemia, chronic kidney disease stage 3b.  Admitted to the hospital 7/5/2025, initially to the intensive care unit;  she presented to the hospital complaining of nausea, vomiting and diarrhea for at least 5 days.  No rectal bleeding or melena reported.  Had a fall in the morning prior to admission, due to generalized weakness.  Patient found to have severe hypokalemia and hypomagnesemia;   As well as acute kidney injury/renal insufficiency, consider secondary to intravascular volume depletion and possible acute tubular  necrosis from chronic nonsteroidal anti-inflammatory use.  Patient was started on hemodialysis during this hospital admission on 7/8/2025.  She had a new permacath placed to right internal jugular vein by vascular surgery on 7/7/2025.  The patient has history of paroxysmal atrial fibrillation, wide-complex tachycardia with cardiac arrest.  She was evaluated by EP cardiology and is started on amiodarone. No ICD implant recommended at this time.        Hemodialysis, started during this admission  Atrial fibrillation with RVR while in dialysis 7/10/25; did not require Cardizem drip; was transferred to 4th floor for monitoring.  Now sinus rhythm.  No other events           Review of Systems   Gastrointestinal:  Positive for abdominal pain.      All pertinent negatives and positives are as above. All other systems have been reviewed and are negative unless otherwise stated.      Objective    Temp:  [97.4 °F (36.3 °C)-98.5 °F (36.9 °C)] 97.9 °F (36.6 °C)  Heart Rate:  [60-79] 79  Resp:  [18-20] 18  BP: (103-130)/(56-79) 107/56  Physical Exam  Constitutional:       Appearance: Chronically ill-appearing, alert oriented x 3, no respiratory distress.  HENT:      Head: Normocephalic and atraumatic.      Nose: Nose normal.      Mouth/Throat:      Mouth: Mucous membranes are moist.   Eyes:      Extraocular Movements: Extraocular movements intact.      Conjunctiva/sclera: Conjunctivae normal.      Pupils: Pupils are equal, round, and reactive to light.   Cardiovascular:      Rate and Rhythm: regular rhythm.      Pulses: Normal pulses.   Pulmonary:      Effort: No respiratory distress.      Breath sounds: Normal breath sounds.  Abdominal:      General: Abdomen is flat. Bowel sounds are normal.      Palpations: Abdomen is soft.   Extremities:  No lower extremity edema.  Skin:     Capillary Refill: Capillary refill takes less than 2 seconds.      Coloration: Skin is not jaundiced.   Neurological:      General: No focal deficit  present.      Mental Status: Patient is alert, oriented to place time and person.     Sensory: No sensory deficit.               Results Review:  I have reviewed the labs, radiology results, and diagnostic studies.     Laboratory Data:          Results from last 7 days   Lab Units 07/12/25  0349 07/11/25  0431 07/10/25  0346   WBC 10*3/mm3 12.75* 10.47 12.13*   HEMOGLOBIN g/dL 9.9* 9.9* 10.4*   HEMATOCRIT % 30.7* 30.8* 30.9*   PLATELETS 10*3/mm3 180 152 155                    Results from last 7 days   Lab Units 07/12/25  0349 07/11/25  0431 07/10/25  0346 07/09/25  1231 07/07/25  2125 07/07/25  1053 07/06/25  0356   SODIUM mmol/L 136 136 137 139   < > 137 134*   POTASSIUM mmol/L 4.1 4.3 5.0 3.7   < > 3.5 3.0*   CHLORIDE mmol/L 99 100 102 100   < > 108* 104   CO2 mmol/L 24.0 26.0 25.0 26.0   < > 16.0* 15.0*   BUN mg/dL 18.7 10.1 13.5 9.6   < > 36.5* 36.5*   CREATININE mg/dL 2.31* 1.45* 2.02* 1.51*   < > 4.19* 4.65*   CALCIUM mg/dL 8.0* 7.9* 7.9* 7.8*   < > 6.8* 6.7*   BILIRUBIN mg/dL  --   --   --  0.6  --  0.2 0.2   ALK PHOS U/L  --   --   --  126*  --  88 87   ALT (SGPT) U/L  --   --   --  6  --  28 32   AST (SGOT) U/L  --   --   --  20  --  28 61*   GLUCOSE mg/dL 233* 298* 143* 113*   < > 193* 208*    < > = values in this interval not displayed.         Culture Data:         Blood Culture   Date Value Ref Range Status   07/03/2025 No growth at 5 days   Final   07/03/2025 No growth at 5 days   Final         Radiology Data:   Imaging Results (Last 24 Hours)         ** No results found for the last 24 hours. **                I have reviewed the patient's current medications.      Assessment/Plan   Assessment       Active Hospital Problems     Diagnosis      **DAISY (acute kidney injury)      Enteritis           Acute kidney injury/renal insufficiency requiring hemodialysis   Paroxysmal atrial fibrillation  Wide-complex tachycardia possible SVT with aberrant conduction  Hypertension  Hyperlipidemia  Acute enteritis,  resolved  Chronic cardiomyopathy with diastolic dysfunction.        A1C 9.0     Labs reviewed  Glucose fasting 233        7/5/2025 Echocardiogram    Left ventricular systolic function is normal. Left ventricular ejection fraction appears to be 66 - 70%.    Left ventricular diastolic function is consistent with (grade Ia w/high LAP) impaired relaxation.    There is mild calcification of the aortic valve.    Mild aortic insufficiency    Estimated right ventricular systolic pressure from tricuspid regurgitation is moderately elevated (45-55 mmHg).    Moderate pulmonary hypertension is present.        Treatment Plan  Patient has had hemodialysis as per nephrology recommendations.    Continue hemodialysis per specialist orders.    Daily laboratory tests.       Continue amiodarone as per specialist recommendations.    Continue Eliquis 5 mg p.o. 12 hours.     Replace electrolytes as per protocol.     Continue insulin sliding scale. Increase Lantus to 10 units daily AM.     Will follow recommendations by nephrology regarding long-term dialysis and outpatient arrangements.     Placement in process.        Medical Decision Making  Number and Complexity of problems: 8, moderate to high     Differential Diagnosis: see above     Conditions and Status        Condition is unchanged.     MDM Data  External documents reviewed: no  Cardiac tracing (EKG, telemetry) interpretation: no new  Radiology interpretation: no new  Labs reviewed: yes  Any tests that were considered but not ordered: no     Decision rules/scores evaluated (example [id number], Wells, etc): see chart     Discussed with: patient     Care Planning  Shared decision making: patient  Code status and discussions: FC     Disposition  Social Determinants of Health that impact treatment or disposition: none  I expect the patient to be discharged to SNF once approved.        Electronically signed by Juan J Garcia MD, 07/12/25, 10:09 CDT.                   Yesenia  Javier Maloney MD   Physician  Nephrology     Progress Notes      Signed     Date of Service: 07/13/25 1734  Creation Time: 07/13/25 1734     Signed       Expand All Collapse All    Nephrology ([facility]) Progress Note        Patient:  Lindsay Laughlin  YOB: 1955  Date of Service: 7/13/2025  MRN: [id number]        Acct: [id number]      Primary Care Physician: Provider, No Known  Advance Directive:       Code Status and Medical Interventions: CPR (Attempt to Resuscitate); Full Support; Discussed CODE STATUS with Mr. Laughlin.  Patient is ,  is in a nursing home.  She does not have any children.   Ordered at: 07/03/25 2000     Code Status (Patient has no pulse and is not breathing):     CPR (Attempt to Resuscitate)     Medical Interventions (Patient has pulse or is breathing):     Full Support     Comments:     Discussed CODE STATUS with Mr. Laughlin.  Patient is ,  is in a nursing home.  She does not have any children.     Level Of Support Discussed With:     Patient      Admit Date: 7/3/2025                          Hospital Day: 10  Referring Provider: Shade Alberts MD        Patient personally seen and examined.  Complete chart including Consults, Notes, Operative Reports, Labs, Cardiology, and Radiology studies reviewed as able.           Subjective:  Lindsay Laughlin is a 69 y.o. female for whom we were consulted for evaluation and treatment of acute kidney injury.  She has a history of baseline chronic kidney disease stage 3.  She did not follow with nephrology previously.  She has a history of type 2 diabetes, hypertension, GERD, prior colectomy done at [facility] for unknown reason.  She presented to ER with several days of nausea, vomiting and diarrhea.  She was unable to maintain PO intake.  She has also been using NSAIDs frequently for arthritis pain. Labs on admission showed creatinine 5, hypokalemia, hypomagnesemia and  metabolic acidosis. Hospital course remarkable for minimal improvement of renal function despite IV fluid resuscitation.      Today, no overnight events.  Tolerated dialysis without issue yesterday.  Asking about discharge plans.  Denied current chest pain, shortness of air at rest, nausea or vomiting.  Seen with nursing and patient requesting help to use the restroom              Allergies:  Patient has no known allergies.     Home Meds:  Prescriptions Prior to Admission           Medications Prior to Admission   Medication Sig Dispense Refill Last Dose/Taking    albuterol sulfate  (90 Base) MCG/ACT inhaler Inhale 2 puffs Every 4 (Four) Hours As Needed for Wheezing.          ibuprofen (ADVIL,MOTRIN) 200 MG tablet Take 5 tablets by mouth Every 6 (Six) Hours As Needed for Mild Pain.          ondansetron ODT (ZOFRAN-ODT) 4 MG disintegrating tablet Place 1 tablet on the tongue Every 4 (Four) Hours As Needed for Nausea or Vomiting. 10 tablet 0              Medicines:  Current Medications             Current Facility-Administered Medications   Medication Dose Route Frequency Provider Last Rate Last Admin    acetaminophen (TYLENOL) tablet 650 mg  650 mg Oral Q4H PRN Sterling Smith DO   650 mg at 07/03/25 2242     Or    acetaminophen (TYLENOL) suppository 650 mg  650 mg Rectal Q4H PRN Sterling Smith DO        aluminum-magnesium hydroxide-simethicone (MAALOX MAX) 400-400-40 MG/5ML suspension 15 mL  15 mL Oral Q6H PRN Sterling Smith DO   15 mL at 07/05/25 1331    amiodarone (PACERONE) tablet 400 mg  400 mg Oral Q12H Torres Irizarry MD   400 mg at 07/13/25 0849    apixaban (ELIQUIS) tablet 5 mg  5 mg Oral Q12H Juan J Garcia MD   5 mg at 07/13/25 0850    sennosides-docusate (PERICOLACE) 8.6-50 MG per tablet 2 tablet  2 tablet Oral BID Sterling Smith DO   2 tablet at 07/11/25 2237     And    polyethylene glycol (MIRALAX) packet 17 g  17 g Oral Daily PRN Sterling Smith DO         And     bisacodyl (DULCOLAX) EC tablet 5 mg  5 mg Oral Daily PRN Sterling Smith DO         And    bisacodyl (DULCOLAX) suppository 10 mg  10 mg Rectal Daily PRN Sterling Smith DO        calcium carbonate (TUMS) chewable tablet 500 mg (200 mg elemental)  2 tablet Oral TID PRN BicSterling chiu DO   2 tablet at 07/04/25 2208    Calcium Replacement - Follow Nurse / BPA Driven Protocol   Not Applicable PRN BicSterling chiu DO        cholecalciferol (VITAMIN D3) tablet 5,000 Units  5,000 Units Oral Daily BickingSterling DO   5,000 Units at 07/13/25 0849    dextrose (D50W) (25 g/50 mL) IV injection 25 g  25 g Intravenous Q15 Min PRN Sterling Smith DO        dextrose (GLUTOSE) oral gel 15 g  15 g Oral Q15 Min PRN Sterling Smith DO        gabapentin (NEURONTIN) capsule 100 mg  100 mg Oral Nightly Juan J Garcia MD        glucagon (GLUCAGEN) injection 1 mg  1 mg Intramuscular Q15 Min PRN Sterling Smith DO        heparin (porcine) injection 3,600 Units  3,600 Units Intracatheter PRN Javier Patterson MD   3,600 Units at 07/12/25 1219    HYDROcodone-acetaminophen (NORCO) 5-325 MG per tablet 1 tablet  1 tablet Oral Q8H PRN Juan J Garcia MD   1 tablet at 07/13/25 1114    insulin glargine (LANTUS, SEMGLEE) injection 10 Units  10 Units Subcutaneous Daily Juan J Garcia MD   10 Units at 07/13/25 0848    insulin regular (humuLIN R,novoLIN R) injection 2-7 Units  2-7 Units Subcutaneous 4x Daily AC & at Bedtime Juan J Garcia MD   2 Units at 07/13/25 1657    Magnesium Standard Dose Replacement - Follow Nurse / BPA Driven Protocol   Not Applicable PRN BicSterling chiu DO        metoprolol tartrate (LOPRESSOR) tablet 25 mg  25 mg Oral Q12H Harpal Proctor MD   25 mg at 07/13/25 0850    nitroglycerin (NITROSTAT) SL tablet 0.4 mg  0.4 mg Sublingual Q5 Min PRN Sterling Smith,         ondansetron ODT (ZOFRAN-ODT) disintegrating tablet 4 mg  4 mg Oral Q6H PRN Sterling Smith, DO          Or    ondansetron (ZOFRAN) injection 4 mg  4 mg Intravenous Q6H PRN Bicking, Sterling K, DO        pantoprazole (PROTONIX) EC tablet 40 mg  40 mg Oral BID AC Sterling Smith K, DO   40 mg at 25 1658    Phosphorus Replacement - Follow Nurse / BPA Driven Protocol   Not Applicable PRN Juan J Garcia MD        Potassium Replacement - Follow Nurse / BPA Driven Protocol   Not Applicable PRN Reinaldo Smithin K, DO        sodium bicarbonate tablet 650 mg  650 mg Oral TID Sudhir Erwin, APRN   650 mg at 25 1658    sodium chloride 0.9 % flush 10 mL  10 mL Intravenous PRN Bicking, Sterling K, DO        sodium chloride 0.9 % flush 10 mL  10 mL Intravenous Q12H BickingSterling K, DO   10 mL at 25 0850    sodium chloride 0.9 % infusion 40 mL  40 mL Intravenous PRN Reinaldo Smithin K, DO                Past Medical History:  Medical History        Past Medical History:   Diagnosis Date    Acid reflux      Arthritis      High cholesterol      Hypertension      Neuropathy      Swollen lymph nodes       LEFT CERVICAL    Type 2 diabetes mellitus              Past Surgical History:  Surgical History         Past Surgical History:   Procedure Laterality Date    CARPAL TUNNEL RELEASE Bilateral      CERVICAL LYMPH NODE BIOPSY/EXCISION Left 2017     Procedure: LEFT NECK EXPLORATION WITH INCISIONAL BIOPSY/CULTURE;  Surgeon: Blue Sanchez MD;  Location: [facility] OR;  Service:      SECTION        COLON RESECTION        COLONOSCOPY N/A 2023     Procedure: COLONOSCOPY WITH ANESTHESIA;  Surgeon: Tamy Smith MD;  Location: [facility] ENDOSCOPY;  Service: Gastroenterology;  Laterality: N/A;  preop; abnormal GI scan   postop ; poor prep   PCP Ruth Noble MD    ENDOSCOPY N/A 2023     Procedure: ESOPHAGOGASTRODUODENOSCOPY WITH ANESTHESIA;  Surgeon: Tamy Smith MD;  Location: [facility] ENDOSCOPY;  Service: Gastroenterology;  Laterality: N/A;  preop; abnormal GI scan    postop esophagitis ; hiatal hernia  PCP Ruth Noble MD    EXCISION LESION         From back    INSERTION HEMODIALYSIS CATHETER Right 7/7/2025     Procedure: OR HEMODIALYSIS CATHETER INSERTION;  Surgeon: Sterling Smith DO;  Location:  [facility] OR;  Service: Vascular;  Laterality: Right;    REPLACEMENT TOTAL KNEE Left      TONSILLECTOMY                Family History        Family History   Problem Relation Age of Onset    No Known Problems Mother      Colon cancer Father           < 60 years old    Breast cancer Neg Hx           Social History  Social History   Social History           Socioeconomic History    Marital status:    Tobacco Use    Smoking status: Never    Smokeless tobacco: Never   Vaping Use    Vaping status: Never Used   Substance and Sexual Activity    Alcohol use: No    Drug use: No    Sexual activity: Defer            Review of Systems:  History obtained from chart review and the patient  General ROS: No fever or chills  Respiratory ROS: No cough, shortness of breath, wheezing  Cardiovascular ROS: No chest pain or palpitations  Gastrointestinal ROS: No abdominal pain or melena  Genito-Urinary ROS: No dysuria or hematuria  Psych ROS: No anxiety and depression  14 point ROS reviewed with the patient and negative except as noted above and in the HPI unless unable to obtain.     Objective:  Patient Vitals for the past 24 hrs:    BP Temp Temp src Pulse Resp SpO2 Weight   07/13/25 1548 107/44 97.9 °F (36.6 °C) Oral 54 18 92 % --   07/13/25 1227 116/50 97.7 °F (36.5 °C) Oral 68 18 100 % --   07/13/25 0916 121/40 97.5 °F (36.4 °C) Axillary 74 18 92 % --   07/13/25 0245 109/42 97.2 °F (36.2 °C) Oral 70 18 97 % 97 kg (213 lb 12.8 oz)   07/12/25 2031 102/88 97.5 °F (36.4 °C) Oral 84 18 98 % --         Intake/Output Summary (Last 24 hours) at 7/13/2025 1734  Last data filed at 7/12/2025 2212      Gross per 24 hour   Intake 350 ml   Output --   Net 350 ml      General:  "awake/alert   Chest:  clear to auscultation bilaterally without respiratory distress  CVS: regular rate and rhythm  Abdominal: soft, nontender, positive bowel sounds  Extremities: no cyanosis or edema  Skin: warm and dry without rash        Labs:         Results from last 7 days   Lab Units 07/13/25 0413 07/12/25 0349 07/11/25  0431   WBC 10*3/mm3 9.96 12.75* 10.47   HEMOGLOBIN g/dL 9.2* 9.9* 9.9*   HEMATOCRIT % 28.4* 30.7* 30.8*   PLATELETS 10*3/mm3 154 180 152                       Results from last 7 days   Lab Units 07/13/25 0413 07/12/25  0349 07/11/25  0431 07/10/25  0346 07/09/25  1231 07/07/25  2125 07/07/25  1053   SODIUM mmol/L 137 136 136   < > 139   < > 137   POTASSIUM mmol/L 4.4 4.1 4.3   < > 3.7   < > 3.5   CHLORIDE mmol/L 103 99 100   < > 100   < > 108*   CO2 mmol/L 25.0 24.0 26.0   < > 26.0   < > 16.0*   BUN mg/dL 16.6 18.7 10.1   < > 9.6   < > 36.5*   CREATININE mg/dL 1.90* 2.31* 1.45*   < > 1.51*   < > 4.19*   CALCIUM mg/dL 8.1* 8.0* 7.9*   < > 7.8*   < > 6.8*   EGFR mL/min/1.73 28.1* 22.2* 38.9*   < > 37.0*   < > 10.9*   BILIRUBIN mg/dL  --   --   --   --  0.6  --  0.2   ALK PHOS U/L  --   --   --   --  126*  --  88   ALT (SGPT) U/L  --   --   --   --  6  --  28   AST (SGOT) U/L  --   --   --   --  20  --  28   GLUCOSE mg/dL 173* 233* 298*   < > 113*   < > 193*    < > = values in this interval not displayed.         Radiology:   Imaging Results (Last 72 Hours)         ** No results found for the last 72 hours. **                Culture:  No results found for: \"BLOODCX\", \"URINECX\", \"WOUNDCX\", \"MRSACX\", \"RESPCX\", \"STOOLCX\"        Assessment   Acute kidney injury/ATN  Chronic kidney disease  Diabetes type 2  Hypertension on NSAID usage  Hypokalemia  Hypomagnesemia  Anemia with iron deficiency  Metabolic acidosis  Gastroenteritis     Plan:  Discussed with patient, nursing, Hospitalist  Workup reviewed today  Monitor labs  Vascular evaluation reviewed as current  Dialysis initiation began 7/8, " planned treatment 7/12 then Monday Wednesday Friday going forward to match outpatient scheduling              Javier Patterson MD  7/13/2025  17:34 CDT                      Sudhir Erwin, NATTY   Nurse Practitioner  Nephrology     Progress Notes      Attested     Date of Service: 07/14/25 1048  Creation Time: 07/14/25 1048     Attested           Attestation signed by Andreas Webb MD at 07/14/25 1248     I saw and examined the patient independently.  I have reviewed this documentation and agree.  Lindsay Laughlin is a 69 y.o. female for whom we were consulted for evaluation and treatment of acute kidney injury. Baseline chronic kidney disease stage 3. Does not follow with nephrology. History of type 2 diabetes, hypertension, GERD, prior colectomy done at [facility] for unknown reason. Presented to ER with several days of nausea, vomiting and diarrhea. Unable to maintain PO intake. Has also been using NSAIDs frequently for arthritis pain. Labs on admission showing creatinine 5.0, hypokalemia, hypomagnesemia, metabolic acidosis. Hospital course remarkable for minimal improvement of renal function despite IV fluid resuscitation. Patient agreed to have permcath placed and start dialysis.  Dr Smith placed permcath on 7/07. First dialysis on 7/08 was tolerated well. Additional HD provided 7/09 and 7/10.  After dialysis on 7/10 she did have some RVR with her chronic atrial fibrillation     Today is awake and alert. No complaints. No new overnight issues. Seen during HD and tolerating well.  She is nonoliguric now.     Dialysis  Patient was seen and evaluated on renal replacement therapy and I have personally evaluated the patient and directed the therapy  Modality: Hemodialysis  Access: Catheter  Location: right upper  QB: 400  QD: 600  UF: 2500 mL     Vitals: Reviewed.     On exam, heart sounds were regular, lungs were clear to auscultation bilaterally no wheezes rales rhonchi,  abdomen is soft without guarding, lower extremity without edema.  Labs reviewed.     Assessment and plan:     Acute kidney injury, ATN--now on dialysis  Baseline chronic kidney disease stage 3  Type 2 diabetes  Hypertension  NSAID use  Hypokalemia--improved  Hypomagnesemia  Anemia with iron deficiency  Metabolic acidosis--improved  Gastroenteritis   Chronic atrial fibrillation     Dialysis as above.  Continue to monitor labs for renal recovery.  Okay to discharge from renal standpoint.            Expand All Collapse All    Nephrology ([facility]) Progress Note        Patient:  Lindsay Laughlin  YOB: 1955  Date of Service: 7/14/2025  MRN: [id number]        Acct: [id number]      Primary Care Physician: Provider, No Known  Advance Directive:       Code Status and Medical Interventions: CPR (Attempt to Resuscitate); Full Support; Discussed CODE STATUS with Mr. Laughlin.  Patient is ,  is in a nursing home.  She does not have any children.   Ordered at: 07/03/25 2000     Code Status (Patient has no pulse and is not breathing):     CPR (Attempt to Resuscitate)     Medical Interventions (Patient has pulse or is breathing):     Full Support     Comments:     Discussed CODE STATUS with Mr. Laughlin.  Patient is ,  is in a nursing home.  She does not have any children.     Level Of Support Discussed With:     Patient      Admit Date: 7/3/2025                          Hospital Day: 11  Referring Provider: Shade Alberts MD        Patient personally seen and examined.  Complete chart including Consults, Notes, Operative Reports, Labs, Cardiology, and Radiology studies reviewed as able.           Subjective:  Lindsay Laughlin is a 69 y.o. female for whom we were consulted for evaluation and treatment of acute kidney injury. Baseline chronic kidney disease stage 3. Does not follow with nephrology. History of type 2 diabetes, hypertension, GERD, prior colectomy done at  [facility] for unknown reason. Presented to ER with several days of nausea, vomiting and diarrhea. Unable to maintain PO intake. Has also been using NSAIDs frequently for arthritis pain. Labs on admission showing creatinine 5.0, hypokalemia, hypomagnesemia, metabolic acidosis. Hospital course remarkable for minimal improvement of renal function despite IV fluid resuscitation. Patient agreed to have permcath placed and start dialysis.  Dr Smith placed permcath on 7/07. First dialysis on 7/08 was tolerated well. Additional HD provided 7/09 and 7/10.  After dialysis on 7/10 she did have some RVR with her chronic atrial fibrillation     Today is awake and alert. No complaints. No new overnight issues. Seen during HD and tolerating well  Dialysis   Patient was seen and evaluated on renal replacement therapy and I have personally evaluated the patient and directed the therapy  Modality: Hemodialysis  Access: Catheter  Location: right upper  QB: 400  QD: 600  UF: 2500        Allergies:  Patient has no known allergies.     Home Meds:  Prescriptions Prior to Admission           Medications Prior to Admission   Medication Sig Dispense Refill Last Dose/Taking    albuterol sulfate  (90 Base) MCG/ACT inhaler Inhale 2 puffs Every 4 (Four) Hours As Needed for Wheezing.          ibuprofen (ADVIL,MOTRIN) 200 MG tablet Take 5 tablets by mouth Every 6 (Six) Hours As Needed for Mild Pain.          ondansetron ODT (ZOFRAN-ODT) 4 MG disintegrating tablet Place 1 tablet on the tongue Every 4 (Four) Hours As Needed for Nausea or Vomiting. 10 tablet 0              Medicines:  Current Medications             Current Facility-Administered Medications   Medication Dose Route Frequency Provider Last Rate Last Admin    acetaminophen (TYLENOL) tablet 650 mg  650 mg Oral Q4H PRN Sterling Smith DO   650 mg at 07/14/25 0831     Or    acetaminophen (TYLENOL) suppository 650 mg  650 mg Rectal Q4H PRN Sterling Smith,  DO        aluminum-magnesium hydroxide-simethicone (MAALOX MAX) 400-400-40 MG/5ML suspension 15 mL  15 mL Oral Q6H PRN Sterling Smith DO   15 mL at 07/05/25 1331    amiodarone (PACERONE) tablet 400 mg  400 mg Oral Q12H Torres Irizarry MD   400 mg at 07/14/25 0831    apixaban (ELIQUIS) tablet 5 mg  5 mg Oral Q12H Juan J Garcia MD   5 mg at 07/14/25 0832    sennosides-docusate (PERICOLACE) 8.6-50 MG per tablet 2 tablet  2 tablet Oral BID Sterling Smith DO   2 tablet at 07/11/25 2237     And    polyethylene glycol (MIRALAX) packet 17 g  17 g Oral Daily PRN Sterling Smith DO         And    bisacodyl (DULCOLAX) EC tablet 5 mg  5 mg Oral Daily PRN BicSterling chiu DO         And    bisacodyl (DULCOLAX) suppository 10 mg  10 mg Rectal Daily PRN Sterling Smith DO        calcium carbonate (TUMS) chewable tablet 500 mg (200 mg elemental)  2 tablet Oral TID PRN Sterling Smith DO   2 tablet at 07/04/25 2208    Calcium Replacement - Follow Nurse / BPA Driven Protocol   Not Applicable PRN BickingSterling DO        cholecalciferol (VITAMIN D3) tablet 5,000 Units  5,000 Units Oral Daily BickingSterling DO   5,000 Units at 07/14/25 0830    dextrose (D50W) (25 g/50 mL) IV injection 25 g  25 g Intravenous Q15 Min PRN BicSterling chiu DO        dextrose (GLUTOSE) oral gel 15 g  15 g Oral Q15 Min PRN BicSterling chiu DO        gabapentin (NEURONTIN) capsule 100 mg  100 mg Oral Nightly Juan J Garcia MD   100 mg at 07/13/25 2020    glucagon (GLUCAGEN) injection 1 mg  1 mg Intramuscular Q15 Min PRN BicSterling chiu DO        heparin (porcine) injection 3,600 Units  3,600 Units Intracatheter PRN Javier Patterson MD   3,600 Units at 07/12/25 1219    HYDROcodone-acetaminophen (NORCO) 5-325 MG per tablet 1 tablet  1 tablet Oral Q8H PRN Juan J Garcia MD   1 tablet at 07/13/25 2020    insulin glargine (LANTUS, SEMGLEE) injection 10 Units  10 Units Subcutaneous Daily Radha,  Juan J AHN MD   10 Units at 07/14/25 0830    insulin regular (humuLIN R,novoLIN R) injection 2-7 Units  2-7 Units Subcutaneous 4x Daily AC & at Bedtime Juan J Garcia MD   4 Units at 07/14/25 0830    Magnesium Standard Dose Replacement - Follow Nurse / BPA Driven Protocol   Not Applicable PRN Sterling Smith, DO        metoprolol tartrate (LOPRESSOR) tablet 25 mg  25 mg Oral Q12H Harpal Proctor MD   25 mg at 07/14/25 0831    nitroglycerin (NITROSTAT) SL tablet 0.4 mg  0.4 mg Sublingual Q5 Min PRN Sterling Smith DO        ondansetron ODT (ZOFRAN-ODT) disintegrating tablet 4 mg  4 mg Oral Q6H PRN Sterling Smith DO         Or    ondansetron (ZOFRAN) injection 4 mg  4 mg Intravenous Q6H PRN Sterling Smith DO        pantoprazole (PROTONIX) EC tablet 40 mg  40 mg Oral BID AC BickingSterling DO   40 mg at 07/14/25 0831    Phosphorus Replacement - Follow Nurse / BPA Driven Protocol   Not Applicable PRN Juan J Garcia MD        Potassium Replacement - Follow Nurse / BPA Driven Protocol   Not Applicable PRN Sterling Smith DO        sodium bicarbonate tablet 650 mg  650 mg Oral TID Sudhir Erwin, APRN   650 mg at 07/14/25 0832    sodium chloride 0.9 % flush 10 mL  10 mL Intravenous PRN Sterling Smith DO        sodium chloride 0.9 % flush 10 mL  10 mL Intravenous Q12H Sterling Smith DO   10 mL at 07/13/25 2022    sodium chloride 0.9 % infusion 40 mL  40 mL Intravenous PRN Sterling Smith DO                Past Medical History:  Medical History        Past Medical History:   Diagnosis Date    Acid reflux      Arthritis      High cholesterol      Hypertension      Neuropathy      Swollen lymph nodes       LEFT CERVICAL    Type 2 diabetes mellitus              Past Surgical History:  Surgical History         Past Surgical History:   Procedure Laterality Date    CARPAL TUNNEL RELEASE Bilateral      CERVICAL LYMPH NODE BIOPSY/EXCISION Left 08/04/2017     Procedure: LEFT NECK  EXPLORATION WITH INCISIONAL BIOPSY/CULTURE;  Surgeon: Blue Sanchez MD;  Location: [facility] OR;  Service:      SECTION        COLON RESECTION        COLONOSCOPY N/A 2023     Procedure: COLONOSCOPY WITH ANESTHESIA;  Surgeon: Tamy Smith MD;  Location: [facility] ENDOSCOPY;  Service: Gastroenterology;  Laterality: N/A;  preop; abnormal GI scan   postop ; poor prep   PCP Ruth Noble MD    ENDOSCOPY N/A 2023     Procedure: ESOPHAGOGASTRODUODENOSCOPY WITH ANESTHESIA;  Surgeon: Tamy Smith MD;  Location: [facility] ENDOSCOPY;  Service: Gastroenterology;  Laterality: N/A;  preop; abnormal GI scan   postop esophagitis ; hiatal hernia  PCP Ruth Noble MD    EXCISION LESION         From back    INSERTION HEMODIALYSIS CATHETER Right 2025     Procedure: OR HEMODIALYSIS CATHETER INSERTION;  Surgeon: Sterling Smith DO;  Location: [facility] HYBRID OR;  Service: Vascular;  Laterality: Right;    REPLACEMENT TOTAL KNEE Left      TONSILLECTOMY                Family History        Family History   Problem Relation Age of Onset    No Known Problems Mother      Colon cancer Father           < 60 years old    Breast cancer Neg Hx           Social History  Social History   Social History           Socioeconomic History    Marital status:    Tobacco Use    Smoking status: Never    Smokeless tobacco: Never   Vaping Use    Vaping status: Never Used   Substance and Sexual Activity    Alcohol use: No    Drug use: No    Sexual activity: Defer            Review of Systems:  History obtained from chart review and the patient  General ROS: No fever or chills  Respiratory ROS: No cough, shortness of breath, wheezing  Cardiovascular ROS: No chest pain or palpitations  Gastrointestinal ROS: No abdominal pain or melena  Genito-Urinary ROS: No dysuria or hematuria  Psych ROS: No anxiety and depression  14 point ROS reviewed with the patient and negative except as noted above and  in the HPI unless unable to obtain.     Objective:  Patient Vitals for the past 24 hrs:    BP Temp Temp src Pulse Resp SpO2 Weight   07/14/25 0758 127/78 97.3 °F (36.3 °C) Oral 79 18 96 % --   07/14/25 0329 106/44 98 °F (36.7 °C) Oral 78 18 92 % 98.5 kg (217 lb 3.2 oz)   07/13/25 2013 110/44 98.3 °F (36.8 °C) Oral 81 18 94 % --   07/13/25 1548 107/44 97.9 °F (36.6 °C) Oral 54 18 92 % --   07/13/25 1227 116/50 97.7 °F (36.5 °C) Oral 68 18 100 % --      No intake or output data in the 24 hours ending 07/14/25 1048     General: awake/alert   Chest:  clear to auscultation bilaterally without respiratory distress  CVS: regular rate and rhythm  Abdominal: soft, nontender, positive bowel sounds  Extremities: no cyanosis or edema  Skin: warm and dry without rash        Labs:         Results from last 7 days   Lab Units 07/14/25  0328 07/13/25  0413 07/12/25  0349   WBC 10*3/mm3 10.79 9.96 12.75*   HEMOGLOBIN g/dL 9.7* 9.2* 9.9*   HEMATOCRIT % 31.0* 28.4* 30.7*   PLATELETS 10*3/mm3 199 154 180                       Results from last 7 days   Lab Units 07/14/25  0328 07/13/25  0413 07/12/25  0349 07/10/25  0346 07/09/25  1231 07/07/25  2125 07/07/25  1053   SODIUM mmol/L 138 137 136   < > 139   < > 137   POTASSIUM mmol/L 4.9 4.4 4.1   < > 3.7   < > 3.5   CHLORIDE mmol/L 102 103 99   < > 100   < > 108*   CO2 mmol/L 26.0 25.0 24.0   < > 26.0   < > 16.0*   BUN mg/dL 24.8* 16.6 18.7   < > 9.6   < > 36.5*   CREATININE mg/dL 2.25* 1.90* 2.31*   < > 1.51*   < > 4.19*   CALCIUM mg/dL 8.5* 8.1* 8.0*   < > 7.8*   < > 6.8*   EGFR mL/min/1.73 23.0* 28.1* 22.2*   < > 37.0*   < > 10.9*   BILIRUBIN mg/dL  --   --   --   --  0.6  --  0.2   ALK PHOS U/L  --   --   --   --  126*  --  88   ALT (SGPT) U/L  --   --   --   --  6  --  28   AST (SGOT) U/L  --   --   --   --  20  --  28   GLUCOSE mg/dL 133* 173* 233*   < > 113*   < > 193*    < > = values in this interval not displayed.         Radiology:   Imaging Results (Last 72 Hours)         **  No results found for the last 72 hours. **                Culture:        Blood Culture   Date Value Ref Range Status   07/03/2025 No growth at 3 days   Preliminary   07/03/2025 No growth at 3 days   Preliminary            Assessment    Acute kidney injury, ATN--now on dialysis  Baseline chronic kidney disease stage 3  Type 2 diabetes  Hypertension  NSAID use  Hypokalemia--improved  Hypomagnesemia  Anemia with iron deficiency  Metabolic acidosis--improved  Gastroenteritis   Chronic atrial fibrillation     Plan:  Dialysis today   OK for discharge from nephrology standpoint when OK with others. Follow up will be via dialysis clinic        Sudhir Erwin, NATTY  7/14/2025  10:48 CDT               Cosigned by: Andreas Webb MD at 07/14/25 1248                                            No current facility-administered medications for this encounter.     Current Outpatient Medications   Medication Sig Dispense Refill    acetaminophen (TYLENOL) 325 MG tablet Take 2 tablets by mouth Every 4 (Four) Hours As Needed for Fever (fever greater than 100.4 °F or headache).      apixaban (ELIQUIS) 5 MG tablet tablet Take 1 tablet by mouth Every 12 (Twelve) Hours. Indications: Atrial Fibrillation 60 tablet 11    calcium carbonate (TUMS) 500 MG chewable tablet Chew 2 tablets 3 (Three) Times a Day As Needed for Indigestion or Heartburn.      cholecalciferol (VITAMIN D3) 25 MCG (1000 UT) tablet Take 5 tablets by mouth Daily.      gabapentin (NEURONTIN) 100 MG capsule Take 1 capsule by mouth Every Night for 3 days. 3 capsule 0    insulin glargine (LANTUS, SEMGLEE) 100 UNIT/ML injection Inject 10 Units under the skin into the appropriate area as directed Daily.      metoprolol tartrate (LOPRESSOR) 25 MG tablet Take 1 tablet by mouth Every 12 (Twelve) Hours. 60 tablet 11    pantoprazole (PROTONIX) 40 MG EC tablet Take 1 tablet by mouth 2 (Two) Times a Day Before Meals.      sodium bicarbonate 650 MG tablet Take 1 tablet by  mouth 3 (Three) Times a Day.      albuterol sulfate  (90 Base) MCG/ACT inhaler Inhale 2 puffs Every 4 (Four) Hours As Needed for Wheezing.      amiodarone (PACERONE) 200 MG tablet Take 2 tablets by mouth 2 (Two) Times a Day for 8 days, THEN 1 tablet Daily for 80 days. 112 tablet 3    ondansetron ODT (ZOFRAN-ODT) 4 MG disintegrating tablet Place 1 tablet on the tongue Every 4 (Four) Hours As Needed for Nausea or Vomiting. 10 tablet 0

## 2025-07-22 ENCOUNTER — APPOINTMENT (OUTPATIENT)
Dept: GENERAL RADIOLOGY | Age: 70
DRG: 682 | End: 2025-07-22
Payer: MEDICARE

## 2025-07-22 LAB
ALBUMIN SERPL-MCNC: 3 G/DL (ref 3.5–5.2)
ALP SERPL-CCNC: 93 U/L (ref 35–104)
ALT SERPL-CCNC: 20 U/L (ref 10–35)
ANION GAP SERPL CALCULATED.3IONS-SCNC: 14 MMOL/L (ref 8–16)
APTT PPP: 106.1 SEC (ref 26–36.2)
APTT PPP: 52.2 SEC (ref 26–36.2)
AST SERPL-CCNC: 26 U/L (ref 10–35)
BILIRUB SERPL-MCNC: 0.5 MG/DL (ref 0.2–1.2)
BUN SERPL-MCNC: 19 MG/DL (ref 8–23)
CALCIUM SERPL-MCNC: 8.1 MG/DL (ref 8.8–10.2)
CHLORIDE SERPL-SCNC: 99 MMOL/L (ref 98–107)
CO2 SERPL-SCNC: 22 MMOL/L (ref 22–29)
CREAT SERPL-MCNC: 1.9 MG/DL (ref 0.5–0.9)
EKG P AXIS: 20 DEGREES
EKG P AXIS: 45 DEGREES
EKG P AXIS: NORMAL DEGREES
EKG P-R INTERVAL: 198 MS
EKG P-R INTERVAL: 242 MS
EKG P-R INTERVAL: NORMAL MS
EKG Q-T INTERVAL: 346 MS
EKG Q-T INTERVAL: 350 MS
EKG Q-T INTERVAL: 408 MS
EKG QRS DURATION: 78 MS
EKG QRS DURATION: 84 MS
EKG QRS DURATION: 90 MS
EKG QTC CALCULATION (BAZETT): 396 MS
EKG QTC CALCULATION (BAZETT): 410 MS
EKG QTC CALCULATION (BAZETT): 439 MS
EKG T AXIS: 151 DEGREES
EKG T AXIS: 25 DEGREES
EKG T AXIS: 58 DEGREES
ERYTHROCYTE [DISTWIDTH] IN BLOOD BY AUTOMATED COUNT: 17.9 % (ref 11.5–14.5)
GLUCOSE BLD-MCNC: 128 MG/DL (ref 70–99)
GLUCOSE BLD-MCNC: 141 MG/DL (ref 70–99)
GLUCOSE BLD-MCNC: 155 MG/DL (ref 70–99)
GLUCOSE SERPL-MCNC: 161 MG/DL (ref 70–99)
HCT VFR BLD AUTO: 26.5 % (ref 37–47)
HGB BLD-MCNC: 8.1 G/DL (ref 12–16)
MCH RBC QN AUTO: 32.7 PG (ref 27–31)
MCHC RBC AUTO-ENTMCNC: 30.6 G/DL (ref 33–37)
MCV RBC AUTO: 106.9 FL (ref 81–99)
PERFORMED ON: ABNORMAL
PLATELET # BLD AUTO: 121 K/UL (ref 130–400)
PMV BLD AUTO: 11.4 FL (ref 9.4–12.3)
POTASSIUM SERPL-SCNC: 4.6 MMOL/L (ref 3.5–5.1)
PROT SERPL-MCNC: 6 G/DL (ref 6.4–8.3)
RBC # BLD AUTO: 2.48 M/UL (ref 4.2–5.4)
SODIUM SERPL-SCNC: 135 MMOL/L (ref 136–145)
WBC # BLD AUTO: 14.9 K/UL (ref 4.8–10.8)

## 2025-07-22 PROCEDURE — 93010 ELECTROCARDIOGRAM REPORT: CPT | Performed by: INTERNAL MEDICINE

## 2025-07-22 PROCEDURE — 6370000000 HC RX 637 (ALT 250 FOR IP): Performed by: STUDENT IN AN ORGANIZED HEALTH CARE EDUCATION/TRAINING PROGRAM

## 2025-07-22 PROCEDURE — 6360000002 HC RX W HCPCS: Performed by: INTERNAL MEDICINE

## 2025-07-22 PROCEDURE — 97530 THERAPEUTIC ACTIVITIES: CPT

## 2025-07-22 PROCEDURE — 6360000002 HC RX W HCPCS: Performed by: STUDENT IN AN ORGANIZED HEALTH CARE EDUCATION/TRAINING PROGRAM

## 2025-07-22 PROCEDURE — 97161 PT EVAL LOW COMPLEX 20 MIN: CPT

## 2025-07-22 PROCEDURE — 36415 COLL VENOUS BLD VENIPUNCTURE: CPT

## 2025-07-22 PROCEDURE — 85027 COMPLETE CBC AUTOMATED: CPT

## 2025-07-22 PROCEDURE — 6370000000 HC RX 637 (ALT 250 FOR IP): Performed by: INTERNAL MEDICINE

## 2025-07-22 PROCEDURE — 2700000000 HC OXYGEN THERAPY PER DAY

## 2025-07-22 PROCEDURE — 82962 GLUCOSE BLOOD TEST: CPT

## 2025-07-22 PROCEDURE — 6370000000 HC RX 637 (ALT 250 FOR IP)

## 2025-07-22 PROCEDURE — 80053 COMPREHEN METABOLIC PANEL: CPT

## 2025-07-22 PROCEDURE — 71045 X-RAY EXAM CHEST 1 VIEW: CPT

## 2025-07-22 PROCEDURE — 85730 THROMBOPLASTIN TIME PARTIAL: CPT

## 2025-07-22 PROCEDURE — 94760 N-INVAS EAR/PLS OXIMETRY 1: CPT

## 2025-07-22 PROCEDURE — 99233 SBSQ HOSP IP/OBS HIGH 50: CPT

## 2025-07-22 PROCEDURE — 8010000000 HC HEMODIALYSIS ACUTE INPT

## 2025-07-22 PROCEDURE — 97165 OT EVAL LOW COMPLEX 30 MIN: CPT

## 2025-07-22 PROCEDURE — 2060000000 HC ICU INTERMEDIATE R&B

## 2025-07-22 PROCEDURE — 2580000003 HC RX 258: Performed by: STUDENT IN AN ORGANIZED HEALTH CARE EDUCATION/TRAINING PROGRAM

## 2025-07-22 RX ORDER — OXYCODONE HYDROCHLORIDE 5 MG/1
5 TABLET ORAL 3 TIMES DAILY
Status: DISCONTINUED | OUTPATIENT
Start: 2025-07-22 | End: 2025-08-06 | Stop reason: HOSPADM

## 2025-07-22 RX ORDER — GUAIFENESIN 600 MG/1
600 TABLET, EXTENDED RELEASE ORAL 2 TIMES DAILY
Status: CANCELLED | OUTPATIENT
Start: 2025-07-22

## 2025-07-22 RX ORDER — LIDOCAINE 4 G/G
1 PATCH TOPICAL DAILY
Status: DISCONTINUED | OUTPATIENT
Start: 2025-07-22 | End: 2025-08-06 | Stop reason: HOSPADM

## 2025-07-22 RX ORDER — GUAIFENESIN 600 MG/1
600 TABLET, EXTENDED RELEASE ORAL 2 TIMES DAILY
Status: DISCONTINUED | OUTPATIENT
Start: 2025-07-22 | End: 2025-08-06 | Stop reason: HOSPADM

## 2025-07-22 RX ADMIN — MORPHINE SULFATE 1 MG: 2 INJECTION, SOLUTION INTRAMUSCULAR; INTRAVENOUS at 19:05

## 2025-07-22 RX ADMIN — SERTRALINE HYDROCHLORIDE 25 MG: 50 TABLET ORAL at 12:57

## 2025-07-22 RX ADMIN — MORPHINE SULFATE 1 MG: 2 INJECTION, SOLUTION INTRAMUSCULAR; INTRAVENOUS at 08:16

## 2025-07-22 RX ADMIN — MORPHINE SULFATE 1 MG: 2 INJECTION, SOLUTION INTRAMUSCULAR; INTRAVENOUS at 00:30

## 2025-07-22 RX ADMIN — IRON SUCROSE 200 MG: 20 INJECTION, SOLUTION INTRAVENOUS at 15:52

## 2025-07-22 RX ADMIN — MORPHINE SULFATE 1 MG: 2 INJECTION, SOLUTION INTRAMUSCULAR; INTRAVENOUS at 04:58

## 2025-07-22 RX ADMIN — OXYCODONE AND ACETAMINOPHEN 1 TABLET: 5; 325 TABLET ORAL at 05:47

## 2025-07-22 RX ADMIN — GUAIFENESIN 600 MG: 600 TABLET, EXTENDED RELEASE ORAL at 12:57

## 2025-07-22 RX ADMIN — OXYCODONE AND ACETAMINOPHEN 1 TABLET: 5; 325 TABLET ORAL at 11:43

## 2025-07-22 RX ADMIN — HYDROXYZINE HYDROCHLORIDE 10 MG: 10 TABLET, FILM COATED ORAL at 21:05

## 2025-07-22 RX ADMIN — GUAIFENESIN 600 MG: 600 TABLET, EXTENDED RELEASE ORAL at 21:05

## 2025-07-22 RX ADMIN — CEFEPIME 2000 MG: 2 INJECTION, POWDER, FOR SOLUTION INTRAVENOUS at 15:53

## 2025-07-22 RX ADMIN — VANCOMYCIN HYDROCHLORIDE 1750 MG: 10 INJECTION, POWDER, LYOPHILIZED, FOR SOLUTION INTRAVENOUS at 17:41

## 2025-07-22 RX ADMIN — HYDROXYZINE HYDROCHLORIDE 10 MG: 10 TABLET, FILM COATED ORAL at 12:57

## 2025-07-22 RX ADMIN — CALCITRIOL CAPSULES 0.25 MCG 0.25 MCG: 0.25 CAPSULE ORAL at 12:57

## 2025-07-22 RX ADMIN — MORPHINE SULFATE 1 MG: 2 INJECTION, SOLUTION INTRAMUSCULAR; INTRAVENOUS at 02:51

## 2025-07-22 RX ADMIN — APIXABAN 5 MG: 5 TABLET, FILM COATED ORAL at 21:05

## 2025-07-22 RX ADMIN — OXYCODONE 5 MG: 5 TABLET ORAL at 15:52

## 2025-07-22 RX ADMIN — OXYCODONE 5 MG: 5 TABLET ORAL at 21:05

## 2025-07-22 RX ADMIN — HEPARIN SODIUM AND DEXTROSE 7 UNITS/KG/HR: 10000; 5 INJECTION INTRAVENOUS at 02:28

## 2025-07-22 RX ADMIN — OXYCODONE AND ACETAMINOPHEN 1 TABLET: 5; 325 TABLET ORAL at 01:39

## 2025-07-22 RX ADMIN — MORPHINE SULFATE 1 MG: 2 INJECTION, SOLUTION INTRAMUSCULAR; INTRAVENOUS at 12:57

## 2025-07-22 ASSESSMENT — PAIN DESCRIPTION - LOCATION
LOCATION: CHEST

## 2025-07-22 ASSESSMENT — PAIN - FUNCTIONAL ASSESSMENT
PAIN_FUNCTIONAL_ASSESSMENT: ACTIVITIES ARE NOT PREVENTED

## 2025-07-22 ASSESSMENT — PAIN DESCRIPTION - DESCRIPTORS
DESCRIPTORS: ACHING;STABBING
DESCRIPTORS: ACHING;CRUSHING
DESCRIPTORS: ACHING;STABBING
DESCRIPTORS: CRUSHING
DESCRIPTORS: STABBING
DESCRIPTORS: ACHING;STABBING

## 2025-07-22 ASSESSMENT — PAIN SCALES - GENERAL
PAINLEVEL_OUTOF10: 9
PAINLEVEL_OUTOF10: 10
PAINLEVEL_OUTOF10: 8
PAINLEVEL_OUTOF10: 10
PAINLEVEL_OUTOF10: 9
PAINLEVEL_OUTOF10: 8
PAINLEVEL_OUTOF10: 10
PAINLEVEL_OUTOF10: 8
PAINLEVEL_OUTOF10: 10
PAINLEVEL_OUTOF10: 7
PAINLEVEL_OUTOF10: 9

## 2025-07-22 ASSESSMENT — PAIN SCALES - WONG BAKER
WONGBAKER_NUMERICALRESPONSE: HURTS LITTLE MORE
WONGBAKER_NUMERICALRESPONSE: HURTS A LITTLE BIT
WONGBAKER_NUMERICALRESPONSE: HURTS EVEN MORE

## 2025-07-22 ASSESSMENT — PAIN DESCRIPTION - ORIENTATION
ORIENTATION: MID

## 2025-07-23 ENCOUNTER — APPOINTMENT (OUTPATIENT)
Dept: ULTRASOUND IMAGING | Age: 70
DRG: 682 | End: 2025-07-23
Payer: MEDICARE

## 2025-07-23 LAB
ALBUMIN SERPL-MCNC: 3 G/DL (ref 3.5–5.2)
ALP SERPL-CCNC: 94 U/L (ref 35–104)
ALT SERPL-CCNC: 19 U/L (ref 10–35)
ANION GAP SERPL CALCULATED.3IONS-SCNC: 14 MMOL/L (ref 8–16)
AST SERPL-CCNC: 20 U/L (ref 10–35)
BILIRUB SERPL-MCNC: 0.4 MG/DL (ref 0.2–1.2)
BUN SERPL-MCNC: 13 MG/DL (ref 8–23)
CALCIUM SERPL-MCNC: 8.3 MG/DL (ref 8.8–10.2)
CHLORIDE SERPL-SCNC: 102 MMOL/L (ref 98–107)
CO2 SERPL-SCNC: 23 MMOL/L (ref 22–29)
CREAT SERPL-MCNC: 1.7 MG/DL (ref 0.5–0.9)
ERYTHROCYTE [DISTWIDTH] IN BLOOD BY AUTOMATED COUNT: 18.4 % (ref 11.5–14.5)
GLUCOSE BLD-MCNC: 147 MG/DL (ref 70–99)
GLUCOSE BLD-MCNC: 171 MG/DL (ref 70–99)
GLUCOSE BLD-MCNC: 183 MG/DL (ref 70–99)
GLUCOSE SERPL-MCNC: 140 MG/DL (ref 70–99)
HCT VFR BLD AUTO: 23.8 % (ref 37–47)
HGB BLD-MCNC: 7.2 G/DL (ref 12–16)
MCH RBC QN AUTO: 33.6 PG (ref 27–31)
MCHC RBC AUTO-ENTMCNC: 30.3 G/DL (ref 33–37)
MCV RBC AUTO: 111.2 FL (ref 81–99)
PERFORMED ON: ABNORMAL
PLATELET # BLD AUTO: 120 K/UL (ref 130–400)
PMV BLD AUTO: 11.5 FL (ref 9.4–12.3)
POTASSIUM SERPL-SCNC: 4.2 MMOL/L (ref 3.5–5.1)
PROT SERPL-MCNC: 6.1 G/DL (ref 6.4–8.3)
RBC # BLD AUTO: 2.14 M/UL (ref 4.2–5.4)
SODIUM SERPL-SCNC: 139 MMOL/L (ref 136–145)
VANCOMYCIN TROUGH SERPL-MCNC: 23 UG/ML (ref 10–20)
WBC # BLD AUTO: 11.4 K/UL (ref 4.8–10.8)

## 2025-07-23 PROCEDURE — 2060000000 HC ICU INTERMEDIATE R&B

## 2025-07-23 PROCEDURE — 6370000000 HC RX 637 (ALT 250 FOR IP): Performed by: STUDENT IN AN ORGANIZED HEALTH CARE EDUCATION/TRAINING PROGRAM

## 2025-07-23 PROCEDURE — 6360000002 HC RX W HCPCS: Performed by: STUDENT IN AN ORGANIZED HEALTH CARE EDUCATION/TRAINING PROGRAM

## 2025-07-23 PROCEDURE — 99221 1ST HOSP IP/OBS SF/LOW 40: CPT

## 2025-07-23 PROCEDURE — 80202 ASSAY OF VANCOMYCIN: CPT

## 2025-07-23 PROCEDURE — 6370000000 HC RX 637 (ALT 250 FOR IP)

## 2025-07-23 PROCEDURE — 82962 GLUCOSE BLOOD TEST: CPT

## 2025-07-23 PROCEDURE — 2700000000 HC OXYGEN THERAPY PER DAY

## 2025-07-23 PROCEDURE — 6370000000 HC RX 637 (ALT 250 FOR IP): Performed by: INTERNAL MEDICINE

## 2025-07-23 PROCEDURE — 94760 N-INVAS EAR/PLS OXIMETRY 1: CPT

## 2025-07-23 PROCEDURE — 85027 COMPLETE CBC AUTOMATED: CPT

## 2025-07-23 PROCEDURE — 36415 COLL VENOUS BLD VENIPUNCTURE: CPT

## 2025-07-23 PROCEDURE — 6360000002 HC RX W HCPCS: Performed by: INTERNAL MEDICINE

## 2025-07-23 PROCEDURE — 2500000003 HC RX 250 WO HCPCS: Performed by: INTERNAL MEDICINE

## 2025-07-23 PROCEDURE — 80053 COMPREHEN METABOLIC PANEL: CPT

## 2025-07-23 PROCEDURE — 2580000003 HC RX 258: Performed by: STUDENT IN AN ORGANIZED HEALTH CARE EDUCATION/TRAINING PROGRAM

## 2025-07-23 PROCEDURE — 76856 US EXAM PELVIC COMPLETE: CPT

## 2025-07-23 PROCEDURE — 2580000003 HC RX 258: Performed by: INTERNAL MEDICINE

## 2025-07-23 PROCEDURE — 99233 SBSQ HOSP IP/OBS HIGH 50: CPT

## 2025-07-23 RX ORDER — METOPROLOL TARTRATE 50 MG
50 TABLET ORAL 2 TIMES DAILY
Status: DISCONTINUED | OUTPATIENT
Start: 2025-07-23 | End: 2025-08-06 | Stop reason: HOSPADM

## 2025-07-23 RX ADMIN — GUAIFENESIN 600 MG: 600 TABLET, EXTENDED RELEASE ORAL at 08:04

## 2025-07-23 RX ADMIN — EPOETIN ALFA-EPBX 10000 UNITS: 10000 INJECTION, SOLUTION INTRAVENOUS; SUBCUTANEOUS at 17:33

## 2025-07-23 RX ADMIN — METOPROLOL TARTRATE 50 MG: 50 TABLET, FILM COATED ORAL at 20:06

## 2025-07-23 RX ADMIN — OXYCODONE AND ACETAMINOPHEN 1 TABLET: 5; 325 TABLET ORAL at 21:27

## 2025-07-23 RX ADMIN — OXYCODONE AND ACETAMINOPHEN 1 TABLET: 5; 325 TABLET ORAL at 04:29

## 2025-07-23 RX ADMIN — SODIUM CHLORIDE, PRESERVATIVE FREE 10 ML: 5 INJECTION INTRAVENOUS at 20:19

## 2025-07-23 RX ADMIN — IRON SUCROSE 200 MG: 20 INJECTION, SOLUTION INTRAVENOUS at 13:42

## 2025-07-23 RX ADMIN — APIXABAN 5 MG: 5 TABLET, FILM COATED ORAL at 20:07

## 2025-07-23 RX ADMIN — GUAIFENESIN 600 MG: 600 TABLET, EXTENDED RELEASE ORAL at 20:06

## 2025-07-23 RX ADMIN — OXYCODONE 5 MG: 5 TABLET ORAL at 20:06

## 2025-07-23 RX ADMIN — MORPHINE SULFATE 1 MG: 2 INJECTION, SOLUTION INTRAMUSCULAR; INTRAVENOUS at 10:22

## 2025-07-23 RX ADMIN — OXYCODONE 5 MG: 5 TABLET ORAL at 08:04

## 2025-07-23 RX ADMIN — CEFEPIME 1000 MG: 1 INJECTION, POWDER, FOR SOLUTION INTRAMUSCULAR; INTRAVENOUS at 15:29

## 2025-07-23 RX ADMIN — SERTRALINE HYDROCHLORIDE 25 MG: 50 TABLET ORAL at 08:04

## 2025-07-23 RX ADMIN — MORPHINE SULFATE 1 MG: 2 INJECTION, SOLUTION INTRAMUSCULAR; INTRAVENOUS at 02:59

## 2025-07-23 RX ADMIN — CALCITRIOL CAPSULES 0.25 MCG 0.25 MCG: 0.25 CAPSULE ORAL at 08:04

## 2025-07-23 RX ADMIN — APIXABAN 5 MG: 5 TABLET, FILM COATED ORAL at 08:04

## 2025-07-23 RX ADMIN — SODIUM CHLORIDE: 0.9 INJECTION, SOLUTION INTRAVENOUS at 15:03

## 2025-07-23 RX ADMIN — HYDROXYZINE HYDROCHLORIDE 10 MG: 10 TABLET, FILM COATED ORAL at 23:30

## 2025-07-23 RX ADMIN — OXYCODONE AND ACETAMINOPHEN 1 TABLET: 5; 325 TABLET ORAL at 13:42

## 2025-07-23 RX ADMIN — METOPROLOL TARTRATE 50 MG: 50 TABLET, FILM COATED ORAL at 14:00

## 2025-07-23 ASSESSMENT — PAIN DESCRIPTION - ORIENTATION
ORIENTATION: MID

## 2025-07-23 ASSESSMENT — PAIN DESCRIPTION - LOCATION
LOCATION: CHEST;BACK
LOCATION: CHEST

## 2025-07-23 ASSESSMENT — PAIN - FUNCTIONAL ASSESSMENT
PAIN_FUNCTIONAL_ASSESSMENT: INTOLERABLE, UNABLE TO DO ANY ACTIVE OR PASSIVE ACTIVITIES
PAIN_FUNCTIONAL_ASSESSMENT: PREVENTS OR INTERFERES WITH MANY ACTIVE NOT PASSIVE ACTIVITIES
PAIN_FUNCTIONAL_ASSESSMENT: PREVENTS OR INTERFERES WITH ALL ACTIVE AND SOME PASSIVE ACTIVITIES

## 2025-07-23 ASSESSMENT — PAIN DESCRIPTION - DESCRIPTORS
DESCRIPTORS: OTHER (COMMENT)
DESCRIPTORS: SQUEEZING
DESCRIPTORS: SQUEEZING

## 2025-07-23 ASSESSMENT — PAIN SCALES - WONG BAKER
WONGBAKER_NUMERICALRESPONSE: HURTS WORST
WONGBAKER_NUMERICALRESPONSE: NO HURT
WONGBAKER_NUMERICALRESPONSE: HURTS WORST
WONGBAKER_NUMERICALRESPONSE: HURTS WORST
WONGBAKER_NUMERICALRESPONSE: NO HURT
WONGBAKER_NUMERICALRESPONSE: HURTS WORST

## 2025-07-23 ASSESSMENT — PAIN SCALES - GENERAL
PAINLEVEL_OUTOF10: 6
PAINLEVEL_OUTOF10: 8
PAINLEVEL_OUTOF10: 7

## 2025-07-24 LAB
ALBUMIN SERPL-MCNC: 2.9 G/DL (ref 3.5–5.2)
ALP SERPL-CCNC: 88 U/L (ref 35–104)
ALT SERPL-CCNC: 17 U/L (ref 10–35)
ANION GAP SERPL CALCULATED.3IONS-SCNC: 11 MMOL/L (ref 8–16)
AST SERPL-CCNC: 24 U/L (ref 10–35)
BACTERIA BLD CULT ORG #2: NORMAL
BACTERIA BLD CULT: NORMAL
BILIRUB SERPL-MCNC: 0.4 MG/DL (ref 0.2–1.2)
BUN SERPL-MCNC: 20 MG/DL (ref 8–23)
CALCIUM SERPL-MCNC: 8.3 MG/DL (ref 8.8–10.2)
CHLORIDE SERPL-SCNC: 105 MMOL/L (ref 98–107)
CO2 SERPL-SCNC: 21 MMOL/L (ref 22–29)
CREAT SERPL-MCNC: 2.3 MG/DL (ref 0.5–0.9)
ERYTHROCYTE [DISTWIDTH] IN BLOOD BY AUTOMATED COUNT: 19.1 % (ref 11.5–14.5)
GLUCOSE BLD-MCNC: 109 MG/DL (ref 70–99)
GLUCOSE BLD-MCNC: 111 MG/DL (ref 70–99)
GLUCOSE BLD-MCNC: 125 MG/DL (ref 70–99)
GLUCOSE SERPL-MCNC: 149 MG/DL (ref 70–99)
HCT VFR BLD AUTO: 25.2 % (ref 37–47)
HGB BLD-MCNC: 7.5 G/DL (ref 12–16)
MCH RBC QN AUTO: 33.8 PG (ref 27–31)
MCHC RBC AUTO-ENTMCNC: 29.8 G/DL (ref 33–37)
MCV RBC AUTO: 113.5 FL (ref 81–99)
PERFORMED ON: ABNORMAL
PLATELET # BLD AUTO: 136 K/UL (ref 130–400)
PMV BLD AUTO: 11.5 FL (ref 9.4–12.3)
POTASSIUM SERPL-SCNC: 4.9 MMOL/L (ref 3.5–5.1)
PROT SERPL-MCNC: 6.2 G/DL (ref 6.4–8.3)
RBC # BLD AUTO: 2.22 M/UL (ref 4.2–5.4)
SODIUM SERPL-SCNC: 137 MMOL/L (ref 136–145)
VANCOMYCIN SERPL-MCNC: 18.9 UG/ML
WBC # BLD AUTO: 9.3 K/UL (ref 4.8–10.8)

## 2025-07-24 PROCEDURE — 36415 COLL VENOUS BLD VENIPUNCTURE: CPT

## 2025-07-24 PROCEDURE — 6370000000 HC RX 637 (ALT 250 FOR IP): Performed by: STUDENT IN AN ORGANIZED HEALTH CARE EDUCATION/TRAINING PROGRAM

## 2025-07-24 PROCEDURE — 6360000002 HC RX W HCPCS: Performed by: STUDENT IN AN ORGANIZED HEALTH CARE EDUCATION/TRAINING PROGRAM

## 2025-07-24 PROCEDURE — 6360000002 HC RX W HCPCS: Performed by: INTERNAL MEDICINE

## 2025-07-24 PROCEDURE — 2500000003 HC RX 250 WO HCPCS: Performed by: INTERNAL MEDICINE

## 2025-07-24 PROCEDURE — 8010000000 HC HEMODIALYSIS ACUTE INPT

## 2025-07-24 PROCEDURE — 82962 GLUCOSE BLOOD TEST: CPT

## 2025-07-24 PROCEDURE — 99232 SBSQ HOSP IP/OBS MODERATE 35: CPT | Performed by: INTERNAL MEDICINE

## 2025-07-24 PROCEDURE — 80202 ASSAY OF VANCOMYCIN: CPT

## 2025-07-24 PROCEDURE — 6370000000 HC RX 637 (ALT 250 FOR IP)

## 2025-07-24 PROCEDURE — 80053 COMPREHEN METABOLIC PANEL: CPT

## 2025-07-24 PROCEDURE — 2580000003 HC RX 258: Performed by: STUDENT IN AN ORGANIZED HEALTH CARE EDUCATION/TRAINING PROGRAM

## 2025-07-24 PROCEDURE — 2500000003 HC RX 250 WO HCPCS: Performed by: STUDENT IN AN ORGANIZED HEALTH CARE EDUCATION/TRAINING PROGRAM

## 2025-07-24 PROCEDURE — 94760 N-INVAS EAR/PLS OXIMETRY 1: CPT

## 2025-07-24 PROCEDURE — 2700000000 HC OXYGEN THERAPY PER DAY

## 2025-07-24 PROCEDURE — 6370000000 HC RX 637 (ALT 250 FOR IP): Performed by: INTERNAL MEDICINE

## 2025-07-24 PROCEDURE — 2060000000 HC ICU INTERMEDIATE R&B

## 2025-07-24 PROCEDURE — 85027 COMPLETE CBC AUTOMATED: CPT

## 2025-07-24 RX ADMIN — OXYCODONE 5 MG: 5 TABLET ORAL at 20:30

## 2025-07-24 RX ADMIN — GUAIFENESIN 600 MG: 600 TABLET, EXTENDED RELEASE ORAL at 15:22

## 2025-07-24 RX ADMIN — METOPROLOL TARTRATE 50 MG: 50 TABLET, FILM COATED ORAL at 20:31

## 2025-07-24 RX ADMIN — SODIUM CHLORIDE, PRESERVATIVE FREE 10 ML: 5 INJECTION INTRAVENOUS at 15:25

## 2025-07-24 RX ADMIN — METOPROLOL TARTRATE 50 MG: 50 TABLET, FILM COATED ORAL at 15:22

## 2025-07-24 RX ADMIN — CEFEPIME 1000 MG: 1 INJECTION, POWDER, FOR SOLUTION INTRAMUSCULAR; INTRAVENOUS at 18:16

## 2025-07-24 RX ADMIN — APIXABAN 5 MG: 5 TABLET, FILM COATED ORAL at 15:22

## 2025-07-24 RX ADMIN — HYDROXYZINE HYDROCHLORIDE 10 MG: 10 TABLET, FILM COATED ORAL at 08:48

## 2025-07-24 RX ADMIN — CALCITRIOL CAPSULES 0.25 MCG 0.25 MCG: 0.25 CAPSULE ORAL at 15:22

## 2025-07-24 RX ADMIN — MORPHINE SULFATE 1 MG: 2 INJECTION, SOLUTION INTRAMUSCULAR; INTRAVENOUS at 04:43

## 2025-07-24 RX ADMIN — ZIPRASIDONE MESYLATE 10 MG: 20 INJECTION, POWDER, LYOPHILIZED, FOR SOLUTION INTRAMUSCULAR at 01:47

## 2025-07-24 RX ADMIN — APIXABAN 5 MG: 5 TABLET, FILM COATED ORAL at 20:31

## 2025-07-24 RX ADMIN — VANCOMYCIN HYDROCHLORIDE 1750 MG: 10 INJECTION, POWDER, LYOPHILIZED, FOR SOLUTION INTRAVENOUS at 18:11

## 2025-07-24 RX ADMIN — GUAIFENESIN 600 MG: 600 TABLET, EXTENDED RELEASE ORAL at 20:31

## 2025-07-24 RX ADMIN — OXYCODONE 5 MG: 5 TABLET ORAL at 15:20

## 2025-07-24 RX ADMIN — HEPARIN SODIUM 3200 UNITS: 1000 INJECTION INTRAVENOUS; SUBCUTANEOUS at 15:15

## 2025-07-24 ASSESSMENT — PAIN SCALES - WONG BAKER: WONGBAKER_NUMERICALRESPONSE: HURTS A LITTLE BIT

## 2025-07-25 ENCOUNTER — APPOINTMENT (OUTPATIENT)
Dept: CT IMAGING | Age: 70
DRG: 682 | End: 2025-07-25
Payer: MEDICARE

## 2025-07-25 PROBLEM — R41.0 DELIRIUM: Status: ACTIVE | Noted: 2025-07-25

## 2025-07-25 LAB
ALBUMIN SERPL-MCNC: 3 G/DL (ref 3.5–5.2)
ALP SERPL-CCNC: 93 U/L (ref 35–104)
ALT SERPL-CCNC: 14 U/L (ref 10–35)
ANION GAP SERPL CALCULATED.3IONS-SCNC: 13 MMOL/L (ref 8–16)
AST SERPL-CCNC: 17 U/L (ref 10–35)
BILIRUB SERPL-MCNC: 0.4 MG/DL (ref 0.2–1.2)
BUN SERPL-MCNC: 13 MG/DL (ref 8–23)
CALCIUM SERPL-MCNC: 8.3 MG/DL (ref 8.8–10.2)
CHLORIDE SERPL-SCNC: 102 MMOL/L (ref 98–107)
CO2 SERPL-SCNC: 23 MMOL/L (ref 22–29)
CREAT SERPL-MCNC: 1.7 MG/DL (ref 0.5–0.9)
ERYTHROCYTE [DISTWIDTH] IN BLOOD BY AUTOMATED COUNT: 19.3 % (ref 11.5–14.5)
GLUCOSE BLD-MCNC: 102 MG/DL (ref 70–99)
GLUCOSE BLD-MCNC: 103 MG/DL (ref 70–99)
GLUCOSE BLD-MCNC: 111 MG/DL (ref 70–99)
GLUCOSE BLD-MCNC: 116 MG/DL (ref 70–99)
GLUCOSE SERPL-MCNC: 105 MG/DL (ref 70–99)
HCT VFR BLD AUTO: 27.8 % (ref 37–47)
HGB BLD-MCNC: 8.2 G/DL (ref 12–16)
MCH RBC QN AUTO: 33.7 PG (ref 27–31)
MCHC RBC AUTO-ENTMCNC: 29.5 G/DL (ref 33–37)
MCV RBC AUTO: 114.4 FL (ref 81–99)
MRSA DNA SPEC QL NAA+PROBE: NOT DETECTED
PERFORMED ON: ABNORMAL
PLATELET # BLD AUTO: 157 K/UL (ref 130–400)
PMV BLD AUTO: 10.9 FL (ref 9.4–12.3)
POTASSIUM SERPL-SCNC: 3.9 MMOL/L (ref 3.5–5.1)
PROT SERPL-MCNC: 6.4 G/DL (ref 6.4–8.3)
RBC # BLD AUTO: 2.43 M/UL (ref 4.2–5.4)
SODIUM SERPL-SCNC: 138 MMOL/L (ref 136–145)
WBC # BLD AUTO: 10.1 K/UL (ref 4.8–10.8)

## 2025-07-25 PROCEDURE — 2060000000 HC ICU INTERMEDIATE R&B

## 2025-07-25 PROCEDURE — 80053 COMPREHEN METABOLIC PANEL: CPT

## 2025-07-25 PROCEDURE — 6370000000 HC RX 637 (ALT 250 FOR IP): Performed by: INTERNAL MEDICINE

## 2025-07-25 PROCEDURE — 6360000002 HC RX W HCPCS: Performed by: STUDENT IN AN ORGANIZED HEALTH CARE EDUCATION/TRAINING PROGRAM

## 2025-07-25 PROCEDURE — 6360000002 HC RX W HCPCS: Performed by: INTERNAL MEDICINE

## 2025-07-25 PROCEDURE — 94760 N-INVAS EAR/PLS OXIMETRY 1: CPT

## 2025-07-25 PROCEDURE — 36415 COLL VENOUS BLD VENIPUNCTURE: CPT

## 2025-07-25 PROCEDURE — 82962 GLUCOSE BLOOD TEST: CPT

## 2025-07-25 PROCEDURE — 6370000000 HC RX 637 (ALT 250 FOR IP)

## 2025-07-25 PROCEDURE — 2500000003 HC RX 250 WO HCPCS: Performed by: PSYCHIATRY & NEUROLOGY

## 2025-07-25 PROCEDURE — 6370000000 HC RX 637 (ALT 250 FOR IP): Performed by: STUDENT IN AN ORGANIZED HEALTH CARE EDUCATION/TRAINING PROGRAM

## 2025-07-25 PROCEDURE — 70450 CT HEAD/BRAIN W/O DYE: CPT

## 2025-07-25 PROCEDURE — 2500000003 HC RX 250 WO HCPCS: Performed by: STUDENT IN AN ORGANIZED HEALTH CARE EDUCATION/TRAINING PROGRAM

## 2025-07-25 PROCEDURE — 2700000000 HC OXYGEN THERAPY PER DAY

## 2025-07-25 PROCEDURE — 85027 COMPLETE CBC AUTOMATED: CPT

## 2025-07-25 PROCEDURE — 87641 MR-STAPH DNA AMP PROBE: CPT

## 2025-07-25 PROCEDURE — 99222 1ST HOSP IP/OBS MODERATE 55: CPT | Performed by: PSYCHIATRY & NEUROLOGY

## 2025-07-25 PROCEDURE — 2500000003 HC RX 250 WO HCPCS: Performed by: INTERNAL MEDICINE

## 2025-07-25 PROCEDURE — 99233 SBSQ HOSP IP/OBS HIGH 50: CPT

## 2025-07-25 PROCEDURE — 6360000002 HC RX W HCPCS: Performed by: PSYCHIATRY & NEUROLOGY

## 2025-07-25 RX ORDER — LEVOFLOXACIN 5 MG/ML
750 INJECTION, SOLUTION INTRAVENOUS
Status: DISCONTINUED | OUTPATIENT
Start: 2025-07-25 | End: 2025-07-26

## 2025-07-25 RX ORDER — OXYCODONE AND ACETAMINOPHEN 5; 325 MG/1; MG/1
1 TABLET ORAL EVERY 4 HOURS PRN
Refills: 0 | Status: DISCONTINUED | OUTPATIENT
Start: 2025-07-25 | End: 2025-08-06 | Stop reason: HOSPADM

## 2025-07-25 RX ADMIN — OXYCODONE AND ACETAMINOPHEN 1 TABLET: 5; 325 TABLET ORAL at 02:50

## 2025-07-25 RX ADMIN — HYDROXYZINE HYDROCHLORIDE 10 MG: 10 TABLET, FILM COATED ORAL at 02:51

## 2025-07-25 RX ADMIN — ZIPRASIDONE MESYLATE 10 MG: 20 INJECTION, POWDER, LYOPHILIZED, FOR SOLUTION INTRAMUSCULAR at 15:52

## 2025-07-25 RX ADMIN — WATER 5 MG: 1 INJECTION INTRAMUSCULAR; INTRAVENOUS; SUBCUTANEOUS at 11:14

## 2025-07-25 RX ADMIN — SODIUM CHLORIDE, PRESERVATIVE FREE 10 ML: 5 INJECTION INTRAVENOUS at 20:17

## 2025-07-25 RX ADMIN — LEVOFLOXACIN 750 MG: 5 INJECTION, SOLUTION INTRAVENOUS at 18:35

## 2025-07-25 RX ADMIN — WATER 5 MG: 1 INJECTION INTRAMUSCULAR; INTRAVENOUS; SUBCUTANEOUS at 20:16

## 2025-07-25 RX ADMIN — EPOETIN ALFA-EPBX 10000 UNITS: 10000 INJECTION, SOLUTION INTRAVENOUS; SUBCUTANEOUS at 18:22

## 2025-07-26 LAB
ANION GAP SERPL CALCULATED.3IONS-SCNC: 15 MMOL/L (ref 8–16)
BUN SERPL-MCNC: 22 MG/DL (ref 8–23)
CALCIUM SERPL-MCNC: 8.5 MG/DL (ref 8.8–10.2)
CHLORIDE SERPL-SCNC: 102 MMOL/L (ref 98–107)
CO2 SERPL-SCNC: 21 MMOL/L (ref 22–29)
CREAT SERPL-MCNC: 2.5 MG/DL (ref 0.5–0.9)
ERYTHROCYTE [DISTWIDTH] IN BLOOD BY AUTOMATED COUNT: 19.7 % (ref 11.5–14.5)
GLUCOSE BLD-MCNC: 90 MG/DL (ref 70–99)
GLUCOSE SERPL-MCNC: 124 MG/DL (ref 70–99)
HCT VFR BLD AUTO: 26.8 % (ref 37–47)
HGB BLD-MCNC: 8.2 G/DL (ref 12–16)
MCH RBC QN AUTO: 34.6 PG (ref 27–31)
MCHC RBC AUTO-ENTMCNC: 30.6 G/DL (ref 33–37)
MCV RBC AUTO: 113.1 FL (ref 81–99)
PERFORMED ON: NORMAL
PLATELET # BLD AUTO: 174 K/UL (ref 130–400)
PMV BLD AUTO: 10.5 FL (ref 9.4–12.3)
POTASSIUM SERPL-SCNC: 3.6 MMOL/L (ref 3.5–5.1)
RBC # BLD AUTO: 2.37 M/UL (ref 4.2–5.4)
SODIUM SERPL-SCNC: 138 MMOL/L (ref 136–145)
VANCOMYCIN SERPL-MCNC: 25.4 UG/ML
WBC # BLD AUTO: 8.3 K/UL (ref 4.8–10.8)

## 2025-07-26 PROCEDURE — 6370000000 HC RX 637 (ALT 250 FOR IP): Performed by: INTERNAL MEDICINE

## 2025-07-26 PROCEDURE — 6370000000 HC RX 637 (ALT 250 FOR IP)

## 2025-07-26 PROCEDURE — 80048 BASIC METABOLIC PNL TOTAL CA: CPT

## 2025-07-26 PROCEDURE — 6360000002 HC RX W HCPCS: Performed by: PSYCHIATRY & NEUROLOGY

## 2025-07-26 PROCEDURE — 82962 GLUCOSE BLOOD TEST: CPT

## 2025-07-26 PROCEDURE — 36415 COLL VENOUS BLD VENIPUNCTURE: CPT

## 2025-07-26 PROCEDURE — 85027 COMPLETE CBC AUTOMATED: CPT

## 2025-07-26 PROCEDURE — 2500000003 HC RX 250 WO HCPCS: Performed by: STUDENT IN AN ORGANIZED HEALTH CARE EDUCATION/TRAINING PROGRAM

## 2025-07-26 PROCEDURE — 80202 ASSAY OF VANCOMYCIN: CPT

## 2025-07-26 PROCEDURE — 2060000000 HC ICU INTERMEDIATE R&B

## 2025-07-26 PROCEDURE — 2500000003 HC RX 250 WO HCPCS: Performed by: INTERNAL MEDICINE

## 2025-07-26 PROCEDURE — 2500000003 HC RX 250 WO HCPCS: Performed by: PSYCHIATRY & NEUROLOGY

## 2025-07-26 PROCEDURE — 99231 SBSQ HOSP IP/OBS SF/LOW 25: CPT | Performed by: PSYCHIATRY & NEUROLOGY

## 2025-07-26 PROCEDURE — 6360000002 HC RX W HCPCS: Performed by: STUDENT IN AN ORGANIZED HEALTH CARE EDUCATION/TRAINING PROGRAM

## 2025-07-26 PROCEDURE — 6370000000 HC RX 637 (ALT 250 FOR IP): Performed by: STUDENT IN AN ORGANIZED HEALTH CARE EDUCATION/TRAINING PROGRAM

## 2025-07-26 PROCEDURE — 94760 N-INVAS EAR/PLS OXIMETRY 1: CPT

## 2025-07-26 PROCEDURE — 8010000000 HC HEMODIALYSIS ACUTE INPT

## 2025-07-26 RX ORDER — LORAZEPAM 2 MG/ML
1 INJECTION INTRAMUSCULAR EVERY 6 HOURS PRN
Status: DISCONTINUED | OUTPATIENT
Start: 2025-07-26 | End: 2025-08-06 | Stop reason: HOSPADM

## 2025-07-26 RX ORDER — LEVOFLOXACIN 5 MG/ML
500 INJECTION, SOLUTION INTRAVENOUS
Status: COMPLETED | OUTPATIENT
Start: 2025-07-27 | End: 2025-07-27

## 2025-07-26 RX ORDER — HALOPERIDOL 5 MG/ML
2 INJECTION INTRAMUSCULAR EVERY 6 HOURS PRN
Status: DISCONTINUED | OUTPATIENT
Start: 2025-07-26 | End: 2025-08-06 | Stop reason: HOSPADM

## 2025-07-26 RX ADMIN — WATER 5 MG: 1 INJECTION INTRAMUSCULAR; INTRAVENOUS; SUBCUTANEOUS at 07:48

## 2025-07-26 RX ADMIN — MIDODRINE HYDROCHLORIDE 5 MG: 5 TABLET ORAL at 07:48

## 2025-07-26 RX ADMIN — METOPROLOL TARTRATE 50 MG: 50 TABLET, FILM COATED ORAL at 21:14

## 2025-07-26 RX ADMIN — GUAIFENESIN 600 MG: 600 TABLET, EXTENDED RELEASE ORAL at 21:14

## 2025-07-26 RX ADMIN — OXYCODONE AND ACETAMINOPHEN 1 TABLET: 5; 325 TABLET ORAL at 01:25

## 2025-07-26 RX ADMIN — APIXABAN 5 MG: 5 TABLET, FILM COATED ORAL at 21:14

## 2025-07-26 RX ADMIN — WATER 5 MG: 1 INJECTION INTRAMUSCULAR; INTRAVENOUS; SUBCUTANEOUS at 03:33

## 2025-07-26 RX ADMIN — SODIUM CHLORIDE, PRESERVATIVE FREE 10 ML: 5 INJECTION INTRAVENOUS at 21:15

## 2025-07-26 RX ADMIN — HALOPERIDOL LACTATE 2 MG: 5 INJECTION, SOLUTION INTRAMUSCULAR at 11:05

## 2025-07-26 RX ADMIN — Medication 1 MG: at 11:31

## 2025-07-26 RX ADMIN — HYDROXYZINE HYDROCHLORIDE 10 MG: 10 TABLET, FILM COATED ORAL at 05:07

## 2025-07-26 ASSESSMENT — PAIN SCALES - GENERAL: PAINLEVEL_OUTOF10: 0

## 2025-07-27 LAB
ALBUMIN SERPL-MCNC: 2.8 G/DL (ref 3.5–5.2)
ALP SERPL-CCNC: 71 U/L (ref 35–104)
ALT SERPL-CCNC: 13 U/L (ref 10–35)
ANION GAP SERPL CALCULATED.3IONS-SCNC: 12 MMOL/L (ref 8–16)
AST SERPL-CCNC: 21 U/L (ref 10–35)
BASOPHILS # BLD: 0.1 K/UL (ref 0–0.2)
BASOPHILS NFR BLD: 1.1 % (ref 0–1)
BILIRUB SERPL-MCNC: 0.4 MG/DL (ref 0.2–1.2)
BUN SERPL-MCNC: 13 MG/DL (ref 8–23)
CALCIUM SERPL-MCNC: 8.3 MG/DL (ref 8.8–10.2)
CHLORIDE SERPL-SCNC: 102 MMOL/L (ref 98–107)
CO2 SERPL-SCNC: 24 MMOL/L (ref 22–29)
CREAT SERPL-MCNC: 2.2 MG/DL (ref 0.5–0.9)
EOSINOPHIL # BLD: 0.5 K/UL (ref 0–0.6)
EOSINOPHIL NFR BLD: 7.1 % (ref 0–5)
ERYTHROCYTE [DISTWIDTH] IN BLOOD BY AUTOMATED COUNT: 20.1 % (ref 11.5–14.5)
FOLATE SERPL-MCNC: 4.9 NG/ML (ref 4.8–37.3)
GLUCOSE BLD-MCNC: 155 MG/DL (ref 70–99)
GLUCOSE BLD-MCNC: 176 MG/DL (ref 70–99)
GLUCOSE BLD-MCNC: 188 MG/DL (ref 70–99)
GLUCOSE SERPL-MCNC: 165 MG/DL (ref 70–99)
HCT VFR BLD AUTO: 26.8 % (ref 37–47)
HGB BLD-MCNC: 8.1 G/DL (ref 12–16)
IMM GRANULOCYTES # BLD: 0.3 K/UL
LYMPHOCYTES # BLD: 0.9 K/UL (ref 1.1–4.5)
LYMPHOCYTES NFR BLD: 12.2 % (ref 20–40)
MAGNESIUM SERPL-MCNC: 1.7 MG/DL (ref 1.6–2.4)
MCH RBC QN AUTO: 34.2 PG (ref 27–31)
MCHC RBC AUTO-ENTMCNC: 30.2 G/DL (ref 33–37)
MCV RBC AUTO: 113.1 FL (ref 81–99)
MONOCYTES # BLD: 1 K/UL (ref 0–0.9)
MONOCYTES NFR BLD: 13.6 % (ref 0–10)
NEUTROPHILS # BLD: 4.4 K/UL (ref 1.5–7.5)
NEUTS SEG NFR BLD: 61.4 % (ref 50–65)
PERFORMED ON: ABNORMAL
PLATELET # BLD AUTO: 166 K/UL (ref 130–400)
PMV BLD AUTO: 10.7 FL (ref 9.4–12.3)
POTASSIUM SERPL-SCNC: 3.4 MMOL/L (ref 3.5–5)
PROT SERPL-MCNC: 5.5 G/DL (ref 6.4–8.3)
RBC # BLD AUTO: 2.37 M/UL (ref 4.2–5.4)
SODIUM SERPL-SCNC: 138 MMOL/L (ref 136–145)
T4 FREE SERPL-MCNC: 1.14 NG/DL (ref 0.93–1.7)
TSH SERPL DL<=0.005 MIU/L-ACNC: 6.85 UIU/ML (ref 0.27–4.2)
VIT B12 SERPL-MCNC: 330 PG/ML (ref 232–1245)
WBC # BLD AUTO: 7.1 K/UL (ref 4.8–10.8)

## 2025-07-27 PROCEDURE — 82746 ASSAY OF FOLIC ACID SERUM: CPT

## 2025-07-27 PROCEDURE — 6370000000 HC RX 637 (ALT 250 FOR IP): Performed by: INTERNAL MEDICINE

## 2025-07-27 PROCEDURE — 82607 VITAMIN B-12: CPT

## 2025-07-27 PROCEDURE — 80053 COMPREHEN METABOLIC PANEL: CPT

## 2025-07-27 PROCEDURE — 2500000003 HC RX 250 WO HCPCS: Performed by: INTERNAL MEDICINE

## 2025-07-27 PROCEDURE — 6370000000 HC RX 637 (ALT 250 FOR IP)

## 2025-07-27 PROCEDURE — 6370000000 HC RX 637 (ALT 250 FOR IP): Performed by: STUDENT IN AN ORGANIZED HEALTH CARE EDUCATION/TRAINING PROGRAM

## 2025-07-27 PROCEDURE — 83735 ASSAY OF MAGNESIUM: CPT

## 2025-07-27 PROCEDURE — 2060000000 HC ICU INTERMEDIATE R&B

## 2025-07-27 PROCEDURE — 6360000002 HC RX W HCPCS: Performed by: STUDENT IN AN ORGANIZED HEALTH CARE EDUCATION/TRAINING PROGRAM

## 2025-07-27 PROCEDURE — 36415 COLL VENOUS BLD VENIPUNCTURE: CPT

## 2025-07-27 PROCEDURE — 99223 1ST HOSP IP/OBS HIGH 75: CPT | Performed by: PSYCHIATRY & NEUROLOGY

## 2025-07-27 PROCEDURE — 85025 COMPLETE CBC W/AUTO DIFF WBC: CPT

## 2025-07-27 PROCEDURE — 84439 ASSAY OF FREE THYROXINE: CPT

## 2025-07-27 PROCEDURE — 99232 SBSQ HOSP IP/OBS MODERATE 35: CPT | Performed by: PSYCHIATRY & NEUROLOGY

## 2025-07-27 PROCEDURE — 82962 GLUCOSE BLOOD TEST: CPT

## 2025-07-27 PROCEDURE — 84443 ASSAY THYROID STIM HORMONE: CPT

## 2025-07-27 PROCEDURE — 2700000000 HC OXYGEN THERAPY PER DAY

## 2025-07-27 PROCEDURE — 6360000002 HC RX W HCPCS: Performed by: PSYCHIATRY & NEUROLOGY

## 2025-07-27 RX ADMIN — APIXABAN 5 MG: 5 TABLET, FILM COATED ORAL at 08:06

## 2025-07-27 RX ADMIN — CALCITRIOL CAPSULES 0.25 MCG 0.25 MCG: 0.25 CAPSULE ORAL at 08:06

## 2025-07-27 RX ADMIN — APIXABAN 5 MG: 5 TABLET, FILM COATED ORAL at 21:43

## 2025-07-27 RX ADMIN — METOPROLOL TARTRATE 50 MG: 50 TABLET, FILM COATED ORAL at 08:06

## 2025-07-27 RX ADMIN — OXYCODONE AND ACETAMINOPHEN 1 TABLET: 5; 325 TABLET ORAL at 21:43

## 2025-07-27 RX ADMIN — GUAIFENESIN 600 MG: 600 TABLET, EXTENDED RELEASE ORAL at 21:44

## 2025-07-27 RX ADMIN — GUAIFENESIN 600 MG: 600 TABLET, EXTENDED RELEASE ORAL at 08:06

## 2025-07-27 RX ADMIN — Medication 1 MG: at 08:06

## 2025-07-27 RX ADMIN — SODIUM CHLORIDE, PRESERVATIVE FREE 10 ML: 5 INJECTION INTRAVENOUS at 21:44

## 2025-07-27 RX ADMIN — OXYCODONE AND ACETAMINOPHEN 1 TABLET: 5; 325 TABLET ORAL at 01:40

## 2025-07-27 RX ADMIN — METOPROLOL TARTRATE 50 MG: 50 TABLET, FILM COATED ORAL at 21:44

## 2025-07-27 RX ADMIN — LEVOFLOXACIN 500 MG: 500 INJECTION, SOLUTION INTRAVENOUS at 12:30

## 2025-07-27 RX ADMIN — OXYCODONE AND ACETAMINOPHEN 1 TABLET: 5; 325 TABLET ORAL at 08:06

## 2025-07-27 RX ADMIN — HYDROXYZINE HYDROCHLORIDE 10 MG: 10 TABLET, FILM COATED ORAL at 08:07

## 2025-07-27 RX ADMIN — SODIUM CHLORIDE, PRESERVATIVE FREE 10 ML: 5 INJECTION INTRAVENOUS at 08:07

## 2025-07-27 ASSESSMENT — PAIN - FUNCTIONAL ASSESSMENT
PAIN_FUNCTIONAL_ASSESSMENT: ACTIVITIES ARE NOT PREVENTED
PAIN_FUNCTIONAL_ASSESSMENT: ACTIVITIES ARE NOT PREVENTED

## 2025-07-27 ASSESSMENT — PAIN DESCRIPTION - LOCATION
LOCATION: BACK
LOCATION: GENERALIZED

## 2025-07-27 ASSESSMENT — PAIN DESCRIPTION - ORIENTATION: ORIENTATION: LOWER

## 2025-07-27 ASSESSMENT — PAIN DESCRIPTION - DESCRIPTORS
DESCRIPTORS: ACHING
DESCRIPTORS: ACHING;DISCOMFORT

## 2025-07-27 ASSESSMENT — PAIN SCALES - GENERAL
PAINLEVEL_OUTOF10: 6
PAINLEVEL_OUTOF10: 5

## 2025-07-28 ENCOUNTER — APPOINTMENT (OUTPATIENT)
Dept: MRI IMAGING | Age: 70
DRG: 682 | End: 2025-07-28
Payer: MEDICARE

## 2025-07-28 PROBLEM — R41.82 ALTERED MENTAL STATUS: Status: ACTIVE | Noted: 2025-07-28

## 2025-07-28 LAB
ALBUMIN SERPL-MCNC: 2.6 G/DL (ref 3.5–5.2)
ALP SERPL-CCNC: 72 U/L (ref 35–104)
ALT SERPL-CCNC: 11 U/L (ref 10–35)
AMMONIA PLAS-SCNC: 33 UMOL/L (ref 11–51)
ANION GAP SERPL CALCULATED.3IONS-SCNC: 9 MMOL/L (ref 8–16)
AST SERPL-CCNC: 19 U/L (ref 10–35)
BASOPHILS # BLD: 0.1 K/UL (ref 0–0.2)
BASOPHILS NFR BLD: 1.2 % (ref 0–1)
BILIRUB SERPL-MCNC: 0.4 MG/DL (ref 0.2–1.2)
BUN SERPL-MCNC: 17 MG/DL (ref 8–23)
CALCIUM SERPL-MCNC: 8.3 MG/DL (ref 8.8–10.2)
CHLORIDE SERPL-SCNC: 102 MMOL/L (ref 98–107)
CO2 SERPL-SCNC: 25 MMOL/L (ref 22–29)
CREAT SERPL-MCNC: 2.9 MG/DL (ref 0.5–0.9)
EOSINOPHIL # BLD: 0.7 K/UL (ref 0–0.6)
EOSINOPHIL NFR BLD: 9 % (ref 0–5)
ERYTHROCYTE [DISTWIDTH] IN BLOOD BY AUTOMATED COUNT: 20 % (ref 11.5–14.5)
GLUCOSE BLD-MCNC: 127 MG/DL (ref 70–99)
GLUCOSE BLD-MCNC: 174 MG/DL (ref 70–99)
GLUCOSE BLD-MCNC: 176 MG/DL (ref 70–99)
GLUCOSE BLD-MCNC: 207 MG/DL (ref 70–99)
GLUCOSE SERPL-MCNC: 195 MG/DL (ref 70–99)
HCT VFR BLD AUTO: 27.1 % (ref 37–47)
HGB BLD-MCNC: 8.3 G/DL (ref 12–16)
IMM GRANULOCYTES # BLD: 0.4 K/UL
LYMPHOCYTES # BLD: 0.9 K/UL (ref 1.1–4.5)
LYMPHOCYTES NFR BLD: 12.3 % (ref 20–40)
MCH RBC QN AUTO: 34.2 PG (ref 27–31)
MCHC RBC AUTO-ENTMCNC: 30.6 G/DL (ref 33–37)
MCV RBC AUTO: 111.5 FL (ref 81–99)
MONOCYTES # BLD: 1.1 K/UL (ref 0–0.9)
MONOCYTES NFR BLD: 14.9 % (ref 0–10)
NEUTROPHILS # BLD: 4.4 K/UL (ref 1.5–7.5)
NEUTS SEG NFR BLD: 57.6 % (ref 50–65)
PERFORMED ON: ABNORMAL
PLATELET # BLD AUTO: 168 K/UL (ref 130–400)
PMV BLD AUTO: 10.6 FL (ref 9.4–12.3)
POTASSIUM SERPL-SCNC: 3.6 MMOL/L (ref 3.5–5)
PROT SERPL-MCNC: 5.4 G/DL (ref 6.4–8.3)
RBC # BLD AUTO: 2.43 M/UL (ref 4.2–5.4)
SODIUM SERPL-SCNC: 136 MMOL/L (ref 136–145)
WBC # BLD AUTO: 7.6 K/UL (ref 4.8–10.8)

## 2025-07-28 PROCEDURE — 2060000000 HC ICU INTERMEDIATE R&B

## 2025-07-28 PROCEDURE — 99232 SBSQ HOSP IP/OBS MODERATE 35: CPT | Performed by: PHYSICIAN ASSISTANT

## 2025-07-28 PROCEDURE — 80053 COMPREHEN METABOLIC PANEL: CPT

## 2025-07-28 PROCEDURE — 6370000000 HC RX 637 (ALT 250 FOR IP)

## 2025-07-28 PROCEDURE — 97530 THERAPEUTIC ACTIVITIES: CPT

## 2025-07-28 PROCEDURE — 85025 COMPLETE CBC W/AUTO DIFF WBC: CPT

## 2025-07-28 PROCEDURE — 36415 COLL VENOUS BLD VENIPUNCTURE: CPT

## 2025-07-28 PROCEDURE — 82962 GLUCOSE BLOOD TEST: CPT

## 2025-07-28 PROCEDURE — 6370000000 HC RX 637 (ALT 250 FOR IP): Performed by: INTERNAL MEDICINE

## 2025-07-28 PROCEDURE — 82140 ASSAY OF AMMONIA: CPT

## 2025-07-28 PROCEDURE — 6370000000 HC RX 637 (ALT 250 FOR IP): Performed by: STUDENT IN AN ORGANIZED HEALTH CARE EDUCATION/TRAINING PROGRAM

## 2025-07-28 PROCEDURE — 2700000000 HC OXYGEN THERAPY PER DAY

## 2025-07-28 PROCEDURE — 99232 SBSQ HOSP IP/OBS MODERATE 35: CPT | Performed by: PSYCHIATRY & NEUROLOGY

## 2025-07-28 PROCEDURE — 99233 SBSQ HOSP IP/OBS HIGH 50: CPT | Performed by: PSYCHIATRY & NEUROLOGY

## 2025-07-28 PROCEDURE — 94760 N-INVAS EAR/PLS OXIMETRY 1: CPT

## 2025-07-28 PROCEDURE — 2500000003 HC RX 250 WO HCPCS: Performed by: INTERNAL MEDICINE

## 2025-07-28 PROCEDURE — 6360000002 HC RX W HCPCS: Performed by: INTERNAL MEDICINE

## 2025-07-28 RX ADMIN — GUAIFENESIN 600 MG: 600 TABLET, EXTENDED RELEASE ORAL at 09:18

## 2025-07-28 RX ADMIN — METOPROLOL TARTRATE 50 MG: 50 TABLET, FILM COATED ORAL at 09:18

## 2025-07-28 RX ADMIN — APIXABAN 5 MG: 5 TABLET, FILM COATED ORAL at 21:04

## 2025-07-28 RX ADMIN — OXYCODONE AND ACETAMINOPHEN 1 TABLET: 5; 325 TABLET ORAL at 03:14

## 2025-07-28 RX ADMIN — SODIUM CHLORIDE, PRESERVATIVE FREE 10 ML: 5 INJECTION INTRAVENOUS at 21:04

## 2025-07-28 RX ADMIN — EPOETIN ALFA-EPBX 10000 UNITS: 10000 INJECTION, SOLUTION INTRAVENOUS; SUBCUTANEOUS at 17:25

## 2025-07-28 RX ADMIN — INSULIN LISPRO 2 UNITS: 100 INJECTION, SOLUTION INTRAVENOUS; SUBCUTANEOUS at 17:25

## 2025-07-28 RX ADMIN — SODIUM CHLORIDE, PRESERVATIVE FREE 10 ML: 5 INJECTION INTRAVENOUS at 09:26

## 2025-07-28 RX ADMIN — GUAIFENESIN 600 MG: 600 TABLET, EXTENDED RELEASE ORAL at 21:04

## 2025-07-28 RX ADMIN — CALCITRIOL CAPSULES 0.25 MCG 0.25 MCG: 0.25 CAPSULE ORAL at 09:18

## 2025-07-28 RX ADMIN — APIXABAN 5 MG: 5 TABLET, FILM COATED ORAL at 09:19

## 2025-07-28 SDOH — ECONOMIC STABILITY: FOOD INSECURITY: WITHIN THE PAST 12 MONTHS, YOU WORRIED THAT YOUR FOOD WOULD RUN OUT BEFORE YOU GOT MONEY TO BUY MORE.: PATIENT DECLINED

## 2025-07-28 SDOH — ECONOMIC STABILITY: INCOME INSECURITY: HOW HARD IS IT FOR YOU TO PAY FOR THE VERY BASICS LIKE FOOD, HOUSING, MEDICAL CARE, AND HEATING?: PATIENT DECLINED

## 2025-07-28 SDOH — ECONOMIC STABILITY: INCOME INSECURITY
IN THE PAST 12 MONTHS, HAS THE ELECTRIC, GAS, OIL, OR WATER COMPANY THREATENED TO SHUT OFF SERVICE IN YOUR HOME?: PATIENT DECLINED

## 2025-07-28 ASSESSMENT — PAIN DESCRIPTION - LOCATION
LOCATION: BACK
LOCATION: CHEST;STERNUM

## 2025-07-28 ASSESSMENT — PAIN SCALES - GENERAL
PAINLEVEL_OUTOF10: 4
PAINLEVEL_OUTOF10: 2
PAINLEVEL_OUTOF10: 6

## 2025-07-28 ASSESSMENT — PATIENT HEALTH QUESTIONNAIRE - PHQ9
SUM OF ALL RESPONSES TO PHQ QUESTIONS 1-9: 2
SUM OF ALL RESPONSES TO PHQ QUESTIONS 1-9: 2
2. FEELING DOWN, DEPRESSED OR HOPELESS: SEVERAL DAYS
SUM OF ALL RESPONSES TO PHQ QUESTIONS 1-9: 2
1. LITTLE INTEREST OR PLEASURE IN DOING THINGS: SEVERAL DAYS
SUM OF ALL RESPONSES TO PHQ QUESTIONS 1-9: 2

## 2025-07-28 ASSESSMENT — PAIN DESCRIPTION - PAIN TYPE: TYPE: ACUTE PAIN

## 2025-07-28 ASSESSMENT — PAIN DESCRIPTION - ORIENTATION
ORIENTATION: MID
ORIENTATION: LOWER

## 2025-07-28 ASSESSMENT — PAIN DESCRIPTION - DESCRIPTORS
DESCRIPTORS: ACHING
DESCRIPTORS: ACHING;DISCOMFORT

## 2025-07-28 ASSESSMENT — PAIN DESCRIPTION - ONSET: ONSET: ON-GOING

## 2025-07-28 ASSESSMENT — PAIN DESCRIPTION - FREQUENCY: FREQUENCY: INTERMITTENT

## 2025-07-29 ENCOUNTER — APPOINTMENT (OUTPATIENT)
Dept: GENERAL RADIOLOGY | Age: 70
DRG: 682 | End: 2025-07-29
Payer: MEDICARE

## 2025-07-29 LAB
ALBUMIN SERPL-MCNC: 2.5 G/DL (ref 3.5–5.2)
ALP SERPL-CCNC: 67 U/L (ref 35–104)
ALT SERPL-CCNC: 11 U/L (ref 10–35)
ANION GAP SERPL CALCULATED.3IONS-SCNC: 11 MMOL/L (ref 8–16)
AST SERPL-CCNC: 16 U/L (ref 10–35)
BASOPHILS # BLD: 0.1 K/UL (ref 0–0.2)
BASOPHILS NFR BLD: 0.9 % (ref 0–1)
BILIRUB SERPL-MCNC: 0.3 MG/DL (ref 0.2–1.2)
BUN SERPL-MCNC: 23 MG/DL (ref 8–23)
CALCIUM SERPL-MCNC: 8.2 MG/DL (ref 8.8–10.2)
CHLORIDE SERPL-SCNC: 100 MMOL/L (ref 98–107)
CO2 SERPL-SCNC: 24 MMOL/L (ref 22–29)
CREAT SERPL-MCNC: 3.5 MG/DL (ref 0.5–0.9)
EOSINOPHIL # BLD: 0.7 K/UL (ref 0–0.6)
EOSINOPHIL NFR BLD: 8.1 % (ref 0–5)
ERYTHROCYTE [DISTWIDTH] IN BLOOD BY AUTOMATED COUNT: 19.3 % (ref 11.5–14.5)
GLUCOSE BLD-MCNC: 177 MG/DL (ref 70–99)
GLUCOSE BLD-MCNC: 200 MG/DL (ref 70–99)
GLUCOSE BLD-MCNC: 268 MG/DL (ref 70–99)
GLUCOSE SERPL-MCNC: 160 MG/DL (ref 70–99)
HCT VFR BLD AUTO: 26.7 % (ref 37–47)
HGB BLD-MCNC: 8.2 G/DL (ref 12–16)
IMM GRANULOCYTES # BLD: 0.3 K/UL
LYMPHOCYTES # BLD: 1 K/UL (ref 1.1–4.5)
LYMPHOCYTES NFR BLD: 11.9 % (ref 20–40)
MAGNESIUM SERPL-MCNC: 1.6 MG/DL (ref 1.6–2.4)
MCH RBC QN AUTO: 33.9 PG (ref 27–31)
MCHC RBC AUTO-ENTMCNC: 30.7 G/DL (ref 33–37)
MCV RBC AUTO: 110.3 FL (ref 81–99)
MONOCYTES # BLD: 1.1 K/UL (ref 0–0.9)
MONOCYTES NFR BLD: 13.5 % (ref 0–10)
NEUTROPHILS # BLD: 5 K/UL (ref 1.5–7.5)
NEUTS SEG NFR BLD: 61.9 % (ref 50–65)
PERFORMED ON: ABNORMAL
PLATELET # BLD AUTO: 165 K/UL (ref 130–400)
PMV BLD AUTO: 10.6 FL (ref 9.4–12.3)
POTASSIUM SERPL-SCNC: 3.4 MMOL/L (ref 3.5–5)
PROT SERPL-MCNC: 5.2 G/DL (ref 6.4–8.3)
RBC # BLD AUTO: 2.42 M/UL (ref 4.2–5.4)
SODIUM SERPL-SCNC: 135 MMOL/L (ref 136–145)
WBC # BLD AUTO: 8.1 K/UL (ref 4.8–10.8)

## 2025-07-29 PROCEDURE — 94150 VITAL CAPACITY TEST: CPT

## 2025-07-29 PROCEDURE — 85025 COMPLETE CBC W/AUTO DIFF WBC: CPT

## 2025-07-29 PROCEDURE — 97535 SELF CARE MNGMENT TRAINING: CPT

## 2025-07-29 PROCEDURE — 2500000003 HC RX 250 WO HCPCS: Performed by: INTERNAL MEDICINE

## 2025-07-29 PROCEDURE — 94640 AIRWAY INHALATION TREATMENT: CPT

## 2025-07-29 PROCEDURE — 99233 SBSQ HOSP IP/OBS HIGH 50: CPT | Performed by: PSYCHIATRY & NEUROLOGY

## 2025-07-29 PROCEDURE — 82962 GLUCOSE BLOOD TEST: CPT

## 2025-07-29 PROCEDURE — 6370000000 HC RX 637 (ALT 250 FOR IP): Performed by: INTERNAL MEDICINE

## 2025-07-29 PROCEDURE — 97530 THERAPEUTIC ACTIVITIES: CPT

## 2025-07-29 PROCEDURE — 6370000000 HC RX 637 (ALT 250 FOR IP): Performed by: STUDENT IN AN ORGANIZED HEALTH CARE EDUCATION/TRAINING PROGRAM

## 2025-07-29 PROCEDURE — 2060000000 HC ICU INTERMEDIATE R&B

## 2025-07-29 PROCEDURE — 83735 ASSAY OF MAGNESIUM: CPT

## 2025-07-29 PROCEDURE — 80053 COMPREHEN METABOLIC PANEL: CPT

## 2025-07-29 PROCEDURE — 2700000000 HC OXYGEN THERAPY PER DAY

## 2025-07-29 PROCEDURE — 6360000002 HC RX W HCPCS: Performed by: INTERNAL MEDICINE

## 2025-07-29 PROCEDURE — 36415 COLL VENOUS BLD VENIPUNCTURE: CPT

## 2025-07-29 PROCEDURE — 94760 N-INVAS EAR/PLS OXIMETRY 1: CPT

## 2025-07-29 PROCEDURE — 6370000000 HC RX 637 (ALT 250 FOR IP)

## 2025-07-29 PROCEDURE — 93005 ELECTROCARDIOGRAM TRACING: CPT | Performed by: STUDENT IN AN ORGANIZED HEALTH CARE EDUCATION/TRAINING PROGRAM

## 2025-07-29 PROCEDURE — 97110 THERAPEUTIC EXERCISES: CPT

## 2025-07-29 PROCEDURE — 99231 SBSQ HOSP IP/OBS SF/LOW 25: CPT | Performed by: PHYSICIAN ASSISTANT

## 2025-07-29 PROCEDURE — 71045 X-RAY EXAM CHEST 1 VIEW: CPT

## 2025-07-29 PROCEDURE — 8010000000 HC HEMODIALYSIS ACUTE INPT

## 2025-07-29 RX ORDER — IPRATROPIUM BROMIDE AND ALBUTEROL SULFATE 2.5; .5 MG/3ML; MG/3ML
1 SOLUTION RESPIRATORY (INHALATION) EVERY 6 HOURS PRN
Status: DISCONTINUED | OUTPATIENT
Start: 2025-07-29 | End: 2025-08-06 | Stop reason: HOSPADM

## 2025-07-29 RX ADMIN — ACETAMINOPHEN 650 MG: 325 TABLET ORAL at 09:46

## 2025-07-29 RX ADMIN — SODIUM CHLORIDE, PRESERVATIVE FREE 10 ML: 5 INJECTION INTRAVENOUS at 13:11

## 2025-07-29 RX ADMIN — IPRATROPIUM BROMIDE AND ALBUTEROL SULFATE 1 DOSE: 2.5; .5 SOLUTION RESPIRATORY (INHALATION) at 23:39

## 2025-07-29 RX ADMIN — OXYCODONE AND ACETAMINOPHEN 1 TABLET: 5; 325 TABLET ORAL at 01:13

## 2025-07-29 RX ADMIN — METOPROLOL TARTRATE 50 MG: 50 TABLET, FILM COATED ORAL at 21:31

## 2025-07-29 RX ADMIN — GUAIFENESIN 600 MG: 600 TABLET, EXTENDED RELEASE ORAL at 21:31

## 2025-07-29 RX ADMIN — ONDANSETRON 4 MG: 2 INJECTION, SOLUTION INTRAMUSCULAR; INTRAVENOUS at 15:18

## 2025-07-29 RX ADMIN — HEPARIN SODIUM 3200 UNITS: 1000 INJECTION INTRAVENOUS; SUBCUTANEOUS at 11:42

## 2025-07-29 RX ADMIN — APIXABAN 5 MG: 5 TABLET, FILM COATED ORAL at 21:31

## 2025-07-29 RX ADMIN — SODIUM CHLORIDE, PRESERVATIVE FREE 10 ML: 5 INJECTION INTRAVENOUS at 21:36

## 2025-07-29 RX ADMIN — MIDODRINE HYDROCHLORIDE 5 MG: 5 TABLET ORAL at 08:01

## 2025-07-29 RX ADMIN — INSULIN LISPRO 4 UNITS: 100 INJECTION, SOLUTION INTRAVENOUS; SUBCUTANEOUS at 16:58

## 2025-07-29 ASSESSMENT — PAIN DESCRIPTION - PAIN TYPE: TYPE: ACUTE PAIN

## 2025-07-29 ASSESSMENT — PAIN - FUNCTIONAL ASSESSMENT: PAIN_FUNCTIONAL_ASSESSMENT: PREVENTS OR INTERFERES SOME ACTIVE ACTIVITIES AND ADLS

## 2025-07-29 ASSESSMENT — PAIN DESCRIPTION - LOCATION: LOCATION: ABDOMEN

## 2025-07-29 ASSESSMENT — PAIN SCALES - GENERAL
PAINLEVEL_OUTOF10: 7
PAINLEVEL_OUTOF10: 8
PAINLEVEL_OUTOF10: 6
PAINLEVEL_OUTOF10: 5

## 2025-07-29 ASSESSMENT — PAIN DESCRIPTION - DESCRIPTORS: DESCRIPTORS: ACHING;DISCOMFORT

## 2025-07-29 ASSESSMENT — PAIN DESCRIPTION - FREQUENCY: FREQUENCY: INTERMITTENT

## 2025-07-29 ASSESSMENT — PAIN DESCRIPTION - ORIENTATION: ORIENTATION: MID

## 2025-07-29 ASSESSMENT — PAIN DESCRIPTION - ONSET: ONSET: ON-GOING

## 2025-07-30 LAB
ANION GAP SERPL CALCULATED.3IONS-SCNC: 11 MMOL/L (ref 8–16)
BUN SERPL-MCNC: 17 MG/DL (ref 8–23)
CALCIUM SERPL-MCNC: 8.4 MG/DL (ref 8.8–10.2)
CHLORIDE SERPL-SCNC: 98 MMOL/L (ref 98–107)
CO2 SERPL-SCNC: 25 MMOL/L (ref 22–29)
CREAT SERPL-MCNC: 2.9 MG/DL (ref 0.5–0.9)
EKG P AXIS: 43 DEGREES
EKG P-R INTERVAL: 228 MS
EKG Q-T INTERVAL: 386 MS
EKG QRS DURATION: 98 MS
EKG QTC CALCULATION (BAZETT): 400 MS
EKG T AXIS: 27 DEGREES
GLUCOSE BLD-MCNC: 133 MG/DL (ref 70–99)
GLUCOSE BLD-MCNC: 155 MG/DL (ref 70–99)
GLUCOSE BLD-MCNC: 157 MG/DL (ref 70–99)
GLUCOSE BLD-MCNC: 209 MG/DL (ref 70–99)
GLUCOSE SERPL-MCNC: 244 MG/DL (ref 70–99)
PERFORMED ON: ABNORMAL
POTASSIUM SERPL-SCNC: 4 MMOL/L (ref 3.5–5)
SODIUM SERPL-SCNC: 134 MMOL/L (ref 136–145)

## 2025-07-30 PROCEDURE — 2060000000 HC ICU INTERMEDIATE R&B

## 2025-07-30 PROCEDURE — 36415 COLL VENOUS BLD VENIPUNCTURE: CPT

## 2025-07-30 PROCEDURE — 6370000000 HC RX 637 (ALT 250 FOR IP): Performed by: STUDENT IN AN ORGANIZED HEALTH CARE EDUCATION/TRAINING PROGRAM

## 2025-07-30 PROCEDURE — 2700000000 HC OXYGEN THERAPY PER DAY

## 2025-07-30 PROCEDURE — 6370000000 HC RX 637 (ALT 250 FOR IP): Performed by: INTERNAL MEDICINE

## 2025-07-30 PROCEDURE — 80048 BASIC METABOLIC PNL TOTAL CA: CPT

## 2025-07-30 PROCEDURE — 93010 ELECTROCARDIOGRAM REPORT: CPT | Performed by: INTERNAL MEDICINE

## 2025-07-30 PROCEDURE — 82962 GLUCOSE BLOOD TEST: CPT

## 2025-07-30 PROCEDURE — 97530 THERAPEUTIC ACTIVITIES: CPT

## 2025-07-30 PROCEDURE — 97116 GAIT TRAINING THERAPY: CPT

## 2025-07-30 PROCEDURE — 99231 SBSQ HOSP IP/OBS SF/LOW 25: CPT | Performed by: PHYSICIAN ASSISTANT

## 2025-07-30 PROCEDURE — 2500000003 HC RX 250 WO HCPCS: Performed by: INTERNAL MEDICINE

## 2025-07-30 PROCEDURE — 6360000002 HC RX W HCPCS: Performed by: INTERNAL MEDICINE

## 2025-07-30 PROCEDURE — 94760 N-INVAS EAR/PLS OXIMETRY 1: CPT

## 2025-07-30 PROCEDURE — 94150 VITAL CAPACITY TEST: CPT

## 2025-07-30 PROCEDURE — 6370000000 HC RX 637 (ALT 250 FOR IP)

## 2025-07-30 RX ADMIN — OXYCODONE AND ACETAMINOPHEN 1 TABLET: 5; 325 TABLET ORAL at 19:53

## 2025-07-30 RX ADMIN — APIXABAN 5 MG: 5 TABLET, FILM COATED ORAL at 09:08

## 2025-07-30 RX ADMIN — SODIUM CHLORIDE, PRESERVATIVE FREE 10 ML: 5 INJECTION INTRAVENOUS at 19:58

## 2025-07-30 RX ADMIN — OXYCODONE AND ACETAMINOPHEN 1 TABLET: 5; 325 TABLET ORAL at 14:49

## 2025-07-30 RX ADMIN — GUAIFENESIN 600 MG: 600 TABLET, EXTENDED RELEASE ORAL at 19:53

## 2025-07-30 RX ADMIN — EPOETIN ALFA-EPBX 10000 UNITS: 10000 INJECTION, SOLUTION INTRAVENOUS; SUBCUTANEOUS at 17:59

## 2025-07-30 RX ADMIN — METOPROLOL TARTRATE 50 MG: 50 TABLET, FILM COATED ORAL at 09:07

## 2025-07-30 RX ADMIN — SODIUM CHLORIDE, PRESERVATIVE FREE 10 ML: 5 INJECTION INTRAVENOUS at 09:07

## 2025-07-30 RX ADMIN — INSULIN LISPRO 2 UNITS: 100 INJECTION, SOLUTION INTRAVENOUS; SUBCUTANEOUS at 11:51

## 2025-07-30 RX ADMIN — CALCITRIOL CAPSULES 0.25 MCG 0.25 MCG: 0.25 CAPSULE ORAL at 09:07

## 2025-07-30 RX ADMIN — APIXABAN 5 MG: 5 TABLET, FILM COATED ORAL at 19:53

## 2025-07-30 RX ADMIN — GUAIFENESIN 600 MG: 600 TABLET, EXTENDED RELEASE ORAL at 09:07

## 2025-07-30 ASSESSMENT — PAIN SCALES - GENERAL
PAINLEVEL_OUTOF10: 7
PAINLEVEL_OUTOF10: 3
PAINLEVEL_OUTOF10: 7

## 2025-07-30 ASSESSMENT — PAIN DESCRIPTION - ORIENTATION: ORIENTATION: MID

## 2025-07-30 ASSESSMENT — PAIN DESCRIPTION - LOCATION
LOCATION: BACK
LOCATION: CHEST

## 2025-07-30 ASSESSMENT — PAIN DESCRIPTION - DESCRIPTORS
DESCRIPTORS: ACHING;DISCOMFORT;DULL;NAGGING
DESCRIPTORS: ACHING

## 2025-07-31 LAB
ANION GAP SERPL CALCULATED.3IONS-SCNC: 16 MMOL/L (ref 8–16)
BASOPHILS # BLD: 0.1 K/UL (ref 0–0.2)
BASOPHILS NFR BLD: 1.3 % (ref 0–1)
BUN SERPL-MCNC: 22 MG/DL (ref 8–23)
CALCIUM SERPL-MCNC: 8.9 MG/DL (ref 8.8–10.2)
CHLORIDE SERPL-SCNC: 96 MMOL/L (ref 98–107)
CO2 SERPL-SCNC: 22 MMOL/L (ref 22–29)
CREAT SERPL-MCNC: 3.3 MG/DL (ref 0.5–0.9)
EOSINOPHIL # BLD: 0.8 K/UL (ref 0–0.6)
EOSINOPHIL NFR BLD: 8.1 % (ref 0–5)
ERYTHROCYTE [DISTWIDTH] IN BLOOD BY AUTOMATED COUNT: 19.1 % (ref 11.5–14.5)
GLUCOSE BLD-MCNC: 146 MG/DL (ref 70–99)
GLUCOSE BLD-MCNC: 172 MG/DL (ref 70–99)
GLUCOSE BLD-MCNC: 186 MG/DL (ref 70–99)
GLUCOSE SERPL-MCNC: 179 MG/DL (ref 70–99)
HCT VFR BLD AUTO: 31.5 % (ref 37–47)
HGB BLD-MCNC: 9.4 G/DL (ref 12–16)
IMM GRANULOCYTES # BLD: 0.3 K/UL
LYMPHOCYTES # BLD: 1.2 K/UL (ref 1.1–4.5)
LYMPHOCYTES NFR BLD: 12 % (ref 20–40)
MCH RBC QN AUTO: 33.7 PG (ref 27–31)
MCHC RBC AUTO-ENTMCNC: 29.8 G/DL (ref 33–37)
MCV RBC AUTO: 112.9 FL (ref 81–99)
MONOCYTES # BLD: 1.4 K/UL (ref 0–0.9)
MONOCYTES NFR BLD: 14.1 % (ref 0–10)
NEUTROPHILS # BLD: 6.1 K/UL (ref 1.5–7.5)
NEUTS SEG NFR BLD: 61.4 % (ref 50–65)
PERFORMED ON: ABNORMAL
PLATELET # BLD AUTO: 233 K/UL (ref 130–400)
PMV BLD AUTO: 11.1 FL (ref 9.4–12.3)
POTASSIUM SERPL-SCNC: 4.6 MMOL/L (ref 3.5–5)
RBC # BLD AUTO: 2.79 M/UL (ref 4.2–5.4)
SODIUM SERPL-SCNC: 134 MMOL/L (ref 136–145)
WBC # BLD AUTO: 9.9 K/UL (ref 4.8–10.8)

## 2025-07-31 PROCEDURE — 36415 COLL VENOUS BLD VENIPUNCTURE: CPT

## 2025-07-31 PROCEDURE — 80048 BASIC METABOLIC PNL TOTAL CA: CPT

## 2025-07-31 PROCEDURE — 94150 VITAL CAPACITY TEST: CPT

## 2025-07-31 PROCEDURE — 6360000002 HC RX W HCPCS: Performed by: INTERNAL MEDICINE

## 2025-07-31 PROCEDURE — 2700000000 HC OXYGEN THERAPY PER DAY

## 2025-07-31 PROCEDURE — 6370000000 HC RX 637 (ALT 250 FOR IP)

## 2025-07-31 PROCEDURE — 6370000000 HC RX 637 (ALT 250 FOR IP): Performed by: STUDENT IN AN ORGANIZED HEALTH CARE EDUCATION/TRAINING PROGRAM

## 2025-07-31 PROCEDURE — 6370000000 HC RX 637 (ALT 250 FOR IP): Performed by: INTERNAL MEDICINE

## 2025-07-31 PROCEDURE — 8010000000 HC HEMODIALYSIS ACUTE INPT

## 2025-07-31 PROCEDURE — 6360000002 HC RX W HCPCS: Performed by: PSYCHIATRY & NEUROLOGY

## 2025-07-31 PROCEDURE — 94760 N-INVAS EAR/PLS OXIMETRY 1: CPT

## 2025-07-31 PROCEDURE — 2500000003 HC RX 250 WO HCPCS: Performed by: INTERNAL MEDICINE

## 2025-07-31 PROCEDURE — 85025 COMPLETE CBC W/AUTO DIFF WBC: CPT

## 2025-07-31 PROCEDURE — 2060000000 HC ICU INTERMEDIATE R&B

## 2025-07-31 PROCEDURE — 82962 GLUCOSE BLOOD TEST: CPT

## 2025-07-31 PROCEDURE — 94640 AIRWAY INHALATION TREATMENT: CPT

## 2025-07-31 RX ORDER — IPRATROPIUM BROMIDE AND ALBUTEROL SULFATE 2.5; .5 MG/3ML; MG/3ML
1 SOLUTION RESPIRATORY (INHALATION)
Status: DISCONTINUED | OUTPATIENT
Start: 2025-07-31 | End: 2025-08-01

## 2025-07-31 RX ADMIN — HEPARIN SODIUM 3200 UNITS: 1000 INJECTION INTRAVENOUS; SUBCUTANEOUS at 13:51

## 2025-07-31 RX ADMIN — SODIUM CHLORIDE, PRESERVATIVE FREE 10 ML: 5 INJECTION INTRAVENOUS at 08:44

## 2025-07-31 RX ADMIN — INSULIN LISPRO 2 UNITS: 100 INJECTION, SOLUTION INTRAVENOUS; SUBCUTANEOUS at 17:05

## 2025-07-31 RX ADMIN — GUAIFENESIN 600 MG: 600 TABLET, EXTENDED RELEASE ORAL at 08:44

## 2025-07-31 RX ADMIN — OXYCODONE AND ACETAMINOPHEN 1 TABLET: 5; 325 TABLET ORAL at 14:30

## 2025-07-31 RX ADMIN — GUAIFENESIN 600 MG: 600 TABLET, EXTENDED RELEASE ORAL at 21:08

## 2025-07-31 RX ADMIN — METOPROLOL TARTRATE 50 MG: 50 TABLET, FILM COATED ORAL at 21:08

## 2025-07-31 RX ADMIN — APIXABAN 5 MG: 5 TABLET, FILM COATED ORAL at 08:44

## 2025-07-31 RX ADMIN — ONDANSETRON 4 MG: 2 INJECTION, SOLUTION INTRAMUSCULAR; INTRAVENOUS at 02:34

## 2025-07-31 RX ADMIN — APIXABAN 5 MG: 5 TABLET, FILM COATED ORAL at 21:08

## 2025-07-31 RX ADMIN — OXYCODONE AND ACETAMINOPHEN 1 TABLET: 5; 325 TABLET ORAL at 21:07

## 2025-07-31 RX ADMIN — SODIUM CHLORIDE, PRESERVATIVE FREE 10 ML: 5 INJECTION INTRAVENOUS at 21:08

## 2025-07-31 RX ADMIN — CALCITRIOL CAPSULES 0.25 MCG 0.25 MCG: 0.25 CAPSULE ORAL at 08:44

## 2025-07-31 RX ADMIN — OXYCODONE AND ACETAMINOPHEN 1 TABLET: 5; 325 TABLET ORAL at 01:30

## 2025-07-31 RX ADMIN — HYDROXYZINE HYDROCHLORIDE 10 MG: 10 TABLET, FILM COATED ORAL at 01:30

## 2025-07-31 RX ADMIN — METOPROLOL TARTRATE 50 MG: 50 TABLET, FILM COATED ORAL at 08:44

## 2025-07-31 RX ADMIN — IPRATROPIUM BROMIDE AND ALBUTEROL SULFATE 1 DOSE: 2.5; .5 SOLUTION RESPIRATORY (INHALATION) at 16:13

## 2025-07-31 RX ADMIN — HYDROXYZINE HYDROCHLORIDE 10 MG: 10 TABLET, FILM COATED ORAL at 21:08

## 2025-07-31 RX ADMIN — Medication 1 MG: at 21:07

## 2025-07-31 ASSESSMENT — PAIN SCALES - GENERAL
PAINLEVEL_OUTOF10: 0
PAINLEVEL_OUTOF10: 10
PAINLEVEL_OUTOF10: 6
PAINLEVEL_OUTOF10: 7
PAINLEVEL_OUTOF10: 0

## 2025-07-31 ASSESSMENT — PAIN DESCRIPTION - DESCRIPTORS
DESCRIPTORS: ACHING;DISCOMFORT
DESCRIPTORS: ACHING;DISCOMFORT
DESCRIPTORS: SORE

## 2025-07-31 ASSESSMENT — PAIN DESCRIPTION - LOCATION
LOCATION: CHEST
LOCATION: STERNUM
LOCATION: CHEST

## 2025-07-31 ASSESSMENT — PAIN DESCRIPTION - ORIENTATION
ORIENTATION: MID
ORIENTATION: MID

## 2025-08-01 LAB
ANION GAP SERPL CALCULATED.3IONS-SCNC: 7 MMOL/L (ref 8–16)
BASOPHILS # BLD: 0.1 K/UL (ref 0–0.2)
BASOPHILS NFR BLD: 1.3 % (ref 0–1)
BUN SERPL-MCNC: 17 MG/DL (ref 8–23)
CALCIUM SERPL-MCNC: 8.2 MG/DL (ref 8.8–10.2)
CHLORIDE SERPL-SCNC: 100 MMOL/L (ref 98–107)
CO2 SERPL-SCNC: 27 MMOL/L (ref 22–29)
CREAT SERPL-MCNC: 2.8 MG/DL (ref 0.5–0.9)
EOSINOPHIL # BLD: 0.5 K/UL (ref 0–0.6)
EOSINOPHIL NFR BLD: 5.5 % (ref 0–5)
ERYTHROCYTE [DISTWIDTH] IN BLOOD BY AUTOMATED COUNT: 18.5 % (ref 11.5–14.5)
GLUCOSE BLD-MCNC: 104 MG/DL (ref 70–99)
GLUCOSE BLD-MCNC: 122 MG/DL (ref 70–99)
GLUCOSE BLD-MCNC: 159 MG/DL (ref 70–99)
GLUCOSE BLD-MCNC: 218 MG/DL (ref 70–99)
GLUCOSE SERPL-MCNC: 127 MG/DL (ref 70–99)
HCT VFR BLD AUTO: 24.2 % (ref 37–47)
HGB BLD-MCNC: 7.3 G/DL (ref 12–16)
IMM GRANULOCYTES # BLD: 0.2 K/UL
LYMPHOCYTES # BLD: 1.1 K/UL (ref 1.1–4.5)
LYMPHOCYTES NFR BLD: 12.9 % (ref 20–40)
MCH RBC QN AUTO: 33.8 PG (ref 27–31)
MCHC RBC AUTO-ENTMCNC: 30.2 G/DL (ref 33–37)
MCV RBC AUTO: 112 FL (ref 81–99)
MONOCYTES # BLD: 1.1 K/UL (ref 0–0.9)
MONOCYTES NFR BLD: 13.1 % (ref 0–10)
NEUTROPHILS # BLD: 5.4 K/UL (ref 1.5–7.5)
NEUTS SEG NFR BLD: 64.9 % (ref 50–65)
PERFORMED ON: ABNORMAL
PLATELET # BLD AUTO: 139 K/UL (ref 130–400)
PMV BLD AUTO: 11 FL (ref 9.4–12.3)
POTASSIUM SERPL-SCNC: 3.7 MMOL/L (ref 3.5–5)
RBC # BLD AUTO: 2.16 M/UL (ref 4.2–5.4)
SODIUM SERPL-SCNC: 134 MMOL/L (ref 136–145)
WBC # BLD AUTO: 8.3 K/UL (ref 4.8–10.8)

## 2025-08-01 PROCEDURE — 6370000000 HC RX 637 (ALT 250 FOR IP)

## 2025-08-01 PROCEDURE — 82962 GLUCOSE BLOOD TEST: CPT

## 2025-08-01 PROCEDURE — 85025 COMPLETE CBC W/AUTO DIFF WBC: CPT

## 2025-08-01 PROCEDURE — 80048 BASIC METABOLIC PNL TOTAL CA: CPT

## 2025-08-01 PROCEDURE — 6370000000 HC RX 637 (ALT 250 FOR IP): Performed by: INTERNAL MEDICINE

## 2025-08-01 PROCEDURE — 94640 AIRWAY INHALATION TREATMENT: CPT

## 2025-08-01 PROCEDURE — 6370000000 HC RX 637 (ALT 250 FOR IP): Performed by: STUDENT IN AN ORGANIZED HEALTH CARE EDUCATION/TRAINING PROGRAM

## 2025-08-01 PROCEDURE — 94760 N-INVAS EAR/PLS OXIMETRY 1: CPT

## 2025-08-01 PROCEDURE — 2500000003 HC RX 250 WO HCPCS: Performed by: INTERNAL MEDICINE

## 2025-08-01 PROCEDURE — 2700000000 HC OXYGEN THERAPY PER DAY

## 2025-08-01 PROCEDURE — 97535 SELF CARE MNGMENT TRAINING: CPT

## 2025-08-01 PROCEDURE — 6360000002 HC RX W HCPCS: Performed by: STUDENT IN AN ORGANIZED HEALTH CARE EDUCATION/TRAINING PROGRAM

## 2025-08-01 PROCEDURE — 2060000000 HC ICU INTERMEDIATE R&B

## 2025-08-01 PROCEDURE — 6360000002 HC RX W HCPCS: Performed by: PSYCHIATRY & NEUROLOGY

## 2025-08-01 PROCEDURE — 94150 VITAL CAPACITY TEST: CPT

## 2025-08-01 PROCEDURE — 97530 THERAPEUTIC ACTIVITIES: CPT

## 2025-08-01 PROCEDURE — 2500000003 HC RX 250 WO HCPCS: Performed by: STUDENT IN AN ORGANIZED HEALTH CARE EDUCATION/TRAINING PROGRAM

## 2025-08-01 PROCEDURE — 36415 COLL VENOUS BLD VENIPUNCTURE: CPT

## 2025-08-01 PROCEDURE — 6360000002 HC RX W HCPCS: Performed by: INTERNAL MEDICINE

## 2025-08-01 RX ORDER — IPRATROPIUM BROMIDE AND ALBUTEROL SULFATE 2.5; .5 MG/3ML; MG/3ML
1 SOLUTION RESPIRATORY (INHALATION)
Status: DISCONTINUED | OUTPATIENT
Start: 2025-08-01 | End: 2025-08-06 | Stop reason: HOSPADM

## 2025-08-01 RX ORDER — PREDNISONE 20 MG/1
40 TABLET ORAL DAILY
Status: DISCONTINUED | OUTPATIENT
Start: 2025-08-02 | End: 2025-08-06 | Stop reason: HOSPADM

## 2025-08-01 RX ADMIN — HALOPERIDOL LACTATE 2 MG: 5 INJECTION, SOLUTION INTRAMUSCULAR at 19:36

## 2025-08-01 RX ADMIN — IPRATROPIUM BROMIDE AND ALBUTEROL SULFATE 1 DOSE: 2.5; .5 SOLUTION RESPIRATORY (INHALATION) at 19:01

## 2025-08-01 RX ADMIN — APIXABAN 5 MG: 5 TABLET, FILM COATED ORAL at 10:43

## 2025-08-01 RX ADMIN — METOPROLOL TARTRATE 50 MG: 50 TABLET, FILM COATED ORAL at 10:43

## 2025-08-01 RX ADMIN — INSULIN LISPRO 2 UNITS: 100 INJECTION, SOLUTION INTRAVENOUS; SUBCUTANEOUS at 21:01

## 2025-08-01 RX ADMIN — HYDROXYZINE HYDROCHLORIDE 10 MG: 10 TABLET, FILM COATED ORAL at 19:35

## 2025-08-01 RX ADMIN — GUAIFENESIN 600 MG: 600 TABLET, EXTENDED RELEASE ORAL at 19:35

## 2025-08-01 RX ADMIN — IPRATROPIUM BROMIDE AND ALBUTEROL SULFATE 1 DOSE: 2.5; .5 SOLUTION RESPIRATORY (INHALATION) at 22:16

## 2025-08-01 RX ADMIN — GUAIFENESIN 600 MG: 600 TABLET, EXTENDED RELEASE ORAL at 10:43

## 2025-08-01 RX ADMIN — CALCITRIOL CAPSULES 0.25 MCG 0.25 MCG: 0.25 CAPSULE ORAL at 10:43

## 2025-08-01 RX ADMIN — HYDROXYZINE HYDROCHLORIDE 10 MG: 10 TABLET, FILM COATED ORAL at 10:43

## 2025-08-01 RX ADMIN — METHYLPREDNISOLONE SODIUM SUCCINATE 60 MG: 125 INJECTION INTRAMUSCULAR; INTRAVENOUS at 17:53

## 2025-08-01 RX ADMIN — IPRATROPIUM BROMIDE AND ALBUTEROL SULFATE 1 DOSE: 2.5; .5 SOLUTION RESPIRATORY (INHALATION) at 10:03

## 2025-08-01 RX ADMIN — ACETAMINOPHEN 650 MG: 325 TABLET ORAL at 19:35

## 2025-08-01 RX ADMIN — EPOETIN ALFA-EPBX 10000 UNITS: 10000 INJECTION, SOLUTION INTRAVENOUS; SUBCUTANEOUS at 17:53

## 2025-08-01 RX ADMIN — IPRATROPIUM BROMIDE AND ALBUTEROL SULFATE 1 DOSE: 2.5; .5 SOLUTION RESPIRATORY (INHALATION) at 06:09

## 2025-08-01 RX ADMIN — IPRATROPIUM BROMIDE AND ALBUTEROL SULFATE 1 DOSE: 2.5; .5 SOLUTION RESPIRATORY (INHALATION) at 14:42

## 2025-08-01 RX ADMIN — METOPROLOL TARTRATE 50 MG: 50 TABLET, FILM COATED ORAL at 19:36

## 2025-08-01 RX ADMIN — APIXABAN 5 MG: 5 TABLET, FILM COATED ORAL at 19:35

## 2025-08-01 RX ADMIN — SODIUM CHLORIDE, PRESERVATIVE FREE 10 ML: 5 INJECTION INTRAVENOUS at 19:37

## 2025-08-02 LAB
ANION GAP SERPL CALCULATED.3IONS-SCNC: 9 MMOL/L (ref 8–16)
BASOPHILS # BLD: 0.1 K/UL (ref 0–0.2)
BASOPHILS NFR BLD: 0.8 % (ref 0–1)
BUN SERPL-MCNC: 27 MG/DL (ref 8–23)
CALCIUM SERPL-MCNC: 8.2 MG/DL (ref 8.8–10.2)
CHLORIDE SERPL-SCNC: 99 MMOL/L (ref 98–107)
CO2 SERPL-SCNC: 24 MMOL/L (ref 22–29)
CREAT SERPL-MCNC: 3.5 MG/DL (ref 0.5–0.9)
EOSINOPHIL # BLD: 0 K/UL (ref 0–0.6)
EOSINOPHIL NFR BLD: 0.2 % (ref 0–5)
ERYTHROCYTE [DISTWIDTH] IN BLOOD BY AUTOMATED COUNT: 18.6 % (ref 11.5–14.5)
GLUCOSE BLD-MCNC: 196 MG/DL (ref 70–99)
GLUCOSE BLD-MCNC: 267 MG/DL (ref 70–99)
GLUCOSE BLD-MCNC: 85 MG/DL (ref 70–99)
GLUCOSE SERPL-MCNC: 252 MG/DL (ref 70–99)
HCT VFR BLD AUTO: 25.5 % (ref 37–47)
HGB BLD-MCNC: 7.6 G/DL (ref 12–16)
IMM GRANULOCYTES # BLD: 0.1 K/UL
LYMPHOCYTES # BLD: 0.4 K/UL (ref 1.1–4.5)
LYMPHOCYTES NFR BLD: 6.1 % (ref 20–40)
MCH RBC QN AUTO: 33 PG (ref 27–31)
MCHC RBC AUTO-ENTMCNC: 29.8 G/DL (ref 33–37)
MCV RBC AUTO: 110.9 FL (ref 81–99)
MONOCYTES # BLD: 0.1 K/UL (ref 0–0.9)
MONOCYTES NFR BLD: 1.9 % (ref 0–10)
NEUTROPHILS # BLD: 5.7 K/UL (ref 1.5–7.5)
NEUTS SEG NFR BLD: 89.1 % (ref 50–65)
PERFORMED ON: ABNORMAL
PERFORMED ON: ABNORMAL
PERFORMED ON: NORMAL
PLATELET # BLD AUTO: 167 K/UL (ref 130–400)
PMV BLD AUTO: 10.5 FL (ref 9.4–12.3)
POTASSIUM SERPL-SCNC: 4.5 MMOL/L (ref 3.5–5)
RBC # BLD AUTO: 2.3 M/UL (ref 4.2–5.4)
SODIUM SERPL-SCNC: 132 MMOL/L (ref 136–145)
WBC # BLD AUTO: 6.4 K/UL (ref 4.8–10.8)

## 2025-08-02 PROCEDURE — 2500000003 HC RX 250 WO HCPCS: Performed by: INTERNAL MEDICINE

## 2025-08-02 PROCEDURE — 6370000000 HC RX 637 (ALT 250 FOR IP)

## 2025-08-02 PROCEDURE — 36415 COLL VENOUS BLD VENIPUNCTURE: CPT

## 2025-08-02 PROCEDURE — 6370000000 HC RX 637 (ALT 250 FOR IP): Performed by: STUDENT IN AN ORGANIZED HEALTH CARE EDUCATION/TRAINING PROGRAM

## 2025-08-02 PROCEDURE — 85025 COMPLETE CBC W/AUTO DIFF WBC: CPT

## 2025-08-02 PROCEDURE — 2060000000 HC ICU INTERMEDIATE R&B

## 2025-08-02 PROCEDURE — 80048 BASIC METABOLIC PNL TOTAL CA: CPT

## 2025-08-02 PROCEDURE — 8010000000 HC HEMODIALYSIS ACUTE INPT

## 2025-08-02 PROCEDURE — 94760 N-INVAS EAR/PLS OXIMETRY 1: CPT

## 2025-08-02 PROCEDURE — 36556 INSERT NON-TUNNEL CV CATH: CPT

## 2025-08-02 PROCEDURE — 82962 GLUCOSE BLOOD TEST: CPT

## 2025-08-02 PROCEDURE — 6360000002 HC RX W HCPCS: Performed by: PSYCHIATRY & NEUROLOGY

## 2025-08-02 PROCEDURE — 6370000000 HC RX 637 (ALT 250 FOR IP): Performed by: INTERNAL MEDICINE

## 2025-08-02 PROCEDURE — 2700000000 HC OXYGEN THERAPY PER DAY

## 2025-08-02 PROCEDURE — 94640 AIRWAY INHALATION TREATMENT: CPT

## 2025-08-02 RX ADMIN — SODIUM CHLORIDE, PRESERVATIVE FREE 10 ML: 5 INJECTION INTRAVENOUS at 21:07

## 2025-08-02 RX ADMIN — INSULIN LISPRO 2 UNITS: 100 INJECTION, SOLUTION INTRAVENOUS; SUBCUTANEOUS at 17:55

## 2025-08-02 RX ADMIN — HYDROXYZINE HYDROCHLORIDE 10 MG: 10 TABLET, FILM COATED ORAL at 08:33

## 2025-08-02 RX ADMIN — OXYCODONE AND ACETAMINOPHEN 1 TABLET: 5; 325 TABLET ORAL at 02:22

## 2025-08-02 RX ADMIN — IPRATROPIUM BROMIDE AND ALBUTEROL SULFATE 1 DOSE: 2.5; .5 SOLUTION RESPIRATORY (INHALATION) at 18:30

## 2025-08-02 RX ADMIN — GUAIFENESIN 600 MG: 600 TABLET, EXTENDED RELEASE ORAL at 21:06

## 2025-08-02 RX ADMIN — IPRATROPIUM BROMIDE AND ALBUTEROL SULFATE 1 DOSE: 2.5; .5 SOLUTION RESPIRATORY (INHALATION) at 07:19

## 2025-08-02 RX ADMIN — MIDODRINE HYDROCHLORIDE 5 MG: 5 TABLET ORAL at 08:33

## 2025-08-02 RX ADMIN — Medication 1 MG: at 02:21

## 2025-08-02 RX ADMIN — HYDROXYZINE HYDROCHLORIDE 10 MG: 10 TABLET, FILM COATED ORAL at 21:06

## 2025-08-02 RX ADMIN — HALOPERIDOL LACTATE 2 MG: 5 INJECTION, SOLUTION INTRAMUSCULAR at 23:17

## 2025-08-02 RX ADMIN — APIXABAN 5 MG: 5 TABLET, FILM COATED ORAL at 21:07

## 2025-08-02 RX ADMIN — OXYCODONE AND ACETAMINOPHEN 1 TABLET: 5; 325 TABLET ORAL at 08:33

## 2025-08-02 RX ADMIN — IPRATROPIUM BROMIDE AND ALBUTEROL SULFATE 1 DOSE: 2.5; .5 SOLUTION RESPIRATORY (INHALATION) at 02:17

## 2025-08-02 RX ADMIN — SODIUM CHLORIDE, PRESERVATIVE FREE 10 ML: 5 INJECTION INTRAVENOUS at 08:36

## 2025-08-02 RX ADMIN — Medication 1 MG: at 08:32

## 2025-08-02 RX ADMIN — IPRATROPIUM BROMIDE AND ALBUTEROL SULFATE 1 DOSE: 2.5; .5 SOLUTION RESPIRATORY (INHALATION) at 22:11

## 2025-08-02 RX ADMIN — METOPROLOL TARTRATE 50 MG: 50 TABLET, FILM COATED ORAL at 21:07

## 2025-08-02 RX ADMIN — OXYCODONE AND ACETAMINOPHEN 1 TABLET: 5; 325 TABLET ORAL at 21:06

## 2025-08-02 ASSESSMENT — PAIN SCALES - GENERAL
PAINLEVEL_OUTOF10: 5
PAINLEVEL_OUTOF10: 3
PAINLEVEL_OUTOF10: 7

## 2025-08-02 ASSESSMENT — PAIN SCALES - WONG BAKER: WONGBAKER_NUMERICALRESPONSE: HURTS A LITTLE BIT

## 2025-08-02 ASSESSMENT — PAIN DESCRIPTION - DESCRIPTORS: DESCRIPTORS: ACHING;DISCOMFORT

## 2025-08-02 ASSESSMENT — PAIN DESCRIPTION - LOCATION
LOCATION: GENERALIZED
LOCATION: STERNUM

## 2025-08-02 ASSESSMENT — PAIN DESCRIPTION - ORIENTATION: ORIENTATION: MID

## 2025-08-03 LAB
ANION GAP SERPL CALCULATED.3IONS-SCNC: 12 MMOL/L (ref 8–16)
BASOPHILS # BLD: 0.1 K/UL (ref 0–0.2)
BASOPHILS NFR BLD: 0.9 % (ref 0–1)
BUN SERPL-MCNC: 21 MG/DL (ref 8–23)
CALCIUM SERPL-MCNC: 8.2 MG/DL (ref 8.8–10.2)
CHLORIDE SERPL-SCNC: 101 MMOL/L (ref 98–107)
CO2 SERPL-SCNC: 23 MMOL/L (ref 22–29)
CREAT SERPL-MCNC: 2.6 MG/DL (ref 0.5–0.9)
EOSINOPHIL # BLD: 0.3 K/UL (ref 0–0.6)
EOSINOPHIL NFR BLD: 2.7 % (ref 0–5)
ERYTHROCYTE [DISTWIDTH] IN BLOOD BY AUTOMATED COUNT: 18.9 % (ref 11.5–14.5)
GLUCOSE BLD-MCNC: 121 MG/DL (ref 70–99)
GLUCOSE BLD-MCNC: 145 MG/DL (ref 70–99)
GLUCOSE BLD-MCNC: 204 MG/DL (ref 70–99)
GLUCOSE BLD-MCNC: 229 MG/DL (ref 70–99)
GLUCOSE SERPL-MCNC: 138 MG/DL (ref 70–99)
HCT VFR BLD AUTO: 27 % (ref 37–47)
HGB BLD-MCNC: 8 G/DL (ref 12–16)
IMM GRANULOCYTES # BLD: 0.1 K/UL
LYMPHOCYTES # BLD: 1.7 K/UL (ref 1.1–4.5)
LYMPHOCYTES NFR BLD: 16.5 % (ref 20–40)
MCH RBC QN AUTO: 33.2 PG (ref 27–31)
MCHC RBC AUTO-ENTMCNC: 29.6 G/DL (ref 33–37)
MCV RBC AUTO: 112 FL (ref 81–99)
MONOCYTES # BLD: 1.2 K/UL (ref 0–0.9)
MONOCYTES NFR BLD: 11.2 % (ref 0–10)
NEUTROPHILS # BLD: 7.2 K/UL (ref 1.5–7.5)
NEUTS SEG NFR BLD: 67.8 % (ref 50–65)
PERFORMED ON: ABNORMAL
PLATELET # BLD AUTO: 240 K/UL (ref 130–400)
PMV BLD AUTO: 10 FL (ref 9.4–12.3)
POTASSIUM SERPL-SCNC: 4.6 MMOL/L (ref 3.5–5)
RBC # BLD AUTO: 2.41 M/UL (ref 4.2–5.4)
SODIUM SERPL-SCNC: 136 MMOL/L (ref 136–145)
WBC # BLD AUTO: 10.5 K/UL (ref 4.8–10.8)

## 2025-08-03 PROCEDURE — 6370000000 HC RX 637 (ALT 250 FOR IP): Performed by: INTERNAL MEDICINE

## 2025-08-03 PROCEDURE — 2700000000 HC OXYGEN THERAPY PER DAY

## 2025-08-03 PROCEDURE — 2060000000 HC ICU INTERMEDIATE R&B

## 2025-08-03 PROCEDURE — 82962 GLUCOSE BLOOD TEST: CPT

## 2025-08-03 PROCEDURE — 2500000003 HC RX 250 WO HCPCS: Performed by: INTERNAL MEDICINE

## 2025-08-03 PROCEDURE — 6360000002 HC RX W HCPCS: Performed by: INTERNAL MEDICINE

## 2025-08-03 PROCEDURE — 80048 BASIC METABOLIC PNL TOTAL CA: CPT

## 2025-08-03 PROCEDURE — 36415 COLL VENOUS BLD VENIPUNCTURE: CPT

## 2025-08-03 PROCEDURE — 6370000000 HC RX 637 (ALT 250 FOR IP): Performed by: STUDENT IN AN ORGANIZED HEALTH CARE EDUCATION/TRAINING PROGRAM

## 2025-08-03 PROCEDURE — 6370000000 HC RX 637 (ALT 250 FOR IP)

## 2025-08-03 PROCEDURE — 94640 AIRWAY INHALATION TREATMENT: CPT

## 2025-08-03 PROCEDURE — 85025 COMPLETE CBC W/AUTO DIFF WBC: CPT

## 2025-08-03 PROCEDURE — 94760 N-INVAS EAR/PLS OXIMETRY 1: CPT

## 2025-08-03 PROCEDURE — 6360000002 HC RX W HCPCS: Performed by: PSYCHIATRY & NEUROLOGY

## 2025-08-03 RX ADMIN — METOPROLOL TARTRATE 50 MG: 50 TABLET, FILM COATED ORAL at 09:31

## 2025-08-03 RX ADMIN — Medication 1 MG: at 13:28

## 2025-08-03 RX ADMIN — IPRATROPIUM BROMIDE AND ALBUTEROL SULFATE 1 DOSE: 2.5; .5 SOLUTION RESPIRATORY (INHALATION) at 14:32

## 2025-08-03 RX ADMIN — INSULIN LISPRO 2 UNITS: 100 INJECTION, SOLUTION INTRAVENOUS; SUBCUTANEOUS at 21:17

## 2025-08-03 RX ADMIN — CALCITRIOL CAPSULES 0.25 MCG 0.25 MCG: 0.25 CAPSULE ORAL at 09:31

## 2025-08-03 RX ADMIN — HYDROXYZINE HYDROCHLORIDE 10 MG: 10 TABLET, FILM COATED ORAL at 09:31

## 2025-08-03 RX ADMIN — IPRATROPIUM BROMIDE AND ALBUTEROL SULFATE 1 DOSE: 2.5; .5 SOLUTION RESPIRATORY (INHALATION) at 10:49

## 2025-08-03 RX ADMIN — SODIUM CHLORIDE, PRESERVATIVE FREE 10 ML: 5 INJECTION INTRAVENOUS at 20:00

## 2025-08-03 RX ADMIN — SODIUM CHLORIDE, PRESERVATIVE FREE 10 ML: 5 INJECTION INTRAVENOUS at 09:34

## 2025-08-03 RX ADMIN — GUAIFENESIN 600 MG: 600 TABLET, EXTENDED RELEASE ORAL at 19:57

## 2025-08-03 RX ADMIN — ONDANSETRON 4 MG: 2 INJECTION, SOLUTION INTRAMUSCULAR; INTRAVENOUS at 18:22

## 2025-08-03 RX ADMIN — INSULIN LISPRO 2 UNITS: 100 INJECTION, SOLUTION INTRAVENOUS; SUBCUTANEOUS at 18:12

## 2025-08-03 RX ADMIN — APIXABAN 5 MG: 5 TABLET, FILM COATED ORAL at 09:31

## 2025-08-03 RX ADMIN — HALOPERIDOL LACTATE 2 MG: 5 INJECTION, SOLUTION INTRAMUSCULAR at 19:57

## 2025-08-03 RX ADMIN — METOPROLOL TARTRATE 50 MG: 50 TABLET, FILM COATED ORAL at 19:56

## 2025-08-03 RX ADMIN — IPRATROPIUM BROMIDE AND ALBUTEROL SULFATE 1 DOSE: 2.5; .5 SOLUTION RESPIRATORY (INHALATION) at 18:51

## 2025-08-03 RX ADMIN — HYDROXYZINE HYDROCHLORIDE 10 MG: 10 TABLET, FILM COATED ORAL at 19:56

## 2025-08-03 RX ADMIN — OXYCODONE AND ACETAMINOPHEN 1 TABLET: 5; 325 TABLET ORAL at 19:56

## 2025-08-03 RX ADMIN — Medication 1 MG: at 02:10

## 2025-08-03 RX ADMIN — PREDNISONE 40 MG: 20 TABLET ORAL at 09:31

## 2025-08-03 RX ADMIN — GUAIFENESIN 600 MG: 600 TABLET, EXTENDED RELEASE ORAL at 09:31

## 2025-08-03 RX ADMIN — IPRATROPIUM BROMIDE AND ALBUTEROL SULFATE 1 DOSE: 2.5; .5 SOLUTION RESPIRATORY (INHALATION) at 02:07

## 2025-08-03 RX ADMIN — APIXABAN 5 MG: 5 TABLET, FILM COATED ORAL at 19:56

## 2025-08-03 ASSESSMENT — PAIN DESCRIPTION - LOCATION
LOCATION: ABDOMEN
LOCATION: ABDOMEN

## 2025-08-03 ASSESSMENT — PAIN SCALES - GENERAL
PAINLEVEL_OUTOF10: 7
PAINLEVEL_OUTOF10: 7

## 2025-08-03 ASSESSMENT — PAIN DESCRIPTION - DESCRIPTORS: DESCRIPTORS: ACHING;DISCOMFORT

## 2025-08-04 LAB
ANION GAP SERPL CALCULATED.3IONS-SCNC: 11 MMOL/L (ref 8–16)
BASOPHILS # BLD: 0 K/UL (ref 0–0.2)
BASOPHILS NFR BLD: 0.3 % (ref 0–1)
BUN SERPL-MCNC: 32 MG/DL (ref 8–23)
CALCIUM SERPL-MCNC: 8.1 MG/DL (ref 8.8–10.2)
CHLORIDE SERPL-SCNC: 100 MMOL/L (ref 98–107)
CO2 SERPL-SCNC: 23 MMOL/L (ref 22–29)
CREAT SERPL-MCNC: 3.1 MG/DL (ref 0.5–0.9)
EOSINOPHIL # BLD: 0 K/UL (ref 0–0.6)
EOSINOPHIL NFR BLD: 0 % (ref 0–5)
ERYTHROCYTE [DISTWIDTH] IN BLOOD BY AUTOMATED COUNT: 18.5 % (ref 11.5–14.5)
GLUCOSE BLD-MCNC: 154 MG/DL (ref 70–99)
GLUCOSE BLD-MCNC: 155 MG/DL (ref 70–99)
GLUCOSE BLD-MCNC: 229 MG/DL (ref 70–99)
GLUCOSE BLD-MCNC: 280 MG/DL (ref 70–99)
GLUCOSE SERPL-MCNC: 186 MG/DL (ref 70–99)
HCT VFR BLD AUTO: 26.3 % (ref 37–47)
HGB BLD-MCNC: 8 G/DL (ref 12–16)
IMM GRANULOCYTES # BLD: 0.1 K/UL
LYMPHOCYTES # BLD: 0.7 K/UL (ref 1.1–4.5)
LYMPHOCYTES NFR BLD: 10.3 % (ref 20–40)
MCH RBC QN AUTO: 32.9 PG (ref 27–31)
MCHC RBC AUTO-ENTMCNC: 30.4 G/DL (ref 33–37)
MCV RBC AUTO: 108.2 FL (ref 81–99)
MONOCYTES # BLD: 0.6 K/UL (ref 0–0.9)
MONOCYTES NFR BLD: 7.7 % (ref 0–10)
NEUTROPHILS # BLD: 5.8 K/UL (ref 1.5–7.5)
NEUTS SEG NFR BLD: 80.9 % (ref 50–65)
PERFORMED ON: ABNORMAL
PLATELET # BLD AUTO: 216 K/UL (ref 130–400)
PMV BLD AUTO: 9.7 FL (ref 9.4–12.3)
POTASSIUM SERPL-SCNC: 4.2 MMOL/L (ref 3.5–5)
RBC # BLD AUTO: 2.43 M/UL (ref 4.2–5.4)
SODIUM SERPL-SCNC: 134 MMOL/L (ref 136–145)
WBC # BLD AUTO: 7.2 K/UL (ref 4.8–10.8)

## 2025-08-04 PROCEDURE — 82962 GLUCOSE BLOOD TEST: CPT

## 2025-08-04 PROCEDURE — 6370000000 HC RX 637 (ALT 250 FOR IP): Performed by: STUDENT IN AN ORGANIZED HEALTH CARE EDUCATION/TRAINING PROGRAM

## 2025-08-04 PROCEDURE — 2060000000 HC ICU INTERMEDIATE R&B

## 2025-08-04 PROCEDURE — 6370000000 HC RX 637 (ALT 250 FOR IP)

## 2025-08-04 PROCEDURE — 6370000000 HC RX 637 (ALT 250 FOR IP): Performed by: INTERNAL MEDICINE

## 2025-08-04 PROCEDURE — 2700000000 HC OXYGEN THERAPY PER DAY

## 2025-08-04 PROCEDURE — 36415 COLL VENOUS BLD VENIPUNCTURE: CPT

## 2025-08-04 PROCEDURE — 6360000002 HC RX W HCPCS: Performed by: PSYCHIATRY & NEUROLOGY

## 2025-08-04 PROCEDURE — 2500000003 HC RX 250 WO HCPCS: Performed by: INTERNAL MEDICINE

## 2025-08-04 PROCEDURE — 94640 AIRWAY INHALATION TREATMENT: CPT

## 2025-08-04 PROCEDURE — 6360000002 HC RX W HCPCS: Performed by: INTERNAL MEDICINE

## 2025-08-04 PROCEDURE — 2500000003 HC RX 250 WO HCPCS: Performed by: STUDENT IN AN ORGANIZED HEALTH CARE EDUCATION/TRAINING PROGRAM

## 2025-08-04 PROCEDURE — 80048 BASIC METABOLIC PNL TOTAL CA: CPT

## 2025-08-04 PROCEDURE — 94150 VITAL CAPACITY TEST: CPT

## 2025-08-04 PROCEDURE — 85025 COMPLETE CBC W/AUTO DIFF WBC: CPT

## 2025-08-04 PROCEDURE — 94760 N-INVAS EAR/PLS OXIMETRY 1: CPT

## 2025-08-04 RX ADMIN — APIXABAN 5 MG: 5 TABLET, FILM COATED ORAL at 21:19

## 2025-08-04 RX ADMIN — IPRATROPIUM BROMIDE AND ALBUTEROL SULFATE 1 DOSE: 2.5; .5 SOLUTION RESPIRATORY (INHALATION) at 10:32

## 2025-08-04 RX ADMIN — SODIUM CHLORIDE, PRESERVATIVE FREE 10 ML: 5 INJECTION INTRAVENOUS at 21:20

## 2025-08-04 RX ADMIN — CALCITRIOL CAPSULES 0.25 MCG 0.25 MCG: 0.25 CAPSULE ORAL at 09:11

## 2025-08-04 RX ADMIN — INSULIN LISPRO 4 UNITS: 100 INJECTION, SOLUTION INTRAVENOUS; SUBCUTANEOUS at 21:19

## 2025-08-04 RX ADMIN — Medication 1 MG: at 10:17

## 2025-08-04 RX ADMIN — IPRATROPIUM BROMIDE AND ALBUTEROL SULFATE 1 DOSE: 2.5; .5 SOLUTION RESPIRATORY (INHALATION) at 15:25

## 2025-08-04 RX ADMIN — OXYCODONE AND ACETAMINOPHEN 1 TABLET: 5; 325 TABLET ORAL at 10:16

## 2025-08-04 RX ADMIN — IPRATROPIUM BROMIDE AND ALBUTEROL SULFATE 1 DOSE: 2.5; .5 SOLUTION RESPIRATORY (INHALATION) at 02:12

## 2025-08-04 RX ADMIN — HYDROXYZINE HYDROCHLORIDE 10 MG: 10 TABLET, FILM COATED ORAL at 09:11

## 2025-08-04 RX ADMIN — MICONAZOLE NITRATE: 2 POWDER TOPICAL at 15:07

## 2025-08-04 RX ADMIN — IPRATROPIUM BROMIDE AND ALBUTEROL SULFATE 1 DOSE: 2.5; .5 SOLUTION RESPIRATORY (INHALATION) at 07:06

## 2025-08-04 RX ADMIN — HALOPERIDOL LACTATE 2 MG: 5 INJECTION, SOLUTION INTRAMUSCULAR at 16:28

## 2025-08-04 RX ADMIN — SODIUM CHLORIDE, PRESERVATIVE FREE 10 ML: 5 INJECTION INTRAVENOUS at 09:12

## 2025-08-04 RX ADMIN — IPRATROPIUM BROMIDE AND ALBUTEROL SULFATE 1 DOSE: 2.5; .5 SOLUTION RESPIRATORY (INHALATION) at 22:20

## 2025-08-04 RX ADMIN — METOPROLOL TARTRATE 50 MG: 50 TABLET, FILM COATED ORAL at 21:19

## 2025-08-04 RX ADMIN — HALOPERIDOL LACTATE 2 MG: 5 INJECTION, SOLUTION INTRAMUSCULAR at 23:44

## 2025-08-04 RX ADMIN — INSULIN LISPRO 2 UNITS: 100 INJECTION, SOLUTION INTRAVENOUS; SUBCUTANEOUS at 17:56

## 2025-08-04 RX ADMIN — PREDNISONE 40 MG: 20 TABLET ORAL at 09:11

## 2025-08-04 RX ADMIN — EPOETIN ALFA-EPBX 10000 UNITS: 10000 INJECTION, SOLUTION INTRAVENOUS; SUBCUTANEOUS at 17:56

## 2025-08-04 RX ADMIN — APIXABAN 5 MG: 5 TABLET, FILM COATED ORAL at 09:11

## 2025-08-04 RX ADMIN — GUAIFENESIN 600 MG: 600 TABLET, EXTENDED RELEASE ORAL at 21:19

## 2025-08-04 RX ADMIN — METOPROLOL TARTRATE 50 MG: 50 TABLET, FILM COATED ORAL at 09:11

## 2025-08-04 RX ADMIN — GUAIFENESIN 600 MG: 600 TABLET, EXTENDED RELEASE ORAL at 09:11

## 2025-08-04 ASSESSMENT — PAIN SCALES - GENERAL: PAINLEVEL_OUTOF10: 7

## 2025-08-04 ASSESSMENT — PAIN SCALES - WONG BAKER: WONGBAKER_NUMERICALRESPONSE: HURTS LITTLE MORE

## 2025-08-04 ASSESSMENT — PAIN - FUNCTIONAL ASSESSMENT: PAIN_FUNCTIONAL_ASSESSMENT: PREVENTS OR INTERFERES SOME ACTIVE ACTIVITIES AND ADLS

## 2025-08-04 ASSESSMENT — PAIN DESCRIPTION - DESCRIPTORS: DESCRIPTORS: ACHING

## 2025-08-04 ASSESSMENT — PAIN DESCRIPTION - LOCATION: LOCATION: CHEST

## 2025-08-05 PROBLEM — E43 SEVERE MALNUTRITION: Status: ACTIVE | Noted: 2025-08-05

## 2025-08-05 LAB
ANION GAP SERPL CALCULATED.3IONS-SCNC: 13 MMOL/L (ref 8–16)
BASOPHILS # BLD: 0 K/UL (ref 0–0.2)
BASOPHILS NFR BLD: 0.1 % (ref 0–1)
BUN SERPL-MCNC: 44 MG/DL (ref 8–23)
CALCIUM SERPL-MCNC: 8.3 MG/DL (ref 8.8–10.2)
CHLORIDE SERPL-SCNC: 97 MMOL/L (ref 98–107)
CO2 SERPL-SCNC: 22 MMOL/L (ref 22–29)
CREAT SERPL-MCNC: 3.7 MG/DL (ref 0.5–0.9)
EOSINOPHIL # BLD: 0 K/UL (ref 0–0.6)
EOSINOPHIL NFR BLD: 0 % (ref 0–5)
ERYTHROCYTE [DISTWIDTH] IN BLOOD BY AUTOMATED COUNT: 18.5 % (ref 11.5–14.5)
GLUCOSE BLD-MCNC: 196 MG/DL (ref 70–99)
GLUCOSE BLD-MCNC: 197 MG/DL (ref 70–99)
GLUCOSE BLD-MCNC: 199 MG/DL (ref 70–99)
GLUCOSE SERPL-MCNC: 283 MG/DL (ref 70–99)
HCT VFR BLD AUTO: 29 % (ref 37–47)
HGB BLD-MCNC: 8.9 G/DL (ref 12–16)
IMM GRANULOCYTES # BLD: 0.1 K/UL
LYMPHOCYTES # BLD: 0.9 K/UL (ref 1.1–4.5)
LYMPHOCYTES NFR BLD: 8.3 % (ref 20–40)
MCH RBC QN AUTO: 33.3 PG (ref 27–31)
MCHC RBC AUTO-ENTMCNC: 30.7 G/DL (ref 33–37)
MCV RBC AUTO: 108.6 FL (ref 81–99)
MONOCYTES # BLD: 0.7 K/UL (ref 0–0.9)
MONOCYTES NFR BLD: 6.9 % (ref 0–10)
NEUTROPHILS # BLD: 8.6 K/UL (ref 1.5–7.5)
NEUTS SEG NFR BLD: 84 % (ref 50–65)
PERFORMED ON: ABNORMAL
PLATELET # BLD AUTO: 254 K/UL (ref 130–400)
PMV BLD AUTO: 10 FL (ref 9.4–12.3)
POTASSIUM SERPL-SCNC: 4.6 MMOL/L (ref 3.5–5)
RBC # BLD AUTO: 2.67 M/UL (ref 4.2–5.4)
SODIUM SERPL-SCNC: 132 MMOL/L (ref 136–145)
WBC # BLD AUTO: 10.2 K/UL (ref 4.8–10.8)

## 2025-08-05 PROCEDURE — 36415 COLL VENOUS BLD VENIPUNCTURE: CPT

## 2025-08-05 PROCEDURE — 94640 AIRWAY INHALATION TREATMENT: CPT

## 2025-08-05 PROCEDURE — 2500000003 HC RX 250 WO HCPCS: Performed by: INTERNAL MEDICINE

## 2025-08-05 PROCEDURE — 6370000000 HC RX 637 (ALT 250 FOR IP): Performed by: STUDENT IN AN ORGANIZED HEALTH CARE EDUCATION/TRAINING PROGRAM

## 2025-08-05 PROCEDURE — 6360000002 HC RX W HCPCS: Performed by: PSYCHIATRY & NEUROLOGY

## 2025-08-05 PROCEDURE — 82962 GLUCOSE BLOOD TEST: CPT

## 2025-08-05 PROCEDURE — 6370000000 HC RX 637 (ALT 250 FOR IP): Performed by: INTERNAL MEDICINE

## 2025-08-05 PROCEDURE — 8010000000 HC HEMODIALYSIS ACUTE INPT

## 2025-08-05 PROCEDURE — 2700000000 HC OXYGEN THERAPY PER DAY

## 2025-08-05 PROCEDURE — 97530 THERAPEUTIC ACTIVITIES: CPT

## 2025-08-05 PROCEDURE — 85025 COMPLETE CBC W/AUTO DIFF WBC: CPT

## 2025-08-05 PROCEDURE — 6370000000 HC RX 637 (ALT 250 FOR IP)

## 2025-08-05 PROCEDURE — 2060000000 HC ICU INTERMEDIATE R&B

## 2025-08-05 PROCEDURE — 80048 BASIC METABOLIC PNL TOTAL CA: CPT

## 2025-08-05 PROCEDURE — 94150 VITAL CAPACITY TEST: CPT

## 2025-08-05 PROCEDURE — 94760 N-INVAS EAR/PLS OXIMETRY 1: CPT

## 2025-08-05 PROCEDURE — 6360000002 HC RX W HCPCS: Performed by: INTERNAL MEDICINE

## 2025-08-05 PROCEDURE — 97110 THERAPEUTIC EXERCISES: CPT

## 2025-08-05 RX ADMIN — METOPROLOL TARTRATE 50 MG: 50 TABLET, FILM COATED ORAL at 10:23

## 2025-08-05 RX ADMIN — APIXABAN 5 MG: 5 TABLET, FILM COATED ORAL at 20:40

## 2025-08-05 RX ADMIN — INSULIN LISPRO 2 UNITS: 100 INJECTION, SOLUTION INTRAVENOUS; SUBCUTANEOUS at 16:49

## 2025-08-05 RX ADMIN — GUAIFENESIN 600 MG: 600 TABLET, EXTENDED RELEASE ORAL at 10:23

## 2025-08-05 RX ADMIN — MICONAZOLE NITRATE: 2 POWDER TOPICAL at 10:26

## 2025-08-05 RX ADMIN — INSULIN LISPRO 2 UNITS: 100 INJECTION, SOLUTION INTRAVENOUS; SUBCUTANEOUS at 23:30

## 2025-08-05 RX ADMIN — APIXABAN 5 MG: 5 TABLET, FILM COATED ORAL at 10:23

## 2025-08-05 RX ADMIN — OXYCODONE AND ACETAMINOPHEN 1 TABLET: 5; 325 TABLET ORAL at 16:56

## 2025-08-05 RX ADMIN — PREDNISONE 40 MG: 20 TABLET ORAL at 10:23

## 2025-08-05 RX ADMIN — OXYCODONE AND ACETAMINOPHEN 1 TABLET: 5; 325 TABLET ORAL at 10:22

## 2025-08-05 RX ADMIN — HYDROXYZINE HYDROCHLORIDE 10 MG: 10 TABLET, FILM COATED ORAL at 20:40

## 2025-08-05 RX ADMIN — METOPROLOL TARTRATE 50 MG: 50 TABLET, FILM COATED ORAL at 20:40

## 2025-08-05 RX ADMIN — IPRATROPIUM BROMIDE AND ALBUTEROL SULFATE 1 DOSE: 2.5; .5 SOLUTION RESPIRATORY (INHALATION) at 06:23

## 2025-08-05 RX ADMIN — HEPARIN SODIUM 3200 UNITS: 1000 INJECTION INTRAVENOUS; SUBCUTANEOUS at 15:00

## 2025-08-05 RX ADMIN — Medication 1 MG: at 17:07

## 2025-08-05 RX ADMIN — Medication 1 MG: at 01:43

## 2025-08-05 RX ADMIN — MICONAZOLE NITRATE: 2 POWDER TOPICAL at 20:41

## 2025-08-05 RX ADMIN — CALCITRIOL CAPSULES 0.25 MCG 0.25 MCG: 0.25 CAPSULE ORAL at 10:23

## 2025-08-05 RX ADMIN — OXYCODONE AND ACETAMINOPHEN 1 TABLET: 5; 325 TABLET ORAL at 23:27

## 2025-08-05 RX ADMIN — SODIUM CHLORIDE, PRESERVATIVE FREE 10 ML: 5 INJECTION INTRAVENOUS at 10:28

## 2025-08-05 RX ADMIN — SODIUM CHLORIDE, PRESERVATIVE FREE 10 ML: 5 INJECTION INTRAVENOUS at 20:40

## 2025-08-05 RX ADMIN — IPRATROPIUM BROMIDE AND ALBUTEROL SULFATE 1 DOSE: 2.5; .5 SOLUTION RESPIRATORY (INHALATION) at 22:14

## 2025-08-05 RX ADMIN — IPRATROPIUM BROMIDE AND ALBUTEROL SULFATE 1 DOSE: 2.5; .5 SOLUTION RESPIRATORY (INHALATION) at 10:52

## 2025-08-05 RX ADMIN — HALOPERIDOL LACTATE 2 MG: 5 INJECTION, SOLUTION INTRAMUSCULAR at 20:40

## 2025-08-05 RX ADMIN — GUAIFENESIN 600 MG: 600 TABLET, EXTENDED RELEASE ORAL at 20:40

## 2025-08-05 ASSESSMENT — PAIN SCALES - GENERAL
PAINLEVEL_OUTOF10: 7
PAINLEVEL_OUTOF10: 0
PAINLEVEL_OUTOF10: 0
PAINLEVEL_OUTOF10: 10
PAINLEVEL_OUTOF10: 0
PAINLEVEL_OUTOF10: 2
PAINLEVEL_OUTOF10: 10
PAINLEVEL_OUTOF10: 0

## 2025-08-05 ASSESSMENT — PAIN DESCRIPTION - LOCATION
LOCATION: GENERALIZED
LOCATION: GENERALIZED

## 2025-08-05 ASSESSMENT — PAIN DESCRIPTION - FREQUENCY: FREQUENCY: INTERMITTENT

## 2025-08-05 ASSESSMENT — PAIN SCALES - WONG BAKER: WONGBAKER_NUMERICALRESPONSE: HURTS A LITTLE BIT

## 2025-08-05 ASSESSMENT — PAIN DESCRIPTION - PAIN TYPE: TYPE: ACUTE PAIN;CHRONIC PAIN

## 2025-08-05 ASSESSMENT — PAIN - FUNCTIONAL ASSESSMENT: PAIN_FUNCTIONAL_ASSESSMENT: PREVENTS OR INTERFERES SOME ACTIVE ACTIVITIES AND ADLS

## 2025-08-05 ASSESSMENT — PAIN DESCRIPTION - DESCRIPTORS: DESCRIPTORS: ACHING;DISCOMFORT

## 2025-08-05 ASSESSMENT — PAIN DESCRIPTION - ONSET: ONSET: ON-GOING

## 2025-08-06 ENCOUNTER — TELEPHONE (OUTPATIENT)
Dept: OBGYN CLINIC | Age: 70
End: 2025-08-06

## 2025-08-06 ENCOUNTER — HOSPITAL ENCOUNTER (INPATIENT)
Facility: HOSPITAL | Age: 70
LOS: 5 days | Discharge: NURSING, REHAB OR LONG-TERM CARE | End: 2025-08-13
Attending: FAMILY MEDICINE | Admitting: FAMILY MEDICINE
Payer: MEDICARE

## 2025-08-06 ENCOUNTER — APPOINTMENT (OUTPATIENT)
Dept: CT IMAGING | Facility: HOSPITAL | Age: 70
End: 2025-08-06
Payer: MEDICARE

## 2025-08-06 VITALS
RESPIRATION RATE: 20 BRPM | SYSTOLIC BLOOD PRESSURE: 118 MMHG | HEIGHT: 65 IN | WEIGHT: 235.89 LBS | OXYGEN SATURATION: 92 % | BODY MASS INDEX: 39.3 KG/M2 | HEART RATE: 55 BPM | DIASTOLIC BLOOD PRESSURE: 52 MMHG | TEMPERATURE: 97.7 F

## 2025-08-06 DIAGNOSIS — G62.9 NEUROPATHY: ICD-10-CM

## 2025-08-06 DIAGNOSIS — N93.9 VAGINAL BLEEDING: ICD-10-CM

## 2025-08-06 DIAGNOSIS — N39.0 URINARY TRACT INFECTION WITHOUT HEMATURIA, SITE UNSPECIFIED: ICD-10-CM

## 2025-08-06 DIAGNOSIS — J81.0 ACUTE PULMONARY EDEMA: ICD-10-CM

## 2025-08-06 DIAGNOSIS — N18.6 ESRD (END STAGE RENAL DISEASE) ON DIALYSIS: Primary | ICD-10-CM

## 2025-08-06 DIAGNOSIS — Z99.2 ESRD (END STAGE RENAL DISEASE) ON DIALYSIS: Primary | ICD-10-CM

## 2025-08-06 DIAGNOSIS — R07.9 LEFT-SIDED CHEST PAIN: ICD-10-CM

## 2025-08-06 DIAGNOSIS — Z74.09 IMPAIRED MOBILITY: ICD-10-CM

## 2025-08-06 LAB
ALBUMIN SERPL-MCNC: 3.1 G/DL (ref 3.5–5.2)
ALBUMIN/GLOB SERPL: 1 G/DL
ALP SERPL-CCNC: 106 U/L (ref 39–117)
ALT SERPL W P-5'-P-CCNC: 10 U/L (ref 1–33)
ANION GAP SERPL CALCULATED.3IONS-SCNC: 14 MMOL/L (ref 5–15)
ANION GAP SERPL CALCULATED.3IONS-SCNC: 7 MMOL/L (ref 8–16)
ANISOCYTOSIS BLD QL: ABNORMAL
AST SERPL-CCNC: 19 U/L (ref 1–32)
BACTERIA UR QL AUTO: ABNORMAL /HPF
BASOPHILS # BLD MANUAL: 0 10*3/MM3 (ref 0–0.2)
BASOPHILS # BLD: 0 K/UL (ref 0–0.2)
BASOPHILS NFR BLD MANUAL: 0 % (ref 0–1.5)
BASOPHILS NFR BLD: 0.1 % (ref 0–1)
BILIRUB SERPL-MCNC: 0.4 MG/DL (ref 0–1.2)
BILIRUB UR QL STRIP: NEGATIVE
BUN SERPL-MCNC: 24 MG/DL (ref 8–23)
BUN SERPL-MCNC: 29.7 MG/DL (ref 8–23)
BUN/CREAT SERPL: 11.4 (ref 7–25)
CALCIUM SERPL-MCNC: 7.8 MG/DL (ref 8.8–10.2)
CALCIUM SPEC-SCNC: 8.4 MG/DL (ref 8.6–10.5)
CHLORIDE SERPL-SCNC: 103 MMOL/L (ref 98–107)
CHLORIDE SERPL-SCNC: 96 MMOL/L (ref 98–107)
CLARITY UR: ABNORMAL
CO2 SERPL-SCNC: 23 MMOL/L (ref 22–29)
CO2 SERPL-SCNC: 27 MMOL/L (ref 22–29)
COLOR UR: ABNORMAL
CREAT SERPL-MCNC: 2.3 MG/DL (ref 0.5–0.9)
CREAT SERPL-MCNC: 2.61 MG/DL (ref 0.57–1)
D-LACTATE SERPL-SCNC: 1.4 MMOL/L (ref 0.5–2)
DEPRECATED RDW RBC AUTO: 73.4 FL (ref 37–54)
EGFRCR SERPLBLD CKD-EPI 2021: 19.2 ML/MIN/1.73
EOSINOPHIL # BLD MANUAL: 0 10*3/MM3 (ref 0–0.4)
EOSINOPHIL # BLD: 0 K/UL (ref 0–0.6)
EOSINOPHIL NFR BLD MANUAL: 0 % (ref 0.3–6.2)
EOSINOPHIL NFR BLD: 0.1 % (ref 0–5)
ERYTHROCYTE [DISTWIDTH] IN BLOOD BY AUTOMATED COUNT: 18.5 % (ref 11.5–14.5)
ERYTHROCYTE [DISTWIDTH] IN BLOOD BY AUTOMATED COUNT: 18.5 % (ref 12.3–15.4)
GEN 5 1HR TROPONIN T REFLEX: 100 NG/L
GLOBULIN UR ELPH-MCNC: 3.2 GM/DL
GLUCOSE BLD-MCNC: 127 MG/DL (ref 70–99)
GLUCOSE BLD-MCNC: 224 MG/DL (ref 70–99)
GLUCOSE SERPL-MCNC: 190 MG/DL (ref 70–99)
GLUCOSE SERPL-MCNC: 242 MG/DL (ref 65–99)
GLUCOSE UR STRIP-MCNC: NEGATIVE MG/DL
HCT VFR BLD AUTO: 27 % (ref 37–47)
HCT VFR BLD AUTO: 30.5 % (ref 34–46.6)
HGB BLD-MCNC: 8.3 G/DL (ref 12–16)
HGB BLD-MCNC: 9.2 G/DL (ref 12–15.9)
HGB UR QL STRIP.AUTO: ABNORMAL
HOLD SPECIMEN: NORMAL
HYALINE CASTS UR QL AUTO: ABNORMAL /LPF
HYPOCHROMIA BLD QL: ABNORMAL
IMM GRANULOCYTES # BLD: 0.1 K/UL
KETONES UR QL STRIP: ABNORMAL
LEUKOCYTE ESTERASE UR QL STRIP.AUTO: ABNORMAL
LYMPHOCYTES # BLD MANUAL: 0.62 10*3/MM3 (ref 0.7–3.1)
LYMPHOCYTES # BLD: 1.1 K/UL (ref 1.1–4.5)
LYMPHOCYTES NFR BLD MANUAL: 5.1 % (ref 5–12)
LYMPHOCYTES NFR BLD: 11.5 % (ref 20–40)
MACROCYTES BLD QL SMEAR: ABNORMAL
MAGNESIUM SERPL-MCNC: 1.7 MG/DL (ref 1.6–2.4)
MCH RBC QN AUTO: 32.5 PG (ref 26.6–33)
MCH RBC QN AUTO: 33.1 PG (ref 27–31)
MCHC RBC AUTO-ENTMCNC: 30.2 G/DL (ref 31.5–35.7)
MCHC RBC AUTO-ENTMCNC: 30.7 G/DL (ref 33–37)
MCV RBC AUTO: 107.6 FL (ref 81–99)
MCV RBC AUTO: 107.8 FL (ref 79–97)
MONOCYTES # BLD: 0.6 K/UL (ref 0–0.9)
MONOCYTES # BLD: 0.62 10*3/MM3 (ref 0.1–0.9)
MONOCYTES NFR BLD: 6.1 % (ref 0–10)
NEUTROPHILS # BLD AUTO: 10.97 10*3/MM3 (ref 1.7–7)
NEUTROPHILS # BLD: 7.4 K/UL (ref 1.5–7.5)
NEUTROPHILS NFR BLD MANUAL: 89.9 % (ref 42.7–76)
NEUTS SEG NFR BLD: 81.5 % (ref 50–65)
NITRITE UR QL STRIP: NEGATIVE
NT-PROBNP SERPL-MCNC: 4687 PG/ML (ref 0–900)
PERFORMED ON: ABNORMAL
PERFORMED ON: ABNORMAL
PH UR STRIP.AUTO: <=5 [PH] (ref 5–8)
PLAT MORPH BLD: NORMAL
PLATELET # BLD AUTO: 224 K/UL (ref 130–400)
PLATELET # BLD AUTO: 285 10*3/MM3 (ref 140–450)
PMV BLD AUTO: 10.1 FL (ref 6–12)
PMV BLD AUTO: 9.7 FL (ref 9.4–12.3)
POIKILOCYTOSIS BLD QL SMEAR: ABNORMAL
POLYCHROMASIA BLD QL SMEAR: ABNORMAL
POTASSIUM SERPL-SCNC: 4.2 MMOL/L (ref 3.5–5)
POTASSIUM SERPL-SCNC: 4.4 MMOL/L (ref 3.5–5.2)
PROT SERPL-MCNC: 6.3 G/DL (ref 6–8.5)
PROT UR QL STRIP: ABNORMAL
RBC # BLD AUTO: 2.51 M/UL (ref 4.2–5.4)
RBC # BLD AUTO: 2.83 10*6/MM3 (ref 3.77–5.28)
RBC # UR STRIP: ABNORMAL /HPF
REF LAB TEST METHOD: ABNORMAL
SODIUM SERPL-SCNC: 133 MMOL/L (ref 136–145)
SODIUM SERPL-SCNC: 137 MMOL/L (ref 136–145)
SP GR UR STRIP: 1.02 (ref 1–1.03)
SQUAMOUS #/AREA URNS HPF: ABNORMAL /HPF
TROPONIN T % DELTA: -2
TROPONIN T NUMERIC DELTA: -2 NG/L
TROPONIN T SERPL HS-MCNC: 102 NG/L
UROBILINOGEN UR QL STRIP: ABNORMAL
VARIANT LYMPHS NFR BLD MANUAL: 5.1 % (ref 19.6–45.3)
WBC # BLD AUTO: 9.1 K/UL (ref 4.8–10.8)
WBC # UR STRIP: ABNORMAL /HPF
WBC MORPH BLD: NORMAL
WBC NRBC COR # BLD AUTO: 12.2 10*3/MM3 (ref 3.4–10.8)
WHOLE BLOOD HOLD COAG: NORMAL
WHOLE BLOOD HOLD SPECIMEN: NORMAL
YEAST URNS QL MICRO: ABNORMAL /HPF

## 2025-08-06 PROCEDURE — 85025 COMPLETE CBC W/AUTO DIFF WBC: CPT | Performed by: FAMILY MEDICINE

## 2025-08-06 PROCEDURE — 74176 CT ABD & PELVIS W/O CONTRAST: CPT

## 2025-08-06 PROCEDURE — 94760 N-INVAS EAR/PLS OXIMETRY 1: CPT

## 2025-08-06 PROCEDURE — 2500000003 HC RX 250 WO HCPCS: Performed by: INTERNAL MEDICINE

## 2025-08-06 PROCEDURE — 81001 URINALYSIS AUTO W/SCOPE: CPT | Performed by: FAMILY MEDICINE

## 2025-08-06 PROCEDURE — 36415 COLL VENOUS BLD VENIPUNCTURE: CPT

## 2025-08-06 PROCEDURE — 84484 ASSAY OF TROPONIN QUANT: CPT | Performed by: FAMILY MEDICINE

## 2025-08-06 PROCEDURE — 82962 GLUCOSE BLOOD TEST: CPT

## 2025-08-06 PROCEDURE — P9612 CATHETERIZE FOR URINE SPEC: HCPCS

## 2025-08-06 PROCEDURE — 6370000000 HC RX 637 (ALT 250 FOR IP): Performed by: STUDENT IN AN ORGANIZED HEALTH CARE EDUCATION/TRAINING PROGRAM

## 2025-08-06 PROCEDURE — 85025 COMPLETE CBC W/AUTO DIFF WBC: CPT

## 2025-08-06 PROCEDURE — 80053 COMPREHEN METABOLIC PANEL: CPT | Performed by: FAMILY MEDICINE

## 2025-08-06 PROCEDURE — 87086 URINE CULTURE/COLONY COUNT: CPT | Performed by: FAMILY MEDICINE

## 2025-08-06 PROCEDURE — 6370000000 HC RX 637 (ALT 250 FOR IP): Performed by: INTERNAL MEDICINE

## 2025-08-06 PROCEDURE — 83880 ASSAY OF NATRIURETIC PEPTIDE: CPT | Performed by: FAMILY MEDICINE

## 2025-08-06 PROCEDURE — 93005 ELECTROCARDIOGRAM TRACING: CPT

## 2025-08-06 PROCEDURE — 94150 VITAL CAPACITY TEST: CPT

## 2025-08-06 PROCEDURE — 99285 EMERGENCY DEPT VISIT HI MDM: CPT | Performed by: FAMILY MEDICINE

## 2025-08-06 PROCEDURE — 94640 AIRWAY INHALATION TREATMENT: CPT

## 2025-08-06 PROCEDURE — 80048 BASIC METABOLIC PNL TOTAL CA: CPT

## 2025-08-06 PROCEDURE — 93005 ELECTROCARDIOGRAM TRACING: CPT | Performed by: FAMILY MEDICINE

## 2025-08-06 PROCEDURE — 83605 ASSAY OF LACTIC ACID: CPT | Performed by: FAMILY MEDICINE

## 2025-08-06 PROCEDURE — 85007 BL SMEAR W/DIFF WBC COUNT: CPT | Performed by: FAMILY MEDICINE

## 2025-08-06 PROCEDURE — 71250 CT THORAX DX C-: CPT

## 2025-08-06 PROCEDURE — 83735 ASSAY OF MAGNESIUM: CPT | Performed by: FAMILY MEDICINE

## 2025-08-06 PROCEDURE — 93010 ELECTROCARDIOGRAM REPORT: CPT | Performed by: HOSPITALIST

## 2025-08-06 PROCEDURE — 6360000002 HC RX W HCPCS: Performed by: PSYCHIATRY & NEUROLOGY

## 2025-08-06 RX ORDER — MIDODRINE HYDROCHLORIDE 5 MG/1
5 TABLET ORAL 2 TIMES DAILY PRN
Qty: 90 TABLET | Refills: 3 | Status: SHIPPED | OUTPATIENT
Start: 2025-08-06

## 2025-08-06 RX ORDER — CALCITRIOL 0.25 UG/1
0.25 CAPSULE, LIQUID FILLED ORAL DAILY
Qty: 30 CAPSULE | Refills: 3 | Status: SHIPPED | OUTPATIENT
Start: 2025-08-07

## 2025-08-06 RX ORDER — HYDROXYZINE HYDROCHLORIDE 10 MG/1
10 TABLET, FILM COATED ORAL 3 TIMES DAILY PRN
Qty: 30 TABLET | Refills: 0 | Status: SHIPPED | OUTPATIENT
Start: 2025-08-06 | End: 2025-08-16

## 2025-08-06 RX ORDER — METOPROLOL TARTRATE 50 MG
50 TABLET ORAL 2 TIMES DAILY
Qty: 60 TABLET | Refills: 3 | Status: SHIPPED | OUTPATIENT
Start: 2025-08-06

## 2025-08-06 RX ADMIN — PREDNISONE 40 MG: 20 TABLET ORAL at 08:14

## 2025-08-06 RX ADMIN — Medication 1 MG: at 09:32

## 2025-08-06 RX ADMIN — APIXABAN 5 MG: 5 TABLET, FILM COATED ORAL at 08:25

## 2025-08-06 RX ADMIN — CALCITRIOL CAPSULES 0.25 MCG 0.25 MCG: 0.25 CAPSULE ORAL at 08:14

## 2025-08-06 RX ADMIN — INSULIN LISPRO 2 UNITS: 100 INJECTION, SOLUTION INTRAVENOUS; SUBCUTANEOUS at 12:10

## 2025-08-06 RX ADMIN — GUAIFENESIN 600 MG: 600 TABLET, EXTENDED RELEASE ORAL at 08:14

## 2025-08-06 RX ADMIN — SODIUM CHLORIDE, PRESERVATIVE FREE 10 ML: 5 INJECTION INTRAVENOUS at 08:21

## 2025-08-06 RX ADMIN — IPRATROPIUM BROMIDE AND ALBUTEROL SULFATE 1 DOSE: 2.5; .5 SOLUTION RESPIRATORY (INHALATION) at 14:23

## 2025-08-06 RX ADMIN — IPRATROPIUM BROMIDE AND ALBUTEROL SULFATE 1 DOSE: 2.5; .5 SOLUTION RESPIRATORY (INHALATION) at 06:24

## 2025-08-07 ENCOUNTER — APPOINTMENT (OUTPATIENT)
Dept: ULTRASOUND IMAGING | Facility: HOSPITAL | Age: 70
End: 2025-08-07
Payer: MEDICARE

## 2025-08-07 PROBLEM — R07.89 ATYPICAL CHEST PAIN: Status: ACTIVE | Noted: 2025-08-07

## 2025-08-07 LAB
ADV 40+41 DNA STL QL NAA+NON-PROBE: NOT DETECTED
ALBUMIN SERPL-MCNC: 2.8 G/DL (ref 3.5–5.2)
ALBUMIN/GLOB SERPL: 1 G/DL
ALP SERPL-CCNC: 103 U/L (ref 39–117)
ALT SERPL W P-5'-P-CCNC: 10 U/L (ref 1–33)
ANION GAP SERPL CALCULATED.3IONS-SCNC: 13 MMOL/L (ref 5–15)
APTT PPP: 32 SECONDS (ref 24.5–36)
AST SERPL-CCNC: 21 U/L (ref 1–32)
ASTRO TYP 1-8 RNA STL QL NAA+NON-PROBE: NOT DETECTED
B PARAPERT DNA SPEC QL NAA+PROBE: NOT DETECTED
B PERT DNA SPEC QL NAA+PROBE: NOT DETECTED
BACTERIA UR QL AUTO: ABNORMAL /HPF
BILIRUB SERPL-MCNC: 0.4 MG/DL (ref 0–1.2)
BILIRUB UR QL STRIP: NEGATIVE
BUN SERPL-MCNC: 12.1 MG/DL (ref 8–23)
BUN/CREAT SERPL: 8 (ref 7–25)
C CAYETANENSIS DNA STL QL NAA+NON-PROBE: NOT DETECTED
C COLI+JEJ+UPSA DNA STL QL NAA+NON-PROBE: NOT DETECTED
C PNEUM DNA NPH QL NAA+NON-PROBE: NOT DETECTED
CALCIUM SPEC-SCNC: 8.1 MG/DL (ref 8.6–10.5)
CHLORIDE SERPL-SCNC: 99 MMOL/L (ref 98–107)
CLARITY UR: ABNORMAL
CO2 SERPL-SCNC: 23 MMOL/L (ref 22–29)
COLOR UR: YELLOW
CREAT SERPL-MCNC: 1.51 MG/DL (ref 0.57–1)
CRYPTOSP DNA STL QL NAA+NON-PROBE: NOT DETECTED
DEPRECATED RDW RBC AUTO: 74.4 FL (ref 37–54)
E HISTOLYT DNA STL QL NAA+NON-PROBE: NOT DETECTED
EAEC PAA PLAS AGGR+AATA ST NAA+NON-PRB: NOT DETECTED
EC STX1+STX2 GENES STL QL NAA+NON-PROBE: NOT DETECTED
EGFRCR SERPLBLD CKD-EPI 2021: 37 ML/MIN/1.73
EPEC EAE GENE STL QL NAA+NON-PROBE: NOT DETECTED
ERYTHROCYTE [DISTWIDTH] IN BLOOD BY AUTOMATED COUNT: 18.5 % (ref 12.3–15.4)
ETEC LTA+ST1A+ST1B TOX ST NAA+NON-PROBE: NOT DETECTED
FLUAV SUBTYP SPEC NAA+PROBE: NOT DETECTED
FLUBV RNA NPH QL NAA+NON-PROBE: NOT DETECTED
G LAMBLIA DNA STL QL NAA+NON-PROBE: NOT DETECTED
GLOBULIN UR ELPH-MCNC: 2.9 GM/DL
GLUCOSE BLDC GLUCOMTR-MCNC: 106 MG/DL (ref 70–130)
GLUCOSE BLDC GLUCOMTR-MCNC: 137 MG/DL (ref 70–130)
GLUCOSE BLDC GLUCOMTR-MCNC: 157 MG/DL (ref 70–130)
GLUCOSE BLDC GLUCOMTR-MCNC: 158 MG/DL (ref 70–130)
GLUCOSE BLDC GLUCOMTR-MCNC: 170 MG/DL (ref 70–130)
GLUCOSE SERPL-MCNC: 168 MG/DL (ref 65–99)
GLUCOSE UR STRIP-MCNC: NEGATIVE MG/DL
HADV DNA SPEC NAA+PROBE: NOT DETECTED
HCOV 229E RNA SPEC QL NAA+PROBE: NOT DETECTED
HCOV HKU1 RNA SPEC QL NAA+PROBE: NOT DETECTED
HCOV NL63 RNA SPEC QL NAA+PROBE: NOT DETECTED
HCOV OC43 RNA SPEC QL NAA+PROBE: NOT DETECTED
HCT VFR BLD AUTO: 30 % (ref 34–46.6)
HGB BLD-MCNC: 8.8 G/DL (ref 12–15.9)
HGB UR QL STRIP.AUTO: ABNORMAL
HMPV RNA NPH QL NAA+NON-PROBE: NOT DETECTED
HPIV1 RNA ISLT QL NAA+PROBE: NOT DETECTED
HPIV2 RNA SPEC QL NAA+PROBE: NOT DETECTED
HPIV3 RNA NPH QL NAA+PROBE: NOT DETECTED
HPIV4 P GENE NPH QL NAA+PROBE: NOT DETECTED
HYALINE CASTS UR QL AUTO: ABNORMAL /LPF
INR PPP: 1.28 (ref 0.91–1.09)
KETONES UR QL STRIP: ABNORMAL
LEUKOCYTE ESTERASE UR QL STRIP.AUTO: ABNORMAL
M PNEUMO IGG SER IA-ACNC: NOT DETECTED
MAGNESIUM SERPL-MCNC: 1.6 MG/DL (ref 1.6–2.4)
MCH RBC QN AUTO: 32 PG (ref 26.6–33)
MCHC RBC AUTO-ENTMCNC: 29.3 G/DL (ref 31.5–35.7)
MCV RBC AUTO: 109.1 FL (ref 79–97)
NITRITE UR QL STRIP: NEGATIVE
NOROVIRUS GI+II RNA STL QL NAA+NON-PROBE: NOT DETECTED
P SHIGELLOIDES DNA STL QL NAA+NON-PROBE: NOT DETECTED
PH UR STRIP.AUTO: 5.5 [PH] (ref 5–8)
PHOSPHATE SERPL-MCNC: 2.3 MG/DL (ref 2.5–4.5)
PLATELET # BLD AUTO: 242 10*3/MM3 (ref 140–450)
PMV BLD AUTO: 9.4 FL (ref 6–12)
POTASSIUM SERPL-SCNC: 3.6 MMOL/L (ref 3.5–5.2)
PROT SERPL-MCNC: 5.7 G/DL (ref 6–8.5)
PROT UR QL STRIP: ABNORMAL
PROTHROMBIN TIME: 16.7 SECONDS (ref 11.8–14.8)
QT INTERVAL: 374 MS
QTC INTERVAL: 403 MS
RBC # BLD AUTO: 2.75 10*6/MM3 (ref 3.77–5.28)
RBC # UR STRIP: ABNORMAL /HPF
REF LAB TEST METHOD: ABNORMAL
RHINOVIRUS RNA SPEC NAA+PROBE: NOT DETECTED
RSV RNA NPH QL NAA+NON-PROBE: NOT DETECTED
RVA RNA STL QL NAA+NON-PROBE: NOT DETECTED
S ENT+BONG DNA STL QL NAA+NON-PROBE: NOT DETECTED
SAPO I+II+IV+V RNA STL QL NAA+NON-PROBE: NOT DETECTED
SARS-COV-2 RNA RESP QL NAA+PROBE: NOT DETECTED
SHIGELLA SP+EIEC IPAH ST NAA+NON-PROBE: NOT DETECTED
SODIUM SERPL-SCNC: 135 MMOL/L (ref 136–145)
SP GR UR STRIP: 1.01 (ref 1–1.03)
SQUAMOUS #/AREA URNS HPF: ABNORMAL /HPF
UROBILINOGEN UR QL STRIP: ABNORMAL
V CHOL+PARA+VUL DNA STL QL NAA+NON-PROBE: NOT DETECTED
V CHOLERAE DNA STL QL NAA+NON-PROBE: NOT DETECTED
WBC # UR STRIP: ABNORMAL /HPF
WBC NRBC COR # BLD AUTO: 11.04 10*3/MM3 (ref 3.4–10.8)
Y ENTEROCOL DNA STL QL NAA+NON-PROBE: NOT DETECTED
YEAST URNS QL MICRO: ABNORMAL /HPF

## 2025-08-07 PROCEDURE — 87086 URINE CULTURE/COLONY COUNT: CPT | Performed by: OBSTETRICS & GYNECOLOGY

## 2025-08-07 PROCEDURE — 87449 NOS EACH ORGANISM AG IA: CPT | Performed by: STUDENT IN AN ORGANIZED HEALTH CARE EDUCATION/TRAINING PROGRAM

## 2025-08-07 PROCEDURE — 83735 ASSAY OF MAGNESIUM: CPT

## 2025-08-07 PROCEDURE — 82948 REAGENT STRIP/BLOOD GLUCOSE: CPT

## 2025-08-07 PROCEDURE — 25010000002 CEFTRIAXONE PER 250 MG: Performed by: FAMILY MEDICINE

## 2025-08-07 PROCEDURE — G0378 HOSPITAL OBSERVATION PER HR: HCPCS

## 2025-08-07 PROCEDURE — 87040 BLOOD CULTURE FOR BACTERIA: CPT

## 2025-08-07 PROCEDURE — 0202U NFCT DS 22 TRGT SARS-COV-2: CPT

## 2025-08-07 PROCEDURE — 87507 IADNA-DNA/RNA PROBE TQ 12-25: CPT | Performed by: STUDENT IN AN ORGANIZED HEALTH CARE EDUCATION/TRAINING PROGRAM

## 2025-08-07 PROCEDURE — 25010000002 ONDANSETRON PER 1 MG

## 2025-08-07 PROCEDURE — 87493 C DIFF AMPLIFIED PROBE: CPT | Performed by: STUDENT IN AN ORGANIZED HEALTH CARE EDUCATION/TRAINING PROGRAM

## 2025-08-07 PROCEDURE — 84100 ASSAY OF PHOSPHORUS: CPT

## 2025-08-07 PROCEDURE — 76856 US EXAM PELVIC COMPLETE: CPT

## 2025-08-07 PROCEDURE — 36415 COLL VENOUS BLD VENIPUNCTURE: CPT

## 2025-08-07 PROCEDURE — 85730 THROMBOPLASTIN TIME PARTIAL: CPT

## 2025-08-07 PROCEDURE — 81001 URINALYSIS AUTO W/SCOPE: CPT | Performed by: OBSTETRICS & GYNECOLOGY

## 2025-08-07 PROCEDURE — 25010000002 MORPHINE PER 10 MG

## 2025-08-07 PROCEDURE — 99204 OFFICE O/P NEW MOD 45 MIN: CPT | Performed by: INTERNAL MEDICINE

## 2025-08-07 PROCEDURE — 5A1D70Z PERFORMANCE OF URINARY FILTRATION, INTERMITTENT, LESS THAN 6 HOURS PER DAY: ICD-10-PCS | Performed by: INTERNAL MEDICINE

## 2025-08-07 PROCEDURE — 85610 PROTHROMBIN TIME: CPT

## 2025-08-07 PROCEDURE — 80053 COMPREHEN METABOLIC PANEL: CPT

## 2025-08-07 PROCEDURE — 85027 COMPLETE CBC AUTOMATED: CPT

## 2025-08-07 PROCEDURE — 63710000001 INSULIN REGULAR HUMAN PER 5 UNITS

## 2025-08-07 PROCEDURE — 25010000002 HEPARIN (PORCINE) PER 1000 UNITS: Performed by: INTERNAL MEDICINE

## 2025-08-07 PROCEDURE — 25010000002 DOXYCYCLINE 100 MG RECONSTITUTED SOLUTION 1 EACH VIAL

## 2025-08-07 RX ORDER — NICOTINE POLACRILEX 4 MG
15 LOZENGE BUCCAL
Status: DISCONTINUED | OUTPATIENT
Start: 2025-08-07 | End: 2025-08-13 | Stop reason: HOSPADM

## 2025-08-07 RX ORDER — SODIUM CHLORIDE 9 MG/ML
40 INJECTION, SOLUTION INTRAVENOUS AS NEEDED
Status: DISCONTINUED | OUTPATIENT
Start: 2025-08-07 | End: 2025-08-13 | Stop reason: HOSPADM

## 2025-08-07 RX ORDER — QUETIAPINE FUMARATE 25 MG/1
25 TABLET, FILM COATED ORAL ONCE AS NEEDED
Status: DISCONTINUED | OUTPATIENT
Start: 2025-08-07 | End: 2025-08-07

## 2025-08-07 RX ORDER — NITROGLYCERIN 0.4 MG/1
0.4 TABLET SUBLINGUAL
Status: DISCONTINUED | OUTPATIENT
Start: 2025-08-07 | End: 2025-08-13 | Stop reason: HOSPADM

## 2025-08-07 RX ORDER — SODIUM BICARBONATE 650 MG/1
650 TABLET ORAL 3 TIMES DAILY
Status: DISCONTINUED | OUTPATIENT
Start: 2025-08-07 | End: 2025-08-13 | Stop reason: HOSPADM

## 2025-08-07 RX ORDER — METOPROLOL TARTRATE 25 MG/1
25 TABLET, FILM COATED ORAL EVERY 12 HOURS SCHEDULED
Status: DISCONTINUED | OUTPATIENT
Start: 2025-08-07 | End: 2025-08-13 | Stop reason: HOSPADM

## 2025-08-07 RX ORDER — ACETAMINOPHEN 500 MG
1000 TABLET ORAL
Status: COMPLETED | OUTPATIENT
Start: 2025-08-07 | End: 2025-08-07

## 2025-08-07 RX ORDER — ONDANSETRON 2 MG/ML
4 INJECTION INTRAMUSCULAR; INTRAVENOUS ONCE
Status: COMPLETED | OUTPATIENT
Start: 2025-08-07 | End: 2025-08-07

## 2025-08-07 RX ORDER — IPRATROPIUM BROMIDE AND ALBUTEROL SULFATE 2.5; .5 MG/3ML; MG/3ML
3 SOLUTION RESPIRATORY (INHALATION) EVERY 4 HOURS PRN
Status: DISCONTINUED | OUTPATIENT
Start: 2025-08-07 | End: 2025-08-13 | Stop reason: HOSPADM

## 2025-08-07 RX ORDER — LORAZEPAM 0.5 MG/1
0.5 TABLET ORAL ONCE AS NEEDED
Status: COMPLETED | OUTPATIENT
Start: 2025-08-07 | End: 2025-08-07

## 2025-08-07 RX ORDER — PANTOPRAZOLE SODIUM 40 MG/10ML
40 INJECTION, POWDER, LYOPHILIZED, FOR SOLUTION INTRAVENOUS EVERY 12 HOURS SCHEDULED
Status: DISCONTINUED | OUTPATIENT
Start: 2025-08-07 | End: 2025-08-08 | Stop reason: ALTCHOICE

## 2025-08-07 RX ORDER — GABAPENTIN 100 MG/1
100 CAPSULE ORAL NIGHTLY
Status: DISCONTINUED | OUTPATIENT
Start: 2025-08-07 | End: 2025-08-13 | Stop reason: HOSPADM

## 2025-08-07 RX ORDER — AMIODARONE HYDROCHLORIDE 200 MG/1
200 TABLET ORAL DAILY
Status: DISCONTINUED | OUTPATIENT
Start: 2025-08-07 | End: 2025-08-13 | Stop reason: HOSPADM

## 2025-08-07 RX ORDER — SODIUM CHLORIDE 0.9 % (FLUSH) 0.9 %
10 SYRINGE (ML) INJECTION EVERY 12 HOURS SCHEDULED
Status: DISCONTINUED | OUTPATIENT
Start: 2025-08-07 | End: 2025-08-13 | Stop reason: HOSPADM

## 2025-08-07 RX ORDER — MORPHINE SULFATE 2 MG/ML
1 INJECTION, SOLUTION INTRAMUSCULAR; INTRAVENOUS ONCE AS NEEDED
Status: COMPLETED | OUTPATIENT
Start: 2025-08-07 | End: 2025-08-07

## 2025-08-07 RX ORDER — LIDOCAINE 4 G/G
1 PATCH TOPICAL
Status: DISCONTINUED | OUTPATIENT
Start: 2025-08-07 | End: 2025-08-13 | Stop reason: HOSPADM

## 2025-08-07 RX ORDER — SODIUM CHLORIDE 0.9 % (FLUSH) 0.9 %
10 SYRINGE (ML) INJECTION AS NEEDED
Status: DISCONTINUED | OUTPATIENT
Start: 2025-08-07 | End: 2025-08-13 | Stop reason: HOSPADM

## 2025-08-07 RX ORDER — DEXTROSE MONOHYDRATE 25 G/50ML
25 INJECTION, SOLUTION INTRAVENOUS
Status: DISCONTINUED | OUTPATIENT
Start: 2025-08-07 | End: 2025-08-13 | Stop reason: HOSPADM

## 2025-08-07 RX ORDER — HEPARIN SODIUM 1000 [USP'U]/ML
3200 INJECTION, SOLUTION INTRAVENOUS; SUBCUTANEOUS AS NEEDED
Status: DISCONTINUED | OUTPATIENT
Start: 2025-08-07 | End: 2025-08-13 | Stop reason: HOSPADM

## 2025-08-07 RX ORDER — ASPIRIN 81 MG/1
324 TABLET, CHEWABLE ORAL ONCE
Status: COMPLETED | OUTPATIENT
Start: 2025-08-07 | End: 2025-08-07

## 2025-08-07 RX ORDER — MORPHINE SULFATE 2 MG/ML
1 INJECTION, SOLUTION INTRAMUSCULAR; INTRAVENOUS EVERY 4 HOURS PRN
Status: DISPENSED | OUTPATIENT
Start: 2025-08-07 | End: 2025-08-09

## 2025-08-07 RX ORDER — CALCIUM CARBONATE 500 MG/1
2 TABLET, CHEWABLE ORAL 3 TIMES DAILY PRN
Status: DISCONTINUED | OUTPATIENT
Start: 2025-08-07 | End: 2025-08-13 | Stop reason: HOSPADM

## 2025-08-07 RX ORDER — ASPIRIN 81 MG/1
324 TABLET, CHEWABLE ORAL DAILY
Status: DISCONTINUED | OUTPATIENT
Start: 2025-08-07 | End: 2025-08-07

## 2025-08-07 RX ADMIN — PANTOPRAZOLE SODIUM 40 MG: 40 INJECTION, POWDER, FOR SOLUTION INTRAVENOUS at 20:37

## 2025-08-07 RX ADMIN — ACETAMINOPHEN 1000 MG: 500 TABLET, FILM COATED ORAL at 10:55

## 2025-08-07 RX ADMIN — GABAPENTIN 100 MG: 100 CAPSULE ORAL at 20:42

## 2025-08-07 RX ADMIN — METOPROLOL TARTRATE 25 MG: 25 TABLET, FILM COATED ORAL at 20:37

## 2025-08-07 RX ADMIN — CEFTRIAXONE SODIUM 2000 MG: 2 INJECTION, POWDER, FOR SOLUTION INTRAMUSCULAR; INTRAVENOUS at 01:26

## 2025-08-07 RX ADMIN — MORPHINE SULFATE 1 MG: 2 INJECTION, SOLUTION INTRAMUSCULAR; INTRAVENOUS at 02:24

## 2025-08-07 RX ADMIN — SODIUM BICARBONATE 650 MG: 650 TABLET ORAL at 14:00

## 2025-08-07 RX ADMIN — DOXYCYCLINE 100 MG: 100 INJECTION, POWDER, LYOPHILIZED, FOR SOLUTION INTRAVENOUS at 03:37

## 2025-08-07 RX ADMIN — METOPROLOL TARTRATE 25 MG: 25 TABLET, FILM COATED ORAL at 03:38

## 2025-08-07 RX ADMIN — Medication 10 ML: at 20:37

## 2025-08-07 RX ADMIN — LORAZEPAM 0.5 MG: 0.5 TABLET ORAL at 04:11

## 2025-08-07 RX ADMIN — GABAPENTIN 100 MG: 100 CAPSULE ORAL at 03:38

## 2025-08-07 RX ADMIN — Medication 10 ML: at 13:49

## 2025-08-07 RX ADMIN — DOXYCYCLINE 100 MG: 100 INJECTION, POWDER, LYOPHILIZED, FOR SOLUTION INTRAVENOUS at 13:48

## 2025-08-07 RX ADMIN — PANTOPRAZOLE SODIUM 40 MG: 40 INJECTION, POWDER, FOR SOLUTION INTRAVENOUS at 13:50

## 2025-08-07 RX ADMIN — AMIODARONE HYDROCHLORIDE 200 MG: 200 TABLET ORAL at 13:49

## 2025-08-07 RX ADMIN — PANTOPRAZOLE SODIUM 40 MG: 40 INJECTION, POWDER, FOR SOLUTION INTRAVENOUS at 03:38

## 2025-08-07 RX ADMIN — Medication 10 ML: at 02:32

## 2025-08-07 RX ADMIN — SODIUM BICARBONATE 650 MG: 650 TABLET ORAL at 20:37

## 2025-08-07 RX ADMIN — ASPIRIN 81 MG CHEWABLE TABLET 324 MG: 81 TABLET CHEWABLE at 03:39

## 2025-08-07 RX ADMIN — HEPARIN SODIUM 3200 UNITS: 1000 INJECTION, SOLUTION INTRAVENOUS; SUBCUTANEOUS at 11:47

## 2025-08-07 RX ADMIN — MORPHINE SULFATE 1 MG: 2 INJECTION, SOLUTION INTRAMUSCULAR; INTRAVENOUS at 22:50

## 2025-08-07 RX ADMIN — INSULIN HUMAN 2 UNITS: 100 INJECTION, SOLUTION PARENTERAL at 22:51

## 2025-08-07 RX ADMIN — ONDANSETRON 4 MG: 2 INJECTION INTRAMUSCULAR; INTRAVENOUS at 02:32

## 2025-08-07 RX ADMIN — INSULIN HUMAN 2 UNITS: 100 INJECTION, SOLUTION PARENTERAL at 17:19

## 2025-08-07 RX ADMIN — LIDOCAINE 1 PATCH: 4 PATCH TOPICAL at 13:49

## 2025-08-08 PROBLEM — R07.9 CHEST PAIN: Status: ACTIVE | Noted: 2025-08-08

## 2025-08-08 PROBLEM — A04.72 C. DIFFICILE COLITIS: Status: ACTIVE | Noted: 2025-08-08

## 2025-08-08 LAB
ANION GAP SERPL CALCULATED.3IONS-SCNC: 15 MMOL/L (ref 5–15)
BACTERIA SPEC AEROBE CULT: ABNORMAL
BACTERIA SPEC AEROBE CULT: ABNORMAL
BUN SERPL-MCNC: 14.9 MG/DL (ref 8–23)
BUN/CREAT SERPL: 7.5 (ref 7–25)
C DIFF GDH + TOXINS A+B STL QL IA.RAPID: POSITIVE
C DIFF TOX GENS STL QL NAA+PROBE: POSITIVE
CALCIUM SPEC-SCNC: 8.2 MG/DL (ref 8.6–10.5)
CHLORIDE SERPL-SCNC: 96 MMOL/L (ref 98–107)
CO2 SERPL-SCNC: 21 MMOL/L (ref 22–29)
CREAT SERPL-MCNC: 1.98 MG/DL (ref 0.57–1)
DEPRECATED RDW RBC AUTO: 68.1 FL (ref 37–54)
EGFRCR SERPLBLD CKD-EPI 2021: 26.8 ML/MIN/1.73
ERYTHROCYTE [DISTWIDTH] IN BLOOD BY AUTOMATED COUNT: 17.9 % (ref 12.3–15.4)
GLUCOSE BLDC GLUCOMTR-MCNC: 104 MG/DL (ref 70–130)
GLUCOSE BLDC GLUCOMTR-MCNC: 108 MG/DL (ref 70–130)
GLUCOSE BLDC GLUCOMTR-MCNC: 148 MG/DL (ref 70–130)
GLUCOSE BLDC GLUCOMTR-MCNC: 165 MG/DL (ref 70–130)
GLUCOSE BLDC GLUCOMTR-MCNC: 210 MG/DL (ref 70–130)
GLUCOSE BLDC GLUCOMTR-MCNC: 213 MG/DL (ref 70–130)
GLUCOSE SERPL-MCNC: 98 MG/DL (ref 65–99)
HCT VFR BLD AUTO: 30.6 % (ref 34–46.6)
HGB BLD-MCNC: 9.6 G/DL (ref 12–15.9)
MCH RBC QN AUTO: 32.8 PG (ref 26.6–33)
MCHC RBC AUTO-ENTMCNC: 31.4 G/DL (ref 31.5–35.7)
MCV RBC AUTO: 104.4 FL (ref 79–97)
MRSA DNA SPEC QL NAA+PROBE: NORMAL
PLATELET # BLD AUTO: 203 10*3/MM3 (ref 140–450)
PMV BLD AUTO: 10.4 FL (ref 6–12)
POTASSIUM SERPL-SCNC: 4.1 MMOL/L (ref 3.5–5.2)
RBC # BLD AUTO: 2.93 10*6/MM3 (ref 3.77–5.28)
SODIUM SERPL-SCNC: 132 MMOL/L (ref 136–145)
WBC NRBC COR # BLD AUTO: 11.81 10*3/MM3 (ref 3.4–10.8)

## 2025-08-08 PROCEDURE — 82948 REAGENT STRIP/BLOOD GLUCOSE: CPT

## 2025-08-08 PROCEDURE — 25010000002 DOXYCYCLINE 100 MG RECONSTITUTED SOLUTION 1 EACH VIAL

## 2025-08-08 PROCEDURE — 36415 COLL VENOUS BLD VENIPUNCTURE: CPT | Performed by: STUDENT IN AN ORGANIZED HEALTH CARE EDUCATION/TRAINING PROGRAM

## 2025-08-08 PROCEDURE — 25810000003 SODIUM CHLORIDE 0.9 % SOLUTION: Performed by: STUDENT IN AN ORGANIZED HEALTH CARE EDUCATION/TRAINING PROGRAM

## 2025-08-08 PROCEDURE — 99221 1ST HOSP IP/OBS SF/LOW 40: CPT | Performed by: INTERNAL MEDICINE

## 2025-08-08 PROCEDURE — 25010000002 CEFTRIAXONE PER 250 MG

## 2025-08-08 PROCEDURE — 25010000002 MORPHINE PER 10 MG

## 2025-08-08 PROCEDURE — 25010000002 FAMOTIDINE 10 MG/ML SOLUTION

## 2025-08-08 PROCEDURE — 63710000001 INSULIN REGULAR HUMAN PER 5 UNITS

## 2025-08-08 PROCEDURE — 85027 COMPLETE CBC AUTOMATED: CPT | Performed by: STUDENT IN AN ORGANIZED HEALTH CARE EDUCATION/TRAINING PROGRAM

## 2025-08-08 PROCEDURE — 87641 MR-STAPH DNA AMP PROBE: CPT | Performed by: STUDENT IN AN ORGANIZED HEALTH CARE EDUCATION/TRAINING PROGRAM

## 2025-08-08 PROCEDURE — 80048 BASIC METABOLIC PNL TOTAL CA: CPT | Performed by: STUDENT IN AN ORGANIZED HEALTH CARE EDUCATION/TRAINING PROGRAM

## 2025-08-08 RX ORDER — FAMOTIDINE 10 MG/ML
10 INJECTION, SOLUTION INTRAVENOUS EVERY 12 HOURS SCHEDULED
Status: DISCONTINUED | OUTPATIENT
Start: 2025-08-08 | End: 2025-08-09 | Stop reason: ALTCHOICE

## 2025-08-08 RX ORDER — SODIUM CHLORIDE 9 MG/ML
100 INJECTION, SOLUTION INTRAVENOUS CONTINUOUS
Status: DISCONTINUED | OUTPATIENT
Start: 2025-08-08 | End: 2025-08-08

## 2025-08-08 RX ORDER — VANCOMYCIN HYDROCHLORIDE 125 MG/1
125 CAPSULE ORAL EVERY 6 HOURS SCHEDULED
Status: DISCONTINUED | OUTPATIENT
Start: 2025-08-08 | End: 2025-08-13 | Stop reason: HOSPADM

## 2025-08-08 RX ORDER — HYDROCODONE BITARTRATE AND ACETAMINOPHEN 5; 325 MG/1; MG/1
1 TABLET ORAL EVERY 6 HOURS PRN
Status: DISPENSED | OUTPATIENT
Start: 2025-08-08 | End: 2025-08-13

## 2025-08-08 RX ADMIN — Medication 10 ML: at 22:10

## 2025-08-08 RX ADMIN — FAMOTIDINE 10 MG: 10 INJECTION INTRAVENOUS at 22:09

## 2025-08-08 RX ADMIN — LIDOCAINE 1 PATCH: 4 PATCH TOPICAL at 08:13

## 2025-08-08 RX ADMIN — VANCOMYCIN HYDROCHLORIDE 125 MG: 125 CAPSULE ORAL at 11:12

## 2025-08-08 RX ADMIN — SODIUM BICARBONATE 650 MG: 650 TABLET ORAL at 08:14

## 2025-08-08 RX ADMIN — FAMOTIDINE 10 MG: 10 INJECTION INTRAVENOUS at 08:14

## 2025-08-08 RX ADMIN — AMIODARONE HYDROCHLORIDE 200 MG: 200 TABLET ORAL at 08:14

## 2025-08-08 RX ADMIN — Medication 10 ML: at 08:15

## 2025-08-08 RX ADMIN — VANCOMYCIN HYDROCHLORIDE 125 MG: 125 CAPSULE ORAL at 17:36

## 2025-08-08 RX ADMIN — MICONAZOLE NITRATE 1 APPLICATION: 20 POWDER TOPICAL at 22:10

## 2025-08-08 RX ADMIN — DOXYCYCLINE 100 MG: 100 INJECTION, POWDER, LYOPHILIZED, FOR SOLUTION INTRAVENOUS at 03:16

## 2025-08-08 RX ADMIN — GABAPENTIN 100 MG: 100 CAPSULE ORAL at 22:10

## 2025-08-08 RX ADMIN — SODIUM BICARBONATE 650 MG: 650 TABLET ORAL at 22:10

## 2025-08-08 RX ADMIN — HYDROCODONE BITARTRATE AND ACETAMINOPHEN 1 TABLET: 5; 325 TABLET ORAL at 17:29

## 2025-08-08 RX ADMIN — SODIUM BICARBONATE 650 MG: 650 TABLET ORAL at 17:35

## 2025-08-08 RX ADMIN — HYDROCODONE BITARTRATE AND ACETAMINOPHEN 1 TABLET: 5; 325 TABLET ORAL at 11:54

## 2025-08-08 RX ADMIN — VANCOMYCIN HYDROCHLORIDE 125 MG: 125 CAPSULE ORAL at 23:38

## 2025-08-08 RX ADMIN — SODIUM CHLORIDE 100 ML/HR: 9 INJECTION, SOLUTION INTRAVENOUS at 11:12

## 2025-08-08 RX ADMIN — MORPHINE SULFATE 1 MG: 2 INJECTION, SOLUTION INTRAMUSCULAR; INTRAVENOUS at 08:14

## 2025-08-08 RX ADMIN — INSULIN HUMAN 3 UNITS: 100 INJECTION, SOLUTION PARENTERAL at 11:54

## 2025-08-08 RX ADMIN — MICONAZOLE NITRATE 1 APPLICATION: 20 POWDER TOPICAL at 11:16

## 2025-08-08 RX ADMIN — CEFTRIAXONE SODIUM 2000 MG: 2 INJECTION, POWDER, FOR SOLUTION INTRAMUSCULAR; INTRAVENOUS at 01:17

## 2025-08-08 RX ADMIN — VANCOMYCIN HYDROCHLORIDE 125 MG: 125 CAPSULE ORAL at 08:14

## 2025-08-08 RX ADMIN — MORPHINE SULFATE 1 MG: 2 INJECTION, SOLUTION INTRAMUSCULAR; INTRAVENOUS at 03:17

## 2025-08-08 RX ADMIN — INSULIN HUMAN 2 UNITS: 100 INJECTION, SOLUTION PARENTERAL at 17:30

## 2025-08-08 RX ADMIN — DOXYCYCLINE 100 MG: 100 INJECTION, POWDER, LYOPHILIZED, FOR SOLUTION INTRAVENOUS at 15:18

## 2025-08-08 RX ADMIN — HYDROCODONE BITARTRATE AND ACETAMINOPHEN 1 TABLET: 5; 325 TABLET ORAL at 23:44

## 2025-08-08 RX ADMIN — METOPROLOL TARTRATE 25 MG: 25 TABLET, FILM COATED ORAL at 22:10

## 2025-08-08 RX ADMIN — METOPROLOL TARTRATE 25 MG: 25 TABLET, FILM COATED ORAL at 08:14

## 2025-08-09 LAB
ANION GAP SERPL CALCULATED.3IONS-SCNC: 11 MMOL/L (ref 5–15)
BUN SERPL-MCNC: 23.2 MG/DL (ref 8–23)
BUN/CREAT SERPL: 9.3 (ref 7–25)
CALCIUM SPEC-SCNC: 8.1 MG/DL (ref 8.6–10.5)
CHLORIDE SERPL-SCNC: 100 MMOL/L (ref 98–107)
CO2 SERPL-SCNC: 21 MMOL/L (ref 22–29)
CREAT SERPL-MCNC: 2.49 MG/DL (ref 0.57–1)
DEPRECATED RDW RBC AUTO: 66.4 FL (ref 37–54)
EGFRCR SERPLBLD CKD-EPI 2021: 20.3 ML/MIN/1.73
ERYTHROCYTE [DISTWIDTH] IN BLOOD BY AUTOMATED COUNT: 17.5 % (ref 12.3–15.4)
GLUCOSE BLDC GLUCOMTR-MCNC: 107 MG/DL (ref 70–130)
GLUCOSE BLDC GLUCOMTR-MCNC: 108 MG/DL (ref 70–130)
GLUCOSE BLDC GLUCOMTR-MCNC: 135 MG/DL (ref 70–130)
GLUCOSE SERPL-MCNC: 111 MG/DL (ref 65–99)
HCT VFR BLD AUTO: 28.6 % (ref 34–46.6)
HGB BLD-MCNC: 9.2 G/DL (ref 12–15.9)
MCH RBC QN AUTO: 33.2 PG (ref 26.6–33)
MCHC RBC AUTO-ENTMCNC: 32.2 G/DL (ref 31.5–35.7)
MCV RBC AUTO: 103.2 FL (ref 79–97)
PLATELET # BLD AUTO: 166 10*3/MM3 (ref 140–450)
PMV BLD AUTO: 10.5 FL (ref 6–12)
POTASSIUM SERPL-SCNC: 4.2 MMOL/L (ref 3.5–5.2)
RBC # BLD AUTO: 2.77 10*6/MM3 (ref 3.77–5.28)
SODIUM SERPL-SCNC: 132 MMOL/L (ref 136–145)
WBC NRBC COR # BLD AUTO: 10.03 10*3/MM3 (ref 3.4–10.8)

## 2025-08-09 PROCEDURE — 82948 REAGENT STRIP/BLOOD GLUCOSE: CPT

## 2025-08-09 PROCEDURE — 25010000002 FAMOTIDINE 10 MG/ML SOLUTION

## 2025-08-09 PROCEDURE — 80048 BASIC METABOLIC PNL TOTAL CA: CPT | Performed by: STUDENT IN AN ORGANIZED HEALTH CARE EDUCATION/TRAINING PROGRAM

## 2025-08-09 PROCEDURE — 99232 SBSQ HOSP IP/OBS MODERATE 35: CPT | Performed by: INTERNAL MEDICINE

## 2025-08-09 PROCEDURE — 25010000002 HEPARIN (PORCINE) PER 1000 UNITS: Performed by: INTERNAL MEDICINE

## 2025-08-09 PROCEDURE — 36415 COLL VENOUS BLD VENIPUNCTURE: CPT | Performed by: STUDENT IN AN ORGANIZED HEALTH CARE EDUCATION/TRAINING PROGRAM

## 2025-08-09 PROCEDURE — 85027 COMPLETE CBC AUTOMATED: CPT | Performed by: STUDENT IN AN ORGANIZED HEALTH CARE EDUCATION/TRAINING PROGRAM

## 2025-08-09 RX ORDER — FAMOTIDINE 20 MG/1
10 TABLET, FILM COATED ORAL
Status: DISCONTINUED | OUTPATIENT
Start: 2025-08-09 | End: 2025-08-09

## 2025-08-09 RX ORDER — FAMOTIDINE 20 MG/1
10 TABLET, FILM COATED ORAL DAILY
Status: DISCONTINUED | OUTPATIENT
Start: 2025-08-10 | End: 2025-08-13 | Stop reason: HOSPADM

## 2025-08-09 RX ADMIN — Medication 10 ML: at 08:40

## 2025-08-09 RX ADMIN — AMIODARONE HYDROCHLORIDE 200 MG: 200 TABLET ORAL at 08:39

## 2025-08-09 RX ADMIN — LIDOCAINE 1 PATCH: 4 PATCH TOPICAL at 08:39

## 2025-08-09 RX ADMIN — SODIUM BICARBONATE 650 MG: 650 TABLET ORAL at 17:58

## 2025-08-09 RX ADMIN — MICONAZOLE NITRATE 1 APPLICATION: 20 POWDER TOPICAL at 20:19

## 2025-08-09 RX ADMIN — SODIUM BICARBONATE 650 MG: 650 TABLET ORAL at 08:39

## 2025-08-09 RX ADMIN — HYDROCODONE BITARTRATE AND ACETAMINOPHEN 1 TABLET: 5; 325 TABLET ORAL at 12:21

## 2025-08-09 RX ADMIN — Medication 10 ML: at 20:19

## 2025-08-09 RX ADMIN — VANCOMYCIN HYDROCHLORIDE 125 MG: 125 CAPSULE ORAL at 05:45

## 2025-08-09 RX ADMIN — HEPARIN SODIUM 3200 UNITS: 1000 INJECTION, SOLUTION INTRAVENOUS; SUBCUTANEOUS at 11:46

## 2025-08-09 RX ADMIN — METOPROLOL TARTRATE 25 MG: 25 TABLET, FILM COATED ORAL at 08:39

## 2025-08-09 RX ADMIN — METOPROLOL TARTRATE 25 MG: 25 TABLET, FILM COATED ORAL at 20:19

## 2025-08-09 RX ADMIN — VANCOMYCIN HYDROCHLORIDE 125 MG: 125 CAPSULE ORAL at 17:58

## 2025-08-09 RX ADMIN — FAMOTIDINE 10 MG: 10 INJECTION INTRAVENOUS at 08:39

## 2025-08-09 RX ADMIN — HYDROCODONE BITARTRATE AND ACETAMINOPHEN 1 TABLET: 5; 325 TABLET ORAL at 20:17

## 2025-08-09 RX ADMIN — HYDROCODONE BITARTRATE AND ACETAMINOPHEN 1 TABLET: 5; 325 TABLET ORAL at 05:45

## 2025-08-09 RX ADMIN — GABAPENTIN 100 MG: 100 CAPSULE ORAL at 20:17

## 2025-08-09 RX ADMIN — SODIUM BICARBONATE 650 MG: 650 TABLET ORAL at 20:17

## 2025-08-09 RX ADMIN — MICONAZOLE NITRATE 1 APPLICATION: 20 POWDER TOPICAL at 08:40

## 2025-08-09 RX ADMIN — VANCOMYCIN HYDROCHLORIDE 125 MG: 125 CAPSULE ORAL at 12:22

## 2025-08-10 LAB
ANION GAP SERPL CALCULATED.3IONS-SCNC: 11 MMOL/L (ref 5–15)
BUN SERPL-MCNC: 12.4 MG/DL (ref 8–23)
BUN/CREAT SERPL: 7.1 (ref 7–25)
CALCIUM SPEC-SCNC: 8 MG/DL (ref 8.6–10.5)
CHLORIDE SERPL-SCNC: 104 MMOL/L (ref 98–107)
CO2 SERPL-SCNC: 22 MMOL/L (ref 22–29)
CREAT SERPL-MCNC: 1.74 MG/DL (ref 0.57–1)
DEPRECATED RDW RBC AUTO: 68.3 FL (ref 37–54)
EGFRCR SERPLBLD CKD-EPI 2021: 31.2 ML/MIN/1.73
ERYTHROCYTE [DISTWIDTH] IN BLOOD BY AUTOMATED COUNT: 17.9 % (ref 12.3–15.4)
GLUCOSE BLDC GLUCOMTR-MCNC: 121 MG/DL (ref 70–130)
GLUCOSE BLDC GLUCOMTR-MCNC: 125 MG/DL (ref 70–130)
GLUCOSE BLDC GLUCOMTR-MCNC: 133 MG/DL (ref 70–130)
GLUCOSE BLDC GLUCOMTR-MCNC: 159 MG/DL (ref 70–130)
GLUCOSE BLDC GLUCOMTR-MCNC: 166 MG/DL (ref 70–130)
GLUCOSE BLDC GLUCOMTR-MCNC: 213 MG/DL (ref 70–130)
GLUCOSE SERPL-MCNC: 116 MG/DL (ref 65–99)
HCT VFR BLD AUTO: 26 % (ref 34–46.6)
HGB BLD-MCNC: 8.3 G/DL (ref 12–15.9)
MCH RBC QN AUTO: 33.2 PG (ref 26.6–33)
MCHC RBC AUTO-ENTMCNC: 31.9 G/DL (ref 31.5–35.7)
MCV RBC AUTO: 104 FL (ref 79–97)
PLATELET # BLD AUTO: 129 10*3/MM3 (ref 140–450)
PMV BLD AUTO: 10.6 FL (ref 6–12)
POTASSIUM SERPL-SCNC: 5.1 MMOL/L (ref 3.5–5.2)
RBC # BLD AUTO: 2.5 10*6/MM3 (ref 3.77–5.28)
SODIUM SERPL-SCNC: 137 MMOL/L (ref 136–145)
WBC NRBC COR # BLD AUTO: 9.49 10*3/MM3 (ref 3.4–10.8)

## 2025-08-10 PROCEDURE — 85027 COMPLETE CBC AUTOMATED: CPT | Performed by: STUDENT IN AN ORGANIZED HEALTH CARE EDUCATION/TRAINING PROGRAM

## 2025-08-10 PROCEDURE — 82948 REAGENT STRIP/BLOOD GLUCOSE: CPT

## 2025-08-10 PROCEDURE — 36415 COLL VENOUS BLD VENIPUNCTURE: CPT | Performed by: STUDENT IN AN ORGANIZED HEALTH CARE EDUCATION/TRAINING PROGRAM

## 2025-08-10 PROCEDURE — 63710000001 INSULIN REGULAR HUMAN PER 5 UNITS

## 2025-08-10 PROCEDURE — 80048 BASIC METABOLIC PNL TOTAL CA: CPT | Performed by: STUDENT IN AN ORGANIZED HEALTH CARE EDUCATION/TRAINING PROGRAM

## 2025-08-10 RX ADMIN — Medication 10 ML: at 20:02

## 2025-08-10 RX ADMIN — HYDROCODONE BITARTRATE AND ACETAMINOPHEN 1 TABLET: 5; 325 TABLET ORAL at 03:33

## 2025-08-10 RX ADMIN — AMIODARONE HYDROCHLORIDE 200 MG: 200 TABLET ORAL at 08:13

## 2025-08-10 RX ADMIN — INSULIN HUMAN 2 UNITS: 100 INJECTION, SOLUTION PARENTERAL at 00:36

## 2025-08-10 RX ADMIN — HYDROCODONE BITARTRATE AND ACETAMINOPHEN 1 TABLET: 5; 325 TABLET ORAL at 20:01

## 2025-08-10 RX ADMIN — VANCOMYCIN HYDROCHLORIDE 125 MG: 125 CAPSULE ORAL at 05:53

## 2025-08-10 RX ADMIN — HYDROCODONE BITARTRATE AND ACETAMINOPHEN 1 TABLET: 5; 325 TABLET ORAL at 13:20

## 2025-08-10 RX ADMIN — VANCOMYCIN HYDROCHLORIDE 125 MG: 125 CAPSULE ORAL at 13:20

## 2025-08-10 RX ADMIN — FAMOTIDINE 10 MG: 20 TABLET, FILM COATED ORAL at 08:13

## 2025-08-10 RX ADMIN — SODIUM BICARBONATE 650 MG: 650 TABLET ORAL at 08:14

## 2025-08-10 RX ADMIN — INSULIN HUMAN 3 UNITS: 100 INJECTION, SOLUTION PARENTERAL at 17:16

## 2025-08-10 RX ADMIN — VANCOMYCIN HYDROCHLORIDE 125 MG: 125 CAPSULE ORAL at 00:29

## 2025-08-10 RX ADMIN — GABAPENTIN 100 MG: 100 CAPSULE ORAL at 20:01

## 2025-08-10 RX ADMIN — MICONAZOLE NITRATE 1 APPLICATION: 20 POWDER TOPICAL at 08:16

## 2025-08-10 RX ADMIN — SODIUM BICARBONATE 650 MG: 650 TABLET ORAL at 20:01

## 2025-08-10 RX ADMIN — SODIUM BICARBONATE 650 MG: 650 TABLET ORAL at 17:16

## 2025-08-10 RX ADMIN — VANCOMYCIN HYDROCHLORIDE 125 MG: 125 CAPSULE ORAL at 23:35

## 2025-08-10 RX ADMIN — VANCOMYCIN HYDROCHLORIDE 125 MG: 125 CAPSULE ORAL at 17:16

## 2025-08-10 RX ADMIN — METOPROLOL TARTRATE 25 MG: 25 TABLET, FILM COATED ORAL at 20:02

## 2025-08-10 RX ADMIN — MICONAZOLE NITRATE 1 APPLICATION: 20 POWDER TOPICAL at 20:03

## 2025-08-10 RX ADMIN — LIDOCAINE 1 PATCH: 4 PATCH TOPICAL at 08:16

## 2025-08-10 RX ADMIN — METOPROLOL TARTRATE 25 MG: 25 TABLET, FILM COATED ORAL at 08:14

## 2025-08-10 RX ADMIN — Medication 10 ML: at 08:16

## 2025-08-11 PROBLEM — Z99.2 CKD (CHRONIC KIDNEY DISEASE) STAGE V REQUIRING CHRONIC DIALYSIS: Status: ACTIVE | Noted: 2025-08-11

## 2025-08-11 PROBLEM — N18.6 CKD (CHRONIC KIDNEY DISEASE) STAGE V REQUIRING CHRONIC DIALYSIS: Status: ACTIVE | Noted: 2025-08-11

## 2025-08-11 LAB
ANION GAP SERPL CALCULATED.3IONS-SCNC: 9 MMOL/L (ref 5–15)
BUN SERPL-MCNC: 15.6 MG/DL (ref 8–23)
BUN/CREAT SERPL: 8.2 (ref 7–25)
CALCIUM SPEC-SCNC: 7.9 MG/DL (ref 8.6–10.5)
CHLORIDE SERPL-SCNC: 103 MMOL/L (ref 98–107)
CO2 SERPL-SCNC: 24 MMOL/L (ref 22–29)
CREAT SERPL-MCNC: 1.91 MG/DL (ref 0.57–1)
DEPRECATED RDW RBC AUTO: 72 FL (ref 37–54)
EGFRCR SERPLBLD CKD-EPI 2021: 27.9 ML/MIN/1.73
ERYTHROCYTE [DISTWIDTH] IN BLOOD BY AUTOMATED COUNT: 18.1 % (ref 12.3–15.4)
FERRITIN SERPL-MCNC: 209.3 NG/ML (ref 13–150)
GLUCOSE BLDC GLUCOMTR-MCNC: 133 MG/DL (ref 70–130)
GLUCOSE BLDC GLUCOMTR-MCNC: 138 MG/DL (ref 70–130)
GLUCOSE BLDC GLUCOMTR-MCNC: 143 MG/DL (ref 70–130)
GLUCOSE BLDC GLUCOMTR-MCNC: 144 MG/DL (ref 70–130)
GLUCOSE BLDC GLUCOMTR-MCNC: 182 MG/DL (ref 70–130)
GLUCOSE SERPL-MCNC: 144 MG/DL (ref 65–99)
HCT VFR BLD AUTO: 28.6 % (ref 34–46.6)
HGB BLD-MCNC: 8.6 G/DL (ref 12–15.9)
IRON 24H UR-MRATE: 19 MCG/DL (ref 37–145)
IRON SATN MFR SERPL: 11 % (ref 20–50)
MCH RBC QN AUTO: 32.7 PG (ref 26.6–33)
MCHC RBC AUTO-ENTMCNC: 30.1 G/DL (ref 31.5–35.7)
MCV RBC AUTO: 108.7 FL (ref 79–97)
PLATELET # BLD AUTO: 129 10*3/MM3 (ref 140–450)
PMV BLD AUTO: 11 FL (ref 6–12)
POTASSIUM SERPL-SCNC: 4.3 MMOL/L (ref 3.5–5.2)
RBC # BLD AUTO: 2.63 10*6/MM3 (ref 3.77–5.28)
RETICS # AUTO: 0.09 10*6/MM3 (ref 0.02–0.13)
RETICS/RBC NFR AUTO: 3.5 % (ref 0.7–1.9)
SODIUM SERPL-SCNC: 136 MMOL/L (ref 136–145)
TIBC SERPL-MCNC: 174 MCG/DL (ref 298–536)
TRANSFERRIN SERPL-MCNC: 117 MG/DL (ref 200–360)
WBC NRBC COR # BLD AUTO: 7.51 10*3/MM3 (ref 3.4–10.8)

## 2025-08-11 PROCEDURE — 85045 AUTOMATED RETICULOCYTE COUNT: CPT | Performed by: FAMILY MEDICINE

## 2025-08-11 PROCEDURE — 83540 ASSAY OF IRON: CPT | Performed by: FAMILY MEDICINE

## 2025-08-11 PROCEDURE — 82948 REAGENT STRIP/BLOOD GLUCOSE: CPT

## 2025-08-11 PROCEDURE — 63710000001 INSULIN REGULAR HUMAN PER 5 UNITS

## 2025-08-11 PROCEDURE — 84466 ASSAY OF TRANSFERRIN: CPT | Performed by: FAMILY MEDICINE

## 2025-08-11 PROCEDURE — 97161 PT EVAL LOW COMPLEX 20 MIN: CPT | Performed by: PHYSICAL THERAPIST

## 2025-08-11 PROCEDURE — 82728 ASSAY OF FERRITIN: CPT | Performed by: FAMILY MEDICINE

## 2025-08-11 PROCEDURE — 80048 BASIC METABOLIC PNL TOTAL CA: CPT | Performed by: STUDENT IN AN ORGANIZED HEALTH CARE EDUCATION/TRAINING PROGRAM

## 2025-08-11 PROCEDURE — 97165 OT EVAL LOW COMPLEX 30 MIN: CPT | Performed by: OCCUPATIONAL THERAPIST

## 2025-08-11 PROCEDURE — 36415 COLL VENOUS BLD VENIPUNCTURE: CPT | Performed by: STUDENT IN AN ORGANIZED HEALTH CARE EDUCATION/TRAINING PROGRAM

## 2025-08-11 PROCEDURE — 85027 COMPLETE CBC AUTOMATED: CPT | Performed by: STUDENT IN AN ORGANIZED HEALTH CARE EDUCATION/TRAINING PROGRAM

## 2025-08-11 RX ADMIN — HYDROCODONE BITARTRATE AND ACETAMINOPHEN 1 TABLET: 5; 325 TABLET ORAL at 14:41

## 2025-08-11 RX ADMIN — VANCOMYCIN HYDROCHLORIDE 125 MG: 125 CAPSULE ORAL at 23:29

## 2025-08-11 RX ADMIN — HYDROCODONE BITARTRATE AND ACETAMINOPHEN 1 TABLET: 5; 325 TABLET ORAL at 01:34

## 2025-08-11 RX ADMIN — VANCOMYCIN HYDROCHLORIDE 125 MG: 125 CAPSULE ORAL at 12:16

## 2025-08-11 RX ADMIN — VANCOMYCIN HYDROCHLORIDE 125 MG: 125 CAPSULE ORAL at 05:00

## 2025-08-11 RX ADMIN — SODIUM BICARBONATE 650 MG: 650 TABLET ORAL at 07:54

## 2025-08-11 RX ADMIN — METOPROLOL TARTRATE 25 MG: 25 TABLET, FILM COATED ORAL at 20:54

## 2025-08-11 RX ADMIN — LIDOCAINE 1 PATCH: 4 PATCH TOPICAL at 07:55

## 2025-08-11 RX ADMIN — FAMOTIDINE 10 MG: 20 TABLET, FILM COATED ORAL at 07:54

## 2025-08-11 RX ADMIN — Medication 10 ML: at 20:54

## 2025-08-11 RX ADMIN — SODIUM BICARBONATE 650 MG: 650 TABLET ORAL at 17:19

## 2025-08-11 RX ADMIN — HYDROCODONE BITARTRATE AND ACETAMINOPHEN 1 TABLET: 5; 325 TABLET ORAL at 20:54

## 2025-08-11 RX ADMIN — VANCOMYCIN HYDROCHLORIDE 125 MG: 125 CAPSULE ORAL at 17:19

## 2025-08-11 RX ADMIN — Medication 10 ML: at 08:10

## 2025-08-11 RX ADMIN — GABAPENTIN 100 MG: 100 CAPSULE ORAL at 20:54

## 2025-08-11 RX ADMIN — INSULIN HUMAN 2 UNITS: 100 INJECTION, SOLUTION PARENTERAL at 17:18

## 2025-08-11 RX ADMIN — MICONAZOLE NITRATE 1 APPLICATION: 20 POWDER TOPICAL at 20:54

## 2025-08-11 RX ADMIN — SODIUM BICARBONATE 650 MG: 650 TABLET ORAL at 20:54

## 2025-08-11 RX ADMIN — HYDROCODONE BITARTRATE AND ACETAMINOPHEN 1 TABLET: 5; 325 TABLET ORAL at 07:52

## 2025-08-11 RX ADMIN — AMIODARONE HYDROCHLORIDE 200 MG: 200 TABLET ORAL at 07:54

## 2025-08-11 RX ADMIN — MICONAZOLE NITRATE 1 APPLICATION: 20 POWDER TOPICAL at 07:58

## 2025-08-12 LAB
ALBUMIN SERPL-MCNC: 2.6 G/DL (ref 3.5–5.2)
ALBUMIN/GLOB SERPL: 1.1 G/DL
ALP SERPL-CCNC: 98 U/L (ref 39–117)
ALT SERPL W P-5'-P-CCNC: 9 U/L (ref 1–33)
ANION GAP SERPL CALCULATED.3IONS-SCNC: 9 MMOL/L (ref 5–15)
AST SERPL-CCNC: 14 U/L (ref 1–32)
BACTERIA SPEC AEROBE CULT: NORMAL
BACTERIA SPEC AEROBE CULT: NORMAL
BASOPHILS # BLD AUTO: 0.07 10*3/MM3 (ref 0–0.2)
BASOPHILS NFR BLD AUTO: 1.1 % (ref 0–1.5)
BILIRUB SERPL-MCNC: 0.3 MG/DL (ref 0–1.2)
BUN SERPL-MCNC: 17.2 MG/DL (ref 8–23)
BUN/CREAT SERPL: 9.1 (ref 7–25)
CALCIUM SPEC-SCNC: 8 MG/DL (ref 8.6–10.5)
CHLORIDE SERPL-SCNC: 103 MMOL/L (ref 98–107)
CHOLEST SERPL-MCNC: 100 MG/DL (ref 0–200)
CO2 SERPL-SCNC: 24 MMOL/L (ref 22–29)
CREAT SERPL-MCNC: 1.9 MG/DL (ref 0.57–1)
DEPRECATED RDW RBC AUTO: 70.4 FL (ref 37–54)
EGFRCR SERPLBLD CKD-EPI 2021: 28.1 ML/MIN/1.73
EOSINOPHIL # BLD AUTO: 0.31 10*3/MM3 (ref 0–0.4)
EOSINOPHIL NFR BLD AUTO: 4.9 % (ref 0.3–6.2)
ERYTHROCYTE [DISTWIDTH] IN BLOOD BY AUTOMATED COUNT: 17.6 % (ref 12.3–15.4)
FOLATE SERPL-MCNC: 3.79 NG/ML (ref 4.78–24.2)
GLOBULIN UR ELPH-MCNC: 2.3 GM/DL
GLUCOSE BLDC GLUCOMTR-MCNC: 108 MG/DL (ref 70–130)
GLUCOSE BLDC GLUCOMTR-MCNC: 124 MG/DL (ref 70–130)
GLUCOSE BLDC GLUCOMTR-MCNC: 136 MG/DL (ref 70–130)
GLUCOSE BLDC GLUCOMTR-MCNC: 162 MG/DL (ref 70–130)
GLUCOSE BLDC GLUCOMTR-MCNC: 181 MG/DL (ref 70–130)
GLUCOSE SERPL-MCNC: 145 MG/DL (ref 65–99)
HBA1C MFR BLD: 6.4 % (ref 4.8–5.6)
HCT VFR BLD AUTO: 29.1 % (ref 34–46.6)
HDLC SERPL-MCNC: 59 MG/DL (ref 40–60)
HGB BLD-MCNC: 8.6 G/DL (ref 12–15.9)
IMM GRANULOCYTES # BLD AUTO: 0.04 10*3/MM3 (ref 0–0.05)
IMM GRANULOCYTES NFR BLD AUTO: 0.6 % (ref 0–0.5)
LDLC SERPL CALC-MCNC: 24 MG/DL (ref 0–100)
LDLC/HDLC SERPL: 0.41 {RATIO}
LYMPHOCYTES # BLD AUTO: 0.97 10*3/MM3 (ref 0.7–3.1)
LYMPHOCYTES NFR BLD AUTO: 15.5 % (ref 19.6–45.3)
MCH RBC QN AUTO: 32.2 PG (ref 26.6–33)
MCHC RBC AUTO-ENTMCNC: 29.6 G/DL (ref 31.5–35.7)
MCV RBC AUTO: 109 FL (ref 79–97)
MONOCYTES # BLD AUTO: 0.86 10*3/MM3 (ref 0.1–0.9)
MONOCYTES NFR BLD AUTO: 13.7 % (ref 5–12)
NEUTROPHILS NFR BLD AUTO: 4.02 10*3/MM3 (ref 1.7–7)
NEUTROPHILS NFR BLD AUTO: 64.2 % (ref 42.7–76)
NRBC BLD AUTO-RTO: 0 /100 WBC (ref 0–0.2)
PLATELET # BLD AUTO: 145 10*3/MM3 (ref 140–450)
PMV BLD AUTO: 10.5 FL (ref 6–12)
POTASSIUM SERPL-SCNC: 4.4 MMOL/L (ref 3.5–5.2)
PROT SERPL-MCNC: 4.9 G/DL (ref 6–8.5)
RBC # BLD AUTO: 2.67 10*6/MM3 (ref 3.77–5.28)
SODIUM SERPL-SCNC: 136 MMOL/L (ref 136–145)
T4 FREE SERPL-MCNC: 1.2 NG/DL (ref 0.92–1.68)
T4 FREE SERPL-MCNC: 1.21 NG/DL (ref 0.92–1.68)
TRIGL SERPL-MCNC: 85 MG/DL (ref 0–150)
TSH SERPL DL<=0.05 MIU/L-ACNC: 6.08 UIU/ML (ref 0.27–4.2)
VIT B12 BLD-MCNC: 302 PG/ML (ref 211–946)
VLDLC SERPL-MCNC: 17 MG/DL (ref 5–40)
WBC NRBC COR # BLD AUTO: 6.27 10*3/MM3 (ref 3.4–10.8)

## 2025-08-12 PROCEDURE — 80061 LIPID PANEL: CPT | Performed by: FAMILY MEDICINE

## 2025-08-12 PROCEDURE — 97535 SELF CARE MNGMENT TRAINING: CPT | Performed by: OCCUPATIONAL THERAPIST

## 2025-08-12 PROCEDURE — 25810000003 SODIUM CHLORIDE 0.9 % SOLUTION: Performed by: INTERNAL MEDICINE

## 2025-08-12 PROCEDURE — 82607 VITAMIN B-12: CPT | Performed by: FAMILY MEDICINE

## 2025-08-12 PROCEDURE — 84443 ASSAY THYROID STIM HORMONE: CPT | Performed by: FAMILY MEDICINE

## 2025-08-12 PROCEDURE — 83036 HEMOGLOBIN GLYCOSYLATED A1C: CPT | Performed by: FAMILY MEDICINE

## 2025-08-12 PROCEDURE — 05PYX3Z REMOVAL OF INFUSION DEVICE FROM UPPER VEIN, EXTERNAL APPROACH: ICD-10-PCS | Performed by: STUDENT IN AN ORGANIZED HEALTH CARE EDUCATION/TRAINING PROGRAM

## 2025-08-12 PROCEDURE — 85025 COMPLETE CBC W/AUTO DIFF WBC: CPT | Performed by: FAMILY MEDICINE

## 2025-08-12 PROCEDURE — 25010000002 NA FERRIC GLUC CPLX PER 12.5 MG: Performed by: INTERNAL MEDICINE

## 2025-08-12 PROCEDURE — 25010000002 EPOETIN ALFA-EPBX 10000 UNIT/ML SOLUTION: Performed by: FAMILY MEDICINE

## 2025-08-12 PROCEDURE — 63710000001 INSULIN REGULAR HUMAN PER 5 UNITS

## 2025-08-12 PROCEDURE — 84439 ASSAY OF FREE THYROXINE: CPT | Performed by: FAMILY MEDICINE

## 2025-08-12 PROCEDURE — 97116 GAIT TRAINING THERAPY: CPT

## 2025-08-12 PROCEDURE — 80053 COMPREHEN METABOLIC PANEL: CPT | Performed by: FAMILY MEDICINE

## 2025-08-12 PROCEDURE — 82746 ASSAY OF FOLIC ACID SERUM: CPT | Performed by: FAMILY MEDICINE

## 2025-08-12 PROCEDURE — 97530 THERAPEUTIC ACTIVITIES: CPT

## 2025-08-12 PROCEDURE — 82948 REAGENT STRIP/BLOOD GLUCOSE: CPT

## 2025-08-12 RX ORDER — IRON POLYSACCHARIDE COMPLEX 150 MG
150 CAPSULE ORAL DAILY
Status: DISCONTINUED | OUTPATIENT
Start: 2025-08-12 | End: 2025-08-12

## 2025-08-12 RX ADMIN — Medication 10 ML: at 08:44

## 2025-08-12 RX ADMIN — EPOETIN ALFA-EPBX 10000 UNITS: 10000 INJECTION, SOLUTION INTRAVENOUS; SUBCUTANEOUS at 11:18

## 2025-08-12 RX ADMIN — INSULIN HUMAN 2 UNITS: 100 INJECTION, SOLUTION PARENTERAL at 17:26

## 2025-08-12 RX ADMIN — Medication 150 MG: at 11:18

## 2025-08-12 RX ADMIN — Medication 10 ML: at 20:04

## 2025-08-12 RX ADMIN — HYDROCODONE BITARTRATE AND ACETAMINOPHEN 1 TABLET: 5; 325 TABLET ORAL at 20:33

## 2025-08-12 RX ADMIN — GABAPENTIN 100 MG: 100 CAPSULE ORAL at 20:04

## 2025-08-12 RX ADMIN — SODIUM BICARBONATE 650 MG: 650 TABLET ORAL at 08:44

## 2025-08-12 RX ADMIN — HYDROCODONE BITARTRATE AND ACETAMINOPHEN 1 TABLET: 5; 325 TABLET ORAL at 14:10

## 2025-08-12 RX ADMIN — LIDOCAINE 1 PATCH: 4 PATCH TOPICAL at 08:44

## 2025-08-12 RX ADMIN — VANCOMYCIN HYDROCHLORIDE 125 MG: 125 CAPSULE ORAL at 11:18

## 2025-08-12 RX ADMIN — METOPROLOL TARTRATE 25 MG: 25 TABLET, FILM COATED ORAL at 08:43

## 2025-08-12 RX ADMIN — MICONAZOLE NITRATE 1 APPLICATION: 20 POWDER TOPICAL at 08:43

## 2025-08-12 RX ADMIN — FAMOTIDINE 10 MG: 20 TABLET, FILM COATED ORAL at 08:43

## 2025-08-12 RX ADMIN — HYDROCODONE BITARTRATE AND ACETAMINOPHEN 1 TABLET: 5; 325 TABLET ORAL at 02:53

## 2025-08-12 RX ADMIN — SODIUM CHLORIDE 250 MG: 9 INJECTION, SOLUTION INTRAVENOUS at 17:14

## 2025-08-12 RX ADMIN — APIXABAN 2.5 MG: 2.5 TABLET, FILM COATED ORAL at 20:04

## 2025-08-12 RX ADMIN — VANCOMYCIN HYDROCHLORIDE 125 MG: 125 CAPSULE ORAL at 05:25

## 2025-08-12 RX ADMIN — AMIODARONE HYDROCHLORIDE 200 MG: 200 TABLET ORAL at 08:43

## 2025-08-12 RX ADMIN — SODIUM BICARBONATE 650 MG: 650 TABLET ORAL at 14:10

## 2025-08-12 RX ADMIN — MICONAZOLE NITRATE 1 APPLICATION: 20 POWDER TOPICAL at 20:34

## 2025-08-12 RX ADMIN — HYDROCODONE BITARTRATE AND ACETAMINOPHEN 1 TABLET: 5; 325 TABLET ORAL at 08:43

## 2025-08-12 RX ADMIN — METOPROLOL TARTRATE 25 MG: 25 TABLET, FILM COATED ORAL at 20:04

## 2025-08-12 RX ADMIN — VANCOMYCIN HYDROCHLORIDE 125 MG: 125 CAPSULE ORAL at 17:14

## 2025-08-12 RX ADMIN — SODIUM BICARBONATE 650 MG: 650 TABLET ORAL at 20:04

## 2025-08-13 VITALS
OXYGEN SATURATION: 100 % | TEMPERATURE: 97.4 F | HEIGHT: 65 IN | SYSTOLIC BLOOD PRESSURE: 143 MMHG | RESPIRATION RATE: 18 BRPM | HEART RATE: 70 BPM | DIASTOLIC BLOOD PRESSURE: 62 MMHG | BODY MASS INDEX: 39.04 KG/M2 | WEIGHT: 234.3 LBS

## 2025-08-13 LAB
ALBUMIN SERPL-MCNC: 2.8 G/DL (ref 3.5–5.2)
ALBUMIN/GLOB SERPL: 1.1 G/DL
ALP SERPL-CCNC: 103 U/L (ref 39–117)
ALT SERPL W P-5'-P-CCNC: 11 U/L (ref 1–33)
ANION GAP SERPL CALCULATED.3IONS-SCNC: 10 MMOL/L (ref 5–15)
AST SERPL-CCNC: 18 U/L (ref 1–32)
BASOPHILS # BLD AUTO: 0.08 10*3/MM3 (ref 0–0.2)
BASOPHILS NFR BLD AUTO: 1.4 % (ref 0–1.5)
BILIRUB SERPL-MCNC: 0.3 MG/DL (ref 0–1.2)
BUN SERPL-MCNC: 19.8 MG/DL (ref 8–23)
BUN/CREAT SERPL: 9.8 (ref 7–25)
CALCIUM SPEC-SCNC: 8 MG/DL (ref 8.6–10.5)
CHLORIDE SERPL-SCNC: 102 MMOL/L (ref 98–107)
CO2 SERPL-SCNC: 24 MMOL/L (ref 22–29)
CREAT SERPL-MCNC: 2.03 MG/DL (ref 0.57–1)
DEPRECATED RDW RBC AUTO: 67.7 FL (ref 37–54)
EGFRCR SERPLBLD CKD-EPI 2021: 26 ML/MIN/1.73
EOSINOPHIL # BLD AUTO: 0.39 10*3/MM3 (ref 0–0.4)
EOSINOPHIL NFR BLD AUTO: 6.9 % (ref 0.3–6.2)
ERYTHROCYTE [DISTWIDTH] IN BLOOD BY AUTOMATED COUNT: 17.1 % (ref 12.3–15.4)
GLOBULIN UR ELPH-MCNC: 2.6 GM/DL
GLUCOSE BLDC GLUCOMTR-MCNC: 114 MG/DL (ref 70–130)
GLUCOSE BLDC GLUCOMTR-MCNC: 119 MG/DL (ref 70–130)
GLUCOSE BLDC GLUCOMTR-MCNC: 136 MG/DL (ref 70–130)
GLUCOSE SERPL-MCNC: 115 MG/DL (ref 65–99)
HCT VFR BLD AUTO: 29.9 % (ref 34–46.6)
HGB BLD-MCNC: 9.1 G/DL (ref 12–15.9)
IMM GRANULOCYTES # BLD AUTO: 0.03 10*3/MM3 (ref 0–0.05)
IMM GRANULOCYTES NFR BLD AUTO: 0.5 % (ref 0–0.5)
LYMPHOCYTES # BLD AUTO: 0.86 10*3/MM3 (ref 0.7–3.1)
LYMPHOCYTES NFR BLD AUTO: 15.1 % (ref 19.6–45.3)
MCH RBC QN AUTO: 32.4 PG (ref 26.6–33)
MCHC RBC AUTO-ENTMCNC: 30.4 G/DL (ref 31.5–35.7)
MCV RBC AUTO: 106.4 FL (ref 79–97)
MONOCYTES # BLD AUTO: 0.88 10*3/MM3 (ref 0.1–0.9)
MONOCYTES NFR BLD AUTO: 15.5 % (ref 5–12)
NEUTROPHILS NFR BLD AUTO: 3.44 10*3/MM3 (ref 1.7–7)
NEUTROPHILS NFR BLD AUTO: 60.6 % (ref 42.7–76)
PLATELET # BLD AUTO: 155 10*3/MM3 (ref 140–450)
PMV BLD AUTO: 10.5 FL (ref 6–12)
POTASSIUM SERPL-SCNC: 4.6 MMOL/L (ref 3.5–5.2)
PROT SERPL-MCNC: 5.4 G/DL (ref 6–8.5)
RBC # BLD AUTO: 2.81 10*6/MM3 (ref 3.77–5.28)
SODIUM SERPL-SCNC: 136 MMOL/L (ref 136–145)
WBC NRBC COR # BLD AUTO: 5.68 10*3/MM3 (ref 3.4–10.8)

## 2025-08-13 PROCEDURE — 82948 REAGENT STRIP/BLOOD GLUCOSE: CPT

## 2025-08-13 PROCEDURE — 80053 COMPREHEN METABOLIC PANEL: CPT | Performed by: INTERNAL MEDICINE

## 2025-08-13 PROCEDURE — 85025 COMPLETE CBC W/AUTO DIFF WBC: CPT | Performed by: INTERNAL MEDICINE

## 2025-08-13 RX ORDER — AMIODARONE HYDROCHLORIDE 200 MG/1
200 TABLET ORAL DAILY
Start: 2025-08-14

## 2025-08-13 RX ORDER — METOPROLOL TARTRATE 25 MG/1
25 TABLET, FILM COATED ORAL EVERY 12 HOURS SCHEDULED
Start: 2025-08-13

## 2025-08-13 RX ORDER — VANCOMYCIN HYDROCHLORIDE 125 MG/1
125 CAPSULE ORAL EVERY 6 HOURS SCHEDULED
Qty: 19 CAPSULE | Refills: 0 | Status: SHIPPED | OUTPATIENT
Start: 2025-08-13 | End: 2025-08-18

## 2025-08-13 RX ORDER — GABAPENTIN 100 MG/1
100 CAPSULE ORAL NIGHTLY
Qty: 30 CAPSULE | Refills: 0 | Status: SHIPPED | OUTPATIENT
Start: 2025-08-13

## 2025-08-13 RX ORDER — LIDOCAINE 4 G/G
1 PATCH TOPICAL
Start: 2025-08-14

## 2025-08-13 RX ORDER — SODIUM BICARBONATE 650 MG/1
650 TABLET ORAL 2 TIMES DAILY
Start: 2025-08-13

## 2025-08-13 RX ORDER — FAMOTIDINE 10 MG
10 TABLET ORAL DAILY
Start: 2025-08-14

## 2025-08-13 RX ADMIN — FAMOTIDINE 10 MG: 20 TABLET, FILM COATED ORAL at 08:27

## 2025-08-13 RX ADMIN — METOPROLOL TARTRATE 25 MG: 25 TABLET, FILM COATED ORAL at 08:27

## 2025-08-13 RX ADMIN — SODIUM BICARBONATE 650 MG: 650 TABLET ORAL at 08:27

## 2025-08-13 RX ADMIN — VANCOMYCIN HYDROCHLORIDE 125 MG: 125 CAPSULE ORAL at 00:27

## 2025-08-13 RX ADMIN — HYDROCODONE BITARTRATE AND ACETAMINOPHEN 1 TABLET: 5; 325 TABLET ORAL at 02:35

## 2025-08-13 RX ADMIN — Medication 10 ML: at 08:28

## 2025-08-13 RX ADMIN — VANCOMYCIN HYDROCHLORIDE 125 MG: 125 CAPSULE ORAL at 05:06

## 2025-08-13 RX ADMIN — MICONAZOLE NITRATE 1 APPLICATION: 20 POWDER TOPICAL at 08:28

## 2025-08-13 RX ADMIN — LIDOCAINE 1 PATCH: 4 PATCH TOPICAL at 08:27

## 2025-08-13 RX ADMIN — AMIODARONE HYDROCHLORIDE 200 MG: 200 TABLET ORAL at 08:28

## 2025-08-13 RX ADMIN — APIXABAN 2.5 MG: 2.5 TABLET, FILM COATED ORAL at 08:27

## 2025-08-15 ENCOUNTER — APPOINTMENT (OUTPATIENT)
Dept: GENERAL RADIOLOGY | Facility: HOSPITAL | Age: 70
End: 2025-08-15
Payer: MEDICARE

## 2025-08-15 ENCOUNTER — APPOINTMENT (OUTPATIENT)
Dept: CT IMAGING | Facility: HOSPITAL | Age: 70
End: 2025-08-15
Payer: MEDICARE

## 2025-08-15 ENCOUNTER — HOSPITAL ENCOUNTER (EMERGENCY)
Facility: HOSPITAL | Age: 70
Discharge: HOME | End: 2025-08-15
Attending: STUDENT IN AN ORGANIZED HEALTH CARE EDUCATION/TRAINING PROGRAM
Payer: MEDICARE

## 2025-08-15 VITALS
SYSTOLIC BLOOD PRESSURE: 149 MMHG | HEART RATE: 69 BPM | WEIGHT: 232.5 LBS | DIASTOLIC BLOOD PRESSURE: 66 MMHG | HEIGHT: 65 IN | TEMPERATURE: 98.3 F | RESPIRATION RATE: 18 BRPM | OXYGEN SATURATION: 97 % | BODY MASS INDEX: 38.74 KG/M2

## 2025-08-15 DIAGNOSIS — R09.89 PULMONARY VASCULAR CONGESTION: Primary | ICD-10-CM

## 2025-08-15 DIAGNOSIS — R07.9 CHEST PAIN, UNSPECIFIED TYPE: ICD-10-CM

## 2025-08-15 LAB
ALBUMIN SERPL-MCNC: 2.7 G/DL (ref 3.5–5.2)
ALBUMIN/GLOB SERPL: 1.2 G/DL
ALP SERPL-CCNC: 97 U/L (ref 39–117)
ALT SERPL W P-5'-P-CCNC: 9 U/L (ref 1–33)
ANION GAP SERPL CALCULATED.3IONS-SCNC: 12 MMOL/L (ref 5–15)
AST SERPL-CCNC: 14 U/L (ref 1–32)
BASOPHILS # BLD AUTO: 0.06 10*3/MM3 (ref 0–0.2)
BASOPHILS NFR BLD AUTO: 0.9 % (ref 0–1.5)
BILIRUB SERPL-MCNC: 0.3 MG/DL (ref 0–1.2)
BUN SERPL-MCNC: 17.2 MG/DL (ref 8–23)
BUN/CREAT SERPL: 8.8 (ref 7–25)
CALCIUM SPEC-SCNC: 7.5 MG/DL (ref 8.6–10.5)
CHLORIDE SERPL-SCNC: 103 MMOL/L (ref 98–107)
CO2 SERPL-SCNC: 23 MMOL/L (ref 22–29)
CREAT SERPL-MCNC: 1.95 MG/DL (ref 0.57–1)
CV ZIO AF AVG BPM: 124 BPM
CV ZIO AF BPM HIGH: 189 BPM
CV ZIO AF BPM LOW: 76 BPM
CV ZIO AF F EPI AVG BPM: 142 BPM
CV ZIO AF F EPI BPM HIGH: 189 BPM
CV ZIO AF F EPI BPM LOW: 81 BPM
CV ZIO AF F EPI DT: NORMAL
CV ZIO AF L EPI AVG BPM: 127 BPM
CV ZIO AF L EPI BPM HIGH: 152 BPM
CV ZIO AF L EPI BPM LOW: 103 BPM
CV ZIO AF L EPI DUR: NORMAL SEC
CV ZIO AF L EPI END: NORMAL
CV ZIO AF L EPI START: NORMAL
CV ZIO AF PERCENT: 20 %
CV ZIO AF S EPI AVG BPM: 95 BPM
CV ZIO AF S EPI BPM HIGH: 115 BPM
CV ZIO AF S EPI BPM LOW: 76 BPM
CV ZIO AF S EPI DT: NORMAL
CV ZIO AF SYMPT IN PT: NORMAL
CV ZIO BASELINE AVG BPM: 83 BPM
CV ZIO BASELINE BPM HIGH: 189 BPM
CV ZIO BASELINE BPM LOW: 29 BPM
CV ZIO DEVICE ANALYSIS TIME: NORMAL
CV ZIO ECT SVE COUNT: NORMAL EPISODES
CV ZIO ECT SVE CPLT COUNT: 485 EPISODES
CV ZIO ECT SVE CPLT FREQ: NORMAL
CV ZIO ECT SVE FREQ: NORMAL
CV ZIO ECT SVE TPLT COUNT: 653 EPISODES
CV ZIO ECT SVE TPLT FREQ: NORMAL
CV ZIO ECT VE COUNT: 5972 EPISODES
CV ZIO ECT VE CPLT COUNT: 120 EPISODES
CV ZIO ECT VE CPLT FREQ: NORMAL
CV ZIO ECT VE FREQ: NORMAL
CV ZIO ECT VE TPLT COUNT: 1 EPISODES
CV ZIO ECT VE TPLT FREQ: NORMAL
CV ZIO ECTOPIC SVE COUPLET RAW PERCENT: 0.14 %
CV ZIO ECTOPIC SVE ISOLATED PERCENT: 1.82 %
CV ZIO ECTOPIC SVE TRIPLET RAW PERCENT: 0.29 %
CV ZIO ECTOPIC VE COUPLET RAW PERCENT: 0.04 %
CV ZIO ECTOPIC VE ISOLATED PERCENT: 0.88 %
CV ZIO ECTOPIC VE TRIPLET RAW PERCENT: 0 %
CV ZIO ENROLLMENT END: NORMAL
CV ZIO ENROLLMENT START: NORMAL
CV ZIO IRREG TYPE: NORMAL
CV ZIO L BIGEMINY DUR: 4.2 SEC
CV ZIO L BIGEMINY END: NORMAL
CV ZIO L BIGEMINY START: NORMAL
CV ZIO L TRIGEMINY DUR: 4.9 SEC
CV ZIO L TRIGEMINY END: NORMAL
CV ZIO L TRIGEMINY START: NORMAL
CV ZIO PATIENT EVENTS DIARIES: 0
CV ZIO PATIENT EVENTS TRIGGERS: 4
CV ZIO PAUSE COUNT: 0
CV ZIO PRESCRIPTION STATUS: NORMAL
CV ZIO SVT COUNT: 0
CV ZIO TOTAL  ENROLLMENT PERIOD: NORMAL
CV ZIO VT AVG BPM: 174 BPM
CV ZIO VT BPM HIGH: 185 BPM
CV ZIO VT BPM LOW: 158 BPM
CV ZIO VT COUNT: 3
CV ZIO VT F EPI AVG BPM: 168
CV ZIO VT F EPI BEATS: 5 BEATS
CV ZIO VT F EPI BPM HIGH: 185
CV ZIO VT F EPI BPM LOW: 158
CV ZIO VT F EPI DUR: 1.9 SEC
CV ZIO VT F EPI END: NORMAL
CV ZIO VT F EPI START: NORMAL
CV ZIO VT L EPI AVG BPM: 168
CV ZIO VT L EPI BEATS: 5 BEATS
CV ZIO VT L EPI BPM HIGH: 185 BPM
CV ZIO VT L EPI BPM LOW: 158 BPM
CV ZIO VT L EPI DUR: 1.9
CV ZIO VT L EPI END: NORMAL
CV ZIO VT L EPI START: NORMAL
D DIMER PPP FEU-MCNC: 1.75 MCGFEU/ML (ref 0–0.7)
DEPRECATED RDW RBC AUTO: 65.6 FL (ref 37–54)
EGFRCR SERPLBLD CKD-EPI 2021: 27.3 ML/MIN/1.73
EOSINOPHIL # BLD AUTO: 0.23 10*3/MM3 (ref 0–0.4)
EOSINOPHIL NFR BLD AUTO: 3.4 % (ref 0.3–6.2)
ERYTHROCYTE [DISTWIDTH] IN BLOOD BY AUTOMATED COUNT: 17.1 % (ref 12.3–15.4)
GEN 5 1HR TROPONIN T REFLEX: 87 NG/L
GLOBULIN UR ELPH-MCNC: 2.2 GM/DL
GLUCOSE SERPL-MCNC: 91 MG/DL (ref 65–99)
HCT VFR BLD AUTO: 25.8 % (ref 34–46.6)
HGB BLD-MCNC: 8 G/DL (ref 12–15.9)
HOLD SPECIMEN: NORMAL
HOLD SPECIMEN: NORMAL
IMM GRANULOCYTES # BLD AUTO: 0.01 10*3/MM3 (ref 0–0.05)
IMM GRANULOCYTES NFR BLD AUTO: 0.1 % (ref 0–0.5)
LYMPHOCYTES # BLD AUTO: 1.05 10*3/MM3 (ref 0.7–3.1)
LYMPHOCYTES NFR BLD AUTO: 15.5 % (ref 19.6–45.3)
MCH RBC QN AUTO: 32 PG (ref 26.6–33)
MCHC RBC AUTO-ENTMCNC: 31 G/DL (ref 31.5–35.7)
MCV RBC AUTO: 103.2 FL (ref 79–97)
MONOCYTES # BLD AUTO: 0.94 10*3/MM3 (ref 0.1–0.9)
MONOCYTES NFR BLD AUTO: 13.8 % (ref 5–12)
NEUTROPHILS NFR BLD AUTO: 4.5 10*3/MM3 (ref 1.7–7)
NEUTROPHILS NFR BLD AUTO: 66.3 % (ref 42.7–76)
NRBC BLD AUTO-RTO: 0 /100 WBC (ref 0–0.2)
NT-PROBNP SERPL-MCNC: 3190 PG/ML (ref 0–900)
PLATELET # BLD AUTO: 173 10*3/MM3 (ref 140–450)
PMV BLD AUTO: 10.1 FL (ref 6–12)
POTASSIUM SERPL-SCNC: 4.3 MMOL/L (ref 3.5–5.2)
PROT SERPL-MCNC: 4.9 G/DL (ref 6–8.5)
QT INTERVAL: 382 MS
QTC INTERVAL: 440 MS
RBC # BLD AUTO: 2.5 10*6/MM3 (ref 3.77–5.28)
SODIUM SERPL-SCNC: 138 MMOL/L (ref 136–145)
TROPONIN T % DELTA: 5
TROPONIN T NUMERIC DELTA: 4 NG/L
TROPONIN T SERPL HS-MCNC: 83 NG/L
WBC NRBC COR # BLD AUTO: 6.79 10*3/MM3 (ref 3.4–10.8)
WHOLE BLOOD HOLD COAG: NORMAL
WHOLE BLOOD HOLD SPECIMEN: NORMAL

## 2025-08-15 PROCEDURE — 71045 X-RAY EXAM CHEST 1 VIEW: CPT

## 2025-08-15 PROCEDURE — 93005 ELECTROCARDIOGRAM TRACING: CPT

## 2025-08-15 PROCEDURE — 71275 CT ANGIOGRAPHY CHEST: CPT

## 2025-08-15 PROCEDURE — 85379 FIBRIN DEGRADATION QUANT: CPT | Performed by: STUDENT IN AN ORGANIZED HEALTH CARE EDUCATION/TRAINING PROGRAM

## 2025-08-15 PROCEDURE — 25510000001 IOPAMIDOL PER 1 ML: Performed by: STUDENT IN AN ORGANIZED HEALTH CARE EDUCATION/TRAINING PROGRAM

## 2025-08-15 PROCEDURE — 80053 COMPREHEN METABOLIC PANEL: CPT | Performed by: STUDENT IN AN ORGANIZED HEALTH CARE EDUCATION/TRAINING PROGRAM

## 2025-08-15 PROCEDURE — 85025 COMPLETE CBC W/AUTO DIFF WBC: CPT | Performed by: STUDENT IN AN ORGANIZED HEALTH CARE EDUCATION/TRAINING PROGRAM

## 2025-08-15 PROCEDURE — 99285 EMERGENCY DEPT VISIT HI MDM: CPT | Performed by: STUDENT IN AN ORGANIZED HEALTH CARE EDUCATION/TRAINING PROGRAM

## 2025-08-15 PROCEDURE — 25810000003 SODIUM CHLORIDE 0.9 % SOLUTION: Performed by: STUDENT IN AN ORGANIZED HEALTH CARE EDUCATION/TRAINING PROGRAM

## 2025-08-15 PROCEDURE — 93005 ELECTROCARDIOGRAM TRACING: CPT | Performed by: STUDENT IN AN ORGANIZED HEALTH CARE EDUCATION/TRAINING PROGRAM

## 2025-08-15 PROCEDURE — 83880 ASSAY OF NATRIURETIC PEPTIDE: CPT | Performed by: STUDENT IN AN ORGANIZED HEALTH CARE EDUCATION/TRAINING PROGRAM

## 2025-08-15 PROCEDURE — 25010000002 FUROSEMIDE PER 20 MG: Performed by: STUDENT IN AN ORGANIZED HEALTH CARE EDUCATION/TRAINING PROGRAM

## 2025-08-15 PROCEDURE — 84484 ASSAY OF TROPONIN QUANT: CPT | Performed by: STUDENT IN AN ORGANIZED HEALTH CARE EDUCATION/TRAINING PROGRAM

## 2025-08-15 PROCEDURE — 36415 COLL VENOUS BLD VENIPUNCTURE: CPT

## 2025-08-15 PROCEDURE — 96374 THER/PROPH/DIAG INJ IV PUSH: CPT

## 2025-08-15 RX ORDER — IOPAMIDOL 755 MG/ML
100 INJECTION, SOLUTION INTRAVASCULAR
Status: COMPLETED | OUTPATIENT
Start: 2025-08-15 | End: 2025-08-15

## 2025-08-15 RX ORDER — FUROSEMIDE 10 MG/ML
40 INJECTION INTRAMUSCULAR; INTRAVENOUS ONCE
Status: COMPLETED | OUTPATIENT
Start: 2025-08-15 | End: 2025-08-15

## 2025-08-15 RX ADMIN — SODIUM CHLORIDE 1000 ML: 9 INJECTION, SOLUTION INTRAVENOUS at 02:31

## 2025-08-15 RX ADMIN — FUROSEMIDE 40 MG: 10 INJECTION, SOLUTION INTRAMUSCULAR; INTRAVENOUS at 06:02

## 2025-08-15 RX ADMIN — IOPAMIDOL 100 ML: 755 INJECTION, SOLUTION INTRAVENOUS at 02:40

## 2025-08-19 ENCOUNTER — TELEPHONE (OUTPATIENT)
Dept: CARDIOLOGY CLINIC | Age: 70
End: 2025-08-19

## 2025-08-19 ENCOUNTER — DOCUMENTATION (OUTPATIENT)
Dept: CARDIOLOGY | Facility: CLINIC | Age: 70
End: 2025-08-19
Payer: MEDICARE

## 2025-08-19 LAB
CV ZIO AF AVG BPM: 124 BPM
CV ZIO AF BPM HIGH: 189 BPM
CV ZIO AF BPM LOW: 76 BPM
CV ZIO AF F EPI AVG BPM: 142 BPM
CV ZIO AF F EPI BPM HIGH: 189 BPM
CV ZIO AF F EPI BPM LOW: 81 BPM
CV ZIO AF F EPI DT: NORMAL
CV ZIO AF L EPI AVG BPM: 127 BPM
CV ZIO AF L EPI BPM HIGH: 152 BPM
CV ZIO AF L EPI BPM LOW: 103 BPM
CV ZIO AF L EPI DUR: NORMAL SEC
CV ZIO AF L EPI END: NORMAL
CV ZIO AF L EPI START: NORMAL
CV ZIO AF PERCENT: 20 %
CV ZIO AF S EPI AVG BPM: 95 BPM
CV ZIO AF S EPI BPM HIGH: 115 BPM
CV ZIO AF S EPI BPM LOW: 76 BPM
CV ZIO AF S EPI DT: NORMAL
CV ZIO AF SYMPT IN PT: NORMAL
CV ZIO BASELINE AVG BPM: 83 BPM
CV ZIO BASELINE BPM HIGH: 189 BPM
CV ZIO BASELINE BPM LOW: 29 BPM
CV ZIO DEVICE ANALYSIS TIME: NORMAL
CV ZIO ECT SVE COUNT: NORMAL EPISODES
CV ZIO ECT SVE CPLT COUNT: 485 EPISODES
CV ZIO ECT SVE CPLT FREQ: NORMAL
CV ZIO ECT SVE FREQ: NORMAL
CV ZIO ECT SVE TPLT COUNT: 653 EPISODES
CV ZIO ECT SVE TPLT FREQ: NORMAL
CV ZIO ECT VE COUNT: 5972 EPISODES
CV ZIO ECT VE CPLT COUNT: 120 EPISODES
CV ZIO ECT VE CPLT FREQ: NORMAL
CV ZIO ECT VE FREQ: NORMAL
CV ZIO ECT VE TPLT COUNT: 1 EPISODES
CV ZIO ECT VE TPLT FREQ: NORMAL
CV ZIO ECTOPIC SVE COUPLET RAW PERCENT: 0.14 %
CV ZIO ECTOPIC SVE ISOLATED PERCENT: 1.82 %
CV ZIO ECTOPIC SVE TRIPLET RAW PERCENT: 0.29 %
CV ZIO ECTOPIC VE COUPLET RAW PERCENT: 0.04 %
CV ZIO ECTOPIC VE ISOLATED PERCENT: 0.88 %
CV ZIO ECTOPIC VE TRIPLET RAW PERCENT: 0 %
CV ZIO ENROLLMENT END: NORMAL
CV ZIO ENROLLMENT START: NORMAL
CV ZIO IRREG TYPE: NORMAL
CV ZIO L BIGEMINY DUR: 4.2 SEC
CV ZIO L BIGEMINY END: NORMAL
CV ZIO L BIGEMINY START: NORMAL
CV ZIO L TRIGEMINY DUR: 4.9 SEC
CV ZIO L TRIGEMINY END: NORMAL
CV ZIO L TRIGEMINY START: NORMAL
CV ZIO PATIENT EVENTS DIARIES: 0
CV ZIO PATIENT EVENTS TRIGGERS: 4
CV ZIO PAUSE COUNT: 0
CV ZIO PRESCRIPTION STATUS: NORMAL
CV ZIO SVT COUNT: 0
CV ZIO TOTAL  ENROLLMENT PERIOD: NORMAL
CV ZIO VT AVG BPM: 174 BPM
CV ZIO VT BPM HIGH: 185 BPM
CV ZIO VT BPM LOW: 158 BPM
CV ZIO VT COUNT: 3
CV ZIO VT F EPI AVG BPM: 168
CV ZIO VT F EPI BEATS: 5 BEATS
CV ZIO VT F EPI BPM HIGH: 185
CV ZIO VT F EPI BPM LOW: 158
CV ZIO VT F EPI DUR: 1.9 SEC
CV ZIO VT F EPI END: NORMAL
CV ZIO VT F EPI START: NORMAL
CV ZIO VT L EPI AVG BPM: 168
CV ZIO VT L EPI BEATS: 5 BEATS
CV ZIO VT L EPI BPM HIGH: 185 BPM
CV ZIO VT L EPI BPM LOW: 158 BPM
CV ZIO VT L EPI DUR: 1.9
CV ZIO VT L EPI END: NORMAL
CV ZIO VT L EPI START: NORMAL

## 2025-08-21 ENCOUNTER — TELEPHONE (OUTPATIENT)
Dept: CARDIOLOGY CLINIC | Age: 70
End: 2025-08-21

## 2025-08-25 ENCOUNTER — TELEPHONE (OUTPATIENT)
Dept: CARDIOLOGY | Facility: CLINIC | Age: 70
End: 2025-08-25
Payer: MEDICARE

## (undated) DEVICE — GLOVE SURG SZ 75 CRM LTX FREE POLYISOPRENE POLYMER BEAD ANTI

## (undated) DEVICE — SUT SILK 3/0 SUTUPAK TIES 24IN SA74H

## (undated) DEVICE — POWERED LIGATING AND DIVIDING STAPLER: Brand: POWERED LDS

## (undated) DEVICE — FRCP BIOP ENDO CAPTURA/PRO SERR SPK 2.8MM 230CM

## (undated) DEVICE — DRSNG SURESITE WNDW 4X4.5

## (undated) DEVICE — DRESSING GRMCDL D0.75N CNTR HOLE D1.5MM BLUE CHLRHXDNE GNT A

## (undated) DEVICE — STAPLER INT L75MM CUT LN L73MM STPL LN L77MM BLU B FRM 8

## (undated) DEVICE — ASTOUND STANDARD SURGICAL GOWN, XL: Brand: CONVERTORS

## (undated) DEVICE — ANTIBACTERIAL UNDYED BRAIDED (POLYGLACTIN 910), SYNTHETIC ABSORBABLE SUTURE: Brand: COATED VICRYL

## (undated) DEVICE — GAUZE,SPONGE,2"X2",8PLY,STERILE,LF,2'S: Brand: MEDLINE

## (undated) DEVICE — SUTURE VCRL + SZ 3-0 L18IN ABSRB UD SH 1/2 CIR TAPERCUT NDL VCP864D

## (undated) DEVICE — MASK,OXYGEN,MED CONC,ADLT,7' TUB, UC: Brand: PENDING

## (undated) DEVICE — DRSNG BRDR MEPILEXLITE SLFADHR SIL 2X5

## (undated) DEVICE — MASTISOL ADHESIVE LIQ 2/3ML

## (undated) DEVICE — YANKAUER,BULB TIP WITH VENT: Brand: ARGYLE

## (undated) DEVICE — INTENDED FOR TISSUE SEPARATION, AND OTHER PROCEDURES THAT REQUIRE A SHARP SURGICAL BLADE TO PUNCTURE OR CUT.: Brand: BARD-PARKER ®  SAFETY SCALPED

## (undated) DEVICE — SOLUTION IV IRRIG POUR BRL 0.9% SODIUM CHL 2F7124

## (undated) DEVICE — CONMED SCOPE SAVER BITE BLOCK, 20X27 MM: Brand: SCOPE SAVER

## (undated) DEVICE — GLV SURG SENSICARE W/ALOE PF LF 7.5 STRL

## (undated) DEVICE — STAPLER INT L60MM REG TISS BLU B FRM 8 FIRING 2 ROW AUTO

## (undated) DEVICE — INTENDED FOR TISSUE SEPARATION, AND OTHER PROCEDURES THAT REQUIRE A SHARP SURGICAL BLADE TO PUNCTURE OR CUT.: Brand: BARD-PARKER ® STAINLESS STEEL BLADES

## (undated) DEVICE — SYR LUERLOK 20CC BX/50

## (undated) DEVICE — STERILE (14X122CM) TELESCOPICALLY-FOLDED COVER: Brand: CIV-CLEAR™ TRANSDUCER COVER

## (undated) DEVICE — SWAB CULT AERO TWIN MD

## (undated) DEVICE — SUT VIC 4/0 P3 18IN UD VCP494H

## (undated) DEVICE — ASTOUND STANDARD SURGICAL GOWN, XXL: Brand: CONVERTORS

## (undated) DEVICE — JACKSON-PRATT 100CC BULB RESERVOIR: Brand: CARDINAL HEALTH

## (undated) DEVICE — SUTURE PERMAHAND SZ 3-0 L18IN NONABSORBABLE BLK L26MM SH C013D

## (undated) DEVICE — DRESSING NEG PRSS OPN ABD FEN VIS PROTCT LAYR PERF FOAM DRP 3706055S] KCI THERAPEUTIC SERVICES]

## (undated) DEVICE — Device: Brand: DEFENDO AIR/WATER/SUCTION AND BIOPSY VALVE

## (undated) DEVICE — TOWEL,OR,DSP,ST,BLUE,DLX,4/PK,20PK/CS: Brand: MEDLINE

## (undated) DEVICE — KIT DSG 26X15X1.6CM PD 1 PERF POLYUR FOAM THN DISP

## (undated) DEVICE — BNDG ADHS CURAD FLX/FABRC 1X3IN STRL LF

## (undated) DEVICE — THE CHANNEL CLEANING BRUSH IS A NYLON FLEXI BRUSH ATTACHED TO A FLEXIBLE PLASTIC SHEATH DESIGNED TO SAFELY REMOVE DEBRIS FROM FLEXIBLE ENDOSCOPES.

## (undated) DEVICE — DRAIN JACKSON PRATT ROUND 15FR: Brand: CARDINAL HEALTH

## (undated) DEVICE — YANKAUER,POOLE TIP,STERILE,50/CS: Brand: MEDLINE

## (undated) DEVICE — PROXIMATE RH ROTATING HEAD SKIN STAPLERS (35 REGULAR) CONTAINS 35 STAINLESS STEEL STAPLES: Brand: PROXIMATE

## (undated) DEVICE — POOLE SUCTION INSTRUMENT WITH REMOVABLE SHEATH: Brand: POOLE

## (undated) DEVICE — SUTURE PERMAHAND SZ 2-0 L18IN NONABSORBABLE BLK L26MM SH C012D

## (undated) DEVICE — SYR SLP TP 10ML DISP

## (undated) DEVICE — GOWN,PREVENTION PLUS,2XL,ST,22/CS: Brand: MEDLINE

## (undated) DEVICE — SUT ETHLN 5/0 P3 18IN 698H

## (undated) DEVICE — PAD MINOR UNIVERSAL: Brand: MEDLINE INDUSTRIES, INC.

## (undated) DEVICE — CULT ANAERB

## (undated) DEVICE — SUT MNCRYL 4/0 PS2 27IN UD MCP426H

## (undated) DEVICE — SUT SILK 2/0 SUTUPAK TIES 24IN SA75H

## (undated) DEVICE — SENSR O2 OXIMAX FNGR A/ 18IN NONSTR

## (undated) DEVICE — KT CATH TAL PALINDROME SAPPHIRE 14.5F 19CM

## (undated) DEVICE — SYR PRECISIONGLIDE LL 5CC 20X1 1/2IN

## (undated) DEVICE — PACK,UNIVERSAL,NO GOWNS: Brand: MEDLINE

## (undated) DEVICE — STAPLER INT L55MM CUT LN L53MM STPL LN L57MM BLU B FRM 8

## (undated) DEVICE — PK ENT HD AND NK 30

## (undated) DEVICE — BULB SYRINGE, IRRIGATION WITH PROTECTIVE CAP, 60 CC, INDIVIDUALLY WRAPPED: Brand: DOVER

## (undated) DEVICE — GLV SURG BIOGEL M LTX PF 7 1/2

## (undated) DEVICE — PROXIMATE RH ROTATING HEAD SKIN STAPLERS (35 WIDE) CONTAINS 35 STAINLESS STEEL STAPLES: Brand: PROXIMATE

## (undated) DEVICE — STRIP,CLOSURE,WOUND,MEDI-STRIP,1/2X4: Brand: MEDLINE

## (undated) DEVICE — SUTURE VCRL SZ 3-0 L18IN ABSRB UD L26MM SH 1/2 CIR J864D

## (undated) DEVICE — SUTURE VCRL SZ 2-0 L18IN ABSRB VLT POLYGLACTIN 910 BRAID J105T

## (undated) DEVICE — CUFF,BP,DISP,1 TUBE,ADULT,HP: Brand: MEDLINE

## (undated) DEVICE — SUT SILK 4/0 SUTUPAK TIES 24IN SA73H

## (undated) DEVICE — SUT SILK 2/0 SH CR8 18IN CR8 C012D

## (undated) DEVICE — MAJOR CDS

## (undated) DEVICE — GLOVE SURG SZ 85 L12IN FNGR ORTHO 126MIL CRM LTX FREE

## (undated) DEVICE — SUT ETHLN 2/0 FS 18IN 664H

## (undated) DEVICE — SNAP KOVER: Brand: UNBRANDED

## (undated) DEVICE — SUTURE ABSORBABLE BRAIDED 0 CT-1 8X27 IN UD VICRYL JJ41G

## (undated) DEVICE — ST MIC/INTRO ACC SHRP/NDL TUNG/TP NITNL 5F 45CM 7CM

## (undated) DEVICE — GOWN,PREVENTION PLUS,XL,ST,24/CS: Brand: MEDLINE

## (undated) DEVICE — PK TURNOVER RM ADV